# Patient Record
Sex: FEMALE | Race: WHITE | NOT HISPANIC OR LATINO | Employment: OTHER | URBAN - NONMETROPOLITAN AREA
[De-identification: names, ages, dates, MRNs, and addresses within clinical notes are randomized per-mention and may not be internally consistent; named-entity substitution may affect disease eponyms.]

---

## 2019-05-06 ENCOUNTER — HOSPITAL ENCOUNTER (INPATIENT)
Facility: HOSPITAL | Age: 74
LOS: 6 days | Discharge: HOME | End: 2019-05-12
Attending: EMERGENCY MEDICINE | Admitting: INTERNAL MEDICINE

## 2019-05-06 ENCOUNTER — APPOINTMENT (OUTPATIENT)
Dept: GENERAL RADIOLOGY | Facility: HOSPITAL | Age: 74
End: 2019-05-06

## 2019-05-06 DIAGNOSIS — J43.9 PULMONARY EMPHYSEMA, UNSPECIFIED EMPHYSEMA TYPE (HCC): Primary | ICD-10-CM

## 2019-05-06 DIAGNOSIS — Z78.9 POOR TOLERANCE FOR ACTIVITY: ICD-10-CM

## 2019-05-06 DIAGNOSIS — J44.1 COPD WITH ACUTE EXACERBATION (HCC): ICD-10-CM

## 2019-05-06 DIAGNOSIS — Z74.09 IMPAIRED MOBILITY AND ENDURANCE: ICD-10-CM

## 2019-05-06 PROBLEM — E43 SEVERE MALNUTRITION (HCC): Status: ACTIVE | Noted: 2019-05-06

## 2019-05-06 PROBLEM — R91.8 INFILTRATE OF LUNG PRESENT ON CHEST X-RAY: Status: ACTIVE | Noted: 2019-05-06

## 2019-05-06 PROBLEM — J96.22 ACUTE ON CHRONIC RESPIRATORY FAILURE WITH HYPERCAPNIA (HCC): Status: ACTIVE | Noted: 2019-05-06

## 2019-05-06 LAB
ALBUMIN SERPL-MCNC: 3.9 G/DL (ref 3.5–5)
ALBUMIN/GLOB SERPL: 1.5 G/DL (ref 1.1–2.5)
ALP SERPL-CCNC: 68 U/L (ref 24–120)
ALT SERPL W P-5'-P-CCNC: 18 U/L (ref 0–54)
ANION GAP SERPL CALCULATED.3IONS-SCNC: ABNORMAL MMOL/L (ref 4–13)
ARTERIAL PATENCY WRIST A: POSITIVE
AST SERPL-CCNC: 22 U/L (ref 7–45)
ATMOSPHERIC PRESS: 752 MMHG
BACTERIA UR QL AUTO: ABNORMAL /HPF
BASE EXCESS BLDA CALC-SCNC: 17.3 MMOL/L (ref 0–2)
BASOPHILS # BLD AUTO: 0.03 10*3/MM3 (ref 0–0.2)
BASOPHILS NFR BLD AUTO: 0.2 % (ref 0–2)
BDY SITE: ABNORMAL
BILIRUB SERPL-MCNC: 0.9 MG/DL (ref 0.1–1)
BILIRUB UR QL STRIP: NEGATIVE
BODY TEMPERATURE: 37 C
BUN BLD-MCNC: 16 MG/DL (ref 5–21)
BUN/CREAT SERPL: 76.2 (ref 7–25)
CALCIUM SPEC-SCNC: 9.7 MG/DL (ref 8.4–10.4)
CHLORIDE SERPL-SCNC: 89 MMOL/L (ref 98–110)
CLARITY UR: CLEAR
CO2 SERPL-SCNC: >40 MMOL/L (ref 24–31)
COLOR UR: YELLOW
CREAT BLD-MCNC: 0.21 MG/DL (ref 0.5–1.4)
D-LACTATE SERPL-SCNC: 1.2 MMOL/L (ref 0.5–2)
DEPRECATED RDW RBC AUTO: 45.7 FL (ref 40–54)
EOSINOPHIL # BLD AUTO: 0.03 10*3/MM3 (ref 0–0.7)
EOSINOPHIL NFR BLD AUTO: 0.2 % (ref 0–4)
ERYTHROCYTE [DISTWIDTH] IN BLOOD BY AUTOMATED COUNT: 13.1 % (ref 12–15)
FLUAV AG NPH QL: NEGATIVE
FLUBV AG NPH QL IA: NEGATIVE
GAS FLOW AIRWAY: 4 LPM
GFR SERPL CREATININE-BSD FRML MDRD: >150 ML/MIN/1.73
GLOBULIN UR ELPH-MCNC: 2.6 GM/DL
GLUCOSE BLD-MCNC: 108 MG/DL (ref 70–100)
GLUCOSE UR STRIP-MCNC: NEGATIVE MG/DL
HCO3 BLDA-SCNC: 46.1 MMOL/L (ref 20–26)
HCT VFR BLD AUTO: 39 % (ref 37–47)
HGB BLD-MCNC: 11.5 G/DL (ref 12–16)
HGB UR QL STRIP.AUTO: NEGATIVE
HOLD SPECIMEN: NORMAL
HYALINE CASTS UR QL AUTO: ABNORMAL /LPF
IMM GRANULOCYTES # BLD AUTO: 0.07 10*3/MM3 (ref 0–0.05)
IMM GRANULOCYTES NFR BLD AUTO: 0.4 % (ref 0–5)
INR PPP: 0.79 (ref 0.91–1.09)
KETONES UR QL STRIP: NEGATIVE
LEUKOCYTE ESTERASE UR QL STRIP.AUTO: ABNORMAL
LYMPHOCYTES # BLD AUTO: 0.94 10*3/MM3 (ref 0.72–4.86)
LYMPHOCYTES NFR BLD AUTO: 5.7 % (ref 15–45)
Lab: ABNORMAL
Lab: ABNORMAL
MCH RBC QN AUTO: 28 PG (ref 28–32)
MCHC RBC AUTO-ENTMCNC: 29.5 G/DL (ref 33–36)
MCV RBC AUTO: 95.1 FL (ref 82–98)
MODALITY: ABNORMAL
MONOCYTES # BLD AUTO: 1.25 10*3/MM3 (ref 0.19–1.3)
MONOCYTES NFR BLD AUTO: 7.6 % (ref 4–12)
NEUTROPHILS # BLD AUTO: 14.2 10*3/MM3 (ref 1.87–8.4)
NEUTROPHILS NFR BLD AUTO: 85.9 % (ref 39–78)
NITRITE UR QL STRIP: NEGATIVE
NOTIFIED BY: ABNORMAL
NOTIFIED WHO: ABNORMAL
NRBC BLD AUTO-RTO: 0 /100 WBC (ref 0–0.2)
NT-PROBNP SERPL-MCNC: 130 PG/ML (ref 0–900)
PCO2 BLDA: 80.1 MM HG (ref 35–45)
PH BLDA: 7.37 PH UNITS (ref 7.35–7.45)
PH UR STRIP.AUTO: 6.5 [PH] (ref 5–8)
PLATELET # BLD AUTO: 142 10*3/MM3 (ref 130–400)
PMV BLD AUTO: 12.8 FL (ref 6–12)
PO2 BLDA: 119 MM HG (ref 83–108)
POTASSIUM BLD-SCNC: 4.1 MMOL/L (ref 3.5–5.3)
PROCALCITONIN SERPL-MCNC: <0.25 NG/ML
PROT SERPL-MCNC: 6.5 G/DL (ref 6.3–8.7)
PROT UR QL STRIP: NEGATIVE
PROTHROMBIN TIME: 11.2 SECONDS (ref 11.9–14.6)
RBC # BLD AUTO: 4.1 10*6/MM3 (ref 4.2–5.4)
RBC # UR: ABNORMAL /HPF
REF LAB TEST METHOD: ABNORMAL
SAO2 % BLDCOA: 99 % (ref 94–99)
SODIUM BLD-SCNC: 137 MMOL/L (ref 135–145)
SP GR UR STRIP: 1.01 (ref 1–1.03)
SQUAMOUS #/AREA URNS HPF: ABNORMAL /HPF
TROPONIN I SERPL-MCNC: <0.012 NG/ML (ref 0–0.03)
UROBILINOGEN UR QL STRIP: ABNORMAL
VENTILATOR MODE: ABNORMAL
WBC NRBC COR # BLD: 16.52 10*3/MM3 (ref 4.8–10.8)
WBC UR QL AUTO: ABNORMAL /HPF
WHOLE BLOOD HOLD SPECIMEN: NORMAL
WHOLE BLOOD HOLD SPECIMEN: NORMAL

## 2019-05-06 PROCEDURE — 99284 EMERGENCY DEPT VISIT MOD MDM: CPT

## 2019-05-06 PROCEDURE — 82803 BLOOD GASES ANY COMBINATION: CPT

## 2019-05-06 PROCEDURE — 83605 ASSAY OF LACTIC ACID: CPT | Performed by: EMERGENCY MEDICINE

## 2019-05-06 PROCEDURE — 93010 ELECTROCARDIOGRAM REPORT: CPT | Performed by: INTERNAL MEDICINE

## 2019-05-06 PROCEDURE — 25010000002 METHYLPREDNISOLONE PER 125 MG: Performed by: EMERGENCY MEDICINE

## 2019-05-06 PROCEDURE — 93005 ELECTROCARDIOGRAM TRACING: CPT | Performed by: EMERGENCY MEDICINE

## 2019-05-06 PROCEDURE — 94640 AIRWAY INHALATION TREATMENT: CPT

## 2019-05-06 PROCEDURE — 84145 PROCALCITONIN (PCT): CPT | Performed by: EMERGENCY MEDICINE

## 2019-05-06 PROCEDURE — 87804 INFLUENZA ASSAY W/OPTIC: CPT | Performed by: EMERGENCY MEDICINE

## 2019-05-06 PROCEDURE — 36600 WITHDRAWAL OF ARTERIAL BLOOD: CPT

## 2019-05-06 PROCEDURE — 71046 X-RAY EXAM CHEST 2 VIEWS: CPT

## 2019-05-06 PROCEDURE — 80053 COMPREHEN METABOLIC PANEL: CPT | Performed by: EMERGENCY MEDICINE

## 2019-05-06 PROCEDURE — 84484 ASSAY OF TROPONIN QUANT: CPT | Performed by: EMERGENCY MEDICINE

## 2019-05-06 PROCEDURE — 25010000002 CEFTRIAXONE PER 250 MG: Performed by: EMERGENCY MEDICINE

## 2019-05-06 PROCEDURE — 94644 CONT INHLJ TX 1ST HOUR: CPT

## 2019-05-06 PROCEDURE — 94799 UNLISTED PULMONARY SVC/PX: CPT

## 2019-05-06 PROCEDURE — 81001 URINALYSIS AUTO W/SCOPE: CPT | Performed by: EMERGENCY MEDICINE

## 2019-05-06 PROCEDURE — 85610 PROTHROMBIN TIME: CPT | Performed by: EMERGENCY MEDICINE

## 2019-05-06 PROCEDURE — 85025 COMPLETE CBC W/AUTO DIFF WBC: CPT | Performed by: EMERGENCY MEDICINE

## 2019-05-06 PROCEDURE — 87040 BLOOD CULTURE FOR BACTERIA: CPT | Performed by: EMERGENCY MEDICINE

## 2019-05-06 PROCEDURE — 83880 ASSAY OF NATRIURETIC PEPTIDE: CPT | Performed by: EMERGENCY MEDICINE

## 2019-05-06 RX ORDER — AZITHROMYCIN 250 MG/1
500 TABLET, FILM COATED ORAL ONCE
Status: COMPLETED | OUTPATIENT
Start: 2019-05-06 | End: 2019-05-06

## 2019-05-06 RX ORDER — BUDESONIDE AND FORMOTEROL FUMARATE DIHYDRATE 160; 4.5 UG/1; UG/1
2 AEROSOL RESPIRATORY (INHALATION)
COMMUNITY
End: 2019-06-05 | Stop reason: SDUPTHER

## 2019-05-06 RX ORDER — METHYLPREDNISOLONE SODIUM SUCCINATE 125 MG/2ML
125 INJECTION, POWDER, LYOPHILIZED, FOR SOLUTION INTRAMUSCULAR; INTRAVENOUS ONCE
Status: COMPLETED | OUTPATIENT
Start: 2019-05-06 | End: 2019-05-06

## 2019-05-06 RX ORDER — IPRATROPIUM BROMIDE AND ALBUTEROL SULFATE 2.5; .5 MG/3ML; MG/3ML
3 SOLUTION RESPIRATORY (INHALATION) ONCE
Status: COMPLETED | OUTPATIENT
Start: 2019-05-06 | End: 2019-05-06

## 2019-05-06 RX ORDER — CLOTRIMAZOLE 10 MG/1
10 LOZENGE ORAL; TOPICAL
Status: DISCONTINUED | OUTPATIENT
Start: 2019-05-07 | End: 2019-05-12

## 2019-05-06 RX ORDER — ALBUTEROL SULFATE 2.5 MG/3ML
2.5 SOLUTION RESPIRATORY (INHALATION)
Status: COMPLETED | OUTPATIENT
Start: 2019-05-06 | End: 2019-05-06

## 2019-05-06 RX ADMIN — CEFTRIAXONE SODIUM 1 G: 1 INJECTION, POWDER, FOR SOLUTION INTRAMUSCULAR; INTRAVENOUS at 23:10

## 2019-05-06 RX ADMIN — AZITHROMYCIN 500 MG: 250 TABLET, FILM COATED ORAL at 21:19

## 2019-05-06 RX ADMIN — ALBUTEROL SULFATE 2.5 MG: 2.5 SOLUTION RESPIRATORY (INHALATION) at 21:15

## 2019-05-06 RX ADMIN — IPRATROPIUM BROMIDE AND ALBUTEROL SULFATE 3 ML: 2.5; .5 SOLUTION RESPIRATORY (INHALATION) at 21:14

## 2019-05-06 RX ADMIN — ALBUTEROL SULFATE 2.5 MG: 2.5 SOLUTION RESPIRATORY (INHALATION) at 21:16

## 2019-05-06 RX ADMIN — METHYLPREDNISOLONE SODIUM SUCCINATE 125 MG: 125 INJECTION, POWDER, FOR SOLUTION INTRAMUSCULAR; INTRAVENOUS at 21:19

## 2019-05-06 RX ADMIN — ALBUTEROL SULFATE 2.5 MG: 2.5 SOLUTION RESPIRATORY (INHALATION) at 21:17

## 2019-05-07 ENCOUNTER — APPOINTMENT (OUTPATIENT)
Dept: CARDIOLOGY | Facility: HOSPITAL | Age: 74
End: 2019-05-07

## 2019-05-07 PROBLEM — Z85.038 HISTORY OF COLON CANCER: Status: ACTIVE | Noted: 2019-05-07

## 2019-05-07 PROBLEM — D69.6 THROMBOCYTOPENIA (HCC): Status: ACTIVE | Noted: 2019-05-07

## 2019-05-07 PROBLEM — J18.9 COMMUNITY ACQUIRED PNEUMONIA OF RIGHT LOWER LOBE OF LUNG: Status: ACTIVE | Noted: 2019-05-07

## 2019-05-07 PROBLEM — T50.905A HYPERGLYCEMIA, DRUG-INDUCED: Status: ACTIVE | Noted: 2019-05-07

## 2019-05-07 PROBLEM — R73.9 HYPERGLYCEMIA, DRUG-INDUCED: Status: ACTIVE | Noted: 2019-05-07

## 2019-05-07 PROBLEM — D64.9 ANEMIA: Status: ACTIVE | Noted: 2019-05-07

## 2019-05-07 LAB
ALBUMIN SERPL-MCNC: 3.5 G/DL (ref 3.5–5)
ALBUMIN/GLOB SERPL: 1.3 G/DL (ref 1.1–2.5)
ALP SERPL-CCNC: 61 U/L (ref 24–120)
ALT SERPL W P-5'-P-CCNC: 19 U/L (ref 0–54)
ANION GAP SERPL CALCULATED.3IONS-SCNC: ABNORMAL MMOL/L (ref 4–13)
ARTICHOKE IGE QN: 51 MG/DL (ref 0–99)
AST SERPL-CCNC: 17 U/L (ref 7–45)
BH CV ECHO MEAS - AO MAX PG (FULL): 9.9 MMHG
BH CV ECHO MEAS - AO MAX PG: 13.7 MMHG
BH CV ECHO MEAS - AO MEAN PG (FULL): 4 MMHG
BH CV ECHO MEAS - AO MEAN PG: 5 MMHG
BH CV ECHO MEAS - AO ROOT AREA (BSA CORRECTED): 2.2
BH CV ECHO MEAS - AO ROOT AREA: 6.6 CM^2
BH CV ECHO MEAS - AO ROOT DIAM: 2.9 CM
BH CV ECHO MEAS - AO V2 MAX: 185 CM/SEC
BH CV ECHO MEAS - AO V2 MEAN: 97.6 CM/SEC
BH CV ECHO MEAS - AO V2 VTI: 29.2 CM
BH CV ECHO MEAS - AVA(I,A): 1.4 CM^2
BH CV ECHO MEAS - AVA(I,D): 1.4 CM^2
BH CV ECHO MEAS - AVA(V,A): 1.3 CM^2
BH CV ECHO MEAS - AVA(V,D): 1.3 CM^2
BH CV ECHO MEAS - BSA(HAYCOCK): 1.3 M^2
BH CV ECHO MEAS - BSA: 1.3 M^2
BH CV ECHO MEAS - BZI_BMI: 14.3 KILOGRAMS/M^2
BH CV ECHO MEAS - BZI_METRIC_HEIGHT: 160 CM
BH CV ECHO MEAS - BZI_METRIC_WEIGHT: 36.7 KG
BH CV ECHO MEAS - EDV(CUBED): 50.9 ML
BH CV ECHO MEAS - EDV(MOD-SP4): 83.7 ML
BH CV ECHO MEAS - EDV(TEICH): 58.3 ML
BH CV ECHO MEAS - EF(CUBED): 56.8 %
BH CV ECHO MEAS - EF(MOD-SP4): 64.6 %
BH CV ECHO MEAS - EF(TEICH): 49.3 %
BH CV ECHO MEAS - ESV(CUBED): 22 ML
BH CV ECHO MEAS - ESV(MOD-SP4): 29.6 ML
BH CV ECHO MEAS - ESV(TEICH): 29.6 ML
BH CV ECHO MEAS - FS: 24.4 %
BH CV ECHO MEAS - IVS/LVPW: 1.2
BH CV ECHO MEAS - IVSD: 0.84 CM
BH CV ECHO MEAS - LA DIMENSION: 2.6 CM
BH CV ECHO MEAS - LA/AO: 0.9
BH CV ECHO MEAS - LAT PEAK E' VEL: 9.8 CM/SEC
BH CV ECHO MEAS - LV DIASTOLIC VOL/BSA (35-75): 63.6 ML/M^2
BH CV ECHO MEAS - LV MASS(C)D: 78.7 GRAMS
BH CV ECHO MEAS - LV MASS(C)DI: 59.8 GRAMS/M^2
BH CV ECHO MEAS - LV MAX PG: 3.7 MMHG
BH CV ECHO MEAS - LV MEAN PG: 1 MMHG
BH CV ECHO MEAS - LV SYSTOLIC VOL/BSA (12-30): 22.5 ML/M^2
BH CV ECHO MEAS - LV V1 MAX: 96.8 CM/SEC
BH CV ECHO MEAS - LV V1 MEAN: 52.1 CM/SEC
BH CV ECHO MEAS - LV V1 VTI: 17.4 CM
BH CV ECHO MEAS - LVIDD: 3.7 CM
BH CV ECHO MEAS - LVIDS: 2.8 CM
BH CV ECHO MEAS - LVLD AP4: 6.7 CM
BH CV ECHO MEAS - LVLS AP4: 5.5 CM
BH CV ECHO MEAS - LVOT AREA (M): 2.5 CM^2
BH CV ECHO MEAS - LVOT AREA: 2.4 CM^2
BH CV ECHO MEAS - LVOT DIAM: 1.8 CM
BH CV ECHO MEAS - LVPWD: 0.71 CM
BH CV ECHO MEAS - MED PEAK E' VEL: 8.38 CM/SEC
BH CV ECHO MEAS - MR MAX PG: 41.5 MMHG
BH CV ECHO MEAS - MR MAX VEL: 322 CM/SEC
BH CV ECHO MEAS - MV A MAX VEL: 106 CM/SEC
BH CV ECHO MEAS - MV DEC TIME: 0.2 SEC
BH CV ECHO MEAS - MV E MAX VEL: 80.5 CM/SEC
BH CV ECHO MEAS - MV E/A: 0.76
BH CV ECHO MEAS - SI(AO): 146.4 ML/M^2
BH CV ECHO MEAS - SI(CUBED): 21.9 ML/M^2
BH CV ECHO MEAS - SI(LVOT): 31.8 ML/M^2
BH CV ECHO MEAS - SI(MOD-SP4): 41.1 ML/M^2
BH CV ECHO MEAS - SI(TEICH): 21.8 ML/M^2
BH CV ECHO MEAS - SV(AO): 192.9 ML
BH CV ECHO MEAS - SV(CUBED): 28.9 ML
BH CV ECHO MEAS - SV(LVOT): 41.9 ML
BH CV ECHO MEAS - SV(MOD-SP4): 54.1 ML
BH CV ECHO MEAS - SV(TEICH): 28.8 ML
BH CV ECHO MEAS - TR MAX VEL: 208 CM/SEC
BH CV ECHO MEASUREMENTS AVERAGE E/E' RATIO: 8.86
BILIRUB SERPL-MCNC: 0.6 MG/DL (ref 0.1–1)
BUN BLD-MCNC: 15 MG/DL (ref 5–21)
BUN/CREAT SERPL: 71.4 (ref 7–25)
CALCIUM SPEC-SCNC: 9.4 MG/DL (ref 8.4–10.4)
CHLORIDE SERPL-SCNC: 93 MMOL/L (ref 98–110)
CHOLEST SERPL-MCNC: 145 MG/DL (ref 130–200)
CO2 SERPL-SCNC: >40 MMOL/L (ref 24–31)
CREAT BLD-MCNC: 0.21 MG/DL (ref 0.5–1.4)
DEPRECATED RDW RBC AUTO: 43.7 FL (ref 40–54)
ERYTHROCYTE [DISTWIDTH] IN BLOOD BY AUTOMATED COUNT: 13.1 % (ref 12–15)
GFR SERPL CREATININE-BSD FRML MDRD: >150 ML/MIN/1.73
GLOBULIN UR ELPH-MCNC: 2.6 GM/DL
GLUCOSE BLD-MCNC: 138 MG/DL (ref 70–100)
HBA1C MFR BLD: 5 %
HCT VFR BLD AUTO: 34 % (ref 37–47)
HDLC SERPL-MCNC: 82 MG/DL
HGB BLD-MCNC: 10.1 G/DL (ref 12–16)
HYPOCHROMIA BLD QL: ABNORMAL
LDLC/HDLC SERPL: 0.69 {RATIO}
LEFT ATRIUM VOLUME INDEX: 34.6 ML/M2
LEFT ATRIUM VOLUME: 45.7 CM3
LYMPHOCYTES # BLD MANUAL: 0 10*3/MM3 (ref 0.72–4.86)
LYMPHOCYTES NFR BLD MANUAL: 0 % (ref 15–45)
MAXIMAL PREDICTED HEART RATE: 148 BPM
MCH RBC QN AUTO: 27.3 PG (ref 28–32)
MCHC RBC AUTO-ENTMCNC: 29.7 G/DL (ref 33–36)
MCV RBC AUTO: 91.9 FL (ref 82–98)
NEUTROPHILS # BLD AUTO: 11.12 10*3/MM3 (ref 1.87–8.4)
NEUTROPHILS NFR BLD MANUAL: 100 % (ref 39–78)
PLAT MORPH BLD: NORMAL
PLATELET # BLD AUTO: 112 10*3/MM3 (ref 130–400)
PMV BLD AUTO: 13.1 FL (ref 6–12)
POIKILOCYTOSIS BLD QL SMEAR: ABNORMAL
POTASSIUM BLD-SCNC: 4.1 MMOL/L (ref 3.5–5.3)
PROT SERPL-MCNC: 6.1 G/DL (ref 6.3–8.7)
RBC # BLD AUTO: 3.7 10*6/MM3 (ref 4.2–5.4)
SODIUM BLD-SCNC: 138 MMOL/L (ref 135–145)
STRESS TARGET HR: 126 BPM
TRIGL SERPL-MCNC: 33 MG/DL (ref 0–149)
TSH SERPL DL<=0.05 MIU/L-ACNC: 0.05 MIU/ML (ref 0.47–4.68)
WBC MORPH BLD: NORMAL
WBC NRBC COR # BLD: 11.12 10*3/MM3 (ref 4.8–10.8)

## 2019-05-07 PROCEDURE — 93306 TTE W/DOPPLER COMPLETE: CPT

## 2019-05-07 PROCEDURE — 80061 LIPID PANEL: CPT | Performed by: NURSE PRACTITIONER

## 2019-05-07 PROCEDURE — 83036 HEMOGLOBIN GLYCOSYLATED A1C: CPT | Performed by: NURSE PRACTITIONER

## 2019-05-07 PROCEDURE — 25010000002 AZITHROMYCIN PER 500 MG: Performed by: NURSE PRACTITIONER

## 2019-05-07 PROCEDURE — 84443 ASSAY THYROID STIM HORMONE: CPT | Performed by: NURSE PRACTITIONER

## 2019-05-07 PROCEDURE — 94760 N-INVAS EAR/PLS OXIMETRY 1: CPT

## 2019-05-07 PROCEDURE — 80053 COMPREHEN METABOLIC PANEL: CPT | Performed by: NURSE PRACTITIONER

## 2019-05-07 PROCEDURE — 94799 UNLISTED PULMONARY SVC/PX: CPT

## 2019-05-07 PROCEDURE — 25010000002 METHYLPREDNISOLONE PER 125 MG: Performed by: NURSE PRACTITIONER

## 2019-05-07 PROCEDURE — 25010000002 CEFTRIAXONE PER 250 MG: Performed by: NURSE PRACTITIONER

## 2019-05-07 PROCEDURE — 93306 TTE W/DOPPLER COMPLETE: CPT | Performed by: INTERNAL MEDICINE

## 2019-05-07 PROCEDURE — 85025 COMPLETE CBC W/AUTO DIFF WBC: CPT | Performed by: NURSE PRACTITIONER

## 2019-05-07 PROCEDURE — 85007 BL SMEAR W/DIFF WBC COUNT: CPT | Performed by: NURSE PRACTITIONER

## 2019-05-07 RX ORDER — METHYLPREDNISOLONE SODIUM SUCCINATE 125 MG/2ML
60 INJECTION, POWDER, LYOPHILIZED, FOR SOLUTION INTRAMUSCULAR; INTRAVENOUS EVERY 8 HOURS
Status: DISCONTINUED | OUTPATIENT
Start: 2019-05-07 | End: 2019-05-08

## 2019-05-07 RX ORDER — SODIUM CHLORIDE 9 MG/ML
50 INJECTION, SOLUTION INTRAVENOUS CONTINUOUS
Status: DISCONTINUED | OUTPATIENT
Start: 2019-05-07 | End: 2019-05-09

## 2019-05-07 RX ORDER — SODIUM CHLORIDE 0.9 % (FLUSH) 0.9 %
3-10 SYRINGE (ML) INJECTION AS NEEDED
Status: DISCONTINUED | OUTPATIENT
Start: 2019-05-07 | End: 2019-05-12 | Stop reason: HOSPADM

## 2019-05-07 RX ORDER — ONDANSETRON 2 MG/ML
4 INJECTION INTRAMUSCULAR; INTRAVENOUS EVERY 6 HOURS PRN
Status: DISCONTINUED | OUTPATIENT
Start: 2019-05-07 | End: 2019-05-12 | Stop reason: HOSPADM

## 2019-05-07 RX ORDER — ONDANSETRON 4 MG/1
4 TABLET, FILM COATED ORAL EVERY 6 HOURS PRN
Status: DISCONTINUED | OUTPATIENT
Start: 2019-05-07 | End: 2019-05-12 | Stop reason: HOSPADM

## 2019-05-07 RX ORDER — SODIUM CHLORIDE 0.9 % (FLUSH) 0.9 %
3 SYRINGE (ML) INJECTION EVERY 12 HOURS SCHEDULED
Status: DISCONTINUED | OUTPATIENT
Start: 2019-05-07 | End: 2019-05-12 | Stop reason: HOSPADM

## 2019-05-07 RX ORDER — BENZONATATE 100 MG/1
200 CAPSULE ORAL 3 TIMES DAILY PRN
Status: DISCONTINUED | OUTPATIENT
Start: 2019-05-07 | End: 2019-05-12 | Stop reason: HOSPADM

## 2019-05-07 RX ORDER — ACETAMINOPHEN 325 MG/1
650 TABLET ORAL EVERY 4 HOURS PRN
Status: DISCONTINUED | OUTPATIENT
Start: 2019-05-07 | End: 2019-05-12 | Stop reason: HOSPADM

## 2019-05-07 RX ORDER — BUDESONIDE AND FORMOTEROL FUMARATE DIHYDRATE 160; 4.5 UG/1; UG/1
2 AEROSOL RESPIRATORY (INHALATION)
Status: DISCONTINUED | OUTPATIENT
Start: 2019-05-07 | End: 2019-05-12 | Stop reason: HOSPADM

## 2019-05-07 RX ORDER — ROPINIROLE 0.25 MG/1
0.25 TABLET, FILM COATED ORAL NIGHTLY
Status: DISCONTINUED | OUTPATIENT
Start: 2019-05-07 | End: 2019-05-12 | Stop reason: HOSPADM

## 2019-05-07 RX ORDER — METHYLPREDNISOLONE SODIUM SUCCINATE 125 MG/2ML
60 INJECTION, POWDER, LYOPHILIZED, FOR SOLUTION INTRAMUSCULAR; INTRAVENOUS EVERY 6 HOURS SCHEDULED
Status: DISCONTINUED | OUTPATIENT
Start: 2019-05-07 | End: 2019-05-07

## 2019-05-07 RX ORDER — IPRATROPIUM BROMIDE AND ALBUTEROL SULFATE 2.5; .5 MG/3ML; MG/3ML
3 SOLUTION RESPIRATORY (INHALATION)
Status: DISCONTINUED | OUTPATIENT
Start: 2019-05-07 | End: 2019-05-12 | Stop reason: HOSPADM

## 2019-05-07 RX ORDER — GUAIFENESIN 600 MG/1
1200 TABLET, EXTENDED RELEASE ORAL EVERY 12 HOURS SCHEDULED
Status: DISCONTINUED | OUTPATIENT
Start: 2019-05-07 | End: 2019-05-09

## 2019-05-07 RX ADMIN — AZITHROMYCIN MONOHYDRATE 500 MG: 500 INJECTION, POWDER, LYOPHILIZED, FOR SOLUTION INTRAVENOUS at 23:13

## 2019-05-07 RX ADMIN — GUAIFENESIN 1200 MG: 600 TABLET, EXTENDED RELEASE ORAL at 00:32

## 2019-05-07 RX ADMIN — IPRATROPIUM BROMIDE AND ALBUTEROL SULFATE 3 ML: 2.5; .5 SOLUTION RESPIRATORY (INHALATION) at 06:16

## 2019-05-07 RX ADMIN — SODIUM CHLORIDE, PRESERVATIVE FREE 3 ML: 5 INJECTION INTRAVENOUS at 00:36

## 2019-05-07 RX ADMIN — CLOTRIMAZOLE 10 MG: 10 LOZENGE ORAL; TOPICAL at 21:41

## 2019-05-07 RX ADMIN — METHYLPREDNISOLONE SODIUM SUCCINATE 60 MG: 125 INJECTION, POWDER, FOR SOLUTION INTRAMUSCULAR; INTRAVENOUS at 06:16

## 2019-05-07 RX ADMIN — IPRATROPIUM BROMIDE AND ALBUTEROL SULFATE 3 ML: 2.5; .5 SOLUTION RESPIRATORY (INHALATION) at 00:36

## 2019-05-07 RX ADMIN — IPRATROPIUM BROMIDE AND ALBUTEROL SULFATE 3 ML: 2.5; .5 SOLUTION RESPIRATORY (INHALATION) at 23:49

## 2019-05-07 RX ADMIN — CLOTRIMAZOLE 10 MG: 10 LOZENGE ORAL; TOPICAL at 12:08

## 2019-05-07 RX ADMIN — SODIUM CHLORIDE 100 ML/HR: 9 INJECTION, SOLUTION INTRAVENOUS at 21:48

## 2019-05-07 RX ADMIN — CLOTRIMAZOLE 10 MG: 10 LOZENGE ORAL; TOPICAL at 14:41

## 2019-05-07 RX ADMIN — IPRATROPIUM BROMIDE AND ALBUTEROL SULFATE 3 ML: 2.5; .5 SOLUTION RESPIRATORY (INHALATION) at 11:38

## 2019-05-07 RX ADMIN — CLOTRIMAZOLE 10 MG: 10 LOZENGE ORAL; TOPICAL at 17:30

## 2019-05-07 RX ADMIN — BUDESONIDE AND FORMOTEROL FUMARATE DIHYDRATE 2 PUFF: 160; 4.5 AEROSOL RESPIRATORY (INHALATION) at 18:21

## 2019-05-07 RX ADMIN — CEFTRIAXONE SODIUM 1 G: 1 INJECTION, POWDER, FOR SOLUTION INTRAMUSCULAR; INTRAVENOUS at 21:42

## 2019-05-07 RX ADMIN — SODIUM CHLORIDE, PRESERVATIVE FREE 3 ML: 5 INJECTION INTRAVENOUS at 21:52

## 2019-05-07 RX ADMIN — METHYLPREDNISOLONE SODIUM SUCCINATE 60 MG: 125 INJECTION, POWDER, FOR SOLUTION INTRAMUSCULAR; INTRAVENOUS at 00:32

## 2019-05-07 RX ADMIN — ROPINIROLE HYDROCHLORIDE 0.25 MG: 0.25 TABLET, FILM COATED ORAL at 21:41

## 2019-05-07 RX ADMIN — METHYLPREDNISOLONE SODIUM SUCCINATE 60 MG: 125 INJECTION, POWDER, FOR SOLUTION INTRAMUSCULAR; INTRAVENOUS at 21:42

## 2019-05-07 RX ADMIN — CLOTRIMAZOLE 10 MG: 10 LOZENGE ORAL; TOPICAL at 08:14

## 2019-05-07 RX ADMIN — METHYLPREDNISOLONE SODIUM SUCCINATE 60 MG: 125 INJECTION, POWDER, FOR SOLUTION INTRAMUSCULAR; INTRAVENOUS at 14:41

## 2019-05-07 RX ADMIN — BUDESONIDE AND FORMOTEROL FUMARATE DIHYDRATE 2 PUFF: 160; 4.5 AEROSOL RESPIRATORY (INHALATION) at 06:16

## 2019-05-07 RX ADMIN — SODIUM CHLORIDE 100 ML/HR: 9 INJECTION, SOLUTION INTRAVENOUS at 12:08

## 2019-05-07 RX ADMIN — IPRATROPIUM BROMIDE AND ALBUTEROL SULFATE 3 ML: 2.5; .5 SOLUTION RESPIRATORY (INHALATION) at 18:21

## 2019-05-07 RX ADMIN — GUAIFENESIN 1200 MG: 600 TABLET, EXTENDED RELEASE ORAL at 21:41

## 2019-05-07 RX ADMIN — SODIUM CHLORIDE 100 ML/HR: 9 INJECTION, SOLUTION INTRAVENOUS at 00:36

## 2019-05-07 RX ADMIN — GUAIFENESIN 1200 MG: 600 TABLET, EXTENDED RELEASE ORAL at 08:14

## 2019-05-07 NOTE — PLAN OF CARE
Problem: Patient Care Overview  Goal: Plan of Care Review  Outcome: Ongoing (interventions implemented as appropriate)   05/07/19 1611   Coping/Psychosocial   Plan of Care Reviewed With patient   Plan of Care Review   Progress no change   OTHER   Outcome Summary no c/o pain. safety maintained. 4L NC o2 continuous.        Problem: Chronic Obstructive Pulmonary Disease (Adult)  Goal: Signs and Symptoms of Listed Potential Problems Will be Absent, Minimized or Managed (Chronic Obstructive Pulmonary Disease)  Outcome: Ongoing (interventions implemented as appropriate)      Problem: Fall Risk (Adult)  Goal: Identify Related Risk Factors and Signs and Symptoms  Outcome: Ongoing (interventions implemented as appropriate)    Goal: Absence of Fall  Outcome: Ongoing (interventions implemented as appropriate)

## 2019-05-07 NOTE — PROGRESS NOTES
Discharge Planning Assessment  [facility]     Patient Name: Heide Newsome  MRN: [id number]  Today's Date: 5/7/2019    Admit Date: 5/6/2019    Discharge Needs Assessment     Row Name 05/07/19 0940       Living Environment    Lives With  spouse    Name(s) of Who Lives With Patient  Myke    Current Living Arrangements  home/apartment/condo    Primary Care Provided by  self;spouse/significant other    Provides Primary Care For  no one, unable/limited ability to care for self    Family Caregiver if Needed  spouse    Quality of Family Relationships  supportive;involved;helpful    Able to Return to Prior Arrangements  yes       Resource/Environmental Concerns    Resource/Environmental Concerns  none    Transportation Concerns  car, none       Transition Planning    Patient/Family Anticipates Transition to  home with family    Patient/Family Anticipated Services at Transition  home health care    Transportation Anticipated  family or friend will provide       Discharge Needs Assessment    Readmission Within the Last 30 Days  no previous admission in last 30 days    Concerns to be Addressed  basic needs    Equipment Currently Used at Home  oxygen;shower chair    Anticipated Changes Related to Illness  none    Equipment Needed After Discharge  none    Outpatient/Agency/Support Group Needs  homecare agency    Discharge Facility/Level of Care Needs  home with home health    Offered/Gave Vendor List  yes    Patient's Choice of Community Agency(s)  [facility]     Current Discharge Risk  chronically ill        Discharge Plan     Row Name 05/07/19 0942       Plan    Plan  Request HH care    Patient/Family in Agreement with Plan  yes    Plan Comments  Spoke with pt/spouse in room to assess for home needs. Pt lives at home with spouse and plans same.  They are very interested in getting HH care started at home, informed Dr. Quezada.  Pt has needed DME to include O2 from [facility].  Pt does have RX coverage. Will follow.          Destination      No service coordination in this encounter.      Durable Medical Equipment      No service coordination in this encounter.      Dialysis/Infusion      No service coordination in this encounter.      Home Medical Care      No service coordination in this encounter.      Therapy      No service coordination in this encounter.      Community Resources      No service coordination in this encounter.          Demographic Summary    No documentation.       Functional Status    No documentation.       Psychosocial    No documentation.       Abuse/Neglect    No documentation.       Legal    No documentation.       Substance Abuse    No documentation.       Patient Forms    No documentation.           XAVIER Erickson

## 2019-05-07 NOTE — ED PROVIDER NOTES
Subjective   74-year-old female presenting to the emergency department with shortness of breath.  She feels like her COPD has been getting worse since last night.  Patient denies any recent hospitalizations antibiotics or steroid use.  Patient states that she has been using her breathing treatments however this is not helped.  Patient denies any chest pain.  Patient states that she has had a worsening cough.  Patient is normally on 2 L of oxygen at home.            Review of Systems   Respiratory: Positive for shortness of breath and wheezing.        No past medical history on file.    Allergies   Allergen Reactions   • Tetracyclines & Related Anaphylaxis   • Penicillins Itching       No past surgical history on file.    No family history on file.    Social History     Socioeconomic History   • Marital status:      Spouse name: Not on file   • Number of children: Not on file   • Years of education: Not on file   • Highest education level: Not on file           Objective   Physical Exam   Constitutional: She is oriented to person, place, and time. She appears well-developed and well-nourished.   HENT:   Head: Normocephalic and atraumatic.   Eyes: EOM are normal. Pupils are equal, round, and reactive to light.   Neck: Normal range of motion. Neck supple.   Cardiovascular: Normal rate, regular rhythm and normal heart sounds.   2+ pulses in upper and lower extremities   Pulmonary/Chest: Effort normal. She has wheezes. She has rales.   Abdominal: Soft. Bowel sounds are normal.   Musculoskeletal: Normal range of motion.   Neurological: She is alert and oriented to person, place, and time.   Skin: Skin is warm. Capillary refill takes less than 2 seconds.   Psychiatric: She has a normal mood and affect. Her behavior is normal. Thought content normal.   Nursing note and vitals reviewed.      Procedures           ED Course  ED Course as of May 06 2148   Mon May 06, 2019   2135 Low suspicion for sepsis likely COPD  exacerbation causing lab abnormalities and vital sign abnormalities.  [AP]   2145 Patient be admitted for COPD exacerbation.  Patient currently improving with steroids and antibiotics as well as nebulizer treatments.  Patient's case discussed with Dr. Matson.  The PCO2 is 80 pH is normal and likely this is a chronic retention of PCO2 secondary to the patient's COPD.  [AP]      ED Course User Index  [AP] Reyna Eastman MD                  [facility]      Final diagnoses:   Pulmonary emphysema, unspecified emphysema type (CMS/[facility])            Reyna Eastman MD  05/06/19 0004

## 2019-05-07 NOTE — PROGRESS NOTES
[facility]  INPATIENT PROGRESS NOTE    Patient Name: Heide Newsome  Date of Admission: 5/6/2019  Today's Date: 05/07/19  Length of Stay: 1  Primary Care Physician: Provider, No Known    Subjective   Chief Complaint: dyspnea     HPI   Currently sitting up in the bed on 4 L nasal cannula.  She states that she is still short of breath.  She appears uncomfortable.  Family at the bedside.  She states that she has restless legs and they are really bothering her.  Currently being treated for PNA.      Review of Systems   Constitutional: Positive for activity change and fatigue. Negative for appetite change, chills and fever.   HENT: Negative for hearing loss, nosebleeds, tinnitus and trouble swallowing.    Eyes: Negative for visual disturbance.   Respiratory: Positive for cough and shortness of breath. Negative for chest tightness and wheezing.    Cardiovascular: Negative for chest pain, palpitations and leg swelling.   Gastrointestinal: Negative for abdominal distention, abdominal pain, blood in stool, constipation, diarrhea, nausea and vomiting.   Endocrine: Negative for cold intolerance, heat intolerance, polydipsia, polyphagia and polyuria.   Genitourinary: Negative for decreased urine volume, difficulty urinating, dysuria, flank pain, frequency and hematuria.   Musculoskeletal: Positive for myalgias. Negative for arthralgias and joint swelling.   Skin: Negative for rash.   Allergic/Immunologic: Negative for immunocompromised state.   Neurological: Positive for weakness. Negative for dizziness, syncope, light-headedness and headaches.   Hematological: Negative for adenopathy. Does not bruise/bleed easily.   Psychiatric/Behavioral: Negative for confusion and sleep disturbance. The patient is not nervous/anxious.       All pertinent negatives and positives are as above. All other systems have been reviewed and are negative unless otherwise stated.     Objective     Temp:  [98 °F (36.7 °C)-99.2 °F (37.3 °C)] 98.3 °F (36.8 °C)  Heart Rate:  [] 117  Resp:  [16-25] 17  BP: (111-156)/(57-89) 156/72     Intake/Output Summary (Last 24 hours) at 5/7/2019 1245  Last data filed at 5/7/2019 1210  Gross per 24 hour   Intake 1000 ml   Output --   Net 1000 ml     Physical Exam   Constitutional: She is oriented to person, place, and time. She appears well-developed.   Sitting up in the bed.  NAD.  Thin.   HENT:   Head: Normocephalic and atraumatic.   Eyes: Conjunctivae and EOM are normal. Pupils are equal, round, and reactive to light.   Neck: Neck supple. No JVD present. No thyromegaly present.   Cardiovascular: Regular rhythm, normal heart sounds and intact distal pulses. Tachycardia present. Exam reveals no gallop and no friction rub.   No murmur heard.  Sinus tach 103-109   Pulmonary/Chest: Effort normal and breath sounds normal. No respiratory distress. She has no wheezes. She has no rales. She exhibits no tenderness.   Decreased air movement.  4 L nasal cannula    Abdominal: Soft. Bowel sounds are normal. She exhibits no distension. There is no tenderness. There is no rebound and no guarding.   Genitourinary:   Genitourinary Comments: Voiding.    Musculoskeletal: Normal range of motion. She exhibits no edema, tenderness or deformity.   Lymphadenopathy:     She has no cervical adenopathy.   Neurological: She is alert and oriented to person, place, and time.   Skin: Skin is warm and dry. No rash noted.   Psychiatric: She has a normal mood and affect. Her behavior is normal. Judgment and thought content normal.   Nursing note and vitals reviewed.    Results Review:  I have reviewed the labs, radiology results, and diagnostic studies.    Laboratory Data:   Results from last 7 days   Lab Units 05/07/19  0523 05/06/19  1925   WBC 10*3/mm3 11.12* 16.52*   HEMOGLOBIN g/dL 10.1* 11.5*   HEMATOCRIT % 34.0* 39.0   PLATELETS 10*3/mm3 112* 142     Results from last 7 days   Lab Units  05/07/19 0523 05/06/19 2051   SODIUM mmol/L 138 137   POTASSIUM mmol/L 4.1 4.1   CHLORIDE mmol/L 93* 89*   CO2 mmol/L >40.0* >40.0*   BUN mg/dL 15 16   CREATININE mg/dL 0.21* 0.21*   CALCIUM mg/dL 9.4 9.7   BILIRUBIN mg/dL 0.6 0.9   ALK PHOS U/L 61 68   ALT (SGPT) U/L 19 18   AST (SGOT) U/L 17 22   GLUCOSE mg/dL 138* 108*     Radiology Data:   Imaging Results (last 24 hours)     Procedure Component Value Units Date/Time    XR Chest 2 View [[id number]] Collected:  05/06/19 2134     Updated:  05/06/19 2138    Narrative:       EXAMINATION: XR CHEST 2 VW-     5/6/2019 9:05 PM CDT     HISTORY: cough and congestion.     2 view chest x-ray with no comparison.     Normal heart size.  Aortic arch calcification.     Markedly hyperexpanded lungs with chronic interstitial disease. Mild  infiltrate or atelectasis at the right lower lobe.     The upper lobes are clear.  There is no pneumothorax or heart failure.     Summary:  1. Chronic lung changes with mild infiltrate or atelectasis at the right  lower lobe  This report was finalized on 05/06/2019 21:35 by Dr. Ady Mcnamara MD.        I have reviewed the patient's current medications.     Assessment/Plan     Active Hospital Problems    Diagnosis   • **Acute on chronic respiratory failure with hypercapnia (CMS/HCC)   • Community acquired pneumonia of right lower lobe of lung (CMS/HCC)   • COPD with acute exacerbation (CMS/HCC)   • Thrombocytopenia (CMS/HCC)   • Anemia   • Pulmonary emphysema (CMS/HCC)   • Severe malnutrition (CMS/HCC)   • History of colon cancer   • Hyperglycemia, drug-induced     Plan:  1.  Azithromycin and Rocephin day #2  2.  Continue Symbicort, Mucinex, Duonebs,   3.  Decrease Solu-medrol to 60 mg every 8 hours   4.  Start Requip  5.  CBC and BMP in AM  6.  2D echocardiogram pending  7.  Decrease IV fluids to 75 ml/hr  8.  Nutrition consult  9.  Blood cultures in process  10.  Incentive spirometry and aerobika    Discharge Planning: I expect the patient to  be discharged to home in 2-3 days.    Brian Bhatti, JESSENIA   05/07/19   12:45 PM

## 2019-05-07 NOTE — PAYOR COMM NOTE
"[facility]  DAGOBERTO,    [phone]  OR   FAX   [phone]    Heide Benitez (74 y.o. Female)     Date of Birth Social Security Number Address Home Phone MRN    1945  [id number]  [facility] [phone] [id number]    [employer] Marital Status          Voodoo        Admission Date Admission Type Admitting Provider Attending Provider Department, Room/Bed    5/6/19 Emergency Freeman Quezada DO Robinson, Maurice S, DO [facility] 4C, 464/1    Discharge Date Discharge Disposition Discharge Destination                       Attending Provider:  Freeman Quezada DO    Allergies:  Tetracyclines & Related, Penicillins    Isolation:  None   Infection:  None   Code Status:  No CPR    Ht:  160 cm (63\")   Wt:  36.7 kg (81 lb)    Admission Cmt:  None   Principal Problem:  Acute on chronic respiratory failure with hypercapnia (CMS/[facility]) [J96.22]                 Active Insurance as of 5/6/2019     Primary Coverage     Payor Plan Insurance Group Employer/Plan Group    HUMANA MEDICARE REPLACEMENT HUMANA MEDICARE REPL [id number]     Payor Plan Address Payor Plan Phone Number Payor Plan Fax Number Effective Dates    [address] [address] [phone]  1/1/2018 - None Entered    [facility] [id number]       Subscriber Name Subscriber Birth Date Member ID       HEIDE BENITEZ 1945 [id number]                 Emergency Contacts      (Rel.) Home Phone Work Phone Mobile Phone    TON BENITEZ (Spouse) [phone] -- --          5/6/2019 Event Details User   19:06 Patient arrival  Poppy Camp RN   19:06 HPI HPI   Stated Reason for Visit: soa & np congested cough worse since yesterday History Obtained From: patient    Poppy Camp RN   19:06:48 Trigger for Triage Start  Poppy Camp RN   19:06:48 Triage Started  Poppy Camp RN   19:07 Vital Signs Vital Signs   Temp: 98.6 °F (37 °C) Temp src: Oral   Heart Rate: 111 Resp: 22 " "  Resp Rate Source: Visual BP: 138/60   BMI (Calculated): 12.4    Oxygen Therapy   SpO2: 99 % Device (Oxygen Therapy): nasal cannula (per home use)   Vitals Timer   Restart Vitals Timer: Yes    Height and Weight   Height: 160 cm (63\") Height Method: Stated   Weight: 31.8 kg (70 lb) Weight Method: Stated   Other flowsheet entries   Ideal Body Weight k.4     Poppy Camp RN     :29:21 Cardiac (Adult) Cardiac (Adult)   Cardiac WDL: WDL except; rhythm Cardiac Rhythm: tachycardic (110)    Mitra Gifford RN   19:29:32 Neuro Cognitive (Adult) Neuro Cognitive (Adult)   Cognitive/Neuro/Behavioral WDL: WDL     Mitra Gifford RN   19:29:36 Peripheral Neurovascular (Adult) Peripheral Neurovascular (Adult)   Peripheral Neuro Vascular WDL: WDL     Mitra Gifford RN   19:29:39 Respiratory Respiratory   Airway WDL: WDL Additional Documentation: Breath Sounds (Group)   Respiratory WDL   Respiratory WDL: cough Cough Frequency: frequent   Cough Type: dry    Breath Sounds   Breath Sounds: All Fields All Lung Fields Breath Sounds: diminished    Mitra Gifford RN     :14 Medication Given ipratropium-albuterol (DUO-NEB) nebulizer solution 3 mL - Dose: 3 mL ; Route: Nebulization ; Scheduled Time:   Rachel Min RRT   21:14 Data Other flowsheet entries   $ Mini Neb Initial: yes     Rachel Min RRT   21:14 Respiratory Assessment $ Oximetry Charges   $ O2 Pt/System Assessment: yes    Oxygen Therapy   SpO2: 99 % Pulse Oximetry Type: Continuous   Device (Oxygen Therapy): nasal cannula Flow (L/min): 4   Vital Signs   Heart Rate: 104 Heart Rate Source: Monitor   Resp: 23 Resp Rate Source: Monitor   Respiratory   Respiratory WDL: WDL except; breath sounds; cough Rhythm/Pattern, Respiratory: shortness of breath; shallow   Cough Frequency: frequent Cough Type: congested; loose; nonproductive   Cough And Deep Breathing: done independently per patient    Breath Sounds   Breath Sounds: All Fields All Lung " Fields Breath Sounds: Anterior:; Posterior:; diminished   Suction   Cough And Deep Breathing: done independently per patient    Aerosol Therapy (SVN)   Respiratory Treatment Status (SVN): given; continuous $ Continuous Inhalation 1st Hr: yes   Treatment Route (SVN): mask; oxygen Patient Position (SVN): HOB elevated   Interventions   Cough And Deep Breathing: done independently per patient     Rachel Min, RRT   21:15 Medication Given albuterol (PROVENTIL) nebulizer solution 0.083% 2.5 mg/3mL - Dose: 2.5 mg ; Route: Nebulization ; Scheduled Time: 2039  Rachel Min RRT     21:15 Data Other flowsheet entries   $ Mini Neb Subsequent: yes     Rachel Min RRT   21:15 Respiratory Assessment Oxygen Therapy   SpO2: 100 % (Device Time: 21:15:11) Pulse Oximetry Type: (P) Continuous   Device (Oxygen Therapy): (P) aerosol mask Flow (L/min): (P) 8   Vital Signs   Heart Rate: 108 (Device Time: 21:15:11) Heart Rate Source: (P) Monitor   Resp: (P) 22 Resp Rate Source: (P) Monitor   Respiratory   Respiratory WDL: (P) WDL except; breath sounds; cough Rhythm/Pattern, Respiratory: (P) shortness of breath   Cough Frequency: (P) frequent Cough Type: (P) congested; nonproductive; loose   Cough And Deep Breathing: (P) done independently per patient    Breath Sounds   Breath Sounds: (P) All Fields All Lung Fields Breath Sounds: (P) Anterior:; Posterior:; diminished   Suction   Cough And Deep Breathing: (P) done independently per patient    Aerosol Therapy (SVN)   Respiratory Treatment Status (SVN): continuous Treatment Route (SVN): mask; oxygen   Patient Position (SVN): HOB elevated    Interventions   Cough And Deep Breathing: (P) done independently per patient     Rachel Min, RRT   21:16 Medication Given albuterol (PROVENTIL) nebulizer solution 0.083% 2.5 mg/3mL - Dose: 2.5 mg ; Route: Nebulization ; Scheduled Time: 2054  Rachel Min, MEGAN   21:16 Vitals Reassessment Vitals Timer   Restart Vitals Timer: (P) Yes      Rachel Min, RRT   21:16 Data Other flowsheet entries   $ Mini Neb Subsequent: yes     Rachel Min, RRT   21:16 Respiratory Assessment Oxygen Therapy   Pulse Oximetry Type: (P) Continuous Device (Oxygen Therapy): (P) aerosol mask   Flow (L/min): (P) 8    Vital Signs   Heart Rate Source: (P) Monitor Resp: (P) 22   Resp Rate Source: (P) Monitor    Respiratory   Respiratory WDL: (P) WDL except; breath sounds; cough Rhythm/Pattern, Respiratory: (P) shortness of breath   Cough Frequency: (P) frequent Cough Type: (P) congested; loose; nonproductive; good   Cough And Deep Breathing: (P) done independently per patient    Breath Sounds   Breath Sounds: (P) All Fields All Lung Fields Breath Sounds: (P) Anterior:; Posterior:; diminished   Suction   Cough And Deep Breathing: (P) done independently per patient    Aerosol Therapy (SVN)   Respiratory Treatment Status (SVN): continuous Treatment Route (SVN): mask; oxygen   Patient Position (SVN): HOB elevated    Interventions   Cough And Deep Breathing: (P) done independently per patient     Rachel Min, MEGAN   21:16 Custom Formula Data Other flowsheet entries   Last MAP (calculated): 85     Lee Malone RN   21:16 Device Vitals Device Data   Heart Rate: 102 (Device Time: 21:16:11) SpO2: 99 % (Device Time: 21:16:11)   BP: 119/68 (Device Time: 21:16:11)     Lee Malone RN   21:17 Medication Given albuterol (PROVENTIL) nebulizer solution 0.083% 2.5 mg/3mL - Dose: 2.5 mg ; Route: Nebulization ; Scheduled Time: 2109  Rachel Min, RRT   21:17 Data Other flowsheet entries   $ Mini Neb Subsequent: yes     Rachel Min, RRT   21:17 Respiratory Assessment Aerosol Therapy (SVN)   Respiratory Treatment Status (SVN): continuous Treatment Route (SVN): mask; oxygen   Patient Position (SVN): HOB elevated     Rachel Min, RRT   21:19 Medication Given methylPREDNISolone sodium succinate (SOLU-Medrol) injection 125 mg - Dose: 125 mg ; Route:  Intravenous ; Line: Peripheral IV 05/06/19 1926 Right Forearm ; Scheduled Time: 2052  Lee Malone RN   21:19 Medication Given azithromycin (ZITHROMAX) tablet 500 mg - Dose: 500 mg ; Route: Oral ; Scheduled Time: 2052  Lee Malone RN     21:34 Respiratory Assessment Oxygen Therapy   SpO2: 100 % (Device Time: 21:34:01) Pulse Oximetry Type: Continuous   Device (Oxygen Therapy): aerosol mask Flow (L/min): 8   Vital Signs   Heart Rate: 100 (Device Time: 21:34:01) Heart Rate Source: Monitor   Resp: 22 Resp Rate Source: Monitor   Respiratory   Respiratory WDL: WDL except; breath sounds; cough Cough Frequency: frequent   Cough Type: congested; loose; nonproductive; good Cough And Deep Breathing: done independently per patient   Breath Sounds   Breath Sounds: All Fields All Lung Fields Breath Sounds: Anterior:; Posterior:; diminished   Suction   Cough And Deep Breathing: done independently per patient    Aerosol Therapy (SVN)   Respiratory Treatment Status (SVN): continuous Treatment Route (SVN): mask; oxygen   Patient Position (SVN): HOB elevated    Interventions   Cough And Deep Breathing: done independently per patient     Rachel Min, RRT     21:54 Respiratory Assessment Oxygen Therapy   SpO2: 100 % (Device Time: 21:53:50) Pulse Oximetry Type: Continuous   Device (Oxygen Therapy): nasal cannula Flow (L/min): 4   Vital Signs   Heart Rate: 117 (Device Time: 21:53:50) Heart Rate Source: Monitor   Resp: 25 Resp Rate Source: Monitor   Respiratory   Respiratory WDL: WDL except; breath sounds; cough Cough Frequency: frequent   Cough Type: congested; loose; nonproductive; good Cough And Deep Breathing: done independently per patient   Breath Sounds Post-Respiratory Treatment   Throughout All Fields Post-Treatment: All Fields Throughout All Fields Post-Treatment: no change   Suction   Cough And Deep Breathing: done independently per patient    Aerosol Therapy (SVN)   Post Treatment Assessment  (SVN): breath sounds unchanged; patient reports breathing improved Signs of Intolerance (SVN): none   Interventions   Cough And Deep Breathing: done independently per patient     Rachel Min, RRT     23:10 Medication New Bag cefTRIAXone (ROCEPHIN) 1 g/100 mL 0.9% NS (MBP) - Dose: 1 g ; Route: Intravenous ; Line: Peripheral IV 05/06/19 1926 Right Forearm ; Scheduled Time: 2148  Lee Malone, RN   23:16 Custom Formula Data Other flowsheet entries   Last MAP (calculated): 78     Lee Malone RN          05/07/19 0413   Coping/Psychosocial   Plan of Care Reviewed With patient   Plan of Care Review   Progress no change   OTHER   Outcome Summary pt admitted to floor with COPD exacerbation; pt food in take has decreased over the last few weeks r/t trouble breathing while eating; pt had no c/o pain since admit to floor; pt unsteady and weak; up with assist to BSC; pt has rested fair; safety discussed and maintained; will contiue to monitor           History & Physical      Landen Matson MD at 5/6/2019  9:59 PM              [facility] Medicine Services  HISTORY AND PHYSICAL    Date of Admission: 5/6/2019  Primary Care Physician: Provider, No Known    Subjective     Chief Complaint: Shortness of breathing    History of Present Illness  Heide blanca is a 74-year-old female with a past medical history of chronic respiratory failure on 2 L oxygen, COPD, restless leg syndrome and colon cancer.  Patient states she has had worsening shortness of breathing over the past 1 to 2 weeks.  Over the past 2 nights breathing has been so severe that her  finally forced her to come to the emergency department for evaluation.  She does report a cough however is ineffective and unable to produce sputum.  Patient denies chest pain.  She does have some mild pain under the right rib cage radiating towards the back.  Chest x-ray reveals infiltrate in the right lower  lobe.  ABGs revealed PO2 of 119 and PCO2 of 80.1 on 4 L, we have decreased oxygen to 3 L and will monitor.  White count is 16.5 to, no other significant findings noted.  Patient is requesting treatment for thrush she feels a secondary to Symbicort use.  Patient is admitted for further evaluation and treatment.    Review of Systems   10 point review of systems was completed, all negative except for those discussed in HPI    Past Medical History:   Past Medical History:   Diagnosis Date   • Cancer (CMS/HCC), colon    • Chronic respiratory failure (CMS/HCC), 2 L nasal cannula continuously    • COPD (chronic obstructive pulmonary disease) (CMS/HCC)    • Restless leg syndrome        Past Surgical History:   Past Surgical History:   Procedure Laterality Date   • APPENDECTOMY     • COLON RESECTION     • FRACTURE SURGERY     • HYSTERECTOMY         Family History: family history includes COPD in her father; Colon cancer in her mother; Diabetes in her father.    Social History:  reports that she has quit smoking. She has never used smokeless tobacco. She reports that she does not drink alcohol.patient is , retired , reports having stopped heavy tobacco use 2 years ago however she does sneak one every now and then.  Denies alcohol or drug use.    Code Status: Limited, if unable speak for herself her  Myke will speak for her      Allergies:  Allergies   Allergen Reactions   • Tetracyclines & Related Anaphylaxis   • Penicillins Itching       Medications:  No current facility-administered medications on file prior to encounter.      Current Outpatient Medications on File Prior to Encounter   Medication Sig Dispense Refill   • budesonide-formoterol (SYMBICORT) 160-4.5 MCG/ACT inhaler Inhale 2 puffs 2 (Two) Times a Day.     • Chlorphen-Pseudoephed-APAP (TYLENOL ALLERGY COMPLETE PO) Take 2 tablets by mouth Every 6 (Six) Hours As Needed.           Objective     /57   Pulse 112   Temp 99.2 °F (37.3  "°C)   Resp 22   Ht 160 cm (63\")   Wt 31.8 kg (70 lb)   SpO2 95%   BMI 12.40 kg/m²    Physical Exam   Constitutional: She is oriented to person, place, and time. She appears well-developed.   Frail, cachectic   HENT:   Head: Normocephalic and atraumatic.   No oral white plaque noted   Eyes: EOM are normal. Pupils are equal, round, and reactive to light.   Neck: No tracheal deviation present.   Cardiovascular: Regular rhythm and normal heart sounds.   No murmur heard.  Tachycardia   Pulmonary/Chest: She is in respiratory distress. Wheezes: scattered.   Tachypnea   Abdominal: Soft. Bowel sounds are normal. She exhibits no distension.   Musculoskeletal: Normal range of motion.   Generalized weakness   Neurological: She is alert and oriented to person, place, and time.   Skin: Skin is warm and dry. No erythema.   Psychiatric: She has a normal mood and affect. Her behavior is normal.       Pertinent Data:   Lab Results (last 72 hours)     Procedure Component Value Units Date/Time    Blood Culture - Blood, Wrist, Left [[id number]] Collected:  05/06/19 2115    Specimen:  Blood from Wrist, Left Updated:  05/06/19 2131    Procalcitonin [[id number]]  (Normal) Collected:  05/06/19 2051    Specimen:  Blood Updated:  05/06/19 2130     Procalcitonin <0.25 ng/mL     Narrative:       SIRS, sepsis, severe sepsis, and septic shock are categorized according to the criteria of the consensus conference of the American College of Chest Physicians/Society of Critical Care Medicine.    PCT < 0.5 ng/mL     Systemic infection (sepsis) is not likely.    PCT >0.5 and < 2.0 ng/mL Systemic infection (sepsis) is possible, but other conditions are known to elevate PCT as well.    PCT > 2.0 ng/mL     Systemic infection (sepsis) is likely, unless other causes are known.      PCT > 10.0 ng/mL    Important systemic inflammatory response, almost exclusively due to severe bacterial sepsis or septic shock.    PCT values of < 0.5 ng/mL do not exclude an " infection, because localized infections (without systemic signs) may be associated with such low concentrations, or a systemic infection in its initial stages (<6 hours).  Increased PCT can occur without infection.  PCT concentrations between 0.5 and 2.0 ng/mL should be interpreted taking into account the patients history.  It is recommended to retest PCT within 6-24 hours if any concentrations < 2.0 ng/mL are obtained.    BNP [[id number]]  (Normal) Collected:  05/06/19 2051    Specimen:  Blood Updated:  05/06/19 2120     proBNP 130.0 pg/mL     Troponin [[id number]]  (Normal) Collected:  05/06/19 2051    Specimen:  Blood Updated:  05/06/19 2120     Troponin I <0.012 ng/mL     Comprehensive Metabolic Panel [[id number]]  (Abnormal) Collected:  05/06/19 2051    Specimen:  Blood Updated:  05/06/19 2110     Glucose 108 mg/dL      BUN 16 mg/dL      Creatinine 0.21 mg/dL      Sodium 137 mmol/L      Potassium 4.1 mmol/L      Chloride 89 mmol/L      CO2 >40.0 mmol/L      Calcium 9.7 mg/dL      Total Protein 6.5 g/dL      Albumin 3.90 g/dL      ALT (SGPT) 18 U/L      AST (SGOT) 22 U/L      Alkaline Phosphatase 68 U/L      Total Bilirubin 0.9 mg/dL      eGFR Non African Amer >150 mL/min/1.73      Globulin 2.6 gm/dL      A/G Ratio 1.5 g/dL      BUN/Creatinine Ratio 76.2     Anion Gap -- mmol/L      Comment: Unable to calculate Anion Gap.       Narrative:       GFR Normal >60  Chronic Kidney Disease <60  Kidney Failure <15    Urinalysis With Culture If Indicated - Urine, Clean Catch [[id number]]  (Abnormal) Collected:  05/06/19 2048    Specimen:  Urine, Clean Catch Updated:  05/06/19 2109     Color, UA Yellow     Appearance, UA Clear     pH, UA 6.5     Specific Gravity, UA 1.010     Glucose, UA Negative     Ketones, UA Negative     Bilirubin, UA Negative     Blood, UA Negative     Protein, UA Negative     Leuk Esterase, UA Small (1+)     Nitrite, UA Negative     Urobilinogen, UA 0.2 E.U./dL    Urinalysis, Microscopic Only - Urine,  Clean Catch [[id number]]  (Abnormal) Collected:  05/06/19 2048    Specimen:  Urine, Clean Catch Updated:  05/06/19 2109     RBC, UA 3-5 /HPF      WBC, UA 3-5 /HPF      Bacteria, UA None Seen /HPF      Squamous Epithelial Cells, UA 0-2 /HPF      Hyaline Casts, UA 0-2 /LPF      Methodology Automated Microscopy    Protime-INR [[id number]]  (Abnormal) Collected:  05/06/19 2051    Specimen:  Blood Updated:  05/06/19 2109     Protime 11.2 Seconds      INR 0.79    Blood Gas, Arterial [[id number]]  (Abnormal) Collected:  05/06/19 2100    Specimen:  Arterial Blood Updated:  05/06/19 2102     Site Right Radial     Tristen's Test Positive     pH, Arterial 7.369 pH units      pCO2, Arterial 80.1 mm Hg      Comment: 86 Value above critical limit        pO2, Arterial 119.0 mm Hg      Comment: 83 Value above reference range        HCO3, Arterial 46.1 mmol/L      Comment: 83 Value above reference range        Base Excess, Arterial 17.3 mmol/L      Comment: 83 Value above reference range        O2 Saturation, Arterial 99.0 %      Temperature 37.0 C      Barometric Pressure for Blood Gas 752 mmHg      Modality Nasal Cannula     Flow Rate 4.0 lpm      Ventilator Mode NA     Notified [facility] BREE LOPEZ MD     Notified By [id number]     Notified Time 05/06/2019 21:07     Collected by [id number]     Comment: Meter: [id number]     :  [id number]       Influenza Antigen, Rapid - Swab, Nasopharynx [[id number]]  (Normal) Collected:  05/06/19 2035    Specimen:  Swab from Nasopharynx Updated:  05/06/19 2100     Influenza A Ag, EIA Negative     Influenza B Ag, EIA Negative    Narrative:       Recommend confirmation of negative results by viral culture or molecular assay.    Lactic Acid, Plasma [[id number]]  (Normal) Collected:  05/06/19 1925    Specimen:  Blood Updated:  05/06/19 2050     Lactate 1.2 mmol/L     CBC & Differential [[id number]] Collected:  05/06/19 1925    Specimen:  Blood Updated:  05/06/19 2043    Narrative:       The following orders  were created for panel order CBC & Differential.  Procedure                               Abnormality         Status                     ---------                               -----------         ------                     CBC Auto Differential[[id number]]        Abnormal            Final result                 Please view results for these tests on the individual orders.    CBC Auto Differential [[id number]]  (Abnormal) Collected:  05/06/19 1925    Specimen:  Blood Updated:  05/06/19 2043     WBC 16.52 10*3/mm3      RBC 4.10 10*6/mm3      Hemoglobin 11.5 g/dL      Hematocrit 39.0 %      MCV 95.1 fL      MCH 28.0 pg      MCHC 29.5 g/dL      RDW 13.1 %      RDW-SD 45.7 fl      MPV 12.8 fL      Platelets 142 10*3/mm3      Neutrophil % 85.9 %      Lymphocyte % 5.7 %      Monocyte % 7.6 %      Eosinophil % 0.2 %      Basophil % 0.2 %      Immature Grans % 0.4 %      Neutrophils, Absolute 14.20 10*3/mm3      Lymphocytes, Absolute 0.94 10*3/mm3      Monocytes, Absolute 1.25 10*3/mm3      Eosinophils, Absolute 0.03 10*3/mm3      Basophils, Absolute 0.03 10*3/mm3      Immature Grans, Absolute 0.07 10*3/mm3      nRBC 0.0 /100 WBC      Imaging Results (last 24 hours)     Procedure Component Value Units Date/Time    XR Chest 2 View [[id number]] Collected:  05/06/19 2134     Updated:  05/06/19 2138    Narrative:       EXAMINATION: XR CHEST 2 VW-     5/6/2019 9:05 PM CDT     HISTORY: cough and congestion.     2 view chest x-ray with no comparison.     Normal heart size.  Aortic arch calcification.     Markedly hyperexpanded lungs with chronic interstitial disease. Mild  infiltrate or atelectasis at the right lower lobe.     The upper lobes are clear.  There is no pneumothorax or heart failure.     Summary:  1. Chronic lung changes with mild infiltrate or atelectasis at the right  lower lobe  This report was finalized on 05/06/2019 21:35 by Dr. Ady Mcnamara MD.          I have personally reviewed and interpreted the radiology  studies and ECG obtained at time of admission.     Assessment / Plan     Assessment:   Active Hospital Problems    Diagnosis   • **Acute on chronic respiratory failure with hypercapnia (CMS/HCC)   • Pulmonary emphysema (CMS/HCC)   • COPD with acute exacerbation (CMS/HCC)   • Infiltrate of lung present on chest x-ray, right lower lobe   • Severe malnutrition (CMS/HCC)       Plan:   1.  Admit as inpatient  2.  ABGs when arrives to floor  3.  Continue supplemental O2, incentive spirometry, continuous pulse oximetry, Mucinex, duo nebs, Symbicort  4.  Labs in a.m. CBC with differential, CMP, hemoglobin A1c, lipid panel, TSH  5.  Azithromycin and Rocephin  6.  Solu-Medrol 60 mg IV every 6 hours  7.  DVT prophylaxis with SCDs only    I discussed the patient's findings and my recommendations with: Landen Matson MD  Time spent 35-minute      JESSENIA Ceron  05/06/19   11:47 PM     I personally evaluated and examined the patient in conjunction with JESSENIA Ceron and agree with the assessment, treatment plan, and disposition of the patient as recorded by her. My history, exam, and further recommendations are:     Patient seen and evaluated the time of admission with nurse practitioner.  Agree with her assessments plans and her examinations.  Work on pulmonary toilet CT read is positive for pneumonia.  Treat accordingly.    Landen Matson MD  05/07/19  5:18 AM      Electronically signed by Landen Matson MD at 5/7/2019  5:19 AM          Emergency Department Notes      Reyna Eastman MD at 5/6/2019  8:35 PM          Subjective   74-year-old female presenting to the emergency department with shortness of breath.  She feels like her COPD has been getting worse since last night.  Patient denies any recent hospitalizations antibiotics or steroid use.  Patient states that she has been using her breathing treatments however this is not helped.  Patient denies any chest pain.  Patient states that she has  had a worsening cough.  Patient is normally on 2 L of oxygen at home.            Review of Systems   Respiratory: Positive for shortness of breath and wheezing.        No past medical history on file.    Allergies   Allergen Reactions   • Tetracyclines & Related Anaphylaxis   • Penicillins Itching       No past surgical history on file.    No family history on file.    Social History     Socioeconomic History   • Marital status:      Spouse name: Not on file   • Number of children: Not on file   • Years of education: Not on file   • Highest education level: Not on file           Objective   Physical Exam   Constitutional: She is oriented to person, place, and time. She appears well-developed and well-nourished.   HENT:   Head: Normocephalic and atraumatic.   Eyes: EOM are normal. Pupils are equal, round, and reactive to light.   Neck: Normal range of motion. Neck supple.   Cardiovascular: Normal rate, regular rhythm and normal heart sounds.   2+ pulses in upper and lower extremities   Pulmonary/Chest: Effort normal. She has wheezes. She has rales.   Abdominal: Soft. Bowel sounds are normal.   Musculoskeletal: Normal range of motion.   Neurological: She is alert and oriented to person, place, and time.   Skin: Skin is warm. Capillary refill takes less than 2 seconds.   Psychiatric: She has a normal mood and affect. Her behavior is normal. Thought content normal.   Nursing note and vitals reviewed.      Procedures          ED Course  ED Course as of May 06 2148   Mon May 06, 2019   2135 Low suspicion for sepsis likely COPD exacerbation causing lab abnormalities and vital sign abnormalities.  [AP]   2145 Patient be admitted for COPD exacerbation.  Patient currently improving with steroids and antibiotics as well as nebulizer treatments.  Patient's case discussed with Dr. Mtason.  The PCO2 is 80 pH is normal and likely this is a chronic retention of PCO2 secondary to the patient's COPD.  [AP]      ED Course User  Index  [AP] Reyna Eastman MD                  [facility]      Final diagnoses:   Pulmonary emphysema, unspecified emphysema type (CMS/HCC)            Reyna Eastman MD  05/06/19 6970      Electronically signed by Reyna Eastman MD at 5/6/2019  9:48 PM       Hospital Medications (all)       Dose Frequency Start End    acetaminophen (TYLENOL) tablet 650 mg 650 mg Every 4 Hours PRN 5/7/2019     Sig - Route: Take 2 tablets by mouth Every 4 (Four) Hours As Needed for Mild Pain . - Oral    albuterol (PROVENTIL) nebulizer solution 0.083% 2.5 mg/3mL 2.5 mg Every 15 Minutes 5/6/2019 5/6/2019    Sig - Route: Take 2.5 mg by nebulization Every 15 (Fifteen) Minutes. - Nebulization    azithromycin (ZITHROMAX) tablet 500 mg 500 mg Once 5/6/2019 5/6/2019    Sig - Route: Take 2 tablets by mouth 1 (One) Time. - Oral    AZITHROMYCIN 500 MG/250 ML 0.9% NS IVPB (vial-mate) 500 mg Every 24 Hours 5/7/2019 5/14/2019    Sig - Route: Infuse 500 mg into a venous catheter Daily. - Intravenous    benzonatate (TESSALON) capsule 200 mg 200 mg 3 Times Daily PRN 5/7/2019     Sig - Route: Take 2 capsules by mouth 3 (Three) Times a Day As Needed for Cough. - Oral    budesonide-formoterol (SYMBICORT) 160-4.5 MCG/ACT inhaler 2 puff 2 puff 2 Times Daily - RT 5/7/2019     Sig - Route: Inhale 2 puffs 2 (Two) Times a Day. - Inhalation    cefTRIAXone (ROCEPHIN) 1 g/100 mL 0.9% NS (MBP) 1 g Once 5/6/2019 5/6/2019    Sig - Route: Infuse 100 mL into a venous catheter 1 (One) Time. - Intravenous    cefTRIAXone (ROCEPHIN) 1 g/100 mL 0.9% NS (MBP) 1 g Every 24 Hours 5/7/2019     Sig - Route: Infuse 100 mL into a venous catheter Daily. - Intravenous    clotrimazole (MYCELEX) bruce 10 mg 10 mg 5 Times Daily 5/7/2019     Sig - Route: Take 1 tablet by mouth 5 (Five) Times a Day. - Oral    guaiFENesin (MUCINEX) 12 hr tablet 1,200 mg 1,200 mg Every 12 Hours Scheduled 5/7/2019     Sig - Route: Take 2 tablets by mouth Every 12 (Twelve) Hours. - Oral    ipratropium-albuterol  "(DUO-NEB) nebulizer solution 3 mL 3 mL Once 5/6/2019 5/6/2019    Sig - Route: Take 3 mL by nebulization 1 (One) Time. - Nebulization    ipratropium-albuterol (DUO-NEB) nebulizer solution 3 mL 3 mL Every 6 Hours - RT 5/7/2019     Sig - Route: Take 3 mL by nebulization Every 6 (Six) Hours. - Nebulization    methylPREDNISolone sodium succinate (SOLU-Medrol) injection 125 mg 125 mg Once 5/6/2019 5/6/2019    Sig - Route: Infuse 2 mL into a venous catheter 1 (One) Time. - Intravenous    methylPREDNISolone sodium succinate (SOLU-Medrol) injection 60 mg 60 mg Every 6 Hours Scheduled 5/7/2019     Sig - Route: Infuse 0.96 mL into a venous catheter Every 6 (Six) Hours. - Intravenous    ondansetron (ZOFRAN) injection 4 mg 4 mg Every 6 Hours PRN 5/7/2019     Sig - Route: Infuse 2 mL into a venous catheter Every 6 (Six) Hours As Needed for Nausea or Vomiting. - Intravenous    Linked Group 1:  \"Or\" Linked Group Details        ondansetron (ZOFRAN) tablet 4 mg 4 mg Every 6 Hours PRN 5/7/2019     Sig - Route: Take 1 tablet by mouth Every 6 (Six) Hours As Needed for Nausea or Vomiting. - Oral    Linked Group 1:  \"Or\" Linked Group Details        sodium chloride 0.9 % flush 3 mL 3 mL Every 12 Hours Scheduled 5/7/2019     Sig - Route: Infuse 3 mL into a venous catheter Every 12 (Twelve) Hours. - Intravenous    sodium chloride 0.9 % flush 3-10 mL 3-10 mL As Needed 5/7/2019     Sig - Route: Infuse 3-10 mL into a venous catheter As Needed for Line Care. - Intravenous    sodium chloride 0.9 % infusion 100 mL/hr Continuous 5/7/2019     Sig - Route: Infuse 100 mL/hr into a venous catheter Continuous. - Intravenous            "

## 2019-05-07 NOTE — H&P
[facility] Medicine Services  HISTORY AND PHYSICAL    Date of Admission: 5/6/2019  Primary Care Physician: Provider, No Known    Subjective     Chief Complaint: Shortness of breathing    History of Present Illness  Heide blanca is a 74-year-old female with a past medical history of chronic respiratory failure on 2 L oxygen, COPD, restless leg syndrome and colon cancer.  Patient states she has had worsening shortness of breathing over the past 1 to 2 weeks.  Over the past 2 nights breathing has been so severe that her  finally forced her to come to the emergency department for evaluation.  She does report a cough however is ineffective and unable to produce sputum.  Patient denies chest pain.  She does have some mild pain under the right rib cage radiating towards the back.  Chest x-ray reveals infiltrate in the right lower lobe.  ABGs revealed PO2 of 119 and PCO2 of 80.1 on 4 L, we have decreased oxygen to 3 L and will monitor.  White count is 16.5 to, no other significant findings noted.  Patient is requesting treatment for thrush she feels a secondary to Symbicort use.  Patient is admitted for further evaluation and treatment.    Review of Systems   10 point review of systems was completed, all negative except for those discussed in HPI    Past Medical History:   Past Medical History:   Diagnosis Date   • Cancer (CMS/HCC), colon    • Chronic respiratory failure (CMS/HCC), 2 L nasal cannula continuously    • COPD (chronic obstructive pulmonary disease) (CMS/HCC)    • Restless leg syndrome        Past Surgical History:   Past Surgical History:   Procedure Laterality Date   • APPENDECTOMY     • COLON RESECTION     • FRACTURE SURGERY     • HYSTERECTOMY         Family History: family history includes COPD in her father; Colon cancer in her mother; Diabetes in her father.    Social History:  reports that she has quit smoking. She has never used smokeless tobacco. She  "reports that she does not drink alcohol.patient is , retired , reports having stopped heavy tobacco use 2 years ago however she does sneak one every now and then.  Denies alcohol or drug use.    Code Status: Limited, if unable speak for herself her  Myke will speak for her      Allergies:  Allergies   Allergen Reactions   • Tetracyclines & Related Anaphylaxis   • Penicillins Itching       Medications:  No current facility-administered medications on file prior to encounter.      Current Outpatient Medications on File Prior to Encounter   Medication Sig Dispense Refill   • budesonide-formoterol (SYMBICORT) 160-4.5 MCG/ACT inhaler Inhale 2 puffs 2 (Two) Times a Day.     • Chlorphen-Pseudoephed-APAP (TYLENOL ALLERGY COMPLETE PO) Take 2 tablets by mouth Every 6 (Six) Hours As Needed.           Objective     /57   Pulse 112   Temp 99.2 °F (37.3 °C)   Resp 22   Ht 160 cm (63\")   Wt 31.8 kg (70 lb)   SpO2 95%   BMI 12.40 kg/m²   Physical Exam   Constitutional: She is oriented to person, place, and time. She appears well-developed.   Frail, cachectic   HENT:   Head: Normocephalic and atraumatic.   No oral white plaque noted   Eyes: EOM are normal. Pupils are equal, round, and reactive to light.   Neck: No tracheal deviation present.   Cardiovascular: Regular rhythm and normal heart sounds.   No murmur heard.  Tachycardia   Pulmonary/Chest: She is in respiratory distress. Wheezes: scattered.   Tachypnea   Abdominal: Soft. Bowel sounds are normal. She exhibits no distension.   Musculoskeletal: Normal range of motion.   Generalized weakness   Neurological: She is alert and oriented to person, place, and time.   Skin: Skin is warm and dry. No erythema.   Psychiatric: She has a normal mood and affect. Her behavior is normal.       Pertinent Data:   Lab Results (last 72 hours)     Procedure Component Value Units Date/Time    Blood Culture - Blood, Wrist, Left [[id number]] Collected:  " 05/06/19 2115    Specimen:  Blood from Wrist, Left Updated:  05/06/19 2131    Procalcitonin [[id number]]  (Normal) Collected:  05/06/19 2051    Specimen:  Blood Updated:  05/06/19 2130     Procalcitonin <0.25 ng/mL     Narrative:       SIRS, sepsis, severe sepsis, and septic shock are categorized according to the criteria of the consensus conference of the American College of Chest Physicians/Society of Critical Care Medicine.    PCT < 0.5 ng/mL     Systemic infection (sepsis) is not likely.    PCT >0.5 and < 2.0 ng/mL Systemic infection (sepsis) is possible, but other conditions are known to elevate PCT as well.    PCT > 2.0 ng/mL     Systemic infection (sepsis) is likely, unless other causes are known.      PCT > 10.0 ng/mL    Important systemic inflammatory response, almost exclusively due to severe bacterial sepsis or septic shock.    PCT values of < 0.5 ng/mL do not exclude an infection, because localized infections (without systemic signs) may be associated with such low concentrations, or a systemic infection in its initial stages (<6 hours).  Increased PCT can occur without infection.  PCT concentrations between 0.5 and 2.0 ng/mL should be interpreted taking into account the patients history.  It is recommended to retest PCT within 6-24 hours if any concentrations < 2.0 ng/mL are obtained.    BNP [[id number]]  (Normal) Collected:  05/06/19 2051    Specimen:  Blood Updated:  05/06/19 2120     proBNP 130.0 pg/mL     Troponin [[id number]]  (Normal) Collected:  05/06/19 2051    Specimen:  Blood Updated:  05/06/19 2120     Troponin I <0.012 ng/mL     Comprehensive Metabolic Panel [[id number]]  (Abnormal) Collected:  05/06/19 2051    Specimen:  Blood Updated:  05/06/19 2110     Glucose 108 mg/dL      BUN 16 mg/dL      Creatinine 0.21 mg/dL      Sodium 137 mmol/L      Potassium 4.1 mmol/L      Chloride 89 mmol/L      CO2 >40.0 mmol/L      Calcium 9.7 mg/dL      Total Protein 6.5 g/dL      Albumin 3.90 g/dL      ALT  (SGPT) 18 U/L      AST (SGOT) 22 U/L      Alkaline Phosphatase 68 U/L      Total Bilirubin 0.9 mg/dL      eGFR Non African Amer >150 mL/min/1.73      Globulin 2.6 gm/dL      A/G Ratio 1.5 g/dL      BUN/Creatinine Ratio 76.2     Anion Gap -- mmol/L      Comment: Unable to calculate Anion Gap.       Narrative:       GFR Normal >60  Chronic Kidney Disease <60  Kidney Failure <15    Urinalysis With Culture If Indicated - Urine, Clean Catch [[id number]]  (Abnormal) Collected:  05/06/19 2048    Specimen:  Urine, Clean Catch Updated:  05/06/19 2109     Color, UA Yellow     Appearance, UA Clear     pH, UA 6.5     Specific Gravity, UA 1.010     Glucose, UA Negative     Ketones, UA Negative     Bilirubin, UA Negative     Blood, UA Negative     Protein, UA Negative     Leuk Esterase, UA Small (1+)     Nitrite, UA Negative     Urobilinogen, UA 0.2 E.U./dL    Urinalysis, Microscopic Only - Urine, Clean Catch [[id number]]  (Abnormal) Collected:  05/06/19 2048    Specimen:  Urine, Clean Catch Updated:  05/06/19 2109     RBC, UA 3-5 /HPF      WBC, UA 3-5 /HPF      Bacteria, UA None Seen /HPF      Squamous Epithelial Cells, UA 0-2 /HPF      Hyaline Casts, UA 0-2 /LPF      Methodology Automated Microscopy    Protime-INR [[id number]]  (Abnormal) Collected:  05/06/19 2051    Specimen:  Blood Updated:  05/06/19 2109     Protime 11.2 Seconds      INR 0.79    Blood Gas, Arterial [[id number]]  (Abnormal) Collected:  05/06/19 2100    Specimen:  Arterial Blood Updated:  05/06/19 2102     Site Right Radial     Tristen's Test Positive     pH, Arterial 7.369 pH units      pCO2, Arterial 80.1 mm Hg      Comment: 86 Value above critical limit        pO2, Arterial 119.0 mm Hg      Comment: 83 Value above reference range        HCO3, Arterial 46.1 mmol/L      Comment: 83 Value above reference range        Base Excess, Arterial 17.3 mmol/L      Comment: 83 Value above reference range        O2 Saturation, Arterial 99.0 %      Temperature 37.0 C       Barometric Pressure for Blood Gas 752 mmHg      Modality Nasal Cannula     Flow Rate 4.0 lpm      Ventilator Mode NA     Notified Who BREE LOPEZ MD     Notified By [id number]     Notified Time 05/06/2019 21:07     Collected by [id number]     Comment: Meter: [id number]     :  [id number]       Influenza Antigen, Rapid - Swab, Nasopharynx [[id number]]  (Normal) Collected:  05/06/19 2035    Specimen:  Swab from Nasopharynx Updated:  05/06/19 2100     Influenza A Ag, EIA Negative     Influenza B Ag, EIA Negative    Narrative:       Recommend confirmation of negative results by viral culture or molecular assay.    Lactic Acid, Plasma [[id number]]  (Normal) Collected:  05/06/19 1925    Specimen:  Blood Updated:  05/06/19 2050     Lactate 1.2 mmol/L     CBC & Differential [[id number]] Collected:  05/06/19 1925    Specimen:  Blood Updated:  05/06/19 2043    Narrative:       The following orders were created for panel order CBC & Differential.  Procedure                               Abnormality         Status                     ---------                               -----------         ------                     CBC Auto Differential[[id number]]        Abnormal            Final result                 Please view results for these tests on the individual orders.    CBC Auto Differential [[id number]]  (Abnormal) Collected:  05/06/19 1925    Specimen:  Blood Updated:  05/06/19 2043     WBC 16.52 10*3/mm3      RBC 4.10 10*6/mm3      Hemoglobin 11.5 g/dL      Hematocrit 39.0 %      MCV 95.1 fL      MCH 28.0 pg      MCHC 29.5 g/dL      RDW 13.1 %      RDW-SD 45.7 fl      MPV 12.8 fL      Platelets 142 10*3/mm3      Neutrophil % 85.9 %      Lymphocyte % 5.7 %      Monocyte % 7.6 %      Eosinophil % 0.2 %      Basophil % 0.2 %      Immature Grans % 0.4 %      Neutrophils, Absolute 14.20 10*3/mm3      Lymphocytes, Absolute 0.94 10*3/mm3      Monocytes, Absolute 1.25 10*3/mm3      Eosinophils, Absolute 0.03 10*3/mm3      Basophils,  Absolute 0.03 10*3/mm3      Immature Grans, Absolute 0.07 10*3/mm3      nRBC 0.0 /100 WBC      Imaging Results (last 24 hours)     Procedure Component Value Units Date/Time    XR Chest 2 View [[id number]] Collected:  05/06/19 2134     Updated:  05/06/19 2138    Narrative:       EXAMINATION: XR CHEST 2 VW-     5/6/2019 9:05 PM CDT     HISTORY: cough and congestion.     2 view chest x-ray with no comparison.     Normal heart size.  Aortic arch calcification.     Markedly hyperexpanded lungs with chronic interstitial disease. Mild  infiltrate or atelectasis at the right lower lobe.     The upper lobes are clear.  There is no pneumothorax or heart failure.     Summary:  1. Chronic lung changes with mild infiltrate or atelectasis at the right  lower lobe  This report was finalized on 05/06/2019 21:35 by Dr. Ady Mcnamara MD.          I have personally reviewed and interpreted the radiology studies and ECG obtained at time of admission.     Assessment / Plan     Assessment:   Active Hospital Problems    Diagnosis   • **Acute on chronic respiratory failure with hypercapnia (CMS/HCC)   • Pulmonary emphysema (CMS/HCC)   • COPD with acute exacerbation (CMS/HCC)   • Infiltrate of lung present on chest x-ray, right lower lobe   • Severe malnutrition (CMS/HCC)       Plan:   1.  Admit as inpatient  2.  ABGs when arrives to floor  3.  Continue supplemental O2, incentive spirometry, continuous pulse oximetry, Mucinex, duo nebs, Symbicort  4.  Labs in a.m. CBC with differential, CMP, hemoglobin A1c, lipid panel, TSH  5.  Azithromycin and Rocephin  6.  Solu-Medrol 60 mg IV every 6 hours  7.  DVT prophylaxis with SCDs only    I discussed the patient's findings and my recommendations with: Landen Matson MD  Time spent 35-minute      JESSENIA Ceron  05/06/19   11:47 PM     I personally evaluated and examined the patient in conjunction with JESSENIA Ceron and agree with the assessment, treatment plan, and disposition  of the patient as recorded by her. My history, exam, and further recommendations are:     Patient seen and evaluated the time of admission with nurse practitioner.  Agree with her assessments plans and her examinations.  Work on pulmonary toilet CT read is positive for pneumonia.  Treat accordingly.    Landen Matson MD  05/07/19  5:18 AM

## 2019-05-07 NOTE — PLAN OF CARE
Problem: Patient Care Overview  Goal: Plan of Care Review  Outcome: Ongoing (interventions implemented as appropriate)   05/07/19 0413   Coping/Psychosocial   Plan of Care Reviewed With patient   Plan of Care Review   Progress no change   OTHER   Outcome Summary pt admitted to floor with COPD exacerbation; pt food in take has decreased over the last few weeks r/t trouble breathing while eating; pt had no c/o pain since admit to floor; pt unsteady and weak; up with assist to BSC; pt has rested fair; safety discussed and maintained; will contiue to monitor     Goal: Discharge Needs Assessment  Outcome: Ongoing (interventions implemented as appropriate)    Goal: Interprofessional Rounds/Family Conf  Outcome: Ongoing (interventions implemented as appropriate)   05/07/19 0413   Interdisciplinary Rounds/Family Conf   Participants nursing;patient;family       Problem: Chronic Obstructive Pulmonary Disease (Adult)  Goal: Signs and Symptoms of Listed Potential Problems Will be Absent, Minimized or Managed (Chronic Obstructive Pulmonary Disease)  Outcome: Ongoing (interventions implemented as appropriate)   05/07/19 0413   Goal/Outcome Evaluation   Problems Assessed (Chronic Obstructive Pulmonary Disease (COPD)) all   Problems Present (COPD, Bronch/Emphy) functional deficit;respiratory compromise;situational response;undernutrition

## 2019-05-08 LAB
ANION GAP SERPL CALCULATED.3IONS-SCNC: 2 MMOL/L (ref 4–13)
BASOPHILS # BLD AUTO: 0 10*3/MM3 (ref 0–0.2)
BASOPHILS NFR BLD AUTO: 0 % (ref 0–2)
BUN BLD-MCNC: 13 MG/DL (ref 5–21)
BUN/CREAT SERPL: 50 (ref 7–25)
CALCIUM SPEC-SCNC: 9.1 MG/DL (ref 8.4–10.4)
CHLORIDE SERPL-SCNC: 97 MMOL/L (ref 98–110)
CO2 SERPL-SCNC: 40 MMOL/L (ref 24–31)
CREAT BLD-MCNC: 0.26 MG/DL (ref 0.5–1.4)
DEPRECATED RDW RBC AUTO: 45.1 FL (ref 40–54)
EOSINOPHIL # BLD AUTO: 0 10*3/MM3 (ref 0–0.7)
EOSINOPHIL NFR BLD AUTO: 0 % (ref 0–4)
ERYTHROCYTE [DISTWIDTH] IN BLOOD BY AUTOMATED COUNT: 13.4 % (ref 12–15)
GFR SERPL CREATININE-BSD FRML MDRD: >150 ML/MIN/1.73
GLUCOSE BLD-MCNC: 121 MG/DL (ref 70–100)
HCT VFR BLD AUTO: 30 % (ref 37–47)
HGB BLD-MCNC: 8.9 G/DL (ref 12–16)
IMM GRANULOCYTES # BLD AUTO: 0.1 10*3/MM3 (ref 0–0.05)
IMM GRANULOCYTES NFR BLD AUTO: 1.2 % (ref 0–5)
LYMPHOCYTES # BLD AUTO: 0.22 10*3/MM3 (ref 0.72–4.86)
LYMPHOCYTES NFR BLD AUTO: 2.6 % (ref 15–45)
MCH RBC QN AUTO: 27.3 PG (ref 28–32)
MCHC RBC AUTO-ENTMCNC: 29.7 G/DL (ref 33–36)
MCV RBC AUTO: 92 FL (ref 82–98)
MONOCYTES # BLD AUTO: 0.14 10*3/MM3 (ref 0.19–1.3)
MONOCYTES NFR BLD AUTO: 1.6 % (ref 4–12)
NEUTROPHILS # BLD AUTO: 8.15 10*3/MM3 (ref 1.87–8.4)
NEUTROPHILS NFR BLD AUTO: 94.6 % (ref 39–78)
NRBC BLD AUTO-RTO: 0 /100 WBC (ref 0–0.2)
PLATELET # BLD AUTO: 111 10*3/MM3 (ref 130–400)
PMV BLD AUTO: 12.9 FL (ref 6–12)
POTASSIUM BLD-SCNC: 4.1 MMOL/L (ref 3.5–5.3)
RBC # BLD AUTO: 3.26 10*6/MM3 (ref 4.2–5.4)
SODIUM BLD-SCNC: 139 MMOL/L (ref 135–145)
WBC NRBC COR # BLD: 8.61 10*3/MM3 (ref 4.8–10.8)

## 2019-05-08 PROCEDURE — 25010000002 METHYLPREDNISOLONE PER 125 MG: Performed by: NURSE PRACTITIONER

## 2019-05-08 PROCEDURE — 25010000002 CEFTRIAXONE PER 250 MG: Performed by: NURSE PRACTITIONER

## 2019-05-08 PROCEDURE — 94799 UNLISTED PULMONARY SVC/PX: CPT

## 2019-05-08 PROCEDURE — 97161 PT EVAL LOW COMPLEX 20 MIN: CPT

## 2019-05-08 PROCEDURE — 94760 N-INVAS EAR/PLS OXIMETRY 1: CPT

## 2019-05-08 PROCEDURE — 97166 OT EVAL MOD COMPLEX 45 MIN: CPT | Performed by: OCCUPATIONAL THERAPIST

## 2019-05-08 PROCEDURE — 85025 COMPLETE CBC W/AUTO DIFF WBC: CPT | Performed by: NURSE PRACTITIONER

## 2019-05-08 PROCEDURE — 80048 BASIC METABOLIC PNL TOTAL CA: CPT | Performed by: NURSE PRACTITIONER

## 2019-05-08 PROCEDURE — 97535 SELF CARE MNGMENT TRAINING: CPT | Performed by: OCCUPATIONAL THERAPIST

## 2019-05-08 RX ORDER — METHYLPREDNISOLONE SODIUM SUCCINATE 40 MG/ML
40 INJECTION, POWDER, LYOPHILIZED, FOR SOLUTION INTRAMUSCULAR; INTRAVENOUS EVERY 12 HOURS
Status: DISCONTINUED | OUTPATIENT
Start: 2019-05-09 | End: 2019-05-09

## 2019-05-08 RX ORDER — AZITHROMYCIN 250 MG/1
500 TABLET, FILM COATED ORAL
Status: DISCONTINUED | OUTPATIENT
Start: 2019-05-08 | End: 2019-05-10

## 2019-05-08 RX ADMIN — ACETAMINOPHEN 650 MG: 325 TABLET, FILM COATED ORAL at 01:23

## 2019-05-08 RX ADMIN — IPRATROPIUM BROMIDE AND ALBUTEROL SULFATE 3 ML: 2.5; .5 SOLUTION RESPIRATORY (INHALATION) at 13:14

## 2019-05-08 RX ADMIN — BUDESONIDE AND FORMOTEROL FUMARATE DIHYDRATE 2 PUFF: 160; 4.5 AEROSOL RESPIRATORY (INHALATION) at 06:25

## 2019-05-08 RX ADMIN — AZITHROMYCIN 500 MG: 250 TABLET, FILM COATED ORAL at 20:36

## 2019-05-08 RX ADMIN — CLOTRIMAZOLE 10 MG: 10 LOZENGE ORAL; TOPICAL at 08:01

## 2019-05-08 RX ADMIN — GUAIFENESIN 1200 MG: 600 TABLET, EXTENDED RELEASE ORAL at 08:01

## 2019-05-08 RX ADMIN — ACETAMINOPHEN 650 MG: 325 TABLET, FILM COATED ORAL at 09:36

## 2019-05-08 RX ADMIN — ROPINIROLE HYDROCHLORIDE 0.25 MG: 0.25 TABLET, FILM COATED ORAL at 20:36

## 2019-05-08 RX ADMIN — ACETAMINOPHEN 650 MG: 325 TABLET, FILM COATED ORAL at 14:08

## 2019-05-08 RX ADMIN — CLOTRIMAZOLE 10 MG: 10 LOZENGE ORAL; TOPICAL at 13:38

## 2019-05-08 RX ADMIN — SODIUM CHLORIDE 100 ML/HR: 9 INJECTION, SOLUTION INTRAVENOUS at 10:10

## 2019-05-08 RX ADMIN — ACETAMINOPHEN 650 MG: 325 TABLET, FILM COATED ORAL at 20:36

## 2019-05-08 RX ADMIN — IPRATROPIUM BROMIDE AND ALBUTEROL SULFATE 3 ML: 2.5; .5 SOLUTION RESPIRATORY (INHALATION) at 20:30

## 2019-05-08 RX ADMIN — IPRATROPIUM BROMIDE AND ALBUTEROL SULFATE 3 ML: 2.5; .5 SOLUTION RESPIRATORY (INHALATION) at 06:20

## 2019-05-08 RX ADMIN — METHYLPREDNISOLONE SODIUM SUCCINATE 60 MG: 125 INJECTION, POWDER, FOR SOLUTION INTRAMUSCULAR; INTRAVENOUS at 13:38

## 2019-05-08 RX ADMIN — GUAIFENESIN 1200 MG: 600 TABLET, EXTENDED RELEASE ORAL at 20:36

## 2019-05-08 RX ADMIN — CEFTRIAXONE SODIUM 1 G: 1 INJECTION, POWDER, FOR SOLUTION INTRAMUSCULAR; INTRAVENOUS at 20:38

## 2019-05-08 RX ADMIN — BUDESONIDE AND FORMOTEROL FUMARATE DIHYDRATE 2 PUFF: 160; 4.5 AEROSOL RESPIRATORY (INHALATION) at 20:30

## 2019-05-08 RX ADMIN — METHYLPREDNISOLONE SODIUM SUCCINATE 60 MG: 125 INJECTION, POWDER, FOR SOLUTION INTRAMUSCULAR; INTRAVENOUS at 06:32

## 2019-05-08 RX ADMIN — CLOTRIMAZOLE 10 MG: 10 LOZENGE ORAL; TOPICAL at 10:10

## 2019-05-08 RX ADMIN — CLOTRIMAZOLE 10 MG: 10 LOZENGE ORAL; TOPICAL at 20:38

## 2019-05-08 RX ADMIN — CLOTRIMAZOLE 10 MG: 10 LOZENGE ORAL; TOPICAL at 17:12

## 2019-05-08 NOTE — PLAN OF CARE
Problem: Patient Care Overview  Goal: Plan of Care Review  Outcome: Ongoing (interventions implemented as appropriate)   05/08/19 3916   Coping/Psychosocial   Plan of Care Reviewed With patient   Plan of Care Review   Progress improving   OTHER   Outcome Summary Pt c/o headache pain. Medicated with PRN tylenol x2 with good relief. IV steriods adjusted. Fluids decreased. PT/OT consulted. Safety maintained. Will continue to monitor.      Goal: Individualization and Mutuality  Outcome: Ongoing (interventions implemented as appropriate)    Goal: Discharge Needs Assessment  Outcome: Ongoing (interventions implemented as appropriate)    Goal: Interprofessional Rounds/Family Conf  Outcome: Ongoing (interventions implemented as appropriate)      Problem: Chronic Obstructive Pulmonary Disease (Adult)  Goal: Signs and Symptoms of Listed Potential Problems Will be Absent, Minimized or Managed (Chronic Obstructive Pulmonary Disease)  Outcome: Ongoing (interventions implemented as appropriate)      Problem: Fall Risk (Adult)  Goal: Absence of Fall  Outcome: Ongoing (interventions implemented as appropriate)

## 2019-05-08 NOTE — THERAPY EVALUATION
Acute Care - Physical Therapy Initial Evaluation  [facility]     Patient Name: Heide Newsome  : 1945  MRN: [id number]  Today's Date: 2019   Onset of Illness/Injury or Date of Surgery: 19  Date of Referral to PT: 19  Referring Physician: Brian Bhatti APRN      Admit Date: 2019    Visit Dx:     ICD-10-CM ICD-9-CM   1. Pulmonary emphysema, unspecified emphysema type (CMS/HCC) J43.9 492.8   2. Impaired mobility and endurance Z74.09 V49.89     Patient Active Problem List   Diagnosis   • Pulmonary emphysema (CMS/HCC)   • Acute on chronic respiratory failure with hypercapnia (CMS/HCC)   • COPD with acute exacerbation (CMS/HCC)   • Severe malnutrition (CMS/HCC)   • History of colon cancer   • Thrombocytopenia (CMS/HCC)   • Anemia   • Hyperglycemia, drug-induced   • Community acquired pneumonia of right lower lobe of lung (CMS/HCC)     Past Medical History:   Diagnosis Date   • Cancer (CMS/HCC), colon    • Chronic respiratory failure (CMS/HCC), 2 L nasal cannula continuously    • COPD (chronic obstructive pulmonary disease) (CMS/HCC)    • Restless leg syndrome      Past Surgical History:   Procedure Laterality Date   • APPENDECTOMY     • COLON RESECTION     • FRACTURE SURGERY     • HYSTERECTOMY          PT ASSESSMENT (last 12 hours)      Physical Therapy Evaluation     Row Name 19 0910          PT Evaluation Time/Intention    Subjective Information  complains of;weakness;fatigue  -COURTNEY (r) GR (t) COURTNEY (c)     Document Type  evaluation  -COURTNEY (r) GR (t) COURTNEY (c)     Mode of Treatment  physical therapy  -COURTNEY (r) GR (t) COURTNEY (c)     Patient Effort  adequate  -COURTNEY (r) GR (t) COURTNEY (c)     Symptoms Noted During/After Treatment  fatigue;shortness of breath  -COURTNEY (r) GR (t) COURTNEY (c)     Row Name 19 0910          General Information    Patient Profile Reviewed?  yes  -COURTNEY (r) GR (t) COURTNEY (c)     Onset of Illness/Injury or Date of Surgery  19  -COURTNEY (r) GR (t) COURTNEY (c)     Referring Physician  Davy  JESSENIA Hayden  -COURTNEY (r) GR (t) COURTNEY (c)     Patient Observations  alert;cooperative;agree to therapy  -COURTNEY (r) GR (t) COURTNEY (c)     Patient/Family Observations  SO at Bedside, voices concern about pt pain and tolerance  -COURTNEY (r) GR (t) COURTNEY (c)     General Observations of Patient  fowlers;oxygen; telemetry; IV   -COURTNEY (r) GR (t) COURTNEY (c)     Prior Level of Function  independent:;all household mobility;transfer;ADL's;dependent:;driving;shopping Pt reports main limitation in independence is SOA  -COURTNEY (r) SAMARA (t) COURTNEY (c)     Equipment Currently Used at Home  oxygen;shower chair;walker, rolling;grab bar  -COURTNEY (r) GR (t) COURTNEY (c)     Pertinent History of Current Functional Problem  Pt presented to ED with CO MARIANELA. Dx:Acute on chronic respiratory failure with hypercapnia. Significant MHx: COPD, On O2x2L continuously at home  -COURTNEY (r) SAMARA (t) COURTNEY (c)     Existing Precautions/Restrictions  fall;oxygen therapy device and L/min Low H&H  -COURTNEY (r) SAMARA (t) COURTNEY (c)     Equipment Issued to Patient  walker, front wheeled  -COURTNEY (r) SAMARA (t) COURTNEY (c)     Risks Reviewed  patient:;spouse/S.O.:;LOB;nausea/vomiting;dizziness;increased discomfort;change in vital signs;increased drainage;lines disloged  -COURTNEY (r) GR (t) COURTNEY (c)     Benefits Reviewed  patient:;spouse/S.O.:;improve function;increase independence;increase strength;increase balance;decrease pain;decrease risk of DVT;improve skin integrity;increase knowledge  -COURTNEY (r) SAMARA (t) COURTNEY (c)     Barriers to Rehab  medically complex  -COURTNEY (r) SAMARA (t) COURTNEY (c)     Row Name 05/08/19 0964          Relationship/Environment    Primary Source of Support/Comfort  spouse  -COURTNEY (r) SAMARA (t) COURTNEY (c)     Lives With  spouse  -COURTNEY (r) GR (t) COURTNEY (c)     Family Caregiver if Needed  spouse  -COURTNEY (r) SAMARA (t) COURTNEY (c)     Primary Roles/Responsibilities  homemaker  -COURTNEY (r) GR (t) COURTNEY (c)     Row Name 05/08/19 0930          Resource/Environmental Concerns    Current Living Arrangements  home/apartment/condo  -COURTNEY (r) SAMARA (t) COURTNEY (c)      Resource/Environmental Concerns  none  -COURTNEY (r) GR (t) COURTNEY (c)     Transportation Concerns  car, none  -COURTNEY (r) GR (t) COURTNEY (c)     Row Name 05/08/19 0910          Home Main Entrance    Number of Stairs, Main Entrance  two  -COURTNEY (r) GR (t) COURTNEY (c)     Stair Railings, Main Entrance  railings on both sides of stairs  -COURTNEY (r) GR (t) COURTNEY (c)     Row Name 05/08/19 0910          Cognitive Assessment/Interventions    Additional Documentation  Cognitive Assessment/Intervention (Group)  -COURTNEY (r) GR (t) COURTNEY (c)     Row Name 05/08/19 0910          Cognitive Assessment/Intervention- PT/OT    Affect/Mental Status (Cognitive)  WFL  -COURTNEY (r) GR (t) COURTNEY (c)     Behavioral Issues (Cognitive)  overwhelmed easily Hyperventilates with activity  -COURTNEY (r) GR (t) COURTNEY (c)     Orientation Status (Cognition)  oriented x 4  -COURTNEY (r) GR (t) COURTNEY (c)     Follows Commands (Cognition)  WFL  -COURTNEY (r) GR (t) COURTNEY (c)     Cognitive Function (Cognitive)  WFL  -COURTNEY (r) GR (t) COURTNEY (c)     Personal Safety Interventions  fall prevention program maintained;gait belt;muscle strengthening facilitated;nonskid shoes/slippers when out of bed;supervised activity  -COURTNEY (r) GR (t) COURTNEY (c)     Row Name 05/08/19 0910          Safety Issues, Functional Mobility    Safety Issues Affecting Function (Mobility)  awareness of need for assistance;insight into deficits/self awareness;safety precautions follow-through/compliance  -COURTNEY (r) GR (t) COURTNEY (c)     Impairments Affecting Function (Mobility)  balance;endurance/activity tolerance;pain;shortness of breath  -COURTNEY (r) GR (t) COURTNEY (c)     Row Name 05/08/19 0910          Bed Mobility Assessment/Treatment    Bed Mobility Assessment/Treatment  supine-sit;sit-supine  -COURTNEY (r) GR (t) COURTNEY (c)     Supine-Sit [place] (Bed Mobility)  supervision  -COURTNEY (r) GR (t) COURTNEY (c)     Sit-Supine [place] (Bed Mobility)  supervision  -COURTNEY (r) GR (t) COURTNEY (c)     Assistive Device (Bed Mobility)  bed rails;head of bed elevated  -COURTNEY (r) GR (t) COURTNEY (c)     Comment (Bed  Mobility)  Main limitation is poor control of breathing with activities and marked increases in fatigue  -COURTNEY (r) GR (t) COURTNEY (c)     Row Name 05/08/19 0910          Transfer Assessment/Treatment    Transfer Assessment/Treatment  other (see comments)  -COURTNEY (r) GR (t) COURTNEY (c)     Comment (Transfers)  Deferred per pt request d/t pain and fatigue, will continue to monitor and assess  -COURTNEY (r) GR (t) COURTNEY (c)     Row Name 05/08/19 0910          Gait/Stairs Assessment/Training    Gait/Stairs Assessment/Training  other (see comments)  -COURTNEY (r) GR (t) COURTNEY (c)     Comment (Gait/Stairs)  Deferred per pt request d/t pain and fatigue, will continue to monitor and assess  -COURTNEY (r) GR (t) COURTNEY (c)     Row Name 05/08/19 0910          General ROM    GENERAL ROM COMMENTS  BUE AROM screened in OT eval, BLE AROM WFL  -COURTNEY (r) GR (t) COURTNEY (c)     Row Name 05/08/19 0910          MMT (Manual Muscle Testing)    General MMT Comments  BUE strength screened in OT eval, BLE strength Functionally 4/5  -COURTNEY (r) GR (t) COURTNEY (c)     Row Name 05/08/19 0910          Motor Assessment/Intervention    Additional Documentation  Balance (Group)  -COURTNEY (r) GR (t) COURTNEY (c)     Row Name 05/08/19 0910          Balance    Balance  static sitting balance;dynamic sitting balance  -COURTNEY (r) GR (t) COURTNEY (c)     Row Name 05/08/19 0910          Static Sitting Balance    Level of [place] (Unsupported Sitting, Static Balance)  supervision  -COURTNEY (r) GR (t) COURTNEY (c)     Sitting Position (Unsupported Sitting, Static Balance)  sitting on edge of bed  -COURTNEY (r) GR (t) COURTNEY (c)     Time Able to Maintain Position (Unsupported Sitting, Static Balance)  2 to 3 minutes  -COURTNEY (r) GR (t) COURTNEY (c)     Comment (Unsupported Sitting, Static Balance)  Reports of nausea and SOA  -COURTNEY (r) GR (t) COURTNEY (c)     Row Name 05/08/19 0910          Dynamic Sitting Balance    Level of [place], Reaches Outside Midline (Sitting, Dynamic Balance)  supervision  -COURTNEY (r) GR (t) COURTNEY (c)     Sitting Position, Reaches Outside  "Midline (Sitting, Dynamic Balance)  sitting on edge of bed  -COURTNEY (r) GR (t) COURTNEY (c)     Comment, Reaches Outside Midline (Sitting, Dynamic Balance)  Tested with LE MMT and donning/doffing socks  -COURTNEY (r) GR (t) COURTNEY (c)     Row Name 05/08/19 0910          Sensory Assessment/Intervention    Sensory General Assessment  light touch sensation deficits identified  -COURTNEY (r) GR (t) COURTNEY (c)     Row Name 05/08/19 0910          Light Touch Sensation Assessment    Left Upper Extremity: Light Touch Sensation Assessment  intact  -COURTNEY (r) GR (t) COURTNEY (c)     Right Upper Extremity: Light Touch Sensation Assessment  intact  -COURTNEY (r) GR (t) COURTNEY (c)     Left Lower Extremity: Light Touch Sensation Assessment  intact  -COURTNEY (r) GR (t) COURTNEY (c)     Right Lower Extremity: Light Touch Sensation Assessment  mild impairment, 75% or more correct responses  -COURTNEY (r) GR (t) COURTNEY (c)     Additional Details: Light Touch Sensation Assessment  Pt reports Hx of fall caused by \"tripping over her feet\"  -COURTNEY (r) GR (t) COURTNEY (c)     Row Name 05/08/19 0910          Pain Assessment    Additional Documentation  Pain Scale: Numbers Pre/Post-Treatment (Group)  -COURTNEY (r) GR (t) COURTNEY (c)     [facility] Name 05/08/19 0910          Pain Scale: Numbers Pre/Post-Treatment    Pain Scale: Numbers, Pretreatment  5/10  -COURTNEY (r) GR (t) COURTNEY (c)     Pain Scale: Numbers, Post-Treatment  5/10  -COURTNEY (r) GR (t) COURTNEY (c)     Pain Location - Orientation  generalized  -COURTNEY (r) GR (t) COURTNEY (c)     Pain Location  head  -COURTNEY (r) GR (t) COURTNEY (c)     Pain Intervention(s)  Repositioned;Ambulation/increased activity;Medication (See MAR)  -COURTNEY (r) GR (t) COURTNEY (c)     Row Name 05/08/19 0910          Health Promotion    Additional Documentation  Coping (Group)  -COURTNEY (r) GR (t) COURTNEY (c)     [facility] Name 05/08/19 0910          Coping    Observed Emotional State  calm;cooperative  -COURTNEY (r) GR (t) COURTNEY (c)     Verbalized Emotional State  acceptance  -COURTNEY (r) GR (t) COURTNEY (c)     [facility] Name 05/08/19 0910          Plan of Care Review    Plan of Care " Reviewed With  patient;spouse  -COURTNEY (r) SAMARA (t) COURTNEY (c)     Row Name 05/08/19 0910          Physical Therapy Clinical Impression    Date of Referral to PT  05/08/19  -COURTNEY (r) SAMARA (t) COURTNEY (c)     Functional Level at Time of Evaluation (PT Clinical Impression)  Pt fearful of SOA and changes in vitals with activity. Will progress as ability to control recovery breathing improves. Will continue to monitor  -COURTNEY (r) SAMARA (t) COURTNEY (c)     Patient/Family Goals Statement (PT Clinical Impression)  To return home  and begin HH  -COURTNEY (connor) SAMARA (t) COURTNEY (c)     Criteria for Skilled Interventions Met (PT Clinical Impression)  yes  -COURTNEY (r) SAMARA (t) COURTNEY (c)     Impairments Found (describe specific impairments)  aerobic capacity/endurance;ergonomics and body mechanics;gait, locomotion, and balance;posture;ventilation and respiration/gas exchange  -COURTNEY (r) SAMARA (t) COURTNEY (c)     Functional Limitations in Following Categories (Describe Specific Limitations)  self-care;home management  -COURTNEY (r) SAMARA (t) COURTNEY (c)     Disability: Inability to Perform Actions/Activities of Required Roles (describe specific disability)  community/leisure  -COURTNEY (r) SAMARA (t) COURTNEY (c)     Rehab Potential (PT Clinical Summary)  good, to achieve stated therapy goals  -COURTNEY (r) SAMARA (t) COURTNEY (c)     Predicted Duration of Therapy (PT)  until DC  -COURTNEY (r) GR (t) COURTNEY (c)     Care Plan Review (PT)  evaluation/treatment results reviewed;care plan/treatment goals reviewed;risks/benefits reviewed;current/potential barriers reviewed;patient/other agree to care plan  -COURTNEY (r) SAMARA (t) COURTNEY (c)     Care Plan Review, Other Participant (PT Clinical Impression)  spouse  -COURTNEY (r) SAMARA (t) COURTNEY (c)     Patient/Family Concerns, Anticipated Discharge Disposition (PT)  SO requests HH referal  -COURTNEY (r) SAMARA (t) COURTNEY (c)     Row Name 05/08/19 0910          Vital Signs    Pretreatment Heart Rate (beats/min)  103  -COURTNEY (r) GR (t) COURTNEY (c)     Intratreatment Heart Rate (beats/min)  113  -COURTNEY (r) SAMARA (t) COURTNEY (c)     Posttreatment Heart Rate  (beats/min)  108  -COURTNEY (r) GR (t) COURTNEY (c)     Pre SpO2 (%)  97  -COURTNEY (r) GR (t) COURTNEY (c)     O2 Delivery Pre Treatment  supplemental O2 4L  -COURTNEY (r) GR (t) COURTNEY (c)     Intra SpO2 (%)  95  -COURTNEY (r) GR (t) COURTNEY (c)     O2 Delivery Intra Treatment  supplemental O2 4L  -COURTNEY (r) GR (t) COURTNEY (c)     Post SpO2 (%)  99  -COURTNEY (r) GR (t) COURTNEY (c)     O2 Delivery Post Treatment  supplemental O2 4L  -COURTNEY (r) GR (t) COURTNEY (c)     Pre Patient Position  Supine  -COURTNEY (r) GR (t) COURTNEY (c)     Intra Patient Position  Sitting  -COURTNEY (r) GR (t) COURTNEY (c)     Post Patient Position  Supine  -COURTNEY (r) GR (t) COURTNEY (c)     Row Name 05/08/19 0910          Physical Therapy Goals    Bed Mobility Goal Selection (PT)  bed mobility, PT goal 1  -COURTNEY (r) GR (t) COURTNEY (c)     Transfer Goal Selection (PT)  transfer, PT goal 1  -COURTNEY (r) GR (t) COURTNEY (c)     Gait Training Goal Selection (PT)  gait training, PT goal 1  -COURTNEY (r) GR (t) COURTNEY (c)     Balance Goal Selection (PT)  balance, PT goal 1  -COURTNEY (r) GR (t) COURTNEY (c)     Stairs Goal Selection (PT)  stairs, PT goal 1  -COURTNEY (r) GR (t) COURTNEY (c)     Additional Documentation  Balance Goal Selection (PT) (Row);Stairs Goal Selection (PT) (Row)  -COURTNEY (r) GR (t) COURTNEY (c)     Row Name 05/08/19 0910          Bed Mobility Goal 1 (PT)    Activity/Assistive Device (Bed Mobility Goal 1, PT)  bed mobility activities, all  -COURTNEY (r) GR (t) COURTNEY (c)     [place] Level/Cues Needed (Bed Mobility Goal 1, PT)  conditional independence  -COURTNEY (r) GR (t) COURTNEY (c)     Time Frame (Bed Mobility Goal 1, PT)  long term goal (LTG);10 days  -COURTNEY (r) GR (t) COURTNEY (c)     Barriers (Bed Mobility Goal 1, PT)  Decreased control of breathing with activites  -COURTNEY (r) GR (t) COURTNEY (c)     Progress/Outcomes (Bed Mobility Goal 1, PT)  goal ongoing  -COURTNEY (r) GR (t) COURTNEY (c)     Row Name 05/08/19 0910          Transfer Goal 1 (PT)    Activity/Assistive Device (Transfer Goal 1, PT)  transfers, all  -COURTNEY (r) GR (t) COURTNEY (c)     [place] Level/Cues Needed (Transfer Goal 1, PT)  conditional independence;verbal  cues required VC for control of breathing and safety awareness  -COURTNEY (r) GR (t) COURTNEY (c)     Time Frame (Transfer Goal 1, PT)  long term goal (LTG);10 days  -COURTNEY (r) GR (t) COURTNEY (c)     Barriers (Transfers Goal 1, PT)  Decreased control of breathing with activites, fear of changes in sx  -COURTNEY (r) GR (t) COURTNEY (c)     Progress/Outcome (Transfer Goal 1, PT)  goal ongoing  -COURTNEY (r) GR (t) COURTNEY (c)     Row Name 05/08/19 0910          Gait Training Goal 1 (PT)    Activity/Assistive Device (Gait Training Goal 1, PT)  gait (walking locomotion);decrease fall risk;improve balance and speed;increase endurance/gait distance;increase energy conservation;walker, rolling progress to no AD per pt tolerance  -COURTNEY (r) GR (t) COURTNEY (c)     [place] Level (Gait Training Goal 1, PT)  contact guard assist;verbal cues required VC for control of breathing and safety awareness  -COURTNEY (r) GR (t) COURTNEY (c)     Distance (Gait Goal 1, PT)  ~100 ft Progress distance per pt tolerance  -COURTNEY (r) GR (t) COURTNEY (c)     Time Frame (Gait Training Goal 1, PT)  long term goal (LTG);10 days  -COURTNEY (r) GR (t) COURTNEY (c)     Barriers (Gait Training Goal 1, PT)  Decreased control of breathing with activites, fear of changes in sx  -COURTNEY (r) GR (t) COURTNEY (c)     Progress/Outcome (Gait Training Goal 1, PT)  goal ongoing  -COURTNEY (r) GR (t) COURTNEY (c)     Row Name 05/08/19 0910          Balance Goal 1 (PT)    Activity/Assistive Device (Balance Goal 1, PT)  standing, static;standing, dynamic  -COURTNEY (r) GR (t) COURTNEY (c)     [place] Level/Cues Needed (Balance Goal 1, PT)  supervision required;verbal cues required VC for control of breathing and safety awareness  -COURTNEY (r) GR (t) COURTNEY (c)     Time Frame (Balance Goal 1, PT)  long term goal (LTG);10 days  -COURTNEY (r) GR (t) COURTNEY (c)     Barriers (Balance Goal 1, PT)  Decreased control of breathing with activites, fear of changes in sx  -COURTNEY (r) GR (t) COURTNEY (c)     Progress/Outcomes (Balance Goal 1, PT)  goal ongoing  -COURTNEY (r) GR (t) COURTNEY (c)     Row Name 05/08/19 0910           Stairs Goal 1 (PT)    Activity/Assistive Device (Stairs Goal 1, PT)  stairs, all skills  -COURTNEY (r) GR (t) COURTNEY (c)     [place] Level/Cues Needed (Stairs Goal 1, PT)  supervision required;verbal cues required VC to control breathing and safety awareness  -COURTNEY (r) GR (t) COURTNEY (c)     Number of Stairs (Stairs Goal 1, PT)  3  -COURTNEY (r) GR (t) COURTNEY (c)     Time Frame (Stairs Goal 1, PT)  long term goal (LTG);10 days  -COURTNEY (r) GR (t) COURTNEY (c)     Barriers (Stairs Goal 1, PT)  Decreased control of breathing with activites, fear of changes in sx  -COURTNEY (r) GR (t) COURTNEY (c)     Progress/Outcome (Stairs Goal 1, PT)  goal ongoing  -COURTNEY (r) GR (t) COURTNEY (c)     Row Name 05/08/19 0910          Plan for Physical Therapy Intervention    Balance Training (Balance)  Increase tolerance of upright postions/posture  -COURTNEY (r) GR (t) COURTNEY (c)     Bed Mobility Training (Bed Mobility)  [place] with breathing and recovery from SOA with all bed mobility  -COURTNEY (r) GR (t) COURTNEY (c)     Gait Training  Initiate with RW for pt comfort and tolerance, DC use of AD as soon as possible per pt tolerance  -COURTNEY (r) GR (t) COURTNEY (c)     Patient/Family Education  Continue education on recovery breathing and activity modification, educate on possible consequences of decreased mobility and activity.  -COURTNEY (r) GR (t) COURTNEY (c)     Postural Re-education  Upright positions and stable supports for control of breathing  -COURTNEY (r) GR (t) COURTNEY (c)     Row Name 05/08/19 0910          Positioning and Restraints    Pre-Treatment Position  in bed  -COURTNEY (r) GR (t) COURTNEY (c)     Post Treatment Position  bed  -COURTNEY (r) GR (t) COURTNEY (c)     In Bed  fowlers;call light within reach;encouraged to call for assist;with family/caregiver  -COURTNEY (r) GR (t) COURTNEY (c)     Row Name 05/08/19 0910          Living Environment    Home Accessibility  stairs to enter home;tub/shower is not walk in  -COURTNEY (r) GR (t) COURTNEY (c)       User Key  (r) = Recorded By, (t) = Taken By, (c) = Cosigned By    Initials Name Provider Type    COURTNEY  Sukhdev Rudd, PT DPT Physical Therapist    GR Leslie Estes, PT Student PT Student        Physical Therapy Education     Title: PT OT SLP Therapies (In Progress)     Topic: Physical Therapy (In Progress)     Point: Mobility training (Done)     Learning Progress Summary           Patient Acceptance, E, VU,NR by GR at 5/8/2019 10:28 AM    Comment:  Pt educated on use and HEP of inspirometer, safety with t/f and bed mobility, recovery breathing, role of PT, POC, and benefits of increase mobitlity.   Significant Other Acceptance, E, VU,NR by GR at 5/8/2019 10:28 AM    Comment:  Pt educated on use and HEP of inspirometer, safety with t/f and bed mobility, recovery breathing, role of PT, POC, and benefits of increase mobitlity.                   Point: Home exercise program (Done)     Learning Progress Summary           Patient Acceptance, E, VU,NR by GR at 5/8/2019 10:28 AM    Comment:  Pt educated on use and HEP of inspirometer, safety with t/f and bed mobility, recovery breathing, role of PT, POC, and benefits of increase mobitlity.   Significant Other Acceptance, E, VU,NR by GR at 5/8/2019 10:28 AM    Comment:  Pt educated on use and HEP of inspirometer, safety with t/f and bed mobility, recovery breathing, role of PT, POC, and benefits of increase mobitlity.                   Point: Precautions (Done)     Learning Progress Summary           Patient Acceptance, E, VU,NR by GR at 5/8/2019 10:28 AM    Comment:  Pt educated on use and HEP of inspirometer, safety with t/f and bed mobility, recovery breathing, role of PT, POC, and benefits of increase mobitlity.   Significant Other Acceptance, E, VU,NR by GR at 5/8/2019 10:28 AM    Comment:  Pt educated on use and HEP of inspirometer, safety with t/f and bed mobility, recovery breathing, role of PT, POC, and benefits of increase mobitlity.                               User Key     Initials Effective Dates Name Provider Type Discipline    GR 03/13/19 -  Franklyn  Leslie, PT Student PT Student PT              PT Recommendation and Plan  Anticipated Discharge Disposition (PT): transitional care, home with home health  Therapy Frequency (PT Clinical Impression): daily  Outcome Summary/Treatment Plan (PT)  Anticipated Equipment Needs at Discharge (PT): rollator, bedside commode  Anticipated Discharge Disposition (PT): transitional care, home with home health  Patient/Family Concerns, Anticipated Discharge Disposition (PT): SO requests HH referal  Plan of Care Reviewed With: patient, spouse  Outcome Summary: PT eval completed. Pt O&Ax4 and voices great concern about limitations with SOA and pain with activity. Pt required supervision with supine<>sit, sit<>supine and sitting balance for VC for recovery breathing and safety with transitional movement. Pt on SupO2 x4L and O2 decreased from 97 to 95% with bed mobility, but pt hyperventilated and reported nausea and marked increases in fatigue. Pt's main impairments are decreased activity tolerance and mobility impaired by decreased control of breathing and recovery from SOA. Pt is hesitant to participate in PT treatment d/t fear of changes in sx, and has been educated on risks with decreased activity and POC. She would benefit from skilled PT to address previously stated impairments and we recommend DC home with HH vs TCU pending progress with ambulation to ~ 100 ft.  Outcome Measures     Row Name 05/08/19 0910 05/08/19 0800          How much help from another person do you currently need...    Turning from your back to your side while in flat bed without using bedrails?  3  -COURTNEY  --     Moving from lying on back to sitting on the side of a flat bed without bedrails?  3  -COURTNEY  --     Moving to and from a bed to a chair (including a wheelchair)?  3  -COURTNEY  --     Standing up from a chair using your arms (e.g., wheelchair, bedside chair)?  2  -COURTNEY  --     Climbing 3-5 steps with a railing?  2  -COURTNEY  --     To walk in hospital room?  2  -COURTNEY   --     AM-PAC 6 Clicks Score  15  -COURTNEY  --        Functional Assessment    Outcome Measure Options  AM-PAC 6 Clicks Basic Mobility (PT)  -COURTNEY  AM-PAC 6 Clicks Basic Mobility (PT)  -COURTNEY       User Key  (r) = Recorded By, (t) = Taken By, (c) = Cosigned By    Initials Name Provider Type    Sukhdev Blackburn, PT DPT Physical Therapist         Time Calculation:   PT Charges     Row Name 05/08/19 1114             Time Calculation    Start Time  0910  (Pended)   -GR      Stop Time  1010  (Pended)   -GR      Time Calculation (min)  60 min  (Pended)   -GR      PT Received On  05/08/19  (Pended)   -GR      PT Goal Re-Cert Due Date  05/18/19  (Pended)   -GR        User Key  (r) = Recorded By, (t) = Taken By, (c) = Cosigned By    Initials Name Provider Type    GR Leslie Estes, PT Student PT Student        Therapy Charges for Today     Code Description Service Date Service Provider Modifiers Qty    [id number] HC PT EVAL LOW COMPLEXITY 4 5/8/2019 Leslie Estes, PT Student GP 1          PT G-Codes  Outcome Measure Options: AM-PAC 6 Clicks Basic Mobility (PT)  AM-PAC 6 Clicks Score: 15      Leslie Estes PT Student  5/8/2019

## 2019-05-08 NOTE — PROGRESS NOTES
[facility]  INPATIENT PROGRESS NOTE    Patient Name: Heide Newsome  Date of Admission: 5/6/2019  Today's Date: 05/08/19  Length of Stay: 2  Primary Care Physician: Provider, No Known    Subjective   Chief Complaint: dyspnea     HPI   Currently sitting up in the bed on 4 L nasal cannula.  Currently has breakfast in front of her, eating fairly well this morning considering she has barely ate at all in the past couple weeks.  She states that she has been so short of breath that it requires too much energy to eat so she just has not been eating.  No family currently present.  She seems to be in pretty good spirits today.  She states that her restless legs had improved last night.    Review of Systems   Constitutional: Positive for activity change and fatigue. Negative for appetite change and chills.   HENT: Negative for hearing loss, nosebleeds, tinnitus and trouble swallowing.    Eyes: Negative for visual disturbance.   Respiratory: Positive for cough. Negative for chest tightness.    Cardiovascular: Negative for palpitations.   Gastrointestinal: Negative for abdominal distention, blood in stool, constipation, diarrhea and nausea.   Endocrine: Negative for cold intolerance, heat intolerance, polydipsia, polyphagia and polyuria.   Genitourinary: Negative for decreased urine volume, difficulty urinating, dysuria, flank pain, frequency and hematuria.   Musculoskeletal: Positive for myalgias. Negative for arthralgias and joint swelling.   Allergic/Immunologic: Negative for immunocompromised state.   Neurological: Positive for weakness. Negative for dizziness, syncope and light-headedness.   Hematological: Negative for adenopathy. Does not bruise/bleed easily.   Psychiatric/Behavioral: Negative for confusion and sleep disturbance. The patient is not nervous/anxious.       All pertinent negatives and positives are as above. All other systems have been reviewed and  are negative unless otherwise stated.     Objective    Temp:  [98.1 °F (36.7 °C)-98.6 °F (37 °C)] 98.1 °F (36.7 °C)  Heart Rate:  [] 101  Resp:  [16-18] 18  BP: (110-123)/(48-65) 115/48     Intake/Output Summary (Last 24 hours) at 5/8/2019 1453  Last data filed at 5/8/2019 1405  Gross per 24 hour   Intake 1742 ml   Output 275 ml   Net 1467 ml     Physical Exam   Constitutional: She is oriented to person, place, and time. She appears well-developed.   Sitting up in the bed.  NAD.  Thin.   HENT:   Head: Normocephalic and atraumatic.   Eyes: Conjunctivae and EOM are normal. Pupils are equal, round, and reactive to light.   Neck: Neck supple. No JVD present. No thyromegaly present.   Cardiovascular: Regular rhythm, normal heart sounds and intact distal pulses. Tachycardia present. Exam reveals no gallop and no friction rub.   No murmur heard.  Sinus tach 103-109   Pulmonary/Chest: Effort normal and breath sounds normal. No respiratory distress. She has no wheezes. She has no rales. She exhibits no tenderness.   Decreased air movement.  4 L nasal cannula    Abdominal: Soft. Bowel sounds are normal. She exhibits no distension. There is no tenderness. There is no rebound and no guarding.   Genitourinary:   Genitourinary Comments: Voiding.    Musculoskeletal: Normal range of motion. She exhibits no edema, tenderness or deformity.   Lymphadenopathy:     She has no cervical adenopathy.   Neurological: She is alert and oriented to person, place, and time.   Skin: Skin is warm and dry. No rash noted.   Psychiatric: She has a normal mood and affect. Her behavior is normal. Judgment and thought content normal.   Nursing note and vitals reviewed.    Results Review:  I have reviewed the labs, radiology results, and diagnostic studies.    Laboratory Data:   Results from last 7 days   Lab Units 05/08/19  0443 05/07/19  0523 05/06/19  1925   WBC 10*3/mm3 8.61 11.12* 16.52*   HEMOGLOBIN g/dL 8.9* 10.1* 11.5*   HEMATOCRIT % 30.0*  34.0* 39.0   PLATELETS 10*3/mm3 111* 112* 142     Results from last 7 days   Lab Units 05/08/19  0443 05/07/19  0523 05/06/19  2051   SODIUM mmol/L 139 138 137   POTASSIUM mmol/L 4.1 4.1 4.1   CHLORIDE mmol/L 97* 93* 89*   CO2 mmol/L 40.0* >40.0* >40.0*   BUN mg/dL 13 15 16   CREATININE mg/dL 0.26* 0.21* 0.21*   CALCIUM mg/dL 9.1 9.4 9.7   BILIRUBIN mg/dL  --  0.6 0.9   ALK PHOS U/L  --  61 68   ALT (SGPT) U/L  --  19 18   AST (SGOT) U/L  --  17 22   GLUCOSE mg/dL 121* 138* 108*     Radiology Data:   Imaging Results (last 24 hours)     ** No results found for the last 24 hours. **        I have reviewed the patient's current medications.     Assessment/Plan     Active Hospital Problems    Diagnosis   • **Acute on chronic respiratory failure with hypercapnia (CMS/HCC)   • Community acquired pneumonia of right lower lobe of lung (CMS/HCC)   • COPD with acute exacerbation (CMS/HCC)   • Thrombocytopenia (CMS/HCC)   • Anemia   • Pulmonary emphysema (CMS/HCC)   • Severe malnutrition (CMS/HCC)   • History of colon cancer   • Hyperglycemia, drug-induced     Plan:  1.  Azithromycin and Rocephin day #3  2.  Continue Symbicort, Mucinex, Duonebs,   3.  Decrease Solu-medrol to 40 mg every 12 hours  4.  Continue Requip  5.  CBC and BMP in AM  6.  2D echocardiogram results reviewed and show mild mitral valve prolapse with mild mitral valve regurgitation  7.  Decrease IV fluids to 50 ml/hr  8.  Nutrition consult  9.  Blood cultures no growth at 24 hours  10.  Incentive spirometry and aerobika  11.  Consult PT and OT    Discharge Planning: I expect the patient to be discharged to home in 2 days.    Brian Bhatti, APRN   05/08/19   2:53 PM

## 2019-05-08 NOTE — PAYOR COMM NOTE
"5/8 CLINICAL UPDATE  UR PHONE    [phone]    Melvin Benitez (74 y.o. Female)     Date of Birth Social Security Number Address Home Phone MRN    1945  [id number]  [facility] [phone] [id number]    [employer]eterian Marital Status          Hinduism        Admission Date Admission Type Admitting Provider Attending Provider Department, Room/Bed    5/6/19 Emergency Freeman Quezada DO Robinson, Maurice S, DO [facility] 4C, 464/1    Discharge Date Discharge Disposition Discharge Destination                       Attending Provider:  Freeman Quezada DO    Allergies:  Tetracyclines & Related, Penicillins    Isolation:  None   Infection:  None   Code Status:  No CPR    Ht:  160 cm (63\")   Wt:  37.3 kg (82 lb 3 oz)    Admission Cmt:  None   Principal Problem:  Acute on chronic respiratory failure with hypercapnia (CMS/HCC) [J96.22]                 Active Insurance as of 5/6/2019     Primary Coverage     Payor Plan Insurance Group Employer/Plan Group    HUMANA MEDICARE REPLACEMENT HUMANA MEDICARE REPL [id number]     Payor Plan Address Payor Plan Phone Number Payor Plan Fax Number Effective Dates    [address] [address] [phone]  1/1/2018 - None Entered    [facility] [id number]       Subscriber Name Subscriber Birth Date Member ID       MELVIN BENITEZ 1945 [id number]                 Emergency Contacts      (Rel.) Home Phone Work Phone Mobile Phone    TON BENITEZ (Spouse) [phone] -- --            ICU Vital Signs     Row Name 05/08/19 1156 05/08/19 0802 05/08/19 0800 05/08/19 0625 05/08/19 0620       Vitals    Temp  98.1 °F (36.7 °C)  98.6 °F (37 °C)  --  --  --    Temp src  Oral  Oral  --  --  --    Pulse  106  93  --  103  94    Heart Rate Source  Monitor  Monitor  --  Monitor  Monitor    Resp  18  18  --  18  16    Resp Rate Source  Visual  Visual  --  Visual  Visual    BP  115/48  110/54  --  --  --    BP Location  Left arm  " Left arm  --  --  --    BP Method  Automatic  Automatic  --  --  --    Patient Position  Lying  Lying  --  --  Lying       Oxygen Therapy    SpO2  96 %  97 %  --  93 %  99 %    Pulse Oximetry Type  --  --  --  Continuous  Continuous    Device (Oxygen Therapy)  --  --  nasal cannula  --  nasal cannula    Flow (L/min)  --  --  4  --  4    Row Name 05/08/19 0000 05/07/19 2357 05/07/19 2349 05/07/19 2000 05/07/19 1832       Height and Weight    Weight  --  --  --  37.3 kg (82 lb 3 oz)  --    Weight Method  --  --  --  Standing scale  --       Vitals    Temp  98.3 °F (36.8 °C)  --  --  98.2 °F (36.8 °C)  --    Temp src  Oral  --  --  Oral  --    Pulse  90  98  105  98  110    Heart Rate Source  Monitor  Monitor  Monitor  Monitor  Monitor    Resp  16  16  16  16  16    Resp Rate Source  Visual  Visual  Visual  Visual  Visual    BP  122/64  --  --  123/65  --    BP Location  Left arm  --  --  Left arm  --    BP Method  Automatic  --  --  Automatic  --    Patient Position  Lying  --  --  Lying  --       Oxygen Therapy    SpO2  98 %  96 %  96 %  96 %  99 %    Pulse Oximetry Type  Continuous  --  Continuous  Continuous  --    Device (Oxygen Therapy)  nasal cannula  --  nasal cannula  nasal cannula  --    Flow (L/min)  4  --  4  4  --    Row Name 05/07/19 1821 05/07/19 1641                Vitals    Temp  --  98.3 °F (36.8 °C)       Temp src  --  Oral       Pulse  81  92       Heart Rate Source  Monitor  Monitor       Resp  16  16       Resp Rate Source  Visual  Visual       BP  --  118/59       BP Location  --  Left arm       BP Method  --  Automatic       Patient Position  --  Lying          Oxygen Therapy    SpO2  98 %  99 %       Pulse Oximetry Type  Continuous  Continuous       Device (Oxygen Therapy)  nasal cannula  --       Flow (L/min)  4  4           Intake & Output (last day)       05/07 0701 - 05/08 0700 05/08 0701 - 05/09 0700    P.O. 720 240    I.V. (mL/kg) 1902 (51)     Total Intake(mL/kg) 2622 (70.3) 240 (6.4)     Urine (mL/kg/hr) 275 (0.3)     Total Output 275     Net +2347 +240          Urine Unmeasured Occurrence  1 x    Stool Unmeasured Occurrence 1 x         Lines, Drains & Airways    Active LDAs     Name:   Placement date:   Placement time:   Site:   Days:    Peripheral IV 05/06/19 1926 Right Forearm   05/06/19 1926    Forearm   1                Hospital Medications (active)       Dose Frequency Start End    acetaminophen (TYLENOL) tablet 650 mg 650 mg Every 4 Hours PRN 5/7/2019     Sig - Route: Take 2 tablets by mouth Every 4 (Four) Hours As Needed for Mild Pain . - Oral    AZITHROMYCIN 500 MG/250 ML 0.9% NS IVPB (vial-mate) 500 mg Every 24 Hours 5/7/2019 5/14/2019    Sig - Route: Infuse 500 mg into a venous catheter Daily. - Intravenous    benzonatate (TESSALON) capsule 200 mg 200 mg 3 Times Daily PRN 5/7/2019     Sig - Route: Take 2 capsules by mouth 3 (Three) Times a Day As Needed for Cough. - Oral    budesonide-formoterol (SYMBICORT) 160-4.5 MCG/ACT inhaler 2 puff 2 puff 2 Times Daily - RT 5/7/2019     Sig - Route: Inhale 2 puffs 2 (Two) Times a Day. - Inhalation    cefTRIAXone (ROCEPHIN) 1 g/100 mL 0.9% NS (MBP) 1 g Every 24 Hours 5/7/2019     Sig - Route: Infuse 100 mL into a venous catheter Daily. - Intravenous    clotrimazole (MYCELEX) bruce 10 mg 10 mg 5 Times Daily 5/7/2019     Sig - Route: Take 1 tablet by mouth 5 (Five) Times a Day. - Oral    guaiFENesin (MUCINEX) 12 hr tablet 1,200 mg 1,200 mg Every 12 Hours Scheduled 5/7/2019     Sig - Route: Take 2 tablets by mouth Every 12 (Twelve) Hours. - Oral    ipratropium-albuterol (DUO-NEB) nebulizer solution 3 mL 3 mL Every 6 Hours - RT 5/7/2019     Sig - Route: Take 3 mL by nebulization Every 6 (Six) Hours. - Nebulization    methylPREDNISolone sodium succinate (SOLU-Medrol) injection 60 mg 60 mg Every 8 Hours 5/7/2019     Sig - Route: Infuse 0.96 mL into a venous catheter Every 8 (Eight) Hours. - Intravenous    ondansetron (ZOFRAN) injection 4 mg 4 mg Every  "6 Hours PRN 5/7/2019     Sig - Route: Infuse 2 mL into a venous catheter Every 6 (Six) Hours As Needed for Nausea or Vomiting. - Intravenous    Linked Group 1:  \"Or\" Linked Group Details        ondansetron (ZOFRAN) tablet 4 mg 4 mg Every 6 Hours PRN 5/7/2019     Sig - Route: Take 1 tablet by mouth Every 6 (Six) Hours As Needed for Nausea or Vomiting. - Oral    Linked Group 1:  \"Or\" Linked Group Details        rOPINIRole (REQUIP) tablet 0.25 mg 0.25 mg Nightly 5/7/2019     Sig - Route: Take 1 tablet by mouth Every Night. - Oral    sodium chloride 0.9 % flush 3 mL 3 mL Every 12 Hours Scheduled 5/7/2019     Sig - Route: Infuse 3 mL into a venous catheter Every 12 (Twelve) Hours. - Intravenous    sodium chloride 0.9 % flush 3-10 mL 3-10 mL As Needed 5/7/2019     Sig - Route: Infuse 3-10 mL into a venous catheter As Needed for Line Care. - Intravenous    sodium chloride 0.9 % infusion 100 mL/hr Continuous 5/7/2019     Sig - Route: Infuse 100 mL/hr into a venous catheter Continuous. - Intravenous          Blood Administration Record (From admission, onward)    None          Lab Results (last 24 hours)     Procedure Component Value Units Date/Time    Basic Metabolic Panel [[id number]]  (Abnormal) Collected:  05/08/19 0443    Specimen:  Blood Updated:  05/08/19 0544     Glucose 121 mg/dL      BUN 13 mg/dL      Creatinine 0.26 mg/dL      Sodium 139 mmol/L      Potassium 4.1 mmol/L      Chloride 97 mmol/L      CO2 40.0 mmol/L      Calcium 9.1 mg/dL      eGFR Non African Amer >150 mL/min/1.73      BUN/Creatinine Ratio 50.0     Anion Gap 2.0 mmol/L     Narrative:       GFR Normal >60  Chronic Kidney Disease <60  Kidney Failure <15    CBC & Differential [[id number]] Collected:  05/08/19 0443    Specimen:  Blood Updated:  05/08/19 0523    Narrative:       The following orders were created for panel order CBC & Differential.  Procedure                               Abnormality         Status                     ---------             "                   -----------         ------                     CBC Auto Differential[[id number]]        Abnormal            Final result                 Please view results for these tests on the individual orders.    CBC Auto Differential [[id number]]  (Abnormal) Collected:  05/08/19 0443    Specimen:  Blood Updated:  05/08/19 0523     WBC 8.61 10*3/mm3      RBC 3.26 10*6/mm3      Hemoglobin 8.9 g/dL      Hematocrit 30.0 %      MCV 92.0 fL      MCH 27.3 pg      MCHC 29.7 g/dL      RDW 13.4 %      RDW-SD 45.1 fl      MPV 12.9 fL      Platelets 111 10*3/mm3      Neutrophil % 94.6 %      Lymphocyte % 2.6 %      Monocyte % 1.6 %      Eosinophil % 0.0 %      Basophil % 0.0 %      Immature Grans % 1.2 %      Neutrophils, Absolute 8.15 10*3/mm3      Lymphocytes, Absolute 0.22 10*3/mm3      Monocytes, Absolute 0.14 10*3/mm3      Eosinophils, Absolute 0.00 10*3/mm3      Basophils, Absolute 0.00 10*3/mm3      Immature Grans, Absolute 0.10 10*3/mm3      nRBC 0.0 /100 WBC     Blood Culture - Blood, Wrist, Left [[id number]] Collected:  05/06/19 2115    Specimen:  Blood from Wrist, Left Updated:  05/07/19 2145     Blood Culture No growth at 24 hours        Imaging Results (last 24 hours)     ** No results found for the last 24 hours. **        ECG/EMG Results (last 24 hours)     Procedure Component Value Units Date/Time    Adult Transthoracic Echo Complete With Contrast if Necessary Per Protocol [[id number]] Collected:  05/07/19 1142     Updated:  05/07/19 1255     BSA 1.3 m^2      IVSd 0.84 cm      LVIDd 3.7 cm      LVIDs 2.8 cm      LVPWd 0.71 cm      IVS/LVPW 1.2     FS 24.4 %      EDV(Teich) 58.3 ml      ESV(Teich) 29.6 ml      EF(Teich) 49.3 %      EDV(cubed) 50.9 ml      ESV(cubed) 22.0 ml      EF(cubed) 56.8 %      LV mass(C)d 78.7 grams      LV mass(C)dI 59.8 grams/m^2      SV(Teich) 28.8 ml      SI(Teich) 21.8 ml/m^2      SV(cubed) 28.9 ml      SI(cubed) 21.9 ml/m^2      Ao root diam 2.9 cm      Ao root area 6.6 cm^2       LA dimension 2.6 cm      LA/Ao 0.9     LVOT diam 1.8 cm      LVOT area 2.4 cm^2      LVOT area(traced) 2.5 cm^2      LVLd ap4 6.7 cm      EDV(MOD-sp4) 83.7 ml      LVLs ap4 5.5 cm      ESV(MOD-sp4) 29.6 ml      EF(MOD-sp4) 64.6 %      SV(MOD-sp4) 54.1 ml      SI(MOD-sp4) 41.1 ml/m^2      Ao root area (BSA corrected) 2.2     LV Quevedo Vol (BSA corrected) 63.6 ml/m^2      LV Sys Vol (BSA corrected) 22.5 ml/m^2      MV E max mac 80.5 cm/sec      MV A max mac 106.0 cm/sec      MV E/A 0.76     MV dec time 0.2 sec      Ao pk mac 185.0 cm/sec      Ao max PG 13.7 mmHg      Ao max PG (full) 9.9 mmHg      Ao V2 mean 97.6 cm/sec      Ao mean PG 5.0 mmHg      Ao mean PG (full) 4.0 mmHg      Ao V2 VTI 29.2 cm      RONALD(I,A) 1.4 cm^2      RONALD(I,D) 1.4 cm^2      RONALD(V,A) 1.3 cm^2      RONALD(V,D) 1.3 cm^2      LV V1 max PG 3.7 mmHg      LV V1 mean PG 1.0 mmHg      LV V1 max 96.8 cm/sec      LV V1 mean 52.1 cm/sec      LV V1 VTI 17.4 cm      MR max mac 322.0 cm/sec      MR max PG 41.5 mmHg      SV(Ao) 192.9 ml      SI(Ao) 146.4 ml/m^2      SV(LVOT) 41.9 ml      SI(LVOT) 31.8 ml/m^2      TR max mac 208.0 cm/sec       CV ECHO TYE - BZI_BMI 14.3 kilograms/m^2       CV ECHO TYE - BSA(HAYCOCK) 1.3 m^2       CV ECHO TYE - BZI_METRIC_WEIGHT 36.7 kg       CV ECHO TYE - BZI_METRIC_HEIGHT 160.0 cm      Target HR (85%) 126 bpm      Max. Pred. HR (100%) 148 bpm      LA volume 45.7 cm3      LA Volume Index 34.6 mL/m2      Avg E/e' ratio 8.86     Lat Peak E' Mac 9.8 cm/sec      Med Peak E' Mac 8.38 cm/sec     Narrative:         · There is mild mitral valve prolapse of the anterior mitral leaflet.  · Right ventricular cavity is mildly dilated.  · Left ventricular systolic function is normal.     NORMAL LV AND RV SYSTOLIC FUNCTION  RV SYSTOLIC PRESSURE NOT ASSESSED  MILD MVP WITH TRIVIAL MR  MILD RV ENLARGEMENT      ECG 12 Lead [[id number]] Collected:  05/06/19 1918     Updated:  05/07/19 1345    Narrative:       Test Reason : soa  Blood  Pressure : **/** mmHG  Vent. Rate : 103 BPM     Atrial Rate : 103 BPM     P-R Int : 096 ms          QRS Dur : 078 ms      QT Int : 308 ms       P-R-T Axes : 092 098 075 degrees     QTc Int : 403 ms    ** Suspect arm lead reversal, interpretation assumes no reversal  Sinus tachycardia with short AK  Right atrial enlargement  Rightward axis  Pulmonary disease pattern  Abnormal ECG  No previous ECGs available    Referred By:  WHITE           Confirmed By:Dakotah Curry MD          Orders (last 24 hrs)     Start     Ordered    19 1118  PT Plan of Care Cert / Re-Cert  Once     Comments:  Physical Therapy Plan of Care  Initial Certification  Certification Period: 2019 - 2019    Patient Name: Heide Newsome  : 1945    (J43.9) Pulmonary emphysema, unspecified emphysema type (CMS/HCC)  (primary encounter diagnosis)  (Z74.09) Impaired mobility and endurance                  Assessment  PT Assessment  Functional Level at Time of Evaluation (PT Clinical Impression): Pt fearful of SOA and changes in vitals with activity. Will progress as ability to control recovery breathing improves. Will continue to monitor  Impairments Found (describe specific impairments): aerobic capacity/endurance, ergonomics and body mechanics, gait, locomotion, and balance, posture, ventilation and respiration/gas exchange  Criteria for Skilled Interventions Met (PT Clinical Impression): yes        Rehab Goal Summary     Row Name 19 0910             Physical Therapy Goals    Bed Mobility Goal Selection (PT)  bed mobility, PT goal 1  -COURTNEY (r) GR (t)   COURTNEY (c)      Transfer Goal Selection (PT)  transfer, PT goal 1  -COURTNEY (r) GR (t) COURTNEY (c)        Gait Training Goal Selection (PT)  gait training, PT goal 1  -COURTNEY (r) GR   (t) COURTNEY (c)      Balance Goal Selection (PT)  balance, PT goal 1  -COURTNEY (r) GR (t) COURTNEY (c)      Stairs Goal Selection (PT)  stairs, PT goal 1  -COURTNEY (r) GR (t) COURTNEY (c)         Bed Mobility Goal 1 (PT)    Activity/Assistive  Device (Bed Mobility Goal 1, PT)  bed mobility   activities, all  -COURTNEY (r) GR (t) COURTNEY (c)      Baker Level/Cues Needed (Bed Mobility Goal 1, PT)  conditional   independence  -COURTNEY (r) GR (t) COURTNEY (c)      Time Frame (Bed Mobility Goal 1, PT)  long term goal (LTG);10 days  -COURTNEY   (r) GR (t) COURTNEY (c)      Barriers (Bed Mobility Goal 1, PT)  Decreased control of breathing with   activites  -COURTNEY (r) GR (t) COURTNEY (c)      Progress/Outcomes (Bed Mobility Goal 1, PT)  goal ongoing  -COURTNEY (r) GR (t)   COURTNEY (c)         Transfer Goal 1 (PT)    Activity/Assistive Device (Transfer Goal 1, PT)  transfers, all  -COURTNEY (r)   GR (t) COURTNEY (c)      Baker Level/Cues Needed (Transfer Goal 1, PT)  conditional   independence;verbal cues required VC for control of breathing and safety   awareness  -COURTNEY (r) GR (t) COURTNEY (c)      Time Frame (Transfer Goal 1, PT)  long term goal (LTG);10 days  -COURTNEY (r)   GR (t) COURTNEY (c)      Barriers (Transfers Goal 1, PT)  Decreased control of breathing with   activites, fear of changes in sx  -COURTNEY (r) GR (t) COURTNEY (c)      Progress/Outcome (Transfer Goal 1, PT)  goal ongoing  -COURTNEY (r) GR (t) COURTNEY   (c)         Gait Training Goal 1 (PT)    Activity/Assistive Device (Gait Training Goal 1, PT)  gait (walking   locomotion);decrease fall risk;improve balance and speed;increase   endurance/gait distance;increase energy conservation;walker, rolling   progress to no AD per pt tolerance  -COURTNEY (r) GR (t) COURTNEY (c)      Baker Level (Gait Training Goal 1, PT)  contact guard   assist;verbal cues required VC for control of breathing and safety   awareness  -COURTNEY (r) GR (t) COURTNEY (c)      Distance (Gait Goal 1, PT)  ~100 ft Progress distance per pt tolerance    -COURTNEY (r) GR (t) COURTNEY (c)      Time Frame (Gait Training Goal 1, PT)  long term goal (LTG);10 days  -COURTNEY   (r) GR (t) COURTNEY (c)      Barriers (Gait Training Goal 1, PT)  Decreased control of breathing with   activites, fear of changes in sx  -COURTNEY (r) GR (t) COURTNEY (c)      Progress/Outcome (Gait  Training Goal 1, PT)  goal ongoing  -COURTNEY (r) GR (t)   COURTNEY (c)         Balance Goal 1 (PT)    Activity/Assistive Device (Balance Goal 1, PT)  standing,   static;standing, dynamic  -COURTNEY (r) GR (t) COURTNEY (c)      [place] Level/Cues Needed (Balance Goal 1, PT)  supervision   required;verbal cues required VC for control of breathing and safety   awareness  -COURTNEY (r) GR (t) COURTNEY (c)      Time Frame (Balance Goal 1, PT)  long term goal (LTG);10 days  -COURTNEY (r) GR   (t) COURTNEY (c)      Barriers (Balance Goal 1, PT)  Decreased control of breathing with   activites, fear of changes in sx  -COURTNEY (r) GR (t) COURTNEY (c)      Progress/Outcomes (Balance Goal 1, PT)  goal ongoing  -COURTNEY (r) GR (t) COURTNEY   (c)         Stairs Goal 1 (PT)    Activity/Assistive Device (Stairs Goal 1, PT)  stairs, all skills  -COURTNEY   (r) GR (t) COURTNEY (c)      [place] Level/Cues Needed (Stairs Goal 1, PT)  supervision   required;verbal cues required VC to control breathing and safety awareness    -COURTNEY (r) GR (t) COURTNEY (c)      Number of Stairs (Stairs Goal 1, PT)  3  -COURTNEY (r) GR (t) COURTNEY (c)      Time Frame (Stairs Goal 1, PT)  long term goal (LTG);10 days  -COURTNEY (r) GR   (t) COURTNEY (c)      Barriers (Stairs Goal 1, PT)  Decreased control of breathing with   activites, fear of changes in sx  -COURTNEY (r) GR (t) COURTNEY (c)      Progress/Outcome (Stairs Goal 1, PT)  goal ongoing  -COURTNEY (r) GR (t) COURTNEY (c)           Occupational Therapy Goals    Bed Mobility Goal Selection (OT)  -  (Pended)   -        User Key  (r) = Recorded By, (t) = Taken By, (c) = Cosigned By    Initials Name Provider Type Discipline    Sukhdev Blackburn, PT DPT Physical Therapist PT    GR Leslie Estes, PT Student PT Student PT    LH Aiden Wiggins, OT Student OT Student OT      PT OP Goals     Row Name 05/08/19 1114          Time Calculation    PT Goal Re-Cert Due Date  05/18/19  (Pended)   -GR       User Key  (r) = Recorded By, (t) = Taken By, (c) = Cosigned By    Initials Name Provider Type    Leslie Ivey, PT  Student PT Student            Plan  PT Plan  Therapy Frequency (PT Clinical Impression): daily  Planned Therapy Interventions (PT Eval): bed mobility training, transfer training, gait training, balance training, home exercise program, patient/family education, postural re-education, stair training, strengthening  Outcome Summary: PT eval completed. Pt O&Ax4 and voices great concern about limitations with SOA and pain with activity. Pt required supervision with supine<>sit, sit<>supine and sitting balance for VC for recovery breathing and safety with transitional movement. Pt on SupO2 x4L and O2 decreased from 97 to 95% with bed mobility, but pt hyperventilated and reported nausea and marked increases in fatigue. Pt's main impairments are decreased activity tolerance and mobility impaired by decreased control of breathing and recovery from SOA. Pt is hesitant to participate in PT treatment d/t fear of changes in sx, and has been educated on risks with decreased activity and POC. She would benefit from skilled PT to address previously stated impairments and we recommend DC home with HH vs TCU pending progress with ambulation to ~ 100 ft.        Sukhdev Rudd, PT DPT  5/8/2019            By cosigning this order, either electronically or physically, I certify that the therapy services are furnished while this patient is under my care, the services outline above are required by this patient, and will be reviewed every 90 days.        M.D.:__________________________________________ Date: ______________    05/08/19 1117    05/08/19 0728  PT Consult: Eval & Treat  Once      05/08/19 0727    05/08/19 0728  OT Consult: Eval & Treat weakness  Once      05/08/19 0727    05/08/19 0600  CBC & Differential  Morning Draw      05/07/19 1309    05/08/19 0600  Basic Metabolic Panel  Morning Draw      05/07/19 1309    05/08/19 0600  CBC Auto Differential  PROCEDURE ONCE      05/08/19 0001    05/07/19 2200  cefTRIAXone (ROCEPHIN) 1  g/100 mL 0.9% NS (MBP)  Every 24 Hours      05/07/19 0007    05/07/19 2100  AZITHROMYCIN 500 MG/250 ML 0.9% NS IVPB (vial-mate)  Every 24 Hours      05/07/19 0008 05/07/19 2100  rOPINIRole (REQUIP) tablet 0.25 mg  Nightly      05/07/19 1050    05/07/19 1416  methylPREDNISolone sodium succinate (SOLU-Medrol) injection 60 mg  Every 8 Hours      05/07/19 1051    05/07/19 1307  Oscillating Positive Expiratory Pressure (OPEP)  Once      05/07/19 1306    05/07/19 0700  clotrimazole (MYCELEX) bruce 10 mg  5 Times Daily      05/06/19 2350    05/07/19 0100  sodium chloride 0.9 % infusion  Continuous      05/07/19 0008    05/07/19 0100  ipratropium-albuterol (DUO-NEB) nebulizer solution 3 mL  Every 6 Hours - RT      05/07/19 0008    05/07/19 0015  guaiFENesin (MUCINEX) 12 hr tablet 1,200 mg  Every 12 Hours Scheduled      05/07/19 0008    05/07/19 0008  sodium chloride 0.9 % flush 3 mL  Every 12 Hours Scheduled      05/07/19 0008    05/07/19 0008  sodium chloride 0.9 % flush 3-10 mL  As Needed      05/07/19 0008    05/07/19 0008  acetaminophen (TYLENOL) tablet 650 mg  Every 4 Hours PRN      05/07/19 0008    05/07/19 0008  ondansetron (ZOFRAN) tablet 4 mg  Every 6 Hours PRN      05/07/19 0008    05/07/19 0008  ondansetron (ZOFRAN) injection 4 mg  Every 6 Hours PRN      05/07/19 0008    05/07/19 0008  benzonatate (TESSALON) capsule 200 mg  3 Times Daily PRN      05/07/19 0008    05/07/19 0007  budesonide-formoterol (SYMBICORT) 160-4.5 MCG/ACT inhaler 2 puff  2 Times Daily - RT      05/07/19 0007    Unscheduled  Up With Assistance  As Needed      05/07/19 0008    --  budesonide-formoterol (SYMBICORT) 160-4.5 MCG/ACT inhaler  2 Times Daily - RT      05/06/19 2246    --  Chlorphen-Pseudoephed-APAP (TYLENOL ALLERGY COMPLETE PO)  Every 6 Hours PRN      05/06/19 2246    --  SCANNED - TELEMETRY        05/06/19 0000    --  SCANNED EKG      05/06/19 0000        Sukhdev Rudd, PT DPT   Physical Therapist   Physical Therapy   Plan  of Care   Signed   Date of Service:  5/8/2019 11:11 AM   Creation Time:  5/8/2019 11:11 AM            Signed           Problem: Patient Care Overview  Goal: Plan of Care Review  Outcome: Ongoing (interventions implemented as appropriate)    05/08/19 0910   Coping/Psychosocial   Plan of Care Reviewed With patient;spouse   OTHER   Outcome Summary PT eval completed. Pt O&Ax4 and voices great concern about limitations with SOA and pain with activity. Pt required supervision with supine<>sit, sit<>supine and sitting balance for VC for recovery breathing and safety with transitional movement. Pt on SupO2 x4L and O2 decreased from 97 to 95% with bed mobility, but pt hyperventilated and reported nausea and marked increases in fatigue. Pt's main impairments are decreased activity tolerance and mobility impaired by decreased control of breathing and recovery from SOA. Pt is hesitant to participate in PT treatment d/t fear of changes in sx, and has been educated on risks with decreased activity and POC. She would benefit from skilled PT to address previously stated impairments and we recommend DC home with HH vs TCU pending progress with ambulation to ~ 100 ft.         Aliza Gusman RN   Registered Nurse      Plan of Care   Signed   Date of Service:  5/7/2019  4:12 PM   Creation Time:  5/7/2019  4:12 PM            Signed           Problem: Patient Care Overview  Goal: Plan of Care Review  Outcome: Ongoing (interventions implemented as appropriate)    05/07/19 1611   Coping/Psychosocial   Plan of Care Reviewed With patient   Plan of Care Review   Progress no change   OTHER   Outcome Summary no c/o pain. safety maintained. 4L NC o2 continuous.          Problem: Chronic Obstructive Pulmonary Disease (Adult)  Goal: Signs and Symptoms of Listed Potential Problems Will be Absent, Minimized or Managed (Chronic Obstructive Pulmonary Disease)  Outcome: Ongoing (interventions implemented as appropriate)        Problem: Fall Risk  (Adult)  Goal: Identify Related Risk Factors and Signs and Symptoms  Outcome: Ongoing (interventions implemented as appropriate)     Goal: Absence of Fall  Outcome: Ongoing (interventions implemented as appropriate)

## 2019-05-08 NOTE — PROGRESS NOTES
Malnutrition Severity Assessment    Patient Name:  Heide Newsome  YOB: 1945  MRN: [id number]  Admit Date:  5/6/2019    Patient meets criteria for : Severe malnutrition    Comments:  If in agreement with malnutrition assessment, please attest documentation. Thanks.     Malnutrition Type: Chronic Illness Malnutrition     Malnutrition Type (last 8 hours)      Malnutrition Severity Assessment     Row Name 05/08/19 1654       Malnutrition Severity Assessment    Malnutrition Type  Chronic Illness Malnutrition    Row Name 05/08/19 1654       Physical Signs of Malnutrition (Chronic)    Muscle Wasting  Severe scooping temporalis; squared shoulders with signicantly sunken pectoris, deltoid, and trapezius--clavicle and acromion process prominent; depression of interosseous muscle    Fat Loss  Severe sunken/dark orbital fat pad; cheek bones visible; minimal to no fat tissue on ribs; loose fat/skin tissue to upper arms     Secondary Physical Signs  Present (comment) dry/flaky skin; easily fatigued/generalized weakness    Row Name 05/08/19 1654       Weight Status (Chronic)    Weight Loss  -- Pt states she's been losing wt but does not specify an amount or time frame    Row Name 05/08/19 1654       Energy Intake Status (Chronic)    Energy Intake  Severe (< or equal to 50% / > or equal to 1 mo)    Row Name 05/08/19 1654       Criteria Met (Must meet criteria for severity in at least 2 of these categories: M Wasting, Fat Loss, Fluid, Secondary Signs, Wt. Status, Intake)    Patient meets criteria for   Severe malnutrition          Electronically signed by:  Kirsten Zhong RDN, LD  05/08/19 5:16 PM

## 2019-05-08 NOTE — PLAN OF CARE
Problem: Patient Care Overview  Goal: Plan of Care Review  Outcome: Ongoing (interventions implemented as appropriate)   05/08/19 1714   Coping/Psychosocial   Plan of Care Reviewed With patient;spouse   Plan of Care Review   Progress no change   OTHER   Outcome Summary Pt meets criteria for malnutrition (see progress notes for details). Boost Plus BID-provided with discharge pack including samples and coupons. Educated on importance of adequate kcal/protein and provided diet handouts to facilitate improved intake. Will continue to follow.       Problem: Chronic Obstructive Pulmonary Disease (Adult)  Goal: Signs and Symptoms of Listed Potential Problems Will be Absent, Minimized or Managed (Chronic Obstructive Pulmonary Disease)  Outcome: Ongoing (interventions implemented as appropriate)   05/08/19 1714   Goal/Outcome Evaluation   Problems Assessed (Chronic Obstructive Pulmonary Disease (COPD)) undernutrition   Problems Present (COPD, Bronch/Emphy) undernutrition

## 2019-05-08 NOTE — PLAN OF CARE
Problem: Patient Care Overview  Goal: Plan of Care Review   05/08/19 0828   Coping/Psychosocial   Plan of Care Reviewed With patient;spouse   OTHER   Outcome Summary Pt. participated in OT evaluation. Pt. reports current pain as a 5/10 due to headache. Pt. supine in bed upon therpaist arrival. Pt. able to sit EOB > 2 mins due to decreased activity tolerance and SOB. Pt. able to don sock ind. followed by returning to supine due to fatigue and SOB. Sat reamined WNL on 4 L Pt. demonstrates some self limiting behaviors. Pt would benefit from skilled OT services to increase activity tolerance and endurance. Anticipate discharge home with  w/ 24 hr assist or SNF.

## 2019-05-08 NOTE — THERAPY EVALUATION
Acute Care - Occupational Therapy Initial Evaluation  [facility]     Patient Name: Heide Newsome  : 1945  MRN: [id number]  Today's Date: 2019  Onset of Illness/Injury or Date of Surgery: 19  Date of Referral to OT: 19  Referring Physician: Brian Bhatti APRN    Admit Date: 2019       ICD-10-CM ICD-9-CM   1. Pulmonary emphysema, unspecified emphysema type (CMS/HCC) J43.9 492.8   2. Impaired mobility and endurance Z74.09 V49.89   3. Poor tolerance for activity Z78.9 V49.89     Patient Active Problem List   Diagnosis   • Pulmonary emphysema (CMS/HCC)   • Acute on chronic respiratory failure with hypercapnia (CMS/HCC)   • COPD with acute exacerbation (CMS/HCC)   • Severe malnutrition (CMS/HCC)   • History of colon cancer   • Thrombocytopenia (CMS/HCC)   • Anemia   • Hyperglycemia, drug-induced   • Community acquired pneumonia of right lower lobe of lung (CMS/HCC)     Past Medical History:   Diagnosis Date   • Cancer (CMS/HCC), colon    • Chronic respiratory failure (CMS/HCC), 2 L nasal cannula continuously    • COPD (chronic obstructive pulmonary disease) (CMS/HCC)    • Restless leg syndrome      Past Surgical History:   Procedure Laterality Date   • APPENDECTOMY     • COLON RESECTION     • FRACTURE SURGERY     • HYSTERECTOMY            OT ASSESSMENT FLOWSHEET (last 12 hours)      Occupational Therapy Evaluation     Row Name 19 0910 19 0815                OT Evaluation Time/Intention    Subjective Information  --  fatigue;weakness;complains of pain 5/10 HA  -       Document Type  --  evaluation  -CH (r) LH (t) CH (c)       Mode of Treatment  --  occupational therapy  -       Patient Effort  --  adequate  -CH (r) LH (t) CH (c)       Symptoms Noted During/After Treatment  --  shortness of breath;fatigue  -CH (r) LH (t) CH (c)          General Information    Patient Profile Reviewed?  --  yes  -       Onset of Illness/Injury or Date of Surgery  --  19  -CH (r) LH  (t) CH (c)       Referring Physician  --  Brian Bhatti APRN   - (r)  (t) CH (c)       Patient Observations  --  alert  -       Patient/Family Observations  --  spouse in room, supportive   - (r)  (t) CH (c)       General Observations of Patient  --  fowlers;oxygen; telemetry; IV  - (r)  (t) CH (c)       Prior Level of Function  --  independent:;all household mobility;transfer;ADL's;dependent:;driving;shopping  (Significant)  pt dresses and bathes infrequently due to SOB   - (r)  (t) CH (c)       Equipment Currently Used at Home  --  oxygen;shower chair;walker, rolling;grab bar  -       Pertinent History of Current Functional Problem  --  cc:SOB   -CH (r)  (t) CH (c)       Existing Precautions/Restrictions  --  fall;oxygen therapy device and L/min  - (r)  (t) CH (c)       Equipment Issued to Patient  --  walker, front wheeled  - (r)  (t) CH (c)       Risks Reviewed  --  spouse/S.O.:;patient:;LOB;dizziness;increased discomfort;change in vital signs;lines disloged;nausea/vomiting  - (r)  (t) CH (c)       Benefits Reviewed  --  patient:;spouse/S.O.:;improve function;increase independence;increase strength;increase balance;decrease pain;improve skin integrity;increase knowledge  - (r)  (t) CH (c)       Barriers to Rehab  --  medically complex  - (r)  (t) CH (c)          Relationship/Environment    Primary Source of Support/Comfort  --  spouse  -       Lives With  --  spouse  -       Family Caregiver if Needed  --  spouse  - (r)  (t) CH (c)       Primary Roles/Responsibilities  --  --  - (r)  (t) CH (c)          Resource/Environmental Concerns    Current Living Arrangements  --  home/apartment/condo  - (r)  (t) CH (c)       Resource/Environmental Concerns  --  none  - (r)  (t) CH (c)       Transportation Concerns  --  car, none  - (r)  (t) CH (c)          Home Main Entrance    Number of Stairs, Main Entrance  --  two  -       Stair Railings, Main  Entrance  --  railings on both sides of stairs  -          Cognitive Assessment/Interventions    Additional Documentation  --  Cognitive Assessment/Intervention (Group)  - (r)  (t)  (c)          Cognitive Assessment/Intervention- PT/OT    Affect/Mental Status (Cognitive)  --  WFL  - (r)  (t) CH (c)       Behavioral Issues (Cognitive)  --  overwhelmed easily  - (r)  (t) CH (c)       Orientation Status (Cognition)  --  oriented x 4  - (r)  (t) CH (c)       Follows Commands (Cognition)  --  WFL  - (r)  (t) CH (c)       Cognitive Function (Cognitive)  --  [facility]  - (r)  (t)  (c)       Personal Safety Interventions  --  gait belt;fall prevention program maintained;muscle strengthening facilitated;nonskid shoes/slippers when out of bed;supervised activity  - (r)  (t)  (c)          Safety Issues, Functional Mobility    Safety Issues Affecting Function (Mobility)  --  awareness of need for assistance;insight into deficits/self awareness;safety precautions follow-through/compliance  - (r)  (t)  (c)       Impairments Affecting Function (Mobility)  --  shortness of breath;endurance/activity tolerance;balance;pain  - (r)  (t)  (c)          Bed Mobility Assessment/Treatment    Bed Mobility Assessment/Treatment  --  supine-sit;sit-supine  - (r)  (t)  (c)       Supine-Sit [place] (Bed Mobility)  --  supervision  - (r)  (t)  (c)       Sit-Supine [place] (Bed Mobility)  --  supervision  - (r)  (t)  (c)       Assistive Device (Bed Mobility)  --  bed rails;head of bed elevated  - (r)  (t)  (c)       Comment (Bed Mobility)  --  Increased fatigue and shortness of breath   - (r)  (t)  (c)          Functional Mobility    Functional Mobility- Comment  --  Pt. refused mobility at thist time, citing SOB as her primary limiting factor   - (r)  (t)  (c)          Transfer Assessment/Treatment    Comment (Transfers)  --  Pt. refused mobility at thist time,  citing SOB as her primary limiting factor   - (r)  (t) CH (c)          ADL Assessment/Intervention    BADL Assessment/Intervention  --  lower body dressing  - (r)  (t) CH (c)          Lower Body Dressing Assessment/Training    Lower Body Dressing [place] Level  --  supervision;don;socks  - (r)  (t) CH (c)       Lower Body Dressing Position  --  edge of bed sitting  - (r)  (t) CH (c)       Comment (Lower Body Dressing)  --  pt. posseses full ROM and strength to perdfrom sock don, may need additional assistance performing full LBD 2' decreased activity tolerance   - (r)  (t) CH (c)          BADL Safety/Performance    Impairments, BADL Safety/Performance  --  endurance/activity tolerance;pain;shortness of breath;strength;trunk/postural control;balance  - (r)  (t) CH (c)          General ROM    GENERAL ROM COMMENTS  --  BUE AROM WFL   - (r)  (t) CH (c)          MMT (Manual Muscle Testing)    General MMT Comments  --  BUE strength 4/5   - (r)  (t) CH (c)          Motor Assessment/Interventions    Additional Documentation  --  Balance (Group)  - (r)  (t) CH (c)          Balance    Balance  --  static sitting balance;dynamic sitting balance  - (r)  (t) CH (c)          Static Sitting Balance    Level of [place] (Unsupported Sitting, Static Balance)  --  supervision  - (r)  (t) CH (c)       Sitting Position (Unsupported Sitting, Static Balance)  --  long sitting on bed  - (r)  (t) CH (c)       Time Able to Maintain Position (Unsupported Sitting, Static Balance)  --  2 to 3 minutes  - (r)  (t) CH (c)       Comment (Unsupported Sitting, Static Balance)  --  Reports of nausea and SOA  - (r)  (t) CH (c)          Dynamic Sitting Balance    Level of [place], Reaches Outside Midline (Sitting, Dynamic Balance)  --  supervision  - (r)  (t) CH (c)       Sitting Position, Reaches Outside Midline (Sitting, Dynamic Balance)  --  sitting on edge of bed  - (r)   (t) CH (c)          Sensory Assessment/Intervention    Sensory General Assessment  --  light touch sensation deficits identified  -CH (r) LH (t) CH (c)          Light Touch Sensation Assessment    Left Upper Extremity: Light Touch Sensation Assessment  --  intact  -CH (r) LH (t) CH (c)       Right Upper Extremity: Light Touch Sensation Assessment  --  intact  -CH (r) LH (t) CH (c)       Left Lower Extremity: Light Touch Sensation Assessment  --  intact  -CH (r) LH (t) CH (c)       Right Lower Extremity: Light Touch Sensation Assessment  --  mild impairment, 75% or more correct responses  -CH (r) LH (t) CH (c)          Positioning and Restraints    Pre-Treatment Position  --  in bed  -CH (r) LH (t) CH (c)       Post Treatment Position  --  bed  -CH (r) LH (t) CH (c)       In Bed  --  supine;fowlers;call light within reach;with family/caregiver;side rails up x2  -CH (r) LH (t) CH (c)          Pain Scale: Numbers Pre/Post-Treatment    Pain Scale: Numbers, Pretreatment  --  5/10  -CH (r) LH (t) CH (c)       Pain Scale: Numbers, Post-Treatment  --  5/10  -CH (r) LH (t) CH (c)       Pain Location - Orientation  --  generalized  -CH (r) LH (t) CH (c)       Pain Location  --  head  -CH (r) LH (t) CH (c)       Pain Intervention(s)  --  Ambulation/increased activity;Repositioned;Medication (See MAR)  -CH (r) LH (t) CH (c)          Coping    Observed Emotional State  --  calm;cooperative  -CH (r) LH (t) CH (c)       Verbalized Emotional State  --  acceptance  -CH (r) LH (t) CH (c)          Plan of Care Review    Plan of Care Reviewed With  --  patient;spouse  -CH (r) LH (t)          Clinical Impression (OT)    Date of Referral to OT  --  -CH (r) LH (t) CH (c)  05/08/19  -CH (r) LH (t) CH (c)       OT Diagnosis  --  -CH (r) LH (t) CH (c)  decreased adl   -CH (r) LH (t) CH (c)       Patient/Family Goals Statement (OT Eval)  --  -CH (r) LH (t) CH (c)  return home   -CH (r) LH (t) CH (c)       Criteria for Skilled Therapeutic  Interventions Met (OT Eval)  --  -CH (r) LH (t) CH (c)  yes  -CH (r) LH (t) CH (c)       Rehab Potential (OT Eval)  --  -CH (r) LH (t) CH (c)  good, to achieve stated therapy goals  -CH (r) LH (t) CH (c)       Therapy Frequency (OT Eval)  --  -CH (r) LH (t) CH (c)  5 times/wk  -CH (r) LH (t) CH (c)       Predicted Duration of Therapy Intervention (Therapy Eval)  --  utntil discharge from this facility; pt will require continued services   -CH (r) LH (t) CH (c)       Care Plan Review, Other Participant (OT Eval)  --  -CH (r) LH (t) CH (c)  caregiver  -CH (r) LH (t) CH (c)       Anticipated Discharge Disposition (OT)  --  -CH (r) LH (t) CH (c)  home with home health;skilled nursing facility  -CH (r) LH (t) CH (c)          Vital Signs    Pretreatment Heart Rate (beats/min)  --  103  -CH       Intratreatment Heart Rate (beats/min)  --  113  -CH (r) LH (t) CH (c)       Posttreatment Heart Rate (beats/min)  --  108  -CH (r) LH (t) CH (c)       Pre SpO2 (%)  --  97  -CH       O2 Delivery Pre Treatment  --  supplemental O2 4 L  -CH       O2 Delivery Intra Treatment  --  supplemental O2  -CH (r) LH (t) CH (c)       Post SpO2 (%)  --  99  -CH (r) LH (t) CH (c)       O2 Delivery Post Treatment  --  supplemental O2  -CH (r) LH (t) CH (c)       Pre Patient Position  --  Supine  -CH (r) LH (t) CH (c)       Intra Patient Position  --  Sitting  -CH (r) LH (t) CH (c)       Post Patient Position  --  Supine  -CH (r) LH (t) CH (c)          Planned OT Interventions    Planned Therapy Interventions (OT Eval)  --  activity tolerance training;BADL retraining;occupation/activity based interventions;patient/caregiver education/training;ROM/therapeutic exercise;strengthening exercise;transfer/mobility retraining;functional balance retraining  -CH (r) LH (t) CH (c)          OT Goals    Bed Mobility Goal Selection (OT)  --  -CH (r) LH (t) CH (c)  --       Bathing Goal Selection (OT)  --  bathing, OT goal 1  -CH (r) LH (t) CH (c)       Dressing  Goal Selection (OT)  --  dressing, OT goal 1  -CH (r) LH (t) CH (c)       Toileting Goal Selection (OT)  --  toileting, OT goal 1  -CH (r) LH (t) CH (c)       Activity Tolerance Goal Selection (OT)  --  activity tolerance, OT goal 1  -CH (r) LH (t) CH (c)       Additional Documentation  --  Activity Tolerance Goal Selection (OT) (Row)  -CH (r) LH (t) CH (c)          Bathing Goal 1 (OT)    Activity/Assistive Device (Bathing Goal 1, OT)  --  bathing skills, all  -CH (r) LH (t) CH (c)       [place] Level/Cues Needed (Bathing Goal 1, OT)  --  supervision required;set-up required  -CH (r) LH (t) CH (c)       Time Frame (Bathing Goal 1, OT)  --  long term goal (LTG);by discharge  -CH (r) LH (t) CH (c)       Barriers (Bathing Goal 1, OT)  --  .  -CH (r) LH (t) CH (c)       Progress/Outcomes (Bathing Goal 1, OT)  --  goal ongoing  -CH (r) LH (t) CH (c)          Dressing Goal 1 (OT)    Activity/Assistive Device (Dressing Goal 1, OT)  --  dressing skills, all  -CH (r) LH (t) CH (c)       [place]/Cues Needed (Dressing Goal 1, OT)  --  supervision required;set-up required  -CH (r) LH (t) CH (c)       Time Frame (Dressing Goal 1, OT)  --  long term goal (LTG);by discharge  -CH (r) LH (t) CH (c)       Barriers (Dressing Goal 1, OT)  --  .  -CH (r) LH (t) CH (c)       Progress/Outcome (Dressing Goal 1, OT)  --  goal ongoing  -CH (r) LH (t) CH (c)          Toileting Goal 1 (OT)    Activity/Device (Toileting Goal 1, OT)  --  toileting skills, all  -CH (r) LH (t) CH (c)       [place] Level/Cues Needed (Toileting Goal 1, OT)  --  independent  -CH (r) LH (t) CH (c)       Time Frame (Toileting Goal 1, OT)  --  long term goal (LTG);by discharge  -CH (r) LH (t) CH (c)       Barriers (Toileting Goal 1, OT)  --  .  -CH (r) LH (t) CH (c)       Progress/Outcome (Toileting Goal 1, OT)  --  goal ongoing  -CH (r) LH (t) CH (c)           Activity Tolerance Goal 1 (OT)    Activity Tolerance Goal 1 (OT)  --  pt. will be able to  participate in 10 m ADL activity with no more than 1 rest break while maintaining sat levels > or equal to 88%. on 4 L.  -CH (r) LH (t) CH (c)       Activity Level (Endurance Goal 1, OT)  --  10 min activity  -CH (r) LH (t) CH (c)       Time Frame (Activity Tolerance Goal 1, OT)  --  by discharge  -CH (r) LH (t) CH (c)       Barriers (Activity Tolerance Goal 1, OT)  --  .  -CH (r) LH (t) CH (c)       Progress/Outcome (Activity Tolerance Goal 1, OT)  --  goal ongoing  -CH (r) LH (t) CH (c)          Living Environment    Home Accessibility  --  stairs to enter home;tub/shower is not walk in single story, 2 stairs to get inside   -         User Key  (r) = Recorded By, (t) = Taken By, (c) = Cosigned By    Initials Name Effective Dates     aMrina Gomez, OTR/L 08/02/16 -      Aiden Wiggins, OT Student 04/15/19 -          Occupational Therapy Education     Title: PT OT SLP Therapies (In Progress)     Topic: Occupational Therapy (In Progress)     Point: ADL training (Done)     Description: Instruct learner(s) on proper safety adaptation and remediation techniques during self care or transfers.   Instruct in proper use of assistive devices.    Learning Progress Summary           Patient Acceptance, E, VU by  at 5/8/2019  4:16 PM   Significant Other Acceptance, E, VU by  at 5/8/2019  4:16 PM                   Point: Precautions (Done)     Description: Instruct learner(s) on prescribed precautions during self-care and functional transfers.    Learning Progress Summary           Patient Acceptance, E, VU by  at 5/8/2019  4:16 PM   Significant Other Acceptance, E, VU by  at 5/8/2019  4:16 PM                               User Key     Initials Effective Dates Name Provider Type Discipline     04/15/19 -  Aiden Wiggins, OT Student OT Student OT                  OT Recommendation and Plan          Outcome Measures     Row Name 05/08/19 1600 05/08/19 0910 05/08/19 0800       How much help from another  person do you currently need...    Turning from your back to your side while in flat bed without using bedrails?  --  3  -COURTNEY  --    Moving from lying on back to sitting on the side of a flat bed without bedrails?  --  3  -COURTNEY  --    Moving to and from a bed to a chair (including a wheelchair)?  --  3  -COURTNEY  --    Standing up from a chair using your arms (e.g., wheelchair, bedside chair)?  --  2  -COURTNEY  --    Climbing 3-5 steps with a railing?  --  2  -COURTNEY  --    To walk in hospital room?  --  2  -COURTNEY  --    AM-PAC 6 Clicks Score  --  15  -COURTNEY  --       How much help from another is currently needed...    Putting on and taking off regular lower body clothing?  2  -CH (r)  (t) CH (c)  --  --    Bathing (including washing, rinsing, and drying)  2  -CH (r)  (t) CH (c)  --  --    Toileting (which includes using toilet bed pan or urinal)  2  -CH (r) LH (t) CH (c)  --  --    Putting on and taking off regular upper body clothing  2  -CH (r) LH (t) CH (c)  --  --    Taking care of personal grooming (such as brushing teeth)  3  -CH (r) LH (t) CH (c)  --  --    Eating meals  3  -CH (r) LH (t) CH (c)  --  --    Score  14  -CH (r) LH (t)  --  --       Functional Assessment    Outcome Measure Options  AM-PAC 6 Clicks Daily Activity (OT)  -CH (r) LH (t) CH (c)  AM-PAC 6 Clicks Basic Mobility (PT)  -COURTNEY  AM-PAC 6 Clicks Basic Mobility (PT)  -COURTNEY      User Key  (r) = Recorded By, (t) = Taken By, (c) = Cosigned By    Initials Name Provider Type     Marina Gomez, OTR/L Occupational Therapist    Sukhdev Blackburn, PT DPT Physical Therapist     Aiden Wiggins, OT Student OT Student          Time Calculation:   Time Calculation- OT     Row Name 05/08/19 0815             Time Calculation- OT    OT Start Time  0815  -CH      OT Stop Time  0950  -      OT Time Calculation (min)  95 min  -      Total Timed Code Minutes- OT  35 minute(s)  -      OT Received On  05/08/19  -      OT Goal Re-Cert Due Date  05/18/19  -         User Key  (r) = Recorded By, (t) = Taken By, (c) = Cosigned By    Initials Name Provider Type     Marina Gomez, OTR/L Occupational Therapist        Therapy Charges for Today     Code Description Service Date Service Provider Modifiers Qty    [id number]  OT SELF CARE/MGMT/TRAIN EA 15 MIN 5/8/2019 Marina Gomez OTR/L GO 2    [id number]  OT EVAL MOD COMPLEXITY 4 5/8/2019 Marina Gomez OTR/L GO 1               Marina Gomez OTR/LES  5/8/2019

## 2019-05-08 NOTE — PLAN OF CARE
Problem: Patient Care Overview  Goal: Plan of Care Review  Outcome: Ongoing (interventions implemented as appropriate)   05/08/19 0436   Coping/Psychosocial   Plan of Care Reviewed With patient   Plan of Care Review   Progress no change   OTHER   Outcome Summary pt c/o headache during night; tylenol given with relief noted; pt rested fair; VSS; safety maintained; will continue to monitor     Goal: Interprofessional Rounds/Family Conf  Outcome: Ongoing (interventions implemented as appropriate)   05/08/19 0436   Interdisciplinary Rounds/Family Conf   Participants nursing;patient;family       Problem: Chronic Obstructive Pulmonary Disease (Adult)  Goal: Signs and Symptoms of Listed Potential Problems Will be Absent, Minimized or Managed (Chronic Obstructive Pulmonary Disease)  Outcome: Ongoing (interventions implemented as appropriate)   05/08/19 0436   Goal/Outcome Evaluation   Problems Assessed (Chronic Obstructive Pulmonary Disease (COPD)) all   Problems Present (COPD, Bronch/Emphy) functional deficit;respiratory compromise;situational response       Problem: Fall Risk (Adult)  Goal: Identify Related Risk Factors and Signs and Symptoms  Outcome: Outcome(s) achieved Date Met: 05/08/19    Goal: Absence of Fall  Outcome: Ongoing (interventions implemented as appropriate)   05/08/19 0436   Fall Risk (Adult)   Absence of Fall making progress toward outcome

## 2019-05-08 NOTE — PLAN OF CARE
Problem: Patient Care Overview  Goal: Plan of Care Review  Outcome: Ongoing (interventions implemented as appropriate)   05/08/19 0910   Coping/Psychosocial   Plan of Care Reviewed With patient;spouse   OTHER   Outcome Summary PT eval completed. Pt O&Ax4 and voices great concern about limitations with SOA and pain with activity. Pt required supervision with supine<>sit, sit<>supine and sitting balance for VC for recovery breathing and safety with transitional movement. Pt on SupO2 x4L and O2 decreased from 97 to 95% with bed mobility, but pt hyperventilated and reported nausea and marked increases in fatigue. Pt's main impairments are decreased activity tolerance and mobility impaired by decreased control of breathing and recovery from SOA. Pt is hesitant to participate in PT treatment d/t fear of changes in sx, and has been educated on risks with decreased activity and POC. She would benefit from skilled PT to address previously stated impairments and we recommend DC home with HH vs TCU pending progress with ambulation to ~ 100 ft.

## 2019-05-09 ENCOUNTER — APPOINTMENT (OUTPATIENT)
Dept: GENERAL RADIOLOGY | Facility: HOSPITAL | Age: 74
End: 2019-05-09

## 2019-05-09 LAB
ANION GAP SERPL CALCULATED.3IONS-SCNC: ABNORMAL MMOL/L (ref 4–13)
BASOPHILS # BLD AUTO: 0 10*3/MM3 (ref 0–0.2)
BASOPHILS NFR BLD AUTO: 0 % (ref 0–2)
BUN BLD-MCNC: 21 MG/DL (ref 5–21)
BUN/CREAT SERPL: 65.6 (ref 7–25)
CALCIUM SPEC-SCNC: 9.4 MG/DL (ref 8.4–10.4)
CHLORIDE SERPL-SCNC: 100 MMOL/L (ref 98–110)
CO2 SERPL-SCNC: >40 MMOL/L (ref 24–31)
CREAT BLD-MCNC: 0.32 MG/DL (ref 0.5–1.4)
DEPRECATED RDW RBC AUTO: 47.8 FL (ref 40–54)
EOSINOPHIL # BLD AUTO: 0 10*3/MM3 (ref 0–0.7)
EOSINOPHIL NFR BLD AUTO: 0 % (ref 0–4)
ERYTHROCYTE [DISTWIDTH] IN BLOOD BY AUTOMATED COUNT: 13.8 % (ref 12–15)
GFR SERPL CREATININE-BSD FRML MDRD: >150 ML/MIN/1.73
GLUCOSE BLD-MCNC: 109 MG/DL (ref 70–100)
HCT VFR BLD AUTO: 32.5 % (ref 37–47)
HGB BLD-MCNC: 9.6 G/DL (ref 12–16)
L PNEUMO1 AG UR QL IA: NEGATIVE
LYMPHOCYTES # BLD AUTO: 0.29 10*3/MM3 (ref 0.72–4.86)
LYMPHOCYTES NFR BLD AUTO: 3.4 % (ref 15–45)
MCH RBC QN AUTO: 27.8 PG (ref 28–32)
MCHC RBC AUTO-ENTMCNC: 29.5 G/DL (ref 33–36)
MCV RBC AUTO: 94.2 FL (ref 82–98)
MONOCYTES # BLD AUTO: 0.17 10*3/MM3 (ref 0.19–1.3)
MONOCYTES NFR BLD AUTO: 2 % (ref 4–12)
NEUTROPHILS # BLD AUTO: 7.92 10*3/MM3 (ref 1.87–8.4)
NEUTROPHILS NFR BLD AUTO: 93.3 % (ref 39–78)
PLATELET # BLD AUTO: 121 10*3/MM3 (ref 130–400)
PMV BLD AUTO: 13.1 FL (ref 6–12)
POTASSIUM BLD-SCNC: 4.1 MMOL/L (ref 3.5–5.3)
RBC # BLD AUTO: 3.45 10*6/MM3 (ref 4.2–5.4)
S PNEUM AG SPEC QL LA: NEGATIVE
SODIUM BLD-SCNC: 140 MMOL/L (ref 135–145)
WBC NRBC COR # BLD: 8.49 10*3/MM3 (ref 4.8–10.8)

## 2019-05-09 PROCEDURE — 87899 AGENT NOS ASSAY W/OPTIC: CPT | Performed by: NURSE PRACTITIONER

## 2019-05-09 PROCEDURE — 94799 UNLISTED PULMONARY SVC/PX: CPT

## 2019-05-09 PROCEDURE — 25010000002 METHYLPREDNISOLONE PER 40 MG: Performed by: NURSE PRACTITIONER

## 2019-05-09 PROCEDURE — 80048 BASIC METABOLIC PNL TOTAL CA: CPT | Performed by: NURSE PRACTITIONER

## 2019-05-09 PROCEDURE — 97530 THERAPEUTIC ACTIVITIES: CPT | Performed by: OCCUPATIONAL THERAPIST

## 2019-05-09 PROCEDURE — 71045 X-RAY EXAM CHEST 1 VIEW: CPT

## 2019-05-09 PROCEDURE — 97535 SELF CARE MNGMENT TRAINING: CPT | Performed by: OCCUPATIONAL THERAPIST

## 2019-05-09 PROCEDURE — 85025 COMPLETE CBC W/AUTO DIFF WBC: CPT | Performed by: NURSE PRACTITIONER

## 2019-05-09 PROCEDURE — 25010000002 CEFTRIAXONE PER 250 MG: Performed by: NURSE PRACTITIONER

## 2019-05-09 RX ORDER — ECHINACEA PURPUREA EXTRACT 125 MG
2 TABLET ORAL AS NEEDED
Status: DISCONTINUED | OUTPATIENT
Start: 2019-05-09 | End: 2019-05-12 | Stop reason: HOSPADM

## 2019-05-09 RX ORDER — METHYLPREDNISOLONE SODIUM SUCCINATE 40 MG/ML
40 INJECTION, POWDER, LYOPHILIZED, FOR SOLUTION INTRAMUSCULAR; INTRAVENOUS EVERY 8 HOURS
Status: DISCONTINUED | OUTPATIENT
Start: 2019-05-09 | End: 2019-05-10

## 2019-05-09 RX ORDER — FLUTICASONE PROPIONATE 50 MCG
2 SPRAY, SUSPENSION (ML) NASAL DAILY
Status: DISCONTINUED | OUTPATIENT
Start: 2019-05-09 | End: 2019-05-12 | Stop reason: HOSPADM

## 2019-05-09 RX ADMIN — ACETAMINOPHEN 650 MG: 325 TABLET, FILM COATED ORAL at 06:47

## 2019-05-09 RX ADMIN — IPRATROPIUM BROMIDE AND ALBUTEROL SULFATE 3 ML: 2.5; .5 SOLUTION RESPIRATORY (INHALATION) at 00:05

## 2019-05-09 RX ADMIN — BUDESONIDE AND FORMOTEROL FUMARATE DIHYDRATE 2 PUFF: 160; 4.5 AEROSOL RESPIRATORY (INHALATION) at 06:23

## 2019-05-09 RX ADMIN — BUDESONIDE AND FORMOTEROL FUMARATE DIHYDRATE 2 PUFF: 160; 4.5 AEROSOL RESPIRATORY (INHALATION) at 19:45

## 2019-05-09 RX ADMIN — CLOTRIMAZOLE 10 MG: 10 LOZENGE ORAL; TOPICAL at 10:37

## 2019-05-09 RX ADMIN — ROPINIROLE HYDROCHLORIDE 0.25 MG: 0.25 TABLET, FILM COATED ORAL at 21:43

## 2019-05-09 RX ADMIN — METHYLPREDNISOLONE SODIUM SUCCINATE 40 MG: 40 INJECTION, POWDER, FOR SOLUTION INTRAMUSCULAR; INTRAVENOUS at 11:02

## 2019-05-09 RX ADMIN — GUAIFENESIN 1200 MG: 600 TABLET, EXTENDED RELEASE ORAL at 08:08

## 2019-05-09 RX ADMIN — IPRATROPIUM BROMIDE AND ALBUTEROL SULFATE 3 ML: 2.5; .5 SOLUTION RESPIRATORY (INHALATION) at 11:40

## 2019-05-09 RX ADMIN — SODIUM CHLORIDE, PRESERVATIVE FREE 3 ML: 5 INJECTION INTRAVENOUS at 21:44

## 2019-05-09 RX ADMIN — IPRATROPIUM BROMIDE AND ALBUTEROL SULFATE 3 ML: 2.5; .5 SOLUTION RESPIRATORY (INHALATION) at 19:45

## 2019-05-09 RX ADMIN — METHYLPREDNISOLONE SODIUM SUCCINATE 40 MG: 40 INJECTION, POWDER, FOR SOLUTION INTRAMUSCULAR; INTRAVENOUS at 18:16

## 2019-05-09 RX ADMIN — SODIUM CHLORIDE 50 ML/HR: 9 INJECTION, SOLUTION INTRAVENOUS at 01:36

## 2019-05-09 RX ADMIN — CLOTRIMAZOLE 10 MG: 10 LOZENGE ORAL; TOPICAL at 21:43

## 2019-05-09 RX ADMIN — METHYLPREDNISOLONE SODIUM SUCCINATE 40 MG: 40 INJECTION, POWDER, FOR SOLUTION INTRAMUSCULAR; INTRAVENOUS at 01:36

## 2019-05-09 RX ADMIN — CLOTRIMAZOLE 10 MG: 10 LOZENGE ORAL; TOPICAL at 17:12

## 2019-05-09 RX ADMIN — Medication 2 SPRAY: at 11:02

## 2019-05-09 RX ADMIN — AZITHROMYCIN 500 MG: 250 TABLET, FILM COATED ORAL at 21:43

## 2019-05-09 RX ADMIN — CLOTRIMAZOLE 10 MG: 10 LOZENGE ORAL; TOPICAL at 06:45

## 2019-05-09 RX ADMIN — FLUTICASONE PROPIONATE 2 SPRAY: 50 SPRAY, METERED NASAL at 11:02

## 2019-05-09 RX ADMIN — CEFTRIAXONE SODIUM 1 G: 1 INJECTION, POWDER, FOR SOLUTION INTRAMUSCULAR; INTRAVENOUS at 21:43

## 2019-05-09 RX ADMIN — IPRATROPIUM BROMIDE AND ALBUTEROL SULFATE 3 ML: 2.5; .5 SOLUTION RESPIRATORY (INHALATION) at 06:23

## 2019-05-09 NOTE — PLAN OF CARE
Problem: Patient Care Overview  Goal: Plan of Care Review  Outcome: Ongoing (interventions implemented as appropriate)   05/09/19 1611   Coping/Psychosocial   Plan of Care Reviewed With patient   Plan of Care Review   Progress improving   OTHER   Outcome Summary No complaints of pain or discomfort so far this shift. IV fluids dc'd. IV steriods adjusted. Chest xray this shift. Pt resting. Family at bedside. Safety maintained. Will continue to monitor.      Goal: Individualization and Mutuality  Outcome: Ongoing (interventions implemented as appropriate)    Goal: Discharge Needs Assessment  Outcome: Ongoing (interventions implemented as appropriate)    Goal: Interprofessional Rounds/Family Conf  Outcome: Ongoing (interventions implemented as appropriate)      Problem: Chronic Obstructive Pulmonary Disease (Adult)  Goal: Signs and Symptoms of Listed Potential Problems Will be Absent, Minimized or Managed (Chronic Obstructive Pulmonary Disease)  Outcome: Ongoing (interventions implemented as appropriate)      Problem: Fall Risk (Adult)  Goal: Absence of Fall  Outcome: Ongoing (interventions implemented as appropriate)

## 2019-05-09 NOTE — PROGRESS NOTES
DISCUSSED WITH PT ABOUT BREATHING TECHNIQUES, PACING HERSELF, KEEPING ALL TUBING AND SUPPLIES CHANGED OUT TO REDUCE INFECTION.  INFORMATION LEFT WITH PT ABOUT COPD ALSO.

## 2019-05-09 NOTE — PLAN OF CARE
Problem: Patient Care Overview  Goal: Plan of Care Review  Outcome: Ongoing (interventions implemented as appropriate)   05/09/19 0447   Coping/Psychosocial   Plan of Care Reviewed With patient;spouse   Plan of Care Review   Progress improving   OTHER   Outcome Summary pt c/o headache; tylenol given with relief reported; pt has rested well; VSS; safety maintained; will continue to monitor     Goal: Interprofessional Rounds/Family Conf  Outcome: Ongoing (interventions implemented as appropriate)   05/09/19 0447   Interdisciplinary Rounds/Family Conf   Participants nursing;patient;family       Problem: Chronic Obstructive Pulmonary Disease (Adult)  Goal: Signs and Symptoms of Listed Potential Problems Will be Absent, Minimized or Managed (Chronic Obstructive Pulmonary Disease)  Outcome: Ongoing (interventions implemented as appropriate)   05/09/19 0447   Goal/Outcome Evaluation   Problems Assessed (Chronic Obstructive Pulmonary Disease (COPD)) all   Problems Present (COPD, Bronch/Emphy) situational response;undernutrition       Problem: Fall Risk (Adult)  Goal: Absence of Fall  Outcome: Ongoing (interventions implemented as appropriate)   05/09/19 0447   Fall Risk (Adult)   Absence of Fall making progress toward outcome

## 2019-05-09 NOTE — PROGRESS NOTES
"    [facility] Medicine Services  INPATIENT PROGRESS NOTE    Patient Name: Heide Newsome  Date of Admission: 5/6/2019  Today's Date: 05/09/19  Length of Stay: 3  Primary Care Physician: Provider, No Known    Subjective   Chief Complaint: follow up hypercapnic respiratory failure  HPI   Pt lying in bed with  at bedside. Complains of continued SOA at rest.  Says that she has difficulty breathing through her nose and is therefore not getting full benefit of supplemental O2 per nasal cannula.  Reports continued nonproductive cough.  Says \"Mucinex binds me\" and makes congestion worse.  Had difficulty walking with PT yesterday, but gets very SILVERIO at baseline, only ambulating 25 feet or so before needing to sit down.  Understands the need for increased caloric intake.  Drank all of Boost this morning and most of breakfast tray.    Review of Systems   All pertinent negatives and positives are as above. All other systems have been reviewed and are negative unless otherwise stated.     Objective    Temp:  [97.9 °F (36.6 °C)-98.2 °F (36.8 °C)] 98.2 °F (36.8 °C)  Heart Rate:  [] 83  Resp:  [16-20] 16  BP: (105-118)/(48-61) 105/61  Physical Exam   Constitutional: She is oriented to person, place, and time.   Cachectic    HENT:   Head: Normocephalic and atraumatic.   Eyes: Conjunctivae and EOM are normal. Pupils are equal, round, and reactive to light.   Neck: Neck supple. No JVD present. No thyromegaly present.   Cardiovascular: Normal rate, regular rhythm, normal heart sounds and intact distal pulses. Exam reveals no gallop and no friction rub.   No murmur heard.  Sinus to sinus tachycardia    Pulmonary/Chest: No respiratory distress. She has no wheezes. She has no rales. She exhibits no tenderness.   Dyspneic. All lung fields diminished.   Abdominal: Soft. Bowel sounds are normal. She exhibits no distension. There is no tenderness. There is no rebound and no guarding. "   Musculoskeletal: Normal range of motion. She exhibits no edema, tenderness or deformity.   Lymphadenopathy:     She has no cervical adenopathy.   Neurological: She is alert and oriented to person, place, and time. She displays normal reflexes. No cranial nerve deficit. She exhibits normal muscle tone.   Skin: Skin is warm and dry. No rash noted.   Psychiatric: She has a normal mood and affect. Her behavior is normal. Judgment and thought content normal.       Results Review:  I have reviewed the labs, radiology results, and diagnostic studies.    Laboratory Data:   Results from last 7 days   Lab Units 05/09/19 0523 05/08/19 0443 05/07/19 0523   WBC 10*3/mm3 8.49 8.61 11.12*   HEMOGLOBIN g/dL 9.6* 8.9* 10.1*   HEMATOCRIT % 32.5* 30.0* 34.0*   PLATELETS 10*3/mm3 121* 111* 112*        Results from last 7 days   Lab Units 05/09/19 0523 05/08/19 0443 05/07/19 0523 05/06/19 2051   SODIUM mmol/L 140 139 138 137   POTASSIUM mmol/L 4.1 4.1 4.1 4.1   CHLORIDE mmol/L 100 97* 93* 89*   CO2 mmol/L >40.0* 40.0* >40.0* >40.0*   BUN mg/dL 21 13 15 16   CREATININE mg/dL 0.32* 0.26* 0.21* 0.21*   CALCIUM mg/dL 9.4 9.1 9.4 9.7   BILIRUBIN mg/dL  --   --  0.6 0.9   ALK PHOS U/L  --   --  61 68   ALT (SGPT) U/L  --   --  19 18   AST (SGOT) U/L  --   --  17 22   GLUCOSE mg/dL 109* 121* 138* 108*     Culture Data:   Blood Culture   Date Value Ref Range Status   05/06/2019 No growth at 2 days  Preliminary     Radiology Data:   Imaging Results (last 24 hours)     ** No results found for the last 24 hours. **          I have reviewed the patient's current medications.     Assessment/Plan     Active Hospital Problems    Diagnosis   • **Acute on chronic respiratory failure with hypercapnia (CMS/HCC)   • History of colon cancer   • Thrombocytopenia (CMS/HCC)   • Anemia   • Hyperglycemia, drug-induced   • Community acquired pneumonia of right lower lobe of lung (CMS/HCC)   • Pulmonary emphysema (CMS/HCC)   • COPD with acute exacerbation  (CMS/[facility])   • Severe malnutrition (CMS/[facility])     Plan:  1. Discontinue Mucinex.  2. Start saline nasal spray and Flonase.  3. Increase Solu-Medrol frequency to Q8h.  4. Continue duo-nebs.   5. Continue PT/OT.  6. Dietician consulted.  7. Day 3 azithromycin and rocephin.  8. Repeat CXR.  9. Add Magic Cup.    Discharge Planning: I expect the patient to be discharged to home in ? days.    Inna Haile, APRN   05/09/19   10:16 AM

## 2019-05-10 PROBLEM — J96.11 CHRONIC RESPIRATORY FAILURE WITH HYPOXIA (HCC): Chronic | Status: ACTIVE | Noted: 2019-05-10

## 2019-05-10 PROCEDURE — 25010000002 METHYLPREDNISOLONE PER 40 MG: Performed by: NURSE PRACTITIONER

## 2019-05-10 PROCEDURE — 25010000002 ONDANSETRON PER 1 MG: Performed by: NURSE PRACTITIONER

## 2019-05-10 PROCEDURE — 94799 UNLISTED PULMONARY SVC/PX: CPT

## 2019-05-10 PROCEDURE — 97535 SELF CARE MNGMENT TRAINING: CPT | Performed by: OCCUPATIONAL THERAPIST

## 2019-05-10 PROCEDURE — 97116 GAIT TRAINING THERAPY: CPT

## 2019-05-10 PROCEDURE — 97530 THERAPEUTIC ACTIVITIES: CPT | Performed by: OCCUPATIONAL THERAPIST

## 2019-05-10 PROCEDURE — 87205 SMEAR GRAM STAIN: CPT | Performed by: NURSE PRACTITIONER

## 2019-05-10 PROCEDURE — 25010000002 CEFTRIAXONE PER 250 MG: Performed by: NURSE PRACTITIONER

## 2019-05-10 PROCEDURE — 25010000002 METHYLPREDNISOLONE PER 40 MG: Performed by: INTERNAL MEDICINE

## 2019-05-10 PROCEDURE — 87070 CULTURE OTHR SPECIMN AEROBIC: CPT | Performed by: NURSE PRACTITIONER

## 2019-05-10 RX ORDER — GUAIFENESIN 600 MG/1
1200 TABLET, EXTENDED RELEASE ORAL EVERY 12 HOURS SCHEDULED
Status: DISCONTINUED | OUTPATIENT
Start: 2019-05-10 | End: 2019-05-10

## 2019-05-10 RX ORDER — METHYLPREDNISOLONE SODIUM SUCCINATE 40 MG/ML
40 INJECTION, POWDER, LYOPHILIZED, FOR SOLUTION INTRAMUSCULAR; INTRAVENOUS EVERY 12 HOURS
Status: DISCONTINUED | OUTPATIENT
Start: 2019-05-10 | End: 2019-05-11

## 2019-05-10 RX ADMIN — CLOTRIMAZOLE 10 MG: 10 LOZENGE ORAL; TOPICAL at 21:57

## 2019-05-10 RX ADMIN — METHYLPREDNISOLONE SODIUM SUCCINATE 40 MG: 40 INJECTION, POWDER, FOR SOLUTION INTRAMUSCULAR; INTRAVENOUS at 11:50

## 2019-05-10 RX ADMIN — CEFTRIAXONE SODIUM 1 G: 1 INJECTION, POWDER, FOR SOLUTION INTRAMUSCULAR; INTRAVENOUS at 21:46

## 2019-05-10 RX ADMIN — BUDESONIDE AND FORMOTEROL FUMARATE DIHYDRATE 2 PUFF: 160; 4.5 AEROSOL RESPIRATORY (INHALATION) at 06:28

## 2019-05-10 RX ADMIN — METHYLPREDNISOLONE SODIUM SUCCINATE 40 MG: 40 INJECTION, POWDER, FOR SOLUTION INTRAMUSCULAR; INTRAVENOUS at 23:54

## 2019-05-10 RX ADMIN — IPRATROPIUM BROMIDE AND ALBUTEROL SULFATE 3 ML: 2.5; .5 SOLUTION RESPIRATORY (INHALATION) at 00:25

## 2019-05-10 RX ADMIN — SODIUM CHLORIDE, PRESERVATIVE FREE 3 ML: 5 INJECTION INTRAVENOUS at 21:47

## 2019-05-10 RX ADMIN — BUDESONIDE AND FORMOTEROL FUMARATE DIHYDRATE 2 PUFF: 160; 4.5 AEROSOL RESPIRATORY (INHALATION) at 18:42

## 2019-05-10 RX ADMIN — IPRATROPIUM BROMIDE AND ALBUTEROL SULFATE 3 ML: 2.5; .5 SOLUTION RESPIRATORY (INHALATION) at 18:42

## 2019-05-10 RX ADMIN — METHYLPREDNISOLONE SODIUM SUCCINATE 40 MG: 40 INJECTION, POWDER, FOR SOLUTION INTRAMUSCULAR; INTRAVENOUS at 03:42

## 2019-05-10 RX ADMIN — IPRATROPIUM BROMIDE AND ALBUTEROL SULFATE 3 ML: 2.5; .5 SOLUTION RESPIRATORY (INHALATION) at 06:28

## 2019-05-10 RX ADMIN — ROPINIROLE HYDROCHLORIDE 0.25 MG: 0.25 TABLET, FILM COATED ORAL at 21:57

## 2019-05-10 RX ADMIN — FLUTICASONE PROPIONATE 2 SPRAY: 50 SPRAY, METERED NASAL at 08:06

## 2019-05-10 RX ADMIN — CLOTRIMAZOLE 10 MG: 10 LOZENGE ORAL; TOPICAL at 17:03

## 2019-05-10 RX ADMIN — ONDANSETRON HYDROCHLORIDE 4 MG: 2 INJECTION, SOLUTION INTRAMUSCULAR; INTRAVENOUS at 21:46

## 2019-05-10 RX ADMIN — CLOTRIMAZOLE 10 MG: 10 LOZENGE ORAL; TOPICAL at 06:07

## 2019-05-10 RX ADMIN — SODIUM CHLORIDE, PRESERVATIVE FREE 3 ML: 5 INJECTION INTRAVENOUS at 08:06

## 2019-05-10 RX ADMIN — CLOTRIMAZOLE 10 MG: 10 LOZENGE ORAL; TOPICAL at 11:50

## 2019-05-10 NOTE — PLAN OF CARE
Problem: Patient Care Overview  Goal: Plan of Care Review  Outcome: Ongoing (interventions implemented as appropriate)   05/10/19 1312   Coping/Psychosocial   Plan of Care Reviewed With patient;spouse   Plan of Care Review   Progress improving   OTHER   Outcome Summary OT tx completed. Pt is limited by anxiety and activity tolerance. Pt was conditional independent to CGA for bed mobility, functional mobility and t/f. Pt was set up assist for grooming in chair. Pt's O2 sats did not drop below 95%. Pt and spouse frequently asked questions concerning O2 status. Pt's spouse tends to enable pt lack of desire to participate. Cont OT POC.

## 2019-05-10 NOTE — PLAN OF CARE
Problem: Patient Care Overview  Goal: Plan of Care Review  Outcome: Ongoing (interventions implemented as appropriate)   05/10/19 1533   Coping/Psychosocial   Plan of Care Reviewed With patient   Plan of Care Review   Progress improving   OTHER   Outcome Summary No complaints of pain or discomfort. VSS. 02 stable on 4L. Worked with OT this shift. Set up in chair. IV steriods/abx continue. Pt reports feeling a little better this shift. Family at bedside. Safety maintained. Will continue to monitor.      Goal: Individualization and Mutuality  Outcome: Ongoing (interventions implemented as appropriate)    Goal: Discharge Needs Assessment  Outcome: Ongoing (interventions implemented as appropriate)    Goal: Interprofessional Rounds/Family Conf  Outcome: Ongoing (interventions implemented as appropriate)      Problem: Chronic Obstructive Pulmonary Disease (Adult)  Goal: Signs and Symptoms of Listed Potential Problems Will be Absent, Minimized or Managed (Chronic Obstructive Pulmonary Disease)  Outcome: Ongoing (interventions implemented as appropriate)      Problem: Fall Risk (Adult)  Goal: Absence of Fall  Outcome: Ongoing (interventions implemented as appropriate)

## 2019-05-10 NOTE — PLAN OF CARE
Problem: Patient Care Overview  Goal: Plan of Care Review  Outcome: Ongoing (interventions implemented as appropriate)   05/10/19 0512   Coping/Psychosocial   Plan of Care Reviewed With patient;spouse   Plan of Care Review   Progress no change   OTHER   Outcome Summary no c/o pain this shift; VSS; pt has rested well; spouse stays bedside; safety maintained; will continue to monitor     Goal: Interprofessional Rounds/Family Conf  Outcome: Ongoing (interventions implemented as appropriate)   05/10/19 0512   Interdisciplinary Rounds/Family Conf   Participants nursing;patient       Problem: Chronic Obstructive Pulmonary Disease (Adult)  Goal: Signs and Symptoms of Listed Potential Problems Will be Absent, Minimized or Managed (Chronic Obstructive Pulmonary Disease)  Outcome: Ongoing (interventions implemented as appropriate)   05/10/19 0512   Goal/Outcome Evaluation   Problems Assessed (Chronic Obstructive Pulmonary Disease (COPD)) all   Problems Present (COPD, Bronch/Emphy) situational response       Problem: Fall Risk (Adult)  Goal: Absence of Fall  Outcome: Ongoing (interventions implemented as appropriate)   05/10/19 0512   Fall Risk (Adult)   Absence of Fall making progress toward outcome

## 2019-05-10 NOTE — PROGRESS NOTES
Continued Stay Note   [place]     Patient Name: Heide Newsome  MRN: [id number]  Today's Date: 5/10/2019    Admit Date: 5/6/2019    Discharge Plan     Row Name 05/10/19 0912       Plan    Plan  Request HH Care    Patient/Family in Agreement with Plan  yes    Plan Comments  Pt will return home with spouse upon d/c as long as making good progress with P.T. They are interested in getting HH care follow-up at home, Dr. Quezada informed.  Pt is on O2 at home already continuously at 4LPM. Will follow.         Discharge Codes    No documentation.             XAVIER Erickson

## 2019-05-10 NOTE — THERAPY TREATMENT NOTE
Acute Care - Occupational Therapy Treatment Note  [facility]     Patient Name: Heide Nwesome  : 1945  MRN: [id number]  Today's Date: 5/10/2019  Onset of Illness/Injury or Date of Surgery: 19  Date of Referral to OT: 19  Referring Physician: Brian Bhatti APRN    Admit Date: 2019       ICD-10-CM ICD-9-CM   1. Pulmonary emphysema, unspecified emphysema type (CMS/HCC) J43.9 492.8   2. Impaired mobility and endurance Z74.09 V49.89   3. Poor tolerance for activity Z78.9 V49.89     Patient Active Problem List   Diagnosis   • Pulmonary emphysema (CMS/HCC)   • Acute on chronic respiratory failure with hypercapnia (CMS/HCC)   • COPD with acute exacerbation (CMS/HCC)   • Severe malnutrition (CMS/HCC)   • History of colon cancer   • Thrombocytopenia (CMS/HCC)   • Anemia   • Hyperglycemia, drug-induced   • Community acquired pneumonia of right lower lobe of lung (CMS/HCC)     Past Medical History:   Diagnosis Date   • Cancer (CMS/HCC), colon    • Chronic respiratory failure (CMS/HCC), 2 L nasal cannula continuously    • COPD (chronic obstructive pulmonary disease) (CMS/HCC)    • Restless leg syndrome      Past Surgical History:   Procedure Laterality Date   • APPENDECTOMY     • COLON RESECTION     • FRACTURE SURGERY     • HYSTERECTOMY         Therapy Treatment    Rehabilitation Treatment Summary     Row Name 05/10/19 1404 05/10/19 1312 05/10/19 0949       Treatment Time/Intention    Discipline  physical therapy assistant  -  occupational therapist  -MM  physical therapy assistant  -    Document Type  therapy note (daily note)  -[facility]  therapy note (daily note)  -MM  --    Subjective Information  complains of;weakness;dyspnea  -2  complains of;weakness;fatigue  -MM  --    Mode of Treatment  --  occupational therapy  -MM  --    Patient/Family Observations  --  spouse present  -MM  --    Care Plan Review  --  care plan/treatment goals reviewed  -MM  --    Comment  --  --  pt on [facility],requested to  check back in pm  -2    Reason Treatment Not Performed  --  --  patient/family declined treatment  -2    Existing Precautions/Restrictions  fall;oxygen therapy device and L/min  (Significant)  anxiety  -2  fall;oxygen therapy device and L/min  -MM  --    Treatment Considerations/Comments  anxiety  -[facility]  --  --    Recorded by [AH] Jessica Pickard, PTA 05/10/19 1404  [AH2] Jessica Pickard, PTA 05/10/19 1426 [MM] Delmar Garcia, OTR/L 05/10/19 1404 [AH] Jessica Pickard, PTA 05/10/19 0949  [AH2] Jessica Pickard, PTA 05/10/19 0951    Row Name 05/10/19 1404 05/10/19 1312          Vital Signs    Pre SpO2 (%)  99  -AH  97  -MM     O2 Delivery Pre Treatment  nasal cannula 4L  -AH  supplemental O2  -MM     Intra SpO2 (%)  --  95  -MM     O2 Delivery Intra Treatment  --  supplemental O2  -MM     Post SpO2 (%)  95  -AH  97  -MM     O2 Delivery Post Treatment  nasal cannula  -AH  supplemental O2  -MM     Pre Patient Position  --  -- Fowlers  -MM     Intra Patient Position  --  Sitting  -MM     Post Patient Position  --  Sitting  -MM     Recorded by [AH] Jessica Pickard, PTA 05/10/19 1426 [MM] Delmar Garica, OTR/L 05/10/19 1409     Row Name 05/10/19 1404 05/10/19 1312          Bed Mobility Assessment/Treatment    Bed Mobility Assessment/Treatment  --  supine-sit;sit-supine  -MM     Supine-Sit [place] (Bed Mobility)  -- chair  -  conditional independence  -MM     Sit-Supine [place] (Bed Mobility)  conditional independence  -  conditional independence  -MM     Assistive Device (Bed Mobility)  head of bed elevated  -  bed rails;head of bed elevated  -MM     Recorded by [AH] Jessica Pickard, PTA 05/10/19 1426 [MM] Delmar Garcia, OTR/L 05/10/19 1409     Row Name 05/10/19 1312             Functional Mobility    Functional Mobility- Ind. Level  contact guard assist  -MM      Functional Mobility- Comment  Bed to chair, approximately 3 steps  -MM      Recorded by [MM] Delmar Garcia, OTR/L 05/10/19 1401       Row Name 05/10/19 1312             Transfer Assessment/Treatment    Transfer Assessment/Treatment  sit-stand transfer;stand-sit transfer  -MM      Recorded by [MM] Delmar Garcia, OTR/L 05/10/19 1409      Row Name 05/10/19 1404 05/10/19 1312          Sit-Stand Transfer    Sit-Stand [place] (Transfers)  contact guard;stand by assist  -  contact guard  -MM     Recorded by [AH] Jessica Pickard, PTA 05/10/19 1426 [MM] Delmar Garcia, OTR/L 05/10/19 1409     Row Name 05/10/19 1404 05/10/19 1312          Stand-Sit Transfer    Stand-Sit [place] (Transfers)  stand by assist  -  supervision  -MM     Recorded by [AH] Jessica Pickard, PTA 05/10/19 1426 [MM] Delmar Garcia, OTR/L 05/10/19 1409     Row Name 05/10/19 1404             Gait/Stairs Assessment/Training    [place] Level (Gait)  contact guard;1 person to manage equipment;verbal cues  -      Assistive Device (Gait)  walker, front-wheeled  -      Distance in Feet (Gait)  30  -AH      Deviations/Abnormal Patterns (Gait)  michelle decreased  -AH      Recorded by [AH] Jessica Pickard, [facility] 05/10/19 1426      Row Name 05/10/19 1312             ADL Assessment/Intervention    BADL Assessment/Intervention  grooming  -MM      Recorded by [MM] Delmar Garcia, OTR/L 05/10/19 1409      Row Name 05/10/19 1312             Grooming Assessment/Training    [place] Level (Grooming)  conditional independence;set up;wash face, hands;oral care regimen  -MM      Grooming Position  supported sitting  -MM      Recorded by [MM] Delmar Garcia, OTR/L 05/10/19 1409      Row Name 05/10/19 1312             BADL Safety/Performance    Impairments, BADL Safety/Performance  endurance/activity tolerance;shortness of breath  -MM      Recorded by [MM] Delmar Garcia, OTR/L 05/10/19 1409      Row Name 05/10/19 1404 05/10/19 1312          Positioning and Restraints    Pre-Treatment Position  sitting in chair/recliner  -  in bed  -MM     Post Treatment  Position  bed  -  chair  -MM     In Bed  fowlers;call light within reach;encouraged to call for assist;with family/caregiver  -  --     In Chair  --  reclined;call light within reach;encouraged to call for assist;with family/caregiver;legs elevated  -MM     Recorded by [AH] Jessica Pickard, PTA 05/10/19 1426 [MM] Delmar Garcia, OTR/L 05/10/19 1409     Row Name 05/10/19 1312             Pain Assessment    Additional Documentation  Pain Scale: Numbers Pre/Post-Treatment (Group)  -MM      Recorded by [MM] Delmar Garcia, OTR/L 05/10/19 1409      Row Name 05/10/19 1404 05/10/19 1312          Pain Scale: Numbers Pre/Post-Treatment    Pain Scale: Numbers, Pretreatment  0/10 - no pain  -  0/10 - no pain  -MM     Pain Scale: Numbers, Post-Treatment  --  0/10 - no pain  -MM     Recorded by [] Jessica Pickard, PTA 05/10/19 1426 [MM] Delmar Garcia, OTR/L 05/10/19 1409     Row Name 05/10/19 1312             Plan of Care Review    Plan of Care Reviewed With  patient;spouse  -MM      Recorded by [MM] Delmar Garcia, OTR/L 05/10/19 1409      Row Name 05/10/19 1312             Outcome Summary/Treatment Plan (OT)    Daily Summary of Progress (OT)  progress towards functional goals is fair  -MM      Barriers to Overall Progress (OT)  anxiety, O2  -MM      Plan for Continued Treatment (OT)  cont OT POC  -MM      Anticipated Discharge Disposition (OT)  home with home health;skilled nursing facility pending pt progress  -MM      Recorded by [MM] Delmar Garcia, OTR/L 05/10/19 1409        User Key  (r) = Recorded By, (t) = Taken By, (c) = Cosigned By    Initials Name Effective Dates Discipline     Jessica Pickard, PTA 08/02/16 -  PT    MM Delmar Garcia, OTR/L 04/03/18 -  OT             Occupational Therapy Education     Title: PT OT SLP Therapies (In Progress)     Topic: Occupational Therapy (In Progress)     Point: ADL training (Done)     Description: Instruct learner(s) on proper safety adaptation and  remediation techniques during self care or transfers.   Instruct in proper use of assistive devices.    Learning Progress Summary           Patient Acceptance, E, VU by  at 5/10/2019  2:16 PM    Comment:  Benefits of activity    Acceptance, E, VU by  at 5/8/2019  4:16 PM   Significant Other Acceptance, E, VU by  at 5/8/2019  4:16 PM                   Point: Precautions (Done)     Description: Instruct learner(s) on prescribed precautions during self-care and functional transfers.    Learning Progress Summary           Patient Acceptance, E, VU by  at 5/8/2019  4:16 PM   Significant Other Acceptance, E, VU by  at 5/8/2019  4:16 PM                               User Key     Initials Effective Dates Name Provider Type Discipline     04/03/18 -  Delmar Garcia, OTR/L Occupational Therapist OT     04/15/19 -  Aiden Wiggins OT Student OT Student OT                OT Recommendation and Plan  Outcome Summary/Treatment Plan (OT)  Daily Summary of Progress (OT): progress towards functional goals is fair  Barriers to Overall Progress (OT): anxiety, O2  Plan for Continued Treatment (OT): cont OT POC  Anticipated Discharge Disposition (OT): home with home health, skilled nursing facility(pending pt progress)  Daily Summary of Progress (OT): progress towards functional goals is fair  Plan of Care Review  Plan of Care Reviewed With: patient, spouse  Plan of Care Reviewed With: patient, spouse  Outcome Summary: OT tx completed. Pt is limited by anxiety and activity tolerance. Pt was conditional independent to CGA for bed mobility, functional mobility and t/f. Pt was set up assist for grooming in chair. Pt's O2 sats did not drop below 95%. Pt and spouse frequently asked questions concerning O2 status. Pt's spouse tends to enable pt lack of desire to participate. Cont OT POC.  Outcome Measures     Row Name 05/10/19 1400 05/08/19 1600 05/08/19 0910       How much help from another person do you currently need...     Turning from your back to your side while in flat bed without using bedrails?  --  --  3  -COURTNEY    Moving from lying on back to sitting on the side of a flat bed without bedrails?  --  --  3  -COURTNEY    Moving to and from a bed to a chair (including a wheelchair)?  --  --  3  -COURTNEY    Standing up from a chair using your arms (e.g., wheelchair, bedside chair)?  --  --  2  -COURTNEY    Climbing 3-5 steps with a railing?  --  --  2  -COURTNEY    To walk in hospital room?  --  --  2  -COURTNEY    AM-PAC 6 Clicks Score  --  --  15  -COURTNEY       How much help from another is currently needed...    Putting on and taking off regular lower body clothing?  2  -MM  2  -CH (r) LH (t) CH (c)  --    Bathing (including washing, rinsing, and drying)  2  -MM  2  -CH (r) LH (t) CH (c)  --    Toileting (which includes using toilet bed pan or urinal)  2  -MM  2  -CH (r) LH (t) CH (c)  --    Putting on and taking off regular upper body clothing  2  -MM  2  -CH (r) LH (t) CH (c)  --    Taking care of personal grooming (such as brushing teeth)  3  -MM  3  -CH (r) LH (t) CH (c)  --    Eating meals  3  -MM  3  -CH (r) LH (t) CH (c)  --    Score  14  -MM  14  -CH (r) LH (t)  --       Functional Assessment    Outcome Measure Options  AM-PAC 6 Clicks Daily Activity (OT)  -MM  AM-PAC 6 Clicks Daily Activity (OT)  -CH (r) LH (t) CH (c)  AM-PAC 6 Clicks Basic Mobility (PT)  -COURTNEY    Row Name 05/08/19 0800             Functional Assessment    Outcome Measure Options  AM-PAC 6 Clicks Basic Mobility (PT)  -COURTNEY        User Key  (r) = Recorded By, (t) = Taken By, (c) = Cosigned By    Initials Name Provider Type    CH Marina Gomez, OTR/L Occupational Therapist    Sukhdev Blackburn, PT DPT Physical Therapist    Delmar Hernandez, OTR/L Occupational Therapist    Aiden Osborn, OT Student OT Student           Time Calculation:   Time Calculation- OT     Row Name 05/10/19 1312             Time Calculation- OT    OT Start Time  1312  -MM      OT Stop Time  1355  -MM       OT Time Calculation (min)  43 min  -MM      Total Timed Code Minutes- OT  43 minute(s)  -MM      OT Received On  05/10/19  -MM         Timed Charges    04725 - OT Therapeutic Activity Minutes  20  -MM      02575 - OT Self Care/Mgmt Minutes  23  -MM        User Key  (r) = Recorded By, (t) = Taken By, (c) = Cosigned By    Initials Name Provider Type    MM Delmar Garcia, OTR/L Occupational Therapist        Therapy Charges for Today     Code Description Service Date Service Provider Modifiers Qty    74685478138 HC OT THERAPEUTIC ACT EA 15 MIN 5/9/2019 Delmar Garcia OTR/L GO 1    14233236737 HC OT SELF CARE/MGMT/TRAIN EA 15 MIN 5/9/2019 Delmar Garcia, OTR/L GO 2    93096942818 HC OT THERAPEUTIC ACT EA 15 MIN 5/10/2019 Delmar Garcia, OTR/L GO 1    43391297053 HC OT SELF CARE/MGMT/TRAIN EA 15 MIN 5/10/2019 Delmar Garcia, OTR/L GO 2               eDlmar Garcia, OTR/L  5/10/2019

## 2019-05-10 NOTE — PLAN OF CARE
Problem: Patient Care Overview  Goal: Plan of Care Review  Outcome: Ongoing (interventions implemented as appropriate)   05/10/19 4085   Coping/Psychosocial   Plan of Care Reviewed With patient   Plan of Care Review   Progress improving   OTHER   Outcome Summary pt in chair, 02 sat 99% on 4L NC, pt trans sit-stand sba, pt amb 30 feet with rwx cga-sba, trans back to bed sba. 02 sat 95% after walk

## 2019-05-10 NOTE — PROGRESS NOTES
"    [facility]  INPATIENT PROGRESS NOTE    Patient Name: Heide Newsome  Date of Admission: 5/6/2019  Today's Date: 05/10/19  Length of Stay: 4  Primary Care Physician: Provider, No Known    Subjective   Chief Complaint: follow up PNA  HPI   Pt is lying in bed. States she feels some better today. She did not participate with therapy yesterday because she was \"too tired.\" Explained to her that activity is key to discharge. Her baseline activity is walking from the bed to the chair, the bed to the recliner, and the bed to the kitchen table.  at bedside. She has been able to cough up some sputum and was sent to lab. No chest pain.     Review of Systems   All pertinent negatives and positives are as above. All other systems have been reviewed and are negative unless otherwise stated.     Objective    Temp:  [97.8 °F (36.6 °C)-98.5 °F (36.9 °C)] 97.8 °F (36.6 °C)  Heart Rate:  [] 109  Resp:  [16-20] 18  BP: (118-135)/(59-67) 122/67  Physical Exam   Constitutional: She is oriented to person, place, and time. She appears well-developed.   Thin frail cachexia.    HENT:   Head: Normocephalic and atraumatic.   Eyes: Conjunctivae and EOM are normal. Pupils are equal, round, and reactive to light.   Neck: Neck supple. No JVD present. No thyromegaly present.   Cardiovascular: Normal rate, regular rhythm, normal heart sounds and intact distal pulses. Exam reveals no gallop and no friction rub.   No murmur heard.  Pulmonary/Chest: Effort normal. No respiratory distress. She has no wheezes. She has no rales. She exhibits no tenderness.   Minimal air movement.    Abdominal: Soft. Bowel sounds are normal. She exhibits no distension. There is no tenderness. There is no rebound and no guarding.   Musculoskeletal: Normal range of motion. She exhibits no edema, tenderness or deformity.   Muscle wasting bilateral lower extremities.    Lymphadenopathy:     She has no " cervical adenopathy.   Neurological: She is alert and oriented to person, place, and time. She displays normal reflexes. No cranial nerve deficit. She exhibits normal muscle tone.   Skin: Skin is warm and dry. No rash noted.   Psychiatric: She has a normal mood and affect. Her behavior is normal. Judgment and thought content normal.     Results Review:  I have reviewed the labs, radiology results, and diagnostic studies.    Laboratory Data:   Results from last 7 days   Lab Units 05/09/19 0523 05/08/19 0443 05/07/19  0523   WBC 10*3/mm3 8.49 8.61 11.12*   HEMOGLOBIN g/dL 9.6* 8.9* 10.1*   HEMATOCRIT % 32.5* 30.0* 34.0*   PLATELETS 10*3/mm3 121* 111* 112*      Results from last 7 days   Lab Units 05/09/19 0523 05/08/19 0443 05/07/19 0523 05/06/19  2051   SODIUM mmol/L 140 139 138 137   POTASSIUM mmol/L 4.1 4.1 4.1 4.1   CHLORIDE mmol/L 100 97* 93* 89*   CO2 mmol/L >40.0* 40.0* >40.0* >40.0*   BUN mg/dL 21 13 15 16   CREATININE mg/dL 0.32* 0.26* 0.21* 0.21*   CALCIUM mg/dL 9.4 9.1 9.4 9.7   BILIRUBIN mg/dL  --   --  0.6 0.9   ALK PHOS U/L  --   --  61 68   ALT (SGPT) U/L  --   --  19 18   AST (SGOT) U/L  --   --  17 22   GLUCOSE mg/dL 109* 121* 138* 108*       Culture Data:   Blood Culture   Date Value Ref Range Status   05/06/2019 No growth at 3 days  Preliminary       Radiology Data:   Imaging Results (last 24 hours)     ** No results found for the last 24 hours. **        I have reviewed the patient's current medications.     Assessment/Plan     Active Hospital Problems    Diagnosis   • **Acute on chronic respiratory failure with hypercapnia (CMS/HCC)   • Chronic respiratory failure with hypoxia (CMS/HCC)   • Community acquired pneumonia of right lower lobe of lung (CMS/HCC)   • Pulmonary emphysema (CMS/HCC)   • COPD with acute exacerbation (CMS/HCC)   • History of colon cancer   • Thrombocytopenia (CMS/HCC)   • Anemia   • Hyperglycemia, drug-induced   • Severe malnutrition (CMS/HCC)     Plan:  1. She has  completed Azithromycin therapy.   2. Rocephin day 5  3. Continue steroids at current dose for now. Will likely wean tomorrow.   4. Labs in am.   5. She will need outpatient pulmonary evaluation.     Discharge Planning: I expect the patient to be discharged to home in 1-2 days.    JESSENIA Luz   05/10/19   12:44 PM     I personally evaluated and examined the patient in conjunction with JESSENIA Devi and agree with the assessment, treatment plan, and disposition of the patient as recorded by her. My history, exam, and further recommendations are:     Discussed with her nurse, Lizette.    Seen with her  present.  She is currently up in bed and eating pizza.  She states that she is slowly feeling better on a daily basis.    Poppy has seen her for a few days and feels that she has been improving clinically. Just completed azithromycin therapy and will continue on ceftriaxone today.    Sputum submitted today and shows mixed gram-positive abrahan.  Blood cultures from presentation remain negative.  Swab for influenza was negative.  Streptococcal and Legionella urinary antigens were negative.    Continue Symbicort and DuoNeb.  IV steroids are being weaned.  Add Mucinex.    She has not done well from a physical standpoint and has found therapy difficult.  However, she is fairly sedentary at home as a baseline.  She did manage to stay up in the chair for a while today and also ambulated into the hallway with physical therapy.    She previously saw Dr. Mcgill for primary care but has not seen him in over 18 months.  She would be interested in establishing care with 1 of our clinic doctors here on campus.    Fantasma Combs,   05/10/19  4:55 PM

## 2019-05-10 NOTE — THERAPY TREATMENT NOTE
Acute Care - Physical Therapy Treatment Note  [facility]     Patient Name: Heide Newsome  : 1945  MRN: [id number]  Today's Date: 5/10/2019  Onset of Illness/Injury or Date of Surgery: 19  Date of Referral to PT: 19  Referring Physician: Brian Bhatti APRN    Admit Date: 2019    Visit Dx:    ICD-10-CM ICD-9-CM   1. Pulmonary emphysema, unspecified emphysema type (CMS/HCC) J43.9 492.8   2. Impaired mobility and endurance Z74.09 V49.89   3. Poor tolerance for activity Z78.9 V49.89     Patient Active Problem List   Diagnosis   • Pulmonary emphysema (CMS/HCC)   • Acute on chronic respiratory failure with hypercapnia (CMS/HCC)   • COPD with acute exacerbation (CMS/HCC)   • Severe malnutrition (CMS/HCC)   • History of colon cancer   • Thrombocytopenia (CMS/HCC)   • Anemia   • Hyperglycemia, drug-induced   • Community acquired pneumonia of right lower lobe of lung (CMS/HCC)       Therapy Treatment    Rehabilitation Treatment Summary     Row Name 05/10/19 1404 05/10/19 1312 05/10/19 0949       Treatment Time/Intention    Discipline  physical therapy assistant  -  occupational therapist  -MM  physical therapy assistant  -    Document Type  therapy note (daily note)  -[facility]  therapy note (daily note)  -  --    Subjective Information  complains of;weakness;dyspnea  -[facility]  complains of;weakness;fatigue  -  --    Mode of Treatment  --  occupational therapy  -MM  --    Patient/Family Observations  --  spouse present  -  --    Care Plan Review  --  care plan/treatment goals reviewed  -  --    Comment  --  --  pt on [facility],requested to check back in pm  -[facility]    Reason Treatment Not Performed  --  --  patient/family declined treatment  -[facility]    Existing Precautions/Restrictions  fall;oxygen therapy device and L/min  (Significant)  anxiety  -[facility]  fall;oxygen therapy device and L/min  -  --    Treatment Considerations/Comments  anxiety  -[facility]  --  --    Recorded by [] Jessica Pickard, [facility]  05/10/19 1404  [AH2] Jessica Pickard AMY, PTA 05/10/19 1426 [MM] Delmar Garcia, OTR/L 05/10/19 1404 [AH] Toery Pickarda AMY, PTA 05/10/19 0949  [AH2] Jessica Pickard, PTA 05/10/19 0951    Row Name 05/10/19 1404 05/10/19 1312          Vital Signs    Pre SpO2 (%)  99  -AH  97  -MM     O2 Delivery Pre Treatment  nasal cannula 4L  -AH  supplemental O2  -MM     Intra SpO2 (%)  --  95  -MM     O2 Delivery Intra Treatment  --  supplemental O2  -MM     Post SpO2 (%)  95  -AH  97  -MM     O2 Delivery Post Treatment  nasal cannula  -AH  supplemental O2  -MM     Pre Patient Position  --  -- Fowlers  -MM     Intra Patient Position  --  Sitting  -MM     Post Patient Position  --  Sitting  -MM     Recorded by [AH] Melly Jessica AMY, PTA 05/10/19 1426 [MM] Delmar Garcia, OTR/L 05/10/19 1409     Row Name 05/10/19 1404 05/10/19 1312          Bed Mobility Assessment/Treatment    Bed Mobility Assessment/Treatment  --  supine-sit;sit-supine  -MM     Supine-Sit [place] (Bed Mobility)  -- chair  -  conditional independence  -MM     Sit-Supine [place] (Bed Mobility)  conditional independence  -  conditional independence  -MM     Assistive Device (Bed Mobility)  head of bed elevated  -  bed rails;head of bed elevated  -MM     Recorded by [AH] Jessica Pickard, PTA 05/10/19 1426 [MM] Delmar Garcia, OTR/L 05/10/19 1409     Row Name 05/10/19 1312             Functional Mobility    Functional Mobility- Ind. Level  contact guard assist  -MM      Functional Mobility- Comment  Bed to chair, approximately 3 steps  -MM      Recorded by [MM] Delmar Garcia, OTR/L 05/10/19 1409      Row Name 05/10/19 1312             Transfer Assessment/Treatment    Transfer Assessment/Treatment  sit-stand transfer;stand-sit transfer  -MM      Recorded by [MM] Delmar Garcia, OTR/L 05/10/19 1409      Row Name 05/10/19 1404 05/10/19 1312          Sit-Stand Transfer    Sit-Stand [place] (Transfers)  contact guard;stand by assist  -AH   contact guard  -MM     Recorded by [] Jessica Pickard, PTA 05/10/19 1426 [MM] Delmar Garcia, OTR/L 05/10/19 1409     Row Name 05/10/19 1404 05/10/19 1312          Stand-Sit Transfer    Stand-Sit [place] (Transfers)  stand by assist  -  supervision  -MM     Recorded by [AH] Jessica Pickard, [facility] 05/10/19 1426 [MM] Delmar Garcia, OTR/L 05/10/19 1409     Row Name 05/10/19 1404             Gait/Stairs Assessment/Training    [place] Level (Gait)  contact guard;1 person to manage equipment;verbal cues  -      Assistive Device (Gait)  walker, front-wheeled  -      Distance in Feet (Gait)  30  -      Deviations/Abnormal Patterns (Gait)  michelle decreased  -AH      Recorded by [AH] Jessica Pickard, PTA 05/10/19 1426      Row Name 05/10/19 1312             ADL Assessment/Intervention    BADL Assessment/Intervention  grooming  -MM      Recorded by [MM] Delmar Garcia, OTR/L 05/10/19 1409      Row Name 05/10/19 1312             Grooming Assessment/Training    [place] Level (Grooming)  conditional independence;set up;wash face, hands;oral care regimen  -MM      Grooming Position  supported sitting  -MM      Recorded by [MM] Delmar Garcia, OTR/L 05/10/19 1409      Row Name 05/10/19 1312             BADL Safety/Performance    Impairments, BADL Safety/Performance  endurance/activity tolerance;shortness of breath  -MM      Recorded by [MM] Delmar Garcia OTR/L 05/10/19 1409      Row Name 05/10/19 1404 05/10/19 1312          Positioning and Restraints    Pre-Treatment Position  sitting in chair/recliner  -  in bed  -MM     Post Treatment Position  bed  -AH  chair  -MM     In Bed  fowlers;call light within reach;encouraged to call for assist;with family/caregiver  -  --     In Chair  --  reclined;call light within reach;encouraged to call for assist;with family/caregiver;legs elevated  -MM     Recorded by [] Jessica Pickard, PTA 05/10/19 1426 [MM] Delmar Garcia, OTR/L 05/10/19 1409      Row Name 05/10/19 1312             Pain Assessment    Additional Documentation  Pain Scale: Numbers Pre/Post-Treatment (Group)  -MM      Recorded by [MM] Delmar Garcia, OTR/L 05/10/19 1409      Row Name 05/10/19 1404 05/10/19 1312          Pain Scale: Numbers Pre/Post-Treatment    Pain Scale: Numbers, Pretreatment  0/10 - no pain  -AH  0/10 - no pain  -MM     Pain Scale: Numbers, Post-Treatment  --  0/10 - no pain  -MM     Recorded by [AH] Jessica Pickard, PTA 05/10/19 1426 [MM] Delmar Garcia, OTR/L 05/10/19 1409     Row Name 05/10/19 1312             Plan of Care Review    Plan of Care Reviewed With  patient;spouse  -MM      Recorded by [MM] Delmar Garcia, OTR/L 05/10/19 1409      Row Name 05/10/19 1312             Outcome Summary/Treatment Plan (OT)    Daily Summary of Progress (OT)  progress towards functional goals is fair  -MM      Barriers to Overall Progress (OT)  anxiety, O2  -MM      Plan for Continued Treatment (OT)  cont OT POC  -MM      Anticipated Discharge Disposition (OT)  home with home health;skilled nursing facility pending pt progress  -MM      Recorded by [MM] Delmar Garcia, OTR/L 05/10/19 1409        User Key  (r) = Recorded By, (t) = Taken By, (c) = Cosigned By    Initials Name Effective Dates Discipline     Jessica Pickard, PTA 08/02/16 -  PT    MM Delmar Garcia, OTR/L 04/03/18 -  OT                   Physical Therapy Education     Title: PT OT SLP Therapies (In Progress)     Topic: Physical Therapy (In Progress)     Point: Mobility training (Done)     Learning Progress Summary           Patient Acceptance, E, VU,NR by GR at 5/8/2019 10:28 AM    Comment:  Pt educated on use and HEP of inspirometer, safety with t/f and bed mobility, recovery breathing, role of PT, POC, and benefits of increase mobitlity.   Significant Other Acceptance, E, VU,NR by GR at 5/8/2019 10:28 AM    Comment:  Pt educated on use and HEP of inspirometer, safety with t/f and bed mobility, recovery  breathing, role of PT, POC, and benefits of increase mobitlity.                   Point: Home exercise program (Done)     Learning Progress Summary           Patient Acceptance, E, VU,NR by GR at 5/8/2019 10:28 AM    Comment:  Pt educated on use and HEP of inspirometer, safety with t/f and bed mobility, recovery breathing, role of PT, POC, and benefits of increase mobitlity.   Significant Other Acceptance, E, VU,NR by GR at 5/8/2019 10:28 AM    Comment:  Pt educated on use and HEP of inspirometer, safety with t/f and bed mobility, recovery breathing, role of PT, POC, and benefits of increase mobitlity.                   Point: Precautions (Done)     Learning Progress Summary           Patient Acceptance, E,TB, VU by  at 5/10/2019  2:26 PM    Comment:  benefits of activity    Acceptance, E, VU,NR by GR at 5/8/2019 10:28 AM    Comment:  Pt educated on use and HEP of inspirometer, safety with t/f and bed mobility, recovery breathing, role of PT, POC, and benefits of increase mobitlity.   Significant Other Acceptance, E, VU,NR by GR at 5/8/2019 10:28 AM    Comment:  Pt educated on use and HEP of inspirometer, safety with t/f and bed mobility, recovery breathing, role of PT, POC, and benefits of increase mobitlity.                               User Key     Initials Effective Dates Name Provider Type Discipline     08/02/16 -  Jessica Pickard, PTA Physical Therapy Assistant PT     03/13/19 -  Leslie Estes, ELVIN Student PT Student PT                PT Recommendation and Plan     Plan of Care Reviewed With: patient  Progress: improving  Outcome Summary: pt in chair, 02 sat 99% on 4L NC, pt trans sit-stand sba, pt amb 30 feet with rwx cga-sba, trans back to bed sba. 02 sat 95% after walk  Outcome Measures     Row Name 05/10/19 1400 05/08/19 1600 05/08/19 0910       How much help from another person do you currently need...    Turning from your back to your side while in flat bed without using bedrails?  --  --  3   -COURTNEY    Moving from lying on back to sitting on the side of a flat bed without bedrails?  --  --  3  -COURTNEY    Moving to and from a bed to a chair (including a wheelchair)?  --  --  3  -COURTNEY    Standing up from a chair using your arms (e.g., wheelchair, bedside chair)?  --  --  2  -COURTNEY    Climbing 3-5 steps with a railing?  --  --  2  -COURTNEY    To walk in hospital room?  --  --  2  -COURTNEY    AM-PAC 6 Clicks Score  --  --  15  -COURTNEY       How much help from another is currently needed...    Putting on and taking off regular lower body clothing?  2  -MM  2  -CH (r) LH (t) CH (c)  --    Bathing (including washing, rinsing, and drying)  2  -MM  2  -CH (r) LH (t) CH (c)  --    Toileting (which includes using toilet bed pan or urinal)  2  -MM  2  -CH (r) LH (t) CH (c)  --    Putting on and taking off regular upper body clothing  2  -MM  2  -CH (r) LH (t) CH (c)  --    Taking care of personal grooming (such as brushing teeth)  3  -MM  3  -CH (r) LH (t) CH (c)  --    Eating meals  3  -MM  3  -CH (r) LH (t) CH (c)  --    Score  14  -MM  14  -CH (r) LH (t)  --       Functional Assessment    Outcome Measure Options  AM-PAC 6 Clicks Daily Activity (OT)  -MM  AM-PAC 6 Clicks Daily Activity (OT)  -CH (r) LH (t) CH (c)  AM-PAC 6 Clicks Basic Mobility (PT)  -COURTNEY    Row Name 05/08/19 0800             Functional Assessment    Outcome Measure Options  AM-PAC 6 Clicks Basic Mobility (PT)  -COURTNEY        User Key  (r) = Recorded By, (t) = Taken By, (c) = Cosigned By    Initials Name Provider Type    CH Marina Gomez, OTR/L Occupational Therapist    Sukhdev Blackburn, PT DPT Physical Therapist    MM Delmar Garcia, OTR/L Occupational Therapist     Aiden Wiggins, OT Student OT Student         Time Calculation:   PT Charges     Row Name 05/10/19 5984             Time Calculation    Start Time  1404  -      Stop Time  1420  -      Time Calculation (min)  16 min  -AH      PT Received On  05/10/19  -         Time Calculation- PT    Total  Timed Code Minutes- PT  16 minute(s)  -         Timed Charges    57226 - Gait Training Minutes   16  -        User Key  (r) = Recorded By, (t) = Taken By, (c) = Cosigned By    Initials Name Provider Type     Jessica Pickard PTA Physical Therapy Assistant        Therapy Charges for Today     Code Description Service Date Service Provider Modifiers Qty    [id number] HC GAIT TRAINING EA 15 MIN 5/10/2019 Jessica Pickard PTA GP 1          PT G-Codes  Outcome Measure Options: AM-PAC 6 Clicks Daily Activity (OT)  AM-PAC 6 Clicks Score: 15  Score: 14    Jessica Pickard PTA  5/10/2019

## 2019-05-11 LAB
ANION GAP SERPL CALCULATED.3IONS-SCNC: ABNORMAL MMOL/L (ref 4–13)
BACTERIA SPEC AEROBE CULT: NORMAL
BASOPHILS # BLD AUTO: 0 10*3/MM3 (ref 0–0.2)
BASOPHILS NFR BLD AUTO: 0 % (ref 0–2)
BUN BLD-MCNC: 21 MG/DL (ref 5–21)
BUN/CREAT SERPL: 63.6 (ref 7–25)
CALCIUM SPEC-SCNC: 9.1 MG/DL (ref 8.4–10.4)
CHLORIDE SERPL-SCNC: 89 MMOL/L (ref 98–110)
CO2 SERPL-SCNC: >40 MMOL/L (ref 24–31)
CREAT BLD-MCNC: 0.33 MG/DL (ref 0.5–1.4)
DEPRECATED RDW RBC AUTO: 47.4 FL (ref 40–54)
EOSINOPHIL # BLD AUTO: 0 10*3/MM3 (ref 0–0.7)
EOSINOPHIL NFR BLD AUTO: 0 % (ref 0–4)
ERYTHROCYTE [DISTWIDTH] IN BLOOD BY AUTOMATED COUNT: 13.3 % (ref 12–15)
GFR SERPL CREATININE-BSD FRML MDRD: >150 ML/MIN/1.73
GLUCOSE BLD-MCNC: 103 MG/DL (ref 70–100)
HCT VFR BLD AUTO: 35.2 % (ref 37–47)
HGB BLD-MCNC: 10.1 G/DL (ref 12–16)
IMM GRANULOCYTES # BLD AUTO: 0.05 10*3/MM3 (ref 0–0.05)
IMM GRANULOCYTES NFR BLD AUTO: 0.9 % (ref 0–5)
LYMPHOCYTES # BLD AUTO: 0.3 10*3/MM3 (ref 0.72–4.86)
LYMPHOCYTES NFR BLD AUTO: 5.2 % (ref 15–45)
MCH RBC QN AUTO: 28 PG (ref 28–32)
MCHC RBC AUTO-ENTMCNC: 28.7 G/DL (ref 33–36)
MCV RBC AUTO: 97.5 FL (ref 82–98)
MONOCYTES # BLD AUTO: 0.2 10*3/MM3 (ref 0.19–1.3)
MONOCYTES NFR BLD AUTO: 3.5 % (ref 4–12)
NEUTROPHILS # BLD AUTO: 5.24 10*3/MM3 (ref 1.87–8.4)
NEUTROPHILS NFR BLD AUTO: 90.4 % (ref 39–78)
NRBC BLD AUTO-RTO: 0 /100 WBC (ref 0–0.2)
PLATELET # BLD AUTO: 144 10*3/MM3 (ref 130–400)
PMV BLD AUTO: 12.7 FL (ref 6–12)
POTASSIUM BLD-SCNC: 4.6 MMOL/L (ref 3.5–5.3)
RBC # BLD AUTO: 3.61 10*6/MM3 (ref 4.2–5.4)
SODIUM BLD-SCNC: 139 MMOL/L (ref 135–145)
WBC NRBC COR # BLD: 5.79 10*3/MM3 (ref 4.8–10.8)

## 2019-05-11 PROCEDURE — 97110 THERAPEUTIC EXERCISES: CPT

## 2019-05-11 PROCEDURE — 85025 COMPLETE CBC W/AUTO DIFF WBC: CPT | Performed by: NURSE PRACTITIONER

## 2019-05-11 PROCEDURE — 94799 UNLISTED PULMONARY SVC/PX: CPT

## 2019-05-11 PROCEDURE — 94760 N-INVAS EAR/PLS OXIMETRY 1: CPT

## 2019-05-11 PROCEDURE — 63710000001 PREDNISONE PER 1 MG: Performed by: NURSE PRACTITIONER

## 2019-05-11 PROCEDURE — 80048 BASIC METABOLIC PNL TOTAL CA: CPT | Performed by: NURSE PRACTITIONER

## 2019-05-11 PROCEDURE — 97116 GAIT TRAINING THERAPY: CPT

## 2019-05-11 PROCEDURE — 25010000002 CEFTRIAXONE PER 250 MG: Performed by: NURSE PRACTITIONER

## 2019-05-11 RX ORDER — PREDNISONE 20 MG/1
20 TABLET ORAL 2 TIMES DAILY WITH MEALS
Status: DISCONTINUED | OUTPATIENT
Start: 2019-05-11 | End: 2019-05-12 | Stop reason: HOSPADM

## 2019-05-11 RX ADMIN — IPRATROPIUM BROMIDE AND ALBUTEROL SULFATE 3 ML: 2.5; .5 SOLUTION RESPIRATORY (INHALATION) at 23:58

## 2019-05-11 RX ADMIN — CLOTRIMAZOLE 10 MG: 10 LOZENGE ORAL; TOPICAL at 06:01

## 2019-05-11 RX ADMIN — FLUTICASONE PROPIONATE 2 SPRAY: 50 SPRAY, METERED NASAL at 08:51

## 2019-05-11 RX ADMIN — PREDNISONE 20 MG: 20 TABLET ORAL at 20:57

## 2019-05-11 RX ADMIN — ROPINIROLE HYDROCHLORIDE 0.25 MG: 0.25 TABLET, FILM COATED ORAL at 20:57

## 2019-05-11 RX ADMIN — IPRATROPIUM BROMIDE AND ALBUTEROL SULFATE 3 ML: 2.5; .5 SOLUTION RESPIRATORY (INHALATION) at 01:07

## 2019-05-11 RX ADMIN — BUDESONIDE AND FORMOTEROL FUMARATE DIHYDRATE 2 PUFF: 160; 4.5 AEROSOL RESPIRATORY (INHALATION) at 20:10

## 2019-05-11 RX ADMIN — SODIUM CHLORIDE, PRESERVATIVE FREE 3 ML: 5 INJECTION INTRAVENOUS at 20:59

## 2019-05-11 RX ADMIN — IPRATROPIUM BROMIDE AND ALBUTEROL SULFATE 3 ML: 2.5; .5 SOLUTION RESPIRATORY (INHALATION) at 13:45

## 2019-05-11 RX ADMIN — IPRATROPIUM BROMIDE AND ALBUTEROL SULFATE 3 ML: 2.5; .5 SOLUTION RESPIRATORY (INHALATION) at 20:09

## 2019-05-11 RX ADMIN — PREDNISONE 20 MG: 20 TABLET ORAL at 10:14

## 2019-05-11 RX ADMIN — BUDESONIDE AND FORMOTEROL FUMARATE DIHYDRATE 2 PUFF: 160; 4.5 AEROSOL RESPIRATORY (INHALATION) at 06:12

## 2019-05-11 RX ADMIN — CEFTRIAXONE SODIUM 1 G: 1 INJECTION, POWDER, FOR SOLUTION INTRAMUSCULAR; INTRAVENOUS at 20:57

## 2019-05-11 RX ADMIN — SODIUM CHLORIDE, PRESERVATIVE FREE 3 ML: 5 INJECTION INTRAVENOUS at 08:52

## 2019-05-11 RX ADMIN — IPRATROPIUM BROMIDE AND ALBUTEROL SULFATE 3 ML: 2.5; .5 SOLUTION RESPIRATORY (INHALATION) at 06:12

## 2019-05-11 NOTE — PLAN OF CARE
Problem: Patient Care Overview  Goal: Plan of Care Review  Outcome: Ongoing (interventions implemented as appropriate)   05/11/19 1841   Coping/Psychosocial   Plan of Care Reviewed With patient   Plan of Care Review   Progress improving   OTHER   Outcome Summary VSS, Oxygen 4L/NC 98% pulse ox, No s/s of distress or discomfort noted. Needs lots of encouragement to ambulate. Walked 40 feet with therapy. Will continue to monitor. Call light within reach.       Problem: Chronic Obstructive Pulmonary Disease (Adult)  Goal: Signs and Symptoms of Listed Potential Problems Will be Absent, Minimized or Managed (Chronic Obstructive Pulmonary Disease)  Outcome: Ongoing (interventions implemented as appropriate)   05/11/19 1841   Goal/Outcome Evaluation   Problems Assessed (Chronic Obstructive Pulmonary Disease (COPD)) all   Problems Present (COPD, Bronch/Emphy) respiratory compromise;situational response       Problem: Fall Risk (Adult)  Goal: Absence of Fall  Outcome: Ongoing (interventions implemented as appropriate)   05/11/19 1841   Fall Risk (Adult)   Absence of Fall making progress toward outcome

## 2019-05-11 NOTE — THERAPY TREATMENT NOTE
Acute Care - Physical Therapy Treatment Note  [facility]     Patient Name: Heide Newsome  : 1945  MRN: [id number]  Today's Date: 2019  Onset of Illness/Injury or Date of Surgery: 19  Date of Referral to PT: 19  Referring Physician: Brian Bhatti APRN    Admit Date: 2019    Visit Dx:    ICD-10-CM ICD-9-CM   1. Pulmonary emphysema, unspecified emphysema type (CMS/HCC) J43.9 492.8   2. Impaired mobility and endurance Z74.09 V49.89   3. Poor tolerance for activity Z78.9 V49.89     Patient Active Problem List   Diagnosis   • Pulmonary emphysema (CMS/HCC)   • Acute on chronic respiratory failure with hypercapnia (CMS/HCC)   • COPD with acute exacerbation (CMS/HCC)   • Severe malnutrition (CMS/HCC)   • History of colon cancer   • Thrombocytopenia (CMS/HCC)   • Anemia   • Hyperglycemia, drug-induced   • Community acquired pneumonia of right lower lobe of lung (CMS/HCC)   • Chronic respiratory failure with hypoxia (CMS/HCC)       Therapy Treatment    Rehabilitation Treatment Summary     Row Name 19 1542             Treatment Time/Intention    Discipline  physical therapy assistant  -LG      Document Type  therapy note (daily note)  -LG      Subjective Information  complains of;weakness;fatigue;dyspnea  -LG2      Mode of Treatment  individual therapy;physical therapy  -LG2      Patient/Family Observations  spouse present  -LG2      Patient Effort  adequate  -LG3      Existing Precautions/Restrictions  fall;oxygen therapy device and L/min  -LG3      Treatment Considerations/Comments  anxiety  -LG3      Recorded by [LG] Oh Archuleta, PTA 19 1544  [LG2] Oh Archuleta, PTA 19 1607  [LG3] Oh Archuleta, PTA 19 1614      Row Name 19 1542             Vital Signs    Pre SpO2 (%)  97  -LG      O2 Delivery Pre Treatment  nasal cannula 4L  -LG      Intra SpO2 (%)  84  -LG      O2 Delivery Intra Treatment  nasal cannula 4L  -LG      Post SpO2 (%)  95  -LG      O2  Delivery Post Treatment  nasal cannula 4L  -LG      Pre Patient Position  Supine  -LG      Intra Patient Position  Standing  -LG      Post Patient Position  Sitting  -LG      Recorded by [LG] Oh Archuleta, PTA 05/11/19 1614      Row Name 05/11/19 1542             Bed Mobility Assessment/Treatment    Supine-Sit [place] (Bed Mobility)  conditional independence  -LG      Sit-Supine [place] (Bed Mobility)  conditional independence  -LG      Recorded by [LG] Oh Archuleta, PTA 05/11/19 1614      Row Name 05/11/19 1542             Sit-Stand Transfer    Sit-Stand [place] (Transfers)  supervision;stand by assist  -LG      Assistive Device (Sit-Stand Transfers)  walker, front-wheeled  -LG      Recorded by [LG] Oh Archuleta, PTA 05/11/19 1614      Row Name 05/11/19 1542             Stand-Sit Transfer    Stand-Sit [place] (Transfers)  contact guard  -LG      Assistive Device (Stand-Sit Transfers)  walker, front-wheeled  -LG      Recorded by [LG] Oh Archuleta, PTA 05/11/19 1614      Row Name 05/11/19 1542             Gait/Stairs Assessment/Training    [place] Level (Gait)  contact guard  -LG      Assistive Device (Gait)  walker, front-wheeled  -LG      Distance in Feet (Gait)  40' standing rest then 40' back to roomo  -LG      Pattern (Gait)  step-through  -LG      Deviations/Abnormal Patterns (Gait)  base of support, narrow  -LG      Recorded by [LG] Oh Archuleta, PTA 05/11/19 1614      Row Name 05/11/19 1542             Motor Skills Assessment/Interventions    Additional Documentation  Therapeutic Exercise (Group)  -LG      Recorded by [LG] Oh Archuleta, PTA 05/11/19 1614      Row Name 05/11/19 1542             Therapeutic Exercise    Comment (Therapeutic Exercise)  B LE AROM 2 sets of 10  -LG      Recorded by [LG] Oh Archuleta, PTA 05/11/19 1614        User Key  (r) = Recorded By, (t) = Taken By, (c) = Cosigned By    Initials Name Effective Dates Discipline    LG Oh Archuleta, PTA 08/02/16 -   PT                   Physical Therapy Education     Title: PT OT SLP Therapies (In Progress)     Topic: Physical Therapy (In Progress)     Point: Mobility training (Done)     Learning Progress Summary           Patient Acceptance, E, VU,NR by GR at 5/8/2019 10:28 AM    Comment:  Pt educated on use and HEP of inspirometer, safety with t/f and bed mobility, recovery breathing, role of PT, POC, and benefits of increase mobitlity.   Significant Other Acceptance, E, VU,NR by  at 5/8/2019 10:28 AM    Comment:  Pt educated on use and HEP of inspirometer, safety with t/f and bed mobility, recovery breathing, role of PT, POC, and benefits of increase mobitlity.                   Point: Home exercise program (Done)     Learning Progress Summary           Patient Acceptance, E, VU,NR by GR at 5/8/2019 10:28 AM    Comment:  Pt educated on use and HEP of inspirometer, safety with t/f and bed mobility, recovery breathing, role of PT, POC, and benefits of increase mobitlity.   Significant Other Acceptance, E, VU,NR by  at 5/8/2019 10:28 AM    Comment:  Pt educated on use and HEP of inspirometer, safety with t/f and bed mobility, recovery breathing, role of PT, POC, and benefits of increase mobitlity.                   Point: Precautions (Done)     Learning Progress Summary           Patient Acceptance, E,TB, VU by  at 5/10/2019  2:26 PM    Comment:  benefits of activity    Acceptance, E, VU,NR by  at 5/8/2019 10:28 AM    Comment:  Pt educated on use and HEP of inspirometer, safety with t/f and bed mobility, recovery breathing, role of PT, POC, and benefits of increase mobitlity.   Significant Other Acceptance, E, VU,NR by  at 5/8/2019 10:28 AM    Comment:  Pt educated on use and HEP of inspirometer, safety with t/f and bed mobility, recovery breathing, role of PT, POC, and benefits of increase mobitlity.                               User Key     Initials Effective Dates Name Provider Type Discipline     08/02/16 -   Jessica Pickard PTA Physical Therapy Assistant PT    GR 03/13/19 -  Leslie Estes, PT Student PT Student PT                PT Recommendation and Plan     Plan of Care Reviewed With: patient, spouse  Progress: improving  Outcome Summary: Pt reluctant to get out of bed and walk, strongly educated that pt needs to be up out of bed more to promote strength and better breathing. Pt independent with bed mobility, supervision for transfers, cga for Gait with RW approx 40' standing rest then 40' back to room. NC O2 4L dropped from 97% at rest to 84% during gait but she recovered to 95% after 45 seconds.   Outcome Measures     Row Name 05/10/19 1400             How much help from another is currently needed...    Putting on and taking off regular lower body clothing?  2  -MM      Bathing (including washing, rinsing, and drying)  2  -MM      Toileting (which includes using toilet bed pan or urinal)  2  -MM      Putting on and taking off regular upper body clothing  2  -MM      Taking care of personal grooming (such as brushing teeth)  3  -MM      Eating meals  3  -MM      Score  14  -MM         Functional Assessment    Outcome Measure Options  AM-PAC 6 Clicks Daily Activity (OT)  -MM        User Key  (r) = Recorded By, (t) = Taken By, (c) = Cosigned By    Initials Name Provider Type    MM Delmar Garcia, OTR/L Occupational Therapist         Time Calculation:   PT Charges     Row Name 05/11/19 1542             Time Calculation    Start Time  1542  -LG      Stop Time  1614  -LG      Time Calculation (min)  32 min  -LG      PT Received On  05/11/19  -      PT Goal Re-Cert Due Date  05/18/19  -         Time Calculation- PT    Total Timed Code Minutes- PT  3232 minute(s)  -LG        User Key  (r) = Recorded By, (t) = Taken By, (c) = Cosigned By    Initials Name Provider Type    LG Oh Archuleta PTA Physical Therapy Assistant        Therapy Charges for Today     Code Description Service Date Service Provider Modifiers  Qty    01450281475 HC GAIT TRAINING EA 15 MIN 5/11/2019 Oh Archuleta, PTA GP 1    63175717271 HC PT THER PROC EA 15 MIN 5/11/2019 Oh Archuleta, PTA GP 1          PT G-Codes  Outcome Measure Options: AM-PAC 6 Clicks Daily Activity (OT)  AM-PAC 6 Clicks Score: 15  Score: 14    Oh Archuleta PTA  5/11/2019

## 2019-05-11 NOTE — PLAN OF CARE
Problem: Patient Care Overview  Goal: Plan of Care Review   05/11/19 0407   Coping/Psychosocial   Plan of Care Reviewed With patient   Plan of Care Review   Progress improving   OTHER   Outcome Summary Pt alert and oriented. O2 stable on 4L. Pt HR tachycardic. No c/o pain. PRN zofran given per c/o nausea with relief. Up to bedside commode independently. Family at bedside. Safety maintained. Will continue to monitor.       Problem: Chronic Obstructive Pulmonary Disease (Adult)  Goal: Signs and Symptoms of Listed Potential Problems Will be Absent, Minimized or Managed (Chronic Obstructive Pulmonary Disease)  Outcome: Ongoing (interventions implemented as appropriate)      Problem: Fall Risk (Adult)  Goal: Absence of Fall  Outcome: Ongoing (interventions implemented as appropriate)

## 2019-05-11 NOTE — PROGRESS NOTES
[facility] Medicine Services  INPATIENT PROGRESS NOTE    Patient Name: Heide Newsome  Date of Admission: 5/6/2019  Today's Date: 05/11/19  Length of Stay: 5  Primary Care Physician: Provider, No Known    Subjective   Chief Complaint: follow up PNA  HPI   Pt is lying in bed. States she is getting deeper breaths today. She was up in the chair yesterday and walked to the door. She is interested in having home health at discharge.  at bedside.     Review of Systems   All pertinent negatives and positives are as above. All other systems have been reviewed and are negative unless otherwise stated.     Objective    Temp:  [97.8 °F (36.6 °C)-98.5 °F (36.9 °C)] 98.2 °F (36.8 °C)  Heart Rate:  [] 91  Resp:  [16-24] 18  BP: ()/(49-71) 96/49  Physical Exam   Constitutional: She is oriented to person, place, and time. She appears well-developed.   Thin frail cachetic.   HENT:   Head: Normocephalic and atraumatic.   Eyes: Conjunctivae and EOM are normal. Pupils are equal, round, and reactive to light.   Neck: Neck supple. No JVD present. No thyromegaly present.   Cardiovascular: Normal rate, regular rhythm, normal heart sounds and intact distal pulses. Exam reveals no gallop and no friction rub.   No murmur heard.  Sinus to sinus tachycardia    Pulmonary/Chest: Effort normal. No respiratory distress. She has no wheezes. She has no rales. She exhibits no tenderness.   Improved aeration this am. No wheezing noted.    Abdominal: Soft. Bowel sounds are normal. She exhibits no distension. There is no tenderness. There is no rebound and no guarding.   Musculoskeletal: Normal range of motion. She exhibits no edema, tenderness or deformity.   Lymphadenopathy:     She has no cervical adenopathy.   Neurological: She is alert and oriented to person, place, and time. She displays normal reflexes. No cranial nerve deficit. She exhibits normal muscle tone.   Skin: Skin is warm  and dry. No rash noted. There is pallor.   Psychiatric: She has a normal mood and affect. Her behavior is normal. Judgment and thought content normal.     Results Review:  I have reviewed the labs, radiology results, and diagnostic studies.    Laboratory Data:   Results from last 7 days   Lab Units 05/11/19 0556 05/09/19 0523 05/08/19 0443   WBC 10*3/mm3 5.79 8.49 8.61   HEMOGLOBIN g/dL 10.1* 9.6* 8.9*   HEMATOCRIT % 35.2* 32.5* 30.0*   PLATELETS 10*3/mm3 144 121* 111*        Results from last 7 days   Lab Units 05/11/19  0556 05/09/19  0523 05/08/19 0443 05/07/19 0523 05/06/19 2051   SODIUM mmol/L 139 140 139 138 137   POTASSIUM mmol/L 4.6 4.1 4.1 4.1 4.1   CHLORIDE mmol/L 89* 100 97* 93* 89*   CO2 mmol/L >40.0* >40.0* 40.0* >40.0* >40.0*   BUN mg/dL 21 21 13 15 16   CREATININE mg/dL 0.33* 0.32* 0.26* 0.21* 0.21*   CALCIUM mg/dL 9.1 9.4 9.1 9.4 9.7   BILIRUBIN mg/dL  --   --   --  0.6 0.9   ALK PHOS U/L  --   --   --  61 68   ALT (SGPT) U/L  --   --   --  19 18   AST (SGOT) U/L  --   --   --  17 22   GLUCOSE mg/dL 103* 109* 121* 138* 108*     I have reviewed the patient's current medications.     Assessment/Plan     Active Hospital Problems    Diagnosis   • **Acute on chronic respiratory failure with hypercapnia (CMS/HCC)   • Chronic respiratory failure with hypoxia (CMS/HCC)   • History of colon cancer   • Thrombocytopenia (CMS/HCC)   • Anemia   • Hyperglycemia, drug-induced   • Community acquired pneumonia of right lower lobe of lung (CMS/HCC)   • Pulmonary emphysema (CMS/HCC)   • COPD with acute exacerbation (CMS/HCC)   • Severe malnutrition (CMS/HCC)     Plan:  1. Continue supplements.   2. Has completed Azithromycin therapy. Rocephin day 6  3. Increase activity. Discussed with nursing.   4. Will transition steroids to oral.     Discharge Planning: I expect the patient to be discharged to home in 1 day.    JESSENIA Luz   05/11/19   7:36 AM     I personally evaluated and examined the  "patient in conjunction with JESSENIA Devi and agree with the assessment, treatment plan, and disposition of the patient as recorded by her. My history, exam, and further recommendations are:     Discussed with her nurse, Ariana.     Seen and discussed with her  and son.  She is up in bed.  Not dyspneic at rest.  4 L of nasal cannula in use.  This is her baseline.    I had wanted to start Mucinex yesterday.  However, the patient refused.  She states that Mucinex \"dries her out too much and makes her have a headache.\"    She is experiencing slow, daily improvement.    She wants to try to go home tomorrow if she is feeling up to it.    Fantasma Combs, DO  05/11/19  2:03 PM    "

## 2019-05-11 NOTE — PLAN OF CARE
Problem: Patient Care Overview  Goal: Plan of Care Review  Outcome: Ongoing (interventions implemented as appropriate)   05/11/19 7594   Coping/Psychosocial   Plan of Care Reviewed With patient;spouse   Plan of Care Review   Progress improving   OTHER   Outcome Summary Pt reluctant to get out of bed and walk, strongly educated that pt needs to be up out of bed more to promote strength and better breathing. Pt independent with bed mobility, supervision for transfers, cga for Gait with RW approx 40' standing rest then 40' back to room. NC O2 4L dropped from 97% at rest to 84% during gait but she recovered to 95% after 45 seconds.

## 2019-05-12 VITALS
HEIGHT: 63 IN | BODY MASS INDEX: 16.78 KG/M2 | WEIGHT: 94.7 LBS | DIASTOLIC BLOOD PRESSURE: 58 MMHG | OXYGEN SATURATION: 97 % | SYSTOLIC BLOOD PRESSURE: 131 MMHG | HEART RATE: 91 BPM | RESPIRATION RATE: 18 BRPM | TEMPERATURE: 98.1 F

## 2019-05-12 LAB
ANION GAP SERPL CALCULATED.3IONS-SCNC: ABNORMAL MMOL/L (ref 4–13)
BACTERIA SPEC RESP CULT: NORMAL
BUN BLD-MCNC: 18 MG/DL (ref 5–21)
BUN/CREAT SERPL: 56.3 (ref 7–25)
CALCIUM SPEC-SCNC: 8.8 MG/DL (ref 8.4–10.4)
CHLORIDE SERPL-SCNC: 85 MMOL/L (ref 98–110)
CO2 SERPL-SCNC: >40 MMOL/L (ref 24–31)
CREAT BLD-MCNC: 0.32 MG/DL (ref 0.5–1.4)
DEPRECATED RDW RBC AUTO: 47.1 FL (ref 40–54)
ERYTHROCYTE [DISTWIDTH] IN BLOOD BY AUTOMATED COUNT: 13.3 % (ref 12–15)
GFR SERPL CREATININE-BSD FRML MDRD: >150 ML/MIN/1.73
GLUCOSE BLD-MCNC: 79 MG/DL (ref 70–100)
GRAM STN SPEC: NORMAL
HCT VFR BLD AUTO: 36.6 % (ref 37–47)
HGB BLD-MCNC: 10.6 G/DL (ref 12–16)
MCH RBC QN AUTO: 27.7 PG (ref 28–32)
MCHC RBC AUTO-ENTMCNC: 29 G/DL (ref 33–36)
MCV RBC AUTO: 95.8 FL (ref 82–98)
PLATELET # BLD AUTO: 146 10*3/MM3 (ref 130–400)
PMV BLD AUTO: 12.2 FL (ref 6–12)
POTASSIUM BLD-SCNC: 4.5 MMOL/L (ref 3.5–5.3)
RBC # BLD AUTO: 3.82 10*6/MM3 (ref 4.2–5.4)
SODIUM BLD-SCNC: 136 MMOL/L (ref 135–145)
WBC NRBC COR # BLD: 8.22 10*3/MM3 (ref 4.8–10.8)

## 2019-05-12 PROCEDURE — 94799 UNLISTED PULMONARY SVC/PX: CPT

## 2019-05-12 PROCEDURE — 85027 COMPLETE CBC AUTOMATED: CPT | Performed by: NURSE PRACTITIONER

## 2019-05-12 PROCEDURE — 63710000001 PREDNISONE PER 1 MG: Performed by: NURSE PRACTITIONER

## 2019-05-12 PROCEDURE — 80048 BASIC METABOLIC PNL TOTAL CA: CPT | Performed by: NURSE PRACTITIONER

## 2019-05-12 RX ORDER — FLUTICASONE PROPIONATE 50 MCG
2 SPRAY, SUSPENSION (ML) NASAL DAILY
Status: ON HOLD
Start: 2019-05-13 | End: 2020-07-10

## 2019-05-12 RX ORDER — CEFDINIR 300 MG/1
300 CAPSULE ORAL EVERY 12 HOURS SCHEDULED
Qty: 3 CAPSULE | Refills: 0 | Status: SHIPPED | OUTPATIENT
Start: 2019-05-12 | End: 2019-05-14

## 2019-05-12 RX ORDER — ROPINIROLE 0.25 MG/1
0.25 TABLET, FILM COATED ORAL NIGHTLY
Qty: 30 TABLET | Refills: 0 | Status: SHIPPED | OUTPATIENT
Start: 2019-05-12 | End: 2019-06-05 | Stop reason: SDUPTHER

## 2019-05-12 RX ORDER — CEFDINIR 300 MG/1
300 CAPSULE ORAL EVERY 12 HOURS SCHEDULED
Status: DISCONTINUED | OUTPATIENT
Start: 2019-05-12 | End: 2019-05-12 | Stop reason: HOSPADM

## 2019-05-12 RX ORDER — ECHINACEA PURPUREA EXTRACT 125 MG
2 TABLET ORAL AS NEEDED
Refills: 12 | Status: ON HOLD
Start: 2019-05-12 | End: 2021-01-01

## 2019-05-12 RX ORDER — IPRATROPIUM BROMIDE AND ALBUTEROL SULFATE 2.5; .5 MG/3ML; MG/3ML
3 SOLUTION RESPIRATORY (INHALATION) EVERY 4 HOURS PRN
Qty: 180 VIAL | Refills: 1 | Status: SHIPPED | OUTPATIENT
Start: 2019-05-12 | End: 2019-06-05 | Stop reason: SDUPTHER

## 2019-05-12 RX ORDER — PREDNISONE 20 MG/1
TABLET ORAL
Qty: 12 TABLET | Refills: 0 | Status: SHIPPED | OUTPATIENT
Start: 2019-05-12 | End: 2019-06-05

## 2019-05-12 RX ADMIN — BUDESONIDE AND FORMOTEROL FUMARATE DIHYDRATE 2 PUFF: 160; 4.5 AEROSOL RESPIRATORY (INHALATION) at 06:53

## 2019-05-12 RX ADMIN — PREDNISONE 20 MG: 20 TABLET ORAL at 08:11

## 2019-05-12 RX ADMIN — SODIUM CHLORIDE, PRESERVATIVE FREE 3 ML: 5 INJECTION INTRAVENOUS at 08:12

## 2019-05-12 RX ADMIN — IPRATROPIUM BROMIDE AND ALBUTEROL SULFATE 3 ML: 2.5; .5 SOLUTION RESPIRATORY (INHALATION) at 06:53

## 2019-05-12 RX ADMIN — FLUTICASONE PROPIONATE 2 SPRAY: 50 SPRAY, METERED NASAL at 08:11

## 2019-05-12 NOTE — PROGRESS NOTES
Continued Stay Note   [facility]     Patient Name: Heide Newsome  MRN: [id number]  Today's Date: 5/12/2019    Admit Date: 5/6/2019    Discharge Plan     Row Name 05/12/19 1616       Plan    Plan  Home    Patient/Family in Agreement with Plan  yes    Final Discharge Disposition Code  01 - home or self-care    Final Note  Pt is being d/c'ed home today. She is unable to have home health because she does not have a pcp. She will get established with a pcp and then she can have home health at that time.        Discharge Codes    No documentation.       Expected Discharge Date and Time     Expected Discharge Date Expected Discharge Time    May 12, 2019             XAVIER Gagnon

## 2019-05-12 NOTE — PLAN OF CARE
Problem: Patient Care Overview  Goal: Plan of Care Review  Outcome: Ongoing (interventions implemented as appropriate)   05/12/19 9235   Coping/Psychosocial   Plan of Care Reviewed With patient   Plan of Care Review   Progress improving   OTHER   Outcome Summary VSS. No complaints of pain. Patient desats when getting up to BSC. O2 on home dose at 4L NC. HR gets up to 130's when up out of bed. Cont to monitor.        Problem: Chronic Obstructive Pulmonary Disease (Adult)  Goal: Signs and Symptoms of Listed Potential Problems Will be Absent, Minimized or Managed (Chronic Obstructive Pulmonary Disease)  Outcome: Ongoing (interventions implemented as appropriate)      Problem: Fall Risk (Adult)  Goal: Absence of Fall  Outcome: Ongoing (interventions implemented as appropriate)

## 2019-05-12 NOTE — DISCHARGE SUMMARY
[facility]  DISCHARGE SUMMARY       Date of Admission: 5/6/2019  Date of Discharge:  5/12/2019  Primary Care Physician: Provider, No Known    Presenting Problem/History of Present Illness:  Shortness of breath    Final Discharge Diagnoses:  • **Acute on chronic respiratory failure with hypercapnia (CMS/HCC)   • Chronic respiratory failure with hypoxia (CMS/HCC)   • History of colon cancer   • Thrombocytopenia (CMS/HCC)   • Anemia   • Hyperglycemia, drug-induced   • Community acquired pneumonia of right lower lobe of lung (CMS/HCC)   • Pulmonary emphysema (CMS/HCC)   • COPD with acute exacerbation (CMS/HCC)   • Severe malnutrition (CMS/HCC)     Consults: none    Procedures Performed: none    Pertinent Test Results:   Imaging Results (last 7 days)     Procedure Component Value Units Date/Time    XR Chest 1 View [[id number]] Collected:  05/09/19 1556     Updated:  05/09/19 1600    Narrative:       Frontal upright radiograph of the chest 5/9/2019 3:52 PM CDT     COMPARISON: 05/06/2019.     FINDINGS:   Hyperinflation chronic changes noted in the pulmonary parenchyma  consistent with COPD. Linear infiltrate is present in the right lung.  This is a slight change from May 6. This may be atelectasis or possibly  an early inflammatory process.. Cardiac silhouettes upper limits of  normal..      The osseous structures and surrounding soft tissues demonstrate no acute  abnormality.       Impression:       1. New linear infiltrate in the right lung. This may be atelectasis or  possibly an early inflammatory process. This is superimposed on COPD..        This report was finalized on 05/09/2019 15:57 by Dr. Zeb Gardner MD.    XR Chest 2 View [[id number]] Collected:  05/06/19 2134     Updated:  05/06/19 2138    Narrative:       EXAMINATION: XR CHEST 2 VW-     5/6/2019 9:05 PM CDT     HISTORY: cough and congestion.     2 view chest x-ray with no comparison.     Normal heart  size.  Aortic arch calcification.     Markedly hyperexpanded lungs with chronic interstitial disease. Mild  infiltrate or atelectasis at the right lower lobe.     The upper lobes are clear.  There is no pneumothorax or heart failure.     Summary:  1. Chronic lung changes with mild infiltrate or atelectasis at the right  lower lobe  This report was finalized on 05/06/2019 21:35 by Dr. Ady Mcnamara MD.        Lab Results (last 7 days)     Procedure Component Value Units Date/Time    Respiratory Culture - Sputum, Cough [[id number]] Collected:  05/10/19 0851    Specimen:  Sputum from Cough Updated:  05/12/19 0745     Respiratory Culture Light growth (2+) Normal Respiratory Amarilys     Gram Stain Less than 25 WBCs per low power field      No Epithelial cells per low power field      Rare (1+) Mixed gram positive amarilys    Basic Metabolic Panel [[id number]]  (Abnormal) Collected:  05/12/19 0549    Specimen:  Blood Updated:  05/12/19 0621     Glucose 79 mg/dL      BUN 18 mg/dL      Creatinine 0.32 mg/dL      Sodium 136 mmol/L      Potassium 4.5 mmol/L      Chloride 85 mmol/L      CO2 >40.0 mmol/L      Calcium 8.8 mg/dL      eGFR Non African Amer >150 mL/min/1.73      BUN/Creatinine Ratio 56.3     Anion Gap -- mmol/L      Comment: Unable to calculate Anion Gap.       Narrative:       GFR Normal >60  Chronic Kidney Disease <60  Kidney Failure <15    CBC (No Diff) [[id number]]  (Abnormal) Collected:  05/12/19 0549    Specimen:  Blood Updated:  05/12/19 0610     WBC 8.22 10*3/mm3      RBC 3.82 10*6/mm3      Hemoglobin 10.6 g/dL      Hematocrit 36.6 %      MCV 95.8 fL      MCH 27.7 pg      MCHC 29.0 g/dL      RDW 13.3 %      RDW-SD 47.1 fl      MPV 12.2 fL      Platelets 146 10*3/mm3     Blood Culture - Blood, Wrist, Left [[id number]] Collected:  05/06/19 2115    Specimen:  Blood from Wrist, Left Updated:  05/11/19 2145     Blood Culture No growth at 5 days    Basic Metabolic Panel [[id number]]  (Abnormal) Collected:  05/11/19 0556     Specimen:  Blood Updated:  05/11/19 0642     Glucose 103 mg/dL      BUN 21 mg/dL      Creatinine 0.33 mg/dL      Sodium 139 mmol/L      Potassium 4.6 mmol/L      Chloride 89 mmol/L      CO2 >40.0 mmol/L      Calcium 9.1 mg/dL      eGFR Non African Amer >150 mL/min/1.73      BUN/Creatinine Ratio 63.6     Anion Gap -- mmol/L      Comment: Unable to calculate Anion Gap.       Narrative:       GFR Normal >60  Chronic Kidney Disease <60  Kidney Failure <15    CBC & Differential [[id number]] Collected:  05/11/19 0556    Specimen:  Blood Updated:  05/11/19 0631    Narrative:       The following orders were created for panel order CBC & Differential.  Procedure                               Abnormality         Status                     ---------                               -----------         ------                     CBC Auto Differential[[id number]]        Abnormal            Final result                 Please view results for these tests on the individual orders.    CBC Auto Differential [[id number]]  (Abnormal) Collected:  05/11/19 0556    Specimen:  Blood Updated:  05/11/19 0631     WBC 5.79 10*3/mm3      RBC 3.61 10*6/mm3      Hemoglobin 10.1 g/dL      Hematocrit 35.2 %      MCV 97.5 fL      MCH 28.0 pg      MCHC 28.7 g/dL      RDW 13.3 %      RDW-SD 47.4 fl      MPV 12.7 fL      Platelets 144 10*3/mm3      Neutrophil % 90.4 %      Lymphocyte % 5.2 %      Monocyte % 3.5 %      Eosinophil % 0.0 %      Basophil % 0.0 %      Immature Grans % 0.9 %      Neutrophils, Absolute 5.24 10*3/mm3      Lymphocytes, Absolute 0.30 10*3/mm3      Monocytes, Absolute 0.20 10*3/mm3      Eosinophils, Absolute 0.00 10*3/mm3      Basophils, Absolute 0.00 10*3/mm3      Immature Grans, Absolute 0.05 10*3/mm3      nRBC 0.0 /100 WBC     CBC & Differential [[id number]] Collected:  05/09/19 0523    Specimen:  Blood Updated:  05/09/19 0649    Narrative:       The following orders were created for panel order CBC & Differential.  Procedure                                Abnormality         Status                     ---------                               -----------         ------                     CBC Auto Differential[[id number]]        Abnormal            Final result                 Please view results for these tests on the individual orders.    CBC Auto Differential [[id number]]  (Abnormal) Collected:  05/09/19 0523    Specimen:  Blood Updated:  05/09/19 0649     WBC 8.49 10*3/mm3      RBC 3.45 10*6/mm3      Hemoglobin 9.6 g/dL      Hematocrit 32.5 %      MCV 94.2 fL      MCH 27.8 pg      MCHC 29.5 g/dL      RDW 13.8 %      RDW-SD 47.8 fl      MPV 13.1 fL      Platelets 121 10*3/mm3      Neutrophil % 93.3 %      Lymphocyte % 3.4 %      Monocyte % 2.0 %      Eosinophil % 0.0 %      Basophil % 0.0 %      Neutrophils, Absolute 7.92 10*3/mm3      Lymphocytes, Absolute 0.29 10*3/mm3      Monocytes, Absolute 0.17 10*3/mm3      Eosinophils, Absolute 0.00 10*3/mm3      Basophils, Absolute 0.00 10*3/mm3     Basic Metabolic Panel [[id number]]  (Abnormal) Collected:  05/09/19 0523    Specimen:  Blood Updated:  05/09/19 0639     Glucose 109 mg/dL      BUN 21 mg/dL      Creatinine 0.32 mg/dL      Sodium 140 mmol/L      Potassium 4.1 mmol/L      Chloride 100 mmol/L      CO2 >40.0 mmol/L      Calcium 9.4 mg/dL      eGFR Non African Amer >150 mL/min/1.73      BUN/Creatinine Ratio 65.6     Anion Gap -- mmol/L      Comment: Unable to calculate Anion Gap.       Narrative:       GFR Normal >60  Chronic Kidney Disease <60  Kidney Failure <15    S. Pneumo Ag Urine or CSF - Urine, Urine, Clean Catch [[id number]]  (Normal) Collected:  05/09/19 0217    Specimen:  Urine, Clean Catch Updated:  05/09/19 0423     Strep Pneumo Ag Negative    Legionella Antigen, Urine - Urine, Urine, Clean Catch [[id number]]  (Normal) Collected:  05/09/19 0217    Specimen:  Urine, Clean Catch Updated:  05/09/19 0423     LEGIONELLA ANTIGEN, URINE Negative    Basic Metabolic Panel [[id number]]  (Abnormal)  Collected:  05/08/19 0443    Specimen:  Blood Updated:  05/08/19 0544     Glucose 121 mg/dL      BUN 13 mg/dL      Creatinine 0.26 mg/dL      Sodium 139 mmol/L      Potassium 4.1 mmol/L      Chloride 97 mmol/L      CO2 40.0 mmol/L      Calcium 9.1 mg/dL      eGFR Non African Amer >150 mL/min/1.73      BUN/Creatinine Ratio 50.0     Anion Gap 2.0 mmol/L     Narrative:       GFR Normal >60  Chronic Kidney Disease <60  Kidney Failure <15    CBC & Differential [[id number]] Collected:  05/08/19 0443    Specimen:  Blood Updated:  05/08/19 0523    Narrative:       The following orders were created for panel order CBC & Differential.  Procedure                               Abnormality         Status                     ---------                               -----------         ------                     CBC Auto Differential[[id number]]        Abnormal            Final result                 Please view results for these tests on the individual orders.    CBC Auto Differential [[id number]]  (Abnormal) Collected:  05/08/19 0443    Specimen:  Blood Updated:  05/08/19 0523     WBC 8.61 10*3/mm3      RBC 3.26 10*6/mm3      Hemoglobin 8.9 g/dL      Hematocrit 30.0 %      MCV 92.0 fL      MCH 27.3 pg      MCHC 29.7 g/dL      RDW 13.4 %      RDW-SD 45.1 fl      MPV 12.9 fL      Platelets 111 10*3/mm3      Neutrophil % 94.6 %      Lymphocyte % 2.6 %      Monocyte % 1.6 %      Eosinophil % 0.0 %      Basophil % 0.0 %      Immature Grans % 1.2 %      Neutrophils, Absolute 8.15 10*3/mm3      Lymphocytes, Absolute 0.22 10*3/mm3      Monocytes, Absolute 0.14 10*3/mm3      Eosinophils, Absolute 0.00 10*3/mm3      Basophils, Absolute 0.00 10*3/mm3      Immature Grans, Absolute 0.10 10*3/mm3      nRBC 0.0 /100 WBC     Hemoglobin A1c [[id number]] Collected:  05/07/19 0523    Specimen:  Blood Updated:  05/07/19 0733     Hemoglobin A1C 5.0 %     Narrative:       Less than 6.0           Non-Diabetic Range  6.0-7.0                 ADA Therapeutic  Target  Greater than 7.0        Action Suggested    Manual Differential [[id number]]  (Abnormal) Collected:  05/07/19 0523    Specimen:  Blood Updated:  05/07/19 0658     Neutrophil % 100.0 %      Lymphocyte % 0.0 %      Neutrophils Absolute 11.12 10*3/mm3      Lymphocytes Absolute 0.00 10*3/mm3      Hypochromia Slight/1+     Poikilocytes Slight/1+     WBC Morphology Normal     Platelet Morphology Normal    CBC Auto Differential [[id number]]  (Abnormal) Collected:  05/07/19 0523    Specimen:  Blood Updated:  05/07/19 0658     WBC 11.12 10*3/mm3      RBC 3.70 10*6/mm3      Hemoglobin 10.1 g/dL      Hematocrit 34.0 %      MCV 91.9 fL      MCH 27.3 pg      MCHC 29.7 g/dL      RDW 13.1 %      RDW-SD 43.7 fl      MPV 13.1 fL      Platelets 112 10*3/mm3     TSH [[id number]]  (Abnormal) Collected:  05/07/19 0523    Specimen:  Blood Updated:  05/07/19 0628     TSH 0.053 mIU/mL     Lipid Panel [[id number]] Collected:  05/07/19 0523    Specimen:  Blood Updated:  05/07/19 0610     Total Cholesterol 145 mg/dL      Triglycerides 33 mg/dL      HDL Cholesterol 82 mg/dL      LDL Cholesterol  51 mg/dL      LDL/HDL Ratio 0.69    Comprehensive Metabolic Panel [[id number]]  (Abnormal) Collected:  05/07/19 0523    Specimen:  Blood Updated:  05/07/19 0600     Glucose 138 mg/dL      BUN 15 mg/dL      Creatinine 0.21 mg/dL      Sodium 138 mmol/L      Potassium 4.1 mmol/L      Chloride 93 mmol/L      CO2 >40.0 mmol/L      Calcium 9.4 mg/dL      Total Protein 6.1 g/dL      Albumin 3.50 g/dL      ALT (SGPT) 19 U/L      AST (SGOT) 17 U/L      Alkaline Phosphatase 61 U/L      Total Bilirubin 0.6 mg/dL      eGFR Non African Amer >150 mL/min/1.73      Globulin 2.6 gm/dL      A/G Ratio 1.3 g/dL      BUN/Creatinine Ratio 71.4     Anion Gap -- mmol/L      Comment: Unable to calculate Anion Gap.       Narrative:       GFR Normal >60  Chronic Kidney Disease <60  Kidney Failure <15    Procalcitonin [[id number]]  (Normal) Collected:  05/06/19 2051     Specimen:  Blood Updated:  05/06/19 2130     Procalcitonin <0.25 ng/mL     Narrative:       SIRS, sepsis, severe sepsis, and septic shock are categorized according to the criteria of the consensus conference of the American College of Chest Physicians/Society of Critical Care Medicine.    PCT < 0.5 ng/mL     Systemic infection (sepsis) is not likely.    PCT >0.5 and < 2.0 ng/mL Systemic infection (sepsis) is possible, but other conditions are known to elevate PCT as well.    PCT > 2.0 ng/mL     Systemic infection (sepsis) is likely, unless other causes are known.      PCT > 10.0 ng/mL    Important systemic inflammatory response, almost exclusively due to severe bacterial sepsis or septic shock.    PCT values of < 0.5 ng/mL do not exclude an infection, because localized infections (without systemic signs) may be associated with such low concentrations, or a systemic infection in its initial stages (<6 hours).  Increased PCT can occur without infection.  PCT concentrations between 0.5 and 2.0 ng/mL should be interpreted taking into account the patients history.  It is recommended to retest PCT within 6-24 hours if any concentrations < 2.0 ng/mL are obtained.    BNP [[id number]]  (Normal) Collected:  05/06/19 2051    Specimen:  Blood Updated:  05/06/19 2120     proBNP 130.0 pg/mL     Troponin [[id number]]  (Normal) Collected:  05/06/19 2051    Specimen:  Blood Updated:  05/06/19 2120     Troponin I <0.012 ng/mL     Comprehensive Metabolic Panel [[id number]]  (Abnormal) Collected:  05/06/19 2051    Specimen:  Blood Updated:  05/06/19 2110     Glucose 108 mg/dL      BUN 16 mg/dL      Creatinine 0.21 mg/dL      Sodium 137 mmol/L      Potassium 4.1 mmol/L      Chloride 89 mmol/L      CO2 >40.0 mmol/L      Calcium 9.7 mg/dL      Total Protein 6.5 g/dL      Albumin 3.90 g/dL      ALT (SGPT) 18 U/L      AST (SGOT) 22 U/L      Alkaline Phosphatase 68 U/L      Total Bilirubin 0.9 mg/dL      eGFR Non African Amer >150 mL/min/1.73       Globulin 2.6 gm/dL      A/G Ratio 1.5 g/dL      BUN/Creatinine Ratio 76.2     Anion Gap -- mmol/L      Comment: Unable to calculate Anion Gap.       Narrative:       GFR Normal >60  Chronic Kidney Disease <60  Kidney Failure <15    Urinalysis With Culture If Indicated - Urine, Clean Catch [[id number]]  (Abnormal) Collected:  05/06/19 2048    Specimen:  Urine, Clean Catch Updated:  05/06/19 2109     Color, UA Yellow     Appearance, UA Clear     pH, UA 6.5     Specific Gravity, UA 1.010     Glucose, UA Negative     Ketones, UA Negative     Bilirubin, UA Negative     Blood, UA Negative     Protein, UA Negative     Leuk Esterase, UA Small (1+)     Nitrite, UA Negative     Urobilinogen, UA 0.2 E.U./dL    Urinalysis, Microscopic Only - Urine, Clean Catch [[id number]]  (Abnormal) Collected:  05/06/19 2048    Specimen:  Urine, Clean Catch Updated:  05/06/19 2109     RBC, UA 3-5 /HPF      WBC, UA 3-5 /HPF      Bacteria, UA None Seen /HPF      Squamous Epithelial Cells, UA 0-2 /HPF      Hyaline Casts, UA 0-2 /LPF      Methodology Automated Microscopy    Protime-INR [[id number]]  (Abnormal) Collected:  05/06/19 2051    Specimen:  Blood Updated:  05/06/19 2109     Protime 11.2 Seconds      INR 0.79    Blood Gas, Arterial [[id number]]  (Abnormal) Collected:  05/06/19 2100    Specimen:  Arterial Blood Updated:  05/06/19 2102     Site Right Radial     Tristen's Test Positive     pH, Arterial 7.369 pH units      pCO2, Arterial 80.1 mm Hg      Comment: 86 Value above critical limit        pO2, Arterial 119.0 mm Hg      Comment: 83 Value above reference range        HCO3, Arterial 46.1 mmol/L      Comment: 83 Value above reference range        Base Excess, Arterial 17.3 mmol/L      Comment: 83 Value above reference range        O2 Saturation, Arterial 99.0 %      Temperature 37.0 C      Barometric Pressure for Blood Gas 752 mmHg      Modality Nasal Cannula     Flow Rate 4.0 lpm      Ventilator Mode NA     Notified Who BREE LOPEZ MD      Notified By [id number]     Notified Time 05/06/2019 21:07     Collected by [id number]     Comment: Meter: [id number]     :  [id number]       Influenza Antigen, Rapid - Swab, Nasopharynx [[id number]]  (Normal) Collected:  05/06/19 2035    Specimen:  Swab from Nasopharynx Updated:  05/06/19 2100     Influenza A Ag, EIA Negative     Influenza B Ag, EIA Negative    Narrative:       Recommend confirmation of negative results by viral culture or molecular assay.    Lactic Acid, Plasma [[id number]]  (Normal) Collected:  05/06/19 1925    Specimen:  Blood Updated:  05/06/19 2050     Lactate 1.2 mmol/L     CBC & Differential [[id number]] Collected:  05/06/19 1925    Specimen:  Blood Updated:  05/06/19 2043    Narrative:       The following orders were created for panel order CBC & Differential.  Procedure                               Abnormality         Status                     ---------                               -----------         ------                     CBC Auto Differential[[id number]]        Abnormal            Final result                 Please view results for these tests on the individual orders.    CBC Auto Differential [[id number]]  (Abnormal) Collected:  05/06/19 1925    Specimen:  Blood Updated:  05/06/19 2043     WBC 16.52 10*3/mm3      RBC 4.10 10*6/mm3      Hemoglobin 11.5 g/dL      Hematocrit 39.0 %      MCV 95.1 fL      MCH 28.0 pg      MCHC 29.5 g/dL      RDW 13.1 %      RDW-SD 45.7 fl      MPV 12.8 fL      Platelets 142 10*3/mm3      Neutrophil % 85.9 %      Lymphocyte % 5.7 %      Monocyte % 7.6 %      Eosinophil % 0.2 %      Basophil % 0.2 %      Immature Grans % 0.4 %      Neutrophils, Absolute 14.20 10*3/mm3      Lymphocytes, Absolute 0.94 10*3/mm3      Monocytes, Absolute 1.25 10*3/mm3      Eosinophils, Absolute 0.03 10*3/mm3      Basophils, Absolute 0.03 10*3/mm3      Immature Grans, Absolute 0.07 10*3/mm3      nRBC 0.0 /100 WBC     [place] Draw [[id number]] Collected:  05/06/19 1925     Specimen:  Blood Updated:  05/06/19 2030    Narrative:       The following orders were created for panel order [place] Draw.  Procedure                               Abnormality         Status                     ---------                               -----------         ------                     Light Blue Top[[id number]]                                   Final result               Green Top (Gel)[[id number]]                                  Final result               Lavender Top[[id number]]                                     Final result               Red Top[[id number]]                                          Final result               [place] Blood Culture Arie...[[id number]]                      Final result                 Please view results for these tests on the individual orders.    Lavender Top [[id number]] Collected:  05/06/19 1925    Specimen:  Blood Updated:  05/06/19 2030     Extra Tube hold for add-on     Comment: Auto resulted       [place] Blood Culture Bottle Set [[id number]] Collected:  05/06/19 1925    Specimen:  Blood from Arm, Right Updated:  05/06/19 2030     Extra Tube Hold for add-ons.     Comment: Auto resulted.       Light Blue Top [[id number]] Collected:  05/06/19 1925    Specimen:  Blood Updated:  05/06/19 2030     Extra Tube hold for add-on     Comment: Auto resulted       Green Top (Gel) [[id number]] Collected:  05/06/19 1925    Specimen:  Blood Updated:  05/06/19 2030     Extra Tube Hold for add-ons.     Comment: Auto resulted.       Red Top [[id number]] Collected:  05/06/19 1925    Specimen:  Blood Updated:  05/06/19 2030     Extra Tube Hold for add-ons.     Comment: Auto resulted.           Hospital Course:  The patient is a 74 y.o. female who does not follow with a primary care provider at present. She has a past medical history significant for chronic respiratory failure with home oxygen at 4L, COPD, restless leg syndrome, and colon cancer. She presented to the emergency department on  "5/6 with complaints of increasing shortness of breath. She had been having worsening problems over the past 2 weeks. In the emergency department, chest x-ray was consistent with an infection in her lower lobe.  She was noted to have hypercarbia with PCO2 of 80.1 and a PCO2 of 119.  She was admitted to hospitalist service for further evaluation management.    She was placed on Rocephin and azithromycin and has completed azithromycin therapy and will have 1 more day of Omnicef therapy.  She required elevated doses of corticosteroids and has been finally weaned down to 20 mg twice daily. She has continued on Symbicort. She was noted to have oral candidiasis and has been treated with mycelex. She has been slow to improve given the severity of her COPD. She will need referral to pulmonology once she establishes with her new PCP. She will be discharged home today with steroid taper and 3 doses of omnicef. I have given her Requip prescription for her restless legs. She has been eating well and tolerating supplements. Sputum culture did not grow any pathogens.     Physical Exam on Discharge:  /58 (BP Location: Left arm, Patient Position: Lying)   Pulse 91   Temp 98.1 °F (36.7 °C) (Oral)   Resp 18   Ht 160 cm (63\")   Wt 43 kg (94 lb 11.2 oz)   SpO2 97%   BMI 16.78 kg/m²   Physical Exam   Constitutional: She is oriented to person, place, and time. She appears well-developed.   Thin frail cachetic   HENT:   Head: Normocephalic and atraumatic.   Eyes: Conjunctivae and EOM are normal. Pupils are equal, round, and reactive to light.   Neck: Neck supple. No JVD present. No thyromegaly present.   Cardiovascular: Normal rate, regular rhythm, normal heart sounds and intact distal pulses. Exam reveals no gallop and no friction rub.   No murmur heard.  Sinus to sinus tachycardia    Pulmonary/Chest: Effort normal. No respiratory distress. She has no wheezes. She has no rales. She exhibits no tenderness.   Minimal air " movement. No wheezes noted.    Abdominal: Soft. Bowel sounds are normal. She exhibits no distension. There is no tenderness. There is no rebound and no guarding.   Musculoskeletal: Normal range of motion. She exhibits no edema, tenderness or deformity.   Lymphadenopathy:     She has no cervical adenopathy.   Neurological: She is alert and oriented to person, place, and time. She displays normal reflexes. No cranial nerve deficit. She exhibits normal muscle tone.   Skin: Skin is warm and dry. No rash noted.   Psychiatric: She has a normal mood and affect. Her behavior is normal. Judgment and thought content normal.     Condition on Discharge: stable    Discharge Disposition:  Home or Self Care    Discharge Medications:     Discharge Medications      New Medications      Instructions Start Date   cefdinir 300 MG capsule  Commonly known as:  OMNICEF   300 mg, Oral, Every 12 Hours Scheduled      fluticasone 50 MCG/ACT nasal spray  Commonly known as:  FLONASE   2 sprays, Each Nare, Daily   Start Date:  5/13/2019     ipratropium-albuterol 0.5-2.5 mg/3 ml nebulizer  Commonly known as:  DUO-NEB   3 mL, Nebulization, Every 4 Hours PRN      predniSONE 20 MG tablet  Commonly known as:  DELTASONE   1 tablet twice daily for 2 days then 1 tablet daily for 3 days then 1/2 tablet daily for 3 days then stop.      rOPINIRole 0.25 MG tablet  Commonly known as:  REQUIP   0.25 mg, Oral, Nightly, Take 1 hour before bedtime.      sodium chloride 0.65 % nasal spray  Commonly known as:  OCEAN   2 sprays, Nasal, As Needed         Continue These Medications      Instructions Start Date   budesonide-formoterol 160-4.5 MCG/ACT inhaler  Commonly known as:  SYMBICORT   2 puffs, Inhalation, 2 Times Daily - RT         Stop These Medications    TYLENOL ALLERGY COMPLETE PO          Discharge Diet:   Diet Instructions     Regular/Ensure or Boost               Activity at Discharge:   Activity Instructions     Activity as tolerated                  Follow-up Appointments:   1. Dr. Cooney-1 week. Will need home health at that time.     Test Results Pending at Discharge: none    JESSENIA Luz  05/12/19  10:59 AM    Time: 35 minutes.     I personally evaluated and examined the patient in conjunction with JESSENIA Devi and agree with the assessment, treatment plan, and disposition of the patient as recorded by her. My history, exam, and further recommendations are:     Discussed with her nurse, Ariana.    Seen and discussed with her  and her son.    She feels that she has approached her baseline and would like to go home today.    She has not carried primary care doctor as an outpatient in recent memory.  We are going to set her up with Dr. Cooney.  After he evaluates her, she can get orders for home health.    I have also placed orders for a home nebulizer and DuoNeb solution.    Fantasma Combs,   05/12/19  12:00 PM

## 2019-05-13 ENCOUNTER — TELEPHONE (OUTPATIENT)
Dept: INTERNAL MEDICINE | Facility: CLINIC | Age: 74
End: 2019-05-13

## 2019-05-13 ENCOUNTER — READMISSION MANAGEMENT (OUTPATIENT)
Dept: CALL CENTER | Facility: HOSPITAL | Age: 74
End: 2019-05-13

## 2019-05-13 NOTE — OUTREACH NOTE
Prep Survey      Responses   Facility patient discharged from?  [facility]   Is patient eligible?  Yes   Discharge diagnosis  A/C resp. failure with hypercapnia,, chronic resp failure with hypoxia, hx. of colon cancer, thrombocytopenia, anemia, Hyperglycemia, CAP of RLL, pulmonary emphysema, COPD AE, severe malnutrition   Does the patient have one of the following disease processes/diagnoses(primary or secondary)?  COPD/Pneumonia   Does the patient have Home health ordered?  No   What is the Home health agency?   Pt request HH but has no PCP, will establish care with PCP and get order for HH   Is there a DME ordered?  Yes   What DME was ordered?  Home nebulizer   Comments regarding appointments  Pt to Schedule follow up appointments.   Prep survey completed?  Yes          Neeru Longo RN

## 2019-05-13 NOTE — TELEPHONE ENCOUNTER
The  of Heide Eason called. She has a new patient appointment with you on 5/20/19. Hospital follow-up.  is upset that she needs HH . He doesn't understand she has to be a patient first. He stated she cant get around and  I don't know if I will be able to get her up for the appointment. Her arm is pooling blood from where she has blood drawn at the hospital.He was very upset  And let me know that he is the only one to take care of her. Mr eason was jumping from one concern to another.He would like to talk to the doctor . He has question [phone]

## 2019-05-13 NOTE — THERAPY DISCHARGE NOTE
Acute Care - Occupational Therapy Discharge Summary  [facility]     Patient Name: Heide Newsome  : 1945  MRN: [id number]    Today's Date: 2019  Onset of Illness/Injury or Date of Surgery: 19    Date of Referral to OT: 19  Referring Physician: Brian Bhatti APRN      Admit Date: 2019        OT Recommendation and Plan    Visit Dx:    ICD-10-CM ICD-9-CM   1. Pulmonary emphysema, unspecified emphysema type (CMS/[facility]) J43.9 492.8   2. Impaired mobility and endurance Z74.09 V49.89   3. Poor tolerance for activity Z78.9 V49.89   4. COPD with acute exacerbation (CMS/[facility]) J44.1 491.21               Rehab Goal Summary     Row Name 19 0700             Bathing Goal 1 (OT)    Activity/Assistive Device (Bathing Goal 1, OT)  bathing skills, all  -TS      [place] Level/Cues Needed (Bathing Goal 1, OT)  supervision required;set-up required  -TS      Time Frame (Bathing Goal 1, OT)  long term goal (LTG);by discharge  -TS      Progress/Outcomes (Bathing Goal 1, OT)  goal not met  -TS         Dressing Goal 1 (OT)    Activity/Assistive Device (Dressing Goal 1, OT)  dressing skills, all  -TS      [place]/Cues Needed (Dressing Goal 1, OT)  supervision required;set-up required  -TS      Time Frame (Dressing Goal 1, OT)  long term goal (LTG);by discharge  -TS      Progress/Outcome (Dressing Goal 1, OT)  goal not met  -TS         Toileting Goal 1 (OT)    Activity/Device (Toileting Goal 1, OT)  toileting skills, all  -TS      [place] Level/Cues Needed (Toileting Goal 1, OT)  independent  -TS      Time Frame (Toileting Goal 1, OT)  long term goal (LTG);by discharge  -TS      Progress/Outcome (Toileting Goal 1, OT)  goal not met  -TS          Activity Tolerance Goal 1 (OT)    Activity Tolerance Goal 1 (OT)  pt will be able to participate in 10 min ADL activity with no more than 1 rest break while maintaining sat levels > or equal to 88% on 4L  -TS      Activity Level (Endurance Goal 1,  OT)  10 min activity  -TS      Time Frame (Activity Tolerance Goal 1, OT)  by discharge  -TS      Progress/Outcome (Activity Tolerance Goal 1, OT)  goal not met  -TS        User Key  (r) = Recorded By, (t) = Taken By, (c) = Cosigned By    Initials Name Provider Type Discipline    Farzana Escobar COTA/L Occupational Therapy Assistant OT          Outcome Measures     Row Name 05/10/19 1400             How much help from another is currently needed...    Putting on and taking off regular lower body clothing?  2  -MM      Bathing (including washing, rinsing, and drying)  2  -MM      Toileting (which includes using toilet bed pan or urinal)  2  -MM      Putting on and taking off regular upper body clothing  2  -MM      Taking care of personal grooming (such as brushing teeth)  3  -MM      Eating meals  3  -MM      Score  14  -MM         Functional Assessment    Outcome Measure Options  AM-PAC 6 Clicks Daily Activity (OT)  -MM        User Key  (r) = Recorded By, (t) = Taken By, (c) = Cosigned By    Initials Name Provider Type    MM Delmar Garcia, OTR/L Occupational Therapist          Therapy Suggested Charges     Code   Minutes Charges    88067 (CPT®) Hc Ot Neuromusc Re Education Ea 15 Min      72838 (CPT®) Hc Ot Ther Proc Ea 15 Min      29839 (CPT®) Hc Ot Therapeutic Act Ea 15 Min 20 1    97190 (CPT®) Hc Ot Manual Therapy Ea 15 Min      39720 (CPT®) Hc Ot Iontophoresis Ea 15 Min      95898 (CPT®) Hc Ot Elec Stim Ea-Per 15 Min      61646 (CPT®) Hc Ot Ultrasound Ea 15 Min      42435 (CPT®) Hc Ot Self Care/Mgmt/Train Ea 15 Min 23 2    Total  43 3              OT Discharge Summary  Reason for Discharge: Discharge from facility  Outcomes Achieved: Refer to plan of care for updates on goals achieved  Discharge Destination: Home with assist      IRMA Valencia/LES  5/13/2019

## 2019-05-13 NOTE — TELEPHONE ENCOUNTER
She will have to be seen or the  should reach out to hospitalist who discharged her if he would like home health referral prior to appt.

## 2019-05-13 NOTE — PAYOR COMM NOTE
"Dc home 5-12-19  [id number]    Melvin Benitez (74 y.o. Female)     Date of Birth Social Security Number Address Home Phone MRN    1945  [facility] [address]  [facility] [address] [phone] [id number]    Oriental orthodox Marital Status          Jain        Admission Date Admission Type Admitting Provider Attending Provider Department, Room/Bed    5/6/19 Emergency Fantasma Combs DO  [facility] 4C, 464/1    Discharge Date Discharge Disposition Discharge Destination        5/12/2019 Home or Self Care              Attending Provider:  (none)   Allergies:  Tetracyclines & Related, Penicillins    Isolation:  None   Infection:  None   Code Status:  Prior    Ht:  160 cm (63\")   Wt:  43 kg (94 lb 11.2 oz)    Admission Cmt:  None   Principal Problem:  Acute on chronic respiratory failure with hypercapnia (CMS/HCC) [J96.22]                 Active Insurance as of 5/6/2019     Primary Coverage     Payor Plan Insurance Group Employer/Plan Group    HUMANA MEDICARE REPLACEMENT HUMANA MEDICARE REPL [id number]     Payor Plan Address Payor Plan Phone Number Payor Plan Fax Number Effective Dates    [address] [address] [phone]  1/1/2018 - None Entered    [facility] [id number]       Subscriber Name Subscriber Birth Date Member ID       MELVIN BENITEZ 1945 [id number]                 Emergency Contacts      (Rel.) Home Phone Work Phone Mobile Phone    TON EBNITEZ (Spouse) [phone] -- --               Discharge Summary      Fantasma Combs DO at 5/12/2019 10:59 AM                [facility]  DISCHARGE SUMMARY       Date of Admission: 5/6/2019  Date of Discharge:  5/12/2019  Primary Care Physician: Provider, No Known    Presenting Problem/History of Present Illness:  Shortness of breath    Final Discharge Diagnoses:  • **Acute on chronic respiratory failure with hypercapnia (CMS/HCC)   • Chronic respiratory failure with hypoxia " (CMS/HCC)   • History of colon cancer   • Thrombocytopenia (CMS/HCC)   • Anemia   • Hyperglycemia, drug-induced   • Community acquired pneumonia of right lower lobe of lung (CMS/HCC)   • Pulmonary emphysema (CMS/HCC)   • COPD with acute exacerbation (CMS/HCC)   • Severe malnutrition (CMS/HCC)     Consults: none    Procedures Performed: none    Pertinent Test Results:   Imaging Results (last 7 days)     Procedure Component Value Units Date/Time    XR Chest 1 View [[id number]] Collected:  05/09/19 1556     Updated:  05/09/19 1600    Narrative:       Frontal upright radiograph of the chest 5/9/2019 3:52 PM CDT     COMPARISON: 05/06/2019.     FINDINGS:   Hyperinflation chronic changes noted in the pulmonary parenchyma  consistent with COPD. Linear infiltrate is present in the right lung.  This is a slight change from May 6. This may be atelectasis or possibly  an early inflammatory process.. Cardiac silhouettes upper limits of  normal..      The osseous structures and surrounding soft tissues demonstrate no acute  abnormality.       Impression:       1. New linear infiltrate in the right lung. This may be atelectasis or  possibly an early inflammatory process. This is superimposed on COPD..        This report was finalized on 05/09/2019 15:57 by Dr. Zeb Gardner MD.    XR Chest 2 View [[id number]] Collected:  05/06/19 2134     Updated:  05/06/19 2138    Narrative:       EXAMINATION: XR CHEST 2 VW-     5/6/2019 9:05 PM CDT     HISTORY: cough and congestion.     2 view chest x-ray with no comparison.     Normal heart size.  Aortic arch calcification.     Markedly hyperexpanded lungs with chronic interstitial disease. Mild  infiltrate or atelectasis at the right lower lobe.     The upper lobes are clear.  There is no pneumothorax or heart failure.     Summary:  1. Chronic lung changes with mild infiltrate or atelectasis at the right  lower lobe  This report was finalized on 05/06/2019 21:35 by Dr. Ady Mcnamara MD.         Lab Results (last 7 days)     Procedure Component Value Units Date/Time    Respiratory Culture - Sputum, Cough [[id number]] Collected:  05/10/19 0851    Specimen:  Sputum from Cough Updated:  05/12/19 0745     Respiratory Culture Light growth (2+) Normal Respiratory Abrahan     Gram Stain Less than 25 WBCs per low power field      No Epithelial cells per low power field      Rare (1+) Mixed gram positive abrahan    Basic Metabolic Panel [[id number]]  (Abnormal) Collected:  05/12/19 0549    Specimen:  Blood Updated:  05/12/19 0621     Glucose 79 mg/dL      BUN 18 mg/dL      Creatinine 0.32 mg/dL      Sodium 136 mmol/L      Potassium 4.5 mmol/L      Chloride 85 mmol/L      CO2 >40.0 mmol/L      Calcium 8.8 mg/dL      eGFR Non African Amer >150 mL/min/1.73      BUN/Creatinine Ratio 56.3     Anion Gap -- mmol/L      Comment: Unable to calculate Anion Gap.       Narrative:       GFR Normal >60  Chronic Kidney Disease <60  Kidney Failure <15    CBC (No Diff) [[id number]]  (Abnormal) Collected:  05/12/19 0549    Specimen:  Blood Updated:  05/12/19 0610     WBC 8.22 10*3/mm3      RBC 3.82 10*6/mm3      Hemoglobin 10.6 g/dL      Hematocrit 36.6 %      MCV 95.8 fL      MCH 27.7 pg      MCHC 29.0 g/dL      RDW 13.3 %      RDW-SD 47.1 fl      MPV 12.2 fL      Platelets 146 10*3/mm3     Blood Culture - Blood, Wrist, Left [[id number]] Collected:  05/06/19 2115    Specimen:  Blood from Wrist, Left Updated:  05/11/19 2145     Blood Culture No growth at 5 days    Basic Metabolic Panel [[id number]]  (Abnormal) Collected:  05/11/19 0556    Specimen:  Blood Updated:  05/11/19 0642     Glucose 103 mg/dL      BUN 21 mg/dL      Creatinine 0.33 mg/dL      Sodium 139 mmol/L      Potassium 4.6 mmol/L      Chloride 89 mmol/L      CO2 >40.0 mmol/L      Calcium 9.1 mg/dL      eGFR Non African Amer >150 mL/min/1.73      BUN/Creatinine Ratio 63.6     Anion Gap -- mmol/L      Comment: Unable to calculate Anion Gap.       Narrative:       GFR Normal  >60  Chronic Kidney Disease <60  Kidney Failure <15    CBC & Differential [[id number]] Collected:  05/11/19 0556    Specimen:  Blood Updated:  05/11/19 0631    Narrative:       The following orders were created for panel order CBC & Differential.  Procedure                               Abnormality         Status                     ---------                               -----------         ------                     CBC Auto Differential[[id number]]        Abnormal            Final result                 Please view results for these tests on the individual orders.    CBC Auto Differential [[id number]]  (Abnormal) Collected:  05/11/19 0556    Specimen:  Blood Updated:  05/11/19 0631     WBC 5.79 10*3/mm3      RBC 3.61 10*6/mm3      Hemoglobin 10.1 g/dL      Hematocrit 35.2 %      MCV 97.5 fL      MCH 28.0 pg      MCHC 28.7 g/dL      RDW 13.3 %      RDW-SD 47.4 fl      MPV 12.7 fL      Platelets 144 10*3/mm3      Neutrophil % 90.4 %      Lymphocyte % 5.2 %      Monocyte % 3.5 %      Eosinophil % 0.0 %      Basophil % 0.0 %      Immature Grans % 0.9 %      Neutrophils, Absolute 5.24 10*3/mm3      Lymphocytes, Absolute 0.30 10*3/mm3      Monocytes, Absolute 0.20 10*3/mm3      Eosinophils, Absolute 0.00 10*3/mm3      Basophils, Absolute 0.00 10*3/mm3      Immature Grans, Absolute 0.05 10*3/mm3      nRBC 0.0 /100 WBC     CBC & Differential [[id number]] Collected:  05/09/19 0523    Specimen:  Blood Updated:  05/09/19 0649    Narrative:       The following orders were created for panel order CBC & Differential.  Procedure                               Abnormality         Status                     ---------                               -----------         ------                     CBC Auto Differential[[id number]]        Abnormal            Final result                 Please view results for these tests on the individual orders.    CBC Auto Differential [[id number]]  (Abnormal) Collected:  05/09/19 0523    Specimen:  Blood  Updated:  05/09/19 0649     WBC 8.49 10*3/mm3      RBC 3.45 10*6/mm3      Hemoglobin 9.6 g/dL      Hematocrit 32.5 %      MCV 94.2 fL      MCH 27.8 pg      MCHC 29.5 g/dL      RDW 13.8 %      RDW-SD 47.8 fl      MPV 13.1 fL      Platelets 121 10*3/mm3      Neutrophil % 93.3 %      Lymphocyte % 3.4 %      Monocyte % 2.0 %      Eosinophil % 0.0 %      Basophil % 0.0 %      Neutrophils, Absolute 7.92 10*3/mm3      Lymphocytes, Absolute 0.29 10*3/mm3      Monocytes, Absolute 0.17 10*3/mm3      Eosinophils, Absolute 0.00 10*3/mm3      Basophils, Absolute 0.00 10*3/mm3     Basic Metabolic Panel [[id number]]  (Abnormal) Collected:  05/09/19 0523    Specimen:  Blood Updated:  05/09/19 0639     Glucose 109 mg/dL      BUN 21 mg/dL      Creatinine 0.32 mg/dL      Sodium 140 mmol/L      Potassium 4.1 mmol/L      Chloride 100 mmol/L      CO2 >40.0 mmol/L      Calcium 9.4 mg/dL      eGFR Non African Amer >150 mL/min/1.73      BUN/Creatinine Ratio 65.6     Anion Gap -- mmol/L      Comment: Unable to calculate Anion Gap.       Narrative:       GFR Normal >60  Chronic Kidney Disease <60  Kidney Failure <15    S. Pneumo Ag Urine or CSF - Urine, Urine, Clean Catch [[id number]]  (Normal) Collected:  05/09/19 0217    Specimen:  Urine, Clean Catch Updated:  05/09/19 0423     Strep Pneumo Ag Negative    Legionella Antigen, Urine - Urine, Urine, Clean Catch [[id number]]  (Normal) Collected:  05/09/19 0217    Specimen:  Urine, Clean Catch Updated:  05/09/19 0423     LEGIONELLA ANTIGEN, URINE Negative    Basic Metabolic Panel [[id number]]  (Abnormal) Collected:  05/08/19 0443    Specimen:  Blood Updated:  05/08/19 0544     Glucose 121 mg/dL      BUN 13 mg/dL      Creatinine 0.26 mg/dL      Sodium 139 mmol/L      Potassium 4.1 mmol/L      Chloride 97 mmol/L      CO2 40.0 mmol/L      Calcium 9.1 mg/dL      eGFR Non African Amer >150 mL/min/1.73      BUN/Creatinine Ratio 50.0     Anion Gap 2.0 mmol/L     Narrative:       GFR Normal >60  Chronic  Kidney Disease <60  Kidney Failure <15    CBC & Differential [[id number]] Collected:  05/08/19 0443    Specimen:  Blood Updated:  05/08/19 0523    Narrative:       The following orders were created for panel order CBC & Differential.  Procedure                               Abnormality         Status                     ---------                               -----------         ------                     CBC Auto Differential[[id number]]        Abnormal            Final result                 Please view results for these tests on the individual orders.    CBC Auto Differential [[id number]]  (Abnormal) Collected:  05/08/19 0443    Specimen:  Blood Updated:  05/08/19 0523     WBC 8.61 10*3/mm3      RBC 3.26 10*6/mm3      Hemoglobin 8.9 g/dL      Hematocrit 30.0 %      MCV 92.0 fL      MCH 27.3 pg      MCHC 29.7 g/dL      RDW 13.4 %      RDW-SD 45.1 fl      MPV 12.9 fL      Platelets 111 10*3/mm3      Neutrophil % 94.6 %      Lymphocyte % 2.6 %      Monocyte % 1.6 %      Eosinophil % 0.0 %      Basophil % 0.0 %      Immature Grans % 1.2 %      Neutrophils, Absolute 8.15 10*3/mm3      Lymphocytes, Absolute 0.22 10*3/mm3      Monocytes, Absolute 0.14 10*3/mm3      Eosinophils, Absolute 0.00 10*3/mm3      Basophils, Absolute 0.00 10*3/mm3      Immature Grans, Absolute 0.10 10*3/mm3      nRBC 0.0 /100 WBC     Hemoglobin A1c [[id number]] Collected:  05/07/19 0523    Specimen:  Blood Updated:  05/07/19 0733     Hemoglobin A1C 5.0 %     Narrative:       Less than 6.0           Non-Diabetic Range  6.0-7.0                 ADA Therapeutic Target  Greater than 7.0        Action Suggested    Manual Differential [[id number]]  (Abnormal) Collected:  05/07/19 0523    Specimen:  Blood Updated:  05/07/19 0658     Neutrophil % 100.0 %      Lymphocyte % 0.0 %      Neutrophils Absolute 11.12 10*3/mm3      Lymphocytes Absolute 0.00 10*3/mm3      Hypochromia Slight/1+     Poikilocytes Slight/1+     WBC Morphology Normal     Platelet Morphology  Normal    CBC Auto Differential [[id number]]  (Abnormal) Collected:  05/07/19 0523    Specimen:  Blood Updated:  05/07/19 0658     WBC 11.12 10*3/mm3      RBC 3.70 10*6/mm3      Hemoglobin 10.1 g/dL      Hematocrit 34.0 %      MCV 91.9 fL      MCH 27.3 pg      MCHC 29.7 g/dL      RDW 13.1 %      RDW-SD 43.7 fl      MPV 13.1 fL      Platelets 112 10*3/mm3     TSH [[id number]]  (Abnormal) Collected:  05/07/19 0523    Specimen:  Blood Updated:  05/07/19 0628     TSH 0.053 mIU/mL     Lipid Panel [[id number]] Collected:  05/07/19 0523    Specimen:  Blood Updated:  05/07/19 0610     Total Cholesterol 145 mg/dL      Triglycerides 33 mg/dL      HDL Cholesterol 82 mg/dL      LDL Cholesterol  51 mg/dL      LDL/HDL Ratio 0.69    Comprehensive Metabolic Panel [[id number]]  (Abnormal) Collected:  05/07/19 0523    Specimen:  Blood Updated:  05/07/19 0600     Glucose 138 mg/dL      BUN 15 mg/dL      Creatinine 0.21 mg/dL      Sodium 138 mmol/L      Potassium 4.1 mmol/L      Chloride 93 mmol/L      CO2 >40.0 mmol/L      Calcium 9.4 mg/dL      Total Protein 6.1 g/dL      Albumin 3.50 g/dL      ALT (SGPT) 19 U/L      AST (SGOT) 17 U/L      Alkaline Phosphatase 61 U/L      Total Bilirubin 0.6 mg/dL      eGFR Non African Amer >150 mL/min/1.73      Globulin 2.6 gm/dL      A/G Ratio 1.3 g/dL      BUN/Creatinine Ratio 71.4     Anion Gap -- mmol/L      Comment: Unable to calculate Anion Gap.       Narrative:       GFR Normal >60  Chronic Kidney Disease <60  Kidney Failure <15    Procalcitonin [[id number]]  (Normal) Collected:  05/06/19 2051    Specimen:  Blood Updated:  05/06/19 2130     Procalcitonin <0.25 ng/mL     Narrative:       SIRS, sepsis, severe sepsis, and septic shock are categorized according to the criteria of the consensus conference of the American College of Chest Physicians/Society of Critical Care Medicine.    PCT < 0.5 ng/mL     Systemic infection (sepsis) is not likely.    PCT >0.5 and < 2.0 ng/mL Systemic infection (sepsis)  is possible, but other conditions are known to elevate PCT as well.    PCT > 2.0 ng/mL     Systemic infection (sepsis) is likely, unless other causes are known.      PCT > 10.0 ng/mL    Important systemic inflammatory response, almost exclusively due to severe bacterial sepsis or septic shock.    PCT values of < 0.5 ng/mL do not exclude an infection, because localized infections (without systemic signs) may be associated with such low concentrations, or a systemic infection in its initial stages (<6 hours).  Increased PCT can occur without infection.  PCT concentrations between 0.5 and 2.0 ng/mL should be interpreted taking into account the patients history.  It is recommended to retest PCT within 6-24 hours if any concentrations < 2.0 ng/mL are obtained.    BNP [[id number]]  (Normal) Collected:  05/06/19 2051    Specimen:  Blood Updated:  05/06/19 2120     proBNP 130.0 pg/mL     Troponin [[id number]]  (Normal) Collected:  05/06/19 2051    Specimen:  Blood Updated:  05/06/19 2120     Troponin I <0.012 ng/mL     Comprehensive Metabolic Panel [[id number]]  (Abnormal) Collected:  05/06/19 2051    Specimen:  Blood Updated:  05/06/19 2110     Glucose 108 mg/dL      BUN 16 mg/dL      Creatinine 0.21 mg/dL      Sodium 137 mmol/L      Potassium 4.1 mmol/L      Chloride 89 mmol/L      CO2 >40.0 mmol/L      Calcium 9.7 mg/dL      Total Protein 6.5 g/dL      Albumin 3.90 g/dL      ALT (SGPT) 18 U/L      AST (SGOT) 22 U/L      Alkaline Phosphatase 68 U/L      Total Bilirubin 0.9 mg/dL      eGFR Non African Amer >150 mL/min/1.73      Globulin 2.6 gm/dL      A/G Ratio 1.5 g/dL      BUN/Creatinine Ratio 76.2     Anion Gap -- mmol/L      Comment: Unable to calculate Anion Gap.       Narrative:       GFR Normal >60  Chronic Kidney Disease <60  Kidney Failure <15    Urinalysis With Culture If Indicated - Urine, Clean Catch [[id number]]  (Abnormal) Collected:  05/06/19 2048    Specimen:  Urine, Clean Catch Updated:  05/06/19 2109      Color, UA Yellow     Appearance, UA Clear     pH, UA 6.5     Specific Gravity, UA 1.010     Glucose, UA Negative     Ketones, UA Negative     Bilirubin, UA Negative     Blood, UA Negative     Protein, UA Negative     Leuk Esterase, UA Small (1+)     Nitrite, UA Negative     Urobilinogen, UA 0.2 E.U./dL    Urinalysis, Microscopic Only - Urine, Clean Catch [[id number]]  (Abnormal) Collected:  05/06/19 2048    Specimen:  Urine, Clean Catch Updated:  05/06/19 2109     RBC, UA 3-5 /HPF      WBC, UA 3-5 /HPF      Bacteria, UA None Seen /HPF      Squamous Epithelial Cells, UA 0-2 /HPF      Hyaline Casts, UA 0-2 /LPF      Methodology Automated Microscopy    Protime-INR [[id number]]  (Abnormal) Collected:  05/06/19 2051    Specimen:  Blood Updated:  05/06/19 2109     Protime 11.2 Seconds      INR 0.79    Blood Gas, Arterial [[id number]]  (Abnormal) Collected:  05/06/19 2100    Specimen:  Arterial Blood Updated:  05/06/19 2102     Site Right Radial     Tristen's Test Positive     pH, Arterial 7.369 pH units      pCO2, Arterial 80.1 mm Hg      Comment: 86 Value above critical limit        pO2, Arterial 119.0 mm Hg      Comment: 83 Value above reference range        HCO3, Arterial 46.1 mmol/L      Comment: 83 Value above reference range        Base Excess, Arterial 17.3 mmol/L      Comment: 83 Value above reference range        O2 Saturation, Arterial 99.0 %      Temperature 37.0 C      Barometric Pressure for Blood Gas 752 mmHg      Modality Nasal Cannula     Flow Rate 4.0 lpm      Ventilator Mode NA     Notified [facility] BREE LOPEZ MD     Notified By [id number]     Notified Time 05/06/2019 21:07     Collected by [id number]     Comment: Meter: [id number]     :  [id number]       Influenza Antigen, Rapid - Swab, Nasopharynx [[id number]]  (Normal) Collected:  05/06/19 2035    Specimen:  Swab from Nasopharynx Updated:  05/06/19 2100     Influenza A Ag, EIA Negative     Influenza B Ag, EIA Negative    Narrative:       Recommend  confirmation of negative results by viral culture or molecular assay.    Lactic Acid, Plasma [[id number]]  (Normal) Collected:  05/06/19 1925    Specimen:  Blood Updated:  05/06/19 2050     Lactate 1.2 mmol/L     CBC & Differential [[id number]] Collected:  05/06/19 1925    Specimen:  Blood Updated:  05/06/19 2043    Narrative:       The following orders were created for panel order CBC & Differential.  Procedure                               Abnormality         Status                     ---------                               -----------         ------                     CBC Auto Differential[[id number]]        Abnormal            Final result                 Please view results for these tests on the individual orders.    CBC Auto Differential [[id number]]  (Abnormal) Collected:  05/06/19 1925    Specimen:  Blood Updated:  05/06/19 2043     WBC 16.52 10*3/mm3      RBC 4.10 10*6/mm3      Hemoglobin 11.5 g/dL      Hematocrit 39.0 %      MCV 95.1 fL      MCH 28.0 pg      MCHC 29.5 g/dL      RDW 13.1 %      RDW-SD 45.7 fl      MPV 12.8 fL      Platelets 142 10*3/mm3      Neutrophil % 85.9 %      Lymphocyte % 5.7 %      Monocyte % 7.6 %      Eosinophil % 0.2 %      Basophil % 0.2 %      Immature Grans % 0.4 %      Neutrophils, Absolute 14.20 10*3/mm3      Lymphocytes, Absolute 0.94 10*3/mm3      Monocytes, Absolute 1.25 10*3/mm3      Eosinophils, Absolute 0.03 10*3/mm3      Basophils, Absolute 0.03 10*3/mm3      Immature Grans, Absolute 0.07 10*3/mm3      nRBC 0.0 /100 WBC     [place] Draw [[id number]] Collected:  05/06/19 1925    Specimen:  Blood Updated:  05/06/19 2030    Narrative:       The following orders were created for panel order [place] Draw.  Procedure                               Abnormality         Status                     ---------                               -----------         ------                     Light Blue Top[[id number]]                                   Final result               Green Top  (Gel)[[id number]]                                  Final result               Lavender Top[[id number]]                                     Final result               Red Top[[id number]]                                          Final result               Start Blood Culture Arie...[[id number]]                      Final result                 Please view results for these tests on the individual orders.    Lavender Top [[id number]] Collected:  05/06/19 1925    Specimen:  Blood Updated:  05/06/19 2030     Extra Tube hold for add-on     Comment: Auto resulted       Start Blood Culture Bottle Set [[id number]] Collected:  05/06/19 1925    Specimen:  Blood from Arm, Right Updated:  05/06/19 2030     Extra Tube Hold for add-ons.     Comment: Auto resulted.       Light Blue Top [[id number]] Collected:  05/06/19 1925    Specimen:  Blood Updated:  05/06/19 2030     Extra Tube hold for add-on     Comment: Auto resulted       Green Top (Gel) [[id number]] Collected:  05/06/19 1925    Specimen:  Blood Updated:  05/06/19 2030     Extra Tube Hold for add-ons.     Comment: Auto resulted.       Red Top [[id number]] Collected:  05/06/19 1925    Specimen:  Blood Updated:  05/06/19 2030     Extra Tube Hold for add-ons.     Comment: Auto resulted.           Hospital Course:  The patient is a 74 y.o. female who does not follow with a primary care provider at present. She has a past medical history significant for chronic respiratory failure with home oxygen at 4L, COPD, restless leg syndrome, and colon cancer. She presented to the emergency department on 5/6 with complaints of increasing shortness of breath. She had been having worsening problems over the past 2 weeks. In the emergency department, chest x-ray was consistent with an infection in her lower lobe.  She was noted to have hypercarbia with PCO2 of 80.1 and a PCO2 of 119.  She was admitted to hospitalist service for further evaluation management.    She was placed on Rocephin and  "azithromycin and has completed azithromycin therapy and will have 1 more day of Omnicef therapy.  She required elevated doses of corticosteroids and has been finally weaned down to 20 mg twice daily. She has continued on Symbicort. She was noted to have oral candidiasis and has been treated with mycelex. She has been slow to improve given the severity of her COPD. She will need referral to pulmonology once she establishes with her new PCP. She will be discharged home today with steroid taper and 3 doses of omnicef. I have given her Requip prescription for her restless legs. She has been eating well and tolerating supplements. Sputum culture did not grow any pathogens.     Physical Exam on Discharge:  /58 (BP Location: Left arm, Patient Position: Lying)   Pulse 91   Temp 98.1 °F (36.7 °C) (Oral)   Resp 18   Ht 160 cm (63\")   Wt 43 kg (94 lb 11.2 oz)   SpO2 97%   BMI 16.78 kg/m²    Physical Exam   Constitutional: She is oriented to person, place, and time. She appears well-developed.   Thin frail cachetic   HENT:   Head: Normocephalic and atraumatic.   Eyes: Conjunctivae and EOM are normal. Pupils are equal, round, and reactive to light.   Neck: Neck supple. No JVD present. No thyromegaly present.   Cardiovascular: Normal rate, regular rhythm, normal heart sounds and intact distal pulses. Exam reveals no gallop and no friction rub.   No murmur heard.  Sinus to sinus tachycardia    Pulmonary/Chest: Effort normal. No respiratory distress. She has no wheezes. She has no rales. She exhibits no tenderness.   Minimal air movement. No wheezes noted.    Abdominal: Soft. Bowel sounds are normal. She exhibits no distension. There is no tenderness. There is no rebound and no guarding.   Musculoskeletal: Normal range of motion. She exhibits no edema, tenderness or deformity.   Lymphadenopathy:     She has no cervical adenopathy.   Neurological: She is alert and oriented to person, place, and time. She displays " normal reflexes. No cranial nerve deficit. She exhibits normal muscle tone.   Skin: Skin is warm and dry. No rash noted.   Psychiatric: She has a normal mood and affect. Her behavior is normal. Judgment and thought content normal.     Condition on Discharge: stable    Discharge Disposition:  Home or Self Care    Discharge Medications:     Discharge Medications      New Medications      Instructions Start Date   cefdinir 300 MG capsule  Commonly known as:  OMNICEF   300 mg, Oral, Every 12 Hours Scheduled      fluticasone 50 MCG/ACT nasal spray  Commonly known as:  FLONASE   2 sprays, Each Nare, Daily   Start Date:  5/13/2019     ipratropium-albuterol 0.5-2.5 mg/3 ml nebulizer  Commonly known as:  DUO-NEB   3 mL, Nebulization, Every 4 Hours PRN      predniSONE 20 MG tablet  Commonly known as:  DELTASONE   1 tablet twice daily for 2 days then 1 tablet daily for 3 days then 1/2 tablet daily for 3 days then stop.      rOPINIRole 0.25 MG tablet  Commonly known as:  REQUIP   0.25 mg, Oral, Nightly, Take 1 hour before bedtime.      sodium chloride 0.65 % nasal spray  Commonly known as:  OCEAN   2 sprays, Nasal, As Needed         Continue These Medications      Instructions Start Date   budesonide-formoterol 160-4.5 MCG/ACT inhaler  Commonly known as:  SYMBICORT   2 puffs, Inhalation, 2 Times Daily - RT         Stop These Medications    TYLENOL ALLERGY COMPLETE PO          Discharge Diet:   Diet Instructions     Regular/Ensure or Boost               Activity at Discharge:   Activity Instructions     Activity as tolerated                 Follow-up Appointments:   1. Dr. Cooney-1 week. Will need home health at that time.     Test Results Pending at Discharge: none    JESSENIA Luz  05/12/19  10:59 AM    Time: 35 minutes.     I personally evaluated and examined the patient in conjunction with JESSENIA Devi and agree with the assessment, treatment plan, and disposition of the patient as recorded by her. My  history, exam, and further recommendations are:     Discussed with her nurse, Ariana.    Seen and discussed with her  and her son.    She feels that she has approached her baseline and would like to go home today.    She has not carried primary care doctor as an outpatient in recent memory.  We are going to set her up with Dr. Cooney.  After he evaluates her, she can get orders for home health.    I have also placed orders for a home nebulizer and DuoNeb solution.    Fantasma Combs DO  05/12/19  12:00 PM        Electronically signed by Fantasma Combs DO at 5/12/2019 12:01 PM

## 2019-05-13 NOTE — THERAPY DISCHARGE NOTE
Acute Care - Physical Therapy Discharge Summary  [facility]       Patient Name: Heide Newsome  : 1945  MRN: [id number]    Today's Date: 2019  Onset of Illness/Injury or Date of Surgery: 19    Date of Referral to PT: 19  Referring Physician: Brian Bhatti APRN      Admit Date: 2019      PT Recommendation and Plan    Visit Dx:    ICD-10-CM ICD-9-CM   1. Pulmonary emphysema, unspecified emphysema type (CMS/[facility]) J43.9 492.8   2. Impaired mobility and endurance Z74.09 V49.89   3. Poor tolerance for activity Z78.9 V49.89   4. COPD with acute exacerbation (CMS/[facility]) J44.1 491.21               Rehab Goal Summary     Row Name 19 1529 19 0700          Physical Therapy Goals    Bed Mobility Goal Selection (PT)  bed mobility, PT goal 1  -NW  --     Transfer Goal Selection (PT)  transfer, PT goal 1  -NW  --     Gait Training Goal Selection (PT)  gait training, PT goal 1  -NW  --     Balance Goal Selection (PT)  balance, PT goal 1  -NW  --     Stairs Goal Selection (PT)  stairs, PT goal 1  -NW  --        Bed Mobility Goal 1 (PT)    Activity/Assistive Device (Bed Mobility Goal 1, PT)  bed mobility activities, all  -NW  --     [place] Level/Cues Needed (Bed Mobility Goal 1, PT)  conditional independence  -NW  --     Time Frame (Bed Mobility Goal 1, PT)  long term goal (LTG);10 days  -NW  --     Barriers (Bed Mobility Goal 1, PT)  Decreased control of breathing with activites  -NW  --     Progress/Outcomes (Bed Mobility Goal 1, PT)  goal met  -NW  --        Transfer Goal 1 (PT)    Activity/Assistive Device (Transfer Goal 1, PT)  transfers, all  -NW  --     [place] Level/Cues Needed (Transfer Goal 1, PT)  conditional independence;verbal cues required VC for control of breathing and safety awareness  -NW  --     Time Frame (Transfer Goal 1, PT)  long term goal (LTG);10 days  -NW  --     Barriers (Transfers Goal 1, PT)  Decreased control of breathing with activites, fear of  changes in sx  -NW  --     Progress/Outcome (Transfer Goal 1, PT)  goal not met  -NW  --        Gait Training Goal 1 (PT)    Activity/Assistive Device (Gait Training Goal 1, PT)  gait (walking locomotion);decrease fall risk;improve balance and speed;increase endurance/gait distance;increase energy conservation;walker, rolling progress to no AD per pt tolerance  -NW  --     [place] Level (Gait Training Goal 1, PT)  contact guard assist;verbal cues required VC for control of breathing and safety awareness  -NW  --     Distance (Gait Goal 1, PT)  ~100 ft Progress distance per pt tolerance  -NW  --     Time Frame (Gait Training Goal 1, PT)  long term goal (LTG);10 days  -NW  --     Barriers (Gait Training Goal 1, PT)  Decreased control of breathing with activites, fear of changes in sx  -NW  --     Progress/Outcome (Gait Training Goal 1, PT)  goal not met  -NW  --        Balance Goal 1 (PT)    Activity/Assistive Device (Balance Goal 1, PT)  standing, static;standing, dynamic  -NW  --     [place] Level/Cues Needed (Balance Goal 1, PT)  supervision required;verbal cues required VC for control of breathing and safety awareness  -NW  --     Time Frame (Balance Goal 1, PT)  long term goal (LTG);10 days  -NW  --     Barriers (Balance Goal 1, PT)  Decreased control of breathing with activites, fear of changes in sx  -NW  --     Progress/Outcomes (Balance Goal 1, PT)  goal not met  -NW  --        Stairs Goal 1 (PT)    Activity/Assistive Device (Stairs Goal 1, PT)  stairs, all skills  -NW  --     [place] Level/Cues Needed (Stairs Goal 1, PT)  supervision required;verbal cues required VC to control breathing and safety awareness  -NW  --     Number of Stairs (Stairs Goal 1, PT)  3  -NW  --     Time Frame (Stairs Goal 1, PT)  long term goal (LTG);10 days  -NW  --     Barriers (Stairs Goal 1, PT)  Decreased control of breathing with activites, fear of changes in sx  -NW  --     Progress/Outcome (Stairs Goal 1, PT)   goal not met  -NW  --        Bathing Goal 1 (OT)    Activity/Assistive Device (Bathing Goal 1, OT)  --  bathing skills, all  -TS     [place] Level/Cues Needed (Bathing Goal 1, OT)  --  supervision required;set-up required  -TS     Time Frame (Bathing Goal 1, OT)  --  long term goal (LTG);by discharge  -TS     Progress/Outcomes (Bathing Goal 1, OT)  --  goal not met  -TS        Dressing Goal 1 (OT)    Activity/Assistive Device (Dressing Goal 1, OT)  --  dressing skills, all  -TS     [place]/Cues Needed (Dressing Goal 1, OT)  --  supervision required;set-up required  -TS     Time Frame (Dressing Goal 1, OT)  --  long term goal (LTG);by discharge  -TS     Progress/Outcome (Dressing Goal 1, OT)  --  goal not met  -TS        Toileting Goal 1 (OT)    Activity/Device (Toileting Goal 1, OT)  --  toileting skills, all  -TS     [place] Level/Cues Needed (Toileting Goal 1, OT)  --  independent  -TS     Time Frame (Toileting Goal 1, OT)  --  long term goal (LTG);by discharge  -TS     Progress/Outcome (Toileting Goal 1, OT)  --  goal not met  -TS         Activity Tolerance Goal 1 (OT)    Activity Tolerance Goal 1 (OT)  --  pt will be able to participate in 10 min ADL activity with no more than 1 rest break while maintaining sat levels > or equal to 88% on 4L  -TS     Activity Level (Endurance Goal 1, OT)  --  10 min activity  -TS     Time Frame (Activity Tolerance Goal 1, OT)  --  by discharge  -TS     Progress/Outcome (Activity Tolerance Goal 1, OT)  --  goal not met  -TS       User Key  (r) = Recorded By, (t) = Taken By, (c) = Cosigned By    Initials Name Provider Type Discipline    TS Farzana Hahn, SOLIS/L Occupational Therapy Assistant OT    NW Sarah Landers, PTA Physical Therapy Assistant PT              PT Discharge Summary  Anticipated Discharge Disposition (PT): home  Reason for Discharge: Discharge from facility  Outcomes Achieved: Refer to plan of care for updates on goals achieved  Discharge  Destination: Home      Sarah REYESMoraima Landers, PTA   5/13/2019

## 2019-05-14 ENCOUNTER — READMISSION MANAGEMENT (OUTPATIENT)
Dept: CALL CENTER | Facility: HOSPITAL | Age: 74
End: 2019-05-14

## 2019-05-14 NOTE — OUTREACH NOTE
COPD/PN Week 1 Survey      Responses   Facility patient discharged from?  [place]   Does the patient have one of the following disease processes/diagnoses(primary or secondary)?  COPD/Pneumonia   Is there a successful TCM telephone encounter documented?  No   Was the primary reason for admission:  COPD exacerbation   Week 1 attempt successful?  No   Unsuccessful attempts  Attempt 1          Ese Garsia RN

## 2019-05-15 ENCOUNTER — READMISSION MANAGEMENT (OUTPATIENT)
Dept: CALL CENTER | Facility: HOSPITAL | Age: 74
End: 2019-05-15

## 2019-05-15 NOTE — OUTREACH NOTE
COPD/PN Week 2 Survey      Responses   Facility patient discharged from?  [place]   Does the patient have one of the following disease processes/diagnoses(primary or secondary)?  COPD/Pneumonia   Was the primary reason for admission:  COPD exacerbation          Sidney Gallo RN

## 2019-05-15 NOTE — OUTREACH NOTE
COPD/PN Week 1 Survey      Responses   Facility patient discharged from?  [place]   Does the patient have one of the following disease processes/diagnoses(primary or secondary)?  COPD/Pneumonia   Is there a successful TCM telephone encounter documented?  No   Was the primary reason for admission:  COPD exacerbation   Week 1 attempt successful?  No   Unsuccessful attempts  Attempt 2          Sidney Gallo RN

## 2019-05-20 ENCOUNTER — TELEPHONE (OUTPATIENT)
Dept: INTERNAL MEDICINE | Facility: CLINIC | Age: 74
End: 2019-05-20

## 2019-05-20 ENCOUNTER — READMISSION MANAGEMENT (OUTPATIENT)
Dept: CALL CENTER | Facility: HOSPITAL | Age: 74
End: 2019-05-20

## 2019-05-20 NOTE — OUTREACH NOTE
COPD/PN Week 2 Survey      Responses   Facility patient discharged from?  [place]   Does the patient have one of the following disease processes/diagnoses(primary or secondary)?  COPD/Pneumonia   Was the primary reason for admission:  COPD exacerbation   Week 2 attempt successful?  No   Unsuccessful attempts  Attempt 1          Georgina Arriaga, RN

## 2019-05-20 NOTE — TELEPHONE ENCOUNTER
Patient is coming in tomorrow as a new patient.  called to request a refill for Symbicort 160-4.5 MCG/ACT inhaler be sent to [facility], [place], [place]. He said that she is completely out and is needing it before tomorrow if at all possible.

## 2019-05-20 NOTE — TELEPHONE ENCOUNTER
"Patient has not been seen in our office yet, I spoke with her  and explained that we cannot fill anything until she is seen. Patient's  stated \"yes I know its the law, but if she does not have it, I don't know what shape she will be in when we get there\".  I advised the patient that she may need to use her nebulizer, or to go the ED if needed.   MM  "

## 2019-05-21 ENCOUNTER — READMISSION MANAGEMENT (OUTPATIENT)
Dept: CALL CENTER | Facility: HOSPITAL | Age: 74
End: 2019-05-21

## 2019-05-21 NOTE — OUTREACH NOTE
COPD/PN Week 3 Survey      Responses   Facility patient discharged from?  [place]   Does the patient have one of the following disease processes/diagnoses(primary or secondary)?  COPD/Pneumonia          Carrol Stuart, RN

## 2019-05-22 NOTE — OUTREACH NOTE
COPD/PN Week 2 Survey      Responses   Facility patient discharged from?  [place]   Does the patient have one of the following disease processes/diagnoses(primary or secondary)?  COPD/Pneumonia   Week 2 attempt successful?  No   Unsuccessful attempts  Attempt 2          Carrol Stuart RN

## 2019-05-29 ENCOUNTER — READMISSION MANAGEMENT (OUTPATIENT)
Dept: CALL CENTER | Facility: HOSPITAL | Age: 74
End: 2019-05-29

## 2019-05-29 NOTE — OUTREACH NOTE
COPD/PN Week 3 Survey      Responses   Facility patient discharged from?  [place]   Does the patient have one of the following disease processes/diagnoses(primary or secondary)?  COPD/Pneumonia   Was the primary reason for admission:  COPD exacerbation   Week 3 attempt successful?  Yes   Call start time  1608   Call end time  1610   Discharge diagnosis  A/C resp. failure with hypercapnia,, chronic resp failure with hypoxia, hx. of colon cancer, thrombocytopenia, anemia, Hyperglycemia, CAP of RLL, pulmonary emphysema, COPD AE, severe malnutrition   Is patient permission given to speak with other caregiver?  Yes   List who call center can speak with  Spouse- Myke   Person spoke with today (if not patient) and relationship  Spouse- Myke   Meds reviewed with patient/caregiver?  Yes   Is the patient taking all medications as directed (includes completed medication regime)?  Yes   Has the patient kept scheduled appointments due by today?  No   What is preventing the patient from keeping their appointments?  Distance   Nursing Interventions  Advised patient to keep appointment, Educated on importance of keeping appointment   Comments  Spouse said he had to cancel first appt but he is calling today to reschedule with her PCP   What is the patient's perception of their health status since discharge?  Improving   Is the patient/caregiver able to teach back the hierarchy of who to call/visit for symptoms/problems? PCP, Specialist, Home health nurse, Urgent Care, ED, 911  Yes   Is the patient able to teach back COPD zones?  Yes   Nursing interventions  Education provided on various zones   Patient reports what zone on this call?  Green Zone   Green Zone  Reports doing well, Breathing without shortness of breath, Sleeping well, Appetite is good, Usual amount of phlegm/mucus without difficulty coughing up, Usual activity and exercise level   Green Zone interventions:  Take daily medications, Continue regular exercise/diet plan,  Avoid indoor/outdoor triggers, Use oxygen as prescribed   Week 3 call completed?  Yes   Wrap up additional comments  spouse says that pt is doing really well          Alla Sanchez RN

## 2019-06-05 ENCOUNTER — OFFICE VISIT (OUTPATIENT)
Dept: INTERNAL MEDICINE | Facility: CLINIC | Age: 74
End: 2019-06-05

## 2019-06-05 VITALS
OXYGEN SATURATION: 98 % | HEART RATE: 116 BPM | TEMPERATURE: 98.3 F | DIASTOLIC BLOOD PRESSURE: 64 MMHG | WEIGHT: 87.06 LBS | RESPIRATION RATE: 16 BRPM | BODY MASS INDEX: 15.43 KG/M2 | SYSTOLIC BLOOD PRESSURE: 132 MMHG | HEIGHT: 63 IN

## 2019-06-05 DIAGNOSIS — J44.9 COPD, VERY SEVERE (HCC): Primary | ICD-10-CM

## 2019-06-05 DIAGNOSIS — G25.81 RLS (RESTLESS LEGS SYNDROME): ICD-10-CM

## 2019-06-05 DIAGNOSIS — D64.9 ANEMIA, UNSPECIFIED TYPE: ICD-10-CM

## 2019-06-05 PROCEDURE — 99204 OFFICE O/P NEW MOD 45 MIN: CPT | Performed by: FAMILY MEDICINE

## 2019-06-05 RX ORDER — IPRATROPIUM BROMIDE AND ALBUTEROL SULFATE 2.5; .5 MG/3ML; MG/3ML
3 SOLUTION RESPIRATORY (INHALATION) EVERY 4 HOURS PRN
Qty: 180 VIAL | Refills: 3 | Status: SHIPPED | OUTPATIENT
Start: 2019-06-05 | End: 2020-06-05

## 2019-06-05 RX ORDER — ROPINIROLE 0.5 MG/1
0.25 TABLET, FILM COATED ORAL NIGHTLY
Qty: 90 TABLET | Refills: 0 | Status: SHIPPED | OUTPATIENT
Start: 2019-06-05 | End: 2019-06-16 | Stop reason: SDUPTHER

## 2019-06-05 RX ORDER — BUDESONIDE AND FORMOTEROL FUMARATE DIHYDRATE 160; 4.5 UG/1; UG/1
2 AEROSOL RESPIRATORY (INHALATION)
Qty: 1 INHALER | Refills: 12 | Status: SHIPPED | OUTPATIENT
Start: 2019-06-05 | End: 2020-07-06

## 2019-06-05 NOTE — PROGRESS NOTES
CC:   Chief Complaint   Patient presents with   • Establish Care     Patient reports requip dosage is not effective through the night   • Restless Legs Syndrome       History:  Heide Newsome is a 74 y.o. female who presents today for evaluation of the above problems.      Here to establish care after hospital stay from 5/6/2019-5/12/2019 for community-acquired pneumonia of right lower lobe and acute on chronic respiratory failure with hypoxia and hypercapnia.  She is on 4 L of home oxygen and has additional findings of COPD, restless leg syndrome, and colon cancer.  She was discharged on Omnicef and steroid taper and was recommended to continue on Symbicort.  Her restless legs were treated with a Requip trial that stopped working after a few days.     Her shortness of breath has improved with scheduled duo nebs and Symbicort, but she still feels weaker than prior to hospital admission.  Denies worsening following cessation of steroids.  Has not had therapy since hospital discharge.  She is not interested in discussing hospice at this time.    She has a depressed mood at times but does not want any further counseling or medical support.    Hemoglobin at time of discharge was 10.6 with MCV of 95.8. Denies residual colon cancer after resection.     ROS:  Review of Systems   Constitutional: Positive for fatigue. Unexpected weight change: increased since discharge.   Respiratory: Positive for cough and shortness of breath.    Cardiovascular: Negative for chest pain.   Gastrointestinal: Negative for blood in stool.   Neurological: Numbness: chronic distal extremities.   Psychiatric/Behavioral: Behavioral problem: depression. Sleep disturbance: rls.       Allergies   Allergen Reactions   • Tetracyclines & Related Anaphylaxis   • Penicillins Itching     Past Medical History:   Diagnosis Date   • Cancer (CMS/HCC), colon    • Chronic respiratory failure (CMS/HCC), 4 L nasal cannula continuously    • COPD (chronic  "obstructive pulmonary disease) (CMS/HCC)    • Restless leg syndrome      Past Surgical History:   Procedure Laterality Date   • APPENDECTOMY     • CARPAL TUNNEL RELEASE Left    • COLON RESECTION     • FOOT SURGERY Bilateral     hammer toe   • FRACTURE SURGERY     • HYSTERECTOMY     • WRIST FRACTURE SURGERY Right      Family History   Problem Relation Age of Onset   • Colon cancer Mother    • Cancer Mother    • COPD Father    • Diabetes Father    • Heart disease Father    • Cancer Sister    • Diabetes Sister    • Cancer Brother    • Cancer Paternal Grandmother    • Cancer Paternal Grandfather       reports that she quit smoking about 2 years ago. She has never used smokeless tobacco. She reports that she does not drink alcohol or use drugs.      Current Outpatient Medications:   •  budesonide-formoterol (SYMBICORT) 160-4.5 MCG/ACT inhaler, Inhale 2 puffs 2 (Two) Times a Day., Disp: , Rfl:   •  ipratropium-albuterol (DUO-NEB) 0.5-2.5 mg/3 ml nebulizer, Take 3 mL by nebulization Every 4 (Four) Hours As Needed for Wheezing., Disp: 180 vial, Rfl: 1  •  fluticasone (FLONASE) 50 MCG/ACT nasal spray, 2 sprays by Each Nare route Daily., Disp: , Rfl:   •  rOPINIRole (REQUIP) 0.5 MG tablet, Take 0.5 tablets by mouth Every Night. Take 1 hour before bedtime., Disp: 90 tablet, Rfl: 0  •  sodium chloride (OCEAN) 0.65 % nasal spray, 2 sprays into the nostril(s) as directed by provider As Needed for Congestion., Disp: , Rfl: 12    OBJECTIVE:  /64 (BP Location: Left arm, Patient Position: Sitting, Cuff Size: Pediatric)   Pulse 116   Temp 98.3 °F (36.8 °C) (Temporal)   Resp 16   Ht 160 cm (63\")   Wt 39.5 kg (87 lb 1 oz)   SpO2 98%   BMI 15.42 kg/m²    Physical Exam   Constitutional: She is oriented to person, place, and time.   Cachectic, chronic ill appearance   HENT:   Head: Normocephalic and atraumatic.   Nose: Nose normal.   Eyes: Conjunctivae are normal. Right eye exhibits no discharge. Left eye exhibits no " discharge. No scleral icterus.   Neck: No tracheal deviation present.   Cardiovascular:   Distant heart sounds without murmur   Pulmonary/Chest: No respiratory distress. She has no wheezes. She has no rales.   Increased work of breathing at rest, speaks in short sentences   Abdominal: Soft. There is no tenderness.   Musculoskeletal: She exhibits no edema.   Neurological: She is alert and oriented to person, place, and time.   Skin: Skin is warm and dry. No pallor.   Psychiatric: She has a normal mood and affect. Her behavior is normal. Judgment and thought content normal.   Nursing note and vitals reviewed.      Assessment/Plan    Problem List Items Addressed This Visit        Respiratory    COPD, very severe (CMS/HCC) - Primary    Relevant Medications    budesonide-formoterol (SYMBICORT) 160-4.5 MCG/ACT inhaler    ipratropium-albuterol (DUO-NEB) 0.5-2.5 mg/3 ml nebulizer    Other Relevant Orders    Ambulatory Referral to Home Health       Hematopoietic and Hemostatic    Anemia    Relevant Orders    Iron and TIBC    Vitamin B12    Folate      Other Visit Diagnoses     RLS (restless legs syndrome)        Relevant Medications    rOPINIRole (REQUIP) 0.5 MG tablet      -Continue scheduled duo nebs and Symbicort, O2  -Referral to home health  -Patient will let us know if she would like to discuss hospice support  -Increase Requip to 0.5 mg nightly  -Labs above for further anemia investigation  -Follow-up 2 to 3 months    An After Visit Summary was printed and given to the patient at discharge.  Return in about 3 months (around 9/5/2019).         Pato Cooney D.O.  Family Medicine  Osteopathic Neuromusculoskeletal Medicine  6/5/2019

## 2019-06-07 ENCOUNTER — READMISSION MANAGEMENT (OUTPATIENT)
Dept: CALL CENTER | Facility: HOSPITAL | Age: 74
End: 2019-06-07

## 2019-06-07 NOTE — OUTREACH NOTE
COPD/PN Week 4 Survey      Responses   Facility patient discharged from?  [place]   Does the patient have one of the following disease processes/diagnoses(primary or secondary)?  COPD/Pneumonia   Was the primary reason for admission:  COPD exacerbation   Week 4 attempt successful?  No          Ese Garsia RN

## 2019-06-10 DIAGNOSIS — B37.0 THRUSH: Primary | ICD-10-CM

## 2019-06-10 DIAGNOSIS — Z66 DNR (DO NOT RESUSCITATE): ICD-10-CM

## 2019-06-13 ENCOUNTER — TELEPHONE (OUTPATIENT)
Dept: INTERNAL MEDICINE | Facility: CLINIC | Age: 74
End: 2019-06-13

## 2019-06-13 DIAGNOSIS — G25.81 RLS (RESTLESS LEGS SYNDROME): ICD-10-CM

## 2019-06-13 NOTE — TELEPHONE ENCOUNTER
Ca monahan with [facility]..WANTS TO KNOW IF WE WILL INCREASE HER REQUIP FROM 0.5 TO AT LEAST A 1 MILLIGRAM AT NIGHT..PLEASE CALL [phone]

## 2019-06-16 RX ORDER — ROPINIROLE 1 MG/1
1 TABLET, FILM COATED ORAL NIGHTLY
Qty: 30 TABLET | Refills: 2 | Status: SHIPPED | OUTPATIENT
Start: 2019-06-16 | End: 2019-06-17 | Stop reason: SDUPTHER

## 2019-06-17 DIAGNOSIS — G25.81 RLS (RESTLESS LEGS SYNDROME): ICD-10-CM

## 2019-06-17 RX ORDER — ROPINIROLE 1 MG/1
1 TABLET, FILM COATED ORAL NIGHTLY
Qty: 30 TABLET | Refills: 2 | Status: SHIPPED | OUTPATIENT
Start: 2019-06-17 | End: 2019-08-07 | Stop reason: SDUPTHER

## 2019-06-17 NOTE — TELEPHONE ENCOUNTER
Zohreh called from [facility] stating that the patient misunderstood the bottle and has been taking her Requip incorrectly.  She will need a new RX [facility] in to  [facility].

## 2019-06-20 ENCOUNTER — TELEPHONE (OUTPATIENT)
Dept: INTERNAL MEDICINE | Facility: CLINIC | Age: 74
End: 2019-06-20

## 2019-08-07 ENCOUNTER — OFFICE VISIT (OUTPATIENT)
Dept: INTERNAL MEDICINE | Facility: CLINIC | Age: 74
End: 2019-08-07

## 2019-08-07 VITALS
HEART RATE: 110 BPM | SYSTOLIC BLOOD PRESSURE: 124 MMHG | HEIGHT: 63 IN | TEMPERATURE: 98.5 F | WEIGHT: 91 LBS | RESPIRATION RATE: 20 BRPM | OXYGEN SATURATION: 92 % | DIASTOLIC BLOOD PRESSURE: 76 MMHG | BODY MASS INDEX: 16.12 KG/M2

## 2019-08-07 DIAGNOSIS — J44.9 COPD, VERY SEVERE (HCC): Primary | ICD-10-CM

## 2019-08-07 DIAGNOSIS — G25.81 RLS (RESTLESS LEGS SYNDROME): ICD-10-CM

## 2019-08-07 PROCEDURE — 99214 OFFICE O/P EST MOD 30 MIN: CPT | Performed by: FAMILY MEDICINE

## 2019-08-07 RX ORDER — ROPINIROLE 1 MG/1
1 TABLET, FILM COATED ORAL 2 TIMES DAILY
Qty: 60 TABLET | Refills: 2 | Status: SHIPPED | OUTPATIENT
Start: 2019-08-07 | End: 2019-11-05 | Stop reason: SDUPTHER

## 2019-08-07 RX ORDER — ALBUTEROL SULFATE 90 UG/1
2 AEROSOL, METERED RESPIRATORY (INHALATION) EVERY 4 HOURS PRN
Qty: 1 INHALER | Refills: 12 | Status: SHIPPED | OUTPATIENT
Start: 2019-08-07 | End: 2021-01-01

## 2019-08-07 NOTE — PROGRESS NOTES
CC:   Chief Complaint   Patient presents with   • Follow-up     Patient reports increased SOB with the heat and humidity   • COPD       History:  Heide Newsome is a 74 y.o. female who presents today for follow-up for evaluation of the above:    Here for follow-up of COPD on chronic oxygen therapy as well as duo nebs, Symbicort which she has been compliant with.  She does not have an albuterol inhaler for as needed use, and she has tried no further medications.  Her shortness of breath seems to be a little better than last visit, but still feels very fatigued at baseline.  Today she feels like she has more energy than usual.    Her restless legs have worsened, requiring ropinirole dosing during the day as needed    ROS:  Review of Systems   Constitutional: Positive for fatigue.   Respiratory: Positive for cough, shortness of breath and wheezing.    Musculoskeletal: Positive for myalgias.   Psychiatric/Behavioral: Positive for sleep disturbance.       Ms. Newsome  reports that she quit smoking about 2 years ago. She has never used smokeless tobacco. She reports that she does not drink alcohol or use drugs.      Current Outpatient Medications:   •  budesonide-formoterol (SYMBICORT) 160-4.5 MCG/ACT inhaler, Inhale 2 puffs 2 (Two) Times a Day., Disp: 1 inhaler, Rfl: 12  •  fluticasone (FLONASE) 50 MCG/ACT nasal spray, 2 sprays by Each Nare route Daily., Disp: , Rfl:   •  ipratropium-albuterol (DUO-NEB) 0.5-2.5 mg/3 ml nebulizer, Take 3 mL by nebulization Every 4 (Four) Hours As Needed for Wheezing., Disp: 180 vial, Rfl: 3  •  nystatin (MYCOSTATIN) 944868 UNIT/ML suspension, Swish and swallow 5 mL 4 (Four) Times a Day., Disp: 473 mL, Rfl: 12  •  rOPINIRole (REQUIP) 1 MG tablet, Take 1 tablet by mouth 2 (Two) Times a Day. Take 1 hour before bedtime., Disp: 60 tablet, Rfl: 2  •  sodium chloride (OCEAN) 0.65 % nasal spray, 2 sprays into the nostril(s) as directed by provider As Needed for Congestion., Disp: , Rfl:  "12  •  albuterol sulfate HFA (VENTOLIN HFA) 108 (90 Base) MCG/ACT inhaler, Inhale 2 puffs Every 4 (Four) Hours As Needed for Wheezing or Shortness of Air., Disp: 1 inhaler, Rfl: 12  •  tiotropium bromide monohydrate (SPIRIVA RESPIMAT) 2.5 MCG/ACT aerosol solution inhaler, Inhale 2 puffs Daily., Disp: 2 inhaler, Rfl: 12      OBJECTIVE:  /76 (BP Location: Left arm, Patient Position: Sitting, Cuff Size: Pediatric)   Pulse 110   Temp 98.5 °F (36.9 °C) (Temporal)   Resp 20   Ht 160 cm (63\")   Wt 41.3 kg (91 lb)   SpO2 92%   BMI 16.12 kg/m²    Physical Exam   Constitutional: She is oriented to person, place, and time. No distress.   Chronic ill appearance, more energy than previous   HENT:   Head: Normocephalic and atraumatic.   Nose: Nose normal.   Eyes: Conjunctivae are normal. Right eye exhibits no discharge. Left eye exhibits no discharge. No scleral icterus.   Neck: No tracheal deviation present.   Cardiovascular: Exam reveals no gallop and no friction rub.   No murmur heard.  Regular tachycardia, distant heart sounds   Pulmonary/Chest: Effort normal.   Mildly increased work of breathing with decreased breath sounds throughout, no wheezing   Musculoskeletal: She exhibits no edema.   Neurological: She is alert and oriented to person, place, and time.   Skin: Skin is warm and dry. She is not diaphoretic. No pallor.   Psychiatric: She has a normal mood and affect. Her behavior is normal. Judgment and thought content normal.   Nursing note and vitals reviewed.      Assessment/Plan     Diagnosis Plan   1. COPD, very severe (CMS/HCC)  tiotropium bromide monohydrate (SPIRIVA RESPIMAT) 2.5 MCG/ACT aerosol solution inhaler    albuterol sulfate HFA (VENTOLIN HFA) 108 (90 Base) MCG/ACT inhaler   2. RLS (restless legs syndrome)  rOPINIRole (REQUIP) 1 MG tablet   -Add Spiriva Respimat to current regimen as well as PRN albuterol  -Increase propranolol to 1 mg twice daily      An After Visit Summary was printed and " given to the patient at discharge.  Return in about 3 months (around 11/7/2019). Sooner if problems arise.         Pato Cooney D.O.  Family Medicine  Osteopathic Neuromusculoskeletal Medicine

## 2019-08-20 ENCOUNTER — TELEPHONE (OUTPATIENT)
Dept: INTERNAL MEDICINE | Facility: CLINIC | Age: 74
End: 2019-08-20

## 2019-08-20 DIAGNOSIS — H10.30 ACUTE CONJUNCTIVITIS, UNSPECIFIED ACUTE CONJUNCTIVITIS TYPE, UNSPECIFIED LATERALITY: Primary | ICD-10-CM

## 2019-09-12 ENCOUNTER — TELEPHONE (OUTPATIENT)
Dept: INTERNAL MEDICINE | Facility: CLINIC | Age: 74
End: 2019-09-12

## 2019-09-12 NOTE — TELEPHONE ENCOUNTER
PLEASE CALL IN MEDICATION FOR [facility].. SHE SAID WE DID BEFORE AND SHE KEPT USING IT AND MISSING HER EYE AND NOW SHE IS OUT.. PLEASE USE [facility] IN [place]

## 2019-09-13 ENCOUNTER — TELEPHONE (OUTPATIENT)
Dept: INTERNAL MEDICINE | Facility: CLINIC | Age: 74
End: 2019-09-13

## 2019-09-13 DIAGNOSIS — H10.30 ACUTE CONJUNCTIVITIS, UNSPECIFIED ACUTE CONJUNCTIVITIS TYPE, UNSPECIFIED LATERALITY: ICD-10-CM

## 2019-09-13 NOTE — TELEPHONE ENCOUNTER
PLEASE CALL IN MEDICATION FOR [facility] TO THE [facility] IN [place]. PT STATES HER EYE IS REALLY BOTHERING HER

## 2019-11-05 DIAGNOSIS — G25.81 RLS (RESTLESS LEGS SYNDROME): ICD-10-CM

## 2019-11-05 RX ORDER — ROPINIROLE 1 MG/1
TABLET, FILM COATED ORAL
Qty: 180 TABLET | Refills: 3 | Status: SHIPPED | OUTPATIENT
Start: 2019-11-05 | End: 2020-07-06

## 2020-06-03 DIAGNOSIS — J44.9 COPD, VERY SEVERE (HCC): ICD-10-CM

## 2020-06-05 RX ORDER — IPRATROPIUM BROMIDE AND ALBUTEROL SULFATE 2.5; .5 MG/3ML; MG/3ML
SOLUTION RESPIRATORY (INHALATION)
Qty: 540 ML | Refills: 11 | Status: ON HOLD | OUTPATIENT
Start: 2020-06-05 | End: 2020-07-10

## 2020-07-02 DIAGNOSIS — J44.9 COPD, VERY SEVERE (HCC): ICD-10-CM

## 2020-07-02 RX ORDER — BUDESONIDE AND FORMOTEROL FUMARATE DIHYDRATE 160; 4.5 UG/1; UG/1
AEROSOL RESPIRATORY (INHALATION)
Qty: 11 G | Refills: 0 | OUTPATIENT
Start: 2020-07-02

## 2020-07-03 DIAGNOSIS — J44.9 COPD, VERY SEVERE (HCC): ICD-10-CM

## 2020-07-03 DIAGNOSIS — G25.81 RLS (RESTLESS LEGS SYNDROME): ICD-10-CM

## 2020-07-06 RX ORDER — ROPINIROLE 1 MG/1
1 TABLET, FILM COATED ORAL 2 TIMES DAILY
Qty: 180 TABLET | Refills: 0 | Status: SHIPPED | OUTPATIENT
Start: 2020-07-06 | End: 2020-07-13 | Stop reason: HOSPADM

## 2020-07-06 RX ORDER — BUDESONIDE AND FORMOTEROL FUMARATE DIHYDRATE 160; 4.5 UG/1; UG/1
2 AEROSOL RESPIRATORY (INHALATION) 2 TIMES DAILY
Qty: 11 G | Refills: 11 | Status: SHIPPED | OUTPATIENT
Start: 2020-07-06 | End: 2020-07-28

## 2020-07-07 ENCOUNTER — HOSPITAL ENCOUNTER (INPATIENT)
Facility: HOSPITAL | Age: 75
LOS: 6 days | Discharge: HOME HEALTH | End: 2020-07-13
Attending: EMERGENCY MEDICINE | Admitting: INTERNAL MEDICINE

## 2020-07-07 ENCOUNTER — APPOINTMENT (OUTPATIENT)
Dept: CT IMAGING | Facility: HOSPITAL | Age: 75
End: 2020-07-07

## 2020-07-07 ENCOUNTER — APPOINTMENT (OUTPATIENT)
Dept: GENERAL RADIOLOGY | Facility: HOSPITAL | Age: 75
End: 2020-07-07

## 2020-07-07 DIAGNOSIS — Z78.9 DECREASED ACTIVITIES OF DAILY LIVING (ADL): ICD-10-CM

## 2020-07-07 DIAGNOSIS — J90 PLEURAL EFFUSION: ICD-10-CM

## 2020-07-07 DIAGNOSIS — R09.02 HYPOXIA: ICD-10-CM

## 2020-07-07 DIAGNOSIS — E27.8 ADRENAL NODULE (HCC): ICD-10-CM

## 2020-07-07 DIAGNOSIS — J44.1 COPD WITH ACUTE EXACERBATION (HCC): Primary | ICD-10-CM

## 2020-07-07 DIAGNOSIS — J44.9 COPD, VERY SEVERE (HCC): ICD-10-CM

## 2020-07-07 DIAGNOSIS — Z74.09 IMPAIRED FUNCTIONAL MOBILITY AND ENDURANCE: ICD-10-CM

## 2020-07-07 DIAGNOSIS — J96.21 ACUTE ON CHRONIC RESPIRATORY FAILURE WITH HYPOXIA (HCC): ICD-10-CM

## 2020-07-07 DIAGNOSIS — J18.9 PNEUMONIA OF RIGHT LOWER LOBE DUE TO INFECTIOUS ORGANISM: ICD-10-CM

## 2020-07-07 LAB
ALBUMIN SERPL-MCNC: 3.5 G/DL (ref 3.5–5.2)
ALBUMIN/GLOB SERPL: 0.9 G/DL
ALP SERPL-CCNC: 79 U/L (ref 39–117)
ALT SERPL W P-5'-P-CCNC: 10 U/L (ref 1–33)
ANION GAP SERPL CALCULATED.3IONS-SCNC: 10 MMOL/L (ref 5–15)
ARTERIAL PATENCY WRIST A: ABNORMAL
AST SERPL-CCNC: 16 U/L (ref 1–32)
ATMOSPHERIC PRESS: 749 MMHG
B PARAPERT DNA SPEC QL NAA+PROBE: NOT DETECTED
B PERT DNA SPEC QL NAA+PROBE: NOT DETECTED
BASE EXCESS BLDA CALC-SCNC: 11.8 MMOL/L (ref 0–2)
BASOPHILS # BLD AUTO: 0.02 10*3/MM3 (ref 0–0.2)
BASOPHILS NFR BLD AUTO: 0.3 % (ref 0–1.5)
BDY SITE: ABNORMAL
BILIRUB SERPL-MCNC: 0.5 MG/DL (ref 0–1.2)
BODY TEMPERATURE: 37 C
BUN SERPL-MCNC: 14 MG/DL (ref 8–23)
BUN/CREAT SERPL: 45.2 (ref 7–25)
C PNEUM DNA NPH QL NAA+NON-PROBE: NOT DETECTED
CALCIUM SPEC-SCNC: 10 MG/DL (ref 8.6–10.5)
CHLORIDE SERPL-SCNC: 92 MMOL/L (ref 98–107)
CO2 SERPL-SCNC: 36 MMOL/L (ref 22–29)
CREAT SERPL-MCNC: 0.31 MG/DL (ref 0.57–1)
D DIMER PPP FEU-MCNC: 2.23 MG/L (FEU) (ref 0–0.5)
D-LACTATE SERPL-SCNC: 0.7 MMOL/L (ref 0.5–2)
DEPRECATED RDW RBC AUTO: 39.8 FL (ref 37–54)
EOSINOPHIL # BLD AUTO: 0.08 10*3/MM3 (ref 0–0.4)
EOSINOPHIL NFR BLD AUTO: 1.3 % (ref 0.3–6.2)
ERYTHROCYTE [DISTWIDTH] IN BLOOD BY AUTOMATED COUNT: 12.9 % (ref 12.3–15.4)
FLUAV H1 2009 PAND RNA NPH QL NAA+PROBE: NOT DETECTED
FLUAV H1 HA GENE NPH QL NAA+PROBE: NOT DETECTED
FLUAV H3 RNA NPH QL NAA+PROBE: NOT DETECTED
FLUAV SUBTYP SPEC NAA+PROBE: NOT DETECTED
FLUBV RNA ISLT QL NAA+PROBE: NOT DETECTED
GFR SERPL CREATININE-BSD FRML MDRD: >150 ML/MIN/1.73
GLOBULIN UR ELPH-MCNC: 3.9 GM/DL
GLUCOSE SERPL-MCNC: 135 MG/DL (ref 65–99)
HADV DNA SPEC NAA+PROBE: NOT DETECTED
HCO3 BLDA-SCNC: 38.9 MMOL/L (ref 20–26)
HCOV 229E RNA SPEC QL NAA+PROBE: NOT DETECTED
HCOV HKU1 RNA SPEC QL NAA+PROBE: NOT DETECTED
HCOV NL63 RNA SPEC QL NAA+PROBE: NOT DETECTED
HCOV OC43 RNA SPEC QL NAA+PROBE: NOT DETECTED
HCT VFR BLD AUTO: 38.8 % (ref 34–46.6)
HGB BLD-MCNC: 11.7 G/DL (ref 12–15.9)
HMPV RNA NPH QL NAA+NON-PROBE: NOT DETECTED
HPIV1 RNA SPEC QL NAA+PROBE: NOT DETECTED
HPIV2 RNA SPEC QL NAA+PROBE: NOT DETECTED
HPIV3 RNA NPH QL NAA+PROBE: NOT DETECTED
HPIV4 P GENE NPH QL NAA+PROBE: NOT DETECTED
IMM GRANULOCYTES # BLD AUTO: 0.02 10*3/MM3 (ref 0–0.05)
IMM GRANULOCYTES NFR BLD AUTO: 0.3 % (ref 0–0.5)
INHALED O2 CONCENTRATION: 100 %
L PNEUMO1 AG UR QL IA: NEGATIVE
LYMPHOCYTES # BLD AUTO: 0.61 10*3/MM3 (ref 0.7–3.1)
LYMPHOCYTES NFR BLD AUTO: 9.6 % (ref 19.6–45.3)
Lab: ABNORMAL
M PNEUMO IGG SER IA-ACNC: NOT DETECTED
MCH RBC QN AUTO: 25.7 PG (ref 26.6–33)
MCHC RBC AUTO-ENTMCNC: 30.2 G/DL (ref 31.5–35.7)
MCV RBC AUTO: 85.1 FL (ref 79–97)
MODALITY: ABNORMAL
MONOCYTES # BLD AUTO: 0.79 10*3/MM3 (ref 0.1–0.9)
MONOCYTES NFR BLD AUTO: 12.4 % (ref 5–12)
NEUTROPHILS NFR BLD AUTO: 4.84 10*3/MM3 (ref 1.7–7)
NEUTROPHILS NFR BLD AUTO: 76.1 % (ref 42.7–76)
NRBC BLD AUTO-RTO: 0 /100 WBC (ref 0–0.2)
NT-PROBNP SERPL-MCNC: 204 PG/ML (ref 0–900)
PCO2 BLDA: 63.1 MM HG (ref 35–45)
PCO2 TEMP ADJ BLD: 63.1 MM HG (ref 35–45)
PH BLDA: 7.4 PH UNITS (ref 7.35–7.45)
PH, TEMP CORRECTED: 7.4 PH UNITS (ref 7.35–7.45)
PLATELET # BLD AUTO: 292 10*3/MM3 (ref 140–450)
PMV BLD AUTO: 10.8 FL (ref 6–12)
PO2 BLDA: 108 MM HG (ref 83–108)
PO2 TEMP ADJ BLD: 108 MM HG (ref 83–108)
POTASSIUM SERPL-SCNC: 3.9 MMOL/L (ref 3.5–5.2)
PROT SERPL-MCNC: 7.4 G/DL (ref 6–8.5)
RBC # BLD AUTO: 4.56 10*6/MM3 (ref 3.77–5.28)
RHINOVIRUS RNA SPEC NAA+PROBE: NOT DETECTED
RSV RNA NPH QL NAA+NON-PROBE: NOT DETECTED
S PNEUM AG SPEC QL LA: NEGATIVE
SAO2 % BLDCOA: 98.5 % (ref 94–99)
SARS-COV-2 RDRP RESP QL NAA+PROBE: NOT DETECTED
SODIUM SERPL-SCNC: 138 MMOL/L (ref 136–145)
VENTILATOR MODE: ABNORMAL
WBC # BLD AUTO: 6.36 10*3/MM3 (ref 3.4–10.8)

## 2020-07-07 PROCEDURE — 83880 ASSAY OF NATRIURETIC PEPTIDE: CPT | Performed by: EMERGENCY MEDICINE

## 2020-07-07 PROCEDURE — 93010 ELECTROCARDIOGRAM REPORT: CPT | Performed by: INTERNAL MEDICINE

## 2020-07-07 PROCEDURE — 80053 COMPREHEN METABOLIC PANEL: CPT | Performed by: EMERGENCY MEDICINE

## 2020-07-07 PROCEDURE — 94799 UNLISTED PULMONARY SVC/PX: CPT

## 2020-07-07 PROCEDURE — 25010000002 ENOXAPARIN PER 10 MG: Performed by: INTERNAL MEDICINE

## 2020-07-07 PROCEDURE — 25010000002 METHYLPREDNISOLONE PER 125 MG: Performed by: EMERGENCY MEDICINE

## 2020-07-07 PROCEDURE — 87635 SARS-COV-2 COVID-19 AMP PRB: CPT | Performed by: EMERGENCY MEDICINE

## 2020-07-07 PROCEDURE — 83605 ASSAY OF LACTIC ACID: CPT | Performed by: EMERGENCY MEDICINE

## 2020-07-07 PROCEDURE — 25010000002 CEFTRIAXONE PER 250 MG: Performed by: INTERNAL MEDICINE

## 2020-07-07 PROCEDURE — 85025 COMPLETE CBC W/AUTO DIFF WBC: CPT | Performed by: EMERGENCY MEDICINE

## 2020-07-07 PROCEDURE — 71275 CT ANGIOGRAPHY CHEST: CPT

## 2020-07-07 PROCEDURE — 0 IOPAMIDOL PER 1 ML: Performed by: EMERGENCY MEDICINE

## 2020-07-07 PROCEDURE — 25010000002 LEVOFLOXACIN PER 250 MG: Performed by: EMERGENCY MEDICINE

## 2020-07-07 PROCEDURE — 85379 FIBRIN DEGRADATION QUANT: CPT | Performed by: EMERGENCY MEDICINE

## 2020-07-07 PROCEDURE — 36600 WITHDRAWAL OF ARTERIAL BLOOD: CPT

## 2020-07-07 PROCEDURE — 87899 AGENT NOS ASSAY W/OPTIC: CPT | Performed by: INTERNAL MEDICINE

## 2020-07-07 PROCEDURE — 25010000002 AZITHROMYCIN PER 500 MG: Performed by: INTERNAL MEDICINE

## 2020-07-07 PROCEDURE — 25010000002 METHYLPREDNISOLONE PER 125 MG: Performed by: INTERNAL MEDICINE

## 2020-07-07 PROCEDURE — 71045 X-RAY EXAM CHEST 1 VIEW: CPT

## 2020-07-07 PROCEDURE — 82803 BLOOD GASES ANY COMBINATION: CPT

## 2020-07-07 PROCEDURE — 0100U HC BIOFIRE FILMARRAY RESP PANEL 2: CPT | Performed by: INTERNAL MEDICINE

## 2020-07-07 PROCEDURE — 93005 ELECTROCARDIOGRAM TRACING: CPT | Performed by: EMERGENCY MEDICINE

## 2020-07-07 PROCEDURE — 94640 AIRWAY INHALATION TREATMENT: CPT

## 2020-07-07 PROCEDURE — 87040 BLOOD CULTURE FOR BACTERIA: CPT | Performed by: EMERGENCY MEDICINE

## 2020-07-07 PROCEDURE — 99285 EMERGENCY DEPT VISIT HI MDM: CPT

## 2020-07-07 RX ORDER — ACETAMINOPHEN 325 MG/1
650 TABLET ORAL EVERY 4 HOURS PRN
Status: DISCONTINUED | OUTPATIENT
Start: 2020-07-07 | End: 2020-07-13 | Stop reason: HOSPADM

## 2020-07-07 RX ORDER — METHYLPREDNISOLONE SODIUM SUCCINATE 125 MG/2ML
80 INJECTION, POWDER, LYOPHILIZED, FOR SOLUTION INTRAMUSCULAR; INTRAVENOUS EVERY 8 HOURS SCHEDULED
Status: DISCONTINUED | OUTPATIENT
Start: 2020-07-07 | End: 2020-07-08

## 2020-07-07 RX ORDER — ROPINIROLE 1 MG/1
1 TABLET, FILM COATED ORAL ONCE
Status: COMPLETED | OUTPATIENT
Start: 2020-07-07 | End: 2020-07-07

## 2020-07-07 RX ORDER — FLUTICASONE PROPIONATE 50 MCG
2 SPRAY, SUSPENSION (ML) NASAL DAILY
Status: DISCONTINUED | OUTPATIENT
Start: 2020-07-08 | End: 2020-07-10

## 2020-07-07 RX ORDER — ECHINACEA PURPUREA EXTRACT 125 MG
2 TABLET ORAL AS NEEDED
Status: DISCONTINUED | OUTPATIENT
Start: 2020-07-07 | End: 2020-07-13 | Stop reason: HOSPADM

## 2020-07-07 RX ORDER — LEVOFLOXACIN 5 MG/ML
750 INJECTION, SOLUTION INTRAVENOUS ONCE
Status: COMPLETED | OUTPATIENT
Start: 2020-07-07 | End: 2020-07-07

## 2020-07-07 RX ORDER — ONDANSETRON 2 MG/ML
4 INJECTION INTRAMUSCULAR; INTRAVENOUS EVERY 6 HOURS PRN
Status: DISCONTINUED | OUTPATIENT
Start: 2020-07-07 | End: 2020-07-13 | Stop reason: HOSPADM

## 2020-07-07 RX ORDER — SODIUM CHLORIDE 0.9 % (FLUSH) 0.9 %
10 SYRINGE (ML) INJECTION AS NEEDED
Status: DISCONTINUED | OUTPATIENT
Start: 2020-07-07 | End: 2020-07-13 | Stop reason: HOSPADM

## 2020-07-07 RX ORDER — ALBUTEROL SULFATE 2.5 MG/3ML
2.5 SOLUTION RESPIRATORY (INHALATION)
Status: COMPLETED | OUTPATIENT
Start: 2020-07-07 | End: 2020-07-07

## 2020-07-07 RX ORDER — GUAIFENESIN 600 MG/1
1200 TABLET, EXTENDED RELEASE ORAL EVERY 12 HOURS SCHEDULED
Status: DISCONTINUED | OUTPATIENT
Start: 2020-07-07 | End: 2020-07-13 | Stop reason: HOSPADM

## 2020-07-07 RX ORDER — ROPINIROLE 1 MG/1
2 TABLET, FILM COATED ORAL NIGHTLY
Status: DISCONTINUED | OUTPATIENT
Start: 2020-07-07 | End: 2020-07-08

## 2020-07-07 RX ORDER — SODIUM CHLORIDE 0.9 % (FLUSH) 0.9 %
10 SYRINGE (ML) INJECTION EVERY 12 HOURS SCHEDULED
Status: DISCONTINUED | OUTPATIENT
Start: 2020-07-07 | End: 2020-07-13 | Stop reason: HOSPADM

## 2020-07-07 RX ORDER — SODIUM CHLORIDE, SODIUM LACTATE, POTASSIUM CHLORIDE, CALCIUM CHLORIDE 600; 310; 30; 20 MG/100ML; MG/100ML; MG/100ML; MG/100ML
75 INJECTION, SOLUTION INTRAVENOUS CONTINUOUS
Status: DISPENSED | OUTPATIENT
Start: 2020-07-07 | End: 2020-07-08

## 2020-07-07 RX ORDER — IPRATROPIUM BROMIDE AND ALBUTEROL SULFATE 2.5; .5 MG/3ML; MG/3ML
3 SOLUTION RESPIRATORY (INHALATION)
Status: DISCONTINUED | OUTPATIENT
Start: 2020-07-07 | End: 2020-07-13 | Stop reason: HOSPADM

## 2020-07-07 RX ORDER — BUDESONIDE AND FORMOTEROL FUMARATE DIHYDRATE 160; 4.5 UG/1; UG/1
2 AEROSOL RESPIRATORY (INHALATION)
Status: DISCONTINUED | OUTPATIENT
Start: 2020-07-07 | End: 2020-07-13 | Stop reason: HOSPADM

## 2020-07-07 RX ORDER — METHYLPREDNISOLONE SODIUM SUCCINATE 125 MG/2ML
125 INJECTION, POWDER, LYOPHILIZED, FOR SOLUTION INTRAMUSCULAR; INTRAVENOUS ONCE
Status: COMPLETED | OUTPATIENT
Start: 2020-07-07 | End: 2020-07-07

## 2020-07-07 RX ADMIN — ALBUTEROL SULFATE 2.5 MG: 2.5 SOLUTION RESPIRATORY (INHALATION) at 15:14

## 2020-07-07 RX ADMIN — METHYLPREDNISOLONE SODIUM SUCCINATE 80 MG: 125 INJECTION, POWDER, FOR SOLUTION INTRAMUSCULAR; INTRAVENOUS at 21:16

## 2020-07-07 RX ADMIN — GUAIFENESIN 1200 MG: 600 TABLET, EXTENDED RELEASE ORAL at 20:10

## 2020-07-07 RX ADMIN — SODIUM CHLORIDE, POTASSIUM CHLORIDE, SODIUM LACTATE AND CALCIUM CHLORIDE 75 ML/HR: 600; 310; 30; 20 INJECTION, SOLUTION INTRAVENOUS at 18:34

## 2020-07-07 RX ADMIN — ROPINIROLE HYDROCHLORIDE 1 MG: 1 TABLET, FILM COATED ORAL at 16:02

## 2020-07-07 RX ADMIN — CEFTRIAXONE SODIUM 1 G: 1 INJECTION, POWDER, FOR SOLUTION INTRAMUSCULAR; INTRAVENOUS at 20:10

## 2020-07-07 RX ADMIN — SODIUM CHLORIDE, PRESERVATIVE FREE 10 ML: 5 INJECTION INTRAVENOUS at 20:10

## 2020-07-07 RX ADMIN — IOPAMIDOL 53 ML: 755 INJECTION, SOLUTION INTRAVENOUS at 16:31

## 2020-07-07 RX ADMIN — ROPINIROLE HYDROCHLORIDE 2 MG: 1 TABLET, FILM COATED ORAL at 20:10

## 2020-07-07 RX ADMIN — LEVOFLOXACIN 750 MG: 5 INJECTION, SOLUTION INTRAVENOUS at 17:42

## 2020-07-07 RX ADMIN — IPRATROPIUM BROMIDE AND ALBUTEROL SULFATE 3 ML: 2.5; .5 SOLUTION RESPIRATORY (INHALATION) at 19:33

## 2020-07-07 RX ADMIN — ENOXAPARIN SODIUM 40 MG: 40 INJECTION SUBCUTANEOUS at 20:10

## 2020-07-07 RX ADMIN — AZITHROMYCIN DIHYDRATE 500 MG: 500 INJECTION, POWDER, LYOPHILIZED, FOR SOLUTION INTRAVENOUS at 20:10

## 2020-07-07 RX ADMIN — METHYLPREDNISOLONE SODIUM SUCCINATE 125 MG: 125 INJECTION, POWDER, FOR SOLUTION INTRAMUSCULAR; INTRAVENOUS at 14:27

## 2020-07-07 NOTE — H&P
"    [facility] Medicine Services  HISTORY AND PHYSICAL    Date of Admission: 7/7/2020  Primary Care Physician: Pato Cooney DO    Subjective     Chief Complaint: Shortness of breath    History of Present Illness  Patient is a 75-year-old  female with past medical history significant for chronic respiratory failure (on 4-1/2 L by nasal cannula continuously), chronic obstructive pulmonary disease, and history of restless leg syndrome the presented to our hospital with a chief complaint of shortness of breath.  Patient reports that she has been having symptoms for the past 2 weeks that have progressively gotten worse.  She denies any fevers or chills.  She denies any sick contacts.  In fact, she states that she has been staying home almost exclusively given the ongoing coronavirus pandemic.  She does report a cough that is started to become more productive of thick, viscous, and tan-colored sputum.  She states that she has not smoked \"in about a year.\"  Recently, even with activity she has noticed that her O2 saturations will drop, and in fact states that her O2 sats hits 71% with activity earlier today which prompted her to come to our facility.  She has not been on any antibiotics recently.  She states that she has not had her Symbicort refilled \"in a while.\"  She states that she did try Trelegy but could not appreciate much benefit and no longer takes this medication.  She does use nebulizer treatments on an outpatient basis 3 times daily, but those have not been improving her symptoms.  She denies any chest pain or chest pressure.  She reports that she has not followed by pulmonologist.  She does not think she has ever had pulmonary function test.    Review of Systems     Otherwise complete ROS reviewed and negative except as mentioned in the HPI.    Past Medical History:   Past Medical History:   Diagnosis Date   • Cancer (CMS/HCC), colon    • Chronic respiratory " failure (CMS/HCC), 4 L nasal cannula continuously    • COPD (chronic obstructive pulmonary disease) (CMS/HCC)    • Restless leg syndrome      Past Surgical History:  Past Surgical History:   Procedure Laterality Date   • APPENDECTOMY     • CARPAL TUNNEL RELEASE Left    • COLON RESECTION     • FOOT SURGERY Bilateral     hammer toe   • FRACTURE SURGERY     • HYSTERECTOMY     • WRIST FRACTURE SURGERY Right      Social History:  reports that she quit smoking about 3 years ago. She has never used smokeless tobacco. She reports that she does not drink alcohol or use drugs.    Family History: family history includes COPD in her father; Cancer in her brother, mother, paternal grandfather, paternal grandmother, and sister; Colon cancer in her mother; Diabetes in her father and sister; Heart disease in her father.       Allergies:  Allergies   Allergen Reactions   • Tetracyclines & Related Anaphylaxis   • Penicillins Itching     Medications:  Prior to Admission medications    Medication Sig Start Date End Date Taking? Authorizing Provider   albuterol sulfate HFA (VENTOLIN HFA) 108 (90 Base) MCG/ACT inhaler Inhale 2 puffs Every 4 (Four) Hours As Needed for Wheezing or Shortness of Air. 8/7/19   Pato Cooney, DO   fluticasone (FLONASE) 50 MCG/ACT nasal spray 2 sprays by Each Nare route Daily. 5/13/19   Inna Haile APRN   ipratropium-albuterol (DUO-NEB) 0.5-2.5 mg/3 ml nebulizer USE 1 AMPULE IN NEBULIZER EVERY 4 HOURS AS NEEDED FOR WHEEZING 6/5/20   Pato Cooney, DO   nystatin (MYCOSTATIN) 887802 UNIT/ML suspension Swish and swallow 5 mL 4 (Four) Times a Day. 6/10/19   Pato Cooney, DO   rOPINIRole (REQUIP) 1 MG tablet Take 1 tablet by mouth 2 (two) times a day. 7/6/20   Pato Cooney, DO   sodium chloride (OCEAN) 0.65 % nasal spray 2 sprays into the nostril(s) as directed by provider As Needed for Congestion. 5/12/19   Inna Haile APRN   SYMBICORT 160-4.5 MCG/ACT inhaler Inhale 2  "puffs 2 (Two) Times a Day. 7/6/20   Pato Cooney, DO   tiotropium bromide monohydrate (SPIRIVA RESPIMAT) 2.5 MCG/ACT aerosol solution inhaler Inhale 2 puffs Daily. 8/7/19   Pato Cooney, DO   tobramycin in sterile water (preservative free) injection-balanced salts ophthalmic solution Apply 1-2 drops to eye(s) as directed by provider Every 4 (Four) Hours. 9/13/19   Pato Cooney, DO     Objective     Vital Signs: /78   Pulse 98   Temp 98.2 °F (36.8 °C)   Resp 18   Ht 162.6 cm (64\")   Wt 50.8 kg (112 lb 1.6 oz)   SpO2 95%   BMI 19.24 kg/m²   Physical Exam   Constitutional: She is oriented to person, place, and time. No distress.   Nontoxic appearing   HENT:   Head: Normocephalic.   Mouth/Throat: No oropharyngeal exudate.   Eyes: No scleral icterus.   Neck: No tracheal deviation present.   Cardiovascular:   Rate approx 100; regular   Pulmonary/Chest: Effort normal. No respiratory distress. She has wheezes (and some faint rhonchi on the right).   On supp 02 via nasal cannula   Abdominal: Soft.   Musculoskeletal: She exhibits no edema.   Neurological: She is alert and oriented to person, place, and time.   Skin: Skin is warm. She is not diaphoretic.   Psychiatric: She has a normal mood and affect. Her behavior is normal.   Vitals reviewed.      Results Reviewed:  Lab Results (last 24 hours)     Procedure Component Value Units Date/Time    Lactic Acid, Plasma [[id number]]  (Normal) Collected:  07/07/20 1739    Specimen:  Blood Updated:  07/07/20 1758     Lactate 0.7 mmol/L     Blood Culture - Blood, Wrist, Right [[id number]] Collected:  07/07/20 1425    Specimen:  Blood from Wrist, Right Updated:  07/07/20 1542    COVID PRE-OP / PRE-PROCEDURE SCREENING ORDER (NO ISOLATION) - Swab, Nasopharynx [[id number]] Collected:  07/07/20 1429    Specimen:  Swab from Nasopharynx Updated:  07/07/20 1548    Narrative:       The following orders were created for panel order COVID PRE-OP / PRE-PROCEDURE SCREENING " ORDER (NO ISOLATION) - Swab, Nasopharynx.  Procedure                               Abnormality         Status                     ---------                               -----------         ------                     COVID-19, ABBOTT IN-HOUS...[[id number]]  Normal              Final result                 Please view results for these tests on the individual orders.    COVID-19, ABBOTT IN-HOUSE,NP Swab (NO TRANSPORT MEDIA) 2 HR TAT - Swab, Nasopharynx [[id number]]  (Normal) Collected:  07/07/20 1429    Specimen:  Swab from Nasopharynx Updated:  07/07/20 1543     COVID19 Not Detected    Narrative:       Fact sheet for providers: https://www.fda.gov/media/291789/download     Fact sheet for patients: https://www.fda.gov/media/461646/download    Blood Gas, Arterial [[id number]]  (Abnormal) Collected:  07/07/20 1500    Specimen:  Arterial Blood Updated:  07/07/20 1509     Site Right Brachial     Tristen's Test N/A     pH, Arterial 7.398 pH units      pCO2, Arterial 63.1 mm Hg      Comment: 83 Value above reference range        pO2, Arterial 108.0 mm Hg      Comment: 83 Value above reference range        HCO3, Arterial 38.9 mmol/L      Comment: 83 Value above reference range        Base Excess, Arterial 11.8 mmol/L      Comment: 83 Value above reference range        O2 Saturation, Arterial 98.5 %      Temperature 37.0 C      Barometric Pressure for Blood Gas 749 mmHg      Modality NRB     FIO2 100 %      Ventilator Mode NA     Collected by [id number]     Comment: Meter: [id number]     :  [id number]        pCO2, Temperature Corrected 63.1 mm Hg      pH, Temp Corrected 7.398 pH Units      pO2, Temperature Corrected 108 mm Hg     Comprehensive Metabolic Panel [[id number]]  (Abnormal) Collected:  07/07/20 1421    Specimen:  Blood Updated:  07/07/20 1457     Glucose 135 mg/dL      BUN 14 mg/dL      Creatinine 0.31 mg/dL      Sodium 138 mmol/L      Potassium 3.9 mmol/L      Chloride 92 mmol/L      CO2 36.0 mmol/L      Calcium  10.0 mg/dL      Total Protein 7.4 g/dL      Albumin 3.50 g/dL      ALT (SGPT) 10 U/L      AST (SGOT) 16 U/L      Alkaline Phosphatase 79 U/L      Total Bilirubin 0.5 mg/dL      eGFR Non African Amer >150 mL/min/1.73      Globulin 3.9 gm/dL      A/G Ratio 0.9 g/dL      BUN/Creatinine Ratio 45.2     Anion Gap 10.0 mmol/L     Narrative:       GFR Normal >60  Chronic Kidney Disease <60  Kidney Failure <15      BNP [[id number]]  (Normal) Collected:  07/07/20 1421    Specimen:  Blood Updated:  07/07/20 1453     proBNP 204.0 pg/mL     Narrative:       Among patients with dyspnea, NT-proBNP is highly sensitive for the detection of acute congestive heart failure. In addition NT-proBNP of <300 pg/ml effectively rules out acute congestive heart failure with 99% negative predictive value.    Results may be falsely decreased if patient taking Biotin.      D-dimer, Quantitative [[id number]]  (Abnormal) Collected:  07/07/20 1421    Specimen:  Blood Updated:  07/07/20 1448     D-Dimer, Quantitative 2.23 mg/L (FEU)     Narrative:       Reference Range is 0-0.50 mg/L FEU. However, results <0.50 mg/L FEU tends to rule out DVT or PE. Results >0.50 mg/L FEU are not useful in predicting absence or presence of DVT or PE.      Blood Culture - Blood, Arm, Right [[id number]] Collected:  07/07/20 1421    Specimen:  Blood from Arm, Right Updated:  07/07/20 1440    CBC & Differential [[id number]] Collected:  07/07/20 1421    Specimen:  Blood Updated:  07/07/20 1438    Narrative:       The following orders were created for panel order CBC & Differential.  Procedure                               Abnormality         Status                     ---------                               -----------         ------                     CBC Auto Differential[[id number]]        Abnormal            Final result                 Please view results for these tests on the individual orders.    CBC Auto Differential [[id number]]  (Abnormal) Collected:  07/07/20 1421       Specimen:  Blood Updated:  07/07/20 1438     WBC 6.36 10*3/mm3      RBC 4.56 10*6/mm3      Hemoglobin 11.7 g/dL      Hematocrit 38.8 %      MCV 85.1 fL      MCH 25.7 pg      MCHC 30.2 g/dL      RDW 12.9 %      RDW-SD 39.8 fl      MPV 10.8 fL      Platelets 292 10*3/mm3      Neutrophil % 76.1 %      Lymphocyte % 9.6 %      Monocyte % 12.4 %      Eosinophil % 1.3 %      Basophil % 0.3 %      Immature Grans % 0.3 %      Neutrophils, Absolute 4.84 10*3/mm3      Lymphocytes, Absolute 0.61 10*3/mm3      Monocytes, Absolute 0.79 10*3/mm3      Eosinophils, Absolute 0.08 10*3/mm3      Basophils, Absolute 0.02 10*3/mm3      Immature Grans, Absolute 0.02 10*3/mm3      nRBC 0.0 /100 WBC         Imaging Results (Last 24 Hours)     Procedure Component Value Units Date/Time    CT Angiogram Chest [[id number]] Collected:  07/07/20 1700     Updated:  07/07/20 1712    Narrative:       CT angiography with 3D MIP images of the chest with IV contrast     Indication: Chest pain, acute, PE suspected, intermed prob, positive  D-dimer     Comparison: None     DOSE LENGTH PRODUCT: 214 mGy cm. Automated exposure control was also  utilized to decrease patient radiation dose.     Findings:     No pulmonary embolus identified. The main pulmonary trunk is upper  limits of normal in caliber. Thoracic aorta is nonaneurysmal and  contains heavy atherosclerotic calcification. Moderate coronary artery  calcification. No pericardial effusion.     The central airways are clear. Small bilateral pleural effusions with  overlying atelectasis. There is also some patchy consolidation in the  RIGHT lower lobe. There is a background of severe centrilobular  emphysema. There is some mild tree-in-bud nodularity in the peripheral  RIGHT middle lobe. No isolated solid pulmonary nodule.     No enlarged axillary or supraclavicular adenopathy. Borderline prominent  precarinal lymph node and RIGHT hilar lymph node, likely reactive.     1.3 cm RIGHT adrenal gland  nodule, not completely characterize on the CT  the chest likely an adenoma. No other abnormality in the partially  imaged upper abdomen.       Impression:       Impression:     1.  No evidence of pulmonary embolus.  2.  Patchy RIGHT lower lobe consolidation and RIGHT middle lobe  tree-in-bud nodularity. Findings likely represent a multifocal  infectious process/pneumonia.  3.  Small bilateral pleural effusions with overlying atelectasis.  4.  1.3 cm RIGHT adrenal gland nodule, likely adenoma.  This report was finalized on 07/07/2020 17:09 by Dr. Tristan Benitez MD.    XR Chest 1 View [[id number]] Collected:  07/07/20 1614     Updated:  07/07/20 1617    Narrative:       Frontal upright radiograph of the chest 7/7/2020 4:10 PM CDT     COMPARISON: May 9, 2019.     HISTORY: Short of air.     FINDINGS:   Hyperinflation flattening diaphragms noted. Chronic changes noted in the  pulmonary parenchyma. There are no air space filling infiltrates.. The  cardiac silhouette is normal. Vascular calcifications present in the  aortic arch..      The osseous structures and surrounding soft tissues demonstrate no acute  abnormality.       Impression:       1. COPD no acute cardiopulmonary process.        This report was finalized on 07/07/2020 16:14 by Dr. Zeb Gardner MD.        I have personally reviewed and interpreted the radiology studies and ECG obtained at time of admission.     Assessment / Plan     Assessment:   Active Hospital Problems    Diagnosis   • COPD with acute exacerbation (CMS/HCC)   • Adrenal nodule (CMS/HCC)   • History of colon cancer   • Pneumonia   • Acute on chronic respiratory failure with hypoxia (CMS/HCC)   • COPD, very severe (CMS/HCC)   • Pulmonary emphysema (CMS/HCC)     Plan:   1.  IV Azithromycin and Rocephin  2.  IV Solumedrol  3.  Supp 02; goal 02 sats 88-94%  4.  Scheduled nebs q4hrs  5.  Mucinex  6.  Respiratory culture and PCR panel  7.  Strep pneumo and Legionella Ag  8.  Lovenox PPx  9.   Flutter valve and incentive spirometry  10.  May require PT and OT assessment  11.  Nutrition consultation  12.  Workup ongoing      Wiliam Byrd MD   07/07/20   18:01

## 2020-07-07 NOTE — ED PROVIDER NOTES
Subjective   Patient is a 75-year-old female who presents to the ER with shortness of breath.  Patient states she has had chronic shortness of breath due to COPD but has had worsening shortness of breath over the last 2 weeks.  Patient is on 4-1/2 L of oxygen at all times.  She denies any fever, chest pain, abdominal pain, nausea vomiting diarrhea, urinary changes, neurologic changes, cough, leg pain or swelling.  Patient states that exertion makes her symptoms worse.  Patient sats were 73% when EMS arrived.          Review of Systems   Constitutional: Negative.    HENT: Negative.    Eyes: Negative.    Respiratory: Positive for shortness of breath.    Cardiovascular: Negative.    Gastrointestinal: Negative.    Endocrine: Negative.    Genitourinary: Negative.    Musculoskeletal: Negative.    Skin: Negative.    Allergic/Immunologic: Negative.    Neurological: Negative.    Hematological: Negative.    Psychiatric/Behavioral: Negative.    All other systems reviewed and are negative.      Past Medical History:   Diagnosis Date   • Cancer (CMS/HCC), colon    • Chronic respiratory failure (CMS/HCC), 4 L nasal cannula continuously    • COPD (chronic obstructive pulmonary disease) (CMS/HCC)    • Restless leg syndrome        Allergies   Allergen Reactions   • Tetracyclines & Related Anaphylaxis   • Penicillins Itching       Past Surgical History:   Procedure Laterality Date   • APPENDECTOMY     • CARPAL TUNNEL RELEASE Left    • COLON RESECTION     • FOOT SURGERY Bilateral     hammer toe   • FRACTURE SURGERY     • HYSTERECTOMY     • WRIST FRACTURE SURGERY Right        Family History   Problem Relation Age of Onset   • Colon cancer Mother    • Cancer Mother    • COPD Father    • Diabetes Father    • Heart disease Father    • Cancer Sister    • Diabetes Sister    • Cancer Brother    • Cancer Paternal Grandmother    • Cancer Paternal Grandfather        Social History     Socioeconomic History   • Marital status:      Spouse  name: Not on file   • Number of children: Not on file   • Years of education: Not on file   • Highest education level: Not on file   Tobacco Use   • Smoking status: Former Smoker     Last attempt to quit: 2017     Years since quitting: 3.5   • Smokeless tobacco: Never Used   Substance and Sexual Activity   • Alcohol use: No     Frequency: Never   • Drug use: No   • Sexual activity: Never           Objective   Physical Exam   Constitutional: She is oriented to person, place, and time. She appears well-developed and well-nourished.   HENT:   Head: Normocephalic and atraumatic.   Eyes: Pupils are equal, round, and reactive to light. Conjunctivae are normal.   Neck: Normal range of motion.   Cardiovascular: Regular rhythm and normal heart sounds. Tachycardia present.   Pulmonary/Chest: Effort normal. No accessory muscle usage. No tachypnea. No respiratory distress. She has wheezes.   Abdominal: Soft. There is no tenderness.   Musculoskeletal: Normal range of motion. She exhibits no edema or deformity.   Neurological: She is alert and oriented to person, place, and time. She has normal strength.   Skin: Skin is warm.   Psychiatric: She has a normal mood and affect. Her behavior is normal.   Nursing note and vitals reviewed.      Procedures           ED Course      EKG: Sinus tachycardia with a rate of 102, no acute ischemia or infarction    Patient was given a continuous nebulizer treatment and Solu-Medrol.  Patient remained the same.  Patient was tried on her home dose of 4-1/2 L of oxygen and became hypoxic.    Lab Results (last 24 hours)     Procedure Component Value Units Date/Time    CBC & Differential [[id number]] Collected:  07/07/20 1421    Specimen:  Blood Updated:  07/07/20 1438    Narrative:       The following orders were created for panel order CBC & Differential.  Procedure                               Abnormality         Status                     ---------                               -----------          ------                     CBC Auto Differential[[id number]]        Abnormal            Final result                 Please view results for these tests on the individual orders.    Comprehensive Metabolic Panel [[id number]]  (Abnormal) Collected:  07/07/20 1421    Specimen:  Blood Updated:  07/07/20 1457     Glucose 135 mg/dL      BUN 14 mg/dL      Creatinine 0.31 mg/dL      Sodium 138 mmol/L      Potassium 3.9 mmol/L      Chloride 92 mmol/L      CO2 36.0 mmol/L      Calcium 10.0 mg/dL      Total Protein 7.4 g/dL      Albumin 3.50 g/dL      ALT (SGPT) 10 U/L      AST (SGOT) 16 U/L      Alkaline Phosphatase 79 U/L      Total Bilirubin 0.5 mg/dL      eGFR Non African Amer >150 mL/min/1.73      Globulin 3.9 gm/dL      A/G Ratio 0.9 g/dL      BUN/Creatinine Ratio 45.2     Anion Gap 10.0 mmol/L     Narrative:       GFR Normal >60  Chronic Kidney Disease <60  Kidney Failure <15      D-dimer, Quantitative [[id number]]  (Abnormal) Collected:  07/07/20 1421    Specimen:  Blood Updated:  07/07/20 1448     D-Dimer, Quantitative 2.23 mg/L (FEU)     Narrative:       Reference Range is 0-0.50 mg/L FEU. However, results <0.50 mg/L FEU tends to rule out DVT or PE. Results >0.50 mg/L FEU are not useful in predicting absence or presence of DVT or PE.      BNP [[id number]]  (Normal) Collected:  07/07/20 1421    Specimen:  Blood Updated:  07/07/20 1453     proBNP 204.0 pg/mL     Narrative:       Among patients with dyspnea, NT-proBNP is highly sensitive for the detection of acute congestive heart failure. In addition NT-proBNP of <300 pg/ml effectively rules out acute congestive heart failure with 99% negative predictive value.    Results may be falsely decreased if patient taking Biotin.      Blood Culture - Blood, Arm, Right [[id number]] Collected:  07/07/20 1421    Specimen:  Blood from Arm, Right Updated:  07/07/20 1440    CBC Auto Differential [[id number]]  (Abnormal) Collected:  07/07/20 1421    Specimen:  Blood Updated:  07/07/20  1438     WBC 6.36 10*3/mm3      RBC 4.56 10*6/mm3      Hemoglobin 11.7 g/dL      Hematocrit 38.8 %      MCV 85.1 fL      MCH 25.7 pg      MCHC 30.2 g/dL      RDW 12.9 %      RDW-SD 39.8 fl      MPV 10.8 fL      Platelets 292 10*3/mm3      Neutrophil % 76.1 %      Lymphocyte % 9.6 %      Monocyte % 12.4 %      Eosinophil % 1.3 %      Basophil % 0.3 %      Immature Grans % 0.3 %      Neutrophils, Absolute 4.84 10*3/mm3      Lymphocytes, Absolute 0.61 10*3/mm3      Monocytes, Absolute 0.79 10*3/mm3      Eosinophils, Absolute 0.08 10*3/mm3      Basophils, Absolute 0.02 10*3/mm3      Immature Grans, Absolute 0.02 10*3/mm3      nRBC 0.0 /100 WBC     Blood Culture - Blood, Wrist, Right [[id number]] Collected:  07/07/20 1425    Specimen:  Blood from Wrist, Right Updated:  07/07/20 1547    COVID PRE-OP / PRE-PROCEDURE SCREENING ORDER (NO ISOLATION) - Swab, Nasopharynx [[id number]] Collected:  07/07/20 1429    Specimen:  Swab from Nasopharynx Updated:  07/07/20 1543    Narrative:       The following orders were created for panel order COVID PRE-OP / PRE-PROCEDURE SCREENING ORDER (NO ISOLATION) - Swab, Nasopharynx.  Procedure                               Abnormality         Status                     ---------                               -----------         ------                     COVID-19, ABBOTT IN-HOUS...[[id number]]  Normal              Final result                 Please view results for these tests on the individual orders.    COVID-19, ABBOTT IN-HOUSE,NP Swab (NO TRANSPORT MEDIA) 2 HR TAT - Swab, Nasopharynx [[id number]]  (Normal) Collected:  07/07/20 1429    Specimen:  Swab from Nasopharynx Updated:  07/07/20 1543     COVID19 Not Detected    Narrative:       Fact sheet for providers: https://www.fda.gov/media/787500/download     Fact sheet for patients: https://www.fda.gov/media/475276/download    Blood Gas, Arterial [[id number]]  (Abnormal) Collected:  07/07/20 1500    Specimen:  Arterial Blood Updated:  07/07/20  1509     Site Right Brachial     Tristen's Test N/A     pH, Arterial 7.398 pH units      pCO2, Arterial 63.1 mm Hg      Comment: 83 Value above reference range        pO2, Arterial 108.0 mm Hg      Comment: 83 Value above reference range        HCO3, Arterial 38.9 mmol/L      Comment: 83 Value above reference range        Base Excess, Arterial 11.8 mmol/L      Comment: 83 Value above reference range        O2 Saturation, Arterial 98.5 %      Temperature 37.0 C      Barometric Pressure for Blood Gas 749 mmHg      Modality NRB     FIO2 100 %      Ventilator Mode NA     Collected by [id number]     Comment: Meter: [id number]     :  [id number]        pCO2, Temperature Corrected 63.1 mm Hg      pH, Temp Corrected 7.398 pH Units      pO2, Temperature Corrected 108 mm Hg         CT Angiogram Chest   Final Result   Impression:       1.  No evidence of pulmonary embolus.   2.  Patchy RIGHT lower lobe consolidation and RIGHT middle lobe   tree-in-bud nodularity. Findings likely represent a multifocal   infectious process/pneumonia.   3.  Small bilateral pleural effusions with overlying atelectasis.   4.  1.3 cm RIGHT adrenal gland nodule, likely adenoma.   This report was finalized on 07/07/2020 17:09 by Dr. Tristan Benitez MD.      XR Chest 1 View   Final Result   1. COPD no acute cardiopulmonary process.           This report was finalized on 07/07/2020 16:14 by Dr. Zeb Gardner MD.        Labs showed hypercarbia and elevated d-dimer.  Chest x-ray showed COPD.  CT scan of the chest showed no evidence of pulmonary embolus.  Did show right lower lobe and right middle lobe pneumonia.  Patient also had bilateral pleural effusions and a 1.3 cm right adrenal nodule.  Patient was given Levaquin.  Patient continued to remain hypoxic.  Patient was admitted to the hospitalist service for further treatment.                                     MDM    Final diagnoses:   COPD with acute exacerbation (CMS/HCC)   Hypoxia   Pneumonia  of right lower lobe due to infectious organism   Adrenal nodule (CMS/HCC)   Pleural effusion            Pina Hill MD  07/07/20 7042

## 2020-07-08 PROCEDURE — 25010000002 ENOXAPARIN PER 10 MG: Performed by: INTERNAL MEDICINE

## 2020-07-08 PROCEDURE — 25010000002 METHYLPREDNISOLONE PER 125 MG: Performed by: INTERNAL MEDICINE

## 2020-07-08 PROCEDURE — 94799 UNLISTED PULMONARY SVC/PX: CPT

## 2020-07-08 PROCEDURE — 25010000002 METHYLPREDNISOLONE PER 40 MG: Performed by: INTERNAL MEDICINE

## 2020-07-08 PROCEDURE — 94640 AIRWAY INHALATION TREATMENT: CPT

## 2020-07-08 PROCEDURE — 99406 BEHAV CHNG SMOKING 3-10 MIN: CPT

## 2020-07-08 PROCEDURE — 94664 DEMO&/EVAL PT USE INHALER: CPT

## 2020-07-08 PROCEDURE — 25010000002 CEFTRIAXONE PER 250 MG: Performed by: INTERNAL MEDICINE

## 2020-07-08 PROCEDURE — 87205 SMEAR GRAM STAIN: CPT | Performed by: INTERNAL MEDICINE

## 2020-07-08 PROCEDURE — 25010000002 AZITHROMYCIN PER 500 MG: Performed by: INTERNAL MEDICINE

## 2020-07-08 RX ORDER — METHYLPREDNISOLONE SODIUM SUCCINATE 40 MG/ML
40 INJECTION, POWDER, LYOPHILIZED, FOR SOLUTION INTRAMUSCULAR; INTRAVENOUS EVERY 8 HOURS SCHEDULED
Status: DISCONTINUED | OUTPATIENT
Start: 2020-07-08 | End: 2020-07-09

## 2020-07-08 RX ORDER — ROPINIROLE 1 MG/1
1 TABLET, FILM COATED ORAL EVERY 12 HOURS SCHEDULED
Status: DISCONTINUED | OUTPATIENT
Start: 2020-07-08 | End: 2020-07-12

## 2020-07-08 RX ADMIN — IPRATROPIUM BROMIDE AND ALBUTEROL SULFATE 3 ML: 2.5; .5 SOLUTION RESPIRATORY (INHALATION) at 23:05

## 2020-07-08 RX ADMIN — ROPINIROLE HYDROCHLORIDE 1 MG: 1 TABLET, FILM COATED ORAL at 20:08

## 2020-07-08 RX ADMIN — IPRATROPIUM BROMIDE AND ALBUTEROL SULFATE 3 ML: 2.5; .5 SOLUTION RESPIRATORY (INHALATION) at 14:19

## 2020-07-08 RX ADMIN — BUDESONIDE AND FORMOTEROL FUMARATE DIHYDRATE 2 PUFF: 160; 4.5 AEROSOL RESPIRATORY (INHALATION) at 18:56

## 2020-07-08 RX ADMIN — ENOXAPARIN SODIUM 40 MG: 40 INJECTION SUBCUTANEOUS at 20:08

## 2020-07-08 RX ADMIN — FLUTICASONE PROPIONATE 2 SPRAY: 50 SPRAY, METERED NASAL at 09:44

## 2020-07-08 RX ADMIN — IPRATROPIUM BROMIDE AND ALBUTEROL SULFATE 3 ML: 2.5; .5 SOLUTION RESPIRATORY (INHALATION) at 06:14

## 2020-07-08 RX ADMIN — ROPINIROLE HYDROCHLORIDE 1 MG: 1 TABLET, FILM COATED ORAL at 14:08

## 2020-07-08 RX ADMIN — GUAIFENESIN 1200 MG: 600 TABLET, EXTENDED RELEASE ORAL at 09:44

## 2020-07-08 RX ADMIN — SALINE NASAL SPRAY 2 SPRAY: 1.5 SOLUTION NASAL at 09:44

## 2020-07-08 RX ADMIN — METHYLPREDNISOLONE SODIUM SUCCINATE 40 MG: 40 INJECTION, POWDER, FOR SOLUTION INTRAMUSCULAR; INTRAVENOUS at 16:42

## 2020-07-08 RX ADMIN — CEFTRIAXONE SODIUM 1 G: 1 INJECTION, POWDER, FOR SOLUTION INTRAMUSCULAR; INTRAVENOUS at 20:08

## 2020-07-08 RX ADMIN — GUAIFENESIN 1200 MG: 600 TABLET, EXTENDED RELEASE ORAL at 20:08

## 2020-07-08 RX ADMIN — METHYLPREDNISOLONE SODIUM SUCCINATE 80 MG: 125 INJECTION, POWDER, FOR SOLUTION INTRAMUSCULAR; INTRAVENOUS at 04:57

## 2020-07-08 RX ADMIN — METHYLPREDNISOLONE SODIUM SUCCINATE 40 MG: 40 INJECTION, POWDER, FOR SOLUTION INTRAMUSCULAR; INTRAVENOUS at 21:07

## 2020-07-08 RX ADMIN — IPRATROPIUM BROMIDE AND ALBUTEROL SULFATE 3 ML: 2.5; .5 SOLUTION RESPIRATORY (INHALATION) at 18:48

## 2020-07-08 RX ADMIN — SODIUM CHLORIDE, PRESERVATIVE FREE 10 ML: 5 INJECTION INTRAVENOUS at 20:09

## 2020-07-08 RX ADMIN — IPRATROPIUM BROMIDE AND ALBUTEROL SULFATE 3 ML: 2.5; .5 SOLUTION RESPIRATORY (INHALATION) at 09:57

## 2020-07-08 RX ADMIN — AZITHROMYCIN DIHYDRATE 500 MG: 500 INJECTION, POWDER, LYOPHILIZED, FOR SOLUTION INTRAVENOUS at 20:08

## 2020-07-08 NOTE — PLAN OF CARE
Patient had a episode of severe tremors.  She said she almost shook herself off the bed.  I, however, did not witness this event but the aid had.  I told her that the high doses of steroids could cause you side effects like that.  Plus she already has restless leg syndrome and essential tremors.

## 2020-07-08 NOTE — PROGRESS NOTES
[facility] Medicine Services  INPATIENT PROGRESS NOTE    Patient Name: Heide Newsome  Date of Admission: 7/7/2020  Today's Date: 07/08/20  Length of Stay: 1  Primary Care Physician: Pato Cooney DO    Subjective   Chief Complaint: Shortness of breath  HPI   Patient reports that she is doing about the same.  No better but also no worse.  She has not been able to cough up any additional sputum for respiratory culture.  She denies any pain at this time.  She reports that prior to coming to the hospital, even minimal activity was resulting in significant desaturation, down as low as 71% at home.  She states that her legs have also been very weak, and she has felt deconditioned.  She denies any chest pain or chest pressure.    Review of Systems     All pertinent negatives and positives are as above. All other systems have been reviewed and are negative unless otherwise stated.     Objective    Temp:  [97.6 °F (36.4 °C)-98.7 °F (37.1 °C)] 98.6 °F (37 °C)  Heart Rate:  [] 86  Resp:  [16-22] 18  BP: (106-129)/(56-87) 115/56  Physical Exam   Constitutional: No distress.   Nontoxic appearing   HENT:   Head: Normocephalic.   Mouth/Throat: No oropharyngeal exudate.   Eyes: No scleral icterus.   Neck: No tracheal deviation present.   Cardiovascular: Normal rate.   Pulmonary/Chest: Effort normal. No respiratory distress. She has wheezes. She has no rales.   No work of breathing; a little bit better air movement today as compared to exam yesterday   Musculoskeletal: She exhibits no deformity.   Neurological: She is alert.   Skin: Skin is warm. She is not diaphoretic.   Psychiatric: She has a normal mood and affect.   Vitals reviewed.      Results Review:  I have reviewed the labs, radiology results, and diagnostic studies.    Laboratory Data:   Results from last 7 days   Lab Units 07/07/20  1421   WBC 10*3/mm3 6.36   HEMOGLOBIN g/dL 11.7*   HEMATOCRIT % 38.8   PLATELETS  10*3/mm3 292        Results from last 7 days   Lab Units 07/07/20  1421   SODIUM mmol/L 138   POTASSIUM mmol/L 3.9   CHLORIDE mmol/L 92*   CO2 mmol/L 36.0*   BUN mg/dL 14   CREATININE mg/dL 0.31*   CALCIUM mg/dL 10.0   BILIRUBIN mg/dL 0.5   ALK PHOS U/L 79   ALT (SGPT) U/L 10   AST (SGOT) U/L 16   GLUCOSE mg/dL 135*       Culture Data:   No results found for: BLOODCX, URINECX, WOUNDCX, MRSACX, RESPCX, STOOLCX    Radiology Data:   Imaging Results (Last 24 Hours)     Procedure Component Value Units Date/Time    CT Angiogram Chest [[id number]] Collected:  07/07/20 1700     Updated:  07/07/20 1712    Narrative:       CT angiography with 3D MIP images of the chest with IV contrast     Indication: Chest pain, acute, PE suspected, intermed prob, positive  D-dimer     Comparison: None     DOSE LENGTH PRODUCT: 214 mGy cm. Automated exposure control was also  utilized to decrease patient radiation dose.     Findings:     No pulmonary embolus identified. The main pulmonary trunk is upper  limits of normal in caliber. Thoracic aorta is nonaneurysmal and  contains heavy atherosclerotic calcification. Moderate coronary artery  calcification. No pericardial effusion.     The central airways are clear. Small bilateral pleural effusions with  overlying atelectasis. There is also some patchy consolidation in the  RIGHT lower lobe. There is a background of severe centrilobular  emphysema. There is some mild tree-in-bud nodularity in the peripheral  RIGHT middle lobe. No isolated solid pulmonary nodule.     No enlarged axillary or supraclavicular adenopathy. Borderline prominent  precarinal lymph node and RIGHT hilar lymph node, likely reactive.     1.3 cm RIGHT adrenal gland nodule, not completely characterize on the CT  the chest likely an adenoma. No other abnormality in the partially  imaged upper abdomen.       Impression:       Impression:     1.  No evidence of pulmonary embolus.  2.  Patchy RIGHT lower lobe consolidation and  RIGHT middle lobe  tree-in-bud nodularity. Findings likely represent a multifocal  infectious process/pneumonia.  3.  Small bilateral pleural effusions with overlying atelectasis.  4.  1.3 cm RIGHT adrenal gland nodule, likely adenoma.  This report was finalized on 07/07/2020 17:09 by Dr. Tristan Benitez MD.    XR Chest 1 View [[id number]] Collected:  07/07/20 1614     Updated:  07/07/20 1617    Narrative:       Frontal upright radiograph of the chest 7/7/2020 4:10 PM CDT     COMPARISON: May 9, 2019.     HISTORY: Short of air.     FINDINGS:   Hyperinflation flattening diaphragms noted. Chronic changes noted in the  pulmonary parenchyma. There are no air space filling infiltrates.. The  cardiac silhouette is normal. Vascular calcifications present in the  aortic arch..      The osseous structures and surrounding soft tissues demonstrate no acute  abnormality.       Impression:       1. COPD no acute cardiopulmonary process.        This report was finalized on 07/07/2020 16:14 by Dr. Zeb Gardner MD.          I have reviewed the patient's current medications.     Assessment/Plan     Active Hospital Problems    Diagnosis   • COPD with acute exacerbation (CMS/HCC)   • Adrenal nodule (CMS/HCC)   • History of colon cancer   • Pneumonia   • Acute on chronic respiratory failure with hypoxia (CMS/HCC)   • COPD, very severe (CMS/HCC)   • Pulmonary emphysema (CMS/HCC)     Plan:   1.  IV Azithromycin and Rocephin - day 2  2.  IV Solumedrol - 40mg IV q8hrs  3.  Supp 02; goal 02 sats 88-94%; currently on 6-7 liters  4.  Scheduled nebs q4hrs  5.  Mucinex  6.  Respiratory culture (no sample obtained yet); PCR panel negative  7.  Strep pneumo and Legionella Ag negative  8.  Lovenox PPx  9.  Flutter valve and incentive spirometry  10.  PT and OT assessment  11.  Nutrition consultation  12.  Workup continues      Wiliam Byrd MD   07/08/20   10:47

## 2020-07-08 NOTE — PAYOR COMM NOTE
"7/8/20  [facility] [phone]  [phone]     PATIENT ADMITTED ON 7/7/20  ER ADMISSION TO IN PATIENT .      AUTH  [id number]    Heide Benitez (75 y.o. Female)     Date of Birth Social Security Number Address Home Phone MRN    1945  [id number]  [facility] [address] [phone] [id number]    [employer] Marital Status          Orthodoxy        Admission Date Admission Type Admitting Provider Attending Provider Department, Room/Bed    7/7/20 Emergency Wiliam Byrd MD Thompson, Benjamin H, MD [facility] 4B, 406/1    Discharge Date Discharge Disposition Discharge Destination                       Attending Provider:  Wiliam Byrd MD    Allergies:  Tetracyclines & Related, Penicillins    Isolation:  None   Infection:  None   Code Status:  No CPR    Ht:  162.6 cm (64\")   Wt:  50.8 kg (112 lb 1.6 oz)    Admission Cmt:  None   Principal Problem:  None                Active Insurance as of 7/7/2020     Primary Coverage     Payor Plan Insurance Group Employer/Plan Group    HUMANA MEDICARE REPLACEMENT HUMANA MEDICARE REPLACEMENT [id number]     Payor Plan Address Payor Plan Phone Number Payor Plan Fax Number Effective Dates    [address] [address] [phone]  1/1/2018 - None Entered    [facility] [id number]       Subscriber Name Subscriber Birth Date Member ID       HEIDE BENITEZ 1945 [id number]                 Emergency Contacts      (Rel.) Home Phone Work Phone Mobile Phone    TON BENITEZ (Spouse) [phone] -- --        Wiliam Byrd MD   Physician   Medicine   H&P   Addendum   Date of Service:  07/07/20 1751   Creation Time:  07/07/20 1751            Expand All Collapse All      Show:Clear all  [x]Manual[x]Template[]Copied    Added by:  [x]Wiliam Byrd MD    []Swetha for details       [facility] Medicine Services  HISTORY AND PHYSICAL     Date of Admission: " "7/7/2020  Primary Care Physician: Pato Cooney DO     Subjective      Chief Complaint: Shortness of breath     History of Present Illness  Patient is a 75-year-old  female with past medical history significant for chronic respiratory failure (on 4-1/2 L by nasal cannula continuously), chronic obstructive pulmonary disease, and history of restless leg syndrome the presented to our hospital with a chief complaint of shortness of breath.  Patient reports that she has been having symptoms for the past 2 weeks that have progressively gotten worse.  She denies any fevers or chills.  She denies any sick contacts.  In fact, she states that she has been staying home almost exclusively given the ongoing coronavirus pandemic.  She does report a cough that is started to become more productive of thick, viscous, and tan-colored sputum.  She states that she has not smoked \"in about a year.\"  Recently, even with activity she has noticed that her O2 saturations will drop, and in fact states that her O2 sats hits 71% with activity earlier today which prompted her to come to our facility.  She has not been on any antibiotics recently.  She states that she has not had her Symbicort refilled \"in a while.\"  She states that she did try Trelegy but could not appreciate much benefit and no longer takes this medication.  She does use nebulizer treatments on an outpatient basis 3 times daily, but those have not been improving her symptoms.  She denies any chest pain or chest pressure.  She reports that she has not followed by pulmonologist.  She does not think she has ever had pulmonary function test.     Review of Systems      Otherwise complete ROS reviewed and negative except as mentioned in the HPI.           Medical History:   Medical History        Past Medical History:   Diagnosis Date   • Cancer (CMS/HCC), colon     • Chronic respiratory failure (CMS/HCC), 4 L nasal cannula continuously     • COPD (chronic obstructive " "pulmonary disease) (CMS/HCC)     • Restless leg syndrome           Vital Signs: /78   Pulse 98   Temp 98.2 °F (36.8 °C)   Resp 18   Ht 162.6 cm (64\")   Wt 50.8 kg (112 lb 1.6 oz)   SpO2 95%   BMI 19.24 kg/m²   Physical Exam   Constitutional: She is oriented to person, place, and time. No distress.   Nontoxic appearing   HENT:   Head: Normocephalic.   Mouth/Throat: No oropharyngeal exudate.   Eyes: No scleral icterus.   Neck: No tracheal deviation present.   Cardiovascular:   Rate approx 100; regular   Pulmonary/Chest: Effort normal. No respiratory distress. She has wheezes (and some faint rhonchi on the right).   On supp 02 via nasal cannula   Abdominal: Soft.   Musculoskeletal: She exhibits no edema.   Neurological: She is alert and oriented to person, place, and time.   Skin: Skin is warm. She is not diaphoretic.   Psychiatric: She has a normal mood and affect. Her behavior is normal.   Vitals reviewed.     Blood Culture - Blood, Wrist, Right [[id number]] Collected:  07/07/20 1425      Specimen:  Blood from Wrist, Right Updated:  07/07/20 1547     COVID PRE-OP / PRE-PROCEDURE SCREENING ORDER (NO ISOLATION) - Swab, Nasopharynx [[id number]] Collected:  07/07/20 1429     Specimen:  Swab from Nasopharynx Updated:  07/07/20 1543     Narrative:        Fact sheet for patients: https://www.fda.gov/media/979194/download      Blood Gas, Arterial [[id number]]  (Abnormal) Collected:  07/07/20 1500     Specimen:  Arterial Blood Updated:  07/07/20 1509       Site Right Brachial       Tristen's Test N/A       pH, Arterial 7.398 pH units         pCO2, Arterial 63.1 mm Hg         Comment: 83 Value above reference range          pO2, Arterial 108.0 mm Hg         Comment: 83 Value above reference range          HCO3, Arterial 38.9 mmol/L         Comment: 83 Value above reference range          Base Excess, Arterial 11.8 mmol/L         Comment: 83 Value above reference range          O2 Saturation, Arterial 98.5 %         " Temperature 37.0 C         Barometric Pressure for Blood Gas 749 mmHg         Modality NRB       FIO2 100 %         Ventilator Mode NA       Collected by [id number]       Comment: Meter: [id number]     :  [id number]          pCO2, Temperature Corrected 63.1 mm Hg         pH, Temp Corrected 7.398 pH Units         pO2, Temperature Corrected 108 mm Hg      108 mm Hg          Comprehensive Metabolic Panel [[id number]]  (Abnormal) Collected:  07/07/20 1421     Specimen:  Blood Updated:  07/07/20 1457       Glucose 135 mg/dL         BUN 14 mg/dL         Creatinine 0.31 mg/dL         Sodium 138 mmol/L         Potassium 3.9 mmol/L         Chloride 92 mmol/L         CO2 36.0 mmol/L         Calcium 10.0 mg/dL         Total Protein 7.4 g/dL         Albumin 3.50 g/dL         ALT (SGPT) 10 U/L         AST (SGOT) 16 U/L         Alkaline Phosphatase 79 U/L         Total Bilirubin 0.5 mg/dL         eGFR Non African Amer >150 mL/min/1.73         Globulin 3.9 gm/dL         A/G Ratio 0.9 g/dL         BUN/Creatinine Ratio 45.2       Anion Gap 10.0 mmol/L       Narrative:             Wiliam Byrd MD   Physician   Medicine   H&P   Addendum   Date of Service: 07/07/20 1751   Creation Time: 07/07/20 1751             Expand All Collapse All      Show:Clear all   ManualTemplateCopied  Added by:   Wiliam Byrd MD  [facility] for details       Differential [[id number]]  (Abnormal) Collected:  07/07/20 1421      Specimen:  Blood Updated:  07/07/20 1438       WBC 6.36 10*3/mm3         RBC 4.56 10*6/mm3         Hemoglobin 11.7 g/dL         Hematocrit 38.8 %         MCV 85.1 fL         MCH 25.7 pg         MCHC 30.2 g/dL         RDW 12.9 %         RDW-SD 39.8 fl         MPV 10.8 fL         Platelets 292 10*3/mm3         Neutrophil % 76.1 %         Lymphocyte % 9.6 %         Monocyte % 12.4 %         Eosinophil % 1.3 %         Basophil % 0.3 %         Immature Grans % 0.3 %         Neutrophils, Absolute 4.84 10*3/mm3          Lymphocytes, Absolute 0.61 10*3/mm3         Monocytes, Absolute 0.79 10*3/mm3         Eosinophils, Absolute 0.08 10*3/mm3         Basophils, Absolute 0.02 10*3/mm3         Immature Grans, Absolute 0.02 10*3/mm3         nRBC 0.0 /100 WBC                 CT angiography with 3D MIP images of the chest with IV contrast     Indication: Chest pain, acute, PE suspected, intermed prob, positive  D-dimer     Comparison: None     DOSE LENGTH PRODUCT: 214 mGy cm. Automated exposure control was also  utilized to decrease patient radiation dose.     Findings:     No pulmonary embolus identified. The main pulmonary trunk is upper  limits of normal in caliber. Thoracic aorta is nonaneurysmal and  contains heavy atherosclerotic calcification. Moderate coronary artery  calcification. No pericardial effusion.     The central airways are clear. Small bilateral pleural effusions with  overlying atelectasis. There is also some patchy consolidation in the  RIGHT lower lobe. There is a background of severe centrilobular  emphysema. There is some mild tree-in-bud nodularity in the peripheral  RIGHT middle lobe. No isolated solid pulmonary nodule.     No enlarged axillary or supraclavicular adenopathy. Borderline prominent  precarinal lymph node and RIGHT hilar lymph node, likely reactive.     1.3 cm RIGHT adrenal gland nodule, not completely characterize on the CT  the chest likely an adenoma. No other abnormality in the partially  imaged upper abdomen.        Impression:        Impression:     1.  No evidence of pulmonary embolus.  2.  Patchy RIGHT lower lobe consolidation and RIGHT middle lobe  tree-in-bud nodularity. Findings likely represent a multifocal  infectious process/pneumonia.  3.  Small bilateral pleural effusions with overlying atelectasis.  4.  1.3 cm RIGHT adrenal gland nodule, likely adenoma.  This report was finalized on 07/07/2020 17:09 by Dr. Tristan Benitez MD.     XR Chest 1 View [[id number]] Collected:  07/07/20 4504        Updated:  07/07/20 1617     Narrative:        Frontal upright radiograph of the chest 7/7/2020 4:10 PM      Chest 1 View [[id number]] Collected:  07/07/20 1614        Updated:  07/07/20 1617     Narrative:        Frontal upright radiograph of the chest 7/7/2020 4:10 PM CDT     COMPARISON: May 9, 2019.     HISTORY: Short of air.     FINDINGS:   Hyperinflation flattening diaphragms noted. Chronic changes noted in the  pulmonary parenchyma. There are no air space filling infiltrates.. The  cardiac silhouette is normal. Vascular calcifications present in the  aortic arch..      The osseous structures and surrounding soft tissues demonstrate no acute  abnormality.        Impression:        1. COPD no acute cardiopulmonary process.        This report was finalized on 07/07/2020 16:14 by Dr. Zeb Gardner MD.          I have personally reviewed and interpreted the radiology studies and ECG obtained at time of admission  Assessment:       Active Hospital Problems     Diagnosis   • COPD with acute exacerbation (CMS/HCC)   • Adrenal nodule (CMS/HCC)   • History of colon cancer   • Pneumonia   • Acute on chronic respiratory failure with hypoxia (CMS/HCC)   • COPD, very severe (CMS/HCC)   • Pulmonary emphysema (CMS/HCC)      Plan:   1.  IV Azithromycin and Rocephin  2.  IV Solumedrol  3.  Supp 02; goal 02 sats 88-94%  4.  Scheduled nebs q4hrs  5.  Mucinex  6.  Respiratory culture and PCR panel  7.  Strep pneumo and Legionella Ag  8.  Lovenox PPx  9.  Flutter valve and incentive spirometry  10.  May require PT and OT assessment  11.  Nutrition consultation  12.  Workup ongoing        Wiliam Byrd MD   07/07/20   18:01      Temp  Pulse  Resp  BP  Device (Oxygen Therapy)  Flow (L/min)  SpO2    07/08/20 0620  --  92  16  --  humidified;high-flow nasal cannula  6  97   07/08/20 0614  --  92  16  --  humidified;high-flow nasal cannula  8   100   Flow (L/min): decreased to 6 lpm nc at 07/08/20 0614   07/08/20 0257  97.6  (36.4)  96  18  129/69  high-flow nasal cannula  8  94   07/07/20 2328  --  101  20  117/87  high-flow nasal cannula  8  95   07/07/20 2012  --  --  --  --  high-flow nasal cannula  8  --   07/07/20 1941  --  102  --  --  --  --  --   07/07/20 1933  --  110  22  --  high-flow nasal cannula;humidified  8  95   07/07/20 1831  97.9 (36.6)  94  16  106/61  nasal cannula  8  97   07/07/20 1755  98.2 (36.8)  98  18  114/78  --  8  95   07/07/20 16:03:29  --  104  22  122/73  --  8  92   07/07/20 1535  --  110  --  --  high-flow nasal cannula;humidified  8  88Abnormal    07/07/20 15:29:01  --  120  20  121/75  --  5  91   07/07/20 1525  --  --  --  --  nasal cannula  5  --   07/07/20 1522  --  --  --  --  nasal cannula  4.5  --   07/07/20 1514  --  97  18  --  nonrebreather mask  8  100   07/07/20 14:55:55  --  102  18  109/74  nonrebreather mask  8  98   07/07/20 1400  98.7 (37.                                                                                                                                                     [facility] Medicine Services   HISTORY AND PHYSICAL   Date of Admission: 7/7/2020   Primary Care Physician: Pato Cooney DO    Subjective   Chief Complaint: Shortness of breath   History of Present Illness   Patient is a 75-year-old  female with past medical history significant for chronic respiratory failure (on 4-1/2 L by nasal cannula continuously), chronic obstructive pulmonary disease, and history of restless leg syndrome the presented to our hospital with a chief complaint of shortness of breath. Patient reports that she has been having symptoms for the past 2 weeks that have progressively gotten worse. She denies any fevers or chills. She denies any sick contacts. In fact, she states that she has been staying home almost exclusively given the ongoing coronavirus pandemic. She does report a cough that is started to become more productive of thick,  "viscous, and tan-colored sputum. She states that she has not smoked \"in about a year.\" Recently, even with activity she has noticed that her O2 saturations will drop, and in fact states that her O2 sats hits 71% with activity earlier today which prompted her to come to our facility. She has not been on any antibiotics recently. She states that she has not had her Symbicort refilled \"in a while.\" She states that she did try Trelegy but could not appreciate much benefit and no longer takes this medication. She does use nebulizer treatments on an outpatient basis 3 times daily, but those have not been improving her symptoms. She denies any chest pain or chest pressure. She reports that she has not followed by pulmonologist. She does not think she has ever had pulmonary function test.   Review of Systems   Otherwise complete ROS reviewed and negative except as mentioned in the HPI.   Past Medical History:    Medical History        Past Medical History:   Diagnosis Date   • Cancer (CMS/[facility]), colon    • Chronic respiratory failure (CMS/[facility]), 4 L nasal cannula continuously    • COPD (chronic obstructive pulmonary disease) (CMS/[facility])    • Restless leg syndrome                        Current Facility-Administered Medications   Medication Dose Route Frequency Provider Last Rate Last Dose   • acetaminophen (TYLENOL) tablet 650 mg  650 mg Oral Q4H PRN Wiliam Byrd MD       • AZITHROMYCIN 500 MG/250 ML 0.9% NS IVPB (vial-mate)  500 mg Intravenous Q24H Wiliam Byrd MD   500 mg at 07/07/20 2010   • budesonide-formoterol (SYMBICORT) 160-4.5 MCG/ACT inhaler 2 puff  2 puff Inhalation BID - RT Wiliam Byrd MD       • cefTRIAXone (ROCEPHIN) 1 g/100 mL 0.9% NS (MBP)  1 g Intravenous Q24H Wiliam Byrd MD   1 g at 07/07/20 2010   • enoxaparin (LOVENOX) syringe 40 mg  40 mg Subcutaneous Q24H Wiliam Byrd MD   40 mg at 07/07/20 2010   • fluticasone (FLONASE) 50 MCG/ACT nasal spray 2 spray  2 spray " Each Nare Daily Wiliam Byrd MD       • guaiFENesin (MUCINEX) 12 hr tablet 1,200 mg  1,200 mg Oral Q12H Wiliam Byrd MD   1,200 mg at 07/07/20 2010   • ipratropium-albuterol (DUO-NEB) nebulizer solution 3 mL  3 mL Nebulization Q4H - RT Wiliam Byrd MD   3 mL at 07/08/20 0614   • lactated ringers infusion  75 mL/hr Intravenous Continuous Wiliam Byrd MD 75 mL/hr at 07/07/20 1834 75 mL/hr at 07/07/20 1834   • methylPREDNISolone sodium succinate (SOLU-Medrol) injection 80 mg  80 mg Intravenous Q8H Wiliam Byrd MD   80 mg at 07/08/20 0457   • ondansetron (ZOFRAN) injection 4 mg  4 mg Intravenous Q6H PRN Wiliam Byrd MD       • rOPINIRole (REQUIP) tablet 2 mg  2 mg Oral Nightly Wiliam Byrd MD   2 mg at 07/07/20 2010   • sodium chloride 0.9 % flush 10 mL  10 mL Intravenous PRN Wiliam Byrd MD       • sodium chloride 0.9 % flush 10 mL  10 mL Intravenous Q12H Wiliam Byrd MD   10 mL at 07/07/20 2010   • sodium chloride 0.9 % flush 10 mL  10 mL Intravenous PRN Wiliam Byrd MD       • sodium chloride nasal spray 2 spray  2 spray Nasal PRN Wiliam Byrd MD

## 2020-07-08 NOTE — PROGRESS NOTES
Discharge Planning Assessment  [facility]     Patient Name: Heide Newsome  MRN: [id number]  Today's Date: 7/8/2020    Admit Date: 7/7/2020    Discharge Needs Assessment     Row Name 07/08/20 1106       Living Environment    Lives With  spouse    Name(s) of Who Lives With Patient  Spouse- Myke and son stays with them to assist    Current Living Arrangements  home/apartment/condo    Primary Care Provided by  self;spouse/significant other;child(nomi)    Provides Primary Care For  no one, unable/limited ability to care for self    Family Caregiver if Needed  spouse;child(nomi), adult    Quality of Family Relationships  supportive;involved;helpful    Able to Return to Prior Arrangements  yes       Resource/Environmental Concerns    Resource/Environmental Concerns  none    Transportation Concerns  car, none       Transition Planning    Patient/Family Anticipates Transition to  home with family;home with help/services    Patient/Family Anticipated Services at Transition  home health care    Transportation Anticipated  family or friend will provide       Discharge Needs Assessment    Readmission Within the Last 30 Days  no previous admission in last 30 days    Concerns to be Addressed  basic needs    Equipment Currently Used at Home  nebulizer;oxygen;bath bench;commode;shower chair;rollator    Anticipated Changes Related to Illness  none    Equipment Needed After Discharge  none    Outpatient/Agency/Support Group Needs  homecare agency    Discharge Facility/Level of Care Needs  home with home health    Current Discharge Risk  chronically ill        Discharge Plan     Row Name 07/08/20 1101       Plan    Plan  Home Health    Patient/Family in Agreement with Plan  yes    Plan Comments  Spoke with pt to assess for home needs. Pt has RX coverage/PCP.  Pt saying she lives with spouse and son(which assist both pt and spouse) and plans same.  Pt is interested in HH care to follow at home. Pt has needed DME to include O2 4.5L  via [employer].  Will follow.         Destination      Coordination has not been started for this encounter.      Durable Medical Equipment      Coordination has not been started for this encounter.      Dialysis/Infusion      Coordination has not been started for this encounter.      Home Medical Care      Coordination has not been started for this encounter.      Therapy      Coordination has not been started for this encounter.      Community Resources      Coordination has not been started for this encounter.          Demographic Summary    No documentation.       Functional Status    No documentation.       Psychosocial    No documentation.       Abuse/Neglect    No documentation.       Legal    No documentation.       Substance Abuse    No documentation.       Patient Forms    No documentation.           XAVIER Erickson

## 2020-07-08 NOTE — SIGNIFICANT NOTE
RT went over COPD education, use of MDI spacer, incentive spirometer and home nebulizer.  Spoke to Dr. Byrd about this pt.  Pt is not compliant with home devices as far as COPD care.      Heather Irizarry, RRT

## 2020-07-08 NOTE — PLAN OF CARE
Problem: Patient Care Overview  Goal: Plan of Care Review  Outcome: Ongoing (interventions implemented as appropriate)  Flowsheets (Taken 7/8/2020 9973)  Progress: no change  Plan of Care Reviewed With: patient  Outcome Summary: Patient has no c/o pain. On 8L high flow, O2 sat 97%. Patient SOB with exertion. S/ST  on tele. Continues to receive IVF. VSS. Safety maintained. Will continue to monitor.

## 2020-07-09 LAB
BACTERIA SPEC RESP CULT: NORMAL
GRAM STN SPEC: NORMAL

## 2020-07-09 PROCEDURE — 97162 PT EVAL MOD COMPLEX 30 MIN: CPT

## 2020-07-09 PROCEDURE — 97165 OT EVAL LOW COMPLEX 30 MIN: CPT | Performed by: OCCUPATIONAL THERAPIST

## 2020-07-09 PROCEDURE — 25010000002 METHYLPREDNISOLONE PER 40 MG: Performed by: INTERNAL MEDICINE

## 2020-07-09 PROCEDURE — 94799 UNLISTED PULMONARY SVC/PX: CPT

## 2020-07-09 PROCEDURE — 25010000002 CEFTRIAXONE PER 250 MG: Performed by: INTERNAL MEDICINE

## 2020-07-09 PROCEDURE — 97110 THERAPEUTIC EXERCISES: CPT

## 2020-07-09 PROCEDURE — 25010000002 ENOXAPARIN PER 10 MG: Performed by: INTERNAL MEDICINE

## 2020-07-09 RX ORDER — AZITHROMYCIN 250 MG/1
500 TABLET, FILM COATED ORAL EVERY 24 HOURS
Status: COMPLETED | OUTPATIENT
Start: 2020-07-09 | End: 2020-07-11

## 2020-07-09 RX ORDER — METHYLPREDNISOLONE SODIUM SUCCINATE 40 MG/ML
40 INJECTION, POWDER, LYOPHILIZED, FOR SOLUTION INTRAMUSCULAR; INTRAVENOUS EVERY 12 HOURS
Status: DISCONTINUED | OUTPATIENT
Start: 2020-07-09 | End: 2020-07-11

## 2020-07-09 RX ADMIN — GUAIFENESIN 1200 MG: 600 TABLET, EXTENDED RELEASE ORAL at 09:43

## 2020-07-09 RX ADMIN — IPRATROPIUM BROMIDE AND ALBUTEROL SULFATE 3 ML: 2.5; .5 SOLUTION RESPIRATORY (INHALATION) at 06:14

## 2020-07-09 RX ADMIN — METHYLPREDNISOLONE SODIUM SUCCINATE 40 MG: 40 INJECTION, POWDER, FOR SOLUTION INTRAMUSCULAR; INTRAVENOUS at 06:41

## 2020-07-09 RX ADMIN — ENOXAPARIN SODIUM 40 MG: 40 INJECTION SUBCUTANEOUS at 20:16

## 2020-07-09 RX ADMIN — NYSTATIN 500000 UNITS: 100000 SUSPENSION ORAL at 12:59

## 2020-07-09 RX ADMIN — ROPINIROLE HYDROCHLORIDE 1 MG: 1 TABLET, FILM COATED ORAL at 09:43

## 2020-07-09 RX ADMIN — METHYLPREDNISOLONE SODIUM SUCCINATE 40 MG: 40 INJECTION, POWDER, FOR SOLUTION INTRAMUSCULAR; INTRAVENOUS at 18:26

## 2020-07-09 RX ADMIN — IPRATROPIUM BROMIDE AND ALBUTEROL SULFATE 3 ML: 2.5; .5 SOLUTION RESPIRATORY (INHALATION) at 22:48

## 2020-07-09 RX ADMIN — BUDESONIDE AND FORMOTEROL FUMARATE DIHYDRATE 2 PUFF: 160; 4.5 AEROSOL RESPIRATORY (INHALATION) at 06:20

## 2020-07-09 RX ADMIN — BUDESONIDE AND FORMOTEROL FUMARATE DIHYDRATE 2 PUFF: 160; 4.5 AEROSOL RESPIRATORY (INHALATION) at 18:27

## 2020-07-09 RX ADMIN — IPRATROPIUM BROMIDE AND ALBUTEROL SULFATE 3 ML: 2.5; .5 SOLUTION RESPIRATORY (INHALATION) at 03:01

## 2020-07-09 RX ADMIN — CEFTRIAXONE SODIUM 1 G: 1 INJECTION, POWDER, FOR SOLUTION INTRAMUSCULAR; INTRAVENOUS at 20:16

## 2020-07-09 RX ADMIN — GUAIFENESIN 1200 MG: 600 TABLET, EXTENDED RELEASE ORAL at 20:17

## 2020-07-09 RX ADMIN — NYSTATIN 500000 UNITS: 100000 SUSPENSION ORAL at 20:17

## 2020-07-09 RX ADMIN — SODIUM CHLORIDE, PRESERVATIVE FREE 10 ML: 5 INJECTION INTRAVENOUS at 09:43

## 2020-07-09 RX ADMIN — ROPINIROLE HYDROCHLORIDE 1 MG: 1 TABLET, FILM COATED ORAL at 20:17

## 2020-07-09 RX ADMIN — IPRATROPIUM BROMIDE AND ALBUTEROL SULFATE 3 ML: 2.5; .5 SOLUTION RESPIRATORY (INHALATION) at 10:40

## 2020-07-09 RX ADMIN — NYSTATIN 500000 UNITS: 100000 SUSPENSION ORAL at 18:26

## 2020-07-09 RX ADMIN — AZITHROMYCIN 500 MG: 250 TABLET, FILM COATED ORAL at 20:17

## 2020-07-09 RX ADMIN — SODIUM CHLORIDE, PRESERVATIVE FREE 10 ML: 5 INJECTION INTRAVENOUS at 20:17

## 2020-07-09 RX ADMIN — IPRATROPIUM BROMIDE AND ALBUTEROL SULFATE 3 ML: 2.5; .5 SOLUTION RESPIRATORY (INHALATION) at 18:19

## 2020-07-09 NOTE — PROGRESS NOTES
Malnutrition Severity Assessment    Patient Name:  Heide Newsome  YOB: 1945  MRN: [id number]  Admit Date:  7/7/2020    Patient meets criteria for : Severe Malnutrition(Secondary Sign: generalized weakness)    Comments:  If in agreement with assessment please attest.    Malnutrition Severity Assessment  Malnutrition Type: Chronic Disease - Related Malnutrition     Malnutrition Type (last 8 hours)      Malnutrition Severity Assessment     Row Name 07/09/20 1147       Malnutrition Severity Assessment    Malnutrition Type  Chronic Disease - Related Malnutrition    Row Name 07/09/20 1147       Insufficient Energy Intake     Insufficient Energy Intake Findings  Severe    Insufficient Energy Intake   <75% of est. energy requirement for > or equal to 1 month    Row Name 07/09/20 1147       Muscle Loss    Loss of Muscle Mass Findings  Moderate    [place]  Moderate - slight depression    Dorsal Hand Region  Moderate - slight depression    Patellar Region  Moderate - patella more prominent, less muscle definition around patella    Anterior Thigh Region  Moderate - mild depression on inner thigh    Posterior Calf Region  Moderate - some roundness, slight firmness    Row Name 07/09/20 1147       Fat Loss    Subcutaneous Fat Loss Findings  Severe    Orbital Region   Severe - pronounced hollowness/depression, dark circles, loose saggy skin    Upper Arm Region  Moderate - some fat tissue, not ample    Row Name 07/09/20 1147       Criteria Met (Must meet criteria for severity in at least 2 of these categories: M Wasting, Fat Loss, Fluid, Secondary Signs, Wt. Status, Intake)    Patient meets criteria for   Severe Malnutrition Secondary Sign: generalized weakness        Electronically signed by:  Cindi Canales MS,RDN,LD  07/09/20 11:56

## 2020-07-09 NOTE — THERAPY EVALUATION
Patient Name: Heide Newsome  : 1945    MRN: [id number]                              Today's Date: 2020       Admit Date: 2020    Visit Dx:     ICD-10-CM ICD-9-CM   1. COPD with acute exacerbation (CMS/HCC) J44.1 491.21   2. Hypoxia R09.02 799.02   3. Pneumonia of right lower lobe due to infectious organism J18.9 486   4. Adrenal nodule (CMS/HCC) E27.8 255.8   5. Pleural effusion J90 511.9   6. Decreased activities of daily living (ADL) Z78.9 V49.89   7. Impaired functional mobility and endurance Z74.09 V49.89     Patient Active Problem List   Diagnosis   • Pulmonary emphysema (CMS/HCC)   • Acute on chronic respiratory failure with hypoxia (CMS/HCC)   • COPD, very severe (CMS/HCC)   • Severe malnutrition (CMS/HCC)   • History of colon cancer   • Thrombocytopenia (CMS/HCC)   • Anemia   • Hyperglycemia, drug-induced   • Pneumonia   • Chronic respiratory failure with hypoxia (CMS/HCC)   • COPD with acute exacerbation (CMS/HCC)   • Adrenal nodule (CMS/HCC)     Past Medical History:   Diagnosis Date   • Cancer (CMS/HCC), colon    • Chronic respiratory failure (CMS/HCC), 4 L nasal cannula continuously    • COPD (chronic obstructive pulmonary disease) (CMS/HCC)    • Restless leg syndrome      Past Surgical History:   Procedure Laterality Date   • APPENDECTOMY     • CARPAL TUNNEL RELEASE Left    • COLON RESECTION     • FOOT SURGERY Bilateral     hammer toe   • FRACTURE SURGERY Left     Arm   • HYSTERECTOMY     • WRIST FRACTURE SURGERY Right      General Information     Row Name 20 0838          PT Evaluation Time/Intention    Document Type  evaluation 75 y.o reports to ED with SOB, wears 4.5L of O2 at all times; PMH chronic respiratory failure, COPD, restless les syndrome   -SB (r) EB (t) SB (c)     Mode of Treatment  physical therapy  -SB (r) EB (t) SB (c)     Row Name 20 0842          General Information    Patient Profile Reviewed?  yes  -SB (r) EB (t) SB (c)     Prior Level of Function   independent:;gait;grooming;dressing;feeding;bathing;dependent:;driving;cleaning;cooking;shopping  -SB (r) EB (t) SB (c)     Existing Precautions/Restrictions  fall;oxygen therapy device and L/min 5.5L   -SB (r) EB (t) SB (c)     Barriers to Rehab  medically complex  -SB (r) EB (t) SB (c)     Row Name 07/09/20 0838          Relationship/Environment    Lives With  spouse;child(nomi), adult  -SB (r) EB (t) SB (c)     Name(s) of Who Lives With Patient  Myke   -SB (r) EB (t) SB (c)     Row Name 07/09/20 0838          Resource/Environmental Concerns    Current Living Arrangements  home/apartment/condo shower chair, tub, grab bars, removable shower, BSC, rollator, RW  -SB (r) EB (t) SB (c)     Row Name 07/09/20 0838          Home Main Entrance    Number of Stairs, Main Entrance  two  -SB (r) EB (t) SB (c)     Stair Railings, Main Entrance  railings on both sides of stairs  -SB (r) EB (t) SB (c)     Row Name 07/09/20 0838          Stairs Within Home, Primary    Number of Stairs, Within Home, Primary  none  -SB (r) EB (t) SB (c)     Row Name 07/09/20 0838          Cognitive Assessment/Intervention- PT/OT    Orientation Status (Cognition)  oriented x 4  -SB (r) EB (t) SB (c)     Row Name 07/09/20 0838          Safety Issues, Functional Mobility    Safety Issues Affecting Function (Mobility)  awareness of need for assistance;insight into deficits/self awareness  -SB (r) EB (t) SB (c)     Impairments Affecting Function (Mobility)  balance;shortness of breath;endurance/activity tolerance;strength  -SB (r) EB (t) SB (c)       User Key  (r) = Recorded By, (t) = Taken By, (c) = Cosigned By    Initials Name Provider Type    Nataliya Recio, PT DPT Physical Therapist    Patrica Ayala, PT Student PT Student        Mobility     Row Name 07/09/20 0838          Bed Mobility Assessment/Treatment    Bed Mobility Assessment/Treatment  supine-sit;scooting/bridging;sit-supine  -SB (r) EB (t) SB (c)     Scooting/Bridging [place] (Bed  Mobility)  supervision  -SB (r) EB (t) SB (c)     Supine-Sit [place] (Bed Mobility)  supervision  -SB (r) EB (t) SB (c)     Sit-Supine [place] (Bed Mobility)  supervision  -SB (r) EB (t) SB (c)     Assistive Device (Bed Mobility)  head of bed elevated  -SB (r) EB (t) SB (c)     Row Name 07/09/20 0838          Transfer Assessment/Treatment    Comment (Transfers)  With increasing SOB, Pt became more unsafe and unsteady throughout transfer  -SB (r) EB (t) SB (c)     Row Name 07/09/20 0838          Bed-Chair Transfer    Bed-Chair [place] (Transfers)  contact guard;verbal cues  -SB (r) EB (t) SB (c)     [facility] Name 07/09/20 08          Sit-Stand Transfer    Sit-Stand [place] (Transfers)  contact guard;verbal cues  -SB (r) EB (t) SB (c)       User Key  (r) = Recorded By, (t) = Taken By, (c) = Cosigned By    Initials Name Provider Type    SB Nataliya Tucker, PT DPT Physical Therapist    Patrica Ayala, PT Student PT Student        Obj/Interventions     Row Name 07/09/20 0838          General ROM    GENERAL ROM COMMENTS  BLE WFL   -SB (r) EB (t) SB (c)     [facility] Name 07/09/20 0838          MMT (Manual Muscle Testing)    General MMT Comments  B hip 4-/5, B knee flexion/extension 4+/5, B DF/PF 5/5   -SB (r) EB (t) SB (c)     Row Name 07/09/20 0838          Static Sitting Balance    Level of [place] (Unsupported Sitting, Static Balance)  independent  -SB (r) EB (t) SB (c)     Sitting Position (Unsupported Sitting, Static Balance)  sitting on edge of bed  -SB (r) EB (t) SB (c)     Time Able to Maintain Position (Unsupported Sitting, Static Balance)  45 to 60 seconds  -SB (r) EB (t) SB (c)     Comment (Unsupported Sitting, Static Balance)  needed rest break between each MMT, cued for deep breathes; rested in supine before starting UE assessment   -SB (r) EB (t) SB (c)     Row Name 07/09/20 0838          Static Standing Balance    Level of [place] (Supported Standing, Static Balance)  contact guard  assist  -SB (r) EB (t) SB (c)     Time Able to Maintain Position (Supported Standing, Static Balance)  less than 15 seconds  -SB (r) EB (t) SB (c)     Row Name 07/09/20 0838          Sensory Assessment/Intervention    Sensory General Assessment  no sensation deficits identified  -SB (r) EB (t) SB (c)       User Key  (r) = Recorded By, (t) = Taken By, (c) = Cosigned By    Initials Name Provider Type    Nataliya Recio, PT DPT Physical Therapist    Patrica Ayala, PT Student PT Student        Goals/Plan     Row Name 07/09/20 0838          Bed Mobility Goal 1 (PT)    Activity/Assistive Device (Bed Mobility Goal 1, PT)  bed mobility activities, all  -SB (r) EB (t) SB (c)     [place] Level/Cues Needed (Bed Mobility Goal 1, PT)  independent  -SB (r) EB (t) SB (c)     Time Frame (Bed Mobility Goal 1, PT)  long term goal (LTG)  -SB (r) EB (t) SB (c)     Progress/Outcomes (Bed Mobility Goal 1, PT)  goal ongoing  -SB (r) EB (t) SB (c)     Row Name 07/09/20 0838          Transfer Goal 1 (PT)    Activity/Assistive Device (Transfer Goal 1, PT)  bed-to-chair/chair-to-bed;commode, bedside  -SB (r) EB (t) SB (c)     [place] Level/Cues Needed (Transfer Goal 1, PT)  supervision required  -SB (r) EB (t) SB (c)     Time Frame (Transfer Goal 1, PT)  long term goal (LTG)  -SB (r) EB (t) SB (c)     Progress/Outcome (Transfer Goal 1, PT)  goal ongoing  -SB (r) EB (t) SB (c)     Row Name 07/09/20 0838          Gait Training Goal 1 (PT)    Activity/Assistive Device (Gait Training Goal 1, PT)  gait (walking locomotion);improve balance and speed;increase endurance/gait distance;increase energy conservation  -SB (r) EB (t) SB (c)     [place] Level (Gait Training Goal 1, PT)  minimum assist (75% or more patient effort)  -SB (r) EB (t) SB (c)     Distance (Gait Goal 1, PT)  20 ft  -SB (r) EB (t) SB (c)     Time Frame (Gait Training Goal 1, PT)  long term goal (LTG)  -SB (r) EB (t) SB (c)     Progress/Outcome (Gait Training Goal  1, PT)  goal ongoing  -SB (r) EB (t) SB (c)       User Key  (r) = Recorded By, (t) = Taken By, (c) = Cosigned By    Initials Name Provider Type    Nataliya Recio, PT DPT Physical Therapist    Patrica Ayala, PT Student PT Student        Clinical Impression     Row Name 07/09/20 0838          Pain Assessment    Additional Documentation  Pain Scale: Numbers Pre/Post-Treatment (Group)  -SB (r) EB (t) SB (c)     Row Name 07/09/20 0838          Pain Scale: Numbers Pre/Post-Treatment    Pain Scale: Numbers, Pretreatment  3/10  -SB (r) EB (t) SB (c)     Pain Location - Side  Bilateral  -SB (r) EB (t) SB (c)     Pain Location - Orientation  lower  -SB (r) EB (t) SB (c)     Pain Location  extremity  -SB (r) EB (t) SB (c)     Pre/Post Treatment Pain Comment  pain in legs due to resetless leg syndrome, headache, light headed when turning in bed   -SB (r) EB (t) SB (c)     Pain Intervention(s)  Medication (See MAR);Repositioned;Ambulation/increased activity  -SB (r) EB (t) SB (c)     Row Name 07/09/20 0896          Plan of Care Review    Plan of Care Reviewed With  patient  -SB (r) EB (t) SB (c)     Progress  no change  -SB (r) EB (t) SB (c)     Outcome Summary  PT eval completed. Pt is A&Ox4. Descibes her pain a 3/10, mostly in B legs due to restless leg syndrome, no complaints of SOB in supine. Pt presents with decreased strength, endurance and balance. Pt sat EOB with supervision with HOB elevated. Pt needed 3-4 rest breaks during MMT and ROM assessment due to SOB. Pt returned to supine for recovery. Supervision given for all bed mobility. CGA for sit to stand and transfered to BSC using small , unsteady steps. As Pts demands of breathing increased, verbal cueing and assits levels increased due to Pt becoming unsafe. O2 dropped to 84% while on BSC. Pt would benefit from skilled PT in order to improve activity tolerance, balance and decrease risk of falls. Recommened d/c home with assist and HH vs. short term rehab.    -SB (r) EB (t) SB (c)     Row Name 07/09/20 0838          Physical Therapy Clinical Impression    Patient/Family Goals Statement (PT Clinical Impression)  return home, ambulate to normal bathroom independently   -SB (r) EB (t) SB (c)     Criteria for Skilled Interventions Met (PT Clinical Impression)  yes;treatment indicated  -SB (r) EB (t) SB (c)     Rehab Potential (PT Clinical Summary)  good, to achieve stated therapy goals  -SB (r) EB (t) SB (c)     Predicted Duration of Therapy (PT)  until d/c  -SB (r) EB (t) SB (c)     Row Name 07/09/20 0838          Vital Signs    Pretreatment Heart Rate (beats/min)  109  -SB (r) EB (t) SB (c)     Intratreatment Heart Rate (beats/min)  140  -SB (r) EB (t) SB (c)     Posttreatment Heart Rate (beats/min)  117  -SB (r) EB (t) SB (c)     Pre SpO2 (%)  98  -SB (r) EB (t) SB (c)     O2 Delivery Pre Treatment  supplemental O2 5.5L  -SB (r) EB (t) SB (c)     Intra SpO2 (%)  (S) 84  -SB (r) EB (t) SB (c)     O2 Delivery Intra Treatment  supplemental O2  -SB (r) EB (t) SB (c)     Post SpO2 (%)  96  -SB (r) EB (t) SB (c)     O2 Delivery Post Treatment  supplemental O2  -SB (r) EB (t) SB (c)     Pre Patient Position  Supine  -SB (r) EB (t) SB (c)     Intra Patient Position  Supine  -SB (r) EB (t) SB (c)     Post Patient Position  Supine  -SB (r) EB (t) SB (c)     Recovery Time  1-3 minutes in supine   -SB (r) EB (t) SB (c)     Row Name 07/09/20 0838          Positioning and Restraints    Pre-Treatment Position  in bed  -SB (r) EB (t) SB (c)     Post Treatment Position  bed  -SB (r) EB (t) SB (c)     In Bed  supine;fowlers;call light within reach;encouraged to call for assist;side rails up x2  -SB (r) EB (t) SB (c)       User Key  (r) = Recorded By, (t) = Taken By, (c) = Cosigned By    Initials Name Provider Type    Nataliya Recio, PT DPT Physical Therapist    Patrica Ayala, PT Student PT Student        Outcome Measures     Row Name 07/09/20 0838          How much help from another  person do you currently need...    Turning from your back to your side while in flat bed without using bedrails?  4  -SB (r) EB (t) SB (c)     Moving from lying on back to sitting on the side of a flat bed without bedrails?  4  -SB (r) EB (t) SB (c)     Moving to and from a bed to a chair (including a wheelchair)?  3  -SB (r) EB (t) SB (c)     Standing up from a chair using your arms (e.g., wheelchair, bedside chair)?  4  -SB (r) EB (t) SB (c)     Climbing 3-5 steps with a railing?  2  -SB (r) EB (t) SB (c)     To walk in hospital room?  3  -SB (r) EB (t) SB (c)     AM-PAC 6 Clicks Score (PT)  20  -SB (r) EB (t)     Row Name 07/09/20 0838          Functional Assessment    Outcome Measure Options  AM-PAC 6 Clicks Basic Mobility (PT)  -SB (r) EB (t) SB (c)       User Key  (r) = Recorded By, (t) = Taken By, (c) = Cosigned By    Initials Name Provider Type    Nataliya Recio, PT DPT Physical Therapist    Patrica Ayala, ELVIN Student PT Student        Physical Therapy Education                 Title: PT OT SLP Therapies (Not Started)     Topic: Physical Therapy (In Progress)     Point: Mobility training (Done)     Description:   Instruct learner(s) on safety and technique for assisting patient out of bed, chair or wheelchair.  Instruct in the proper use of assistive devices, such as walker, crutches, cane or brace.              Patient Friendly Description:   It's important to get you on your feet again, but we need to do so in a way that is safe for you. Falling has serious consequences, and your personal safety is the most important thing of all.        When it's time to get out of bed, one of us or a family member will sit next to you on the bed to give you support.     If your doctor or nurse tells you to use a walker, crutches, a cane, or a brace, be sure you use it every time you get out of bed, even if you think you don't need it.    Learning Progress Summary           Patient Acceptance, E, VU by DEANGELO at 7/9/2020  [id number]    Comment:  Pt educated on importance of getting EOB for meals;POC; d/c planning                   Point: Home exercise program (Not Started)     Description:   Instruct learner(s) on appropriate technique for monitoring, assisting and/or progressing patient with therapeutic exercises and activities.              Learner Progress:   Not documented in this visit.          Point: Body mechanics (Not Started)     Description:   Instruct learner(s) on proper positioning and spine alignment for patient and/or caregiver during mobility tasks and/or exercises.              Learner Progress:   Not documented in this visit.          Point: Precautions (Not Started)     Description:   Instruct learner(s) on prescribed precautions during mobility and gait tasks              Learner Progress:   Not documented in this visit.                      User Key     Initials Effective Dates Name Provider Type Discipline     06/23/20 -  Patrica Cunningham, PT Student PT Student PT              PT Recommendation and Plan  Planned Therapy Interventions (PT Eval): balance training, bed mobility training, transfer training, gait training, home exercise program, patient/family education, strengthening  Outcome Summary/Treatment Plan (PT)  Anticipated Discharge Disposition (PT): home with home health, home with assist, skilled nursing facility  Plan of Care Reviewed With: patient  Progress: no change  Outcome Summary: PT eval completed. Pt is A&Ox4. Descibes her pain a 3/10, mostly in B legs due to restless leg syndrome, no complaints of SOB in supine. Pt presents with decreased strength, endurance and balance. Pt sat EOB with supervision with HOB elevated. Pt needed 3-4 rest breaks during MMT and ROM assessment due to SOB. Pt returned to supine for recovery. Supervision given for all bed mobility. CGA for sit to stand and transfered to BSC using small , unsteady steps. As Pts demands of breathing increased, verbal cueing and assits levels  increased due to Pt becoming unsafe. O2 dropped to 84% while on BSC. Pt would benefit from skilled PT in order to improve activity tolerance, balance and decrease risk of falls. Recommened d/c home with assist and HH vs. short term rehab.     Time Calculation:   PT Charges     Row Name 07/09/20 1341             Time Calculation    Start Time  0838 11 minute chart review, for a total of 53 minutes  -SB (r) EB (t) SB (c)      Stop Time  0920  -SB (r) EB (t) SB (c)      Time Calculation (min)  42 min  -SB (r) EB (t)      PT Received On  07/09/20  -SB (r) EB (t) SB (c)      PT Goal Re-Cert Due Date  07/19/20  -SB (r) EB (t) SB (c)        User Key  (r) = Recorded By, (t) = Taken By, (c) = Cosigned By    Initials Name Provider Type    Nataliya Recio, PT DPT Physical Therapist    Patrica Ayala, PT Student PT Student        Therapy Charges for Today     Code Description Service Date Service Provider Modifiers Qty    40660065047 HC PT EVAL MOD COMPLEXITY 4 7/9/2020 Patrica Cunningham, PT Student GP 1          PT G-Codes  Outcome Measure Options: AM-PAC 6 Clicks Daily Activity (OT)  AM-PAC 6 Clicks Score (PT): 20  AM-PAC 6 Clicks Score (OT): 20    Patrica Cunningham PT Student  7/9/2020

## 2020-07-09 NOTE — THERAPY TREATMENT NOTE
Acute Care - Physical Therapy Treatment Note  [facility]     Patient Name: Heide Newsome  : 1945  MRN: [id number]  Today's Date: 2020  Onset of Illness/Injury or Date of Surgery: 20     Referring Physician: Dr. Byrd    Admit Date: 2020    Visit Dx:    ICD-10-CM ICD-9-CM   1. COPD with acute exacerbation (CMS/HCC) J44.1 491.21   2. Hypoxia R09.02 799.02   3. Pneumonia of right lower lobe due to infectious organism J18.9 486   4. Adrenal nodule (CMS/HCC) E27.8 255.8   5. Pleural effusion J90 511.9   6. Decreased activities of daily living (ADL) Z78.9 V49.89   7. Impaired functional mobility and endurance Z74.09 V49.89     Patient Active Problem List   Diagnosis   • Pulmonary emphysema (CMS/HCC)   • Acute on chronic respiratory failure with hypoxia (CMS/HCC)   • COPD, very severe (CMS/HCC)   • Severe malnutrition (CMS/HCC)   • History of colon cancer   • Thrombocytopenia (CMS/HCC)   • Anemia   • Hyperglycemia, drug-induced   • Pneumonia   • Chronic respiratory failure with hypoxia (CMS/HCC)   • COPD with acute exacerbation (CMS/HCC)   • Adrenal nodule (CMS/HCC)       Therapy Treatment    Rehabilitation Treatment Summary     Row Name 20 1533             Treatment Time/Intention    Discipline  physical therapy assistant  -      Document Type  therapy note (daily note)  -      Subjective Information  complains of;weakness;dyspnea  -LC2      Mode of Treatment  physical therapy  -      Existing Precautions/Restrictions  fall;oxygen therapy device and L/min  -2      Recorded by [LC] Alice Lundberg, PTA 20 1534  [LC2] Alice Lundberg, PTA 20 1549      Row Name 20 1533             Bed Mobility Assessment/Treatment    Scooting/Bridging [place] (Bed Mobility)  supervision  -      Supine-Sit [place] (Bed Mobility)  supervision  -      Sit-Supine [place] (Bed Mobility)  supervision  -      Assistive Device (Bed Mobility)  head of bed elevated  -       Recorded by [LC] Alice Lundberg, PTA 07/09/20 1549      Row Name 07/09/20 1533             Motor Skills Assessment/Interventions    Additional Documentation  Therapeutic Exercise (Group);Therapeutic Exercise Interventions (Group)  -LC      Recorded by [] Alice Lundberg, PTA 07/09/20 1549      Row Name 07/09/20 1533             Therapeutic Exercise    Lower Extremity (Therapeutic Exercise)  LAQ (long arc quad), bilateral;marching while seated  -LC      Lower Extremity Range of Motion (Therapeutic Exercise)  hip abduction/adduction, bilateral;ankle dorsiflexion/plantar flexion, bilateral  -      Exercise Type (Therapeutic Exercise)  AROM (active range of motion)  -LC      Position (Therapeutic Exercise)  seated  -LC      Sets/Reps (Therapeutic Exercise)  15  -LC      Comment (Therapeutic Exercise)  pt requires frequent rest breaks in order to complete  -LC      Recorded by [] Alice Lundberg, PTA 07/09/20 1549      Row Name 07/09/20 1533             Positioning and Restraints    Pre-Treatment Position  in bed  -LC      Post Treatment Position  bed  -LC      In Bed  fowlers;call light within reach;encouraged to call for assist  -LC      Recorded by [] Alice Lundberg, [facility] 07/09/20 1549      Row Name 07/09/20 1533             Pain Scale: Numbers Pre/Post-Treatment    Pain Scale: Numbers, Pretreatment  0/10 - no pain  -      Pain Intervention(s)  Medication (See MAR)  -LC      Recorded by [] Alice Lundberg, [facility] 07/09/20 1549        User Key  (r) = Recorded By, (t) = Taken By, (c) = Cosigned By    Initials Name Effective Dates Discipline     Alice Lundberg, [facility] 10/18/19 -  PT               Rehab Goal Summary     Row Name 07/09/20 0839 07/09/20 0838          Bed Mobility Goal 1 (PT)    Activity/Assistive Device (Bed Mobility Goal 1, PT)  --  bed mobility activities, all  -SB (r) EB (t) SB (c)     [place] Level/Cues Needed (Bed Mobility Goal 1, PT)  --  independent  -SB (r) EB (t) SB (c)     Time Frame (Bed  Mobility Goal 1, PT)  --  long term goal (LTG)  -SB (r) EB (t) SB (c)     Progress/Outcomes (Bed Mobility Goal 1, PT)  --  goal ongoing  -SB (r) EB (t) SB (c)        Transfer Goal 1 (PT)    Activity/Assistive Device (Transfer Goal 1, PT)  --  bed-to-chair/chair-to-bed;commode, bedside  -SB (r) EB (t) SB (c)     [place] Level/Cues Needed (Transfer Goal 1, PT)  --  supervision required  -SB (r) EB (t) SB (c)     Time Frame (Transfer Goal 1, PT)  --  long term goal (LTG)  -SB (r) EB (t) SB (c)     Progress/Outcome (Transfer Goal 1, PT)  --  goal ongoing  -SB (r) EB (t) SB (c)        Gait Training Goal 1 (PT)    Activity/Assistive Device (Gait Training Goal 1, PT)  --  gait (walking locomotion);improve balance and speed;increase endurance/gait distance;increase energy conservation  -SB (r) EB (t) SB (c)     [place] Level (Gait Training Goal 1, PT)  --  minimum assist (75% or more patient effort)  -SB (r) EB (t) SB (c)     Distance (Gait Goal 1, PT)  --  20 ft  -SB (r) EB (t) SB (c)     Time Frame (Gait Training Goal 1, PT)  --  long term goal (LTG)  -SB (r) EB (t) SB (c)     Progress/Outcome (Gait Training Goal 1, PT)  --  goal ongoing  -SB (r) EB (t) SB (c)        Occupational Therapy Goals    Bathing Goal Selection (OT)  bathing, OT goal 1  -JJ  --     Dressing Goal Selection (OT)  dressing, OT goal 1  -JJ  --     Activity Tolerance Goal Selection (OT)  activity tolerance, OT goal 1  -JJ  --        Bathing Goal 1 (OT)    Activity/Assistive Device (Bathing Goal 1, OT)  bathing skills, all  -JJ  --     [place] Level/Cues Needed (Bathing Goal 1, OT)  set-up required;supervision required AE PRN  -JJ  --     Time Frame (Bathing Goal 1, OT)  long term goal (LTG);by discharge  -JJ  --     Progress/Outcomes (Bathing Goal 1, OT)  goal ongoing  -JJ  --        Dressing Goal 1 (OT)    Activity/Assistive Device (Dressing Goal 1, OT)  dressing skills, all  -JJ  --     [place]/Cues Needed (Dressing Goal 1, OT)   supervision required;set-up required  -JJ  --     Time Frame (Dressing Goal 1, OT)  long term goal (LTG);by discharge  -JJ  --     Progress/Outcome (Dressing Goal 1, OT)  goal ongoing  -JJ  --         Activity Tolerance Goal 1 (OT)    Activity Tolerance Goal 1 (OT)  Pt will participate in functional task in sitting for 8 minutes to address decreased activity tolerance   -JJ  --     Activity Level (Endurance Goal 1, OT)  O2 sat >/ equal to 88% 8 minutes  -JJ  --     Time Frame (Activity Tolerance Goal 1, OT)  long term goal (LTG);by discharge  -JJ  --     Progress/Outcome (Activity Tolerance Goal 1, OT)  goal ongoing  -JJ  --       User Key  (r) = Recorded By, (t) = Taken By, (c) = Cosigned By    Initials Name Provider Type Discipline    Nataliya Recio, PT DPT Physical Therapist PT    Inna Gomez, OTR/L Occupational Therapist OT    Patrica Ayala, PT Student PT Student PT          Physical Therapy Education                 Title: PT OT SLP Therapies (Not Started)     Topic: Physical Therapy (In Progress)     Point: Mobility training (Done)     Description:   Instruct learner(s) on safety and technique for assisting patient out of bed, chair or wheelchair.  Instruct in the proper use of assistive devices, such as walker, crutches, cane or brace.              Patient Friendly Description:   It's important to get you on your feet again, but we need to do so in a way that is safe for you. Falling has serious consequences, and your personal safety is the most important thing of all.        When it's time to get out of bed, one of us or a family member will sit next to you on the bed to give you support.     If your doctor or nurse tells you to use a walker, crutches, a cane, or a brace, be sure you use it every time you get out of bed, even if you think you don't need it.    Learning Progress Summary           Patient Acceptance, SUJATA VU by DEANGELO at 7/9/2020 6265    Comment:  Pt educated on importance of getting  EOB for meals;POC; d/c planning                   Point: Home exercise program (Not Started)     Description:   Instruct learner(s) on appropriate technique for monitoring, assisting and/or progressing patient with therapeutic exercises and activities.              Learner Progress:   Not documented in this visit.          Point: Body mechanics (Not Started)     Description:   Instruct learner(s) on proper positioning and spine alignment for patient and/or caregiver during mobility tasks and/or exercises.              Learner Progress:   Not documented in this visit.          Point: Precautions (Not Started)     Description:   Instruct learner(s) on prescribed precautions during mobility and gait tasks              Learner Progress:   Not documented in this visit.                      User Key     Initials Effective Dates Name Provider Type Discipline    EB 06/23/20 -  Patrica Cunningham, PT Student PT Student PT                PT Recommendation and Plan        Outcome Measures     Row Name 07/09/20 0900             How much help from another is currently needed...    Putting on and taking off regular lower body clothing?  2  -JJ      Bathing (including washing, rinsing, and drying)  3  -JJ      Toileting (which includes using toilet bed pan or urinal)  3  -JJ      Putting on and taking off regular upper body clothing  4  -JJ      Taking care of personal grooming (such as brushing teeth)  4  -JJ      Eating meals  4  -JJ      AM-PAC 6 Clicks Score (OT)  20  -JJ         Functional Assessment    Outcome Measure Options  AM-PAC 6 Clicks Daily Activity (OT)  -JJ        User Key  (r) = Recorded By, (t) = Taken By, (c) = Cosigned By    Initials Name Provider Type    JJ Inna Dooley OTR/L Occupational Therapist         Time Calculation:   PT Charges     Row Name 07/09/20 1547 07/09/20 1341          Time Calculation    Start Time  1533  -  0838 11 minute chart review, for a total of 53 minutes  -SB (r) EB (t) SB (c)      Stop Time  1546  -  0920  -SB (r) EB (t) SB (c)     Time Calculation (min)  13 min  -  42 min  -SB (r) EB (t)     PT Received On  07/09/20  -  07/09/20  -SB (r) EB (t) SB (c)     PT Goal Re-Cert Due Date  --  07/19/20  -SB (r) EB (t) SB (c)        Time Calculation- PT    Total Timed Code Minutes- PT  13 minute(s)  -  --       User Key  (r) = Recorded By, (t) = Taken By, (c) = Cosigned By    Initials Name Provider Type     Alice Lundberg, PTA Physical Therapy Assistant    Nataliya Recio, PT DPT Physical Therapist    Patrica Ayala, PT Student PT Student        Therapy Charges for Today     Code Description Service Date Service Provider Modifiers Qty    [id number] HC PT THER PROC EA 15 MIN 7/9/2020 Alice Lundberg PTA GP 1          PT G-Codes  Outcome Measure Options: AM-PAC 6 Clicks Daily Activity (OT)  AM-PAC 6 Clicks Score (PT): 20  AM-PAC 6 Clicks Score (OT): 20    Alice Lundberg PTA  7/9/2020

## 2020-07-09 NOTE — PLAN OF CARE
Problem: Patient Care Overview  Goal: Plan of Care Review  Outcome: Ongoing (interventions implemented as appropriate)  Flowsheets (Taken 7/9/2020 5947)  Progress: no change  Plan of Care Reviewed With: patient  Outcome Summary: A&O x4. Tachycardic. 7L High flow NC, WNL O2 sat. SOB on exertion. IV abx contd. Safety maintained. Will cont to monitor.

## 2020-07-09 NOTE — PLAN OF CARE
"She refused to get in to the chair when encouraged.  She stated,\" no I am too tired and I need to rest.\"  I informed her it would be in her best interest to get up and move to the chair.  She continues to make slow progress with weaning oxygen.  "

## 2020-07-09 NOTE — PLAN OF CARE
Problem: Patient Care Overview  Goal: Plan of Care Review  Flowsheets  Taken 7/9/2020 1228  Plan of Care Reviewed With: patient  Taken 7/9/2020 3206  Outcome Summary: OT eval completed. Pt is A&Ox4. Demo's decreased strength, balance, activity tolerance and increased SOB. Supervision for supine <> sit at EOB. Max A for LB dressing d/t SOB. CGA for sit <> stand t/f from EOB and to take small steps to bsc. Pt set-up with supervision for clothing mgt and perineal hygiene during toileting. Pt required multiple recovery period throughout eval d/t increased SOB with all activity. Pt would benefit from skilled OT services to address these deficits. Recommend d/c home with assist and HH vs. short term rehab.

## 2020-07-09 NOTE — THERAPY EVALUATION
Acute Care - Occupational Therapy Initial Evaluation  [facility]     Patient Name: Heide Newsome  : 1945  MRN: [id number]  Today's Date: 2020  Onset of Illness/Injury or Date of Surgery: 20  Date of Referral to OT: 20  Referring Physician: Dr. Byrd    Admit Date: 2020       ICD-10-CM ICD-9-CM   1. COPD with acute exacerbation (CMS/HCC) J44.1 491.21   2. Hypoxia R09.02 799.02   3. Pneumonia of right lower lobe due to infectious organism J18.9 486   4. Adrenal nodule (CMS/HCC) E27.8 255.8   5. Pleural effusion J90 511.9   6. Decreased activities of daily living (ADL) Z78.9 V49.89     Patient Active Problem List   Diagnosis   • Pulmonary emphysema (CMS/HCC)   • Acute on chronic respiratory failure with hypoxia (CMS/HCC)   • COPD, very severe (CMS/HCC)   • Severe malnutrition (CMS/HCC)   • History of colon cancer   • Thrombocytopenia (CMS/HCC)   • Anemia   • Hyperglycemia, drug-induced   • Pneumonia   • Chronic respiratory failure with hypoxia (CMS/HCC)   • COPD with acute exacerbation (CMS/HCC)   • Adrenal nodule (CMS/HCC)     Past Medical History:   Diagnosis Date   • Cancer (CMS/HCC), colon    • Chronic respiratory failure (CMS/HCC), 4 L nasal cannula continuously    • COPD (chronic obstructive pulmonary disease) (CMS/HCC)    • Restless leg syndrome      Past Surgical History:   Procedure Laterality Date   • APPENDECTOMY     • CARPAL TUNNEL RELEASE Left    • COLON RESECTION     • FOOT SURGERY Bilateral     hammer toe   • FRACTURE SURGERY Left     Arm   • HYSTERECTOMY     • WRIST FRACTURE SURGERY Right           OT ASSESSMENT FLOWSHEET (last 12 hours)      Occupational Therapy Evaluation     Row Name 20 0839                   OT Evaluation Time/Intention    Subjective Information  complains of;pain;weakness;fatigue;dyspnea  -JJ        Document Type  evaluation see MAR  -JJ        Mode of Treatment  occupational therapy  -JJ        Patient Effort  good  -JJ           General  Information    Patient Profile Reviewed?  yes  -JJ        Onset of Illness/Injury or Date of Surgery  07/07/20  -JJ        Referring Physician  Dr. Byrd  -JJ        Patient Observations  alert;cooperative;agree to therapy  -JJ        Patient/Family Observations  no family present in room   -JJ        General Observations of Patient  fowlers in bed, 5L O2/NC, alert   -JJ        Prior Level of Function  independent:;transfer;bed mobility;gait;min assist:;mod assist:;dressing;bathing  -JJ        Equipment Currently Used at Home  oxygen;shower chair;walker, rolling;grab bar  -JJ        Pertinent History of Current Functional Problem  Pt presented to ED with reports of worsening SOB over past 2 weeks. Pt on 4L O2/NC at home continuous. Dx: COPD with acute exacerbation, hypoxia, pneumonia of RLL, adrenal nodule, pleural effusion.   -JJ        Existing Precautions/Restrictions  fall;oxygen therapy device and L/min  -JJ        Risks Reviewed  patient:;LOB;nausea/vomiting;dizziness;increased discomfort;change in vital signs;increased drainage;lines disloged  -JJ        Benefits Reviewed  patient:;improve function;increase independence;increase strength;increase balance;decrease risk of DVT;decrease pain;improve skin integrity;increase knowledge  -JJ           Relationship/Environment    Lives With  child(nomi), adult;spouse  -JJ           Resource/Environmental Concerns    Current Living Arrangements  home/apartment/condo  -JJ           Home Main Entrance    Number of Stairs, Main Entrance  two  -JJ        Stair Railings, Main Entrance  railings on both sides of stairs  -JJ           Stairs Within Home, Primary    Number of Stairs, Within Home, Primary  none  -JJ           Cognitive Assessment/Interventions    Additional Documentation  Cognitive Assessment/Intervention (Group)  -JJ           Cognitive Assessment/Intervention- PT/OT    Affect/Mental Status (Cognitive)  WNL  -JJ        Orientation Status (Cognition)   oriented x 4  -J        Follows Commands (Cognition)  WNL  -JJ        Cognitive Function (Cognitive)  WNL  -        Personal Safety Interventions  fall prevention program maintained;gait belt;muscle strengthening facilitated;nonskid shoes/slippers when out of bed;supervised activity  -           Safety Issues, Functional Mobility    Impairments Affecting Function (Mobility)  endurance/activity tolerance;pain;strength;shortness of breath  -           Bed Mobility Assessment/Treatment    Bed Mobility Assessment/Treatment  supine-sit;sit-supine  -        Supine-Sit [place] (Bed Mobility)  supervision  -        Assistive Device (Bed Mobility)  head of bed elevated  -           Functional Mobility    Functional Mobility- Comment  small steps from bed to [facility] [place]   -           Transfer Assessment/Treatment    Transfer Assessment/Treatment  sit-stand transfer;stand-sit transfer  -           Sit-Stand Transfer    Sit-Stand [place] (Transfers)  contact guard;supervision  -           Stand-Sit Transfer    Stand-Sit [place] (Transfers)  contact guard;supervision  -           ADL Assessment/Intervention    BADL Assessment/Intervention  lower body dressing;toileting  -           Lower Body Dressing Assessment/Training    Lower Body Dressing [place] Level  don;socks;maximum assist (25% patient effort)  -        Lower Body Dressing Position  edge of bed sitting  -        Comment (Lower Body Dressing)  d/t SOB  -           Toileting Assessment/Training    [place] Level (Toileting)  perform perineal hygiene;adjust/manage clothing;contact guard assist;set up  -        Assistive Devices (Toileting)  commode, bedside without drop arms  -        Toileting Position  unsupported sitting  -           BAD Safety/Performance    Impairments, BADL Safety/Performance  endurance/activity tolerance;strength;pain;shortness of breath  -        Skilled BADL Treatment/Intervention  BADL  process/adaptation training  -JJ           General ROM    GENERAL ROM COMMENTS  BUE AROM WFL   -JJ           MMT (Manual Muscle Testing)    General MMT Comments  BUE strength 5/5  -JJ           Motor Assessment/Interventions    Additional Documentation  Balance (Group)  -JJ           Balance    Balance  static sitting balance;static standing balance  -JJ           Static Sitting Balance    Level of [place] (Unsupported Sitting, Static Balance)  independent  -JJ        Sitting Position (Unsupported Sitting, Static Balance)  sitting on edge of bed  -JJ           Static Standing Balance    Level of [place] (Supported Standing, Static Balance)  contact guard assist  -JJ           Sensory Assessment/Intervention    Sensory General Assessment  no sensation deficits identified in BUEs  -JJ           Positioning and Restraints    Pre-Treatment Position  in bed  -JJ        Post Treatment Position  bed  -JJ        In Bed  fowlers;notified nsg;call light within reach;encouraged to call for assist;side rails up x2  -JJ           Pain Assessment    Additional Documentation  Pain Scale: Numbers Pre/Post-Treatment (Group)  -JJ           Pain Scale: Numbers Pre/Post-Treatment    Pain Scale: Numbers, Pretreatment  3/10  -JJ        Pain Location - Side  Bilateral  -JJ        Pain Location - Orientation  lower  -JJ        Pain Location  extremity  -JJ        Pain Intervention(s)  Repositioned;Ambulation/increased activity  -JJ           Plan of Care Review    Plan of Care Reviewed With  patient  -JJ        Outcome Summary  OT eval completed. Pt is A&Ox4. Demo's decreased strength, balance, activity tolerance and increased SOB. Supervision for supine <> sit at EOB. Max A for LB dressing d/t SOB. CGA for sit <> stand t/f from EOB and to take small steps to bsc. Pt set-up with supervision for clothing mgt and perineal hygiene during toileting. Pt required multiple recovery period throughout eval d/t increased SOB with all  activity. Pt would benefit from skilled OT services to address these deficits. Recommend d/c home with assist and HH vs. short term rehab.   -JJ           Clinical Impression (OT)    Date of Referral to OT  07/08/20  -JJ        OT Diagnosis  decreased adls  -JJ        Prognosis (OT Eval)  good  -JJ        Patient/Family Goals Statement (OT Eval)  return home  -JJ        Criteria for Skilled Therapeutic Interventions Met (OT Eval)  yes;treatment indicated  -JJ        Rehab Potential (OT Eval)  good, to achieve stated therapy goals  -JJ        Therapy Frequency (OT Eval)  5 times/wk  -JJ        Predicted Duration of Therapy Intervention (Therapy Eval)  10 days  -JJ        Care Plan Review (OT)  evaluation/treatment results reviewed;risks/benefits reviewed;care plan/treatment goals reviewed;current/potential barriers reviewed;patient/other agree to care plan  -JJ        Anticipated Discharge Disposition (OT)  home with assist;home with home health;transitional care;skilled nursing facility  -JJ           Vital Signs    Pretreatment Heart Rate (beats/min)  109  -JJ        Intratreatment Heart Rate (beats/min)  140  -JJ        Pre SpO2 (%)  98  -JJ        O2 Delivery Pre Treatment  supplemental O2  -JJ        Intra SpO2 (%)  84  -JJ        O2 Delivery Intra Treatment  supplemental O2  -JJ        Pre Patient Position  Supine  -JJ        Intra Patient Position  Standing  -JJ        Post Patient Position  Supine  -JJ           Planned OT Interventions    Planned Therapy Interventions (OT Eval)  activity tolerance training;BADL retraining;functional balance retraining;occupation/activity based interventions;patient/caregiver education/training;strengthening exercise;transfer/mobility retraining  -JJ           OT Goals    Bathing Goal Selection (OT)  bathing, OT goal 1  -JJ        Dressing Goal Selection (OT)  dressing, OT goal 1  -JJ        Activity Tolerance Goal Selection (OT)  activity tolerance, OT goal 1  -JJ         Additional Documentation  Activity Tolerance Goal Selection (OT) (Row)  -           Bathing Goal 1 (OT)    Activity/Assistive Device (Bathing Goal 1, OT)  bathing skills, all  -JJ        [place] Level/Cues Needed (Bathing Goal 1, OT)  set-up required;supervision required AE PRN  -JJ        Time Frame (Bathing Goal 1, OT)  long term goal (LTG);by discharge  -JJ        Progress/Outcomes (Bathing Goal 1, OT)  goal ongoing  -J           Dressing Goal 1 (OT)    Activity/Assistive Device (Dressing Goal 1, OT)  dressing skills, all  -JJ        [place]/Cues Needed (Dressing Goal 1, OT)  supervision required;set-up required  -JJ        Time Frame (Dressing Goal 1, OT)  long term goal (LTG);by discharge  -JJ        Progress/Outcome (Dressing Goal 1, OT)  goal ongoing  -J            Activity Tolerance Goal 1 (OT)    Activity Tolerance Goal 1 (OT)  Pt will participate in functional task in sitting for 8 minutes to address decreased activity tolerance   -J        Activity Level (Endurance Goal 1, OT)  O2 sat >/ equal to 88% 8 minutes  -JJ        Time Frame (Activity Tolerance Goal 1, OT)  long term goal (LTG);by discharge  -JJ        Progress/Outcome (Activity Tolerance Goal 1, OT)  goal ongoing  -           Living Environment    Home Accessibility  stairs to enter home;tub/shower is not walk in  -          User Key  (r) = Recorded By, (t) = Taken By, (c) = Cosigned By    Initials Name Effective Dates    Inna Gomez OTR/L 07/05/20 -          Occupational Therapy Education                 Title: PT OT SLP Therapies (Not Started)     Topic: Occupational Therapy (In Progress)     Point: ADL training (Done)     Description:   Instruct learner(s) on proper safety adaptation and remediation techniques during self care or transfers.   Instruct in proper use of assistive devices.              Learning Progress Summary           Patient Acceptance, E, VU by SYLVIA at 7/9/2020 0868    Comment:  OT POC, benefits  and roles of OT.                   Point: Home exercise program (Not Started)     Description:   Instruct learner(s) on appropriate technique for monitoring, assisting and/or progressing therapeutic exercises/activities.              Learner Progress:   Not documented in this visit.          Point: Precautions (Not Started)     Description:   Instruct learner(s) on prescribed precautions during self-care and functional transfers.              Learner Progress:   Not documented in this visit.          Point: Body mechanics (Not Started)     Description:   Instruct learner(s) on proper positioning and spine alignment during self-care, functional mobility activities and/or exercises.              Learner Progress:   Not documented in this visit.                      User Key     Initials Effective Dates Name Provider Type Discipline    SYLVIA 07/05/20 -  Inna Dooley, OTR/L Occupational Therapist OT                  OT Recommendation and Plan  Outcome Summary/Treatment Plan (OT)  Anticipated Discharge Disposition (OT): home with assist, home with home health, transitional care, skilled nursing facility  Planned Therapy Interventions (OT Eval): activity tolerance training, BADL retraining, functional balance retraining, occupation/activity based interventions, patient/caregiver education/training, strengthening exercise, transfer/mobility retraining  Therapy Frequency (OT Eval): 5 times/wk  Plan of Care Review  Plan of Care Reviewed With: patient  Plan of Care Reviewed With: patient  Outcome Summary: OT eval completed. Pt is A&Ox4. Demo's decreased strength, balance, activity tolerance and increased SOB. Supervision for supine <> sit at EOB. Max A for LB dressing d/t SOB. CGA for sit <> stand t/f from EOB and to take small steps to bsc. Pt set-up with supervision for clothing mgt and perineal hygiene during toileting. Pt required multiple recovery period throughout eval d/t increased SOB with all activity. Pt would  benefit from skilled OT services to address these deficits. Recommend d/c home with assist and HH vs. short term rehab.     Outcome Measures     Row Name 07/09/20 0900             How much help from another is currently needed...    Putting on and taking off regular lower body clothing?  2  -JJ      Bathing (including washing, rinsing, and drying)  3  -JJ      Toileting (which includes using toilet bed pan or urinal)  3  -JJ      Putting on and taking off regular upper body clothing  4  -JJ      Taking care of personal grooming (such as brushing teeth)  4  -JJ      Eating meals  4  -J      AM-PAC 6 Clicks Score (OT)  20  -         Functional Assessment    Outcome Measure Options  AM-PAC 6 Clicks Daily Activity (OT)  -        User Key  (r) = Recorded By, (t) = Taken By, (c) = Cosigned By    Initials Name Provider Type    Inna Gomez OTR/L Occupational Therapist          Time Calculation:   Time Calculation- OT     Row Name 07/09/20 0929             Time Calculation- OT    OT Start Time  0839 add 8 minutes for chart review   -      OT Stop Time  0913  -      OT Time Calculation (min)  34 min  -      OT Received On  07/09/20  -      OT Goal Re-Cert Due Date  07/19/20  -        User Key  (r) = Recorded By, (t) = Taken By, (c) = Cosigned By    Initials Name Provider Type    Inna Gomez OTR/L Occupational Therapist        Therapy Charges for Today     Code Description Service Date Service Provider Modifiers Qty    82480709013 HC OT EVAL LOW COMPLEXITY 3 7/9/2020 Inna Dooley OTR/L GO 1               DOMINIQUE Hoang/LES  7/9/2020

## 2020-07-09 NOTE — PROGRESS NOTES
[facility] Medicine Services  INPATIENT PROGRESS NOTE    Patient Name: Heide Newsome  Date of Admission: 7/7/2020  Today's Date: 07/09/20  Length of Stay: 2  Primary Care Physician: Pato Cooney DO    Subjective   Chief Complaint: Shortness of breath  HPI   Patient states that she is feeling better today-much better than yesterday.  She is worked with therapy this morning, states that she got up to a bedside commode, and did much better with activity than she thought she would.  She feels like her breathing is starting to improve.  She denies any significant purulent sputum production, and states that the respiratory culture that was previously sent was an adequate specimen, and patient states had a lot of saliva.  Denies any current pain.    Review of Systems   Respiratory: Positive for shortness of breath.         All pertinent negatives and positives are as above. All other systems have been reviewed and are negative unless otherwise stated.     Objective    Temp:  [97.6 °F (36.4 °C)-98.1 °F (36.7 °C)] 97.6 °F (36.4 °C)  Heart Rate:  [] 89  Resp:  [16-20] 18  BP: (110-116)/(51-66) 114/51  Physical Exam   Constitutional: No distress.   Nontoxic appearing   HENT:   Head: Normocephalic.   Mouth/Throat: No oropharyngeal exudate.   Eyes: No scleral icterus.   Neck: No tracheal deviation present.   Cardiovascular: Normal rate.   Pulmonary/Chest: Effort normal. No respiratory distress. She has wheezes (improving). She has no rales.   Moved from 7L to 6L NC this AM   Musculoskeletal: She exhibits no deformity.   Neurological: She is alert.   Skin: Skin is warm. She is not diaphoretic.   Psychiatric: She has a normal mood and affect.   Vitals reviewed.      Results Review:  I have reviewed the labs, radiology results, and diagnostic studies.    Laboratory Data:   Results from last 7 days   Lab Units 07/07/20  1421   WBC 10*3/mm3 6.36   HEMOGLOBIN g/dL 11.7*    HEMATOCRIT % 38.8   PLATELETS 10*3/mm3 292        Results from last 7 days   Lab Units 07/07/20  1421   SODIUM mmol/L 138   POTASSIUM mmol/L 3.9   CHLORIDE mmol/L 92*   CO2 mmol/L 36.0*   BUN mg/dL 14   CREATININE mg/dL 0.31*   CALCIUM mg/dL 10.0   BILIRUBIN mg/dL 0.5   ALK PHOS U/L 79   ALT (SGPT) U/L 10   AST (SGOT) U/L 16   GLUCOSE mg/dL 135*       Culture Data:   No results found for: BLOODCX, URINECX, WOUNDCX, MRSACX, RESPCX, STOOLCX    Radiology Data:   Imaging Results (Last 24 Hours)     ** No results found for the last 24 hours. **          I have reviewed the patient's current medications.     Assessment/Plan     Active Hospital Problems    Diagnosis   • COPD with acute exacerbation (CMS/HCC)   • Adrenal nodule (CMS/HCC)   • History of colon cancer   • Pneumonia   • Acute on chronic respiratory failure with hypoxia (CMS/HCC)   • COPD, very severe (CMS/HCC)   • Pulmonary emphysema (CMS/HCC)     Plan:   1.  IV to PO Azithromycin - day 3  2.  IV Rocephin - day 3  3.  Taper IV Solumedrol - 40mg IV q12hrs  4.  Supp 02; goal 02 sats 88-94%; turned down to 6LNC during exam today (normally on 4.5LNC on outpatient basis)  5.  Scheduled nebs q4hrs  6.  Mucinex  7.  Respiratory culture (inadequate sample obtained); PCR panel negative  8.  Lovenox PPx  9.  Flutter valve and incentive spirometry  10.  PT and OT; increase activity  11.  PO Nystatin  12.  PO Symbicort  13.  Dispo: home 2-3 days anticipated; will see how patient does with therapy services    Wiliam Byrd MD   07/09/20   10:40

## 2020-07-09 NOTE — PLAN OF CARE
Problem: Patient Care Overview  Goal: Plan of Care Review  Outcome: Ongoing (interventions implemented as appropriate)  Flowsheets  Taken 7/9/2020 0838  Progress: no change  Taken 7/9/2020 1344  Plan of Care Reviewed With: patient (Pended)  Outcome Summary: PT eval completed. Pt is A&Ox4. Descibes her pain a 3/10, mostly in B legs due to restless leg syndrome, no complaints of SOB in supine. Pt presents with decreased strength, endurance and balance. Pt sat EOB with supervision with HOB elevated. Pt needed 3-4 rest breaks during MMT and ROM assessment due to SOB. Pt returned to supine for recovery. Supervision given for all bed mobility. CGA for sit to stand and transfered to BSC using small , unsteady steps. As Pts demands of breathing increased, verbal cueing and assits levels increased due to Pt becoming unsafe. O2 dropped to 84% while on BSC. Pt would benefit from skilled PT in order to improve activity tolerance, balance and decrease risk of falls. Recommened d/c home with assist and HH vs. short term rehab. (Pended)

## 2020-07-10 PROCEDURE — 25010000002 METHYLPREDNISOLONE PER 40 MG: Performed by: INTERNAL MEDICINE

## 2020-07-10 PROCEDURE — 25010000002 ENOXAPARIN PER 10 MG: Performed by: INTERNAL MEDICINE

## 2020-07-10 PROCEDURE — 94799 UNLISTED PULMONARY SVC/PX: CPT

## 2020-07-10 PROCEDURE — 25010000002 ACETAZOLAMIDE PER 500 MG: Performed by: INTERNAL MEDICINE

## 2020-07-10 PROCEDURE — 97530 THERAPEUTIC ACTIVITIES: CPT

## 2020-07-10 PROCEDURE — 25010000002 CEFTRIAXONE PER 250 MG: Performed by: INTERNAL MEDICINE

## 2020-07-10 PROCEDURE — 97110 THERAPEUTIC EXERCISES: CPT

## 2020-07-10 RX ORDER — ACETAZOLAMIDE 500 MG/5ML
250 INJECTION, POWDER, LYOPHILIZED, FOR SOLUTION INTRAVENOUS ONCE
Status: COMPLETED | OUTPATIENT
Start: 2020-07-10 | End: 2020-07-10

## 2020-07-10 RX ORDER — IPRATROPIUM BROMIDE AND ALBUTEROL SULFATE 2.5; .5 MG/3ML; MG/3ML
3 SOLUTION RESPIRATORY (INHALATION) EVERY 4 HOURS PRN
COMMUNITY
End: 2021-01-01 | Stop reason: HOSPADM

## 2020-07-10 RX ADMIN — ENOXAPARIN SODIUM 40 MG: 40 INJECTION SUBCUTANEOUS at 20:00

## 2020-07-10 RX ADMIN — SODIUM CHLORIDE, PRESERVATIVE FREE 10 ML: 5 INJECTION INTRAVENOUS at 07:47

## 2020-07-10 RX ADMIN — CEFTRIAXONE SODIUM 1 G: 1 INJECTION, POWDER, FOR SOLUTION INTRAMUSCULAR; INTRAVENOUS at 19:54

## 2020-07-10 RX ADMIN — SODIUM CHLORIDE, PRESERVATIVE FREE 10 ML: 5 INJECTION INTRAVENOUS at 20:01

## 2020-07-10 RX ADMIN — METHYLPREDNISOLONE SODIUM SUCCINATE 40 MG: 40 INJECTION, POWDER, FOR SOLUTION INTRAMUSCULAR; INTRAVENOUS at 16:56

## 2020-07-10 RX ADMIN — IPRATROPIUM BROMIDE AND ALBUTEROL SULFATE 3 ML: 2.5; .5 SOLUTION RESPIRATORY (INHALATION) at 06:30

## 2020-07-10 RX ADMIN — SODIUM CHLORIDE, PRESERVATIVE FREE 10 ML: 5 INJECTION INTRAVENOUS at 06:19

## 2020-07-10 RX ADMIN — BUDESONIDE AND FORMOTEROL FUMARATE DIHYDRATE 2 PUFF: 160; 4.5 AEROSOL RESPIRATORY (INHALATION) at 19:09

## 2020-07-10 RX ADMIN — ROPINIROLE HYDROCHLORIDE 1 MG: 1 TABLET, FILM COATED ORAL at 07:48

## 2020-07-10 RX ADMIN — METHYLPREDNISOLONE SODIUM SUCCINATE 40 MG: 40 INJECTION, POWDER, FOR SOLUTION INTRAMUSCULAR; INTRAVENOUS at 06:19

## 2020-07-10 RX ADMIN — NYSTATIN 500000 UNITS: 100000 SUSPENSION ORAL at 07:47

## 2020-07-10 RX ADMIN — IPRATROPIUM BROMIDE AND ALBUTEROL SULFATE 3 ML: 2.5; .5 SOLUTION RESPIRATORY (INHALATION) at 14:25

## 2020-07-10 RX ADMIN — BUDESONIDE AND FORMOTEROL FUMARATE DIHYDRATE 2 PUFF: 160; 4.5 AEROSOL RESPIRATORY (INHALATION) at 06:30

## 2020-07-10 RX ADMIN — IPRATROPIUM BROMIDE AND ALBUTEROL SULFATE 3 ML: 2.5; .5 SOLUTION RESPIRATORY (INHALATION) at 19:09

## 2020-07-10 RX ADMIN — IPRATROPIUM BROMIDE AND ALBUTEROL SULFATE 3 ML: 2.5; .5 SOLUTION RESPIRATORY (INHALATION) at 02:37

## 2020-07-10 RX ADMIN — GUAIFENESIN 1200 MG: 600 TABLET, EXTENDED RELEASE ORAL at 07:48

## 2020-07-10 RX ADMIN — GUAIFENESIN 1200 MG: 600 TABLET, EXTENDED RELEASE ORAL at 20:00

## 2020-07-10 RX ADMIN — NYSTATIN 500000 UNITS: 100000 SUSPENSION ORAL at 11:34

## 2020-07-10 RX ADMIN — ACETAZOLAMIDE 250 MG: 500 INJECTION, POWDER, LYOPHILIZED, FOR SOLUTION INTRAVENOUS at 11:35

## 2020-07-10 RX ADMIN — ROPINIROLE HYDROCHLORIDE 1 MG: 1 TABLET, FILM COATED ORAL at 20:00

## 2020-07-10 RX ADMIN — FLUTICASONE PROPIONATE 2 SPRAY: 50 SPRAY, METERED NASAL at 07:47

## 2020-07-10 RX ADMIN — IPRATROPIUM BROMIDE AND ALBUTEROL SULFATE 3 ML: 2.5; .5 SOLUTION RESPIRATORY (INHALATION) at 10:12

## 2020-07-10 RX ADMIN — IPRATROPIUM BROMIDE AND ALBUTEROL SULFATE 3 ML: 2.5; .5 SOLUTION RESPIRATORY (INHALATION) at 23:35

## 2020-07-10 RX ADMIN — AZITHROMYCIN 500 MG: 250 TABLET, FILM COATED ORAL at 20:00

## 2020-07-10 NOTE — THERAPY TREATMENT NOTE
Acute Care - Physical Therapy Treatment Note  [facility]     Patient Name: Heide Newsome  : 1945  MRN: [id number]  Today's Date: 7/10/2020  Onset of Illness/Injury or Date of Surgery: 20     Referring Physician: Dr. Byrd    Admit Date: 2020    Visit Dx:    ICD-10-CM ICD-9-CM   1. COPD with acute exacerbation (CMS/HCC) J44.1 491.21   2. Hypoxia R09.02 799.02   3. Pneumonia of right lower lobe due to infectious organism J18.9 486   4. Adrenal nodule (CMS/HCC) E27.8 255.8   5. Pleural effusion J90 511.9   6. Decreased activities of daily living (ADL) Z78.9 V49.89   7. Impaired functional mobility and endurance Z74.09 V49.89     Patient Active Problem List   Diagnosis   • Pulmonary emphysema (CMS/HCC)   • Acute on chronic respiratory failure with hypoxia (CMS/HCC)   • COPD, very severe (CMS/HCC)   • Severe malnutrition (CMS/HCC)   • History of colon cancer   • Thrombocytopenia (CMS/HCC)   • Anemia   • Hyperglycemia, drug-induced   • Pneumonia   • Chronic respiratory failure with hypoxia (CMS/HCC)   • COPD with acute exacerbation (CMS/HCC)   • Adrenal nodule (CMS/HCC)       Therapy Treatment    Rehabilitation Treatment Summary     Row Name 07/10/20 0938             Treatment Time/Intention    Discipline  physical therapy assistant  (Pended)   -AP      Document Type  therapy note (daily note)  (Pended)   -AP      Subjective Information  complains of;pain  (Pended)   -AP2      Mode of Treatment  physical therapy  (Pended)   -AP      Patient Effort  good  (Pended)   -AP2      Existing Precautions/Restrictions  fall;oxygen therapy device and L/min  (Pended)   -AP2      Recorded by [AP] Yeyo Robins PTA Student 07/10/20 0939  [AP2] Yeyo Robins PTA Student 07/10/20 1022      Row Name 07/10/20 0938             Vital Signs    Pre SpO2 (%)  92  (Pended)   -AP      O2 Delivery Pre Treatment  hi-flow  (Pended)   -AP      Intra SpO2 (%)  86  (Pended)   -AP      O2 Delivery Intra Treatment  hi-flow   (Pended)   -AP      Post SpO2 (%)  89  (Pended)   -AP      O2 Delivery Post Treatment  hi-flow  (Pended)   -AP      Rest Breaks   2  (Pended)   -AP      Recorded by [AP] Yeyo Robins PTA Student 07/10/20 1035      Row Name 07/10/20 0938             Bed Mobility Assessment/Treatment    Bed Mobility Assessment/Treatment  supine-sit;sidelying-sit  (Pended)   -AP      Supine-Sit Mellette (Bed Mobility)  contact guard  (Pended)   -AP2      Sidelying-Sit Mel[place] (Bed Mobility)  contact guard  (Pended)   -AP2      Assistive Device (Bed Mobility)  head of bed elevated  (Pended)   -AP2      Recorded by [AP] Yeyo Robins PTA Student 07/10/20 1022  [AP2] Yeyo Robins PTA Student 07/10/20 1023      Row Name 07/10/20 0938             Transfer Assessment/Treatment    Transfer Assessment/Treatment  sit-stand transfer  (Pended)   -AP      Comment (Transfers)  pt stood quickly and transferred to chair   (Pended)   -AP2      Recorded by [AP] Yeyo Robins PTA Student 07/10/20 1023  [AP2] Yeyo Robins PTA Student 07/10/20 1027      Row Name 07/10/20 0938             Sit-Stand Transfer    Sit-Stand Mel[place] (Transfers)  contact guard  (Pended)   -AP      Recorded by [AP] Yeyo Robins PTA Student 07/10/20 1027      Row Name 07/10/20 0938             Stand-Sit Transfer    Stand-Sit Mel[place] (Transfers)  contact guard  (Pended)   -AP      Recorded by [AP] Yeyo Robins PTA Student 07/10/20 1027      Row Name 07/10/20 0938             Gait/Stairs Assessment/Training    Gait/Stairs Assessment/Training  gait/ambulation independence  (Pended)   -AP      Mellette Level (Gait)  contact guard  (Pended)   -AP      Distance in Feet (Gait)  3-4 steps to chair  (Pended)   -AP      Comment (Gait/Stairs)  SOB inhibited ability to walk greater distance  (Pended)   -AP      Recorded by [AP] Yeyo Robins PTA Student 07/10/20 1035      Row Name 07/10/20 0938             Therapeutic Exercise    60820 - PT  Therapeutic Exercise Minutes  15  (Pended)   -AP      35740 - PT Therapeutic Activity Minutes  11  (Pended)   -AP      Recorded by [AP] Yeyo Robins PTA Student 07/10/20 1029      Row Name 07/10/20 0938             Therapeutic Exercise    Comment (Therapeutic Exercise)  pt performed AROM marches, LAQ, ankle pumps, and hip abd/add X 10 in sitting  (Pended)   -AP      Recorded by [AP] Yeyo Robins PTA Student 07/10/20 1029      Row Name 07/10/20 0938             Positioning and Restraints    Pre-Treatment Position  in bed  (Pended)   -AP      Post Treatment Position  chair  (Pended)   -AP      In Bed  fowlers;call light within reach  (Pended)   -AP2      In Chair  sitting;reclined;call light within reach;encouraged to call for assist;heels elevated  (Pended)   -AP2      Recorded by [AP] Yeyo Robins PTA Student 07/10/20 1035  [AP2] Yeyo Robins PTA Student 07/10/20 1036      Row Name 07/10/20 0938             Pain Assessment    Additional Documentation  Pain Scale: FACES Pre/Post-Treatment (Group)  (Pended)   -AP      Recorded by [AP] Yeyo Robins PTA Student 07/10/20 1029      Row Name 07/10/20 0938             Pain Scale: FACES Pre/Post-Treatment    Pain: FACES Scale, Pretreatment  2-->hurts little bit  (Pended)   -AP      Pain: FACES Scale, Post-Treatment  2-->hurts little bit  (Pended)   -AP      Recorded by [AP] Yeyo Robins PTA Student 07/10/20 1035        User Key  (r) = Recorded By, (t) = Taken By, (c) = Cosigned By    Initials Name Effective Dates Discipline    AP Yeyo Robins PTA Student 06/23/20 -  PT                   Physical Therapy Education                 Title: PT OT SLP Therapies (Not Started)     Topic: Physical Therapy (In Progress)     Point: Mobility training (Done)     Description:   Instruct learner(s) on safety and technique for assisting patient out of bed, chair or wheelchair.  Instruct in the proper use of assistive devices, such as walker, crutches, cane or  brace.              Patient Friendly Description:   It's important to get you on your feet again, but we need to do so in a way that is safe for you. Falling has serious consequences, and your personal safety is the most important thing of all.        When it's time to get out of bed, one of us or a family member will sit next to you on the bed to give you support.     If your doctor or nurse tells you to use a walker, crutches, a cane, or a brace, be sure you use it every time you get out of bed, even if you think you don't need it.    Learning Progress Summary           Patient Acceptance, E, VU by AP at 7/10/2020 1038    Comment:  pt educated on t/f adn bed mobilty.    Acceptance, E, VU by EB at 7/9/2020 1340    Comment:  Pt educated on importance of getting EOB for meals;POC; d/c planning                   Point: Home exercise program (Not Started)     Description:   Instruct learner(s) on appropriate technique for monitoring, assisting and/or progressing patient with therapeutic exercises and activities.              Learner Progress:   Not documented in this visit.          Point: Body mechanics (Not Started)     Description:   Instruct learner(s) on proper positioning and spine alignment for patient and/or caregiver during mobility tasks and/or exercises.              Learner Progress:   Not documented in this visit.          Point: Precautions (Not Started)     Description:   Instruct learner(s) on prescribed precautions during mobility and gait tasks              Learner Progress:   Not documented in this visit.                      User Key     Initials Effective Dates Name Provider Type Discipline    EB 06/23/20 -  Patrica Cunningham, PT Student PT Student PT    KRISTAN 06/23/20 -  Yeyo Robins, PTA Student PTA Student PT                PT Recommendation and Plan     Plan of Care Reviewed With: (P) patient  Progress: (P) improving  Outcome Summary: (P) Pt c/o pain, did not specify number on scale when asked. Pt  CGA with bed mobilities and t/fs. Pt stood from bed and t/f to chair CGA. Pt attempted to do exercises in standing, from chair, however could not due to SOB and O2 drop. Pt performed AROM BLE X 10.  Outcome Measures     Row Name 07/10/20 0938 07/09/20 0900          How much help from another person do you currently need...    Turning from your back to your side while in flat bed without using bedrails?  4  (Pended)   -AP  --     Moving from lying on back to sitting on the side of a flat bed without bedrails?  3  (Pended)   -AP  --     Moving to and from a bed to a chair (including a wheelchair)?  3  (Pended)   -AP  --     Standing up from a chair using your arms (e.g., wheelchair, bedside chair)?  3  (Pended)   -AP  --     Climbing 3-5 steps with a railing?  2  (Pended)   -AP  --     To walk in hospital room?  3  (Pended)   -AP  --     AM-PAC 6 Clicks Score (PT)  18  (Pended)   -AP  --        How much help from another is currently needed...    Putting on and taking off regular lower body clothing?  --  2  -JJ     Bathing (including washing, rinsing, and drying)  --  3  -JJ     Toileting (which includes using toilet bed pan or urinal)  --  3  -JJ     Putting on and taking off regular upper body clothing  --  4  -JJ     Taking care of personal grooming (such as brushing teeth)  --  4  -JJ     Eating meals  --  4  -JJ     AM-PAC 6 Clicks Score (OT)  --  20  -JJ        Functional Assessment    Outcome Measure Options  AM-PAC 6 Clicks Basic Mobility (PT)  (Pended)   -AP  AM-PAC 6 Clicks Daily Activity (OT)  -       User Key  (r) = Recorded By, (t) = Taken By, (c) = Cosigned By    Initials Name Provider Type    Inna Gomez, OTR/L Occupational Therapist    Yeyo Mendiloa PTA Student PTA Student         Time Calculation:   PT Charges     Row Name 07/10/20 0938             Time Calculation    Start Time  0938  (Pended)   -AP      Stop Time  1004  (Pended)   -AP      Time Calculation (min)  26 min  (Pended)    -AP      PT Received On  07/10/20  (Pended)   -AP      PT Goal Re-Cert Due Date  07/19/20  (Pended)   -AP         Time Calculation- PT    Total Timed Code Minutes- PT  26 minute(s)  (Pended)   -AP         Timed Charges    38778 - PT Therapeutic Exercise Minutes  15  (Pended)   -AP      94792 - PT Therapeutic Activity Minutes  11  (Pended)   -AP        User Key  (r) = Recorded By, (t) = Taken By, (c) = Cosigned By    Initials Name Provider Type    AP Yeyo Robins PTA Student PTA Student        Therapy Charges for Today     Code Description Service Date Service Provider Modifiers Qty    10838671156 HC PT THER PROC EA 15 MIN 7/10/2020 Yeyo Robins PTA Student GP 1    38487756680 HC PT THERAPEUTIC ACT EA 15 MIN 7/10/2020 Yeyo Robins PTA Student GP 1          PT G-Codes  Outcome Measure Options: (P) AM-PAC 6 Clicks Basic Mobility (PT)  AM-PAC 6 Clicks Score (PT): (P) 18  AM-PAC 6 Clicks Score (OT): 20    Yeyo Robins PTA Student  7/10/2020

## 2020-07-10 NOTE — PLAN OF CARE
Problem: Patient Care Overview  Goal: Plan of Care Review  Outcome: Ongoing (interventions implemented as appropriate)  Flowsheets (Taken 7/10/2020 1145)  Progress: improving  Plan of Care Reviewed With: patient  Note:   Pt. Performed ue exs to increase her transfer and amb ability with rwx, also increasing her act. Teodora! C/o's pain in L. Wrist during bar exs sec. Old wrist pinning!

## 2020-07-10 NOTE — PLAN OF CARE
Problem: Patient Care Overview  Goal: Plan of Care Review  Outcome: Ongoing (interventions implemented as appropriate)  Flowsheets (Taken 7/10/2020 6374)  Progress: improving  Plan of Care Reviewed With: patient  Outcome Summary: Pt c/o pain, did not specify number on scale when asked. Pt CGA with bed mobilities and t/fs. Pt stood from bed and t/f to chair CGA. Pt attempted to do exercises in standing, from chair, however could not due to SOB and O2 drop. Pt performed AROM BLE X 10.

## 2020-07-10 NOTE — THERAPY TREATMENT NOTE
Acute Care - Occupational Therapy Treatment Note  [facility]     Patient Name: Heide Newsome  : 1945  MRN: [id number]  Today's Date: 7/10/2020  Onset of Illness/Injury or Date of Surgery: 20  Date of Referral to OT: 20  Referring Physician: Dr. Byrd    Admit Date: 2020       ICD-10-CM ICD-9-CM   1. COPD with acute exacerbation (CMS/HCC) J44.1 491.21   2. Hypoxia R09.02 799.02   3. Pneumonia of right lower lobe due to infectious organism J18.9 486   4. Adrenal nodule (CMS/HCC) E27.8 255.8   5. Pleural effusion J90 511.9   6. Decreased activities of daily living (ADL) Z78.9 V49.89   7. Impaired functional mobility and endurance Z74.09 V49.89     Patient Active Problem List   Diagnosis   • Pulmonary emphysema (CMS/HCC)   • Acute on chronic respiratory failure with hypoxia (CMS/HCC)   • COPD, very severe (CMS/HCC)   • Severe malnutrition (CMS/HCC)   • History of colon cancer   • Thrombocytopenia (CMS/HCC)   • Anemia   • Hyperglycemia, drug-induced   • Pneumonia   • Chronic respiratory failure with hypoxia (CMS/HCC)   • COPD with acute exacerbation (CMS/HCC)   • Adrenal nodule (CMS/HCC)     Past Medical History:   Diagnosis Date   • Cancer (CMS/HCC), colon    • Chronic respiratory failure (CMS/HCC), 4 L nasal cannula continuously    • COPD (chronic obstructive pulmonary disease) (CMS/HCC)    • Restless leg syndrome      Past Surgical History:   Procedure Laterality Date   • APPENDECTOMY     • CARPAL TUNNEL RELEASE Left    • COLON RESECTION     • FOOT SURGERY Bilateral     hammer toe   • FRACTURE SURGERY Left     Arm   • HYSTERECTOMY     • WRIST FRACTURE SURGERY Right        Therapy Treatment    Rehabilitation Treatment Summary     Row Name 07/10/20 0938 07/10/20 0812          Treatment Time/Intention    Discipline  physical therapy assistant  (Pended)   -AP  occupational therapy assistant  -ELIO     Document Type  therapy note (daily note)  (Pended)   -AP  therapy note (daily note)  -ELIO      Subjective Information  complains of;pain  (Pended)   -AP2  complains of;weakness;fatigue;pain  -CJ     Mode of Treatment  physical therapy  (Pended)   -AP  occupational therapy  -CJ     Patient Effort  good  (Pended)   -AP2  good  -CJ     Existing Precautions/Restrictions  fall;oxygen therapy device and L/min  (Pended)   -AP2  fall;oxygen therapy device and L/min  -CJ     Recorded by [AP] Yeyo Robins PTA Student 07/10/20 0939  [AP2] Yeyo Robins PTA Student 07/10/20 1022 [CJ] Haroldo Cavanaugh COTA/L 07/10/20 1144     Row Name 07/10/20 0938             Vital Signs    Pre SpO2 (%)  92  (Pended)   -AP      O2 Delivery Pre Treatment  hi-flow  (Pended)   -AP      Intra SpO2 (%)  86  (Pended)   -AP      O2 Delivery Intra Treatment  hi-flow  (Pended)   -AP      Post SpO2 (%)  89  (Pended)   -AP      O2 Delivery Post Treatment  hi-flow  (Pended)   -AP      Rest Breaks   2  (Pended)   -AP      Recorded by [AP] Yeyo Robins PTA Student 07/10/20 1035      Row Name 07/10/20 0938 07/10/20 0812          Bed Mobility Assessment/Treatment    Bed Mobility Assessment/Treatment  supine-sit;sidelying-sit  (Pended)   -AP  --     Supine-Sit [place] (Bed Mobility)  contact guard  (Pended)   -AP2  --     Sidelying-Sit [place] (Bed Mobility)  contact guard  (Pended)   -AP2  --     Assistive Device (Bed Mobility)  head of bed elevated  (Pended)   -AP2  --     Comment (Bed Mobility)  --  fowlers in bed!  -CJ     Recorded by [AP] Yeyo Robins PTA Student 07/10/20 1022  [AP2] Yeyo Robins PTA Student 07/10/20 1023 [CJ] Haroldo Cavanaugh COTA/L 07/10/20 1144     Row Name 07/10/20 0938             Transfer Assessment/Treatment    Transfer Assessment/Treatment  sit-stand transfer  (Pended)   -AP      Comment (Transfers)  pt stood quickly and transferred to chair   (Pended)   -AP2      Recorded by [AP] Yeyo Robins PTA Student 07/10/20 1023  [AP2] Yeyo Robins PTA Student 07/10/20 1027      Row Name  07/10/20 0938             Sit-Stand Transfer    Sit-Stand [place] (Transfers)  contact guard  (Pended)   -AP      Recorded by [AP] Yeyo Robins PTA Student 07/10/20 1027      Row Name 07/10/20 0938             Stand-Sit Transfer    Stand-Sit [place] (Transfers)  contact guard  (Pended)   -AP      Recorded by [AP] Yeyo Robins PTA Student 07/10/20 1027      Row Name 07/10/20 0938             Gait/Stairs Assessment/Training    Gait/Stairs Assessment/Training  gait/ambulation independence  (Pended)   -AP      [place] Level (Gait)  contact guard  (Pended)   -AP      Distance in Feet (Gait)  3-4 steps to chair  (Pended)   -AP      Comment (Gait/Stairs)  SOB inhibited ability to walk greater distance  (Pended)   -AP      Recorded by [AP] Yeyo Robins PTA Student 07/10/20 1035      Row Name 07/10/20 0812             Motor Skills Assessment/Interventions    Additional Documentation  Therapeutic Exercise (Group)  -CJ      Recorded by [CJ] Haroldo Cavanaugh COTA/L 07/10/20 1144      Row Name 07/10/20 0938             Therapeutic Exercise    74350 - PT Therapeutic Exercise Minutes  15  (Pended)   -AP      49911 - PT Therapeutic Activity Minutes  11  (Pended)   -AP      Recorded by [AP] Yeyo Robins PTA Student 07/10/20 1029      Row Name 07/10/20 0938 07/10/20 0812          Therapeutic Exercise    Upper Extremity (Therapeutic Exercise)  --  bicep curl, bilateral;wand exercises  -     Upper Extremity Range of Motion (Therapeutic Exercise)  --  shoulder flexion/extension, bilateral;elbow flexion/extension, bilateral;wrist flexion/extension, bilateral  -     Weight/Resistance (Therapeutic Exercise)  --  2 pounds;3 pounds  -     Exercise Type (Therapeutic Exercise)  --  AROM (active range of motion)  -     Position (Therapeutic Exercise)  --  seated  -     Sets/Reps (Therapeutic Exercise)  --  2 sets x 20 reps!  -     Equipment (Therapeutic Exercise)  --  dowel rosemary/wand;free weight,  barbell  -CJ     Expected Outcome (Therapeutic Exercise)  --  facilitate normal movement patterns;improve functional stability;improve functional tolerance, self-care activity;improve motor control;improve neuromuscular control;improve performance, BADLs;improve performance, gait skills;improve performance, transfer skills;improve postural control;increase active range of motion  -CJ     Comment (Therapeutic Exercise)  pt performed AROM marches, LAQ, ankle pumps, and hip abd/add X 10 in sitting  (Pended)   -AP  --     Recorded by [AP] Yeyo Robins PTA Student 07/10/20 1029 [CJ] Haroldo Cavanaugh SOLIS/L 07/10/20 1144     Row Name 07/10/20 0938 07/10/20 0812          Positioning and Restraints    Pre-Treatment Position  in bed  (Pended)   -AP  in bed  -CJ     Post Treatment Position  chair  (Pended)   -AP  bed  -CJ     In Bed  fowlers;call light within reach  (Pended)   -AP2  fowlers;call light within reach;encouraged to call for assist;side rails up x2  -CJ     In Chair  sitting;reclined;call light within reach;encouraged to call for assist;heels elevated  (Pended)   -AP2  --     Recorded by [AP] Yeyo Robins PTA Student 07/10/20 1035  [AP2] Yeyo Robins PTA Student 07/10/20 1036 [CJ] Haroldo Cavanaugh SOLIS/L 07/10/20 1144     Row Name 07/10/20 0938 07/10/20 0812          Pain Assessment    Additional Documentation  Pain Scale: FACES Pre/Post-Treatment (Group)  (Pended)   -AP  Pain Scale 2: Word Pre/Post-Treatment (Group)  -CJ     Recorded by [AP] Yeyo Robins PTA Student 07/10/20 1029 [CJ] Haroldo Cavanaugh SOLIS/L 07/10/20 1144     Row Name 07/10/20 0812             Pain Scale: Numbers Pre/Post-Treatment    Pain Scale: Numbers, Pretreatment  2/10  -CJ      Pain Location - Side  Left  -CJ      Pain Location - Orientation  medial  -CJ      Pain Location  wrist  -CJ      Pain Intervention(s)  Repositioned;Rest  -CJ      Recorded by [CJ] Haroldo Cavanaugh SOLIS/L 07/10/20 1144      Row Name  07/10/20 0938             Pain Scale: FACES Pre/Post-Treatment    Pain: FACES Scale, Pretreatment  2-->hurts little bit  (Pended)   -AP      Pain: FACES Scale, Post-Treatment  2-->hurts little bit  (Pended)   -AP      Recorded by [AP] Yeyo Robins PTA Student 07/10/20 1035      Row Name 07/10/20 0812             Outcome Summary/Treatment Plan (OT)    Daily Summary of Progress (OT)  progress toward functional goals is good  -      Barriers to Overall Progress (OT)  Fall/O2/pain!  -      Plan for Continued Treatment (OT)  continue with ot poc1  -CJ      Recorded by [CJ] Haroldo Cavanaugh SOLIS/LES 07/10/20 1144        User Key  (r) = Recorded By, (t) = Taken By, (c) = Cosigned By    Initials Name Effective Dates Discipline     Haroldo Cavanaugh SOLIS/L 08/02/16 -  OT    AP Yeyo Robins PTA Student 06/23/20 -  PT             Occupational Therapy Education                 Title: PT OT SLP Therapies (Not Started)     Topic: Occupational Therapy (Done)     Point: ADL training (Done)     Description:   Instruct learner(s) on proper safety adaptation and remediation techniques during self care or transfers.   Instruct in proper use of assistive devices.              Learning Progress Summary           Patient Acceptance, E, VU by SYLVIA at 7/9/2020 1228    Comment:  OT POC, benefits and roles of OT.                   Point: Home exercise program (Done)     Description:   Instruct learner(s) on appropriate technique for monitoring, assisting and/or progressing therapeutic exercises/activities.              Learning Progress Summary           Patient Acceptance, E, VU,DU,NR by  at 7/10/2020 1144    Comment:  Pt. performed ue exs to increase her act. benny!                   Point: Precautions (Done)     Description:   Instruct learner(s) on prescribed precautions during self-care and functional transfers.              Learning Progress Summary           Patient Acceptance, E, VU,DU,NR by  at 7/10/2020 114     Comment:  Pt. performed ue exs to increase her act. benny!                   Point: Body mechanics (Done)     Description:   Instruct learner(s) on proper positioning and spine alignment during self-care, functional mobility activities and/or exercises.              Learning Progress Summary           Patient Acceptance, E, VU,DU,NR by  at 7/10/2020 2184    Comment:  Pt. performed ue exs to increase her act. benny!                               User Key     Initials Effective Dates Name Provider Type Discipline     08/02/16 -  Haroldo Cavanaugh COTA/L Occupational Therapy Assistant OT    SYLVIA 07/05/20 -  Inna Dooley OTR/L Occupational Therapist OT                OT Recommendation and Plan  Outcome Summary/Treatment Plan (OT)  Daily Summary of Progress (OT): progress toward functional goals is good  Barriers to Overall Progress (OT): Fall/O2/pain!  Plan for Continued Treatment (OT): continue with ot poc1  Daily Summary of Progress (OT): progress toward functional goals is good  Plan of Care Review  Plan of Care Reviewed With: patient  Plan of Care Reviewed With: patient  Outcome Measures     Row Name 07/10/20 0938 07/10/20 0812 07/09/20 0900       How much help from another person do you currently need...    Turning from your back to your side while in flat bed without using bedrails?  4  (Pended)   -AP  --  --    Moving from lying on back to sitting on the side of a flat bed without bedrails?  3  (Pended)   -AP  --  --    Moving to and from a bed to a chair (including a wheelchair)?  3  (Pended)   -AP  --  --    Standing up from a chair using your arms (e.g., wheelchair, bedside chair)?  3  (Pended)   -AP  --  --    Climbing 3-5 steps with a railing?  2  (Pended)   -AP  --  --    To walk in hospital room?  3  (Pended)   -AP  --  --    AM-PAC 6 Clicks Score (PT)  18  (Pended)   -CJ (r) AP (t)  --  --       How much help from another is currently needed...    Putting on and taking off regular lower body  clothing?  --  2  -  2  -JJ    Bathing (including washing, rinsing, and drying)  --  3  -  3  -JJ    Toileting (which includes using toilet bed pan or urinal)  --  3  -  3  -JJ    Putting on and taking off regular upper body clothing  --  4  -  4  -JJ    Taking care of personal grooming (such as brushing teeth)  --  4  -  4  -JJ    Eating meals  --  4  -  4  -JJ    AM-PAC 6 Clicks Score (OT)  --  20  -  20  -J       Functional Assessment    Outcome Measure Options  AM-PAC 6 Clicks Basic Mobility (PT)  (Pended)   -AP  AM-PAC 6 Clicks Daily Activity (OT)  -  AM-PAC 6 Clicks Daily Activity (OT)  -      User Key  (r) = Recorded By, (t) = Taken By, (c) = Cosigned By    Initials Name Provider Type     Haroldo Cavanaugh COTA/L Occupational Therapy Assistant    Inna Gomez, OTR/L Occupational Therapist    Yeyo Mendiola, PTA Student PTA Student           Time Calculation:   Time Calculation- OT     Row Name 07/10/20 0812             Time Calculation- OT    OT Start Time  0812  -      OT Stop Time  0840  -      OT Time Calculation (min)  28 min  -      Total Timed Code Minutes- OT  28 minute(s)  -      TCU Minutes- OT  28 min  -      OT Received On  07/10/20  -      OT Goal Re-Cert Due Date  07/19/20  -        User Key  (r) = Recorded By, (t) = Taken By, (c) = Cosigned By    Initials Name Provider Type     Haroldo Cavanaugh COTA/L Occupational Therapy Assistant        Therapy Charges for Today     Code Description Service Date Service Provider Modifiers Qty    [id number] HC OT THER PROC EA 15 MIN 7/10/2020 Haroldo Cavanaugh COTA/L GO 2               MONSTER Pablo  7/10/2020

## 2020-07-10 NOTE — PAYOR COMM NOTE
"7/9 CLINICAL UPDATE  UR  [phone]      Heide Benitez (75 y.o. Female)     Date of Birth Social Security Number Address Home Phone MRN    [id number]  [id number]  [facility] [phone] [id number]    Druze Marital Status          Presybeterian        Admission Date Admission Type Admitting Provider Attending Provider Department, Room/Bed    7/7/20 Emergency Wiliam Byrd MD Thompson, Benjamin H, MD [facility] 4B, 406/1    Discharge Date Discharge Disposition Discharge Destination                       Attending Provider:  Wiliam Byrd MD    Allergies:  Tetracyclines & Related, Penicillins    Isolation:  None   Infection:  None   Code Status:  No CPR    Ht:  162.6 cm (64\")   Wt:  53 kg (116 lb 14.4 oz)    Admission Cmt:  None   Principal Problem:  None                Active Insurance as of 7/7/2020     Primary Coverage     Payor Plan Insurance Group Employer/Plan Group    HUMANA MEDICARE REPLACEMENT HUMANA MEDICARE REPLACEMENT [id number]     Payor Plan Address Payor Plan Phone Number Payor Plan Fax Number Effective Dates    [address] [address] [phone]  1/1/2018 - None Entered    [facility] [id number]       Subscriber Name Subscriber Birth Date Member ID       HEIDE BENITEZ 1945 [id number]                 Emergency Contacts      (Rel.) Home Phone Work Phone Mobile Phone    TON BENITEZ (Spouse) [phone] -- --               Physician Progress Notes (last 48 hours) (Notes from 07/08/20 0637 through 07/10/20 0637)      Wiliam Byrd MD at 07/09/20 1040              [facility] Medicine Services  INPATIENT PROGRESS NOTE    Patient Name: Heide Benitez  Date of Admission: 7/7/2020  Today's Date: 07/09/20  Length of Stay: 2  Primary Care Physician: Pato Cooney DO    Subjective   Chief Complaint: Shortness of breath  HPI   Patient states that she is feeling better today-much better " than yesterday.  She is worked with therapy this morning, states that she got up to a bedside commode, and did much better with activity than she thought she would.  She feels like her breathing is starting to improve.  She denies any significant purulent sputum production, and states that the respiratory culture that was previously sent was an adequate specimen, and patient states had a lot of saliva.  Denies any current pain.    Review of Systems   Respiratory: Positive for shortness of breath.         All pertinent negatives and positives are as above. All other systems have been reviewed and are negative unless otherwise stated.     Objective    Temp:  [97.6 °F (36.4 °C)-98.1 °F (36.7 °C)] 97.6 °F (36.4 °C)  Heart Rate:  [] 89  Resp:  [16-20] 18  BP: (110-116)/(51-66) 114/51  Physical Exam   Constitutional: No distress.   Nontoxic appearing   HENT:   Head: Normocephalic.   Mouth/Throat: No oropharyngeal exudate.   Eyes: No scleral icterus.   Neck: No tracheal deviation present.   Cardiovascular: Normal rate.   Pulmonary/Chest: Effort normal. No respiratory distress. She has wheezes (improving). She has no rales.   Moved from 7L to 6L NC this AM   Musculoskeletal: She exhibits no deformity.   Neurological: She is alert.   Skin: Skin is warm. She is not diaphoretic.   Psychiatric: She has a normal mood and affect.   Vitals reviewed.      Results Review:  I have reviewed the labs, radiology results, and diagnostic studies.    Laboratory Data:   Results from last 7 days   Lab Units 07/07/20  1421   WBC 10*3/mm3 6.36   HEMOGLOBIN g/dL 11.7*   HEMATOCRIT % 38.8   PLATELETS 10*3/mm3 292        Results from last 7 days   Lab Units 07/07/20  1421   SODIUM mmol/L 138   POTASSIUM mmol/L 3.9   CHLORIDE mmol/L 92*   CO2 mmol/L 36.0*   BUN mg/dL 14   CREATININE mg/dL 0.31*   CALCIUM mg/dL 10.0   BILIRUBIN mg/dL 0.5   ALK PHOS U/L 79   ALT (SGPT) U/L 10   AST (SGOT) U/L 16   GLUCOSE mg/dL 135*       Culture Data:   No  results found for: BLOODCX, URINECX, WOUNDCX, MRSACX, RESPCX, STOOLCX    Radiology Data:   Imaging Results (Last 24 Hours)     ** No results found for the last 24 hours. **          I have reviewed the patient's current medications.     Assessment/Plan     Active Hospital Problems    Diagnosis   • COPD with acute exacerbation (CMS/HCC)   • Adrenal nodule (CMS/HCC)   • History of colon cancer   • Pneumonia   • Acute on chronic respiratory failure with hypoxia (CMS/HCC)   • COPD, very severe (CMS/HCC)   • Pulmonary emphysema (CMS/HCC)     Plan:   1.  IV  to PO Azithromycin - day 3  2.  IV Rocephin - day 3  3.   Taper IV Solumedrol - 40mg IV q12hrs  4.  Supp 02; goal 02 sats 88-94%; turned down to 6LNC during exam today (normally on 4.5LNC on outpatient basis)  5.  Scheduled nebs q4hrs  6.  Mucinex  7.  Respiratory culture (inadequate sample obtained); PCR panel negative  8.  Lovenox PPx  9.  Flutter valve and incentive spirometry  10.  PT and OT; increase activity  11.   PO Nystatin  12.  PO Symbicort  13.  Dispo: home 2-3 days anticipated; will see how patient does with therapy services    Wiliam Byrd MD   07/09/20   10:40    Electronically signed by Wiliam Byrd MD at 07/09/20 1046     Wiliam Byrd MD at 07/08/20 1047              [facility] Medicine Services  INPATIENT PROGRESS NOTE    Patient Name: Heide Newsome  Date of Admission: 7/7/2020  Today's Date: 07/08/20  Length of Stay: 1  Primary Care Physician: Pato Cooney DO    Subjective   Chief Complaint: Shortness of breath  HPI   Patient reports that she is doing about the same.  No better but also no worse.  She has not been able to cough up any additional sputum for respiratory culture.  She denies any pain at this time.  She reports that prior to coming to the hospital, even minimal activity was resulting in significant desaturation, down as low as 71% at home.  She states that her legs  have also been very weak, and she has felt deconditioned.  She denies any chest pain or chest pressure.    Review of Systems     All pertinent negatives and positives are as above. All other systems have been reviewed and are negative unless otherwise stated.     Objective    Temp:  [97.6 °F (36.4 °C)-98.7 °F (37.1 °C)] 98.6 °F (37 °C)  Heart Rate:  [] 86  Resp:  [16-22] 18  BP: (106-129)/(56-87) 115/56  Physical Exam   Constitutional: No distress.   Nontoxic appearing   HENT:   Head: Normocephalic.   Mouth/Throat: No oropharyngeal exudate.   Eyes: No scleral icterus.   Neck: No tracheal deviation present.   Cardiovascular: Normal rate.   Pulmonary/Chest: Effort normal. No respiratory distress. She has wheezes. She has no rales.   No work of breathing; a little bit better air movement today as compared to exam yesterday   Musculoskeletal: She exhibits no deformity.   Neurological: She is alert.   Skin: Skin is warm. She is not diaphoretic.   Psychiatric: She has a normal mood and affect.   Vitals reviewed.      Results Review:  I have reviewed the labs, radiology results, and diagnostic studies.    Laboratory Data:   Results from last 7 days   Lab Units 07/07/20  1421   WBC 10*3/mm3 6.36   HEMOGLOBIN g/dL 11.7*   HEMATOCRIT % 38.8   PLATELETS 10*3/mm3 292        Results from last 7 days   Lab Units 07/07/20  1421   SODIUM mmol/L 138   POTASSIUM mmol/L 3.9   CHLORIDE mmol/L 92*   CO2 mmol/L 36.0*   BUN mg/dL 14   CREATININE mg/dL 0.31*   CALCIUM mg/dL 10.0   BILIRUBIN mg/dL 0.5   ALK PHOS U/L 79   ALT (SGPT) U/L 10   AST (SGOT) U/L 16   GLUCOSE mg/dL 135*       Culture Data:   No results found for: BLOODCX, URINECX, WOUNDCX, MRSACX, RESPCX, STOOLCX    Radiology Data:   Imaging Results (Last 24 Hours)     Procedure Component Value Units Date/Time    CT Angiogram Chest [[id number]] Collected:  07/07/20 1700     Updated:  07/07/20 1712    Narrative:       CT angiography with 3D MIP images of the chest with IV  contrast     Indication: Chest pain, acute, PE suspected, intermed prob, positive  D-dimer     Comparison: None     DOSE LENGTH PRODUCT: 214 mGy cm. Automated exposure control was also  utilized to decrease patient radiation dose.     Findings:     No pulmonary embolus identified. The main pulmonary trunk is upper  limits of normal in caliber. Thoracic aorta is nonaneurysmal and  contains heavy atherosclerotic calcification. Moderate coronary artery  calcification. No pericardial effusion.     The central airways are clear. Small bilateral pleural effusions with  overlying atelectasis. There is also some patchy consolidation in the  RIGHT lower lobe. There is a background of severe centrilobular  emphysema. There is some mild tree-in-bud nodularity in the peripheral  RIGHT middle lobe. No isolated solid pulmonary nodule.     No enlarged axillary or supraclavicular adenopathy. Borderline prominent  precarinal lymph node and RIGHT hilar lymph node, likely reactive.     1.3 cm RIGHT adrenal gland nodule, not completely characterize on the CT  the chest likely an adenoma. No other abnormality in the partially  imaged upper abdomen.       Impression:       Impression:     1.  No evidence of pulmonary embolus.  2.  Patchy RIGHT lower lobe consolidation and RIGHT middle lobe  tree-in-bud nodularity. Findings likely represent a multifocal  infectious process/pneumonia.  3.  Small bilateral pleural effusions with overlying atelectasis.  4.  1.3 cm RIGHT adrenal gland nodule, likely adenoma.  This report was finalized on 07/07/2020 17:09 by Dr. Tristan Benitez MD.    XR Chest 1 View [[id number]] Collected:  07/07/20 1614     Updated:  07/07/20 1617    Narrative:       Frontal upright radiograph of the chest 7/7/2020 4:10 PM CDT     COMPARISON: May 9, 2019.     HISTORY: Short of air.     FINDINGS:   Hyperinflation flattening diaphragms noted. Chronic changes noted in the  pulmonary parenchyma. There are no air space filling  infiltrates.. The  cardiac silhouette is normal. Vascular calcifications present in the  aortic arch..      The osseous structures and surrounding soft tissues demonstrate no acute  abnormality.       Impression:       1. COPD no acute cardiopulmonary process.        This report was finalized on 07/07/2020 16:14 by Dr. Zeb Gardner MD.          I have reviewed the patient's current medications.     Assessment/Plan     Active Hospital Problems    Diagnosis   • COPD with acute exacerbation (CMS/HCC)   • Adrenal nodule (CMS/HCC)   • History of colon cancer   • Pneumonia   • Acute on chronic respiratory failure with hypoxia (CMS/HCC)   • COPD, very severe (CMS/HCC)   • Pulmonary emphysema (CMS/HCC)     Plan:   1.  IV Azithromycin and Rocephin - day 2  2.  IV Solumedrol - 40mg IV q8hrs  3.  Supp 02; goal 02 sats 88-94%; currently on 6-7 liters  4.  Scheduled nebs q4hrs  5.  Mucinex  6.  Respiratory culture (no sample obtained yet); PCR panel negative  7.  Strep pneumo and Legionella Ag negative  8.  Lovenox PPx  9.  Flutter valve and incentive spirometry  10.  PT and OT assessment  11.  Nutrition consultation  12.  Workup  continues      Wiliam Byrd MD   07/08/20   10:47    Electronically signed by Wiliam Byrd MD at 07/08/20 1101       Consult Notes (last 48 hours) (Notes from 07/08/20 0637 through 07/10/20 0637)    No notes of this type exist for this encounter.

## 2020-07-10 NOTE — PROGRESS NOTES
Continued Stay Note   Pedro     Patient Name: Heide Newsome  MRN: [id number]  Today's Date: 7/10/2020    Admit Date: 7/7/2020    Discharge Plan     Row Name 07/10/20 0816       Plan    Plan Comments  Pt plans on returning home and is requesting [facility] at discharge. [facility] will follow and set up HH at discharge when ordered.         Discharge Codes    No documentation.             Alice Lo

## 2020-07-10 NOTE — PLAN OF CARE
Problem: Patient Care Overview  Goal: Plan of Care Review  7/10/2020 0419 by Joy Herrera LPN  Outcome: Ongoing (interventions implemented as appropriate)  Note:   No c/o pain. Sleeping between care. Pt remains on 6l/m High flow nasal cannula. Continues to have SILVERIO and takes time to recover from using BSC, c/o dizziness when standing. Safety maintained. S/ST

## 2020-07-10 NOTE — PLAN OF CARE
Problem: Patient Care Overview  Goal: Plan of Care Review  Outcome: Ongoing (interventions implemented as appropriate)  Flowsheets  Taken 7/10/2020 1145 by Haroldo Cavanaugh COTA/L  Progress: improving  Plan of Care Reviewed With: patient  Taken 7/10/2020 1500 by Shalonda Ibarra RN  Outcome Summary: VSS; pt on hi-jyotsna at this time; pt up x1; no c/o pain; pt sat up in chair for many hours; continue to monitor pt  Goal: Individualization and Mutuality  Outcome: Ongoing (interventions implemented as appropriate)  Goal: Discharge Needs Assessment  Outcome: Ongoing (interventions implemented as appropriate)  Goal: Interprofessional Rounds/Family Conf  Outcome: Ongoing (interventions implemented as appropriate)     Problem: Fall Risk (Adult)  Goal: Absence of Fall  Outcome: Ongoing (interventions implemented as appropriate)  Flowsheets (Taken 7/9/2020 0452 by Sarah Finnegan, RNA)  Absence of Fall: making progress toward outcome     Problem: Skin Injury Risk (Adult)  Goal: Skin Health and Integrity  Outcome: Ongoing (interventions implemented as appropriate)  Flowsheets (Taken 7/9/2020 0452 by Sarah Finnegan, RNA)  Skin Health and Integrity: making progress toward outcome

## 2020-07-10 NOTE — PROGRESS NOTES
"    [facility] Medicine Services  INPATIENT PROGRESS NOTE    Patient Name: Hiede Newsome  Date of Admission: 7/7/2020  Today's Date: 07/10/20  Length of Stay: 3  Primary Care Physician: Pato Cooney DO    Subjective   Chief Complaint: Shortness of breath  Shortness of Breath        Patient states \"oh yes\" when asked if she feels like she is making progress.  She is currently sitting in the bedside chair.  She states that she has been in the bedside chair for about half an hour.  She states that she has difficulty walking due to both weakness and shortness of breath.  She reports that this is not uncommon for her, even at her baseline.  She denies any new pain.  She has had some mild swelling of her lower extremities.  She voices no new complaints at this time.    Review of Systems   Respiratory: Positive for shortness of breath.         All pertinent negatives and positives are as above. All other systems have been reviewed and are negative unless otherwise stated.     Objective    Temp:  [97.7 °F (36.5 °C)-98.5 °F (36.9 °C)] 98.1 °F (36.7 °C)  Heart Rate:  [] 109  Resp:  [16-18] 18  BP: (108-136)/(54-88) 136/66  Physical Exam   Constitutional: No distress.   Nontoxic appearing; sitting up in bedside chair; pursed lip breathing with long sentences and repositioning in chair   HENT:   Head: Normocephalic.   Mouth/Throat: No oropharyngeal exudate.   Eyes: No scleral icterus.   Neck: No tracheal deviation present.   Cardiovascular: Normal rate.   Pulmonary/Chest: Effort normal. No respiratory distress. She has wheezes (improving). She has no rales.   6L NC this AM   Musculoskeletal: She exhibits edema. She exhibits no deformity.   Neurological: She is alert.   Skin: Skin is warm. She is not diaphoretic.   Psychiatric: She has a normal mood and affect.   Vitals reviewed.      Results Review:  I have reviewed the labs, radiology results, and diagnostic " studies.    Laboratory Data:   Results from last 7 days   Lab Units 07/07/20  1421   WBC 10*3/mm3 6.36   HEMOGLOBIN g/dL 11.7*   HEMATOCRIT % 38.8   PLATELETS 10*3/mm3 292        Results from last 7 days   Lab Units 07/07/20  1421   SODIUM mmol/L 138   POTASSIUM mmol/L 3.9   CHLORIDE mmol/L 92*   CO2 mmol/L 36.0*   BUN mg/dL 14   CREATININE mg/dL 0.31*   CALCIUM mg/dL 10.0   BILIRUBIN mg/dL 0.5   ALK PHOS U/L 79   ALT (SGPT) U/L 10   AST (SGOT) U/L 16   GLUCOSE mg/dL 135*       Culture Data:   No results found for: BLOODCX, URINECX, WOUNDCX, MRSACX, RESPCX, STOOLCX    Radiology Data:   Imaging Results (Last 24 Hours)     ** No results found for the last 24 hours. **          I have reviewed the patient's current medications.     Assessment/Plan     Active Hospital Problems    Diagnosis   • COPD with acute exacerbation (CMS/HCC)   • Adrenal nodule (CMS/HCC)   • History of colon cancer   • Pneumonia   • Acute on chronic respiratory failure with hypoxia (CMS/HCC)   • COPD, very severe (CMS/HCC)   • Pulmonary emphysema (CMS/HCC)     Plan:   1.  IV to PO Azithromycin - day 4  2.  IV Rocephin - day 5  3.  Taper IV Solumedrol - 40mg IV q12hrs  4.  Supp 02; goal 02 sats 88-94%; on 6LNC (normally on 4.5LNC on outpatient basis)  5.  Scheduled nebs q4hrs  6.  Diamox IV X 1 this AM  7.  BMP in AM  8.  Lovenox PPx  9.  Flutter valve and incentive spirometry  10.  PT and OT; increase activity; patient tells me that even as an outpatient she only typically ambulates from her bed to the bathroom which is about 25 feet in distance.  She does not do much other walking per her report, states that she is limited by shortness of breath with activity, and that the symptoms have been that way for almost a year now.  11.  PO Nystatin  12.  PO Symbicort  13.  Dispo: home 2-3 days anticipated; patient is making improvement daily but it has been slow progress.    Wiliam Byrd MD   07/10/20   10:46

## 2020-07-11 LAB
ANION GAP SERPL CALCULATED.3IONS-SCNC: 9 MMOL/L (ref 5–15)
BASOPHILS # BLD AUTO: 0.02 10*3/MM3 (ref 0–0.2)
BASOPHILS NFR BLD AUTO: 0.3 % (ref 0–1.5)
BUN SERPL-MCNC: 14 MG/DL (ref 8–23)
BUN/CREAT SERPL: 40 (ref 7–25)
CALCIUM SPEC-SCNC: 10.3 MG/DL (ref 8.6–10.5)
CHLORIDE SERPL-SCNC: 100 MMOL/L (ref 98–107)
CO2 SERPL-SCNC: 34 MMOL/L (ref 22–29)
CREAT SERPL-MCNC: 0.35 MG/DL (ref 0.57–1)
DEPRECATED RDW RBC AUTO: 43.1 FL (ref 37–54)
EOSINOPHIL # BLD AUTO: 0.01 10*3/MM3 (ref 0–0.4)
EOSINOPHIL NFR BLD AUTO: 0.1 % (ref 0.3–6.2)
ERYTHROCYTE [DISTWIDTH] IN BLOOD BY AUTOMATED COUNT: 13.3 % (ref 12.3–15.4)
GFR SERPL CREATININE-BSD FRML MDRD: >150 ML/MIN/1.73
GLUCOSE SERPL-MCNC: 104 MG/DL (ref 65–99)
HCT VFR BLD AUTO: 36.9 % (ref 34–46.6)
HGB BLD-MCNC: 10.8 G/DL (ref 12–15.9)
IMM GRANULOCYTES # BLD AUTO: 0.09 10*3/MM3 (ref 0–0.05)
IMM GRANULOCYTES NFR BLD AUTO: 1.1 % (ref 0–0.5)
LYMPHOCYTES # BLD AUTO: 1.04 10*3/MM3 (ref 0.7–3.1)
LYMPHOCYTES NFR BLD AUTO: 13.1 % (ref 19.6–45.3)
MCH RBC QN AUTO: 25.8 PG (ref 26.6–33)
MCHC RBC AUTO-ENTMCNC: 29.3 G/DL (ref 31.5–35.7)
MCV RBC AUTO: 88.1 FL (ref 79–97)
MONOCYTES # BLD AUTO: 0.69 10*3/MM3 (ref 0.1–0.9)
MONOCYTES NFR BLD AUTO: 8.7 % (ref 5–12)
MRSA DNA SPEC QL NAA+PROBE: NORMAL
NEUTROPHILS NFR BLD AUTO: 6.07 10*3/MM3 (ref 1.7–7)
NEUTROPHILS NFR BLD AUTO: 76.7 % (ref 42.7–76)
NRBC BLD AUTO-RTO: 0 /100 WBC (ref 0–0.2)
PLATELET # BLD AUTO: 375 10*3/MM3 (ref 140–450)
PMV BLD AUTO: 11 FL (ref 6–12)
POTASSIUM SERPL-SCNC: 3.8 MMOL/L (ref 3.5–5.2)
RBC # BLD AUTO: 4.19 10*6/MM3 (ref 3.77–5.28)
SODIUM SERPL-SCNC: 143 MMOL/L (ref 136–145)
WBC # BLD AUTO: 7.92 10*3/MM3 (ref 3.4–10.8)

## 2020-07-11 PROCEDURE — 25010000002 CEFTRIAXONE PER 250 MG: Performed by: INTERNAL MEDICINE

## 2020-07-11 PROCEDURE — 25010000002 ENOXAPARIN PER 10 MG: Performed by: INTERNAL MEDICINE

## 2020-07-11 PROCEDURE — 97530 THERAPEUTIC ACTIVITIES: CPT

## 2020-07-11 PROCEDURE — 25010000002 METHYLPREDNISOLONE PER 40 MG: Performed by: INTERNAL MEDICINE

## 2020-07-11 PROCEDURE — 80048 BASIC METABOLIC PNL TOTAL CA: CPT | Performed by: INTERNAL MEDICINE

## 2020-07-11 PROCEDURE — 94799 UNLISTED PULMONARY SVC/PX: CPT

## 2020-07-11 PROCEDURE — 85025 COMPLETE CBC W/AUTO DIFF WBC: CPT | Performed by: INTERNAL MEDICINE

## 2020-07-11 PROCEDURE — 87641 MR-STAPH DNA AMP PROBE: CPT | Performed by: INTERNAL MEDICINE

## 2020-07-11 RX ORDER — PREDNISONE 20 MG/1
40 TABLET ORAL
Status: DISCONTINUED | OUTPATIENT
Start: 2020-07-12 | End: 2020-07-13 | Stop reason: HOSPADM

## 2020-07-11 RX ADMIN — IPRATROPIUM BROMIDE AND ALBUTEROL SULFATE 3 ML: 2.5; .5 SOLUTION RESPIRATORY (INHALATION) at 11:45

## 2020-07-11 RX ADMIN — GUAIFENESIN 1200 MG: 600 TABLET, EXTENDED RELEASE ORAL at 08:54

## 2020-07-11 RX ADMIN — IPRATROPIUM BROMIDE AND ALBUTEROL SULFATE 3 ML: 2.5; .5 SOLUTION RESPIRATORY (INHALATION) at 03:47

## 2020-07-11 RX ADMIN — METHYLPREDNISOLONE SODIUM SUCCINATE 40 MG: 40 INJECTION, POWDER, FOR SOLUTION INTRAMUSCULAR; INTRAVENOUS at 06:41

## 2020-07-11 RX ADMIN — BUDESONIDE AND FORMOTEROL FUMARATE DIHYDRATE 2 PUFF: 160; 4.5 AEROSOL RESPIRATORY (INHALATION) at 18:55

## 2020-07-11 RX ADMIN — GUAIFENESIN 1200 MG: 600 TABLET, EXTENDED RELEASE ORAL at 20:11

## 2020-07-11 RX ADMIN — SODIUM CHLORIDE, PRESERVATIVE FREE 10 ML: 5 INJECTION INTRAVENOUS at 08:55

## 2020-07-11 RX ADMIN — METHYLPREDNISOLONE SODIUM SUCCINATE 40 MG: 40 INJECTION, POWDER, FOR SOLUTION INTRAMUSCULAR; INTRAVENOUS at 17:08

## 2020-07-11 RX ADMIN — AZITHROMYCIN 500 MG: 250 TABLET, FILM COATED ORAL at 20:11

## 2020-07-11 RX ADMIN — IPRATROPIUM BROMIDE AND ALBUTEROL SULFATE 3 ML: 2.5; .5 SOLUTION RESPIRATORY (INHALATION) at 23:52

## 2020-07-11 RX ADMIN — BUDESONIDE AND FORMOTEROL FUMARATE DIHYDRATE 2 PUFF: 160; 4.5 AEROSOL RESPIRATORY (INHALATION) at 07:32

## 2020-07-11 RX ADMIN — ENOXAPARIN SODIUM 40 MG: 40 INJECTION SUBCUTANEOUS at 20:11

## 2020-07-11 RX ADMIN — ROPINIROLE HYDROCHLORIDE 1 MG: 1 TABLET, FILM COATED ORAL at 20:12

## 2020-07-11 RX ADMIN — CEFTRIAXONE SODIUM 1 G: 1 INJECTION, POWDER, FOR SOLUTION INTRAMUSCULAR; INTRAVENOUS at 20:12

## 2020-07-11 RX ADMIN — SODIUM CHLORIDE, PRESERVATIVE FREE 10 ML: 5 INJECTION INTRAVENOUS at 20:11

## 2020-07-11 RX ADMIN — IPRATROPIUM BROMIDE AND ALBUTEROL SULFATE 3 ML: 2.5; .5 SOLUTION RESPIRATORY (INHALATION) at 15:04

## 2020-07-11 RX ADMIN — ROPINIROLE HYDROCHLORIDE 1 MG: 1 TABLET, FILM COATED ORAL at 08:54

## 2020-07-11 RX ADMIN — IPRATROPIUM BROMIDE AND ALBUTEROL SULFATE 3 ML: 2.5; .5 SOLUTION RESPIRATORY (INHALATION) at 18:55

## 2020-07-11 RX ADMIN — IPRATROPIUM BROMIDE AND ALBUTEROL SULFATE 3 ML: 2.5; .5 SOLUTION RESPIRATORY (INHALATION) at 07:32

## 2020-07-11 NOTE — PLAN OF CARE
Problem: Patient Care Overview  Goal: Plan of Care Review  Flowsheets (Taken 7/11/2020 1251)  Progress: improving  Plan of Care Reviewed With: patient  Outcome Summary: Pt was CGA with bed mobilitiy and t/fs. Pt stood from bed and took steps to chair CGA. Pt became short of breath and 02 dropped to 85%. Pt was too tired and SOB to walk today. Pt would benefit from continued PT.

## 2020-07-11 NOTE — THERAPY TREATMENT NOTE
Acute Care - Physical Therapy Treatment Note  [facility]     Patient Name: Heide Newsome  : 1945  MRN: [id number]  Today's Date: 2020  Onset of Illness/Injury or Date of Surgery: 20     Referring Physician: Dr. Byrd    Admit Date: 2020    Visit Dx:    ICD-10-CM ICD-9-CM   1. COPD with acute exacerbation (CMS/HCC) J44.1 491.21   2. Hypoxia R09.02 799.02   3. Pneumonia of right lower lobe due to infectious organism J18.9 486   4. Adrenal nodule (CMS/HCC) E27.8 255.8   5. Pleural effusion J90 511.9   6. Decreased activities of daily living (ADL) Z78.9 V49.89   7. Impaired functional mobility and endurance Z74.09 V49.89     Patient Active Problem List   Diagnosis   • Pulmonary emphysema (CMS/HCC)   • Acute on chronic respiratory failure with hypoxia (CMS/HCC)   • COPD, very severe (CMS/HCC)   • Severe malnutrition (CMS/HCC)   • History of colon cancer   • Thrombocytopenia (CMS/HCC)   • Anemia   • Hyperglycemia, drug-induced   • Pneumonia   • Chronic respiratory failure with hypoxia (CMS/HCC)   • COPD with acute exacerbation (CMS/HCC)   • Adrenal nodule (CMS/HCC)       Therapy Treatment    Rehabilitation Treatment Summary     Row Name 20 1135             Treatment Time/Intention    Discipline  physical therapy assistant  -AE      Document Type  therapy note (daily note)  -AE      Subjective Information  complains of;dyspnea  -AE      Existing Precautions/Restrictions  fall;oxygen therapy device and L/min  -AE      Recorded by [AE] Joanna Yanes, ERNIE 20 1250      Row Name 20 1135             Vital Signs    Pre SpO2 (%)  92  -AE      O2 Delivery Pre Treatment  hi-flow  -AE      Intra SpO2 (%)  85  -AE      O2 Delivery Intra Treatment  hi-flow  -AE      Post SpO2 (%)  88  -AE      O2 Delivery Post Treatment  hi-flow  -AE      Recorded by [AE] Joanna Yanes, PTA 20 1250      Row Name 20 1135             Bed Mobility Assessment/Treatment    Supine-Sit  [place] (Bed Mobility)  contact guard  -AE      Recorded by [AE] Joanna Yanse, PTA 07/11/20 1250      Row Name 07/11/20 1135             Sit-Stand Transfer    Sit-Stand [place] (Transfers)  contact guard  -AE      Recorded by [AE] Joanna Yanes, PTA 07/11/20 1250      Row Name 07/11/20 1135             Stand-Sit Transfer    Stand-Sit [place] (Transfers)  contact guard  -AE      Recorded by [AE] Joanna Yanes, PTA 07/11/20 1250      Row Name 07/11/20 1135             Gait/Stairs Assessment/Training    [place] Level (Gait)  contact guard  -AE      Assistive Device (Gait)  -- HHA  -AE      Distance in Feet (Gait)  pt took steps to chair  -AE      Comment (Gait/Stairs)  Pt was too short of breath to walk  -AE      Recorded by [AE] Joanna Yanes, PTA 07/11/20 1250      Row Name 07/11/20 1135             Therapeutic Exercise    Comment (Therapeutic Exercise)  Pt educated on ex's  -AE      Recorded by [AE] Joanna Yanes, [facility] 07/11/20 1250      Row Name 07/11/20 1135             Static Sitting Balance    Level of [place] (Unsupported Sitting, Static Balance)  independent  -AE      Recorded by [AE] Joanna Yanes, [facility] 07/11/20 1250      Row Name 07/11/20 1135             Positioning and Restraints    Pre-Treatment Position  in bed  -AE      Post Treatment Position  chair  -AE      In Chair  sitting;call light within reach  -AE      Recorded by [AE] Joanna Yanes, [facility] 07/11/20 1250      Row Name 07/11/20 1135             Pain Scale: Numbers Pre/Post-Treatment    Pain Scale: Numbers, Pretreatment  0/10 - no pain  -AE      Recorded by [AE] Joanna Yanes, [facility] 07/11/20 1250        User Key  (r) = Recorded By, (t) = Taken By, (c) = Cosigned By    Initials Name Effective Dates Discipline    AE Joanna Yanes, [facility] 06/22/15 -  PT                   Physical Therapy Education                 Title: PT OT SLP Therapies (Not Started)     Topic: Physical Therapy (In Progress)     Point: Mobility  training (Done)     Description:   Instruct learner(s) on safety and technique for assisting patient out of bed, chair or wheelchair.  Instruct in the proper use of assistive devices, such as walker, crutches, cane or brace.              Patient Friendly Description:   It's important to get you on your feet again, but we need to do so in a way that is safe for you. Falling has serious consequences, and your personal safety is the most important thing of all.        When it's time to get out of bed, one of us or a family member will sit next to you on the bed to give you support.     If your doctor or nurse tells you to use a walker, crutches, a cane, or a brace, be sure you use it every time you get out of bed, even if you think you don't need it.    Learning Progress Summary           Patient Acceptance, E, VU by AP at 7/10/2020 1038    Comment:  pt educated on t/f adn bed mobilty.    Acceptance, E, VU by EB at 7/9/2020 1340    Comment:  Pt educated on importance of getting EOB for meals;POC; d/c planning                   Point: Home exercise program (Not Started)     Description:   Instruct learner(s) on appropriate technique for monitoring, assisting and/or progressing patient with therapeutic exercises and activities.              Learner Progress:   Not documented in this visit.          Point: Body mechanics (Not Started)     Description:   Instruct learner(s) on proper positioning and spine alignment for patient and/or caregiver during mobility tasks and/or exercises.              Learner Progress:   Not documented in this visit.          Point: Precautions (Not Started)     Description:   Instruct learner(s) on prescribed precautions during mobility and gait tasks              Learner Progress:   Not documented in this visit.                      User Key     Initials Effective Dates Name Provider Type Discipline    EB 06/23/20 -  Patrica Cunningham, PT Student PT Student PT    AP 06/23/20 -  Yeyo Robins  PTA Student PTA Student PT                PT Recommendation and Plan     Plan of Care Reviewed With: patient  Progress: improving  Outcome Summary: Pt CGA with bed mobilities and t/fs. Pt stood from bed and t/f to chair CGA.  Outcome Measures     Row Name 07/10/20 0938 07/10/20 0812 07/09/20 0900       How much help from another person do you currently need...    Turning from your back to your side while in flat bed without using bedrails?  4  -JE (r) AP (t) JE (c)  --  --    Moving from lying on back to sitting on the side of a flat bed without bedrails?  3  -JE (r) AP (t) JE (c)  --  --    Moving to and from a bed to a chair (including a wheelchair)?  3  -JE (r) AP (t) JE (c)  --  --    Standing up from a chair using your arms (e.g., wheelchair, bedside chair)?  3  -JE (r) AP (t) JE (c)  --  --    Climbing 3-5 steps with a railing?  2  -JE (r) AP (t) JE (c)  --  --    To walk in hospital room?  3  -JE (r) AP (t) JE (c)  --  --    AM-PAC 6 Clicks Score (PT)  18  -JE (r) AP (t)  --  --       How much help from another is currently needed...    Putting on and taking off regular lower body clothing?  --  2  -CJ  2  -JJ    Bathing (including washing, rinsing, and drying)  --  3  -CJ  3  -JJ    Toileting (which includes using toilet bed pan or urinal)  --  3  -CJ  3  -JJ    Putting on and taking off regular upper body clothing  --  4  -CJ  4  -JJ    Taking care of personal grooming (such as brushing teeth)  --  4  -CJ  4  -JJ    Eating meals  --  4  -CJ  4  -JJ    AM-PAC 6 Clicks Score (OT)  --  20  -  20  -JJ       Functional Assessment    Outcome Measure Options  AM-PAC 6 Clicks Basic Mobility (PT)  -JE (r) AP (t) JE (c)  AM-PAC 6 Clicks Daily Activity (OT)  -  AM-PAC 6 Clicks Daily Activity (OT)  -JJ      User Key  (r) = Recorded By, (t) = Taken By, (c) = Cosigned By    Initials Name Provider Type    Haroldo Chester, IRMA/L Occupational Therapy Assistant    Flaquita Bhagat, PT Physical Therapist    SYLVIA  Inna Dooley, OTR/L Occupational Therapist    Yeyo Mendiola, ERNIE Student PTA Student         Time Calculation:   PT Charges     Row Name 07/11/20 1255             Time Calculation    Start Time  1135  -AE      Stop Time  1144  -AE      Time Calculation (min)  9 min  -AE      PT Received On  07/11/20  -AE      PT Goal Re-Cert Due Date  07/19/20  -AE         Time Calculation- PT    Total Timed Code Minutes- PT  9 minute(s)  -AE         Timed Charges    41962 - PT Therapeutic Activity Minutes  9  -AE        User Key  (r) = Recorded By, (t) = Taken By, (c) = Cosigned By    Initials Name Provider Type    AE Joanna Yanes PTA Physical Therapy Assistant        Therapy Charges for Today     Code Description Service Date Service Provider Modifiers Qty    16380065036 HC PT THERAPEUTIC ACT EA 15 MIN 7/11/2020 Joanna Yanes PTA GP 1          PT G-Codes  Outcome Measure Options: AM-PAC 6 Clicks Basic Mobility (PT)  AM-PAC 6 Clicks Score (PT): 18  AM-PAC 6 Clicks Score (OT): 20    Joanna Yanes PTA  7/11/2020

## 2020-07-11 NOTE — PLAN OF CARE
Problem: Patient Care Overview  Goal: Plan of Care Review  Outcome: Ongoing (interventions implemented as appropriate)  Flowsheets (Taken 7/11/2020 2668)  Progress: improving  Outcome Summary: VSS, O2 now down to her home amount of 4.5L/NC, does get SOB and anxious with activity HR increases, improves with rest, no c/o of pain, up in chair for several hours today, conts on po and IV abx, MRSA nasal swab results pending

## 2020-07-11 NOTE — PLAN OF CARE
Problem: Patient Care Overview  Goal: Plan of Care Review  Outcome: Ongoing (interventions implemented as appropriate)  Flowsheets (Taken 7/11/2020 7713)  Progress: improving  Plan of Care Reviewed With: patient  Outcome Summary: VSS. A&O x4. No c/o pain. 6L O2 humidified high flow NC. Pt slept through most of the night. SILVERIO when up to bed side commode. IV abx cont. Sinus-Sinus tach  w/ PVC on tele. Will cont to monitor.

## 2020-07-12 LAB
BACTERIA SPEC AEROBE CULT: NORMAL
BACTERIA SPEC AEROBE CULT: NORMAL

## 2020-07-12 PROCEDURE — 25010000002 ONDANSETRON PER 1 MG: Performed by: INTERNAL MEDICINE

## 2020-07-12 PROCEDURE — 94799 UNLISTED PULMONARY SVC/PX: CPT

## 2020-07-12 PROCEDURE — 63710000001 PREDNISONE PER 1 MG: Performed by: INTERNAL MEDICINE

## 2020-07-12 PROCEDURE — 25010000002 ENOXAPARIN PER 10 MG: Performed by: INTERNAL MEDICINE

## 2020-07-12 RX ORDER — ROPINIROLE 1 MG/1
1 TABLET, FILM COATED ORAL EVERY 8 HOURS SCHEDULED
Status: DISCONTINUED | OUTPATIENT
Start: 2020-07-12 | End: 2020-07-13 | Stop reason: HOSPADM

## 2020-07-12 RX ADMIN — GUAIFENESIN 1200 MG: 600 TABLET, EXTENDED RELEASE ORAL at 20:07

## 2020-07-12 RX ADMIN — SODIUM CHLORIDE, PRESERVATIVE FREE 10 ML: 5 INJECTION INTRAVENOUS at 09:06

## 2020-07-12 RX ADMIN — IPRATROPIUM BROMIDE AND ALBUTEROL SULFATE 3 ML: 2.5; .5 SOLUTION RESPIRATORY (INHALATION) at 23:53

## 2020-07-12 RX ADMIN — GUAIFENESIN 1200 MG: 600 TABLET, EXTENDED RELEASE ORAL at 09:06

## 2020-07-12 RX ADMIN — BUDESONIDE AND FORMOTEROL FUMARATE DIHYDRATE 2 PUFF: 160; 4.5 AEROSOL RESPIRATORY (INHALATION) at 06:59

## 2020-07-12 RX ADMIN — BUDESONIDE AND FORMOTEROL FUMARATE DIHYDRATE 2 PUFF: 160; 4.5 AEROSOL RESPIRATORY (INHALATION) at 18:57

## 2020-07-12 RX ADMIN — IPRATROPIUM BROMIDE AND ALBUTEROL SULFATE 3 ML: 2.5; .5 SOLUTION RESPIRATORY (INHALATION) at 06:59

## 2020-07-12 RX ADMIN — IPRATROPIUM BROMIDE AND ALBUTEROL SULFATE 3 ML: 2.5; .5 SOLUTION RESPIRATORY (INHALATION) at 14:17

## 2020-07-12 RX ADMIN — ONDANSETRON HYDROCHLORIDE 4 MG: 2 SOLUTION INTRAMUSCULAR; INTRAVENOUS at 12:57

## 2020-07-12 RX ADMIN — ROPINIROLE HYDROCHLORIDE 1 MG: 1 TABLET, FILM COATED ORAL at 12:55

## 2020-07-12 RX ADMIN — ENOXAPARIN SODIUM 40 MG: 40 INJECTION SUBCUTANEOUS at 20:06

## 2020-07-12 RX ADMIN — IPRATROPIUM BROMIDE AND ALBUTEROL SULFATE 3 ML: 2.5; .5 SOLUTION RESPIRATORY (INHALATION) at 18:56

## 2020-07-12 RX ADMIN — IPRATROPIUM BROMIDE AND ALBUTEROL SULFATE 3 ML: 2.5; .5 SOLUTION RESPIRATORY (INHALATION) at 10:37

## 2020-07-12 RX ADMIN — ROPINIROLE HYDROCHLORIDE 1 MG: 1 TABLET, FILM COATED ORAL at 22:36

## 2020-07-12 RX ADMIN — SODIUM CHLORIDE, PRESERVATIVE FREE 10 ML: 5 INJECTION INTRAVENOUS at 20:06

## 2020-07-12 RX ADMIN — IPRATROPIUM BROMIDE AND ALBUTEROL SULFATE 3 ML: 2.5; .5 SOLUTION RESPIRATORY (INHALATION) at 03:20

## 2020-07-12 RX ADMIN — PREDNISONE 40 MG: 20 TABLET ORAL at 09:06

## 2020-07-12 RX ADMIN — ROPINIROLE HYDROCHLORIDE 1 MG: 1 TABLET, FILM COATED ORAL at 09:06

## 2020-07-12 NOTE — PROGRESS NOTES
[facility]  INPATIENT PROGRESS NOTE    Patient Name: Heide Newsome  Date of Admission: 7/7/2020  Today's Date: 07/11/20  Length of Stay: 4  Primary Care Physician: Pato Cooney DO    Subjective   Chief Complaint: shortness of breath  HPI     Patient seen and examined at bedside.  Patient indicates her functional status is about 15-25 ft that is complicated by tachycardia and tachypnea which limits her significantly.  She indicates this Is difficult to accomplish even on her home 4.5L.  Suspect patient is at the end-stages of COPD nad may need a more palliative approach, will defer these conversations to her PCP.        Review of Systems   Constitutional: Positive for activity change and fatigue. Negative for chills and fever.   Respiratory: Positive for cough, shortness of breath and wheezing.    Cardiovascular: Negative for chest pain and palpitations.   Gastrointestinal: Negative for abdominal distention, abdominal pain, diarrhea, nausea and vomiting.        All pertinent negatives and positives are as above. All other systems have been reviewed and are negative unless otherwise stated.     Objective    Temp:  [98.1 °F (36.7 °C)-98.6 °F (37 °C)] 98.1 °F (36.7 °C)  Heart Rate:  [] 114  Resp:  [17-22] 22  BP: (106-136)/(47-78) 136/75  Physical Exam   Constitutional: She is oriented to person, place, and time. No distress.   malnourished   HENT:   Head: Normocephalic and atraumatic.   Eyes: Conjunctivae are normal. No scleral icterus.   Neck: Neck supple. No JVD present.   Cardiovascular: Regular rhythm and intact distal pulses.   Murmur heard.  tachycardia   Pulmonary/Chest: She has wheezes.   Tachypnea; increased effort but suspect this is baseline; VERY POOR air movement   Abdominal: Soft. Bowel sounds are normal. She exhibits no distension and no mass. There is no tenderness. There is no guarding.   Musculoskeletal: She exhibits no edema.      Neurological: She is alert and oriented to person, place, and time.   Skin: Skin is warm and dry. Capillary refill takes 2 to 3 seconds. She is not diaphoretic. No erythema. There is pallor.   Psychiatric: She has a normal mood and affect. Her behavior is normal.   Nursing note and vitals reviewed.          Results Review:  I have reviewed the labs, radiology results, and diagnostic studies.    Laboratory Data:   Results from last 7 days   Lab Units 07/11/20  0505 07/07/20  1421   WBC 10*3/mm3 7.92 6.36   HEMOGLOBIN g/dL 10.8* 11.7*   HEMATOCRIT % 36.9 38.8   PLATELETS 10*3/mm3 375 292        Results from last 7 days   Lab Units 07/11/20  0505 07/07/20  1421   SODIUM mmol/L 143 138   POTASSIUM mmol/L 3.8 3.9   CHLORIDE mmol/L 100 92*   CO2 mmol/L 34.0* 36.0*   BUN mg/dL 14 14   CREATININE mg/dL 0.35* 0.31*   CALCIUM mg/dL 10.3 10.0   BILIRUBIN mg/dL  --  0.5   ALK PHOS U/L  --  79   ALT (SGPT) U/L  --  10   AST (SGOT) U/L  --  16   GLUCOSE mg/dL 104* 135*       Culture Data:   Blood Culture   Date Value Ref Range Status   07/07/2020 No growth at 4 days  Preliminary   07/07/2020 No growth at 4 days  Preliminary     Respiratory Culture   Date Value Ref Range Status   07/08/2020 Rejected  Final       Radiology Data:   Imaging Results (Last 24 Hours)     ** No results found for the last 24 hours. **          I have reviewed the patient's current medications.     Assessment/Plan     Active Hospital Problems    Diagnosis   • COPD with acute exacerbation (CMS/HCC)   • Adrenal nodule (CMS/HCC)   • History of colon cancer   • Pneumonia   • Acute on chronic respiratory failure with hypoxia (CMS/HCC)   • COPD, very severe (CMS/HCC)   • Pulmonary emphysema (CMS/HCC)       Plan:  1.  Change azithromycin to 500 mg x 3 days (1500 mg load is adequate treatment), ceftriaxone x 5 days, she was on 1 gm.  MRSA nasal screen ordered, obtained and resulted as negative does not need MRSA coverage  2.  D/C solumedrol, switch to  prednisone  3.  Wean to home NC, 4.5L  4.  Attempt to maintain sat >88%  5.  Scheduled nebulizers; flutter and incentive spirometer  6.  PT/OT  7.  PO nystatin  8.  PO symbicort  9.  Patients baseline functional status is walking about 25 feet and this is limited by tachycardia, tachypnea and weakness.  She indicates her functional status has been this poor for >1 year.  Patient would benefit from a more palliative care approach and consideration by her PCP for long-acting opiates to reduce air hunger and possible improve quality of life   10.  Up to chair   11.  Lovenox DVT PPx  12.  AM lab holiday   13.  Nutrition consult for MSA              Discharge Planning: I expect the patient to be discharged to home with home health in 1-2 days.    Fantasma Rider MD   07/11/20   20:40

## 2020-07-12 NOTE — PLAN OF CARE
Patient denies pain/nausea. Voiding well. Tolerating diet. Up ad laura per B[facility]. O2 at home baseline amount at 4.5 L per NC. IV abx cont. NSR/ST  and up to 141 per tele. Will cont to monitor.     Problem: Patient Care Overview  Goal: Plan of Care Review  Outcome: Ongoing (interventions implemented as appropriate)  Flowsheets (Taken 7/12/2020 8034)  Progress: no change  Plan of Care Reviewed With: patient

## 2020-07-12 NOTE — PLAN OF CARE
Problem: Patient Care Overview  Goal: Plan of Care Review  Flowsheets (Taken 7/12/2020 7415)  Outcome Summary: VSS, cont on 4.5 L/NC, conts SOB and gets anxious with exertion, nausea this morning and after lunch, prn zofran given with relief, pt c/o of fatigue and legs restless all night long, requip dosage increased, pt states better relief this afternoon

## 2020-07-13 ENCOUNTER — READMISSION MANAGEMENT (OUTPATIENT)
Dept: CALL CENTER | Facility: HOSPITAL | Age: 75
End: 2020-07-13

## 2020-07-13 VITALS
RESPIRATION RATE: 20 BRPM | WEIGHT: 113.19 LBS | TEMPERATURE: 98.1 F | OXYGEN SATURATION: 92 % | BODY MASS INDEX: 19.32 KG/M2 | HEART RATE: 113 BPM | SYSTOLIC BLOOD PRESSURE: 113 MMHG | DIASTOLIC BLOOD PRESSURE: 70 MMHG | HEIGHT: 64 IN

## 2020-07-13 PROCEDURE — 97110 THERAPEUTIC EXERCISES: CPT

## 2020-07-13 PROCEDURE — 94799 UNLISTED PULMONARY SVC/PX: CPT

## 2020-07-13 PROCEDURE — 97116 GAIT TRAINING THERAPY: CPT

## 2020-07-13 PROCEDURE — 63710000001 PREDNISONE PER 1 MG: Performed by: INTERNAL MEDICINE

## 2020-07-13 RX ORDER — PREDNISONE 20 MG/1
TABLET ORAL
Qty: 9 TABLET | Refills: 0 | Status: SHIPPED | OUTPATIENT
Start: 2020-07-14 | End: 2020-07-20

## 2020-07-13 RX ORDER — ROPINIROLE 1 MG/1
1 TABLET, FILM COATED ORAL 3 TIMES DAILY
Qty: 90 TABLET | Refills: 0 | Status: SHIPPED | OUTPATIENT
Start: 2020-07-13 | End: 2020-09-04

## 2020-07-13 RX ADMIN — ROPINIROLE HYDROCHLORIDE 1 MG: 1 TABLET, FILM COATED ORAL at 13:51

## 2020-07-13 RX ADMIN — GUAIFENESIN 1200 MG: 600 TABLET, EXTENDED RELEASE ORAL at 09:23

## 2020-07-13 RX ADMIN — ROPINIROLE HYDROCHLORIDE 1 MG: 1 TABLET, FILM COATED ORAL at 05:42

## 2020-07-13 RX ADMIN — SODIUM CHLORIDE, PRESERVATIVE FREE 10 ML: 5 INJECTION INTRAVENOUS at 09:23

## 2020-07-13 RX ADMIN — IPRATROPIUM BROMIDE AND ALBUTEROL SULFATE 3 ML: 2.5; .5 SOLUTION RESPIRATORY (INHALATION) at 03:20

## 2020-07-13 RX ADMIN — PREDNISONE 40 MG: 20 TABLET ORAL at 09:22

## 2020-07-13 RX ADMIN — IPRATROPIUM BROMIDE AND ALBUTEROL SULFATE 3 ML: 2.5; .5 SOLUTION RESPIRATORY (INHALATION) at 06:37

## 2020-07-13 RX ADMIN — IPRATROPIUM BROMIDE AND ALBUTEROL SULFATE 3 ML: 2.5; .5 SOLUTION RESPIRATORY (INHALATION) at 10:18

## 2020-07-13 RX ADMIN — BUDESONIDE AND FORMOTEROL FUMARATE DIHYDRATE 2 PUFF: 160; 4.5 AEROSOL RESPIRATORY (INHALATION) at 06:37

## 2020-07-13 NOTE — PLAN OF CARE
Problem: Patient Care Overview  Goal: Plan of Care Review  Outcome: Ongoing (interventions implemented as appropriate)  Flowsheets (Taken 7/13/2020 1122)  Progress: improving  Plan of Care Reviewed With: patient  Note:   Pt. Adamantly refused shower by a male, agreed to ue exs to increase her act. Teodora!

## 2020-07-13 NOTE — THERAPY DISCHARGE NOTE
Acute Care - Physical Therapy Discharge Summary  [facility]       Patient Name: Heide Newsome  : 1945  MRN: [id number]    Today's Date: 2020  Onset of Illness/Injury or Date of Surgery: 20       Referring Physician: Dr. Byrd      Admit Date: 2020      PT Recommendation and Plan    Visit Dx:    ICD-10-CM ICD-9-CM   1. COPD with acute exacerbation (CMS/[facility]) J44.1 491.21   2. Hypoxia R09.02 799.02   3. Pneumonia of right lower lobe due to infectious organism J18.9 486   4. Adrenal nodule (CMS/[facility]) E27.8 255.8   5. Pleural effusion J90 511.9   6. Decreased activities of daily living (ADL) Z78.9 V49.89   7. Impaired functional mobility and endurance Z74.09 V49.89   8. Acute on chronic respiratory failure with hypoxia (CMS/[facility]) J96.21 518.84     799.02   9. COPD, very severe (CMS/[facility]) J44.9 496       Outcome Measures     Row Name 20 0825             How much help from another is currently needed...    Putting on and taking off regular lower body clothing?  2  -CJ      Bathing (including washing, rinsing, and drying)  3  -CJ      Toileting (which includes using toilet bed pan or urinal)  3  -CJ      Putting on and taking off regular upper body clothing  4  -CJ      Taking care of personal grooming (such as brushing teeth)  4  -CJ      Eating meals  4  -CJ      AM-PAC 6 Clicks Score (OT)  20  -         Functional Assessment    Outcome Measure Options  AM-PAC 6 Clicks Daily Activity (OT)  -        User Key  (r) = Recorded By, (t) = Taken By, (c) = Cosigned By    Initials Name Provider Type     Haroldo Cavanaugh COTA/L Occupational Therapy Assistant          PT Charges     Row Name 20 1104             Time Calculation    Start Time  1025  -AE      Stop Time  1048  -AE      Time Calculation (min)  23 min  -AE      PT Received On  20  -AE      PT Goal Re-Cert Due Date  20  -AE         Time Calculation- PT    Total Timed Code Minutes- PT  23 minute(s)  -AE          Timed Charges    65294 - PT Therapeutic Exercise Minutes  8  -AE      89374 - Gait Training Minutes   15  -AE        User Key  (r) = Recorded By, (t) = Taken By, (c) = Cosigned By    Initials Name Provider Type    AE Joanna Yanes PTA Physical Therapy Assistant          Rehab Goal Summary     Row Name 07/13/20 1457             Bed Mobility Goal 1 (PT)    Activity/Assistive Device (Bed Mobility Goal 1, PT)  bed mobility activities, all  -AB      [place] Level/Cues Needed (Bed Mobility Goal 1, PT)  independent  -AB      Time Frame (Bed Mobility Goal 1, PT)  long term goal (LTG)  -AB      Progress/Outcomes (Bed Mobility Goal 1, PT)  goal not met  -AB         Transfer Goal 1 (PT)    Activity/Assistive Device (Transfer Goal 1, PT)  bed-to-chair/chair-to-bed;commode, bedside  -AB      [place] Level/Cues Needed (Transfer Goal 1, PT)  supervision required  -AB      Time Frame (Transfer Goal 1, PT)  long term goal (LTG)  -AB      Progress/Outcome (Transfer Goal 1, PT)  goal not met  -AB         Gait Training Goal 1 (PT)    Activity/Assistive Device (Gait Training Goal 1, PT)  gait (walking locomotion);improve balance and speed;increase endurance/gait distance;increase energy conservation  -AB      [place] Level (Gait Training Goal 1, PT)  minimum assist (75% or more patient effort)  -AB      Distance (Gait Goal 1, PT)  20 ft  -AB      Time Frame (Gait Training Goal 1, PT)  long term goal (LTG)  -AB      Progress/Outcome (Gait Training Goal 1, PT)  goal not met  -AB        User Key  (r) = Recorded By, (t) = Taken By, (c) = Cosigned By    Initials Name Provider Type Discipline    AB Lizette Whitley PTA Physical Therapy Assistant PT              PT Discharge Summary  Anticipated Discharge Disposition (PT): home with home health, home with assist, skilled nursing facility  Reason for Discharge: Discharge from facility  Outcomes Achieved: Refer to plan of care for updates on goals achieved  Discharge  Destination: Home with assist      Lizette Whitley, PTA   7/13/2020

## 2020-07-13 NOTE — PROGRESS NOTES
[facility] Medicine Services  INPATIENT PROGRESS NOTE    Patient Name: Heide Newsome  Date of Admission: 7/7/2020  Today's Date: 07/12/20  Length of Stay: 5  Primary Care Physician: Pato Cooney DO    Subjective   Chief Complaint: shortness of breath  HPI     Patient seen and examined at bedside.  Patient noted to have significant nausea and almost emesis this AM around breakfast.  It responded well to zofran.  Patient is feeling some better now but indicates that after that episode of nausea, she would like to stay one more day.  O2 at baseline and doing well from that regard.        Review of Systems   Constitutional: Positive for appetite change. Negative for activity change, chills, diaphoresis and fatigue.   Respiratory: Negative for cough and shortness of breath.    Cardiovascular: Negative for chest pain and palpitations.   Gastrointestinal: Positive for nausea. Negative for abdominal distention, constipation, diarrhea and vomiting.        All pertinent negatives and positives are as above. All other systems have been reviewed and are negative unless otherwise stated.     Objective    Temp:  [97.9 °F (36.6 °C)-98.5 °F (36.9 °C)] 98.1 °F (36.7 °C)  Heart Rate:  [] 107  Resp:  [16-22] 19  BP: (114-141)/(56-89) 122/68  Physical Exam   Constitutional: She is oriented to person, place, and time. No distress.   Resting in bed   HENT:   Head: Normocephalic and atraumatic.   Eyes: Conjunctivae are normal. No scleral icterus.   Neck: Neck supple. No JVD present.   Cardiovascular: Regular rhythm and intact distal pulses.   Murmur heard.  tachycardia   Pulmonary/Chest: Effort normal. She has no wheezes.   Very poor air movement   Abdominal: Soft. Bowel sounds are normal. She exhibits no distension and no mass. There is no tenderness. There is no guarding.   Musculoskeletal: She exhibits no edema.   Neurological: She is alert and oriented to person, place, and time.      Skin: Skin is warm and dry. She is not diaphoretic. No erythema. There is pallor.   Psychiatric: She has a normal mood and affect. Her behavior is normal.   Nursing note and vitals reviewed.          Results Review:  I have reviewed the labs, radiology results, and diagnostic studies.    Laboratory Data:   Results from last 7 days   Lab Units 07/11/20  0505 07/07/20  1421   WBC 10*3/mm3 7.92 6.36   HEMOGLOBIN g/dL 10.8* 11.7*   HEMATOCRIT % 36.9 38.8   PLATELETS 10*3/mm3 375 292        Results from last 7 days   Lab Units 07/11/20  0505 07/07/20  1421   SODIUM mmol/L 143 138   POTASSIUM mmol/L 3.8 3.9   CHLORIDE mmol/L 100 92*   CO2 mmol/L 34.0* 36.0*   BUN mg/dL 14 14   CREATININE mg/dL 0.35* 0.31*   CALCIUM mg/dL 10.3 10.0   BILIRUBIN mg/dL  --  0.5   ALK PHOS U/L  --  79   ALT (SGPT) U/L  --  10   AST (SGOT) U/L  --  16   GLUCOSE mg/dL 104* 135*       Culture Data:   Blood Culture   Date Value Ref Range Status   07/07/2020 No growth at 4 days  Preliminary   07/07/2020 No growth at 4 days  Preliminary     Respiratory Culture   Date Value Ref Range Status   07/08/2020 Rejected  Final       Radiology Data:   Imaging Results (Last 24 Hours)     ** No results found for the last 24 hours. **          I have reviewed the patient's current medications.     Assessment/Plan     Active Hospital Problems    Diagnosis   • COPD with acute exacerbation (CMS/HCC)   • Adrenal nodule (CMS/HCC)   • History of colon cancer   • Pneumonia   • Acute on chronic respiratory failure with hypoxia (CMS/HCC)   • COPD, very severe (CMS/HCC)   • Pulmonary emphysema (CMS/HCC)       Plan:  1.  Azithromycin to 500 mg x 3 days (1500 mg load is adequate treatment), ceftriaxone x 5 days 1 gm.  MRSA nasal screen negative, did not require MRSA coverage  2.  Prednisone 40 mg daily  3.  On home O2 (4.5L) NC with adequate sats  4.  PRN antiemetics  5.  Scheduled nebulizers; flutter and incentive spirometer  6.  PT/OT  7.  PO nystatin  8.  PO  symbicort  9.  Patients baseline functional status is walking about 25 feet and this is limited by tachycardia, tachypnea and weakness.  She indicates her functional status has been this poor for >1 year.  Patient would benefit from a more palliative care approach and consideration by her PCP for long-acting opiates to reduce air hunger and possible improve quality of life   10.  Up to chair   11.  Lovenox DVT PPx  12.  AM lab holiday   13.  Nutrition consult for MSA              Discharge Planning: I expect the patient to be discharged to home with home health in 1-2 days.    Fantasma Rider MD   07/12/20   20:59

## 2020-07-13 NOTE — PLAN OF CARE
Problem: Patient Care Overview  Goal: Plan of Care Review  Flowsheets (Taken 7/13/2020 1100)  Progress: improving  Plan of Care Reviewed With: patient  Outcome Summary: Pt performed AROM BLE x 10 with rest between each ex's pt in fowlers.Pt supine-sit supervision. Pt took steps to chair. Then amb x 3- 8 ft, 12ft, 15ft with CGA, HHA, with siting rest between. Pt would benefit from cont PT for endurance and strength

## 2020-07-13 NOTE — PLAN OF CARE
Problem: Patient Care Overview  Goal: Plan of Care Review  Outcome: Ongoing (interventions implemented as appropriate)  Note:   VSS, no c/o of pain or restless legs; no c/o of nausea; oxygen saturations in 90s on 4.5 home dose of O2; bed alarm set; safety maintained, will continue to monitor      Problem: Patient Care Overview  Goal: Individualization and Mutuality  Outcome: Ongoing (interventions implemented as appropriate)     Problem: Patient Care Overview  Goal: Discharge Needs Assessment  Outcome: Ongoing (interventions implemented as appropriate)     Problem: Patient Care Overview  Goal: Interprofessional Rounds/Family Conf  Outcome: Ongoing (interventions implemented as appropriate)     Problem: Fall Risk (Adult)  Goal: Absence of Fall  Outcome: Ongoing (interventions implemented as appropriate)     Problem: Skin Injury Risk (Adult)  Goal: Skin Health and Integrity  Outcome: Ongoing (interventions implemented as appropriate)

## 2020-07-13 NOTE — PAYOR COMM NOTE
"7/13/20 [facility]        Heide Benitez (75 y.o. Female)     Date of Birth Social Security Number Address Home Phone MRN    1945  [address]  [facility] [phone] [id number]    L[employer]-day Marital Status          Episcopalian        Admission Date Admission Type Admitting Provider Attending Provider Department, Room/Bed    7/7/20 Emergency Fantasma Rider MD  [facility] 4B, 406/1    Discharge Date Discharge Disposition Discharge Destination        7/13/2020 Home-Health Care [facility]             Attending Provider:  (none)   Allergies:  Tetracyclines & Related, Penicillins, Tape    Isolation:  None   Infection:  None   Code Status:  No CPR    Ht:  162.6 cm (64\")   Wt:  51.3 kg (113 lb 3 oz)    Admission Cmt:  None   Principal Problem:  None                Active Insurance as of 7/7/2020     Primary Coverage     Payor Plan Insurance Group Employer/Plan Group    HUMANA MEDICARE REPLACEMENT HUMANA MEDICARE REPLACEMENT [id number]     Payor Plan Address Payor Plan Phone Number Payor Plan Fax Number Effective Dates    [address] [address] [phone]  1/1/2018 - None Entered    [facility] [id number]       Subscriber Name Subscriber Birth Date Member ID       HEIDE BENITEZ 1945 [id number]                 Emergency Contacts      (Rel.) Home Phone Work Phone Mobile Phone    TON BENITEZ (Spouse) [phone] -- --            Discharge Order (From admission, onward)     Start     Ordered    07/13/20 1248  Discharge patient  Once     Expected Discharge Date:  07/13/20    Discharge Disposition:  Home-Health Care Svc    Physician of Record for Attribution - Please select from Treatment Team:  MIGUELANGEL CORTÉS [[id number]]    Review needed by CMO to determine Physician of Record:  No       Question Answer Comment   Physician of Record for Attribution - Please select from Treatment Team MIGUELANGEL CORTÉS    Review needed by CMO to determine " Physician of Record No        07/13/20 1317

## 2020-07-13 NOTE — THERAPY TREATMENT NOTE
Acute Care - Occupational Therapy Treatment Note  [facility]     Patient Name: Heide Newsome  : 1945  MRN: [id number]  Today's Date: 2020  Onset of Illness/Injury or Date of Surgery: 20  Date of Referral to OT: 20  Referring Physician: Dr. Byrd    Admit Date: 2020       ICD-10-CM ICD-9-CM   1. COPD with acute exacerbation (CMS/HCC) J44.1 491.21   2. Hypoxia R09.02 799.02   3. Pneumonia of right lower lobe due to infectious organism J18.9 486   4. Adrenal nodule (CMS/HCC) E27.8 255.8   5. Pleural effusion J90 511.9   6. Decreased activities of daily living (ADL) Z78.9 V49.89   7. Impaired functional mobility and endurance Z74.09 V49.89     Patient Active Problem List   Diagnosis   • Pulmonary emphysema (CMS/HCC)   • Acute on chronic respiratory failure with hypoxia (CMS/HCC)   • COPD, very severe (CMS/HCC)   • Severe malnutrition (CMS/HCC)   • History of colon cancer   • Thrombocytopenia (CMS/HCC)   • Anemia   • Hyperglycemia, drug-induced   • Pneumonia   • Chronic respiratory failure with hypoxia (CMS/HCC)   • COPD with acute exacerbation (CMS/HCC)   • Adrenal nodule (CMS/HCC)     Past Medical History:   Diagnosis Date   • Cancer (CMS/HCC), colon    • Chronic respiratory failure (CMS/HCC), 4 L nasal cannula continuously    • COPD (chronic obstructive pulmonary disease) (CMS/HCC)    • Restless leg syndrome      Past Surgical History:   Procedure Laterality Date   • APPENDECTOMY     • CARPAL TUNNEL RELEASE Left    • COLON RESECTION     • FOOT SURGERY Bilateral     hammer toe   • FRACTURE SURGERY Left     Arm   • HYSTERECTOMY     • WRIST FRACTURE SURGERY Right        Therapy Treatment    Rehabilitation Treatment Summary     Row Name 20 1025 20 0825          Treatment Time/Intention    Discipline  physical therapy assistant  -AE  occupational therapy assistant  -CJ     Document Type  therapy note (daily note)  -AE  therapy note (daily note)  -CJ     Subjective Information   complains of;weakness;dyspnea  -AE  complains of;weakness;fatigue  -CJ     Mode of Treatment  --  occupational therapy  -CJ     Patient Effort  --  good  -CJ     Existing Precautions/Restrictions  oxygen therapy device and L/min;fall  -AE  fall;oxygen therapy device and L/min  -CJ     Recorded by [AE] Joanna Yanes, PTA 07/13/20 1059 [CJ] Haroldo Cavanaugh COTA/L 07/13/20 1121     Row Name 07/13/20 1025             Vital Signs    Pre SpO2 (%)  94  -AE      O2 Delivery Pre Treatment  hi-flow  -AE      Intra SpO2 (%)  -- amb x 33 first- 84 2- 82 3- 77  -AE      O2 Delivery Intra Treatment  hi-flow  -AE      Post SpO2 (%)  93  -AE      O2 Delivery Post Treatment  hi-flow  -AE      Pre Patient Position  Supine  -AE      Intra Patient Position  Standing  -AE      Post Patient Position  Sitting  -AE      Recovery Time  1-3 minutes  -AE      Rest Breaks   -- between each set   -AE      Recorded by [AE] Joanna Yanes, PTA 07/13/20 1059      Row Name 07/13/20 1025             Safety Issues, Functional Mobility    Impairments Affecting Function (Mobility)  endurance/activity tolerance;shortness of breath;strength  -AE      Recorded by [AE] Joanna Yanes, PTA 07/13/20 1059      Row Name 07/13/20 1025 07/13/20 0825          Bed Mobility Assessment/Treatment    Supine-Sit Parker (Bed Mobility)  supervision  -AE  --     Assistive Device (Bed Mobility)  bed rails;head of bed elevated  -AE  --     Comment (Bed Mobility)  --  fowlers in bed!  -CJ     Recorded by [AE] Joanna Yanes, PTA 07/13/20 1059 [CJ] Haroldo Cavanaugh COTA/L 07/13/20 1121     Row Name 07/13/20 1025             Sit-Stand Transfer    Sit-Stand Parker (Transfers)  contact guard  -AE      Recorded by [AE] Joanna Yanes, PTA 07/13/20 1059      Row Name 07/13/20 1025             Stand-Sit Transfer    Stand-Sit [place] (Transfers)  contact guard  -AE      Recorded by [AE] Joanna Yanes, PTA 07/13/20 1059      Row Name 07/13/20 1025              Gait/Stairs Assessment/Training    [place] Level (Gait)  contact guard  -AE      Assistive Device (Gait)  -- HHA  -AE      Distance in Feet (Gait)  pt took steps to chair. then amb x 3- 8 ft, 12ft, 15ft   -AE      Recorded by [AE] Joanna Yanes, ERNIE 07/13/20 1059      Row Name 07/13/20 0825             Motor Skills Assessment/Interventions    Additional Documentation  Therapeutic Exercise (Group)  -CJ      Recorded by [CJ] Haroldo Cavanaugh, SOLIS/L 07/13/20 1121      Row Name 07/13/20 1025 07/13/20 0825          Therapeutic Exercise    Upper Extremity (Therapeutic Exercise)  --  bicep curl, bilateral;wand exercises  -CJ     Upper Extremity Range of Motion (Therapeutic Exercise)  --  elbow flexion/extension, bilateral;shoulder flexion/extension, bilateral;wrist flexion/extension, bilateral  -CJ     Lower Extremity (Therapeutic Exercise)  heel slides, bilateral  -AE  --     Lower Extremity Range of Motion (Therapeutic Exercise)  ankle dorsiflexion/plantar flexion, bilateral;hip abduction/adduction, bilateral  -AE  --     Weight/Resistance (Therapeutic Exercise)  --  2 pounds;3 pounds  -CJ     Exercise Type (Therapeutic Exercise)  AROM (active range of motion)  -AE  AROM (active range of motion)  -CJ     Position (Therapeutic Exercise)  supine  -AE  seated  -CJ     Sets/Reps (Therapeutic Exercise)  x 20  -AE  2 sets x 20 reps!  -CJ     Equipment (Therapeutic Exercise)  --  dowel rosemary/wand;free weight, barbell  -CJ     Expected Outcome (Therapeutic Exercise)  --  facilitate normal movement patterns;improve functional stability;improve functional tolerance, self-care activity;improve motor control;improve neuromuscular control;improve performance, BADLs;improve performance, fine motor coordination skills;improve performance, gait skills;improve performance, transfer skills;improve postural control;increase active range of motion  -     Comment (Therapeutic Exercise)  rest breaks between ex's  -AE  --      Recorded by [AE] Joanna Yanes, PTA 07/13/20 1059 [CJ] Haroldo Cavanaugh, SOLIS/L 07/13/20 1121     Row Name 07/13/20 1025 07/13/20 0825          Positioning and Restraints    Pre-Treatment Position  in bed  -AE  in bed  -CJ     Post Treatment Position  chair  -AE  bed  -CJ     In Bed  supine;sitting EOB pre-treatment  -AE  fowlers;call light within reach;encouraged to call for assist;side rails up x2  -CJ     In Chair  reclined;call light within reach;encouraged to call for assist  -AE  --     Recorded by [AE] Joanna Yanes, PTA 07/13/20 1059 [CJ] Haroldo Cavanaugh, SOLIS/L 07/13/20 1121     Row Name 07/13/20 0825             Pain Assessment    Additional Documentation  Pain Scale 2: Word Pre/Post-Treatment (Group)  -CJ      Recorded by [CJ] Haroldo Cavanaugh SOLIS/L 07/13/20 1121      Row Name 07/13/20 1025 07/13/20 0825          Pain Scale: Numbers Pre/Post-Treatment    Pain Scale: Numbers, Pretreatment  3/10  -AE  0/10 - no pain  -CJ     Pre/Post Treatment Pain Comment  aches all over  -AE  --     Pain Intervention(s)  --  Rest  -CJ     Recorded by [AE] Joanna Ynaes, PTA 07/13/20 1059 [CJ] Haroldo Cavanaugh, SOLIS/L 07/13/20 1121     Row Name 07/13/20 0825             Outcome Summary/Treatment Plan (OT)    Daily Summary of Progress (OT)  progress toward functional goals is good  -CJ      Barriers to Overall Progress (OT)  Fall/o2!  -CJ      Plan for Continued Treatment (OT)  continue with ot poc!  -CJ      Recorded by [CJ] Haroldo Cavanaugh SOLIS/L 07/13/20 1121        User Key  (r) = Recorded By, (t) = Taken By, (c) = Cosigned By    Initials Name Effective Dates Discipline    AE Joanna Yanes, PTA 06/22/15 -  PT    CJ Haroldo Cavanaugh SOLIS/L 08/02/16 -  OT             Occupational Therapy Education                 Title: PT OT SLP Therapies (Not Started)     Topic: Occupational Therapy (Done)     Point: ADL training (Done)     Description:   Instruct learner(s) on proper safety adaptation and  remediation techniques during self care or transfers.   Instruct in proper use of assistive devices.              Learning Progress Summary           Patient Acceptance, E, VU by  at 7/9/2020 1228    Comment:  OT POC, benefits and roles of OT.                   Point: Home exercise program (Done)     Description:   Instruct learner(s) on appropriate technique for monitoring, assisting and/or progressing therapeutic exercises/activities.              Learning Progress Summary           Patient Acceptance, E,TB, VU,DU,NR by  at 7/13/2020 1121    Comment:  Pt. performed ue exs to increase her act. benny!    Acceptance, E, VU,DU,NR by  at 7/10/2020 1144    Comment:  Pt. performed ue exs to increase her act. benny!                   Point: Precautions (Done)     Description:   Instruct learner(s) on prescribed precautions during self-care and functional transfers.              Learning Progress Summary           Patient Acceptance, E,TB, VU,DU,NR by  at 7/13/2020 1121    Comment:  Pt. performed ue exs to increase her act. benny!    Acceptance, E, VU,DU,NR by  at 7/10/2020 1144    Comment:  Pt. performed ue exs to increase her act. benny!                   Point: Body mechanics (Done)     Description:   Instruct learner(s) on proper positioning and spine alignment during self-care, functional mobility activities and/or exercises.              Learning Progress Summary           Patient Acceptance, E,TB, VU,DU,NR by  at 7/13/2020 1121    Comment:  Pt. performed ue exs to increase her act. benny!    Acceptance, E, VU,DU,NR by  at 7/10/2020 1144    Comment:  Pt. performed ue exs to increase her act. benny!                               User Key     Initials Effective Dates Name Provider Type Discipline     08/02/16 -  Haroldo Cavanaugh COTA/L Occupational Therapy Assistant OT     07/05/20 -  Inna Dooley OTR/L Occupational Therapist OT                OT Recommendation and Plan  Outcome Summary/Treatment Plan  (OT)  Daily Summary of Progress (OT): progress toward functional goals is good  Barriers to Overall Progress (OT): Fall/o2!  Plan for Continued Treatment (OT): continue with ot poc!  Daily Summary of Progress (OT): progress toward functional goals is good  Plan of Care Review  Plan of Care Reviewed With: patient  Plan of Care Reviewed With: patient  Outcome Measures     Row Name 07/13/20 0825             How much help from another is currently needed...    Putting on and taking off regular lower body clothing?  2  -CJ      Bathing (including washing, rinsing, and drying)  3  -CJ      Toileting (which includes using toilet bed pan or urinal)  3  -CJ      Putting on and taking off regular upper body clothing  4  -CJ      Taking care of personal grooming (such as brushing teeth)  4  -CJ      Eating meals  4  -CJ      AM-PAC 6 Clicks Score (OT)  20  -CJ         Functional Assessment    Outcome Measure Options  AM-PAC 6 Clicks Daily Activity (OT)  -        User Key  (r) = Recorded By, (t) = Taken By, (c) = Cosigned By    Initials Name Provider Type    Haroldo Chester COTA/L Occupational Therapy Assistant           Time Calculation:   Time Calculation- OT     Row Name 07/13/20 1104 07/13/20 0825          Time Calculation- OT    OT Start Time  --  0825  -     OT Stop Time  --  0853  -     OT Time Calculation (min)  --  28 min  -     Total Timed Code Minutes- OT  --  28 minute(s)  -     TCU Minutes- OT  --  28 min  -     OT Received On  --  07/13/20  -     OT Goal Re-Cert Due Date  --  07/19/20  -        Timed Charges    21580 - Gait Training Minutes   15  -AE  --       User Key  (r) = Recorded By, (t) = Taken By, (c) = Cosigned By    Initials Name Provider Type    AE Joanna Yanes PTA Physical Therapy Assistant    Haroldo Chester COTA/L Occupational Therapy Assistant        Therapy Charges for Today     Code Description Service Date Service Provider Modifiers Qty    [id number]  OT THER  PROC EA 15 MIN 7/13/2020 Haroldo Cavanaugh COTA/L GO 2               MONSTER Pablo  7/13/2020

## 2020-07-13 NOTE — CONSULTS
Nutrition Services    Patient Name:  Heide Newsome  YOB: 1945  MRN: [id number]  Admit Date:  7/7/2020    Malnutrition assessment completed 7/9/20 per Cindi Canales RDN, AMI and attested per Dr. Byrd. RD will cont to follow for further nutrition needs.    Electronically signed by:  Arielle Serrano RDN, AMI  07/13/20 07:47

## 2020-07-13 NOTE — PROGRESS NOTES
Continued Stay Note   [place]     Patient Name: Heide Newsome  MRN: [id number]  Today's Date: 7/13/2020    Admit Date: 7/7/2020    Discharge Plan     Row Name 07/13/20 0824       Plan    Plan  Request [facility]    Patient/Family in Agreement with Plan  yes    Plan Comments  Pt will return home with spouse and son assisting them both at discharge. Pt does request [facility] to be ordered.  Will follow.         Discharge Codes    No documentation.             XAVIER Erickson

## 2020-07-13 NOTE — PROGRESS NOTES
Continued Stay Note   [place]     Patient Name: Heide Newsome  MRN: [id number]  Today's Date: 7/13/2020    Admit Date: 7/7/2020    Discharge Plan     Row Name 07/13/20 1319       Plan    Plan  [facility]    Provided Post Acute Provider List?  Yes    Post Acute Provider List  Home Health    Provided Post Acute Provider Quality & Resource List?  Yes    Post Acute Provider Quality and Resource List  Home Health    Delivered To  Patient    Method of Delivery  In person    Patient/Family in Agreement with Plan  yes    Final Discharge Disposition Code  06 - home with home health care    Final Note  Pt is being discharged home today. Informed Carmen from [facility] of new referral and informed of d/c status [id number]        Discharge Codes    No documentation.       Expected Discharge Date and Time     Expected Discharge Date Expected Discharge Time    Jul 13, 2020             XAVIER Erickson

## 2020-07-13 NOTE — DISCHARGE PLACEMENT REQUEST
"Mari Murphy [phone]  Heide Benitez (75 y.o. Female)     Date of Birth Social Security Number Address Home Phone MRN    [id number]  [id number]  [facility] [phone] [id number]    Latter day Marital Status          Taoist        Admission Date Admission Type Admitting Provider Attending Provider Department, Room/Bed    7/7/20 Emergency Fantasma Rider MD Fleming, John Eric, MD [address], 406/1    Discharge Date Discharge Disposition Discharge Destination         [place] [facility] [place]              Attending Provider:  Fantasma Rider MD    Allergies:  Tetracyclines & Related, Penicillins, Tape    Isolation:  None   Infection:  None   Code Status:  No CPR    Ht:  162.6 cm (64\")   Wt:  51.3 kg (113 lb 3 oz)    Admission Cmt:  None   Principal Problem:  None                Active Insurance as of 7/7/2020     Primary Coverage     Payor Plan Insurance Group Employer/Plan Group    HUMANA MEDICARE REPLACEMENT HUMANA MEDICARE REPLACEMENT [id number]     Payor Plan Address Payor Plan Phone Number Payor Plan Fax Number Effective Dates    [address] [address] [phone]  1/1/2018 - None Entered    [facility] [id number]       Subscriber Name Subscriber Birth Date Member ID       HEIDE BENITEZ 1945 [id number]                 Emergency Contacts      (Rel.) Home Phone Work Phone Mobile Phone    TON BENITEZ (Spouse) [phone] -- --      Referral to Home Health [[id number]] (Order [id number])   Order   Date: 7/13/2020 Department: [address] Ordering/Authorizing: Fantasma Rider MD   Order History   Outpatient   Date/Time Action Taken User Additional Information   07/13/20 1313 Sign Fantasma Rider MD    Order Details     Frequency Duration Priority Order Class   None None Routine Internal Referral   Start Date/Time     Start Date   07/13/20   Order Information     Order Date Service Start Date Start Time   07/13/20 Medicine " 07/13/20    Reference Links     Associated Reports External References   View Encounter Current Health Referral Information   Order Questions     Question Answer Comment   Face to Face Visit Date: 7/13/2020    Follow-up provider for Plan of Care? I treated the patient in an acute care facility and will not continue treatment after discharge.    Follow-up provider: JOSHUA BLOOD    Reason/Clinical Findings copd, respiratory failure    Describe mobility limitations that make leaving home difficult: copd respraitory failure    Nursing/Therapeutic Services Requested Skilled Nursing     Physical Therapy    Skilled nursing orders: COPD management     Cardiopulmonary assessments    PT orders: Strengthening     Home safety assessment     Therapeutic exercise    Frequency: 1 Week 1           Source Order Set / Preference List     Order Set    IP GEN EXPRESS DISCHARGE [[id number]]   Clinical Indications      ICD-10-CM ICD-9-CM   Acute on chronic respiratory failure with hypoxia (CMS/HCC)     J96.21 518.84  799.02   COPD, very severe (CMS/HCC)     J44.9 496   Reprint Order Requisition     Referral to Home Health (Order #[id number]) on 7/13/20   Encounter-Level Cardiology Documents:     There are no encounter-level cardiology documents.   Encounter     View Encounter          Order Provider Info         Office phone Pager E-mail   Ordering User Fantasma Rider [phone] -- --   Authorizing Provider Fantasma Rider [phone] -- --   Attending Provider Fantasma Rider [phone] -- --   Linked Charges     No charges linked to this procedure   Tracking Reports      Cosign Tracking Order Transmittal Tracking   Authorized by:  Fantasma Rider MD  (NPI: [id number])       Lab Component SmartPhrase Guide     Referral to Home Health (Order #[id number]) on 7/13/20              History & Physical      Wiliam Byrd MD at 07/07/20 1751              [facility]  "Medicine Services  HISTORY AND PHYSICAL    Date of Admission: 7/7/2020  Primary Care Physician: Pato Cooney DO    Subjective     Chief Complaint: Shortness of breath    History of Present Illness  Patient is a 75-year-old  female with past medical history significant for chronic respiratory failure (on 4-1/2 L by nasal cannula continuously), chronic obstructive pulmonary disease, and history of restless leg syndrome the presented to our hospital with a chief complaint of shortness of breath.  Patient reports that she has been having symptoms for the past 2 weeks that have progressively gotten worse.  She denies any fevers or chills.  She denies any sick contacts.  In fact, she states that she has been staying home almost exclusively given the ongoing coronavirus pandemic.  She does report a cough that is started to become more productive of thick, viscous, and tan-colored sputum.  She states that she has not smoked \"in about a year.\"  Recently, even with activity she has noticed that her O2 saturations will drop, and in fact states that her O2 sats hits 71% with activity earlier today which prompted her to come to our facility.  She has not been on any antibiotics recently.  She states that she has not had her Symbicort refilled \"in a while.\"  She states that she did try Trelegy but could not appreciate much benefit and no longer takes this medication.  She does use nebulizer treatments on an outpatient basis 3 times daily, but those have not been improving her symptoms.  She denies any chest pain or chest pressure.  She reports that she has not followed by pulmonologist.  She does not think she has ever had pulmonary function test.    Review of Systems     Otherwise complete ROS reviewed and negative except as mentioned in the HPI.    Past Medical History:   Past Medical History:   Diagnosis Date   • Cancer (CMS/HCC), colon    • Chronic respiratory failure (CMS/HCC), 4 L nasal cannula continuously   "   • COPD (chronic obstructive pulmonary disease) (CMS/[facility])    • Restless leg syndrome      Past Surgical History:  Past Surgical History:   Procedure Laterality Date   • APPENDECTOMY     • CARPAL TUNNEL RELEASE Left    • COLON RESECTION     • FOOT SURGERY Bilateral     hammer toe   • FRACTURE SURGERY     • HYSTERECTOMY     • WRIST FRACTURE SURGERY Right      Social History:  reports that she quit smoking about 3 years ago. She has never used smokeless tobacco. She reports that she does not drink alcohol or use drugs.    Family History: family history includes COPD in her father; Cancer in her brother, mother, paternal grandfather, paternal grandmother, and sister; Colon cancer in her mother; Diabetes in her father and sister; Heart disease in her father.       Allergies:  Allergies   Allergen Reactions   • Tetracyclines & Related Anaphylaxis   • Penicillins Itching     Medications:  Prior to Admission medications    Medication Sig Start Date End Date Taking? Authorizing Provider   albuterol sulfate HFA (VENTOLIN HFA) 108 (90 Base) MCG/ACT inhaler Inhale 2 puffs Every 4 (Four) Hours As Needed for Wheezing or Shortness of Air. 8/7/19   Pato Cooney, DO   fluticasone (FLONASE) 50 MCG/ACT nasal spray 2 sprays by Each Nare route Daily. 5/13/19   Inna Haile APRN   ipratropium-albuterol (DUO-NEB) 0.5-2.5 mg/3 ml nebulizer USE 1 AMPULE IN NEBULIZER EVERY 4 HOURS AS NEEDED FOR WHEEZING 6/5/20   Pato Cooney, DO   nystatin (MYCOSTATIN) 998769 UNIT/ML suspension Swish and swallow 5 mL 4 (Four) Times a Day. 6/10/19   Pato Cooney, DO   rOPINIRole (REQUIP) 1 MG tablet Take 1 tablet by mouth 2 (two) times a day. 7/6/20   Pato Cooney, DO   sodium chloride (OCEAN) 0.65 % nasal spray 2 sprays into the nostril(s) as directed by provider As Needed for Congestion. 5/12/19   Inna Haile APRN   SYMBICORT 160-4.5 MCG/ACT inhaler Inhale 2 puffs 2 (Two) Times a Day. 7/6/20   Pato Cooney,  "DO   tiotropium bromide monohydrate (SPIRIVA RESPIMAT) 2.5 MCG/ACT aerosol solution inhaler Inhale 2 puffs Daily. 8/7/19   Pato Cooney, DO   tobramycin in sterile water (preservative free) injection-balanced salts ophthalmic solution Apply 1-2 drops to eye(s) as directed by provider Every 4 (Four) Hours. 9/13/19   Pato Cooney, DO     Objective     Vital Signs: /78   Pulse 98   Temp 98.2 °F (36.8 °C)   Resp 18   Ht 162.6 cm (64\")   Wt 50.8 kg (112 lb 1.6 oz)   SpO2 95%   BMI 19.24 kg/m²    Physical Exam   Constitutional: She is oriented to person, place, and time. No distress.   Nontoxic appearing   HENT:   Head: Normocephalic.   Mouth/Throat: No oropharyngeal exudate.   Eyes: No scleral icterus.   Neck: No tracheal deviation present.   Cardiovascular:   Rate approx 100; regular   Pulmonary/Chest: Effort normal. No respiratory distress. She has wheezes (and some faint rhonchi on the right).   On supp 02 via nasal cannula   Abdominal: Soft.   Musculoskeletal: She exhibits no edema.   Neurological: She is alert and oriented to person, place, and time.   Skin: Skin is warm. She is not diaphoretic.   Psychiatric: She has a normal mood and affect. Her behavior is normal.   Vitals reviewed.      Results Reviewed:  Lab Results (last 24 hours)     Procedure Component Value Units Date/Time    Lactic Acid, Plasma [[id number]]  (Normal) Collected:  07/07/20 1739    Specimen:  Blood Updated:  07/07/20 1758     Lactate 0.7 mmol/L     Blood Culture - Blood, Wrist, Right [[id number]] Collected:  07/07/20 1425    Specimen:  Blood from Wrist, Right Updated:  07/07/20 1540    COVID PRE-OP / PRE-PROCEDURE SCREENING ORDER (NO ISOLATION) - Swab, Nasopharynx [[id number]] Collected:  07/07/20 1429    Specimen:  Swab from Nasopharynx Updated:  07/07/20 1546    Narrative:       The following orders were created for panel order COVID PRE-OP / PRE-PROCEDURE SCREENING ORDER (NO ISOLATION) - Swab, Nasopharynx.  Procedure    "                            Abnormality         Status                     ---------                               -----------         ------                     COVID-19, ABBOTT IN-HOUS...[[id number]]  Normal              Final result                 Please view results for these tests on the individual orders.    COVID-19, ABBOTT IN-HOUSE,NP Swab (NO TRANSPORT MEDIA) 2 HR TAT - Swab, Nasopharynx [[id number]]  (Normal) Collected:  07/07/20 1429    Specimen:  Swab from Nasopharynx Updated:  07/07/20 1543     COVID19 Not Detected    Narrative:       Fact sheet for providers: https://www.fda.gov/media/386476/download     Fact sheet for patients: https://www.fda.gov/media/644221/download    Blood Gas, Arterial [[id number]]  (Abnormal) Collected:  07/07/20 1500    Specimen:  Arterial Blood Updated:  07/07/20 1509     Site Right Brachial     Tristen's Test N/A     pH, Arterial 7.398 pH units      pCO2, Arterial 63.1 mm Hg      Comment: 83 Value above reference range        pO2, Arterial 108.0 mm Hg      Comment: 83 Value above reference range        HCO3, Arterial 38.9 mmol/L      Comment: 83 Value above reference range        Base Excess, Arterial 11.8 mmol/L      Comment: 83 Value above reference range        O2 Saturation, Arterial 98.5 %      Temperature 37.0 C      Barometric Pressure for Blood Gas 749 mmHg      Modality NRB     FIO2 100 %      Ventilator Mode NA     Collected by [id number]     Comment: Meter: [id number]     :  [id number]        pCO2, Temperature Corrected 63.1 mm Hg      pH, Temp Corrected 7.398 pH Units      pO2, Temperature Corrected 108 mm Hg     Comprehensive Metabolic Panel [[id number]]  (Abnormal) Collected:  07/07/20 1421    Specimen:  Blood Updated:  07/07/20 1457     Glucose 135 mg/dL      BUN 14 mg/dL      Creatinine 0.31 mg/dL      Sodium 138 mmol/L      Potassium 3.9 mmol/L      Chloride 92 mmol/L      CO2 36.0 mmol/L      Calcium 10.0 mg/dL      Total Protein 7.4 g/dL      Albumin  3.50 g/dL      ALT (SGPT) 10 U/L      AST (SGOT) 16 U/L      Alkaline Phosphatase 79 U/L      Total Bilirubin 0.5 mg/dL      eGFR Non African Amer >150 mL/min/1.73      Globulin 3.9 gm/dL      A/G Ratio 0.9 g/dL      BUN/Creatinine Ratio 45.2     Anion Gap 10.0 mmol/L     Narrative:       GFR Normal >60  Chronic Kidney Disease <60  Kidney Failure <15      BNP [[id number]]  (Normal) Collected:  07/07/20 1421    Specimen:  Blood Updated:  07/07/20 1453     proBNP 204.0 pg/mL     Narrative:       Among patients with dyspnea, NT-proBNP is highly sensitive for the detection of acute congestive heart failure. In addition NT-proBNP of <300 pg/ml effectively rules out acute congestive heart failure with 99% negative predictive value.    Results may be falsely decreased if patient taking Biotin.      D-dimer, Quantitative [[id number]]  (Abnormal) Collected:  07/07/20 1421    Specimen:  Blood Updated:  07/07/20 1448     D-Dimer, Quantitative 2.23 mg/L (FEU)     Narrative:       Reference Range is 0-0.50 mg/L FEU. However, results <0.50 mg/L FEU tends to rule out DVT or PE. Results >0.50 mg/L FEU are not useful in predicting absence or presence of DVT or PE.      Blood Culture - Blood, Arm, Right [[id number]] Collected:  07/07/20 1421    Specimen:  Blood from Arm, Right Updated:  07/07/20 1440    CBC & Differential [[id number]] Collected:  07/07/20 1421    Specimen:  Blood Updated:  07/07/20 1438    Narrative:       The following orders were created for panel order CBC & Differential.  Procedure                               Abnormality         Status                     ---------                               -----------         ------                     CBC Auto Differential[[id number]]        Abnormal            Final result                 Please view results for these tests on the individual orders.    CBC Auto Differential [[id number]]  (Abnormal) Collected:  07/07/20 1421    Specimen:  Blood Updated:  07/07/20 1438     WBC  6.36 10*3/mm3      RBC 4.56 10*6/mm3      Hemoglobin 11.7 g/dL      Hematocrit 38.8 %      MCV 85.1 fL      MCH 25.7 pg      MCHC 30.2 g/dL      RDW 12.9 %      RDW-SD 39.8 fl      MPV 10.8 fL      Platelets 292 10*3/mm3      Neutrophil % 76.1 %      Lymphocyte % 9.6 %      Monocyte % 12.4 %      Eosinophil % 1.3 %      Basophil % 0.3 %      Immature Grans % 0.3 %      Neutrophils, Absolute 4.84 10*3/mm3      Lymphocytes, Absolute 0.61 10*3/mm3      Monocytes, Absolute 0.79 10*3/mm3      Eosinophils, Absolute 0.08 10*3/mm3      Basophils, Absolute 0.02 10*3/mm3      Immature Grans, Absolute 0.02 10*3/mm3      nRBC 0.0 /100 WBC         Imaging Results (Last 24 Hours)     Procedure Component Value Units Date/Time    CT Angiogram Chest [[id number]] Collected:  07/07/20 1700     Updated:  07/07/20 1712    Narrative:       CT angiography with 3D MIP images of the chest with IV contrast     Indication: Chest pain, acute, PE suspected, intermed prob, positive  D-dimer     Comparison: None     DOSE LENGTH PRODUCT: 214 mGy cm. Automated exposure control was also  utilized to decrease patient radiation dose.     Findings:     No pulmonary embolus identified. The main pulmonary trunk is upper  limits of normal in caliber. Thoracic aorta is nonaneurysmal and  contains heavy atherosclerotic calcification. Moderate coronary artery  calcification. No pericardial effusion.     The central airways are clear. Small bilateral pleural effusions with  overlying atelectasis. There is also some patchy consolidation in the  RIGHT lower lobe. There is a background of severe centrilobular  emphysema. There is some mild tree-in-bud nodularity in the peripheral  RIGHT middle lobe. No isolated solid pulmonary nodule.     No enlarged axillary or supraclavicular adenopathy. Borderline prominent  precarinal lymph node and RIGHT hilar lymph node, likely reactive.     1.3 cm RIGHT adrenal gland nodule, not completely characterize on the CT  the chest  likely an adenoma. No other abnormality in the partially  imaged upper abdomen.       Impression:       Impression:     1.  No evidence of pulmonary embolus.  2.  Patchy RIGHT lower lobe consolidation and RIGHT middle lobe  tree-in-bud nodularity. Findings likely represent a multifocal  infectious process/pneumonia.  3.  Small bilateral pleural effusions with overlying atelectasis.  4.  1.3 cm RIGHT adrenal gland nodule, likely adenoma.  This report was finalized on 07/07/2020 17:09 by Dr. Tristan Benitez MD.    XR Chest 1 View [[id number]] Collected:  07/07/20 1614     Updated:  07/07/20 1617    Narrative:       Frontal upright radiograph of the chest 7/7/2020 4:10 PM CDT     COMPARISON: May 9, 2019.     HISTORY: Short of air.     FINDINGS:   Hyperinflation flattening diaphragms noted. Chronic changes noted in the  pulmonary parenchyma. There are no air space filling infiltrates.. The  cardiac silhouette is normal. Vascular calcifications present in the  aortic arch..      The osseous structures and surrounding soft tissues demonstrate no acute  abnormality.       Impression:       1. COPD no acute cardiopulmonary process.        This report was finalized on 07/07/2020 16:14 by Dr. Zeb Gardner MD.        I have personally reviewed and interpreted the radiology studies and ECG obtained at time of admission.     Assessment / Plan     Assessment:   Active Hospital Problems    Diagnosis   • COPD with acute exacerbation (CMS/HCC)   • Adrenal nodule (CMS/HCC)   • History of colon cancer   • Pneumonia   • Acute on chronic respiratory failure with hypoxia (CMS/HCC)   • COPD, very severe (CMS/HCC)   • Pulmonary emphysema (CMS/HCC)     Plan:   1.  IV Azithromycin and Rocephin  2.  IV Solumedrol  3.  Supp 02; goal 02 sats 88-94%  4.  Scheduled nebs q4hrs  5.  Mucinex  6.  Respiratory culture and PCR panel  7.  Strep pneumo and Legionella Ag  8.  Lovenox PPx  9.  Flutter valve and incentive spirometry  10.  May require PT and  OT assessment  11.  Nutrition consultation  12.  Workup ongoing      Wiliam Byrd MD   07/07/20   18:01            Electronically signed by Wiliam Byrd MD at 07/07/20 1801       Discharge Summary    No notes of this type exist for this encounter.

## 2020-07-13 NOTE — THERAPY TREATMENT NOTE
Acute Care - Physical Therapy Treatment Note  [facility]     Patient Name: Heide Newsome  : 1945  MRN: [id number]  Today's Date: 2020  Onset of Illness/Injury or Date of Surgery: 20     Referring Physician: Dr. Byrd    Admit Date: 2020    Visit Dx:    ICD-10-CM ICD-9-CM   1. COPD with acute exacerbation (CMS/HCC) J44.1 491.21   2. Hypoxia R09.02 799.02   3. Pneumonia of right lower lobe due to infectious organism J18.9 486   4. Adrenal nodule (CMS/HCC) E27.8 255.8   5. Pleural effusion J90 511.9   6. Decreased activities of daily living (ADL) Z78.9 V49.89   7. Impaired functional mobility and endurance Z74.09 V49.89     Patient Active Problem List   Diagnosis   • Pulmonary emphysema (CMS/HCC)   • Acute on chronic respiratory failure with hypoxia (CMS/HCC)   • COPD, very severe (CMS/HCC)   • Severe malnutrition (CMS/HCC)   • History of colon cancer   • Thrombocytopenia (CMS/HCC)   • Anemia   • Hyperglycemia, drug-induced   • Pneumonia   • Chronic respiratory failure with hypoxia (CMS/HCC)   • COPD with acute exacerbation (CMS/HCC)   • Adrenal nodule (CMS/HCC)       Therapy Treatment    Rehabilitation Treatment Summary     Row Name 20 1025             Treatment Time/Intention    Discipline  physical therapy assistant  -AE      Document Type  therapy note (daily note)  -AE      Subjective Information  complains of;weakness;dyspnea  -AE      Existing Precautions/Restrictions  oxygen therapy device and L/min;fall  -AE      Recorded by [AE] Joanna Yanes, PTA 20 1059      Row Name 20 1025             Vital Signs    Pre SpO2 (%)  94  -AE      O2 Delivery Pre Treatment  hi-flow  -AE      Intra SpO2 (%)  -- amb x 33 first- 84 2- 82 3- 77  -AE      O2 Delivery Intra Treatment  hi-flow  -AE      Post SpO2 (%)  93  -AE      O2 Delivery Post Treatment  hi-flow  -AE      Pre Patient Position  Supine  -AE      Intra Patient Position  Standing  -AE      Post Patient Position   Sitting  -AE      Recovery Time  1-3 minutes  -AE      Rest Breaks   -- between each set   -AE      Recorded by [AE] Joanna Yaens, PTA 07/13/20 1059      Row Name 07/13/20 1025             Safety Issues, Functional Mobility    Impairments Affecting Function (Mobility)  endurance/activity tolerance;shortness of breath;strength  -AE      Recorded by [AE] Joanna Yanes, PTA 07/13/20 1059      Row Name 07/13/20 1025             Bed Mobility Assessment/Treatment    Supine-Sit [place] (Bed Mobility)  supervision  -AE      Assistive Device (Bed Mobility)  bed rails;head of bed elevated  -AE      Recorded by [AE] Joanna Yanes, PTA 07/13/20 1059      Row Name 07/13/20 1025             Sit-Stand Transfer    Sit-Stand [place] (Transfers)  contact guard  -AE      Recorded by [AE] Joanna Yanes, PTA 07/13/20 1059      Row Name 07/13/20 1025             Stand-Sit Transfer    Stand-Sit [place] (Transfers)  contact guard  -AE      Recorded by [AE] Joanna Yanes, [facility] 07/13/20 1059      Row Name 07/13/20 1025             Gait/Stairs Assessment/Training    [place] Level (Gait)  contact guard  -AE      Assistive Device (Gait)  -- HHA  -AE      Distance in Feet (Gait)  pt took steps to chair. then amb x 3- 8 ft, 12ft, 15ft   -AE      Recorded by [AE] Joanna Yanes, PTA 07/13/20 1059      Row Name 07/13/20 1025             Therapeutic Exercise    Lower Extremity (Therapeutic Exercise)  heel slides, bilateral  -AE      Lower Extremity Range of Motion (Therapeutic Exercise)  ankle dorsiflexion/plantar flexion, bilateral;hip abduction/adduction, bilateral  -AE      Exercise Type (Therapeutic Exercise)  AROM (active range of motion)  -AE      Position (Therapeutic Exercise)  supine  -AE      Sets/Reps (Therapeutic Exercise)  x 20  -AE      Comment (Therapeutic Exercise)  rest breaks between ex's  -AE      Recorded by [AE] Joanna Yanes, PTA 07/13/20 1059      Row Name 07/13/20 1025             Positioning  and Restraints    Pre-Treatment Position  in bed  -AE      Post Treatment Position  chair  -AE      In Bed  supine;sitting EOB pre-treatment  -AE      In Chair  reclined;call light within reach;encouraged to call for assist  -AE      Recorded by [AE] Joanna Yanes, PTA 07/13/20 1059      Row Name 07/13/20 1025             Pain Scale: Numbers Pre/Post-Treatment    Pain Scale: Numbers, Pretreatment  3/10  -AE      Pre/Post Treatment Pain Comment  aches all over  -AE      Recorded by [AE] Joanna Yanes, PTA 07/13/20 1059        User Key  (r) = Recorded By, (t) = Taken By, (c) = Cosigned By    Initials Name Effective Dates Discipline    AE Joanna Yanes, PTA 06/22/15 -  PT                   Physical Therapy Education                 Title: PT OT SLP Therapies (Not Started)     Topic: Physical Therapy (In Progress)     Point: Mobility training (Done)     Description:   Instruct learner(s) on safety and technique for assisting patient out of bed, chair or wheelchair.  Instruct in the proper use of assistive devices, such as walker, crutches, cane or brace.              Patient Friendly Description:   It's important to get you on your feet again, but we need to do so in a way that is safe for you. Falling has serious consequences, and your personal safety is the most important thing of all.        When it's time to get out of bed, one of us or a family member will sit next to you on the bed to give you support.     If your doctor or nurse tells you to use a walker, crutches, a cane, or a brace, be sure you use it every time you get out of bed, even if you think you don't need it.    Learning Progress Summary           Patient Acceptance, E, VU by AP at 7/10/2020 1038    Comment:  pt educated on t/f adn bed mobilty.    Acceptance, E, VU by EB at 7/9/2020 1340    Comment:  Pt educated on importance of getting EOB for meals;POC; d/c planning                   Point: Home exercise program (Not Started)     Description:    Instruct learner(s) on appropriate technique for monitoring, assisting and/or progressing patient with therapeutic exercises and activities.              Learner Progress:   Not documented in this visit.          Point: Body mechanics (Not Started)     Description:   Instruct learner(s) on proper positioning and spine alignment for patient and/or caregiver during mobility tasks and/or exercises.              Learner Progress:   Not documented in this visit.          Point: Precautions (Not Started)     Description:   Instruct learner(s) on prescribed precautions during mobility and gait tasks              Learner Progress:   Not documented in this visit.                      User Key     Initials Effective Dates Name Provider Type Discipline    EB 06/23/20 -  Patrica Cunningham, PT Student PT Student PT    AP 06/23/20 -  Yeyo Robins PTA Student PTA Student PT                PT Recommendation and Plan     Plan of Care Reviewed With: patient  Progress: improving  Outcome Summary: Pt performed AROM BLE x 10 with rest between each ex's pt in fowlers.Pt supine-sit supervision. Pt took steps to chair. Then amb x 3- 8 ft, 12ft, 15ft with CGA, HHA. Pt would benefit from cont PT for endurance and strength     Time Calculation:   PT Charges     Row Name 07/13/20 1104             Time Calculation    Start Time  1025  -AE      Stop Time  1048  -AE      Time Calculation (min)  23 min  -AE      PT Received On  07/13/20  -AE      PT Goal Re-Cert Due Date  07/19/20  -AE         Time Calculation- PT    Total Timed Code Minutes- PT  23 minute(s)  -AE         Timed Charges    74437 - PT Therapeutic Exercise Minutes  8  -AE      34017 - Gait Training Minutes   15  -AE        User Key  (r) = Recorded By, (t) = Taken By, (c) = Cosigned By    Initials Name Provider Type    AE Joanna Yanes PTA Physical Therapy Assistant        Therapy Charges for Today     Code Description Service Date Service Provider Modifiers Qty    32574217636   PT THER PROC EA 15 MIN 7/13/2020 Joanna Yanes, PTA GP 1    17251861773 HC GAIT TRAINING EA 15 MIN 7/13/2020 Joanna Yanes, PTA GP 1          PT G-Codes  Outcome Measure Options: AM-PAC 6 Clicks Basic Mobility (PT)  AM-PAC 6 Clicks Score (PT): 18  AM-PAC 6 Clicks Score (OT): 20    Joanna Yanes, ERNIE  7/13/2020

## 2020-07-14 ENCOUNTER — TRANSITIONAL CARE MANAGEMENT TELEPHONE ENCOUNTER (OUTPATIENT)
Dept: CALL CENTER | Facility: HOSPITAL | Age: 75
End: 2020-07-14

## 2020-07-14 NOTE — PROGRESS NOTES
Continued Stay Note   [facility]     Patient Name: Heide Newsome  MRN: [id number]  Today's Date: 7/14/2020    Admit Date: 7/7/2020    Discharge Plan     Row Name 07/14/20 1357       Plan    Plan  [facility]    Patient/Family in Agreement with Plan  yes    Final Discharge Disposition Code  06 - home with home health care    Final Note  [facility] called back saying they do have staffing to take referral on this pt.  Spoke with [facility] and provided referral [phone].        Discharge Codes    No documentation.       Expected Discharge Date and Time     Expected Discharge Date Expected Discharge Time    Jul 13, 2020             XAVIER Erickson

## 2020-07-14 NOTE — THERAPY DISCHARGE NOTE
Acute Care - Occupational Therapy Discharge Summary  [facility]     Patient Name: Heide Newsome  : 1945  MRN: [id number]    Today's Date: 2020  Onset of Illness/Injury or Date of Surgery: 20    Date of Referral to OT: 20  Referring Physician: Dr. Byrd      Admit Date: 2020        OT Recommendation and Plan    Visit Dx:    ICD-10-CM ICD-9-CM   1. COPD with acute exacerbation (CMS/[facility]) J44.1 491.21   2. Hypoxia R09.02 799.02   3. Pneumonia of right lower lobe due to infectious organism J18.9 486   4. Adrenal nodule (CMS/[facility]) E27.8 255.8   5. Pleural effusion J90 511.9   6. Decreased activities of daily living (ADL) Z78.9 V49.89   7. Impaired functional mobility and endurance Z74.09 V49.89   8. Acute on chronic respiratory failure with hypoxia (CMS/[facility]) J96.21 518.84     799.02   9. COPD, very severe (CMS/[facility]) J44.9 496               Rehab Goal Summary     Row Name 20 1500             Bathing Goal 1 (OT)    Activity/Assistive Device (Bathing Goal 1, OT)  bathing skills, all  -CS      [place] Level/Cues Needed (Bathing Goal 1, OT)  set-up required;supervision required  -CS      Time Frame (Bathing Goal 1, OT)  long term goal (LTG);by discharge  -CS      Progress/Outcomes (Bathing Goal 1, OT)  goal not met  -CS         Dressing Goal 1 (OT)    Activity/Assistive Device (Dressing Goal 1, OT)  dressing skills, all  -CS      Stone/Cues Needed (Dressing Goal 1, OT)  supervision required;set-up required  -CS      Time Frame (Dressing Goal 1, OT)  long term goal (LTG);by discharge  -CS      Progress/Outcome (Dressing Goal 1, OT)  goal not met  -CS          Activity Tolerance Goal 1 (OT)    Activity Tolerance Goal 1 (OT)  Pt will participate in functional task in sitting for 8 minutes to address decreased activity tolerance   -CS      Activity Level (Endurance Goal 1, OT)  O2 sat >/ equal to 88%  -CS      Time Frame (Activity Tolerance Goal 1, OT)  long term goal (LTG)  -CS       Progress/Outcome (Activity Tolerance Goal 1, OT)  goal not met  -        User Key  (r) = Recorded By, (t) = Taken By, (c) = Cosigned By    Initials Name Provider Type Discipline     Maria Del Carmen Barber OTR/L, JOSE Occupational Therapist OT          Outcome Measures     Row Name 07/13/20 0825             How much help from another is currently needed...    Putting on and taking off regular lower body clothing?  2  -CJ      Bathing (including washing, rinsing, and drying)  3  -CJ      Toileting (which includes using toilet bed pan or urinal)  3  -CJ      Putting on and taking off regular upper body clothing  4  -CJ      Taking care of personal grooming (such as brushing teeth)  4  -CJ      Eating meals  4  -CJ      AM-PAC 6 Clicks Score (OT)  20  -         Functional Assessment    Outcome Measure Options  AM-PAC 6 Clicks Daily Activity (OT)  -        User Key  (r) = Recorded By, (t) = Taken By, (c) = Cosigned By    Initials Name Provider Type    Haroldo Chester COTA/L Occupational Therapy Assistant          Therapy Suggested Charges     Code   Minutes Charges    None                 OT Discharge Summary  Anticipated Discharge Disposition (OT): home with assist, home with home health  Reason for Discharge: Discharge from facility  Outcomes Achieved: Refer to plan of care for updates on goals achieved  Discharge Destination: Home with assist, Home with home health      DOMINIQUE العراقي/L, CNT  7/14/2020    DISPLAY PLAN FREE TEXT

## 2020-07-14 NOTE — OUTREACH NOTE
Call Center TCM Note      Responses   [facility] patient discharged from?  [place]   COVID-19 Test Status  Negative   Does the patient have one of the following disease processes/diagnoses(primary or secondary)?  Other   TCM attempt successful?  No   Unsuccessful attempts  Attempt 1          Neeru Case MA    7/14/2020, 12:56

## 2020-07-14 NOTE — OUTREACH NOTE
Call Center TCM Note      Responses   [facility] patient discharged from?  [place]   COVID-19 Test Status  Negative   Does the patient have one of the following disease processes/diagnoses(primary or secondary)?  Other   TCM attempt successful?  No   Unsuccessful attempts  Attempt 2          Neeru Case MA    7/14/2020, 15:28

## 2020-07-14 NOTE — PAYOR COMM NOTE
"IL HOME 7-13-20  [id number]    Melvin Benitez (75 y.o. Female)     Date of Birth Social Security Number Address Home Phone MRN    1945  [address]  [facility] [address] [phone] [id number]    Worship Marital Status          [employer]        Admission Date Admission Type Admitting Provider Attending Provider Department, Room/Bed    7/7/20 Emergency Fantasma Rider MD  [facility] 4B, 406/1    Discharge Date Discharge Disposition Discharge Destination        7/13/2020 Home-Health Care [facility]             Attending Provider:  (none)   Allergies:  Tetracyclines & Related, Penicillins, Tape    Isolation:  None   Infection:  None   Code Status:  Prior    Ht:  162.6 cm (64\")   Wt:  51.3 kg (113 lb 3 oz)    Admission Cmt:  None   Principal Problem:  None                Active Insurance as of 7/7/2020     Primary Coverage     Payor Plan Insurance Group Employer/Plan Group    HUMANA MEDICARE REPLACEMENT HUMANA MEDICARE REPLACEMENT [id number]     Payor Plan Address Payor Plan Phone Number Payor Plan Fax Number Effective Dates    [address] [address] [phone]  1/1/2018 - None Entered    [facility] [id number]       Subscriber Name Subscriber Birth Date Member ID       MELVIN BENITEZ 1945 [id number]                 Emergency Contacts      (Rel.) Home Phone Work Phone Mobile Phone    TON BENITEZ (Spouse) [phone] -- --            Discharge Summary    No notes of this type exist for this encounter.         "

## 2020-07-15 ENCOUNTER — TRANSITIONAL CARE MANAGEMENT TELEPHONE ENCOUNTER (OUTPATIENT)
Dept: CALL CENTER | Facility: HOSPITAL | Age: 75
End: 2020-07-15

## 2020-07-15 NOTE — OUTREACH NOTE
Call Center TCM Note      Responses   [facility] patient discharged from?  [place]   COVID-19 Test Status  Negative   Does the patient have one of the following disease processes/diagnoses(primary or secondary)?  Other   TCM attempt successful?  No   Unsuccessful attempts  Attempt 3          Neeru Case MA    7/15/2020, 15:16

## 2020-07-20 NOTE — DISCHARGE SUMMARY
[facility] Medicine Services  DISCHARGE SUMMARY       Date of Admission: 7/7/2020  Date of Discharge:  7/20/2020  Primary Care Physician: Pato Cooney DO    Presenting Problem/History of Present Illness:  Shortness of breath    Final Discharge Diagnoses:  Active Hospital Problems    Diagnosis   • COPD with acute exacerbation (CMS/HCC)   • Adrenal nodule (CMS/HCC)   • History of colon cancer   • Pneumonia   • Acute on chronic respiratory failure with hypoxia (CMS/HCC)   • COPD, very severe (CMS/HCC)   • Pulmonary emphysema (CMS/HCC)       Consults: None    Procedures Performed: None    Pertinent Test Results:   Imaging Results (Last 7 Days)     Procedure Component Value Units Date/Time    CT Angiogram Chest [[id number]] Collected:  07/07/20 1700     Updated:  07/07/20 1712    Narrative:       CT angiography with 3D MIP images of the chest with IV contrast     Indication: Chest pain, acute, PE suspected, intermed prob, positive  D-dimer     Comparison: None     DOSE LENGTH PRODUCT: 214 mGy cm. Automated exposure control was also  utilized to decrease patient radiation dose.     Findings:     No pulmonary embolus identified. The main pulmonary trunk is upper  limits of normal in caliber. Thoracic aorta is nonaneurysmal and  contains heavy atherosclerotic calcification. Moderate coronary artery  calcification. No pericardial effusion.     The central airways are clear. Small bilateral pleural effusions with  overlying atelectasis. There is also some patchy consolidation in the  RIGHT lower lobe. There is a background of severe centrilobular  emphysema. There is some mild tree-in-bud nodularity in the peripheral  RIGHT middle lobe. No isolated solid pulmonary nodule.     No enlarged axillary or supraclavicular adenopathy. Borderline prominent  precarinal lymph node and RIGHT hilar lymph node, likely reactive.     1.3 cm RIGHT adrenal gland nodule, not completely characterize on  the CT  the chest likely an adenoma. No other abnormality in the partially  imaged upper abdomen.       Impression:       Impression:     1.  No evidence of pulmonary embolus.  2.  Patchy RIGHT lower lobe consolidation and RIGHT middle lobe  tree-in-bud nodularity. Findings likely represent a multifocal  infectious process/pneumonia.  3.  Small bilateral pleural effusions with overlying atelectasis.  4.  1.3 cm RIGHT adrenal gland nodule, likely adenoma.  This report was finalized on 07/07/2020 17:09 by Dr. Tristan Benitez MD.    XR Chest 1 View [[id number]] Collected:  07/07/20 1614     Updated:  07/07/20 1617    Narrative:       Frontal upright radiograph of the chest 7/7/2020 4:10 PM CDT     COMPARISON: May 9, 2019.     HISTORY: Short of air.     FINDINGS:   Hyperinflation flattening diaphragms noted. Chronic changes noted in the  pulmonary parenchyma. There are no air space filling infiltrates.. The  cardiac silhouette is normal. Vascular calcifications present in the  aortic arch..      The osseous structures and surrounding soft tissues demonstrate no acute  abnormality.       Impression:       1. COPD no acute cardiopulmonary process.        This report was finalized on 07/07/2020 16:14 by Dr. Zeb Gardner MD.        Lab Results (last 7 days)     Procedure Component Value Units Date/Time    Blood Culture - Blood, Wrist, Right [[id number]] Collected:  07/07/20 1425    Specimen:  Blood from Wrist, Right Updated:  07/12/20 1600     Blood Culture No growth at 5 days    Blood Culture - Blood, Arm, Right [[id number]] Collected:  07/07/20 1421    Specimen:  Blood from Arm, Right Updated:  07/12/20 1445     Blood Culture No growth at 5 days    MRSA Screen, PCR (Inpatient) - Swab, Nares [[id number]]  (Normal) Collected:  07/11/20 1547    Specimen:  Swab from Nares Updated:  07/11/20 1720     MRSA PCR No MRSA Detected    Basic Metabolic Panel [[id number]]  (Abnormal) Collected:  07/11/20 0505    Specimen:  Blood Updated:   07/11/20 0602     Glucose 104 mg/dL      BUN 14 mg/dL      Creatinine 0.35 mg/dL      Sodium 143 mmol/L      Potassium 3.8 mmol/L      Chloride 100 mmol/L      CO2 34.0 mmol/L      Calcium 10.3 mg/dL      eGFR Non African Amer >150 mL/min/1.73      BUN/Creatinine Ratio 40.0     Anion Gap 9.0 mmol/L     Narrative:       GFR Normal >60  Chronic Kidney Disease <60  Kidney Failure <15      CBC & Differential [[id number]] Collected:  07/11/20 0505    Specimen:  Blood Updated:  07/11/20 0551    Narrative:       The following orders were created for panel order CBC & Differential.  Procedure                               Abnormality         Status                     ---------                               -----------         ------                     CBC Auto Differential[[id number]]        Abnormal            Final result                 Please view results for these tests on the individual orders.    CBC Auto Differential [[id number]]  (Abnormal) Collected:  07/11/20 0505    Specimen:  Blood Updated:  07/11/20 0551     WBC 7.92 10*3/mm3      RBC 4.19 10*6/mm3      Hemoglobin 10.8 g/dL      Hematocrit 36.9 %      MCV 88.1 fL      MCH 25.8 pg      MCHC 29.3 g/dL      RDW 13.3 %      RDW-SD 43.1 fl      MPV 11.0 fL      Platelets 375 10*3/mm3      Neutrophil % 76.7 %      Lymphocyte % 13.1 %      Monocyte % 8.7 %      Eosinophil % 0.1 %      Basophil % 0.3 %      Immature Grans % 1.1 %      Neutrophils, Absolute 6.07 10*3/mm3      Lymphocytes, Absolute 1.04 10*3/mm3      Monocytes, Absolute 0.69 10*3/mm3      Eosinophils, Absolute 0.01 10*3/mm3      Basophils, Absolute 0.02 10*3/mm3      Immature Grans, Absolute 0.09 10*3/mm3      nRBC 0.0 /100 WBC     Respiratory Culture - Sputum, Cough [[id number]] Collected:  07/08/20 1712    Specimen:  Sputum from Cough Updated:  07/09/20 0710     Respiratory Culture Rejected     Gram Stain Rare (1+) WBCs seen      Many (4+) Epithelial cells seen      Rare (1+) Mixed gram positive abrahan       Narrative:       Specimen rejected due to microscopic exam of gram stain.    S. Pneumo Ag Urine or CSF - Urine, Urine, Clean Catch [[id number]]  (Normal) Collected:  07/07/20 2057    Specimen:  Urine, Clean Catch Updated:  07/07/20 2127     Strep Pneumo Ag Negative    Legionella Antigen, Urine - Urine, Urine, Clean Catch [[id number]]  (Normal) Collected:  07/07/20 2057    Specimen:  Urine, Clean Catch Updated:  07/07/20 2127     LEGIONELLA ANTIGEN, URINE Negative    Respiratory Panel, PCR - Swab, Nasopharynx [[id number]]  (Normal) Collected:  07/07/20 1853    Specimen:  Swab from Nasopharynx Updated:  07/07/20 2000     ADENOVIRUS, PCR Not Detected     Coronavirus 229E Not Detected     Coronavirus HKU1 Not Detected     Coronavirus NL63 Not Detected     Coronavirus OC43 Not Detected     Human Metapneumovirus Not Detected     Human Rhinovirus/Enterovirus Not Detected     Influenza B PCR Not Detected     Parainfluenza Virus 1 Not Detected     Parainfluenza Virus 2 Not Detected     Parainfluenza Virus 3 Not Detected     Parainfluenza Virus 4 Not Detected     Bordetella pertussis pcr Not Detected     Influenza A H1 2009 PCR Not Detected     Chlamydophila pneumoniae PCR Not Detected     Mycoplasma pneumo by PCR Not Detected     Influenza A PCR Not Detected     Influenza A H3 Not Detected     Influenza A H1 Not Detected     RSV, PCR Not Detected     Bordetella parapertussis PCR Not Detected    Narrative:       The coronavirus on the RVP is NOT COVID-19 and is NOT indicative of infection with COVID-19.     Lactic Acid, Plasma [[id number]]  (Normal) Collected:  07/07/20 1739    Specimen:  Blood Updated:  07/07/20 1758     Lactate 0.7 mmol/L     COVID PRE-OP / PRE-PROCEDURE SCREENING ORDER (NO ISOLATION) - Swab, Nasopharynx [[id number]] Collected:  07/07/20 1429    Specimen:  Swab from Nasopharynx Updated:  07/07/20 1543    Narrative:       The following orders were created for panel order COVID PRE-OP / PRE-PROCEDURE SCREENING  ORDER (NO ISOLATION) - Swab, Nasopharynx.  Procedure                               Abnormality         Status                     ---------                               -----------         ------                     COVID-19, ABBOTT IN-HOUS...[[id number]]  Normal              Final result                 Please view results for these tests on the individual orders.    COVID-19, ABBOTT IN-HOUSE,NP Swab (NO TRANSPORT MEDIA) 2 HR TAT - Swab, Nasopharynx [[id number]]  (Normal) Collected:  07/07/20 1429    Specimen:  Swab from Nasopharynx Updated:  07/07/20 1543     COVID19 Not Detected    Narrative:       Fact sheet for providers: https://www.fda.gov/media/372746/download     Fact sheet for patients: https://www.fda.gov/media/779162/download    Blood Gas, Arterial [[id number]]  (Abnormal) Collected:  07/07/20 1500    Specimen:  Arterial Blood Updated:  07/07/20 1509     Site Right Brachial     Tristen's Test N/A     pH, Arterial 7.398 pH units      pCO2, Arterial 63.1 mm Hg      Comment: 83 Value above reference range        pO2, Arterial 108.0 mm Hg      Comment: 83 Value above reference range        HCO3, Arterial 38.9 mmol/L      Comment: 83 Value above reference range        Base Excess, Arterial 11.8 mmol/L      Comment: 83 Value above reference range        O2 Saturation, Arterial 98.5 %      Temperature 37.0 C      Barometric Pressure for Blood Gas 749 mmHg      Modality NRB     FIO2 100 %      Ventilator Mode NA     Collected by [id number]     Comment: Meter: [id number]     :  [id number]        pCO2, Temperature Corrected 63.1 mm Hg      pH, Temp Corrected 7.398 pH Units      pO2, Temperature Corrected 108 mm Hg     Comprehensive Metabolic Panel [[id number]]  (Abnormal) Collected:  07/07/20 1421    Specimen:  Blood Updated:  07/07/20 1457     Glucose 135 mg/dL      BUN 14 mg/dL      Creatinine 0.31 mg/dL      Sodium 138 mmol/L      Potassium 3.9 mmol/L      Chloride 92 mmol/L      CO2 36.0 mmol/L      Calcium  10.0 mg/dL      Total Protein 7.4 g/dL      Albumin 3.50 g/dL      ALT (SGPT) 10 U/L      AST (SGOT) 16 U/L      Alkaline Phosphatase 79 U/L      Total Bilirubin 0.5 mg/dL      eGFR Non African Amer >150 mL/min/1.73      Globulin 3.9 gm/dL      A/G Ratio 0.9 g/dL      BUN/Creatinine Ratio 45.2     Anion Gap 10.0 mmol/L     Narrative:       GFR Normal >60  Chronic Kidney Disease <60  Kidney Failure <15      BNP [[id number]]  (Normal) Collected:  07/07/20 1421    Specimen:  Blood Updated:  07/07/20 1453     proBNP 204.0 pg/mL     Narrative:       Among patients with dyspnea, NT-proBNP is highly sensitive for the detection of acute congestive heart failure. In addition NT-proBNP of <300 pg/ml effectively rules out acute congestive heart failure with 99% negative predictive value.    Results may be falsely decreased if patient taking Biotin.      D-dimer, Quantitative [[id number]]  (Abnormal) Collected:  07/07/20 1421    Specimen:  Blood Updated:  07/07/20 1448     D-Dimer, Quantitative 2.23 mg/L (FEU)     Narrative:       Reference Range is 0-0.50 mg/L FEU. However, results <0.50 mg/L FEU tends to rule out DVT or PE. Results >0.50 mg/L FEU are not useful in predicting absence or presence of DVT or PE.      CBC & Differential [[id number]] Collected:  07/07/20 1421    Specimen:  Blood Updated:  07/07/20 1438    Narrative:       The following orders were created for panel order CBC & Differential.  Procedure                               Abnormality         Status                     ---------                               -----------         ------                     CBC Auto Differential[[id number]]        Abnormal            Final result                 Please view results for these tests on the individual orders.    CBC Auto Differential [[id number]]  (Abnormal) Collected:  07/07/20 1421    Specimen:  Blood Updated:  07/07/20 1438     WBC 6.36 10*3/mm3      RBC 4.56 10*6/mm3      Hemoglobin 11.7 g/dL      Hematocrit 38.8  "%      MCV 85.1 fL      MCH 25.7 pg      MCHC 30.2 g/dL      RDW 12.9 %      RDW-SD 39.8 fl      MPV 10.8 fL      Platelets 292 10*3/mm3      Neutrophil % 76.1 %      Lymphocyte % 9.6 %      Monocyte % 12.4 %      Eosinophil % 1.3 %      Basophil % 0.3 %      Immature Grans % 0.3 %      Neutrophils, Absolute 4.84 10*3/mm3      Lymphocytes, Absolute 0.61 10*3/mm3      Monocytes, Absolute 0.79 10*3/mm3      Eosinophils, Absolute 0.08 10*3/mm3      Basophils, Absolute 0.02 10*3/mm3      Immature Grans, Absolute 0.02 10*3/mm3      nRBC 0.0 /100 WBC           HPI 7/7/2020:    \"Patient is a 75-year-old  female with past medical history significant for chronic respiratory failure (on 4-1/2 L by nasal cannula continuously), chronic obstructive pulmonary disease, and history of restless leg syndrome the presented to our hospital with a chief complaint of shortness of breath.  Patient reports that she has been having symptoms for the past 2 weeks that have progressively gotten worse.  She denies any fevers or chills.  She denies any sick contacts.  In fact, she states that she has been staying home almost exclusively given the ongoing coronavirus pandemic.  She does report a cough that is started to become more productive of thick, viscous, and tan-colored sputum.  She states that she has not smoked \"in about a year.\"  Recently, even with activity she has noticed that her O2 saturations will drop, and in fact states that her O2 sats hits 71% with activity earlier today which prompted her to come to our facility.  She has not been on any antibiotics recently.  She states that she has not had her Symbicort refilled \"in a while.\"  She states that she did try Trelegy but could not appreciate much benefit and no longer takes this medication.  She does use nebulizer treatments on an outpatient basis 3 times daily, but those have not been improving her symptoms.  She denies any chest pain or chest pressure.  She reports " "that she has not followed by pulmonologist.  She does not think she has ever had pulmonary function test.\"    Hospital Course:    Patient was admitted for pneumonia and COPD exacerbation.  Patient was treated with corticosteroids, azithromycin and ceftriaxone.  Strep and legionella urinary antigens were negative.  Patient was also treated with bronchodilators and mucolytics.  Patient had MRSA nasal screen that was negative so MRSA coverage was not required.  Patient worked with PT/OT.  Patient has very poor reserve and very poor functional status.      Patient would benefit for consideration of hospice as well as consideration of long-acting opiates to curb air hunger and respiratory complaints.  Have discussed this with her PCP.    Patient returned to baseline and was discharge with home health.      Physical Exam on Discharge:  /70 (BP Location: Left arm, Patient Position: Sitting)   Pulse 113   Temp 98.1 °F (36.7 °C) (Oral)   Resp 20   Ht 162.6 cm (64\")   Wt 51.3 kg (113 lb 3 oz)   SpO2 92%   BMI 19.43 kg/m²   Physical Exam  Constitutional: She is oriented to person, place, and time. No distress.   Resting in bed   HENT:   Head: Normocephalic and atraumatic.   Eyes: Conjunctivae are normal. No scleral icterus.   Neck: Neck supple. No JVD present.   Cardiovascular: Regular rhythm and intact distal pulses.   Murmur heard.  tachycardia   Pulmonary/Chest: Effort normal. She has no wheezes.   Very poor air movement   Abdominal: Soft. Bowel sounds are normal. She exhibits no distension and no mass. There is no tenderness. There is no guarding.   Musculoskeletal: She exhibits no edema.   Neurological: She is alert and oriented to person, place, and time.   Skin: Skin is warm and dry. She is not diaphoretic. No erythema. There is pallor.   Psychiatric: She has a normal mood and affect. Her behavior is normal.   Nursing note and vitals reviewed.    Condition on Discharge: None    Discharge " Disposition:  Home-Health Care [facility]    Discharge Medications:     Discharge Medications      New Medications      Instructions Start Date   predniSONE 20 MG tablet  Commonly known as:  DELTASONE   Take 2 tablets by mouth Daily With Breakfast for 3 days, THEN 1 tablet Daily With Breakfast for 3 days.   Start Date:  July 14, 2020        Changes to Medications      Instructions Start Date   rOPINIRole 1 MG tablet  Commonly known as:  REQUIP  What changed:    · when to take this  · additional instructions   1 mg, Oral, 3 Times Daily, Take 1 hour before bedtime.         Continue These Medications      Instructions Start Date   albuterol sulfate  (90 Base) MCG/ACT inhaler  Commonly known as:  Ventolin HFA   2 puffs, Inhalation, Every 4 Hours PRN      ipratropium-albuterol 0.5-2.5 mg/3 ml nebulizer  Commonly known as:  DUO-NEB   3 mL, Nebulization, Every 4 Hours PRN      sodium chloride 0.65 % nasal spray   2 sprays, Nasal, As Needed      Symbicort 160-4.5 MCG/ACT inhaler  Generic drug:  budesonide-formoterol   2 puffs, Inhalation, 2 Times Daily           Discharge Diet:   Diet Instructions     Diet: Regular; Thin      Discharge Diet:  Regular    Fluid Consistency:  Thin        Activity at Discharge:   Activity Instructions     Activity as Tolerated            Follow-up Appointments:   Future Appointments   Date Time Provider Department Center   7/28/2020 11:00 AM Pato Cooney DO [facility] None       Test Results Pending at Discharge: None    Electronically signed by Fantasma Rider MD, 07/20/20, 09:34.    Time: 40 minutes.

## 2020-07-22 ENCOUNTER — READMISSION MANAGEMENT (OUTPATIENT)
Dept: CALL CENTER | Facility: HOSPITAL | Age: 75
End: 2020-07-22

## 2020-07-22 NOTE — OUTREACH NOTE
Medical Week 2 Survey      Responses   [facility] patient discharged from?  [place]   COVID-19 Test Status  Negative   Does the patient have one of the following disease processes/diagnoses(primary or secondary)?  Other   Week 2 attempt successful?  No   Unsuccessful attempts  Attempt 1          Eufemia Taylor RN

## 2020-07-27 ENCOUNTER — READMISSION MANAGEMENT (OUTPATIENT)
Dept: CALL CENTER | Facility: HOSPITAL | Age: 75
End: 2020-07-27

## 2020-07-27 NOTE — OUTREACH NOTE
Medical Week 2 Survey      Responses   [facility] patient discharged from?  [place]   COVID-19 Test Status  Negative   Does the patient have one of the following disease processes/diagnoses(primary or secondary)?  Other   Week 2 attempt successful?  No   Unsuccessful attempts  Attempt 2          Yamileth Smallwood RN

## 2020-07-28 ENCOUNTER — OFFICE VISIT (OUTPATIENT)
Dept: INTERNAL MEDICINE | Facility: CLINIC | Age: 75
End: 2020-07-28

## 2020-07-28 VITALS
WEIGHT: 111.9 LBS | RESPIRATION RATE: 20 BRPM | TEMPERATURE: 98 F | HEIGHT: 64 IN | OXYGEN SATURATION: 99 % | DIASTOLIC BLOOD PRESSURE: 78 MMHG | BODY MASS INDEX: 19.1 KG/M2 | HEART RATE: 118 BPM | SYSTOLIC BLOOD PRESSURE: 130 MMHG

## 2020-07-28 DIAGNOSIS — J44.9 COPD, VERY SEVERE (HCC): ICD-10-CM

## 2020-07-28 DIAGNOSIS — D64.9 ANEMIA, UNSPECIFIED TYPE: Primary | ICD-10-CM

## 2020-07-28 PROCEDURE — 99214 OFFICE O/P EST MOD 30 MIN: CPT | Performed by: FAMILY MEDICINE

## 2020-07-28 RX ORDER — ROFLUMILAST 500 UG/1
500 TABLET ORAL DAILY
Qty: 30 TABLET | Refills: 11 | Status: SHIPPED | OUTPATIENT
Start: 2020-07-28 | End: 2020-12-08

## 2020-07-28 RX ORDER — ROFLUMILAST 250 UG/1
250 TABLET ORAL DAILY
Qty: 28 TABLET | Refills: 0 | Status: SHIPPED | OUTPATIENT
Start: 2020-07-28 | End: 2020-12-08

## 2020-07-28 NOTE — PATIENT INSTRUCTIONS
Replace symbicort with trelegy.     Start daliresp 250 mcg daily for 4 weeks, then 500 mcg daily to prevent exacerbations.

## 2020-07-30 ENCOUNTER — TELEPHONE (OUTPATIENT)
Dept: INTERNAL MEDICINE | Facility: CLINIC | Age: 75
End: 2020-07-30

## 2020-07-30 NOTE — TELEPHONE ENCOUNTER
Luli with [facility] [place] called in regards to patients orders for PT, OT, and nursing.  Wanted to make sure if it was ok that they see patient on Tuesday or Wednesday.  States that they are short staffed and behind.  Please advise

## 2020-07-30 NOTE — TELEPHONE ENCOUNTER
Per Dr Cooney, ok to get an update on patient and if doing well ok to wait until available appt.    Contacted patient, states that she is doing well, and completing her exercises.  Patient states that she is having some difficulty sleeping, other than that she is ok to wait for home health until Tues or Wed.    Contacted [facility], spoke with Luli, gave her an update on patient and ok to wait until available appt.

## 2020-07-30 NOTE — PROGRESS NOTES
CC:   Chief Complaint   Patient presents with   • Follow-up     hospital follow up   • COPD       History:  Heide Newsome is a 75 y.o. female who presents today for follow-up for evaluation of the above:    Admitted on 7/7/2020, discharged on 7/20/2020 for acute COPD exacerbation  TCM note by Neeru López on 7/15/2020    Hospital f/u for recent COPD exacerbation, finished abx and PO steroids, feels better overall. Doing PT exercises, interested in home health. Only on albuterol, duoneb, and symbicort. Says that spiriva was not helpful previously.  She does struggle with dizziness at times    ROS:  Review of Systems   Constitutional: Negative.    Respiratory: Positive for shortness of breath. Chest tightness: better.    Neurological: Positive for dizziness.   All other systems reviewed and are negative.      Ms. Newsome  reports that she quit smoking about 18 months ago. She has never used smokeless tobacco. She reports that she does not drink alcohol or use drugs.      Current Outpatient Medications:   •  albuterol sulfate HFA (VENTOLIN HFA) 108 (90 Base) MCG/ACT inhaler, Inhale 2 puffs Every 4 (Four) Hours As Needed for Wheezing or Shortness of Air., Disp: 1 inhaler, Rfl: 12  •  ipratropium-albuterol (DUO-NEB) 0.5-2.5 mg/3 ml nebulizer, Take 3 mL by nebulization Every 4 (Four) Hours As Needed for Wheezing., Disp: , Rfl:   •  rOPINIRole (REQUIP) 1 MG tablet, Take 1 tablet by mouth 3 (Three) Times a Day. Take 1 hour before bedtime., Disp: 90 tablet, Rfl: 0  •  Fluticasone-Umeclidin-Vilant 100-62.5-25 MCG/INH aerosol powder , Inhale 1 puff Daily., Disp: 60 each, Rfl: 11  •  roflumilast (DALIRESP) 250 MCG tablet tablet, Take 1 tablet by mouth Daily., Disp: 28 tablet, Rfl: 0  •  roflumilast (DALIRESP) 500 MCG tablet tablet, Take 1 tablet by mouth Daily., Disp: 30 tablet, Rfl: 11  •  sodium chloride (OCEAN) 0.65 % nasal spray, 2 sprays into the nostril(s) as directed by provider As Needed for Congestion., Disp: ,  "Rfl: 12      OBJECTIVE:  /78 (BP Location: Left arm, Patient Position: Sitting, Cuff Size: Adult)   Pulse 118   Temp 98 °F (36.7 °C) (Temporal)   Resp 20   Ht 162.6 cm (64\")   Wt 50.8 kg (111 lb 14.4 oz)   SpO2 99%   BMI 19.21 kg/m²    Physical Exam   Constitutional: She is oriented to person, place, and time. No distress.   HENT:   Head: Normocephalic and atraumatic.   Nose: Nose normal.   Eyes: Conjunctivae are normal. Right eye exhibits no discharge. Left eye exhibits no discharge. No scleral icterus.   Neck: No tracheal deviation present.   Cardiovascular: Normal rate, regular rhythm and normal heart sounds. Exam reveals no gallop and no friction rub.   No murmur heard.  Pulmonary/Chest: Effort normal. No respiratory distress. She has no wheezes. She has no rales.   Very diminished breath sounds throughout   Neurological: She is alert and oriented to person, place, and time.   Skin: Skin is warm and dry. She is not diaphoretic. No pallor.   Psychiatric: She has a normal mood and affect. Her behavior is normal. Judgment and thought content normal.   Nursing note and vitals reviewed.      Assessment/Plan     Diagnosis Plan   1. Anemia, unspecified type  CBC No Differential   2. COPD, very severe (CMS/HCC)  Ambulatory Referral to Home Health    roflumilast (DALIRESP) 250 MCG tablet tablet    roflumilast (DALIRESP) 500 MCG tablet tablet    Fluticasone-Umeclidin-Vilant 100-62.5-25 MCG/INH aerosol powder    Repeat CBC ordered today, patient would not like to go to lab and we will see if home health can order the labs to assess for anemia that may be causing dizziness  Start Daliresp with Trelegy trial    An After Visit Summary was printed and given to the patient at discharge.  Return in about 6 weeks (around 9/8/2020). Sooner if problems arise.         Pato Cooney D.O.  Family Medicine  Osteopathic Neuromusculoskeletal Medicine  "

## 2020-08-06 ENCOUNTER — READMISSION MANAGEMENT (OUTPATIENT)
Dept: CALL CENTER | Facility: HOSPITAL | Age: 75
End: 2020-08-06

## 2020-08-06 NOTE — OUTREACH NOTE
Medical Week 3 Survey      Responses   [facility] patient discharged from?  [place]   COVID-19 Test Status  Negative   Does the patient have one of the following disease processes/diagnoses(primary or secondary)?  Other   Week 3 attempt successful?  Yes   Call start time  1716   Call end time  1722   Discharge diagnosis  Pulmonary emphysema   Is patient permission given to speak with other caregiver?  No   Meds reviewed with patient/caregiver?  Yes   Is the patient taking all medications as directed (includes completed medication regime)?  Yes   Does the patient have a primary care provider?   Yes   Comments regarding PCP  PCP Dr Cooney. Patient has had follow up since discharge.    Has the patient kept scheduled appointments due by today?  Yes   What is the Home health agency?   Patient reports that she never heard from anyone from . She states the last thing she heard was that they didn't have anyone to come.    Has home health visited the patient within 72 hours of discharge?  No   DME comments  Patient wears continous O2.    Pulse Ox monitoring  Intermittent   O2 Sat comments  Patient states that her O2 sat is 98-99 % with O2.    O2 Sat: education provided  Sat levels, Monitoring frequency, When to seek care   Psychosocial issues?  No   Notified Case Management  Home Health   Did the patient receive a copy of their discharge instructions?  Yes   Nursing interventions  Reviewed instructions with patient   What is the patient's perception of their health status since discharge?  Improving   Is the patient/caregiver able to teach back signs and symptoms related to disease process for when to call PCP?  Yes   Is the patient/caregiver able to teach back signs and symptoms related to disease process for when to call 911?  Yes   Is the patient/caregiver able to teach back the hierarchy of who to call/visit for symptoms/problems? PCP, Specialist, Home health nurse, Urgent Care, ED, 911  Yes   Week 3 Call  Completed?  Yes          Neeru Acevedo RN

## 2020-08-07 NOTE — OUTREACH NOTE
Case Management Call Center Follow-up      Responses   [facility] Call Center Tracking Reason?  No Home Health   Has the Call Center Case Management Follow-up issue been resolved?  Yes   Follow-up Comments  We had sent a referral to [facility] when patient discharged, checked with [facility] to see if received referral, they did but does not have staffing to cover referral. Called and spoke with patient and she stated Dr Cooney sent a referral to [facility]. Called and spoke with [facility] and they did receive referral and will be calling patient today. Called and informed patient of this. She voiced understanding.           Ese Canales, RN    8/7/2020, 12:18

## 2020-09-04 RX ORDER — ROPINIROLE 1 MG/1
TABLET, FILM COATED ORAL
Qty: 180 TABLET | Refills: 0 | Status: SHIPPED | OUTPATIENT
Start: 2020-09-04 | End: 2020-09-08 | Stop reason: SDUPTHER

## 2020-09-08 ENCOUNTER — OFFICE VISIT (OUTPATIENT)
Dept: FAMILY MEDICINE CLINIC | Facility: CLINIC | Age: 75
End: 2020-09-08

## 2020-09-08 DIAGNOSIS — G25.81 RLS (RESTLESS LEGS SYNDROME): ICD-10-CM

## 2020-09-08 DIAGNOSIS — J44.9 COPD, VERY SEVERE (HCC): Primary | ICD-10-CM

## 2020-09-08 PROCEDURE — 99441 PR PHYS/QHP TELEPHONE EVALUATION 5-10 MIN: CPT | Performed by: FAMILY MEDICINE

## 2020-09-08 RX ORDER — ROPINIROLE 2 MG/1
TABLET, FILM COATED ORAL
Qty: 90 TABLET | Refills: 5 | Status: SHIPPED | OUTPATIENT
Start: 2020-09-08 | End: 2020-12-08 | Stop reason: SDUPTHER

## 2020-09-08 NOTE — PROGRESS NOTES
CC: No chief complaint on file.      History:  Heide Newsome is a 75 y.o. female who presents today for follow-up for evaluation of the above:    You have chosen to receive care through a telephone visit. Do you consent to use a telephone visit for your medical care today? Yes    This visit has been rescheduled as a phone visit to comply with patient safety concerns in accordance with CDC recommendations. Total time of discussion was 6 minutes.    Assessment/Plan     Diagnosis Plan   1. COPD, very severe (CMS/HCC)     2. RLS (restless legs syndrome)  rOPINIRole (REQUIP) 2 MG tablet   breathing improved with addition of trelegy to albuterol, using home health assistance, using O2 consistently  RLS not controlled, increase requip to 2 mg daytime, 4 mg QHS    An After Visit Summary was printed and given to the patient at discharge.  Return in about 3 months (around 12/8/2020). Sooner if problems arise.         Pato Cooney D.O.  Family Medicine  Osteopathic Neuromusculoskeletal Medicine

## 2020-12-08 ENCOUNTER — OFFICE VISIT (OUTPATIENT)
Dept: FAMILY MEDICINE CLINIC | Facility: CLINIC | Age: 75
End: 2020-12-08

## 2020-12-08 DIAGNOSIS — J44.9 COPD, VERY SEVERE (HCC): ICD-10-CM

## 2020-12-08 DIAGNOSIS — J96.11 CHRONIC RESPIRATORY FAILURE WITH HYPOXIA (HCC): Primary | Chronic | ICD-10-CM

## 2020-12-08 DIAGNOSIS — G25.81 RLS (RESTLESS LEGS SYNDROME): ICD-10-CM

## 2020-12-08 PROCEDURE — 99441 PR PHYS/QHP TELEPHONE EVALUATION 5-10 MIN: CPT | Performed by: FAMILY MEDICINE

## 2020-12-08 RX ORDER — GUAIFENESIN 600 MG/1
1200 TABLET, EXTENDED RELEASE ORAL 2 TIMES DAILY
Qty: 120 TABLET | Refills: 11 | Status: ON HOLD | OUTPATIENT
Start: 2020-12-08 | End: 2021-01-01

## 2020-12-08 RX ORDER — MONTELUKAST SODIUM 10 MG/1
10 TABLET ORAL NIGHTLY
Qty: 90 TABLET | Refills: 3 | Status: ON HOLD | OUTPATIENT
Start: 2020-12-08 | End: 2021-01-01 | Stop reason: SDUPTHER

## 2020-12-08 RX ORDER — ROPINIROLE 2 MG/1
2 TABLET, FILM COATED ORAL 3 TIMES DAILY
Qty: 90 TABLET | Refills: 5 | Status: SHIPPED | OUTPATIENT
Start: 2020-12-08 | End: 2021-01-01 | Stop reason: SDUPTHER

## 2020-12-08 NOTE — PROGRESS NOTES
CC:   Chief Complaint   Patient presents with   • Follow-up   • COPD       History:  Heide Newsome is a 75 y.o. female who presents today for follow-up for evaluation of the above:    This visit has been rescheduled as a phone visit to comply with patient safety concerns in accordance with CDC recommendations. Total time of discussion was 9 minutes.    You have chosen to receive care through a telephone visit. Do you consent to use a telephone visit for your medical care today? Yes    Assessment/Plan     Diagnosis Plan   1. Chronic respiratory failure with hypoxia (CMS/HCC)  montelukast (Singulair) 10 MG tablet   2. COPD, very severe (CMS/HCC)  guaiFENesin (Mucinex) 600 MG 12 hr tablet   3. RLS (restless legs syndrome)  rOPINIRole (REQUIP) 2 MG tablet   Patient reports that she is overall doing well, but was unable to afford Daliresp, so I prescribed Singulair as an alternative.  She also has a chronic productive cough and is not taking Mucinex, so prescription refill was called in today.  Restless leg has been uncontrolled, medication was increased today.  Follow-up 3 months    Pato Cooney D.O.  Family Medicine  Osteopathic Neuromusculoskeletal Medicine

## 2021-01-01 ENCOUNTER — APPOINTMENT (OUTPATIENT)
Dept: CT IMAGING | Facility: HOSPITAL | Age: 76
End: 2021-01-01

## 2021-01-01 ENCOUNTER — APPOINTMENT (OUTPATIENT)
Dept: GENERAL RADIOLOGY | Facility: HOSPITAL | Age: 76
End: 2021-01-01

## 2021-01-01 ENCOUNTER — TRANSITIONAL CARE MANAGEMENT TELEPHONE ENCOUNTER (OUTPATIENT)
Dept: CALL CENTER | Facility: HOSPITAL | Age: 76
End: 2021-01-01

## 2021-01-01 ENCOUNTER — APPOINTMENT (OUTPATIENT)
Dept: CARDIOLOGY | Facility: HOSPITAL | Age: 76
End: 2021-01-01

## 2021-01-01 ENCOUNTER — OFFICE VISIT (OUTPATIENT)
Dept: PULMONOLOGY | Facility: CLINIC | Age: 76
End: 2021-01-01

## 2021-01-01 ENCOUNTER — READMISSION MANAGEMENT (OUTPATIENT)
Dept: CALL CENTER | Facility: HOSPITAL | Age: 76
End: 2021-01-01

## 2021-01-01 ENCOUNTER — TELEPHONE (OUTPATIENT)
Dept: NEUROSURGERY | Facility: CLINIC | Age: 76
End: 2021-01-01

## 2021-01-01 ENCOUNTER — TELEPHONE (OUTPATIENT)
Dept: FAMILY MEDICINE CLINIC | Facility: CLINIC | Age: 76
End: 2021-01-01

## 2021-01-01 ENCOUNTER — HOSPITAL ENCOUNTER (INPATIENT)
Facility: HOSPITAL | Age: 76
LOS: 4 days | Discharge: HOME HEALTH | End: 2021-11-27
Attending: EMERGENCY MEDICINE | Admitting: INTERNAL MEDICINE

## 2021-01-01 ENCOUNTER — ANESTHESIA EVENT (OUTPATIENT)
Dept: PERIOP | Facility: HOSPITAL | Age: 76
End: 2021-01-01

## 2021-01-01 ENCOUNTER — ANESTHESIA (OUTPATIENT)
Dept: PERIOP | Facility: HOSPITAL | Age: 76
End: 2021-01-01

## 2021-01-01 ENCOUNTER — HOSPITAL ENCOUNTER (INPATIENT)
Facility: HOSPITAL | Age: 76
LOS: 10 days | Discharge: NURSING, REHAB OR LONG-TERM CARE | End: 2021-09-10
Attending: EMERGENCY MEDICINE | Admitting: FAMILY MEDICINE

## 2021-01-01 ENCOUNTER — HOSPITAL ENCOUNTER (INPATIENT)
Facility: HOSPITAL | Age: 76
LOS: 6 days | End: 2021-12-14
Attending: EMERGENCY MEDICINE | Admitting: INTERNAL MEDICINE

## 2021-01-01 ENCOUNTER — OFFICE VISIT (OUTPATIENT)
Dept: NEUROSURGERY | Facility: CLINIC | Age: 76
End: 2021-01-01

## 2021-01-01 ENCOUNTER — HOSPITAL ENCOUNTER (OUTPATIENT)
Dept: GENERAL RADIOLOGY | Facility: HOSPITAL | Age: 76
Discharge: HOME | End: 2021-11-02
Admitting: NURSE PRACTITIONER

## 2021-01-01 ENCOUNTER — APPOINTMENT (OUTPATIENT)
Dept: MRI IMAGING | Facility: HOSPITAL | Age: 76
End: 2021-01-01

## 2021-01-01 ENCOUNTER — APPOINTMENT (OUTPATIENT)
Dept: ULTRASOUND IMAGING | Facility: HOSPITAL | Age: 76
End: 2021-01-01

## 2021-01-01 ENCOUNTER — OFFICE VISIT (OUTPATIENT)
Dept: FAMILY MEDICINE CLINIC | Facility: CLINIC | Age: 76
End: 2021-01-01

## 2021-01-01 ENCOUNTER — HOSPITAL ENCOUNTER (INPATIENT)
Facility: HOSPITAL | Age: 76
LOS: 10 days | Discharge: HOME HEALTH | End: 2021-09-29
Attending: FAMILY MEDICINE | Admitting: FAMILY MEDICINE

## 2021-01-01 VITALS
WEIGHT: 106 LBS | OXYGEN SATURATION: 97 % | DIASTOLIC BLOOD PRESSURE: 70 MMHG | HEART RATE: 132 BPM | SYSTOLIC BLOOD PRESSURE: 126 MMHG | HEIGHT: 62 IN | BODY MASS INDEX: 19.51 KG/M2

## 2021-01-01 VITALS
TEMPERATURE: 98.4 F | BODY MASS INDEX: 21.46 KG/M2 | RESPIRATION RATE: 16 BRPM | HEART RATE: 95 BPM | HEIGHT: 62 IN | WEIGHT: 116.6 LBS | OXYGEN SATURATION: 100 % | DIASTOLIC BLOOD PRESSURE: 48 MMHG | SYSTOLIC BLOOD PRESSURE: 104 MMHG

## 2021-01-01 VITALS
RESPIRATION RATE: 16 BRPM | HEART RATE: 116 BPM | HEIGHT: 62 IN | BODY MASS INDEX: 19.6 KG/M2 | OXYGEN SATURATION: 82 % | SYSTOLIC BLOOD PRESSURE: 100 MMHG | WEIGHT: 106.48 LBS | DIASTOLIC BLOOD PRESSURE: 58 MMHG | TEMPERATURE: 97.5 F

## 2021-01-01 VITALS
HEIGHT: 63 IN | HEART RATE: 94 BPM | SYSTOLIC BLOOD PRESSURE: 119 MMHG | WEIGHT: 130 LBS | OXYGEN SATURATION: 95 % | DIASTOLIC BLOOD PRESSURE: 57 MMHG | RESPIRATION RATE: 18 BRPM | BODY MASS INDEX: 23.04 KG/M2 | TEMPERATURE: 98.1 F

## 2021-01-01 VITALS
HEIGHT: 62 IN | DIASTOLIC BLOOD PRESSURE: 68 MMHG | BODY MASS INDEX: 20.56 KG/M2 | RESPIRATION RATE: 16 BRPM | OXYGEN SATURATION: 100 % | SYSTOLIC BLOOD PRESSURE: 112 MMHG | HEART RATE: 93 BPM | WEIGHT: 111.7 LBS | TEMPERATURE: 98.3 F

## 2021-01-01 VITALS
TEMPERATURE: 98 F | DIASTOLIC BLOOD PRESSURE: 73 MMHG | SYSTOLIC BLOOD PRESSURE: 101 MMHG | HEART RATE: 114 BPM | HEIGHT: 62 IN | BODY MASS INDEX: 21.33 KG/M2 | OXYGEN SATURATION: 98 %

## 2021-01-01 VITALS — HEIGHT: 62 IN | WEIGHT: 108.6 LBS | BODY MASS INDEX: 19.98 KG/M2

## 2021-01-01 DIAGNOSIS — R09.02 HYPOXIA: Primary | ICD-10-CM

## 2021-01-01 DIAGNOSIS — Z85.038 HISTORY OF COLON CANCER: ICD-10-CM

## 2021-01-01 DIAGNOSIS — D64.9 ANEMIA, UNSPECIFIED TYPE: ICD-10-CM

## 2021-01-01 DIAGNOSIS — Z78.9 DECREASED ACTIVITIES OF DAILY LIVING (ADL): ICD-10-CM

## 2021-01-01 DIAGNOSIS — Z63.4 GRIEF AT LOSS OF CHILD: ICD-10-CM

## 2021-01-01 DIAGNOSIS — E27.8 ADRENAL NODULE (HCC): ICD-10-CM

## 2021-01-01 DIAGNOSIS — J96.02 ACUTE RESPIRATORY FAILURE WITH HYPOXIA AND HYPERCAPNIA (HCC): Primary | ICD-10-CM

## 2021-01-01 DIAGNOSIS — J43.9 PULMONARY EMPHYSEMA, UNSPECIFIED EMPHYSEMA TYPE (HCC): ICD-10-CM

## 2021-01-01 DIAGNOSIS — G25.81 RLS (RESTLESS LEGS SYNDROME): ICD-10-CM

## 2021-01-01 DIAGNOSIS — I50.9 ACUTE CONGESTIVE HEART FAILURE, UNSPECIFIED HEART FAILURE TYPE (HCC): ICD-10-CM

## 2021-01-01 DIAGNOSIS — D69.6 THROMBOCYTOPENIA (HCC): ICD-10-CM

## 2021-01-01 DIAGNOSIS — S22.080D COMPRESSION FRACTURE OF T12 VERTEBRA WITH ROUTINE HEALING, SUBSEQUENT ENCOUNTER: ICD-10-CM

## 2021-01-01 DIAGNOSIS — F32.9 MAJOR DEPRESSIVE DISORDER WITH CURRENT ACTIVE EPISODE, UNSPECIFIED DEPRESSION EPISODE SEVERITY, UNSPECIFIED WHETHER RECURRENT: ICD-10-CM

## 2021-01-01 DIAGNOSIS — J90 PLEURAL EFFUSION: ICD-10-CM

## 2021-01-01 DIAGNOSIS — J96.11 CHRONIC RESPIRATORY FAILURE WITH HYPOXIA AND HYPERCAPNIA (HCC): ICD-10-CM

## 2021-01-01 DIAGNOSIS — J96.22 ACUTE ON CHRONIC RESPIRATORY FAILURE WITH HYPOXIA AND HYPERCAPNIA (HCC): ICD-10-CM

## 2021-01-01 DIAGNOSIS — Z87.891 FORMER SMOKER: ICD-10-CM

## 2021-01-01 DIAGNOSIS — J96.12 CHRONIC RESPIRATORY FAILURE WITH HYPERCAPNIA (HCC): ICD-10-CM

## 2021-01-01 DIAGNOSIS — S72.002A CLOSED FRACTURE OF LEFT HIP, INITIAL ENCOUNTER (HCC): ICD-10-CM

## 2021-01-01 DIAGNOSIS — E44.0 MODERATE MALNUTRITION (HCC): ICD-10-CM

## 2021-01-01 DIAGNOSIS — S22.080D COMPRESSION FRACTURE OF T12 VERTEBRA WITH ROUTINE HEALING, SUBSEQUENT ENCOUNTER: Primary | ICD-10-CM

## 2021-01-01 DIAGNOSIS — S72.002S CLOSED FRACTURE OF LEFT HIP, SEQUELA: ICD-10-CM

## 2021-01-01 DIAGNOSIS — Z74.09 IMPAIRED MOBILITY: ICD-10-CM

## 2021-01-01 DIAGNOSIS — J96.01 ACUTE RESPIRATORY FAILURE WITH HYPOXIA AND HYPERCAPNIA (HCC): Primary | ICD-10-CM

## 2021-01-01 DIAGNOSIS — J44.9 COPD, VERY SEVERE (HCC): ICD-10-CM

## 2021-01-01 DIAGNOSIS — G25.81 RESTLESS LEGS SYNDROME (RLS): ICD-10-CM

## 2021-01-01 DIAGNOSIS — R05.9 COUGH: Primary | ICD-10-CM

## 2021-01-01 DIAGNOSIS — J44.1 COPD EXACERBATION (HCC): ICD-10-CM

## 2021-01-01 DIAGNOSIS — J96.11 CHRONIC RESPIRATORY FAILURE WITH HYPOXIA (HCC): Primary | ICD-10-CM

## 2021-01-01 DIAGNOSIS — J96.12 CHRONIC RESPIRATORY FAILURE WITH HYPOXIA AND HYPERCAPNIA (HCC): ICD-10-CM

## 2021-01-01 DIAGNOSIS — S72.002A CLOSED FRACTURE OF LEFT HIP, INITIAL ENCOUNTER (HCC): Primary | ICD-10-CM

## 2021-01-01 DIAGNOSIS — M43.9 COMPRESSION DEFORMITY OF VERTEBRA: ICD-10-CM

## 2021-01-01 DIAGNOSIS — R73.9 HYPERGLYCEMIA, DRUG-INDUCED: ICD-10-CM

## 2021-01-01 DIAGNOSIS — S22.080S COMPRESSION FRACTURE OF T12 VERTEBRA, SEQUELA: ICD-10-CM

## 2021-01-01 DIAGNOSIS — J96.11 CHRONIC RESPIRATORY FAILURE WITH HYPOXIA (HCC): ICD-10-CM

## 2021-01-01 DIAGNOSIS — J44.1 COPD WITH ACUTE EXACERBATION (HCC): ICD-10-CM

## 2021-01-01 DIAGNOSIS — I47.1 NARROW COMPLEX TACHYCARDIA (HCC): ICD-10-CM

## 2021-01-01 DIAGNOSIS — R93.89 ABNORMAL CT OF THE CHEST: ICD-10-CM

## 2021-01-01 DIAGNOSIS — J96.21 ACUTE ON CHRONIC RESPIRATORY FAILURE WITH HYPOXIA AND HYPERCAPNIA (HCC): ICD-10-CM

## 2021-01-01 DIAGNOSIS — T50.905A HYPERGLYCEMIA, DRUG-INDUCED: ICD-10-CM
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
## 2021-01-01 DIAGNOSIS — G47.33 OSA (OBSTRUCTIVE SLEEP APNEA): ICD-10-CM

## 2021-01-01 DIAGNOSIS — F43.21 GRIEF AT LOSS OF CHILD: ICD-10-CM

## 2021-01-01 DIAGNOSIS — J96.11 CHRONIC RESPIRATORY FAILURE WITH HYPOXIA (HCC): Chronic | ICD-10-CM
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
## 2021-01-01 DIAGNOSIS — S22.32XA CLOSED FRACTURE OF ONE RIB OF LEFT SIDE, INITIAL ENCOUNTER: ICD-10-CM

## 2021-01-01 DIAGNOSIS — J44.9 COPD, VERY SEVERE (HCC): Primary | ICD-10-CM

## 2021-01-01 DIAGNOSIS — E43 SEVERE MALNUTRITION (HCC): ICD-10-CM

## 2021-01-01 DIAGNOSIS — J18.9 PNEUMONIA DUE TO INFECTIOUS ORGANISM, UNSPECIFIED LATERALITY, UNSPECIFIED PART OF LUNG: ICD-10-CM

## 2021-01-01 DIAGNOSIS — Z99.81 DEPENDENCE ON SUPPLEMENTAL OXYGEN: ICD-10-CM

## 2021-01-01 DIAGNOSIS — I27.20 PULMONARY HYPERTENSION (HCC): ICD-10-CM

## 2021-01-01 DIAGNOSIS — M25.552 ACUTE PAIN OF LEFT HIP: ICD-10-CM

## 2021-01-01 LAB
25(OH)D3 SERPL-MCNC: 45.2 NG/ML
ALBUMIN SERPL-MCNC: 3 G/DL (ref 3.5–5.2)
ALBUMIN SERPL-MCNC: 3.1 G/DL (ref 3.5–5.2)
ALBUMIN SERPL-MCNC: 3.2 G/DL (ref 3.5–5.2)
ALBUMIN SERPL-MCNC: 3.2 G/DL (ref 3.5–5.2)
ALBUMIN SERPL-MCNC: 3.4 G/DL (ref 3.5–5.2)
ALBUMIN SERPL-MCNC: 3.5 G/DL (ref 3.5–5.2)
ALBUMIN SERPL-MCNC: 3.6 G/DL (ref 3.5–5.2)
ALBUMIN SERPL-MCNC: 3.6 G/DL (ref 3.5–5.2)
ALBUMIN SERPL-MCNC: 3.7 G/DL (ref 3.5–5.2)
ALBUMIN/GLOB SERPL: 1 G/DL
ALBUMIN/GLOB SERPL: 1.1 G/DL
ALBUMIN/GLOB SERPL: 1.2 G/DL
ALBUMIN/GLOB SERPL: 1.3 G/DL
ALBUMIN/GLOB SERPL: 1.4 G/DL
ALBUMIN/GLOB SERPL: 1.4 G/DL
ALBUMIN/GLOB SERPL: 1.5 G/DL
ALP SERPL-CCNC: 100 U/L (ref 39–117)
ALP SERPL-CCNC: 115 U/L (ref 39–117)
ALP SERPL-CCNC: 117 U/L (ref 39–117)
ALP SERPL-CCNC: 136 U/L (ref 39–117)
ALP SERPL-CCNC: 161 U/L (ref 39–117)
ALP SERPL-CCNC: 73 U/L (ref 39–117)
ALP SERPL-CCNC: 73 U/L (ref 39–117)
ALP SERPL-CCNC: 92 U/L (ref 39–117)
ALP SERPL-CCNC: 96 U/L (ref 39–117)
ALT SERPL W P-5'-P-CCNC: 11 U/L (ref 1–33)
ALT SERPL W P-5'-P-CCNC: 12 U/L (ref 1–33)
ALT SERPL W P-5'-P-CCNC: 12 U/L (ref 1–33)
ALT SERPL W P-5'-P-CCNC: 13 U/L (ref 1–33)
ALT SERPL W P-5'-P-CCNC: 13 U/L (ref 1–33)
ALT SERPL W P-5'-P-CCNC: 15 U/L (ref 1–33)
ALT SERPL W P-5'-P-CCNC: 16 U/L (ref 1–33)
ALT SERPL W P-5'-P-CCNC: 23 U/L (ref 1–33)
ALT SERPL W P-5'-P-CCNC: 30 U/L (ref 1–33)
ANION GAP SERPL CALCULATED.3IONS-SCNC: 0 MMOL/L (ref 5–15)
ANION GAP SERPL CALCULATED.3IONS-SCNC: 1 MMOL/L (ref 5–15)
ANION GAP SERPL CALCULATED.3IONS-SCNC: 10 MMOL/L (ref 5–15)
ANION GAP SERPL CALCULATED.3IONS-SCNC: 10 MMOL/L (ref 5–15)
ANION GAP SERPL CALCULATED.3IONS-SCNC: 15 MMOL/L (ref 5–15)
ANION GAP SERPL CALCULATED.3IONS-SCNC: 3 MMOL/L (ref 5–15)
ANION GAP SERPL CALCULATED.3IONS-SCNC: 4 MMOL/L (ref 5–15)
ANION GAP SERPL CALCULATED.3IONS-SCNC: 4 MMOL/L (ref 5–15)
ANION GAP SERPL CALCULATED.3IONS-SCNC: 5 MMOL/L (ref 5–15)
ANION GAP SERPL CALCULATED.3IONS-SCNC: 6 MMOL/L (ref 5–15)
ANION GAP SERPL CALCULATED.3IONS-SCNC: 9 MMOL/L (ref 5–15)
ANISOCYTOSIS BLD QL: ABNORMAL
APTT PPP: 23.8 SECONDS (ref 24.1–35)
APTT PPP: 25.8 SECONDS (ref 24.1–35)
APTT PPP: 26 SECONDS (ref 24.1–35)
ARTERIAL PATENCY WRIST A: ABNORMAL
ARTERIAL PATENCY WRIST A: POSITIVE
AST SERPL-CCNC: 11 U/L (ref 1–32)
AST SERPL-CCNC: 14 U/L (ref 1–32)
AST SERPL-CCNC: 16 U/L (ref 1–32)
AST SERPL-CCNC: 17 U/L (ref 1–32)
AST SERPL-CCNC: 18 U/L (ref 1–32)
AST SERPL-CCNC: 19 U/L (ref 1–32)
AST SERPL-CCNC: 23 U/L (ref 1–32)
ATMOSPHERIC PRESS: 746 MMHG
ATMOSPHERIC PRESS: 750 MMHG
ATMOSPHERIC PRESS: 751 MMHG
ATMOSPHERIC PRESS: 755 MMHG
ATMOSPHERIC PRESS: 756 MMHG
ATMOSPHERIC PRESS: 756 MMHG
B PARAPERT DNA SPEC QL NAA+PROBE: NOT DETECTED
B PERT DNA SPEC QL NAA+PROBE: NOT DETECTED
BACTERIA SPEC AEROBE CULT: NORMAL
BACTERIA UR QL AUTO: ABNORMAL /HPF
BACTERIA UR QL AUTO: ABNORMAL /HPF
BASE EXCESS BLDA CALC-SCNC: 1.7 MMOL/L (ref 0–2)
BASE EXCESS BLDA CALC-SCNC: 10 MMOL/L (ref 0–2)
BASE EXCESS BLDA CALC-SCNC: 13.1 MMOL/L (ref 0–2)
BASE EXCESS BLDA CALC-SCNC: 14.1 MMOL/L (ref 0–2)
BASE EXCESS BLDA CALC-SCNC: 14.7 MMOL/L (ref 0–2)
BASE EXCESS BLDA CALC-SCNC: 17.1 MMOL/L (ref 0–2)
BASE EXCESS BLDA CALC-SCNC: 21.1 MMOL/L (ref 0–2)
BASE EXCESS BLDA CALC-SCNC: 3.7 MMOL/L (ref 0–2)
BASE EXCESS BLDA CALC-SCNC: 3.9 MMOL/L (ref 0–2)
BASE EXCESS BLDA CALC-SCNC: 8.9 MMOL/L (ref 0–2)
BASOPHILS # BLD AUTO: 0 10*3/MM3 (ref 0–0.2)
BASOPHILS # BLD AUTO: 0.01 10*3/MM3 (ref 0–0.2)
BASOPHILS # BLD AUTO: 0.04 10*3/MM3 (ref 0–0.2)
BASOPHILS NFR BLD AUTO: 0 % (ref 0–1.5)
BASOPHILS NFR BLD AUTO: 0.1 % (ref 0–1.5)
BASOPHILS NFR BLD AUTO: 0.2 % (ref 0–1.5)
BASOPHILS NFR BLD AUTO: 0.2 % (ref 0–1.5)
BASOPHILS NFR BLD AUTO: 0.3 % (ref 0–1.5)
BASOPHILS NFR BLD AUTO: 0.3 % (ref 0–1.5)
BASOPHILS NFR BLD AUTO: 0.5 % (ref 0–1.5)
BDY SITE: ABNORMAL
BH CV ECHO MEAS - AO MAX PG (FULL): 4 MMHG
BH CV ECHO MEAS - AO MAX PG: 9.7 MMHG
BH CV ECHO MEAS - AO MEAN PG (FULL): 3 MMHG
BH CV ECHO MEAS - AO MEAN PG: 6 MMHG
BH CV ECHO MEAS - AO ROOT AREA (BSA CORRECTED): 2
BH CV ECHO MEAS - AO ROOT AREA: 8 CM^2
BH CV ECHO MEAS - AO ROOT DIAM: 3.2 CM
BH CV ECHO MEAS - AO V2 MAX: 156 CM/SEC
BH CV ECHO MEAS - AO V2 MEAN: 121 CM/SEC
BH CV ECHO MEAS - AO V2 VTI: 28.4 CM
BH CV ECHO MEAS - AVA(I,A): 2.1 CM^2
BH CV ECHO MEAS - AVA(I,D): 2.1 CM^2
BH CV ECHO MEAS - AVA(V,A): 2.2 CM^2
BH CV ECHO MEAS - AVA(V,D): 2.2 CM^2
BH CV ECHO MEAS - BSA(HAYCOCK): 1.6 M^2
BH CV ECHO MEAS - BSA: 1.6 M^2
BH CV ECHO MEAS - BZI_BMI: 23 KILOGRAMS/M^2
BH CV ECHO MEAS - BZI_METRIC_HEIGHT: 160 CM
BH CV ECHO MEAS - BZI_METRIC_WEIGHT: 59 KG
BH CV ECHO MEAS - EDV(CUBED): 72.5 ML
BH CV ECHO MEAS - EDV(MOD-SP4): 55.4 ML
BH CV ECHO MEAS - EDV(TEICH): 77.3 ML
BH CV ECHO MEAS - EF(CUBED): 93.5 %
BH CV ECHO MEAS - EF(MOD-SP4): 70.2 %
BH CV ECHO MEAS - EF(TEICH): 89.5 %
BH CV ECHO MEAS - ESV(CUBED): 4.7 ML
BH CV ECHO MEAS - ESV(MOD-SP4): 16.5 ML
BH CV ECHO MEAS - ESV(TEICH): 8.1 ML
BH CV ECHO MEAS - FS: 59.7 %
BH CV ECHO MEAS - IVS/LVPW: 0.94
BH CV ECHO MEAS - IVSD: 0.81 CM
BH CV ECHO MEAS - LA DIMENSION: 3.4 CM
BH CV ECHO MEAS - LA/AO: 1.1
BH CV ECHO MEAS - LAT PEAK E' VEL: 6.9 CM/SEC
BH CV ECHO MEAS - LV DIASTOLIC VOL/BSA (35-75): 34.4 ML/M^2
BH CV ECHO MEAS - LV MASS(C)D: 106.4 GRAMS
BH CV ECHO MEAS - LV MASS(C)DI: 66.1 GRAMS/M^2
BH CV ECHO MEAS - LV MAX PG: 5.8 MMHG
BH CV ECHO MEAS - LV MEAN PG: 3 MMHG
BH CV ECHO MEAS - LV SYSTOLIC VOL/BSA (12-30): 10.2 ML/M^2
BH CV ECHO MEAS - LV V1 MAX: 120 CM/SEC
BH CV ECHO MEAS - LV V1 MEAN: 85.5 CM/SEC
BH CV ECHO MEAS - LV V1 VTI: 21.4 CM
BH CV ECHO MEAS - LVIDD: 4.2 CM
BH CV ECHO MEAS - LVIDS: 1.7 CM
BH CV ECHO MEAS - LVLD AP4: 6.6 CM
BH CV ECHO MEAS - LVLS AP4: 5.6 CM
BH CV ECHO MEAS - LVOT AREA (M): 2.8 CM^2
BH CV ECHO MEAS - LVOT AREA: 2.8 CM^2
BH CV ECHO MEAS - LVOT DIAM: 1.9 CM
BH CV ECHO MEAS - LVPWD: 0.86 CM
BH CV ECHO MEAS - MED PEAK E' VEL: 9.03 CM/SEC
BH CV ECHO MEAS - MV A MAX VEL: 72.3 CM/SEC
BH CV ECHO MEAS - MV DEC TIME: 0.24 SEC
BH CV ECHO MEAS - MV E MAX VEL: 55.3 CM/SEC
BH CV ECHO MEAS - MV E/A: 0.76
BH CV ECHO MEAS - PA MAX PG: 1.2 MMHG
BH CV ECHO MEAS - PA V2 MAX: 53.8 CM/SEC
BH CV ECHO MEAS - PI END-D VEL: 196 CM/SEC
BH CV ECHO MEAS - RAP SYSTOLE: 5 MMHG
BH CV ECHO MEAS - RVDD: 3 CM
BH CV ECHO MEAS - RVSP: 65.2 MMHG
BH CV ECHO MEAS - SI(AO): 141.8 ML/M^2
BH CV ECHO MEAS - SI(CUBED): 42.1 ML/M^2
BH CV ECHO MEAS - SI(LVOT): 37.7 ML/M^2
BH CV ECHO MEAS - SI(MOD-SP4): 24.2 ML/M^2
BH CV ECHO MEAS - SI(TEICH): 42.9 ML/M^2
BH CV ECHO MEAS - SV(AO): 228.4 ML
BH CV ECHO MEAS - SV(CUBED): 67.8 ML
BH CV ECHO MEAS - SV(LVOT): 60.7 ML
BH CV ECHO MEAS - SV(MOD-SP4): 38.9 ML
BH CV ECHO MEAS - SV(TEICH): 69.1 ML
BH CV ECHO MEAS - TR MAX VEL: 388 CM/SEC
BH CV ECHO MEASUREMENTS AVERAGE E/E' RATIO: 6.94
BILIRUB SERPL-MCNC: 0.3 MG/DL (ref 0–1.2)
BILIRUB SERPL-MCNC: 0.4 MG/DL (ref 0–1.2)
BILIRUB SERPL-MCNC: 0.5 MG/DL (ref 0–1.2)
BILIRUB SERPL-MCNC: 0.6 MG/DL (ref 0–1.2)
BILIRUB SERPL-MCNC: 0.7 MG/DL (ref 0–1.2)
BILIRUB SERPL-MCNC: 0.8 MG/DL (ref 0–1.2)
BILIRUB SERPL-MCNC: 1.1 MG/DL (ref 0–1.2)
BILIRUB UR QL STRIP: ABNORMAL
BILIRUB UR QL STRIP: NEGATIVE
BODY TEMPERATURE: 37 C
BUN SERPL-MCNC: 16 MG/DL (ref 8–23)
BUN SERPL-MCNC: 17 MG/DL (ref 8–23)
BUN SERPL-MCNC: 19 MG/DL (ref 8–23)
BUN SERPL-MCNC: 19 MG/DL (ref 8–23)
BUN SERPL-MCNC: 21 MG/DL (ref 8–23)
BUN SERPL-MCNC: 23 MG/DL (ref 8–23)
BUN SERPL-MCNC: 23 MG/DL (ref 8–23)
BUN SERPL-MCNC: 24 MG/DL (ref 8–23)
BUN SERPL-MCNC: 25 MG/DL (ref 8–23)
BUN SERPL-MCNC: 26 MG/DL (ref 8–23)
BUN SERPL-MCNC: 28 MG/DL (ref 8–23)
BUN SERPL-MCNC: 31 MG/DL (ref 8–23)
BUN SERPL-MCNC: 33 MG/DL (ref 8–23)
BUN SERPL-MCNC: 36 MG/DL (ref 8–23)
BUN SERPL-MCNC: 38 MG/DL (ref 8–23)
BUN SERPL-MCNC: 39 MG/DL (ref 8–23)
BUN SERPL-MCNC: 46 MG/DL (ref 8–23)
BUN/CREAT SERPL: 28.4 (ref 7–25)
BUN/CREAT SERPL: 39.6 (ref 7–25)
BUN/CREAT SERPL: 43.2 (ref 7–25)
BUN/CREAT SERPL: 43.2 (ref 7–25)
BUN/CREAT SERPL: 44.2 (ref 7–25)
BUN/CREAT SERPL: 49.1 (ref 7–25)
BUN/CREAT SERPL: 51 (ref 7–25)
BUN/CREAT SERPL: 54.2 (ref 7–25)
BUN/CREAT SERPL: 54.3 (ref 7–25)
BUN/CREAT SERPL: 54.5 (ref 7–25)
BUN/CREAT SERPL: 56.7 (ref 7–25)
BUN/CREAT SERPL: 57.4 (ref 7–25)
BUN/CREAT SERPL: 57.8 (ref 7–25)
BUN/CREAT SERPL: 59.1 (ref 7–25)
BUN/CREAT SERPL: 60.5 (ref 7–25)
BUN/CREAT SERPL: 61.3 (ref 7–25)
BUN/CREAT SERPL: 64.7 (ref 7–25)
BUN/CREAT SERPL: 65.1 (ref 7–25)
BUN/CREAT SERPL: 69.7 (ref 7–25)
BUN/CREAT SERPL: 75 (ref 7–25)
BUN/CREAT SERPL: 78.1 (ref 7–25)
C PNEUM DNA NPH QL NAA+NON-PROBE: NOT DETECTED
CALCIUM SPEC-SCNC: 10 MG/DL (ref 8.6–10.5)
CALCIUM SPEC-SCNC: 10.2 MG/DL (ref 8.6–10.5)
CALCIUM SPEC-SCNC: 10.4 MG/DL (ref 8.6–10.5)
CALCIUM SPEC-SCNC: 8.4 MG/DL (ref 8.6–10.5)
CALCIUM SPEC-SCNC: 8.8 MG/DL (ref 8.6–10.5)
CALCIUM SPEC-SCNC: 8.9 MG/DL (ref 8.6–10.5)
CALCIUM SPEC-SCNC: 8.9 MG/DL (ref 8.6–10.5)
CALCIUM SPEC-SCNC: 9.2 MG/DL (ref 8.6–10.5)
CALCIUM SPEC-SCNC: 9.3 MG/DL (ref 8.6–10.5)
CALCIUM SPEC-SCNC: 9.3 MG/DL (ref 8.6–10.5)
CALCIUM SPEC-SCNC: 9.4 MG/DL (ref 8.6–10.5)
CALCIUM SPEC-SCNC: 9.5 MG/DL (ref 8.6–10.5)
CALCIUM SPEC-SCNC: 9.6 MG/DL (ref 8.6–10.5)
CALCIUM SPEC-SCNC: 9.8 MG/DL (ref 8.6–10.5)
CALCIUM SPEC-SCNC: 9.9 MG/DL (ref 8.6–10.5)
CHLORIDE SERPL-SCNC: 100 MMOL/L (ref 98–107)
CHLORIDE SERPL-SCNC: 100 MMOL/L (ref 98–107)
CHLORIDE SERPL-SCNC: 101 MMOL/L (ref 98–107)
CHLORIDE SERPL-SCNC: 86 MMOL/L (ref 98–107)
CHLORIDE SERPL-SCNC: 88 MMOL/L (ref 98–107)
CHLORIDE SERPL-SCNC: 89 MMOL/L (ref 98–107)
CHLORIDE SERPL-SCNC: 90 MMOL/L (ref 98–107)
CHLORIDE SERPL-SCNC: 90 MMOL/L (ref 98–107)
CHLORIDE SERPL-SCNC: 92 MMOL/L (ref 98–107)
CHLORIDE SERPL-SCNC: 93 MMOL/L (ref 98–107)
CHLORIDE SERPL-SCNC: 93 MMOL/L (ref 98–107)
CHLORIDE SERPL-SCNC: 94 MMOL/L (ref 98–107)
CHLORIDE SERPL-SCNC: 97 MMOL/L (ref 98–107)
CHLORIDE SERPL-SCNC: 98 MMOL/L (ref 98–107)
CHLORIDE SERPL-SCNC: 99 MMOL/L (ref 98–107)
CHOLEST SERPL-MCNC: 140 MG/DL (ref 0–200)
CHOLEST SERPL-MCNC: 182 MG/DL (ref 0–200)
CLARITY UR: CLEAR
CLARITY UR: CLEAR
CO2 SERPL-SCNC: 30 MMOL/L (ref 22–29)
CO2 SERPL-SCNC: 32 MMOL/L (ref 22–29)
CO2 SERPL-SCNC: 32 MMOL/L (ref 22–29)
CO2 SERPL-SCNC: 35 MMOL/L (ref 22–29)
CO2 SERPL-SCNC: 36 MMOL/L (ref 22–29)
CO2 SERPL-SCNC: 36 MMOL/L (ref 22–29)
CO2 SERPL-SCNC: 39 MMOL/L (ref 22–29)
CO2 SERPL-SCNC: 40 MMOL/L (ref 22–29)
CO2 SERPL-SCNC: 40 MMOL/L (ref 22–29)
CO2 SERPL-SCNC: 41 MMOL/L (ref 22–29)
CO2 SERPL-SCNC: 42 MMOL/L (ref 22–29)
CO2 SERPL-SCNC: 42 MMOL/L (ref 22–29)
CO2 SERPL-SCNC: 45 MMOL/L (ref 22–29)
CO2 SERPL-SCNC: 45 MMOL/L (ref 22–29)
CO2 SERPL-SCNC: 47 MMOL/L (ref 22–29)
CO2 SERPL-SCNC: 48 MMOL/L (ref 22–29)
CO2 SERPL-SCNC: 49 MMOL/L (ref 22–29)
COLOR UR: ABNORMAL
COLOR UR: ABNORMAL
CREAT SERPL-MCNC: 0.3 MG/DL (ref 0.57–1)
CREAT SERPL-MCNC: 0.31 MG/DL (ref 0.57–1)
CREAT SERPL-MCNC: 0.32 MG/DL (ref 0.57–1)
CREAT SERPL-MCNC: 0.33 MG/DL (ref 0.57–1)
CREAT SERPL-MCNC: 0.37 MG/DL (ref 0.57–1)
CREAT SERPL-MCNC: 0.43 MG/DL (ref 0.57–1)
CREAT SERPL-MCNC: 0.44 MG/DL (ref 0.57–1)
CREAT SERPL-MCNC: 0.44 MG/DL (ref 0.57–1)
CREAT SERPL-MCNC: 0.45 MG/DL (ref 0.57–1)
CREAT SERPL-MCNC: 0.46 MG/DL (ref 0.57–1)
CREAT SERPL-MCNC: 0.48 MG/DL (ref 0.57–1)
CREAT SERPL-MCNC: 0.48 MG/DL (ref 0.57–1)
CREAT SERPL-MCNC: 0.49 MG/DL (ref 0.57–1)
CREAT SERPL-MCNC: 0.51 MG/DL (ref 0.57–1)
CREAT SERPL-MCNC: 0.53 MG/DL (ref 0.57–1)
CREAT SERPL-MCNC: 0.53 MG/DL (ref 0.57–1)
CREAT SERPL-MCNC: 0.54 MG/DL (ref 0.57–1)
CREAT SERPL-MCNC: 0.66 MG/DL (ref 0.57–1)
CREAT SERPL-MCNC: 0.76 MG/DL (ref 0.57–1)
CREAT SERPL-MCNC: 0.81 MG/DL (ref 0.57–1)
CREAT SERPL-MCNC: 0.86 MG/DL (ref 0.57–1)
D DIMER PPP FEU-MCNC: 8.44 MG/L (FEU) (ref 0–0.5)
D-LACTATE SERPL-SCNC: 1.1 MMOL/L (ref 0.5–2)
D-LACTATE SERPL-SCNC: 1.4 MMOL/L (ref 0.5–2)
D-LACTATE SERPL-SCNC: 1.7 MMOL/L (ref 0.5–2)
DEPRECATED RDW RBC AUTO: 42.9 FL (ref 37–54)
DEPRECATED RDW RBC AUTO: 43.5 FL (ref 37–54)
DEPRECATED RDW RBC AUTO: 43.6 FL (ref 37–54)
DEPRECATED RDW RBC AUTO: 44.1 FL (ref 37–54)
DEPRECATED RDW RBC AUTO: 44.8 FL (ref 37–54)
DEPRECATED RDW RBC AUTO: 45 FL (ref 37–54)
DEPRECATED RDW RBC AUTO: 49.1 FL (ref 37–54)
DEPRECATED RDW RBC AUTO: 49.2 FL (ref 37–54)
DEPRECATED RDW RBC AUTO: 50.1 FL (ref 37–54)
DEPRECATED RDW RBC AUTO: 50.5 FL (ref 37–54)
DEPRECATED RDW RBC AUTO: 50.6 FL (ref 37–54)
DEPRECATED RDW RBC AUTO: 50.6 FL (ref 37–54)
DEPRECATED RDW RBC AUTO: 51.1 FL (ref 37–54)
DEPRECATED RDW RBC AUTO: 51.1 FL (ref 37–54)
DEPRECATED RDW RBC AUTO: 51.2 FL (ref 37–54)
DEPRECATED RDW RBC AUTO: 52.3 FL (ref 37–54)
EOSINOPHIL # BLD AUTO: 0 10*3/MM3 (ref 0–0.4)
EOSINOPHIL # BLD AUTO: 0.01 10*3/MM3 (ref 0–0.4)
EOSINOPHIL # BLD AUTO: 0.03 10*3/MM3 (ref 0–0.4)
EOSINOPHIL # BLD AUTO: 0.05 10*3/MM3 (ref 0–0.4)
EOSINOPHIL # BLD AUTO: 0.06 10*3/MM3 (ref 0–0.4)
EOSINOPHIL # BLD AUTO: 0.08 10*3/MM3 (ref 0–0.4)
EOSINOPHIL # BLD AUTO: 0.19 10*3/MM3 (ref 0–0.4)
EOSINOPHIL NFR BLD AUTO: 0 % (ref 0.3–6.2)
EOSINOPHIL NFR BLD AUTO: 0.1 % (ref 0.3–6.2)
EOSINOPHIL NFR BLD AUTO: 0.3 % (ref 0.3–6.2)
EOSINOPHIL NFR BLD AUTO: 0.3 % (ref 0.3–6.2)
EOSINOPHIL NFR BLD AUTO: 0.7 % (ref 0.3–6.2)
EOSINOPHIL NFR BLD AUTO: 0.7 % (ref 0.3–6.2)
EOSINOPHIL NFR BLD AUTO: 2.7 % (ref 0.3–6.2)
EPAP: 10
EPAP: 8
ERYTHROCYTE [DISTWIDTH] IN BLOOD BY AUTOMATED COUNT: 13.2 % (ref 12.3–15.4)
ERYTHROCYTE [DISTWIDTH] IN BLOOD BY AUTOMATED COUNT: 13.2 % (ref 12.3–15.4)
ERYTHROCYTE [DISTWIDTH] IN BLOOD BY AUTOMATED COUNT: 13.3 % (ref 12.3–15.4)
ERYTHROCYTE [DISTWIDTH] IN BLOOD BY AUTOMATED COUNT: 13.3 % (ref 12.3–15.4)
ERYTHROCYTE [DISTWIDTH] IN BLOOD BY AUTOMATED COUNT: 13.4 % (ref 12.3–15.4)
ERYTHROCYTE [DISTWIDTH] IN BLOOD BY AUTOMATED COUNT: 13.5 % (ref 12.3–15.4)
ERYTHROCYTE [DISTWIDTH] IN BLOOD BY AUTOMATED COUNT: 14.6 % (ref 12.3–15.4)
ERYTHROCYTE [DISTWIDTH] IN BLOOD BY AUTOMATED COUNT: 15 % (ref 12.3–15.4)
ERYTHROCYTE [DISTWIDTH] IN BLOOD BY AUTOMATED COUNT: 15.1 % (ref 12.3–15.4)
ERYTHROCYTE [DISTWIDTH] IN BLOOD BY AUTOMATED COUNT: 15.2 % (ref 12.3–15.4)
ERYTHROCYTE [DISTWIDTH] IN BLOOD BY AUTOMATED COUNT: 15.2 % (ref 12.3–15.4)
ERYTHROCYTE [DISTWIDTH] IN BLOOD BY AUTOMATED COUNT: 15.3 % (ref 12.3–15.4)
ERYTHROCYTE [DISTWIDTH] IN BLOOD BY AUTOMATED COUNT: 15.4 % (ref 12.3–15.4)
ERYTHROCYTE [DISTWIDTH] IN BLOOD BY AUTOMATED COUNT: 16.2 % (ref 12.3–15.4)
FLUAV SUBTYP SPEC NAA+PROBE: NOT DETECTED
FLUBV RNA ISLT QL NAA+PROBE: NOT DETECTED
GAS FLOW AIRWAY: 15 LPM
GAS FLOW AIRWAY: 35 LPM
GAS FLOW AIRWAY: 4 LPM
GAS FLOW AIRWAY: 6 LPM
GFR SERPL CREATININE-BSD FRML MDRD: 110 ML/MIN/1.73
GFR SERPL CREATININE-BSD FRML MDRD: 113 ML/MIN/1.73
GFR SERPL CREATININE-BSD FRML MDRD: 113 ML/MIN/1.73
GFR SERPL CREATININE-BSD FRML MDRD: 118 ML/MIN/1.73
GFR SERPL CREATININE-BSD FRML MDRD: 123 ML/MIN/1.73
GFR SERPL CREATININE-BSD FRML MDRD: 126 ML/MIN/1.73
GFR SERPL CREATININE-BSD FRML MDRD: 126 ML/MIN/1.73
GFR SERPL CREATININE-BSD FRML MDRD: 133 ML/MIN/1.73
GFR SERPL CREATININE-BSD FRML MDRD: 136 ML/MIN/1.73
GFR SERPL CREATININE-BSD FRML MDRD: 140 ML/MIN/1.73
GFR SERPL CREATININE-BSD FRML MDRD: 140 ML/MIN/1.73
GFR SERPL CREATININE-BSD FRML MDRD: 144 ML/MIN/1.73
GFR SERPL CREATININE-BSD FRML MDRD: 65 ML/MIN/1.73
GFR SERPL CREATININE-BSD FRML MDRD: 69 ML/MIN/1.73
GFR SERPL CREATININE-BSD FRML MDRD: 74 ML/MIN/1.73
GFR SERPL CREATININE-BSD FRML MDRD: 88 ML/MIN/1.73
GFR SERPL CREATININE-BSD FRML MDRD: >150 ML/MIN/1.73
GLOBULIN UR ELPH-MCNC: 2.2 GM/DL
GLOBULIN UR ELPH-MCNC: 2.5 GM/DL
GLOBULIN UR ELPH-MCNC: 2.5 GM/DL
GLOBULIN UR ELPH-MCNC: 2.6 GM/DL
GLOBULIN UR ELPH-MCNC: 2.8 GM/DL
GLOBULIN UR ELPH-MCNC: 2.9 GM/DL
GLOBULIN UR ELPH-MCNC: 3 GM/DL
GLOBULIN UR ELPH-MCNC: 3.4 GM/DL
GLOBULIN UR ELPH-MCNC: 3.7 GM/DL
GLUCOSE BLDC GLUCOMTR-MCNC: 112 MG/DL (ref 70–130)
GLUCOSE BLDC GLUCOMTR-MCNC: 126 MG/DL (ref 70–130)
GLUCOSE BLDC GLUCOMTR-MCNC: 128 MG/DL (ref 70–130)
GLUCOSE BLDC GLUCOMTR-MCNC: 137 MG/DL (ref 70–130)
GLUCOSE BLDC GLUCOMTR-MCNC: 147 MG/DL (ref 70–130)
GLUCOSE BLDC GLUCOMTR-MCNC: 159 MG/DL (ref 70–130)
GLUCOSE BLDC GLUCOMTR-MCNC: 168 MG/DL (ref 70–130)
GLUCOSE BLDC GLUCOMTR-MCNC: 187 MG/DL (ref 70–130)
GLUCOSE BLDC GLUCOMTR-MCNC: 272 MG/DL (ref 70–130)
GLUCOSE BLDC GLUCOMTR-MCNC: 289 MG/DL (ref 70–130)
GLUCOSE BLDC GLUCOMTR-MCNC: 70 MG/DL (ref 70–130)
GLUCOSE BLDC GLUCOMTR-MCNC: 80 MG/DL (ref 70–130)
GLUCOSE SERPL-MCNC: 100 MG/DL (ref 65–99)
GLUCOSE SERPL-MCNC: 100 MG/DL (ref 65–99)
GLUCOSE SERPL-MCNC: 105 MG/DL (ref 65–99)
GLUCOSE SERPL-MCNC: 116 MG/DL (ref 65–99)
GLUCOSE SERPL-MCNC: 123 MG/DL (ref 65–99)
GLUCOSE SERPL-MCNC: 133 MG/DL (ref 65–99)
GLUCOSE SERPL-MCNC: 138 MG/DL (ref 65–99)
GLUCOSE SERPL-MCNC: 146 MG/DL (ref 65–99)
GLUCOSE SERPL-MCNC: 147 MG/DL (ref 65–99)
GLUCOSE SERPL-MCNC: 152 MG/DL (ref 65–99)
GLUCOSE SERPL-MCNC: 163 MG/DL (ref 65–99)
GLUCOSE SERPL-MCNC: 163 MG/DL (ref 65–99)
GLUCOSE SERPL-MCNC: 168 MG/DL (ref 65–99)
GLUCOSE SERPL-MCNC: 176 MG/DL (ref 65–99)
GLUCOSE SERPL-MCNC: 200 MG/DL (ref 65–99)
GLUCOSE SERPL-MCNC: 204 MG/DL (ref 65–99)
GLUCOSE SERPL-MCNC: 212 MG/DL (ref 65–99)
GLUCOSE SERPL-MCNC: 260 MG/DL (ref 65–99)
GLUCOSE SERPL-MCNC: 78 MG/DL (ref 65–99)
GLUCOSE SERPL-MCNC: 86 MG/DL (ref 65–99)
GLUCOSE SERPL-MCNC: 87 MG/DL (ref 65–99)
GLUCOSE UR STRIP-MCNC: NEGATIVE MG/DL
GLUCOSE UR STRIP-MCNC: NEGATIVE MG/DL
HADV DNA SPEC NAA+PROBE: NOT DETECTED
HBA1C MFR BLD: 5.4 % (ref 4.8–5.6)
HBA1C MFR BLD: 5.5 % (ref 4.8–5.6)
HCO3 BLDA-SCNC: 29.7 MMOL/L (ref 20–26)
HCO3 BLDA-SCNC: 30.8 MMOL/L (ref 20–26)
HCO3 BLDA-SCNC: 31.2 MMOL/L (ref 20–26)
HCO3 BLDA-SCNC: 38.8 MMOL/L (ref 20–26)
HCO3 BLDA-SCNC: 40.4 MMOL/L (ref 20–26)
HCO3 BLDA-SCNC: 40.9 MMOL/L (ref 20–26)
HCO3 BLDA-SCNC: 43.3 MMOL/L (ref 20–26)
HCO3 BLDA-SCNC: 43.7 MMOL/L (ref 20–26)
HCO3 BLDA-SCNC: 45.5 MMOL/L (ref 20–26)
HCO3 BLDA-SCNC: 49.6 MMOL/L (ref 20–26)
HCOV 229E RNA SPEC QL NAA+PROBE: NOT DETECTED
HCOV HKU1 RNA SPEC QL NAA+PROBE: NOT DETECTED
HCOV NL63 RNA SPEC QL NAA+PROBE: NOT DETECTED
HCOV OC43 RNA SPEC QL NAA+PROBE: NOT DETECTED
HCT VFR BLD AUTO: 31.1 % (ref 34–46.6)
HCT VFR BLD AUTO: 32.7 % (ref 34–46.6)
HCT VFR BLD AUTO: 32.9 % (ref 34–46.6)
HCT VFR BLD AUTO: 33.5 % (ref 34–46.6)
HCT VFR BLD AUTO: 34.3 % (ref 34–46.6)
HCT VFR BLD AUTO: 36.9 % (ref 34–46.6)
HCT VFR BLD AUTO: 37.1 % (ref 34–46.6)
HCT VFR BLD AUTO: 37.2 % (ref 34–46.6)
HCT VFR BLD AUTO: 37.4 % (ref 34–46.6)
HCT VFR BLD AUTO: 37.5 % (ref 34–46.6)
HCT VFR BLD AUTO: 38 % (ref 34–46.6)
HCT VFR BLD AUTO: 40.5 % (ref 34–46.6)
HCT VFR BLD AUTO: 41.5 % (ref 34–46.6)
HCT VFR BLD AUTO: 42.1 % (ref 34–46.6)
HCT VFR BLD AUTO: 43.1 % (ref 34–46.6)
HCT VFR BLD AUTO: 45.9 % (ref 34–46.6)
HDLC SERPL-MCNC: 39 MG/DL (ref 40–60)
HDLC SERPL-MCNC: 89 MG/DL (ref 40–60)
HGB BLD-MCNC: 10.2 G/DL (ref 12–15.9)
HGB BLD-MCNC: 10.2 G/DL (ref 12–15.9)
HGB BLD-MCNC: 10.3 G/DL (ref 12–15.9)
HGB BLD-MCNC: 10.6 G/DL (ref 12–15.9)
HGB BLD-MCNC: 10.8 G/DL (ref 12–15.9)
HGB BLD-MCNC: 10.9 G/DL (ref 12–15.9)
HGB BLD-MCNC: 11.1 G/DL (ref 12–15.9)
HGB BLD-MCNC: 11.2 G/DL (ref 12–15.9)
HGB BLD-MCNC: 12.4 G/DL (ref 12–15.9)
HGB BLD-MCNC: 12.7 G/DL (ref 12–15.9)
HGB BLD-MCNC: 12.7 G/DL (ref 12–15.9)
HGB BLD-MCNC: 9.2 G/DL (ref 12–15.9)
HGB BLD-MCNC: 9.3 G/DL (ref 12–15.9)
HGB BLD-MCNC: 9.5 G/DL (ref 12–15.9)
HGB BLD-MCNC: 9.9 G/DL (ref 12–15.9)
HGB BLD-MCNC: 9.9 G/DL (ref 12–15.9)
HGB UR QL STRIP.AUTO: NEGATIVE
HGB UR QL STRIP.AUTO: NEGATIVE
HMPV RNA NPH QL NAA+NON-PROBE: NOT DETECTED
HOLD SPECIMEN: NORMAL
HPIV1 RNA SPEC QL NAA+PROBE: NOT DETECTED
HPIV2 RNA SPEC QL NAA+PROBE: NOT DETECTED
HPIV3 RNA NPH QL NAA+PROBE: NOT DETECTED
HPIV4 P GENE NPH QL NAA+PROBE: NOT DETECTED
HYALINE CASTS UR QL AUTO: ABNORMAL /LPF
HYALINE CASTS UR QL AUTO: ABNORMAL /LPF
IMM GRANULOCYTES # BLD AUTO: 0.03 10*3/MM3 (ref 0–0.05)
IMM GRANULOCYTES # BLD AUTO: 0.06 10*3/MM3 (ref 0–0.05)
IMM GRANULOCYTES # BLD AUTO: 0.07 10*3/MM3 (ref 0–0.05)
IMM GRANULOCYTES # BLD AUTO: 0.08 10*3/MM3 (ref 0–0.05)
IMM GRANULOCYTES # BLD AUTO: 0.09 10*3/MM3 (ref 0–0.05)
IMM GRANULOCYTES # BLD AUTO: 0.1 10*3/MM3 (ref 0–0.05)
IMM GRANULOCYTES # BLD AUTO: 0.12 10*3/MM3 (ref 0–0.05)
IMM GRANULOCYTES # BLD AUTO: 0.13 10*3/MM3 (ref 0–0.05)
IMM GRANULOCYTES NFR BLD AUTO: 0.4 % (ref 0–0.5)
IMM GRANULOCYTES NFR BLD AUTO: 0.6 % (ref 0–0.5)
IMM GRANULOCYTES NFR BLD AUTO: 0.7 % (ref 0–0.5)
IMM GRANULOCYTES NFR BLD AUTO: 0.9 % (ref 0–0.5)
INHALED O2 CONCENTRATION: 50 %
INHALED O2 CONCENTRATION: 60 %
INHALED O2 CONCENTRATION: 75 %
INHALED O2 CONCENTRATION: 90 %
INHALED O2 CONCENTRATION: <21 %
INR PPP: 0.94 (ref 0.91–1.09)
INR PPP: 1 (ref 0.91–1.09)
INR PPP: 1.2 (ref 0.91–1.09)
IPAP: 1
IPAP: 16
IPAP: 20
KETONES UR QL STRIP: NEGATIVE
KETONES UR QL STRIP: NEGATIVE
L PNEUMO1 AG UR QL IA: NEGATIVE
LDLC SERPL CALC-MCNC: 82 MG/DL (ref 0–100)
LDLC SERPL CALC-MCNC: 86 MG/DL (ref 0–100)
LDLC/HDLC SERPL: 0.92 {RATIO}
LDLC/HDLC SERPL: 2.18 {RATIO}
LEFT ATRIUM VOLUME INDEX: 34.2 ML/M2
LEFT ATRIUM VOLUME: 55 CM3
LEUKOCYTE ESTERASE UR QL STRIP.AUTO: NEGATIVE
LEUKOCYTE ESTERASE UR QL STRIP.AUTO: NEGATIVE
LYMPHOCYTES # BLD AUTO: 0.11 10*3/MM3 (ref 0.7–3.1)
LYMPHOCYTES # BLD AUTO: 0.19 10*3/MM3 (ref 0.7–3.1)
LYMPHOCYTES # BLD AUTO: 0.36 10*3/MM3 (ref 0.7–3.1)
LYMPHOCYTES # BLD AUTO: 0.4 10*3/MM3 (ref 0.7–3.1)
LYMPHOCYTES # BLD AUTO: 0.48 10*3/MM3 (ref 0.7–3.1)
LYMPHOCYTES # BLD AUTO: 0.52 10*3/MM3 (ref 0.7–3.1)
LYMPHOCYTES # BLD AUTO: 0.69 10*3/MM3 (ref 0.7–3.1)
LYMPHOCYTES # BLD AUTO: 0.74 10*3/MM3 (ref 0.7–3.1)
LYMPHOCYTES # BLD AUTO: 0.76 10*3/MM3 (ref 0.7–3.1)
LYMPHOCYTES # BLD AUTO: 1.32 10*3/MM3 (ref 0.7–3.1)
LYMPHOCYTES # BLD MANUAL: 0.39 10*3/MM3 (ref 0.7–3.1)
LYMPHOCYTES NFR BLD AUTO: 1.2 % (ref 19.6–45.3)
LYMPHOCYTES NFR BLD AUTO: 1.9 % (ref 19.6–45.3)
LYMPHOCYTES NFR BLD AUTO: 18.4 % (ref 19.6–45.3)
LYMPHOCYTES NFR BLD AUTO: 2.6 % (ref 19.6–45.3)
LYMPHOCYTES NFR BLD AUTO: 3.6 % (ref 19.6–45.3)
LYMPHOCYTES NFR BLD AUTO: 4.6 % (ref 19.6–45.3)
LYMPHOCYTES NFR BLD AUTO: 5 % (ref 19.6–45.3)
LYMPHOCYTES NFR BLD AUTO: 5.8 % (ref 19.6–45.3)
LYMPHOCYTES NFR BLD AUTO: 5.9 % (ref 19.6–45.3)
LYMPHOCYTES NFR BLD AUTO: 6.5 % (ref 19.6–45.3)
Lab: ABNORMAL
M PNEUMO IGG SER IA-ACNC: NOT DETECTED
MAGNESIUM SERPL-MCNC: 1.8 MG/DL (ref 1.6–2.4)
MAXIMAL PREDICTED HEART RATE: 146 BPM
MCH RBC QN AUTO: 24.7 PG (ref 26.6–33)
MCH RBC QN AUTO: 24.8 PG (ref 26.6–33)
MCH RBC QN AUTO: 24.9 PG (ref 26.6–33)
MCH RBC QN AUTO: 25.1 PG (ref 26.6–33)
MCH RBC QN AUTO: 25.5 PG (ref 26.6–33)
MCH RBC QN AUTO: 25.8 PG (ref 26.6–33)
MCH RBC QN AUTO: 26.2 PG (ref 26.6–33)
MCH RBC QN AUTO: 26.2 PG (ref 26.6–33)
MCH RBC QN AUTO: 26.6 PG (ref 26.6–33)
MCH RBC QN AUTO: 26.6 PG (ref 26.6–33)
MCH RBC QN AUTO: 26.8 PG (ref 26.6–33)
MCH RBC QN AUTO: 26.9 PG (ref 26.6–33)
MCH RBC QN AUTO: 27 PG (ref 26.6–33)
MCH RBC QN AUTO: 27.1 PG (ref 26.6–33)
MCH RBC QN AUTO: 27.3 PG (ref 26.6–33)
MCH RBC QN AUTO: 27.3 PG (ref 26.6–33)
MCHC RBC AUTO-ENTMCNC: 26.8 G/DL (ref 31.5–35.7)
MCHC RBC AUTO-ENTMCNC: 27 G/DL (ref 31.5–35.7)
MCHC RBC AUTO-ENTMCNC: 27.3 G/DL (ref 31.5–35.7)
MCHC RBC AUTO-ENTMCNC: 27.4 G/DL (ref 31.5–35.7)
MCHC RBC AUTO-ENTMCNC: 27.7 G/DL (ref 31.5–35.7)
MCHC RBC AUTO-ENTMCNC: 27.7 G/DL (ref 31.5–35.7)
MCHC RBC AUTO-ENTMCNC: 27.9 G/DL (ref 31.5–35.7)
MCHC RBC AUTO-ENTMCNC: 28.3 G/DL (ref 31.5–35.7)
MCHC RBC AUTO-ENTMCNC: 29.1 G/DL (ref 31.5–35.7)
MCHC RBC AUTO-ENTMCNC: 29.5 G/DL (ref 31.5–35.7)
MCHC RBC AUTO-ENTMCNC: 29.5 G/DL (ref 31.5–35.7)
MCHC RBC AUTO-ENTMCNC: 29.6 G/DL (ref 31.5–35.7)
MCHC RBC AUTO-ENTMCNC: 29.6 G/DL (ref 31.5–35.7)
MCHC RBC AUTO-ENTMCNC: 29.7 G/DL (ref 31.5–35.7)
MCV RBC AUTO: 88.6 FL (ref 79–97)
MCV RBC AUTO: 89.3 FL (ref 79–97)
MCV RBC AUTO: 90.3 FL (ref 79–97)
MCV RBC AUTO: 91 FL (ref 79–97)
MCV RBC AUTO: 91.5 FL (ref 79–97)
MCV RBC AUTO: 91.7 FL (ref 79–97)
MCV RBC AUTO: 91.8 FL (ref 79–97)
MCV RBC AUTO: 92 FL (ref 79–97)
MCV RBC AUTO: 92.1 FL (ref 79–97)
MCV RBC AUTO: 92.2 FL (ref 79–97)
MCV RBC AUTO: 92.6 FL (ref 79–97)
MCV RBC AUTO: 93.2 FL (ref 79–97)
MCV RBC AUTO: 93.9 FL (ref 79–97)
MCV RBC AUTO: 94 FL (ref 79–97)
MCV RBC AUTO: 94.1 FL (ref 79–97)
MCV RBC AUTO: 94.8 FL (ref 79–97)
MODALITY: ABNORMAL
MONOCYTES # BLD AUTO: 0.18 10*3/MM3 (ref 0.1–0.9)
MONOCYTES # BLD AUTO: 0.2 10*3/MM3 (ref 0.1–0.9)
MONOCYTES # BLD AUTO: 0.36 10*3/MM3 (ref 0.1–0.9)
MONOCYTES # BLD AUTO: 0.38 10*3/MM3 (ref 0.1–0.9)
MONOCYTES # BLD AUTO: 0.51 10*3/MM3 (ref 0.1–0.9)
MONOCYTES # BLD AUTO: 0.57 10*3/MM3 (ref 0.1–0.9)
MONOCYTES # BLD AUTO: 0.62 10*3/MM3 (ref 0.1–0.9)
MONOCYTES # BLD AUTO: 0.84 10*3/MM3 (ref 0.1–0.9)
MONOCYTES # BLD AUTO: 0.87 10*3/MM3 (ref 0.1–0.9)
MONOCYTES # BLD AUTO: 0.93 10*3/MM3 (ref 0.1–0.9)
MONOCYTES NFR BLD AUTO: 12.2 % (ref 5–12)
MONOCYTES NFR BLD AUTO: 2 % (ref 5–12)
MONOCYTES NFR BLD AUTO: 2.2 % (ref 5–12)
MONOCYTES NFR BLD AUTO: 4.1 % (ref 5–12)
MONOCYTES NFR BLD AUTO: 4.3 % (ref 5–12)
MONOCYTES NFR BLD AUTO: 4.6 % (ref 5–12)
MONOCYTES NFR BLD AUTO: 4.9 % (ref 5–12)
MONOCYTES NFR BLD AUTO: 5.1 % (ref 5–12)
MONOCYTES NFR BLD AUTO: 5.2 % (ref 5–12)
MONOCYTES NFR BLD AUTO: 5.8 % (ref 5–12)
NEUTROPHILS # BLD AUTO: 9.25 10*3/MM3 (ref 1.7–7)
NEUTROPHILS NFR BLD AUTO: 10.1 10*3/MM3 (ref 1.7–7)
NEUTROPHILS NFR BLD AUTO: 10.46 10*3/MM3 (ref 1.7–7)
NEUTROPHILS NFR BLD AUTO: 14.26 10*3/MM3 (ref 1.7–7)
NEUTROPHILS NFR BLD AUTO: 16.78 10*3/MM3 (ref 1.7–7)
NEUTROPHILS NFR BLD AUTO: 4.74 10*3/MM3 (ref 1.7–7)
NEUTROPHILS NFR BLD AUTO: 66.2 % (ref 42.7–76)
NEUTROPHILS NFR BLD AUTO: 7.39 10*3/MM3 (ref 1.7–7)
NEUTROPHILS NFR BLD AUTO: 7.71 10*3/MM3 (ref 1.7–7)
NEUTROPHILS NFR BLD AUTO: 8.27 10*3/MM3 (ref 1.7–7)
NEUTROPHILS NFR BLD AUTO: 8.96 10*3/MM3 (ref 1.7–7)
NEUTROPHILS NFR BLD AUTO: 86.7 % (ref 42.7–76)
NEUTROPHILS NFR BLD AUTO: 87.7 % (ref 42.7–76)
NEUTROPHILS NFR BLD AUTO: 87.8 % (ref 42.7–76)
NEUTROPHILS NFR BLD AUTO: 88.4 % (ref 42.7–76)
NEUTROPHILS NFR BLD AUTO: 9.75 10*3/MM3 (ref 1.7–7)
NEUTROPHILS NFR BLD AUTO: 90.5 % (ref 42.7–76)
NEUTROPHILS NFR BLD AUTO: 91.8 % (ref 42.7–76)
NEUTROPHILS NFR BLD AUTO: 91.9 % (ref 42.7–76)
NEUTROPHILS NFR BLD AUTO: 93.9 % (ref 42.7–76)
NEUTROPHILS NFR BLD AUTO: 95.1 % (ref 42.7–76)
NEUTROPHILS NFR BLD MANUAL: 94 % (ref 42.7–76)
NEUTS BAND NFR BLD MANUAL: 2 % (ref 0–5)
NITRITE UR QL STRIP: NEGATIVE
NITRITE UR QL STRIP: NEGATIVE
NOTIFIED BY: ABNORMAL
NOTIFIED WHO: ABNORMAL
NRBC BLD AUTO-RTO: 0 /100 WBC (ref 0–0.2)
NRBC BLD AUTO-RTO: 0.3 /100 WBC (ref 0–0.2)
NT-PROBNP SERPL-MCNC: 405.5 PG/ML (ref 0–900)
NT-PROBNP SERPL-MCNC: 8308 PG/ML (ref 0–900)
NT-PROBNP SERPL-MCNC: ABNORMAL PG/ML (ref 0–900)
NT-PROBNP SERPL-MCNC: ABNORMAL PG/ML (ref 0–900)
PCO2 BLDA: 57.3 MM HG (ref 35–45)
PCO2 BLDA: 62.1 MM HG (ref 35–45)
PCO2 BLDA: 63.8 MM HG (ref 35–45)
PCO2 BLDA: 65.7 MM HG (ref 35–45)
PCO2 BLDA: 79.3 MM HG (ref 35–45)
PCO2 BLDA: 82.7 MM HG (ref 35–45)
PCO2 BLDA: 85.8 MM HG (ref 35–45)
PCO2 BLDA: 86.9 MM HG (ref 35–45)
PCO2 BLDA: 88.7 MM HG (ref 35–45)
PCO2 BLDA: 92.8 MM HG (ref 35–45)
PCO2 TEMP ADJ BLD: 57.3 MM HG (ref 35–45)
PCO2 TEMP ADJ BLD: 62.1 MM HG (ref 35–45)
PCO2 TEMP ADJ BLD: 63.8 MM HG (ref 35–45)
PCO2 TEMP ADJ BLD: 65.7 MM HG (ref 35–45)
PCO2 TEMP ADJ BLD: 79.3 MM HG (ref 35–45)
PCO2 TEMP ADJ BLD: 82.7 MM HG (ref 35–45)
PCO2 TEMP ADJ BLD: 85.8 MM HG (ref 35–45)
PCO2 TEMP ADJ BLD: 86.9 MM HG (ref 35–45)
PCO2 TEMP ADJ BLD: 88.7 MM HG (ref 35–45)
PCO2 TEMP ADJ BLD: 92.8 MM HG (ref 35–45)
PH BLDA: 7.25 PH UNITS (ref 7.35–7.45)
PH BLDA: 7.26 PH UNITS (ref 7.35–7.45)
PH BLDA: 7.28 PH UNITS (ref 7.35–7.45)
PH BLDA: 7.3 PH UNITS (ref 7.35–7.45)
PH BLDA: 7.31 PH UNITS (ref 7.35–7.45)
PH BLDA: 7.32 PH UNITS (ref 7.35–7.45)
PH BLDA: 7.34 PH UNITS (ref 7.35–7.45)
PH BLDA: 7.37 PH UNITS (ref 7.35–7.45)
PH BLDA: 7.39 PH UNITS (ref 7.35–7.45)
PH BLDA: 7.4 PH UNITS (ref 7.35–7.45)
PH UR STRIP.AUTO: 5.5 [PH] (ref 5–8)
PH UR STRIP.AUTO: 6 [PH] (ref 5–8)
PH, TEMP CORRECTED: 7.25 PH UNITS (ref 7.35–7.45)
PH, TEMP CORRECTED: 7.26 PH UNITS (ref 7.35–7.45)
PH, TEMP CORRECTED: 7.28 PH UNITS (ref 7.35–7.45)
PH, TEMP CORRECTED: 7.3 PH UNITS (ref 7.35–7.45)
PH, TEMP CORRECTED: 7.31 PH UNITS (ref 7.35–7.45)
PH, TEMP CORRECTED: 7.32 PH UNITS (ref 7.35–7.45)
PH, TEMP CORRECTED: 7.34 PH UNITS (ref 7.35–7.45)
PH, TEMP CORRECTED: 7.37 PH UNITS (ref 7.35–7.45)
PH, TEMP CORRECTED: 7.39 PH UNITS (ref 7.35–7.45)
PH, TEMP CORRECTED: 7.4 PH UNITS (ref 7.35–7.45)
PLAT MORPH BLD: NORMAL
PLATELET # BLD AUTO: 133 10*3/MM3 (ref 140–450)
PLATELET # BLD AUTO: 141 10*3/MM3 (ref 140–450)
PLATELET # BLD AUTO: 156 10*3/MM3 (ref 140–450)
PLATELET # BLD AUTO: 196 10*3/MM3 (ref 140–450)
PLATELET # BLD AUTO: 198 10*3/MM3 (ref 140–450)
PLATELET # BLD AUTO: 222 10*3/MM3 (ref 140–450)
PLATELET # BLD AUTO: 238 10*3/MM3 (ref 140–450)
PLATELET # BLD AUTO: 241 10*3/MM3 (ref 140–450)
PLATELET # BLD AUTO: 243 10*3/MM3 (ref 140–450)
PLATELET # BLD AUTO: 265 10*3/MM3 (ref 140–450)
PLATELET # BLD AUTO: 267 10*3/MM3 (ref 140–450)
PLATELET # BLD AUTO: 270 10*3/MM3 (ref 140–450)
PLATELET # BLD AUTO: 289 10*3/MM3 (ref 140–450)
PLATELET # BLD AUTO: 305 10*3/MM3 (ref 140–450)
PLATELET # BLD AUTO: 361 10*3/MM3 (ref 140–450)
PLATELET # BLD AUTO: 375 10*3/MM3 (ref 140–450)
PMV BLD AUTO: 11.3 FL (ref 6–12)
PMV BLD AUTO: 11.4 FL (ref 6–12)
PMV BLD AUTO: 11.4 FL (ref 6–12)
PMV BLD AUTO: 11.6 FL (ref 6–12)
PMV BLD AUTO: 11.7 FL (ref 6–12)
PMV BLD AUTO: 11.8 FL (ref 6–12)
PMV BLD AUTO: 12 FL (ref 6–12)
PMV BLD AUTO: 12 FL (ref 6–12)
PMV BLD AUTO: 12.1 FL (ref 6–12)
PMV BLD AUTO: 12.2 FL (ref 6–12)
PMV BLD AUTO: 12.3 FL (ref 6–12)
PMV BLD AUTO: 12.5 FL (ref 6–12)
PMV BLD AUTO: 12.5 FL (ref 6–12)
PMV BLD AUTO: 12.8 FL (ref 6–12)
PO2 BLDA: 134 MM HG (ref 83–108)
PO2 BLDA: 45.4 MM HG (ref 83–108)
PO2 BLDA: 56.4 MM HG (ref 83–108)
PO2 BLDA: 65.2 MM HG (ref 83–108)
PO2 BLDA: 71.1 MM HG (ref 83–108)
PO2 BLDA: 72.3 MM HG (ref 83–108)
PO2 BLDA: 74.5 MM HG (ref 83–108)
PO2 BLDA: 75.8 MM HG (ref 83–108)
PO2 BLDA: 80.2 MM HG (ref 83–108)
PO2 BLDA: 85.4 MM HG (ref 83–108)
PO2 TEMP ADJ BLD: 134 MM HG (ref 83–108)
PO2 TEMP ADJ BLD: 45.4 MM HG (ref 83–108)
PO2 TEMP ADJ BLD: 56.4 MM HG (ref 83–108)
PO2 TEMP ADJ BLD: 65.2 MM HG (ref 83–108)
PO2 TEMP ADJ BLD: 71.1 MM HG (ref 83–108)
PO2 TEMP ADJ BLD: 72.3 MM HG (ref 83–108)
PO2 TEMP ADJ BLD: 74.5 MM HG (ref 83–108)
PO2 TEMP ADJ BLD: 75.8 MM HG (ref 83–108)
PO2 TEMP ADJ BLD: 80.2 MM HG (ref 83–108)
PO2 TEMP ADJ BLD: 85.4 MM HG (ref 83–108)
POIKILOCYTOSIS BLD QL SMEAR: ABNORMAL
POLYCHROMASIA BLD QL SMEAR: ABNORMAL
POTASSIUM SERPL-SCNC: 3.4 MMOL/L (ref 3.5–5.2)
POTASSIUM SERPL-SCNC: 3.5 MMOL/L (ref 3.5–5.2)
POTASSIUM SERPL-SCNC: 3.6 MMOL/L (ref 3.5–5.2)
POTASSIUM SERPL-SCNC: 3.8 MMOL/L (ref 3.5–5.2)
POTASSIUM SERPL-SCNC: 3.9 MMOL/L (ref 3.5–5.2)
POTASSIUM SERPL-SCNC: 4.1 MMOL/L (ref 3.5–5.2)
POTASSIUM SERPL-SCNC: 4.1 MMOL/L (ref 3.5–5.2)
POTASSIUM SERPL-SCNC: 4.3 MMOL/L (ref 3.5–5.2)
POTASSIUM SERPL-SCNC: 4.4 MMOL/L (ref 3.5–5.2)
POTASSIUM SERPL-SCNC: 4.5 MMOL/L (ref 3.5–5.2)
POTASSIUM SERPL-SCNC: 4.7 MMOL/L (ref 3.5–5.2)
POTASSIUM SERPL-SCNC: 4.8 MMOL/L (ref 3.5–5.2)
POTASSIUM SERPL-SCNC: 4.9 MMOL/L (ref 3.5–5.2)
POTASSIUM SERPL-SCNC: 5 MMOL/L (ref 3.5–5.2)
POTASSIUM SERPL-SCNC: 5.2 MMOL/L (ref 3.5–5.2)
POTASSIUM SERPL-SCNC: 5.7 MMOL/L (ref 3.5–5.2)
PROCALCITONIN SERPL-MCNC: 0.06 NG/ML (ref 0–0.25)
PROCALCITONIN SERPL-MCNC: 0.1 NG/ML (ref 0–0.25)
PROT SERPL-MCNC: 5.3 G/DL (ref 6–8.5)
PROT SERPL-MCNC: 5.7 G/DL (ref 6–8.5)
PROT SERPL-MCNC: 5.9 G/DL (ref 6–8.5)
PROT SERPL-MCNC: 6 G/DL (ref 6–8.5)
PROT SERPL-MCNC: 6.2 G/DL (ref 6–8.5)
PROT SERPL-MCNC: 6.2 G/DL (ref 6–8.5)
PROT SERPL-MCNC: 6.5 G/DL (ref 6–8.5)
PROT SERPL-MCNC: 6.8 G/DL (ref 6–8.5)
PROT SERPL-MCNC: 7.3 G/DL (ref 6–8.5)
PROT UR QL STRIP: ABNORMAL
PROT UR QL STRIP: ABNORMAL
PROTHROMBIN TIME: 11.8 SECONDS (ref 11.5–13.4)
PROTHROMBIN TIME: 12.8 SECONDS (ref 11.9–14.6)
PROTHROMBIN TIME: 14.7 SECONDS (ref 11.9–14.6)
QT INTERVAL: 286 MS
QT INTERVAL: 290 MS
QT INTERVAL: 328 MS
QT INTERVAL: 350 MS
QTC INTERVAL: 428 MS
QTC INTERVAL: 445 MS
QTC INTERVAL: 446 MS
QTC INTERVAL: 452 MS
RBC # BLD AUTO: 3.4 10*6/MM3 (ref 3.77–5.28)
RBC # BLD AUTO: 3.47 10*6/MM3 (ref 3.77–5.28)
RBC # BLD AUTO: 3.48 10*6/MM3 (ref 3.77–5.28)
RBC # BLD AUTO: 3.78 10*6/MM3 (ref 3.77–5.28)
RBC # BLD AUTO: 3.84 10*6/MM3 (ref 3.77–5.28)
RBC # BLD AUTO: 3.95 10*6/MM3 (ref 3.77–5.28)
RBC # BLD AUTO: 3.99 10*6/MM3 (ref 3.77–5.28)
RBC # BLD AUTO: 4.01 10*6/MM3 (ref 3.77–5.28)
RBC # BLD AUTO: 4.04 10*6/MM3 (ref 3.77–5.28)
RBC # BLD AUTO: 4.05 10*6/MM3 (ref 3.77–5.28)
RBC # BLD AUTO: 4.06 10*6/MM3 (ref 3.77–5.28)
RBC # BLD AUTO: 4.45 10*6/MM3 (ref 3.77–5.28)
RBC # BLD AUTO: 4.52 10*6/MM3 (ref 3.77–5.28)
RBC # BLD AUTO: 4.66 10*6/MM3 (ref 3.77–5.28)
RBC # BLD AUTO: 4.7 10*6/MM3 (ref 3.77–5.28)
RBC # BLD AUTO: 4.98 10*6/MM3 (ref 3.77–5.28)
RBC # UR: ABNORMAL /HPF
RBC # UR: ABNORMAL /HPF
REF LAB TEST METHOD: ABNORMAL
REF LAB TEST METHOD: ABNORMAL
RHINOVIRUS RNA SPEC NAA+PROBE: NOT DETECTED
RSV RNA NPH QL NAA+NON-PROBE: NOT DETECTED
S PNEUM AG SPEC QL LA: NEGATIVE
SAO2 % BLDCOA: 75.3 % (ref 94–99)
SAO2 % BLDCOA: 86.8 % (ref 94–99)
SAO2 % BLDCOA: 92.9 % (ref 94–99)
SAO2 % BLDCOA: 94.3 % (ref 94–99)
SAO2 % BLDCOA: 94.8 % (ref 94–99)
SAO2 % BLDCOA: 94.8 % (ref 94–99)
SAO2 % BLDCOA: 95.7 % (ref 94–99)
SAO2 % BLDCOA: 95.8 % (ref 94–99)
SAO2 % BLDCOA: 96.4 % (ref 94–99)
SAO2 % BLDCOA: 99.4 % (ref 94–99)
SARS-COV-2 RNA PNL SPEC NAA+PROBE: NOT DETECTED
SCHISTOCYTES BLD QL SMEAR: ABNORMAL
SET MECH RESP RATE: 16
SET MECH RESP RATE: 20
SET MECH RESP RATE: 24
SODIUM SERPL-SCNC: 131 MMOL/L (ref 136–145)
SODIUM SERPL-SCNC: 136 MMOL/L (ref 136–145)
SODIUM SERPL-SCNC: 136 MMOL/L (ref 136–145)
SODIUM SERPL-SCNC: 137 MMOL/L (ref 136–145)
SODIUM SERPL-SCNC: 138 MMOL/L (ref 136–145)
SODIUM SERPL-SCNC: 138 MMOL/L (ref 136–145)
SODIUM SERPL-SCNC: 139 MMOL/L (ref 136–145)
SODIUM SERPL-SCNC: 140 MMOL/L (ref 136–145)
SODIUM SERPL-SCNC: 141 MMOL/L (ref 136–145)
SODIUM SERPL-SCNC: 141 MMOL/L (ref 136–145)
SODIUM SERPL-SCNC: 142 MMOL/L (ref 136–145)
SODIUM SERPL-SCNC: 143 MMOL/L (ref 136–145)
SP GR UR STRIP: 1.02 (ref 1–1.03)
SP GR UR STRIP: 1.02 (ref 1–1.03)
SQUAMOUS #/AREA URNS HPF: ABNORMAL /HPF
SQUAMOUS #/AREA URNS HPF: ABNORMAL /HPF
STRESS TARGET HR: 124 BPM
TRIGL SERPL-MCNC: 56 MG/DL (ref 0–150)
TRIGL SERPL-MCNC: 79 MG/DL (ref 0–150)
TROPONIN T SERPL-MCNC: 0.01 NG/ML (ref 0–0.03)
TROPONIN T SERPL-MCNC: <0.01 NG/ML (ref 0–0.03)
TSH SERPL DL<=0.05 MIU/L-ACNC: 0.47 UIU/ML (ref 0.27–4.2)
TSH SERPL DL<=0.05 MIU/L-ACNC: 0.71 UIU/ML (ref 0.27–4.2)
UROBILINOGEN UR QL STRIP: ABNORMAL
UROBILINOGEN UR QL STRIP: ABNORMAL
VARIANT LYMPHS NFR BLD MANUAL: 4 % (ref 19.6–45.3)
VENTILATOR MODE: ABNORMAL
VLDLC SERPL-MCNC: 11 MG/DL (ref 5–40)
VLDLC SERPL-MCNC: 15 MG/DL (ref 5–40)
WBC # BLD AUTO: 11.65 10*3/MM3 (ref 3.4–10.8)
WBC # BLD AUTO: 18.27 10*3/MM3 (ref 3.4–10.8)
WBC # BLD AUTO: 7.16 10*3/MM3 (ref 3.4–10.8)
WBC # BLD AUTO: 8.05 10*3/MM3 (ref 3.4–10.8)
WBC # BLD AUTO: 8.34 10*3/MM3 (ref 3.4–10.8)
WBC # BLD AUTO: 8.79 10*3/MM3 (ref 3.4–10.8)
WBC # BLD AUTO: 8.81 10*3/MM3 (ref 3.4–10.8)
WBC # BLD AUTO: 8.99 10*3/MM3 (ref 3.4–10.8)
WBC # BLD AUTO: 9.51 10*3/MM3 (ref 3.4–10.8)
WBC # BLD AUTO: 9.91 10*3/MM3 (ref 3.4–10.8)
WBC MORPH BLD: NORMAL
WBC NRBC COR # BLD: 10.24 10*3/MM3 (ref 3.4–10.8)
WBC NRBC COR # BLD: 11.91 10*3/MM3 (ref 3.4–10.8)
WBC NRBC COR # BLD: 12.75 10*3/MM3 (ref 3.4–10.8)
WBC NRBC COR # BLD: 16.14 10*3/MM3 (ref 3.4–10.8)
WBC NRBC COR # BLD: 8.55 10*3/MM3 (ref 3.4–10.8)
WBC NRBC COR # BLD: 9.64 10*3/MM3 (ref 3.4–10.8)
WBC UR QL AUTO: ABNORMAL /HPF
WBC UR QL AUTO: ABNORMAL /HPF
WHOLE BLOOD HOLD SPECIMEN: NORMAL
WHOLE BLOOD HOLD SPECIMEN: NORMAL

## 2021-01-01 PROCEDURE — 97110 THERAPEUTIC EXERCISES: CPT

## 2021-01-01 PROCEDURE — 80048 BASIC METABOLIC PNL TOTAL CA: CPT | Performed by: NURSE PRACTITIONER

## 2021-01-01 PROCEDURE — 25010000002 METHYLPREDNISOLONE PER 40 MG: Performed by: NURSE PRACTITIONER

## 2021-01-01 PROCEDURE — 94799 UNLISTED PULMONARY SVC/PX: CPT

## 2021-01-01 PROCEDURE — 97530 THERAPEUTIC ACTIVITIES: CPT

## 2021-01-01 PROCEDURE — 94660 CPAP INITIATION&MGMT: CPT

## 2021-01-01 PROCEDURE — A9577 INJ MULTIHANCE: HCPCS | Performed by: FAMILY MEDICINE

## 2021-01-01 PROCEDURE — 94664 DEMO&/EVAL PT USE INHALER: CPT

## 2021-01-01 PROCEDURE — 80053 COMPREHEN METABOLIC PANEL: CPT | Performed by: EMERGENCY MEDICINE

## 2021-01-01 PROCEDURE — 97162 PT EVAL MOD COMPLEX 30 MIN: CPT | Performed by: PHYSICAL THERAPIST

## 2021-01-01 PROCEDURE — 84443 ASSAY THYROID STIM HORMONE: CPT | Performed by: FAMILY MEDICINE

## 2021-01-01 PROCEDURE — 36600 WITHDRAWAL OF ARTERIAL BLOOD: CPT

## 2021-01-01 PROCEDURE — 25010000002 MORPHINE SULFATE (PF) 2 MG/ML SOLUTION: Performed by: FAMILY MEDICINE

## 2021-01-01 PROCEDURE — 80053 COMPREHEN METABOLIC PANEL: CPT | Performed by: FAMILY MEDICINE

## 2021-01-01 PROCEDURE — 87635 SARS-COV-2 COVID-19 AMP PRB: CPT | Performed by: NURSE PRACTITIONER

## 2021-01-01 PROCEDURE — 25010000002 FUROSEMIDE PER 20 MG: Performed by: NURSE PRACTITIONER

## 2021-01-01 PROCEDURE — 99222 1ST HOSP IP/OBS MODERATE 55: CPT | Performed by: CLINICAL NURSE SPECIALIST

## 2021-01-01 PROCEDURE — 1111F DSCHRG MED/CURRENT MED MERGE: CPT | Performed by: FAMILY MEDICINE

## 2021-01-01 PROCEDURE — 71045 X-RAY EXAM CHEST 1 VIEW: CPT

## 2021-01-01 PROCEDURE — 25010000002 PROPOFOL 10 MG/ML EMULSION: Performed by: NURSE ANESTHETIST, CERTIFIED REGISTERED

## 2021-01-01 PROCEDURE — 97535 SELF CARE MNGMENT TRAINING: CPT

## 2021-01-01 PROCEDURE — 83036 HEMOGLOBIN GLYCOSYLATED A1C: CPT | Performed by: FAMILY MEDICINE

## 2021-01-01 PROCEDURE — 84145 PROCALCITONIN (PCT): CPT | Performed by: EMERGENCY MEDICINE

## 2021-01-01 PROCEDURE — 85025 COMPLETE CBC W/AUTO DIFF WBC: CPT | Performed by: NURSE PRACTITIONER

## 2021-01-01 PROCEDURE — 84443 ASSAY THYROID STIM HORMONE: CPT | Performed by: ORTHOPAEDIC SURGERY

## 2021-01-01 PROCEDURE — 25010000002 CEFEPIME PER 500 MG: Performed by: FAMILY MEDICINE

## 2021-01-01 PROCEDURE — 99497 ADVNCD CARE PLAN 30 MIN: CPT | Performed by: CLINICAL NURSE SPECIALIST

## 2021-01-01 PROCEDURE — 85027 COMPLETE CBC AUTOMATED: CPT | Performed by: NURSE PRACTITIONER

## 2021-01-01 PROCEDURE — 93010 ELECTROCARDIOGRAM REPORT: CPT | Performed by: INTERNAL MEDICINE

## 2021-01-01 PROCEDURE — 85025 COMPLETE CBC W/AUTO DIFF WBC: CPT | Performed by: ORTHOPAEDIC SURGERY

## 2021-01-01 PROCEDURE — 84484 ASSAY OF TROPONIN QUANT: CPT | Performed by: EMERGENCY MEDICINE

## 2021-01-01 PROCEDURE — 99495 TRANSJ CARE MGMT MOD F2F 14D: CPT | Performed by: FAMILY MEDICINE

## 2021-01-01 PROCEDURE — 71275 CT ANGIOGRAPHY CHEST: CPT

## 2021-01-01 PROCEDURE — 76942 ECHO GUIDE FOR BIOPSY: CPT | Performed by: ORTHOPAEDIC SURGERY

## 2021-01-01 PROCEDURE — 87040 BLOOD CULTURE FOR BACTERIA: CPT | Performed by: EMERGENCY MEDICINE

## 2021-01-01 PROCEDURE — C1713 ANCHOR/SCREW BN/BN,TIS/BN: HCPCS | Performed by: ORTHOPAEDIC SURGERY

## 2021-01-01 PROCEDURE — 82803 BLOOD GASES ANY COMBINATION: CPT

## 2021-01-01 PROCEDURE — 84145 PROCALCITONIN (PCT): CPT | Performed by: NURSE PRACTITIONER

## 2021-01-01 PROCEDURE — 25010000002 MORPHINE SULFATE (PF) 2 MG/ML SOLUTION: Performed by: ORTHOPAEDIC SURGERY

## 2021-01-01 PROCEDURE — 83605 ASSAY OF LACTIC ACID: CPT | Performed by: NURSE PRACTITIONER

## 2021-01-01 PROCEDURE — 85379 FIBRIN DEGRADATION QUANT: CPT | Performed by: EMERGENCY MEDICINE

## 2021-01-01 PROCEDURE — 85610 PROTHROMBIN TIME: CPT | Performed by: EMERGENCY MEDICINE

## 2021-01-01 PROCEDURE — C1769 GUIDE WIRE: HCPCS | Performed by: ORTHOPAEDIC SURGERY

## 2021-01-01 PROCEDURE — 87040 BLOOD CULTURE FOR BACTERIA: CPT | Performed by: NURSE PRACTITIONER

## 2021-01-01 PROCEDURE — 99231 SBSQ HOSP IP/OBS SF/LOW 25: CPT | Performed by: INTERNAL MEDICINE

## 2021-01-01 PROCEDURE — 25010000002 METHYLPREDNISOLONE PER 125 MG: Performed by: NURSE PRACTITIONER

## 2021-01-01 PROCEDURE — 87635 SARS-COV-2 COVID-19 AMP PRB: CPT | Performed by: EMERGENCY MEDICINE

## 2021-01-01 PROCEDURE — 99233 SBSQ HOSP IP/OBS HIGH 50: CPT | Performed by: CLINICAL NURSE SPECIALIST

## 2021-01-01 PROCEDURE — 63710000001 INSULIN LISPRO (HUMAN) PER 5 UNITS: Performed by: FAMILY MEDICINE

## 2021-01-01 PROCEDURE — 63710000001 PREDNISONE PER 1 MG: Performed by: NURSE PRACTITIONER

## 2021-01-01 PROCEDURE — 97166 OT EVAL MOD COMPLEX 45 MIN: CPT | Performed by: OCCUPATIONAL THERAPIST

## 2021-01-01 PROCEDURE — 25010000002 IOPAMIDOL 61 % SOLUTION: Performed by: FAMILY MEDICINE

## 2021-01-01 PROCEDURE — 25010000002 CEFTRIAXONE PER 250 MG: Performed by: INTERNAL MEDICINE

## 2021-01-01 PROCEDURE — 99222 1ST HOSP IP/OBS MODERATE 55: CPT | Performed by: INTERNAL MEDICINE

## 2021-01-01 PROCEDURE — 25010000002 FUROSEMIDE PER 20 MG: Performed by: NURSE ANESTHETIST, CERTIFIED REGISTERED

## 2021-01-01 PROCEDURE — 25010000002 METHYLPREDNISOLONE PER 125 MG: Performed by: INTERNAL MEDICINE

## 2021-01-01 PROCEDURE — 99233 SBSQ HOSP IP/OBS HIGH 50: CPT | Performed by: INTERNAL MEDICINE

## 2021-01-01 PROCEDURE — 83880 ASSAY OF NATRIURETIC PEPTIDE: CPT | Performed by: EMERGENCY MEDICINE

## 2021-01-01 PROCEDURE — 94640 AIRWAY INHALATION TREATMENT: CPT

## 2021-01-01 PROCEDURE — 83735 ASSAY OF MAGNESIUM: CPT | Performed by: EMERGENCY MEDICINE

## 2021-01-01 PROCEDURE — 93306 TTE W/DOPPLER COMPLETE: CPT | Performed by: EMERGENCY MEDICINE

## 2021-01-01 PROCEDURE — 85025 COMPLETE CBC W/AUTO DIFF WBC: CPT | Performed by: EMERGENCY MEDICINE

## 2021-01-01 PROCEDURE — 99232 SBSQ HOSP IP/OBS MODERATE 35: CPT | Performed by: INTERNAL MEDICINE

## 2021-01-01 PROCEDURE — 80053 COMPREHEN METABOLIC PANEL: CPT | Performed by: ORTHOPAEDIC SURGERY

## 2021-01-01 PROCEDURE — 71110 X-RAY EXAM RIBS BIL 3 VIEWS: CPT

## 2021-01-01 PROCEDURE — 25010000002 METHYLPREDNISOLONE PER 40 MG: Performed by: FAMILY MEDICINE

## 2021-01-01 PROCEDURE — 99285 EMERGENCY DEPT VISIT HI MDM: CPT

## 2021-01-01 PROCEDURE — 25010000002 ENOXAPARIN PER 10 MG: Performed by: INTERNAL MEDICINE

## 2021-01-01 PROCEDURE — 25010000002 ENOXAPARIN PER 10 MG: Performed by: FAMILY MEDICINE

## 2021-01-01 PROCEDURE — 93005 ELECTROCARDIOGRAM TRACING: CPT | Performed by: NURSE PRACTITIONER

## 2021-01-01 PROCEDURE — 97167 OT EVAL HIGH COMPLEX 60 MIN: CPT

## 2021-01-01 PROCEDURE — 85730 THROMBOPLASTIN TIME PARTIAL: CPT | Performed by: EMERGENCY MEDICINE

## 2021-01-01 PROCEDURE — 93005 ELECTROCARDIOGRAM TRACING: CPT | Performed by: EMERGENCY MEDICINE

## 2021-01-01 PROCEDURE — 25010000002 AZITHROMYCIN PER 500 MG: Performed by: EMERGENCY MEDICINE

## 2021-01-01 PROCEDURE — 25010000002 ROPIVACAINE PER 1 MG: Performed by: NURSE ANESTHETIST, CERTIFIED REGISTERED

## 2021-01-01 PROCEDURE — 25010000002 DEXAMETHASONE PER 1 MG: Performed by: NURSE ANESTHETIST, CERTIFIED REGISTERED

## 2021-01-01 PROCEDURE — 84484 ASSAY OF TROPONIN QUANT: CPT | Performed by: NURSE PRACTITIONER

## 2021-01-01 PROCEDURE — 90471 IMMUNIZATION ADMIN: CPT | Performed by: EMERGENCY MEDICINE

## 2021-01-01 PROCEDURE — 74178 CT ABD&PLV WO CNTR FLWD CNTR: CPT

## 2021-01-01 PROCEDURE — 80048 BASIC METABOLIC PNL TOTAL CA: CPT | Performed by: FAMILY MEDICINE

## 2021-01-01 PROCEDURE — 99222 1ST HOSP IP/OBS MODERATE 55: CPT | Performed by: NURSE PRACTITIONER

## 2021-01-01 PROCEDURE — 5A09557 ASSISTANCE WITH RESPIRATORY VENTILATION, GREATER THAN 96 CONSECUTIVE HOURS, CONTINUOUS POSITIVE AIRWAY PRESSURE: ICD-10-PCS | Performed by: FAMILY MEDICINE

## 2021-01-01 PROCEDURE — 63710000001 PREDNISONE PER 1 MG: Performed by: INTERNAL MEDICINE

## 2021-01-01 PROCEDURE — 0 GADOBENATE DIMEGLUMINE 529 MG/ML SOLUTION: Performed by: FAMILY MEDICINE

## 2021-01-01 PROCEDURE — 90715 TDAP VACCINE 7 YRS/> IM: CPT | Performed by: EMERGENCY MEDICINE

## 2021-01-01 PROCEDURE — 80061 LIPID PANEL: CPT | Performed by: FAMILY MEDICINE

## 2021-01-01 PROCEDURE — 87899 AGENT NOS ASSAY W/OPTIC: CPT | Performed by: FAMILY MEDICINE

## 2021-01-01 PROCEDURE — 99284 EMERGENCY DEPT VISIT MOD MDM: CPT

## 2021-01-01 PROCEDURE — 83605 ASSAY OF LACTIC ACID: CPT | Performed by: EMERGENCY MEDICINE

## 2021-01-01 PROCEDURE — 82962 GLUCOSE BLOOD TEST: CPT

## 2021-01-01 PROCEDURE — 85730 THROMBOPLASTIN TIME PARTIAL: CPT | Performed by: NURSE PRACTITIONER

## 2021-01-01 PROCEDURE — 82306 VITAMIN D 25 HYDROXY: CPT | Performed by: NURSE PRACTITIONER

## 2021-01-01 PROCEDURE — 5A09357 ASSISTANCE WITH RESPIRATORY VENTILATION, LESS THAN 24 CONSECUTIVE HOURS, CONTINUOUS POSITIVE AIRWAY PRESSURE: ICD-10-PCS | Performed by: INTERNAL MEDICINE

## 2021-01-01 PROCEDURE — 72157 MRI CHEST SPINE W/O & W/DYE: CPT

## 2021-01-01 PROCEDURE — 80061 LIPID PANEL: CPT | Performed by: ORTHOPAEDIC SURGERY

## 2021-01-01 PROCEDURE — 25010000002 ONDANSETRON PER 1 MG: Performed by: NURSE ANESTHETIST, CERTIFIED REGISTERED

## 2021-01-01 PROCEDURE — 81001 URINALYSIS AUTO W/SCOPE: CPT | Performed by: NURSE PRACTITIONER

## 2021-01-01 PROCEDURE — 83880 ASSAY OF NATRIURETIC PEPTIDE: CPT | Performed by: INTERNAL MEDICINE

## 2021-01-01 PROCEDURE — 25010000002 MORPHINE SULFATE (PF) 2 MG/ML SOLUTION: Performed by: CLINICAL NURSE SPECIALIST

## 2021-01-01 PROCEDURE — 25010000002 LORAZEPAM PER 2 MG: Performed by: FAMILY MEDICINE

## 2021-01-01 PROCEDURE — 25010000002 MORPHINE PER 10 MG: Performed by: EMERGENCY MEDICINE

## 2021-01-01 PROCEDURE — 25010000002 FUROSEMIDE PER 20 MG: Performed by: EMERGENCY MEDICINE

## 2021-01-01 PROCEDURE — 25010000002 FUROSEMIDE PER 20 MG: Performed by: FAMILY MEDICINE

## 2021-01-01 PROCEDURE — 97530 THERAPEUTIC ACTIVITIES: CPT | Performed by: PHYSICAL THERAPIST

## 2021-01-01 PROCEDURE — 0 IOPAMIDOL PER 1 ML: Performed by: NURSE PRACTITIONER

## 2021-01-01 PROCEDURE — 25010000002 LEVOFLOXACIN PER 250 MG: Performed by: NURSE PRACTITIONER

## 2021-01-01 PROCEDURE — 97162 PT EVAL MOD COMPLEX 30 MIN: CPT

## 2021-01-01 PROCEDURE — 63710000001 PREDNISONE PER 5 MG: Performed by: INTERNAL MEDICINE

## 2021-01-01 PROCEDURE — 72070 X-RAY EXAM THORAC SPINE 2VWS: CPT

## 2021-01-01 PROCEDURE — 25010000002 FENTANYL CITRATE (PF) 100 MCG/2ML SOLUTION: Performed by: NURSE ANESTHETIST, CERTIFIED REGISTERED

## 2021-01-01 PROCEDURE — 85610 PROTHROMBIN TIME: CPT | Performed by: NURSE PRACTITIONER

## 2021-01-01 PROCEDURE — 25010000002 DEXAMETHASONE PER 1 MG: Performed by: EMERGENCY MEDICINE

## 2021-01-01 PROCEDURE — 83880 ASSAY OF NATRIURETIC PEPTIDE: CPT | Performed by: NURSE PRACTITIONER

## 2021-01-01 PROCEDURE — 25010000002 MAGNESIUM SULFATE IN D5W 1G/100ML (PREMIX) 1-5 GM/100ML-% SOLUTION: Performed by: EMERGENCY MEDICINE

## 2021-01-01 PROCEDURE — 76000 FLUOROSCOPY <1 HR PHYS/QHP: CPT

## 2021-01-01 PROCEDURE — 25010000002 CEFEPIME PER 500 MG: Performed by: NURSE PRACTITIONER

## 2021-01-01 PROCEDURE — 93005 ELECTROCARDIOGRAM TRACING: CPT | Performed by: FAMILY MEDICINE

## 2021-01-01 PROCEDURE — 73502 X-RAY EXAM HIP UNI 2-3 VIEWS: CPT

## 2021-01-01 PROCEDURE — 85025 COMPLETE CBC W/AUTO DIFF WBC: CPT | Performed by: FAMILY MEDICINE

## 2021-01-01 PROCEDURE — 36415 COLL VENOUS BLD VENIPUNCTURE: CPT

## 2021-01-01 PROCEDURE — 93970 EXTREMITY STUDY: CPT | Performed by: SURGERY

## 2021-01-01 PROCEDURE — 25010000002 FENTANYL CITRATE (PF) 50 MCG/ML SOLUTION: Performed by: NURSE ANESTHETIST, CERTIFIED REGISTERED

## 2021-01-01 PROCEDURE — 83036 HEMOGLOBIN GLYCOSYLATED A1C: CPT | Performed by: ORTHOPAEDIC SURGERY

## 2021-01-01 PROCEDURE — 87633 RESP VIRUS 12-25 TARGETS: CPT | Performed by: FAMILY MEDICINE

## 2021-01-01 PROCEDURE — 85007 BL SMEAR W/DIFF WBC COUNT: CPT | Performed by: FAMILY MEDICINE

## 2021-01-01 PROCEDURE — 25010000002 TDAP 5-2.5-18.5 LF-MCG/0.5 SUSPENSION: Performed by: EMERGENCY MEDICINE

## 2021-01-01 PROCEDURE — 93970 EXTREMITY STUDY: CPT

## 2021-01-01 PROCEDURE — 80048 BASIC METABOLIC PNL TOTAL CA: CPT | Performed by: INTERNAL MEDICINE

## 2021-01-01 PROCEDURE — 99214 OFFICE O/P EST MOD 30 MIN: CPT | Performed by: NURSE PRACTITIONER

## 2021-01-01 PROCEDURE — 25010000002 ONDANSETRON PER 1 MG: Performed by: EMERGENCY MEDICINE

## 2021-01-01 PROCEDURE — 76705 ECHO EXAM OF ABDOMEN: CPT

## 2021-01-01 PROCEDURE — 25010000003 CEFAZOLIN PER 500 MG: Performed by: NURSE ANESTHETIST, CERTIFIED REGISTERED

## 2021-01-01 PROCEDURE — 85027 COMPLETE CBC AUTOMATED: CPT | Performed by: FAMILY MEDICINE

## 2021-01-01 PROCEDURE — 93005 ELECTROCARDIOGRAM TRACING: CPT

## 2021-01-01 PROCEDURE — 25010000002 FUROSEMIDE PER 20 MG: Performed by: INTERNAL MEDICINE

## 2021-01-01 PROCEDURE — 80053 COMPREHEN METABOLIC PANEL: CPT | Performed by: NURSE PRACTITIONER

## 2021-01-01 PROCEDURE — P9612 CATHETERIZE FOR URINE SPEC: HCPCS

## 2021-01-01 PROCEDURE — 0 IOPAMIDOL PER 1 ML: Performed by: INTERNAL MEDICINE

## 2021-01-01 PROCEDURE — 93306 TTE W/DOPPLER COMPLETE: CPT

## 2021-01-01 PROCEDURE — 25010000002 CEFAZOLIN PER 500 MG: Performed by: ORTHOPAEDIC SURGERY

## 2021-01-01 PROCEDURE — 25010000002 ENOXAPARIN PER 10 MG: Performed by: EMERGENCY MEDICINE

## 2021-01-01 PROCEDURE — 25010000002 ACETAZOLAMIDE PER 500 MG: Performed by: NURSE PRACTITIONER

## 2021-01-01 PROCEDURE — 0QH736Z INSERTION OF INTRAMEDULLARY INTERNAL FIXATION DEVICE INTO LEFT UPPER FEMUR, PERCUTANEOUS APPROACH: ICD-10-PCS | Performed by: ORTHOPAEDIC SURGERY

## 2021-01-01 PROCEDURE — 99214 OFFICE O/P EST MOD 30 MIN: CPT | Performed by: INTERNAL MEDICINE

## 2021-01-01 DEVICE — BLD FEM FIX HELI TFN ADV PERF 95MM STRL: Type: IMPLANTABLE DEVICE | Site: HIP | Status: FUNCTIONAL

## 2021-01-01 DEVICE — SCRW LK STRDRV TI 5X34MM STRL: Type: IMPLANTABLE DEVICE | Site: HIP | Status: FUNCTIONAL

## 2021-01-01 DEVICE — NAIL FEM TFN ADV PROX 130D 11X235MM LT STRL: Type: IMPLANTABLE DEVICE | Site: HIP | Status: FUNCTIONAL

## 2021-01-01 RX ORDER — CLONAZEPAM 0.5 MG/1
0.5 TABLET ORAL 2 TIMES DAILY PRN
Status: DISCONTINUED | OUTPATIENT
Start: 2021-01-01 | End: 2021-01-01 | Stop reason: HOSPADM

## 2021-01-01 RX ORDER — LIDOCAINE 50 MG/G
2 PATCH TOPICAL
Status: DISCONTINUED | OUTPATIENT
Start: 2021-01-01 | End: 2021-01-01

## 2021-01-01 RX ORDER — HYDROCODONE BITARTRATE AND ACETAMINOPHEN 7.5; 325 MG/1; MG/1
2 TABLET ORAL EVERY 4 HOURS PRN
Status: DISCONTINUED | OUTPATIENT
Start: 2021-01-01 | End: 2021-01-01 | Stop reason: HOSPADM

## 2021-01-01 RX ORDER — FAMOTIDINE 10 MG/ML
20 INJECTION, SOLUTION INTRAVENOUS 2 TIMES DAILY
Status: DISCONTINUED | OUTPATIENT
Start: 2021-01-01 | End: 2021-01-01 | Stop reason: CLARIF

## 2021-01-01 RX ORDER — ACETAMINOPHEN 500 MG
1000 TABLET ORAL ONCE
Status: DISCONTINUED | OUTPATIENT
Start: 2021-01-01 | End: 2021-01-01 | Stop reason: HOSPADM

## 2021-01-01 RX ORDER — IPRATROPIUM BROMIDE AND ALBUTEROL SULFATE 2.5; .5 MG/3ML; MG/3ML
3 SOLUTION RESPIRATORY (INHALATION)
Status: DISCONTINUED | OUTPATIENT
Start: 2021-01-01 | End: 2021-01-01

## 2021-01-01 RX ORDER — LORAZEPAM 2 MG/ML
2 CONCENTRATE ORAL
Status: DISCONTINUED | OUTPATIENT
Start: 2021-01-01 | End: 2021-01-01 | Stop reason: HOSPADM

## 2021-01-01 RX ORDER — GUAIFENESIN 600 MG/1
600 TABLET, EXTENDED RELEASE ORAL 2 TIMES DAILY
Status: DISCONTINUED | OUTPATIENT
Start: 2021-01-01 | End: 2021-01-01

## 2021-01-01 RX ORDER — MORPHINE SULFATE 2 MG/ML
2 INJECTION, SOLUTION INTRAMUSCULAR; INTRAVENOUS
Status: DISCONTINUED | OUTPATIENT
Start: 2021-01-01 | End: 2021-01-01

## 2021-01-01 RX ORDER — FUROSEMIDE 10 MG/ML
40 INJECTION INTRAMUSCULAR; INTRAVENOUS ONCE
Status: COMPLETED | OUTPATIENT
Start: 2021-01-01 | End: 2021-01-01

## 2021-01-01 RX ORDER — ROPINIROLE 1 MG/1
4 TABLET, FILM COATED ORAL NIGHTLY
Status: DISCONTINUED | OUTPATIENT
Start: 2021-01-01 | End: 2021-01-01 | Stop reason: HOSPADM

## 2021-01-01 RX ORDER — FAMOTIDINE 20 MG/1
20 TABLET, FILM COATED ORAL
Qty: 60 TABLET | Refills: 2 | Status: SHIPPED | OUTPATIENT
Start: 2021-01-01

## 2021-01-01 RX ORDER — DEXAMETHASONE SODIUM PHOSPHATE 4 MG/ML
INJECTION, SOLUTION INTRA-ARTICULAR; INTRALESIONAL; INTRAMUSCULAR; INTRAVENOUS; SOFT TISSUE AS NEEDED
Status: DISCONTINUED | OUTPATIENT
Start: 2021-01-01 | End: 2021-01-01 | Stop reason: SURG

## 2021-01-01 RX ORDER — MAGNESIUM SULFATE 1 G/100ML
4 INJECTION INTRAVENOUS ONCE
Status: COMPLETED | OUTPATIENT
Start: 2021-01-01 | End: 2021-01-01

## 2021-01-01 RX ORDER — LORAZEPAM 1 MG/1
2 TABLET ORAL
Status: DISCONTINUED | OUTPATIENT
Start: 2021-01-01 | End: 2021-01-01 | Stop reason: HOSPADM

## 2021-01-01 RX ORDER — FLUOXETINE 10 MG/1
10 CAPSULE ORAL DAILY
Status: DISCONTINUED | OUTPATIENT
Start: 2021-01-01 | End: 2021-01-01

## 2021-01-01 RX ORDER — LORAZEPAM 2 MG/ML
0.5 INJECTION INTRAMUSCULAR
Status: DISCONTINUED | OUTPATIENT
Start: 2021-01-01 | End: 2021-01-01 | Stop reason: HOSPADM

## 2021-01-01 RX ORDER — HYDROMORPHONE HYDROCHLORIDE 1 MG/ML
0.5 INJECTION, SOLUTION INTRAMUSCULAR; INTRAVENOUS; SUBCUTANEOUS
Status: DISCONTINUED | OUTPATIENT
Start: 2021-01-01 | End: 2021-01-01

## 2021-01-01 RX ORDER — FLUOXETINE 10 MG/1
10 CAPSULE ORAL DAILY
Qty: 90 CAPSULE | Refills: 0 | Status: SHIPPED | OUTPATIENT
Start: 2021-01-01

## 2021-01-01 RX ORDER — METHYLPREDNISOLONE SODIUM SUCCINATE 125 MG/2ML
60 INJECTION, POWDER, LYOPHILIZED, FOR SOLUTION INTRAMUSCULAR; INTRAVENOUS EVERY 6 HOURS SCHEDULED
Status: DISCONTINUED | OUTPATIENT
Start: 2021-01-01 | End: 2021-01-01

## 2021-01-01 RX ORDER — MORPHINE SULFATE 2 MG/ML
1 INJECTION, SOLUTION INTRAMUSCULAR; INTRAVENOUS EVERY 4 HOURS PRN
Status: DISCONTINUED | OUTPATIENT
Start: 2021-01-01 | End: 2021-01-01

## 2021-01-01 RX ORDER — ACETAMINOPHEN 325 MG/1
650 TABLET ORAL EVERY 6 HOURS PRN
Status: ON HOLD
Start: 2021-01-01 | End: 2021-01-01

## 2021-01-01 RX ORDER — OXYCODONE HYDROCHLORIDE AND ACETAMINOPHEN 5; 325 MG/1; MG/1
1 TABLET ORAL EVERY 6 HOURS PRN
Status: DISCONTINUED | OUTPATIENT
Start: 2021-01-01 | End: 2021-01-01

## 2021-01-01 RX ORDER — PREDNISONE 20 MG/1
40 TABLET ORAL DAILY
Qty: 10 TABLET | Refills: 0 | Status: SHIPPED | OUTPATIENT
Start: 2021-01-01 | End: 2021-01-01

## 2021-01-01 RX ORDER — PREDNISONE 10 MG/1
TABLET ORAL
Qty: 1 EACH | Refills: 0 | Status: SHIPPED | OUTPATIENT
Start: 2021-01-01

## 2021-01-01 RX ORDER — METHYLPREDNISOLONE SODIUM SUCCINATE 40 MG/ML
40 INJECTION, POWDER, LYOPHILIZED, FOR SOLUTION INTRAMUSCULAR; INTRAVENOUS EVERY 12 HOURS
Status: DISCONTINUED | OUTPATIENT
Start: 2021-01-01 | End: 2021-01-01

## 2021-01-01 RX ORDER — FLUTICASONE PROPIONATE 50 MCG
2 SPRAY, SUSPENSION (ML) NASAL DAILY
Qty: 16 G | Refills: 12 | Status: ON HOLD | OUTPATIENT
Start: 2021-01-01 | End: 2021-01-01

## 2021-01-01 RX ORDER — ONDANSETRON 2 MG/ML
4 INJECTION INTRAMUSCULAR; INTRAVENOUS EVERY 6 HOURS PRN
Status: DISCONTINUED | OUTPATIENT
Start: 2021-01-01 | End: 2021-01-01 | Stop reason: HOSPADM

## 2021-01-01 RX ORDER — ONDANSETRON 4 MG/1
4 TABLET, FILM COATED ORAL EVERY 6 HOURS PRN
Status: DISCONTINUED | OUTPATIENT
Start: 2021-01-01 | End: 2021-01-01 | Stop reason: HOSPADM

## 2021-01-01 RX ORDER — CARVEDILOL 3.12 MG/1
3.12 TABLET ORAL 2 TIMES DAILY WITH MEALS
Status: DISCONTINUED | OUTPATIENT
Start: 2021-01-01 | End: 2021-01-01 | Stop reason: HOSPADM

## 2021-01-01 RX ORDER — IPRATROPIUM BROMIDE AND ALBUTEROL SULFATE 2.5; .5 MG/3ML; MG/3ML
3 SOLUTION RESPIRATORY (INHALATION)
Status: CANCELLED | OUTPATIENT
Start: 2021-01-01

## 2021-01-01 RX ORDER — MONTELUKAST SODIUM 10 MG/1
10 TABLET ORAL NIGHTLY
Status: DISCONTINUED | OUTPATIENT
Start: 2021-01-01 | End: 2021-01-01 | Stop reason: HOSPADM

## 2021-01-01 RX ORDER — ROPIVACAINE HYDROCHLORIDE 2 MG/ML
INJECTION, SOLUTION EPIDURAL; INFILTRATION; PERINEURAL
Status: COMPLETED | OUTPATIENT
Start: 2021-01-01 | End: 2021-01-01

## 2021-01-01 RX ORDER — ALBUTEROL SULFATE 2.5 MG/3ML
2.5 SOLUTION RESPIRATORY (INHALATION) EVERY 4 HOURS PRN
Status: DISCONTINUED | OUTPATIENT
Start: 2021-01-01 | End: 2021-01-01 | Stop reason: HOSPADM

## 2021-01-01 RX ORDER — CETIRIZINE HYDROCHLORIDE 10 MG/1
10 TABLET ORAL DAILY
Status: DISCONTINUED | OUTPATIENT
Start: 2021-01-01 | End: 2021-01-01 | Stop reason: HOSPADM

## 2021-01-01 RX ORDER — OXYCODONE AND ACETAMINOPHEN 10; 325 MG/1; MG/1
1 TABLET ORAL ONCE AS NEEDED
Status: DISCONTINUED | OUTPATIENT
Start: 2021-01-01 | End: 2021-01-01

## 2021-01-01 RX ORDER — ALBUTEROL SULFATE 90 UG/1
2 AEROSOL, METERED RESPIRATORY (INHALATION) EVERY 4 HOURS PRN
Qty: 8.5 G | Refills: 12 | Status: SHIPPED | OUTPATIENT
Start: 2021-01-01

## 2021-01-01 RX ORDER — PROPOFOL 10 MG/ML
VIAL (ML) INTRAVENOUS AS NEEDED
Status: DISCONTINUED | OUTPATIENT
Start: 2021-01-01 | End: 2021-01-01 | Stop reason: SURG

## 2021-01-01 RX ORDER — CARVEDILOL 3.12 MG/1
3.12 TABLET ORAL 2 TIMES DAILY WITH MEALS
Status: ON HOLD
Start: 2021-01-01 | End: 2021-01-01 | Stop reason: SDUPTHER

## 2021-01-01 RX ORDER — METHYLPREDNISOLONE SODIUM SUCCINATE 125 MG/2ML
60 INJECTION, POWDER, LYOPHILIZED, FOR SOLUTION INTRAMUSCULAR; INTRAVENOUS EVERY 8 HOURS SCHEDULED
Status: DISCONTINUED | OUTPATIENT
Start: 2021-01-01 | End: 2021-01-01

## 2021-01-01 RX ORDER — FENTANYL CITRATE 50 UG/ML
25 INJECTION, SOLUTION INTRAMUSCULAR; INTRAVENOUS
Status: DISCONTINUED | OUTPATIENT
Start: 2021-01-01 | End: 2021-01-01

## 2021-01-01 RX ORDER — ECHINACEA PURPUREA EXTRACT 125 MG
1 TABLET ORAL AS NEEDED
Qty: 1 EACH | Refills: 12 | Status: SHIPPED | OUTPATIENT
Start: 2021-01-01

## 2021-01-01 RX ORDER — ACETAMINOPHEN 325 MG/1
650 TABLET ORAL EVERY 4 HOURS PRN
Status: DISCONTINUED | OUTPATIENT
Start: 2021-01-01 | End: 2021-01-01 | Stop reason: HOSPADM

## 2021-01-01 RX ORDER — ROPINIROLE 1 MG/1
2 TABLET, FILM COATED ORAL NIGHTLY
Status: DISCONTINUED | OUTPATIENT
Start: 2021-01-01 | End: 2021-01-01

## 2021-01-01 RX ORDER — LABETALOL HYDROCHLORIDE 5 MG/ML
5 INJECTION, SOLUTION INTRAVENOUS
Status: DISCONTINUED | OUTPATIENT
Start: 2021-01-01 | End: 2021-01-01

## 2021-01-01 RX ORDER — ROPINIROLE 2 MG/1
2 TABLET, FILM COATED ORAL 3 TIMES DAILY
Qty: 90 TABLET | Refills: 5 | Status: SHIPPED | OUTPATIENT
Start: 2021-01-01 | End: 2021-01-01 | Stop reason: SDUPTHER

## 2021-01-01 RX ORDER — ROPINIROLE 1 MG/1
2 TABLET, FILM COATED ORAL 3 TIMES DAILY PRN
Status: DISCONTINUED | OUTPATIENT
Start: 2021-01-01 | End: 2021-01-01 | Stop reason: HOSPADM

## 2021-01-01 RX ORDER — LIDOCAINE 50 MG/G
2 PATCH TOPICAL
Status: DISCONTINUED | OUTPATIENT
Start: 2021-01-01 | End: 2021-01-01 | Stop reason: HOSPADM

## 2021-01-01 RX ORDER — SODIUM CHLORIDE 0.9 % (FLUSH) 0.9 %
3-10 SYRINGE (ML) INJECTION AS NEEDED
Status: DISCONTINUED | OUTPATIENT
Start: 2021-01-01 | End: 2021-01-01 | Stop reason: HOSPADM

## 2021-01-01 RX ORDER — GLYCOPYRROLATE 0.2 MG/ML
0.4 INJECTION INTRAMUSCULAR; INTRAVENOUS
Status: DISCONTINUED | OUTPATIENT
Start: 2021-01-01 | End: 2021-01-01 | Stop reason: HOSPADM

## 2021-01-01 RX ORDER — GUAIFENESIN 600 MG/1
600 TABLET, EXTENDED RELEASE ORAL 2 TIMES DAILY
Status: ON HOLD | COMMUNITY
End: 2021-01-01 | Stop reason: SDUPTHER

## 2021-01-01 RX ORDER — FENTANYL CITRATE 50 UG/ML
25 INJECTION, SOLUTION INTRAMUSCULAR; INTRAVENOUS
Status: DISCONTINUED | OUTPATIENT
Start: 2021-01-01 | End: 2021-01-01 | Stop reason: HOSPADM

## 2021-01-01 RX ORDER — GUAIFENESIN 600 MG/1
600 TABLET, EXTENDED RELEASE ORAL 2 TIMES DAILY
Qty: 180 TABLET | Refills: 0 | Status: SHIPPED | OUTPATIENT
Start: 2021-01-01

## 2021-01-01 RX ORDER — MORPHINE SULFATE 2 MG/ML
2 INJECTION, SOLUTION INTRAMUSCULAR; INTRAVENOUS
Status: DISCONTINUED | OUTPATIENT
Start: 2021-01-01 | End: 2021-01-01 | Stop reason: HOSPADM

## 2021-01-01 RX ORDER — BUDESONIDE AND FORMOTEROL FUMARATE DIHYDRATE 160; 4.5 UG/1; UG/1
2 AEROSOL RESPIRATORY (INHALATION)
Qty: 1 EACH | Refills: 0 | Status: ON HOLD
Start: 2021-01-01 | End: 2021-01-01 | Stop reason: SDUPTHER

## 2021-01-01 RX ORDER — LORAZEPAM 2 MG/ML
2 INJECTION INTRAMUSCULAR
Status: DISCONTINUED | OUTPATIENT
Start: 2021-01-01 | End: 2021-01-01 | Stop reason: HOSPADM

## 2021-01-01 RX ORDER — DOCUSATE SODIUM 100 MG/1
100 CAPSULE, LIQUID FILLED ORAL 2 TIMES DAILY
Status: DISCONTINUED | OUTPATIENT
Start: 2021-01-01 | End: 2021-01-01 | Stop reason: HOSPADM

## 2021-01-01 RX ORDER — FUROSEMIDE 10 MG/ML
20 INJECTION INTRAMUSCULAR; INTRAVENOUS ONCE
Status: COMPLETED | OUTPATIENT
Start: 2021-01-01 | End: 2021-01-01

## 2021-01-01 RX ORDER — LIDOCAINE HYDROCHLORIDE 10 MG/ML
0.5 INJECTION, SOLUTION EPIDURAL; INFILTRATION; INTRACAUDAL; PERINEURAL ONCE AS NEEDED
Status: DISCONTINUED | OUTPATIENT
Start: 2021-01-01 | End: 2021-01-01 | Stop reason: HOSPADM

## 2021-01-01 RX ORDER — GUAIFENESIN 600 MG/1
600 TABLET, EXTENDED RELEASE ORAL 2 TIMES DAILY
Status: DISCONTINUED | OUTPATIENT
Start: 2021-01-01 | End: 2021-01-01 | Stop reason: HOSPADM

## 2021-01-01 RX ORDER — CEFDINIR 300 MG/1
300 CAPSULE ORAL EVERY 12 HOURS SCHEDULED
Status: DISCONTINUED | OUTPATIENT
Start: 2021-01-01 | End: 2021-01-01 | Stop reason: HOSPADM

## 2021-01-01 RX ORDER — METHYLPREDNISOLONE SODIUM SUCCINATE 40 MG/ML
40 INJECTION, POWDER, LYOPHILIZED, FOR SOLUTION INTRAMUSCULAR; INTRAVENOUS 2 TIMES DAILY
Status: DISCONTINUED | OUTPATIENT
Start: 2021-01-01 | End: 2021-01-01

## 2021-01-01 RX ORDER — ACETAMINOPHEN 325 MG/1
650 TABLET ORAL EVERY 4 HOURS PRN
Start: 2021-01-01

## 2021-01-01 RX ORDER — BUSPIRONE HYDROCHLORIDE 5 MG/1
5 TABLET ORAL 2 TIMES DAILY
COMMUNITY
End: 2021-01-01 | Stop reason: HOSPADM

## 2021-01-01 RX ORDER — GLYCOPYRROLATE 0.2 MG/ML
0.2 INJECTION INTRAMUSCULAR; INTRAVENOUS
Status: DISCONTINUED | OUTPATIENT
Start: 2021-01-01 | End: 2021-01-01 | Stop reason: HOSPADM

## 2021-01-01 RX ORDER — BISACODYL 10 MG
10 SUPPOSITORY, RECTAL RECTAL ONCE
Status: DISCONTINUED | OUTPATIENT
Start: 2021-01-01 | End: 2021-01-01

## 2021-01-01 RX ORDER — SODIUM CHLORIDE 0.9 % (FLUSH) 0.9 %
10 SYRINGE (ML) INJECTION AS NEEDED
Status: DISCONTINUED | OUTPATIENT
Start: 2021-01-01 | End: 2021-01-01 | Stop reason: HOSPADM

## 2021-01-01 RX ORDER — LORAZEPAM 2 MG/ML
0.5 CONCENTRATE ORAL
Status: DISCONTINUED | OUTPATIENT
Start: 2021-01-01 | End: 2021-01-01 | Stop reason: HOSPADM

## 2021-01-01 RX ORDER — NALOXONE HCL 0.4 MG/ML
0.04 VIAL (ML) INJECTION AS NEEDED
Status: DISCONTINUED | OUTPATIENT
Start: 2021-01-01 | End: 2021-01-01

## 2021-01-01 RX ORDER — FLUOXETINE 10 MG/1
10 CAPSULE ORAL DAILY
Status: DISCONTINUED | OUTPATIENT
Start: 2021-01-01 | End: 2021-01-01 | Stop reason: HOSPADM

## 2021-01-01 RX ORDER — HYDROCODONE BITARTRATE AND ACETAMINOPHEN 7.5; 325 MG/1; MG/1
1 TABLET ORAL 3 TIMES DAILY
Status: DISCONTINUED | OUTPATIENT
Start: 2021-01-01 | End: 2021-01-01

## 2021-01-01 RX ORDER — MAGNESIUM CARB/ALUMINUM HYDROX 105-160MG
296 TABLET,CHEWABLE ORAL ONCE
Status: COMPLETED | OUTPATIENT
Start: 2021-01-01 | End: 2021-01-01

## 2021-01-01 RX ORDER — AMOXICILLIN 250 MG
2 CAPSULE ORAL NIGHTLY
Status: DISCONTINUED | OUTPATIENT
Start: 2021-01-01 | End: 2021-01-01

## 2021-01-01 RX ORDER — METHYLPREDNISOLONE SODIUM SUCCINATE 40 MG/ML
40 INJECTION, POWDER, LYOPHILIZED, FOR SOLUTION INTRAMUSCULAR; INTRAVENOUS EVERY 8 HOURS SCHEDULED
Status: DISCONTINUED | OUTPATIENT
Start: 2021-01-01 | End: 2021-01-01

## 2021-01-01 RX ORDER — ALBUTEROL SULFATE 90 UG/1
2 AEROSOL, METERED RESPIRATORY (INHALATION) EVERY 4 HOURS PRN
Status: DISCONTINUED | OUTPATIENT
Start: 2021-01-01 | End: 2021-01-01 | Stop reason: ALTCHOICE

## 2021-01-01 RX ORDER — BUDESONIDE 0.25 MG/2ML
0.25 INHALANT ORAL
Status: DISCONTINUED | OUTPATIENT
Start: 2021-01-01 | End: 2021-01-01

## 2021-01-01 RX ORDER — LORATADINE 10 MG/1
10 TABLET ORAL DAILY
Qty: 90 TABLET | Refills: 3 | Status: SHIPPED | OUTPATIENT
Start: 2021-01-01

## 2021-01-01 RX ORDER — SODIUM CHLORIDE, SODIUM LACTATE, POTASSIUM CHLORIDE, CALCIUM CHLORIDE 600; 310; 30; 20 MG/100ML; MG/100ML; MG/100ML; MG/100ML
75 INJECTION, SOLUTION INTRAVENOUS CONTINUOUS
Status: DISCONTINUED | OUTPATIENT
Start: 2021-01-01 | End: 2021-01-01

## 2021-01-01 RX ORDER — BUDESONIDE AND FORMOTEROL FUMARATE DIHYDRATE 80; 4.5 UG/1; UG/1
2 AEROSOL RESPIRATORY (INHALATION)
Status: DISCONTINUED | OUTPATIENT
Start: 2021-01-01 | End: 2021-01-01

## 2021-01-01 RX ORDER — FLUOXETINE HYDROCHLORIDE 20 MG/1
20 CAPSULE ORAL DAILY
COMMUNITY
End: 2021-01-01 | Stop reason: HOSPADM

## 2021-01-01 RX ORDER — CETIRIZINE HYDROCHLORIDE 10 MG/1
10 TABLET ORAL DAILY
Status: DISCONTINUED | OUTPATIENT
Start: 2021-01-01 | End: 2021-01-01

## 2021-01-01 RX ORDER — ALBUTEROL SULFATE 2.5 MG/3ML
2.5 SOLUTION RESPIRATORY (INHALATION) EVERY 4 HOURS PRN
Qty: 360 ML | Refills: 5 | Status: SHIPPED | OUTPATIENT
Start: 2021-01-01

## 2021-01-01 RX ORDER — FUROSEMIDE 10 MG/ML
10 INJECTION INTRAMUSCULAR; INTRAVENOUS ONCE
Status: COMPLETED | OUTPATIENT
Start: 2021-01-01 | End: 2021-01-01

## 2021-01-01 RX ORDER — POTASSIUM CHLORIDE 750 MG/1
40 CAPSULE, EXTENDED RELEASE ORAL ONCE
Status: COMPLETED | OUTPATIENT
Start: 2021-01-01 | End: 2021-01-01

## 2021-01-01 RX ORDER — ONDANSETRON 2 MG/ML
INJECTION INTRAMUSCULAR; INTRAVENOUS AS NEEDED
Status: DISCONTINUED | OUTPATIENT
Start: 2021-01-01 | End: 2021-01-01 | Stop reason: SURG

## 2021-01-01 RX ORDER — CLONAZEPAM 0.5 MG/1
0.5 TABLET ORAL 2 TIMES DAILY PRN
Status: DISPENSED | OUTPATIENT
Start: 2021-01-01 | End: 2021-01-01

## 2021-01-01 RX ORDER — ROPINIROLE 2 MG/1
2 TABLET, FILM COATED ORAL
Qty: 120 TABLET | Refills: 0 | Status: SHIPPED | OUTPATIENT
Start: 2021-01-01 | End: 2021-12-27

## 2021-01-01 RX ORDER — ROPINIROLE 2 MG/1
2 TABLET, FILM COATED ORAL 3 TIMES DAILY
Qty: 90 TABLET | Refills: 5 | Status: CANCELLED | OUTPATIENT
Start: 2021-01-01

## 2021-01-01 RX ORDER — OXYCODONE HYDROCHLORIDE AND ACETAMINOPHEN 5; 325 MG/1; MG/1
1 TABLET ORAL EVERY 6 HOURS PRN
Status: DISPENSED | OUTPATIENT
Start: 2021-01-01 | End: 2021-01-01

## 2021-01-01 RX ORDER — SODIUM CHLORIDE 0.9 % (FLUSH) 0.9 %
10 SYRINGE (ML) INJECTION EVERY 12 HOURS SCHEDULED
Status: DISCONTINUED | OUTPATIENT
Start: 2021-01-01 | End: 2021-01-01 | Stop reason: HOSPADM

## 2021-01-01 RX ORDER — MORPHINE SULFATE 2 MG/ML
2 INJECTION, SOLUTION INTRAMUSCULAR; INTRAVENOUS EVERY 4 HOURS PRN
Status: DISCONTINUED | OUTPATIENT
Start: 2021-01-01 | End: 2021-01-01

## 2021-01-01 RX ORDER — NALOXONE HCL 0.4 MG/ML
0.4 VIAL (ML) INJECTION AS NEEDED
Status: DISCONTINUED | OUTPATIENT
Start: 2021-01-01 | End: 2021-01-01 | Stop reason: HOSPADM

## 2021-01-01 RX ORDER — LIDOCAINE 50 MG/G
2 PATCH TOPICAL NIGHTLY
Status: ON HOLD
Start: 2021-01-01 | End: 2021-01-01

## 2021-01-01 RX ORDER — ROPINIROLE 1 MG/1
2 TABLET, FILM COATED ORAL NIGHTLY
Status: DISCONTINUED | OUTPATIENT
Start: 2021-01-01 | End: 2021-01-01 | Stop reason: HOSPADM

## 2021-01-01 RX ORDER — ACETAZOLAMIDE 500 MG/5ML
250 INJECTION, POWDER, LYOPHILIZED, FOR SOLUTION INTRAVENOUS ONCE
Status: COMPLETED | OUTPATIENT
Start: 2021-01-01 | End: 2021-01-01

## 2021-01-01 RX ORDER — CEFDINIR 300 MG/1
300 CAPSULE ORAL EVERY 12 HOURS SCHEDULED
Qty: 5 CAPSULE | Refills: 0 | Status: SHIPPED | OUTPATIENT
Start: 2021-01-01 | End: 2021-01-01

## 2021-01-01 RX ORDER — BUDESONIDE AND FORMOTEROL FUMARATE DIHYDRATE 160; 4.5 UG/1; UG/1
2 AEROSOL RESPIRATORY (INHALATION)
Qty: 1 EACH | Refills: 12
Start: 2021-01-01 | End: 2021-01-01 | Stop reason: ALTCHOICE

## 2021-01-01 RX ORDER — ACETAMINOPHEN 325 MG/1
650 TABLET ORAL EVERY 6 HOURS PRN
Status: DISCONTINUED | OUTPATIENT
Start: 2021-01-01 | End: 2021-01-01 | Stop reason: HOSPADM

## 2021-01-01 RX ORDER — OXYCODONE HYDROCHLORIDE AND ACETAMINOPHEN 5; 325 MG/1; MG/1
1 TABLET ORAL EVERY 6 HOURS PRN
Qty: 10 TABLET | Refills: 0 | Status: SHIPPED | OUTPATIENT
Start: 2021-01-01

## 2021-01-01 RX ORDER — FENTANYL CITRATE 50 UG/ML
50 INJECTION, SOLUTION INTRAMUSCULAR; INTRAVENOUS
Status: DISPENSED | OUTPATIENT
Start: 2021-01-01 | End: 2021-01-01

## 2021-01-01 RX ORDER — AMOXICILLIN 250 MG
1 CAPSULE ORAL 2 TIMES DAILY
Status: DISCONTINUED | OUTPATIENT
Start: 2021-01-01 | End: 2021-01-01 | Stop reason: HOSPADM

## 2021-01-01 RX ORDER — LEVOFLOXACIN 5 MG/ML
500 INJECTION, SOLUTION INTRAVENOUS EVERY 24 HOURS
Status: DISCONTINUED | OUTPATIENT
Start: 2021-01-01 | End: 2021-01-01

## 2021-01-01 RX ORDER — DOCUSATE SODIUM 100 MG/1
100 CAPSULE, LIQUID FILLED ORAL 2 TIMES DAILY
Qty: 60 CAPSULE | Refills: 1 | Status: ON HOLD | OUTPATIENT
Start: 2021-01-01 | End: 2021-01-01 | Stop reason: SDUPTHER

## 2021-01-01 RX ORDER — MORPHINE SULFATE 20 MG/ML
10 SOLUTION ORAL
Status: DISCONTINUED | OUTPATIENT
Start: 2021-01-01 | End: 2021-01-01

## 2021-01-01 RX ORDER — SODIUM CHLORIDE, SODIUM LACTATE, POTASSIUM CHLORIDE, CALCIUM CHLORIDE 600; 310; 30; 20 MG/100ML; MG/100ML; MG/100ML; MG/100ML
100 INJECTION, SOLUTION INTRAVENOUS CONTINUOUS
Status: DISCONTINUED | OUTPATIENT
Start: 2021-01-01 | End: 2021-01-01

## 2021-01-01 RX ORDER — IPRATROPIUM BROMIDE AND ALBUTEROL SULFATE 2.5; .5 MG/3ML; MG/3ML
3 SOLUTION RESPIRATORY (INHALATION)
Qty: 360 ML | Refills: 12 | Status: SHIPPED | OUTPATIENT
Start: 2021-01-01 | End: 2021-01-01 | Stop reason: ALTCHOICE

## 2021-01-01 RX ORDER — IPRATROPIUM BROMIDE AND ALBUTEROL SULFATE 2.5; .5 MG/3ML; MG/3ML
3 SOLUTION RESPIRATORY (INHALATION)
Status: DISCONTINUED | OUTPATIENT
Start: 2021-01-01 | End: 2021-01-01 | Stop reason: HOSPADM

## 2021-01-01 RX ORDER — FENTANYL CITRATE 50 UG/ML
INJECTION, SOLUTION INTRAMUSCULAR; INTRAVENOUS AS NEEDED
Status: DISCONTINUED | OUTPATIENT
Start: 2021-01-01 | End: 2021-01-01 | Stop reason: SURG

## 2021-01-01 RX ORDER — ROPINIROLE 2 MG/1
2 TABLET, FILM COATED ORAL NIGHTLY
COMMUNITY
End: 2021-01-01 | Stop reason: HOSPADM

## 2021-01-01 RX ORDER — LORAZEPAM 2 MG/ML
1 INJECTION INTRAMUSCULAR
Status: DISCONTINUED | OUTPATIENT
Start: 2021-01-01 | End: 2021-01-01 | Stop reason: HOSPADM

## 2021-01-01 RX ORDER — ONDANSETRON 2 MG/ML
4 INJECTION INTRAMUSCULAR; INTRAVENOUS ONCE
Status: COMPLETED | OUTPATIENT
Start: 2021-01-01 | End: 2021-01-01

## 2021-01-01 RX ORDER — ECHINACEA PURPUREA EXTRACT 125 MG
1 TABLET ORAL AS NEEDED
Status: DISCONTINUED | OUTPATIENT
Start: 2021-01-01 | End: 2021-01-01 | Stop reason: HOSPADM

## 2021-01-01 RX ORDER — AZITHROMYCIN 250 MG/1
TABLET, FILM COATED ORAL
Qty: 6 TABLET | Refills: 0 | Status: SHIPPED | OUTPATIENT
Start: 2021-01-01 | End: 2021-01-01

## 2021-01-01 RX ORDER — ROPINIROLE 2 MG/1
2 TABLET, FILM COATED ORAL NIGHTLY
Qty: 90 TABLET | Refills: 0 | Status: ON HOLD | OUTPATIENT
Start: 2021-01-01 | End: 2021-01-01

## 2021-01-01 RX ORDER — LIDOCAINE HYDROCHLORIDE 20 MG/ML
INJECTION, SOLUTION EPIDURAL; INFILTRATION; INTRACAUDAL; PERINEURAL AS NEEDED
Status: DISCONTINUED | OUTPATIENT
Start: 2021-01-01 | End: 2021-01-01 | Stop reason: SURG

## 2021-01-01 RX ORDER — FLUOXETINE HYDROCHLORIDE 20 MG/1
20 CAPSULE ORAL DAILY
Qty: 30 CAPSULE | Refills: 1 | Status: SHIPPED | OUTPATIENT
Start: 2021-01-01 | End: 2021-01-01

## 2021-01-01 RX ORDER — NICOTINE POLACRILEX 4 MG
15 LOZENGE BUCCAL
Status: DISCONTINUED | OUTPATIENT
Start: 2021-01-01 | End: 2021-01-01 | Stop reason: HOSPADM

## 2021-01-01 RX ORDER — SODIUM CHLORIDE 0.9 % (FLUSH) 0.9 %
3 SYRINGE (ML) INJECTION EVERY 12 HOURS SCHEDULED
Status: DISCONTINUED | OUTPATIENT
Start: 2021-01-01 | End: 2021-01-01 | Stop reason: HOSPADM

## 2021-01-01 RX ORDER — POLYETHYLENE GLYCOL 3350 17 G/17G
17 POWDER, FOR SOLUTION ORAL DAILY
Status: DISCONTINUED | OUTPATIENT
Start: 2021-01-01 | End: 2021-01-01 | Stop reason: HOSPADM

## 2021-01-01 RX ORDER — CEFAZOLIN SODIUM 1 G/3ML
INJECTION, POWDER, FOR SOLUTION INTRAMUSCULAR; INTRAVENOUS AS NEEDED
Status: DISCONTINUED | OUTPATIENT
Start: 2021-01-01 | End: 2021-01-01 | Stop reason: SURG

## 2021-01-01 RX ORDER — LIDOCAINE 50 MG/G
1 PATCH TOPICAL NIGHTLY
Status: DISCONTINUED | OUTPATIENT
Start: 2021-01-01 | End: 2021-01-01 | Stop reason: HOSPADM

## 2021-01-01 RX ORDER — HYDROCODONE BITARTRATE AND ACETAMINOPHEN 5; 325 MG/1; MG/1
1 TABLET ORAL EVERY 6 HOURS PRN
Status: DISPENSED | OUTPATIENT
Start: 2021-01-01 | End: 2021-01-01

## 2021-01-01 RX ORDER — ATROPINE SULFATE 1 MG/ML
0.5 INJECTION, SOLUTION INTRAMUSCULAR; INTRAVENOUS; SUBCUTANEOUS ONCE AS NEEDED
Status: DISCONTINUED | OUTPATIENT
Start: 2021-01-01 | End: 2021-01-01

## 2021-01-01 RX ORDER — MAGNESIUM HYDROXIDE 1200 MG/15ML
LIQUID ORAL AS NEEDED
Status: DISCONTINUED | OUTPATIENT
Start: 2021-01-01 | End: 2021-01-01 | Stop reason: HOSPADM

## 2021-01-01 RX ORDER — ALBUTEROL SULFATE 90 UG/1
AEROSOL, METERED RESPIRATORY (INHALATION)
Qty: 18 G | Refills: 11 | Status: ON HOLD | OUTPATIENT
Start: 2021-01-01 | End: 2021-01-01

## 2021-01-01 RX ORDER — FAMOTIDINE 20 MG/1
20 TABLET, FILM COATED ORAL DAILY
Status: DISCONTINUED | OUTPATIENT
Start: 2021-01-01 | End: 2021-01-01

## 2021-01-01 RX ORDER — BUPIVACAINE HCL/0.9 % NACL/PF 0.1 %
2 PLASTIC BAG, INJECTION (ML) EPIDURAL EVERY 8 HOURS
Status: COMPLETED | OUTPATIENT
Start: 2021-01-01 | End: 2021-01-01

## 2021-01-01 RX ORDER — CARVEDILOL 3.12 MG/1
3.12 TABLET ORAL 2 TIMES DAILY WITH MEALS
Qty: 180 TABLET | Refills: 0 | Status: ON HOLD
Start: 2021-01-01 | End: 2021-01-01

## 2021-01-01 RX ORDER — MORPHINE SULFATE 20 MG/ML
10 SOLUTION ORAL
Status: DISCONTINUED | OUTPATIENT
Start: 2021-01-01 | End: 2021-01-01 | Stop reason: HOSPADM

## 2021-01-01 RX ORDER — POLYETHYLENE GLYCOL 3350 17 G/17G
17 POWDER, FOR SOLUTION ORAL DAILY
Qty: 510 G | Refills: 0 | Status: SHIPPED | OUTPATIENT
Start: 2021-01-01 | End: 2021-01-01

## 2021-01-01 RX ORDER — CETIRIZINE HYDROCHLORIDE 10 MG/1
10 TABLET ORAL DAILY
Start: 2021-01-01 | End: 2021-01-01 | Stop reason: HOSPADM

## 2021-01-01 RX ORDER — LORAZEPAM 0.5 MG/1
0.5 TABLET ORAL
Status: DISCONTINUED | OUTPATIENT
Start: 2021-01-01 | End: 2021-01-01 | Stop reason: HOSPADM

## 2021-01-01 RX ORDER — FUROSEMIDE 10 MG/ML
20 INJECTION INTRAMUSCULAR; INTRAVENOUS EVERY 12 HOURS SCHEDULED
Status: COMPLETED | OUTPATIENT
Start: 2021-01-01 | End: 2021-01-01

## 2021-01-01 RX ORDER — LORAZEPAM 2 MG/ML
1 CONCENTRATE ORAL
Status: DISCONTINUED | OUTPATIENT
Start: 2021-01-01 | End: 2021-01-01 | Stop reason: HOSPADM

## 2021-01-01 RX ORDER — IBUPROFEN 400 MG/1
600 TABLET ORAL ONCE AS NEEDED
Status: DISCONTINUED | OUTPATIENT
Start: 2021-01-01 | End: 2021-01-01

## 2021-01-01 RX ORDER — ACETAMINOPHEN 160 MG/5ML
650 SOLUTION ORAL EVERY 4 HOURS PRN
Status: DISCONTINUED | OUTPATIENT
Start: 2021-01-01 | End: 2021-01-01 | Stop reason: HOSPADM

## 2021-01-01 RX ORDER — SODIUM CHLORIDE 0.9 % (FLUSH) 0.9 %
10 SYRINGE (ML) INJECTION AS NEEDED
Status: DISCONTINUED | OUTPATIENT
Start: 2021-01-01 | End: 2021-01-01

## 2021-01-01 RX ORDER — LIDOCAINE 50 MG/G
2 PATCH TOPICAL
Qty: 15 PATCH | Refills: 0 | Status: SHIPPED | OUTPATIENT
Start: 2021-01-01

## 2021-01-01 RX ORDER — METHYLPREDNISOLONE SODIUM SUCCINATE 40 MG/ML
40 INJECTION, POWDER, LYOPHILIZED, FOR SOLUTION INTRAMUSCULAR; INTRAVENOUS EVERY 6 HOURS SCHEDULED
Status: DISCONTINUED | OUTPATIENT
Start: 2021-01-01 | End: 2021-01-01

## 2021-01-01 RX ORDER — METHYLPREDNISOLONE SODIUM SUCCINATE 40 MG/ML
40 INJECTION, POWDER, LYOPHILIZED, FOR SOLUTION INTRAMUSCULAR; INTRAVENOUS EVERY 8 HOURS
Status: COMPLETED | OUTPATIENT
Start: 2021-01-01 | End: 2021-01-01

## 2021-01-01 RX ORDER — OXYCODONE AND ACETAMINOPHEN 7.5; 325 MG/1; MG/1
2 TABLET ORAL EVERY 4 HOURS PRN
Status: DISCONTINUED | OUTPATIENT
Start: 2021-01-01 | End: 2021-01-01 | Stop reason: HOSPADM

## 2021-01-01 RX ORDER — NALOXONE HCL 0.4 MG/ML
0.4 VIAL (ML) INJECTION
Status: DISCONTINUED | OUTPATIENT
Start: 2021-01-01 | End: 2021-01-01

## 2021-01-01 RX ORDER — LIDOCAINE 50 MG/G
2 PATCH TOPICAL EVERY 24 HOURS
COMMUNITY
End: 2021-01-01 | Stop reason: HOSPADM

## 2021-01-01 RX ORDER — OXYCODONE AND ACETAMINOPHEN 10; 325 MG/1; MG/1
1 TABLET ORAL ONCE AS NEEDED
Status: DISCONTINUED | OUTPATIENT
Start: 2021-01-01 | End: 2021-01-01 | Stop reason: HOSPADM

## 2021-01-01 RX ORDER — PREDNISONE 20 MG/1
40 TABLET ORAL
Status: DISCONTINUED | OUTPATIENT
Start: 2021-01-01 | End: 2021-01-01 | Stop reason: HOSPADM

## 2021-01-01 RX ORDER — LIDOCAINE 50 MG/G
1 PATCH TOPICAL NIGHTLY
Qty: 30 PATCH | Refills: 12 | Status: SHIPPED | OUTPATIENT
Start: 2021-01-01 | End: 2021-01-01 | Stop reason: ALTCHOICE

## 2021-01-01 RX ORDER — GUAIFENESIN 600 MG/1
1200 TABLET, EXTENDED RELEASE ORAL 2 TIMES DAILY
Status: DISCONTINUED | OUTPATIENT
Start: 2021-01-01 | End: 2021-01-01 | Stop reason: HOSPADM

## 2021-01-01 RX ORDER — ONDANSETRON 4 MG/1
4 TABLET, ORALLY DISINTEGRATING ORAL EVERY 8 HOURS PRN
COMMUNITY
End: 2021-01-01 | Stop reason: HOSPADM

## 2021-01-01 RX ORDER — DEXTROSE MONOHYDRATE 25 G/50ML
25 INJECTION, SOLUTION INTRAVENOUS
Status: DISCONTINUED | OUTPATIENT
Start: 2021-01-01 | End: 2021-01-01 | Stop reason: HOSPADM

## 2021-01-01 RX ORDER — FLUTICASONE PROPIONATE 50 MCG
2 SPRAY, SUSPENSION (ML) NASAL DAILY
Status: DISCONTINUED | OUTPATIENT
Start: 2021-01-01 | End: 2021-01-01 | Stop reason: HOSPADM

## 2021-01-01 RX ORDER — ACETAMINOPHEN 325 MG/1
650 TABLET ORAL EVERY 6 HOURS PRN
COMMUNITY
End: 2021-01-01 | Stop reason: HOSPADM

## 2021-01-01 RX ORDER — ONDANSETRON 2 MG/ML
4 INJECTION INTRAMUSCULAR; INTRAVENOUS ONCE AS NEEDED
Status: DISCONTINUED | OUTPATIENT
Start: 2021-01-01 | End: 2021-01-01 | Stop reason: HOSPADM

## 2021-01-01 RX ORDER — CARVEDILOL 3.12 MG/1
3.12 TABLET ORAL 2 TIMES DAILY WITH MEALS
Status: DISCONTINUED | OUTPATIENT
Start: 2021-01-01 | End: 2021-01-01

## 2021-01-01 RX ORDER — LIDOCAINE 50 MG/G
1 PATCH TOPICAL
Status: DISCONTINUED | OUTPATIENT
Start: 2021-01-01 | End: 2021-01-01

## 2021-01-01 RX ORDER — OXYCODONE HYDROCHLORIDE AND ACETAMINOPHEN 5; 325 MG/1; MG/1
1 TABLET ORAL EVERY 6 HOURS PRN
Qty: 24 TABLET | Refills: 0 | Status: SHIPPED | OUTPATIENT
Start: 2021-01-01 | End: 2021-01-01

## 2021-01-01 RX ORDER — FUROSEMIDE 10 MG/ML
20 INJECTION INTRAMUSCULAR; INTRAVENOUS EVERY 12 HOURS
Status: DISCONTINUED | OUTPATIENT
Start: 2021-01-01 | End: 2021-01-01

## 2021-01-01 RX ORDER — IPRATROPIUM BROMIDE AND ALBUTEROL SULFATE 2.5; .5 MG/3ML; MG/3ML
3 SOLUTION RESPIRATORY (INHALATION) EVERY 4 HOURS PRN
Status: DISCONTINUED | OUTPATIENT
Start: 2021-01-01 | End: 2021-01-01

## 2021-01-01 RX ORDER — BUDESONIDE 0.25 MG/2ML
0.25 INHALANT ORAL
Start: 2021-01-01 | End: 2021-01-01 | Stop reason: HOSPADM

## 2021-01-01 RX ORDER — FLUMAZENIL 0.1 MG/ML
0.2 INJECTION INTRAVENOUS AS NEEDED
Status: DISCONTINUED | OUTPATIENT
Start: 2021-01-01 | End: 2021-01-01

## 2021-01-01 RX ORDER — BISACODYL 10 MG
10 SUPPOSITORY, RECTAL RECTAL DAILY PRN
Status: DISCONTINUED | OUTPATIENT
Start: 2021-01-01 | End: 2021-01-01 | Stop reason: HOSPADM

## 2021-01-01 RX ORDER — ECHINACEA PURPUREA EXTRACT 125 MG
2 TABLET ORAL EVERY 6 HOURS PRN
COMMUNITY
End: 2021-01-01 | Stop reason: HOSPADM

## 2021-01-01 RX ORDER — PREDNISONE 20 MG/1
20 TABLET ORAL
Status: COMPLETED | OUTPATIENT
Start: 2021-01-01 | End: 2021-01-01

## 2021-01-01 RX ORDER — ERGOCALCIFEROL 1.25 MG/1
50000 CAPSULE ORAL WEEKLY
COMMUNITY
End: 2021-01-01 | Stop reason: HOSPADM

## 2021-01-01 RX ORDER — MONTELUKAST SODIUM 10 MG/1
10 TABLET ORAL NIGHTLY
Qty: 90 TABLET | Refills: 3 | Status: SHIPPED | OUTPATIENT
Start: 2021-01-01 | End: 2022-09-24

## 2021-01-01 RX ORDER — HYDROCODONE BITARTRATE AND ACETAMINOPHEN 7.5; 325 MG/1; MG/1
2 TABLET ORAL EVERY 4 HOURS PRN
Status: DISPENSED | OUTPATIENT
Start: 2021-01-01 | End: 2021-01-01

## 2021-01-01 RX ORDER — IPRATROPIUM BROMIDE AND ALBUTEROL SULFATE 2.5; .5 MG/3ML; MG/3ML
3 SOLUTION RESPIRATORY (INHALATION) ONCE
Status: COMPLETED | OUTPATIENT
Start: 2021-01-01 | End: 2021-01-01

## 2021-01-01 RX ORDER — LIDOCAINE 50 MG/G
2 PATCH TOPICAL NIGHTLY
Status: DISCONTINUED | OUTPATIENT
Start: 2021-01-01 | End: 2021-01-01 | Stop reason: HOSPADM

## 2021-01-01 RX ORDER — FLUMAZENIL 0.1 MG/ML
0.2 INJECTION INTRAVENOUS AS NEEDED
Status: DISCONTINUED | OUTPATIENT
Start: 2021-01-01 | End: 2021-01-01 | Stop reason: HOSPADM

## 2021-01-01 RX ORDER — ECHINACEA PURPUREA EXTRACT 125 MG
2 TABLET ORAL AS NEEDED
Status: DISCONTINUED | OUTPATIENT
Start: 2021-01-01 | End: 2021-01-01 | Stop reason: HOSPADM

## 2021-01-01 RX ORDER — BUDESONIDE AND FORMOTEROL FUMARATE DIHYDRATE 160; 4.5 UG/1; UG/1
2 AEROSOL RESPIRATORY (INHALATION)
Status: DISCONTINUED | OUTPATIENT
Start: 2021-01-01 | End: 2021-01-01 | Stop reason: HOSPADM

## 2021-01-01 RX ORDER — DOCUSATE SODIUM 100 MG/1
100 CAPSULE, LIQUID FILLED ORAL 2 TIMES DAILY
Status: DISCONTINUED | OUTPATIENT
Start: 2021-01-01 | End: 2021-01-01

## 2021-01-01 RX ORDER — OXYCODONE AND ACETAMINOPHEN 7.5; 325 MG/1; MG/1
1 TABLET ORAL EVERY 4 HOURS PRN
Qty: 50 TABLET | Refills: 0 | Status: SHIPPED | OUTPATIENT
Start: 2021-01-01 | End: 2021-01-01 | Stop reason: SDUPTHER

## 2021-01-01 RX ORDER — OXYCODONE HYDROCHLORIDE AND ACETAMINOPHEN 5; 325 MG/1; MG/1
1 TABLET ORAL EVERY 6 HOURS PRN
Qty: 30 TABLET | Refills: 0 | Status: ON HOLD | OUTPATIENT
Start: 2021-01-01 | End: 2021-01-01

## 2021-01-01 RX ORDER — OXYCODONE HYDROCHLORIDE AND ACETAMINOPHEN 5; 325 MG/1; MG/1
1 TABLET ORAL EVERY 6 HOURS PRN
Status: DISCONTINUED | OUTPATIENT
Start: 2021-01-01 | End: 2021-01-01 | Stop reason: HOSPADM

## 2021-01-01 RX ORDER — MONTELUKAST SODIUM 10 MG/1
10 TABLET ORAL NIGHTLY
Status: DISCONTINUED | OUTPATIENT
Start: 2021-01-01 | End: 2021-01-01

## 2021-01-01 RX ORDER — ACETAMINOPHEN 325 MG/1
650 TABLET ORAL EVERY 4 HOURS PRN
Status: DISCONTINUED | OUTPATIENT
Start: 2021-01-01 | End: 2021-01-01

## 2021-01-01 RX ORDER — FAMOTIDINE 20 MG/1
20 TABLET, FILM COATED ORAL
Status: DISCONTINUED | OUTPATIENT
Start: 2021-01-01 | End: 2021-01-01 | Stop reason: HOSPADM

## 2021-01-01 RX ORDER — LORAZEPAM 1 MG/1
1 TABLET ORAL
Status: DISCONTINUED | OUTPATIENT
Start: 2021-01-01 | End: 2021-01-01 | Stop reason: HOSPADM

## 2021-01-01 RX ORDER — ONDANSETRON 2 MG/ML
4 INJECTION INTRAMUSCULAR; INTRAVENOUS AS NEEDED
Status: DISCONTINUED | OUTPATIENT
Start: 2021-01-01 | End: 2021-01-01

## 2021-01-01 RX ORDER — DIPHENOXYLATE HYDROCHLORIDE AND ATROPINE SULFATE 2.5; .025 MG/1; MG/1
1 TABLET ORAL
Status: DISCONTINUED | OUTPATIENT
Start: 2021-01-01 | End: 2021-01-01 | Stop reason: HOSPADM

## 2021-01-01 RX ORDER — MORPHINE SULFATE 20 MG/ML
10 SOLUTION ORAL EVERY 6 HOURS SCHEDULED
Status: DISCONTINUED | OUTPATIENT
Start: 2021-01-01 | End: 2021-01-01 | Stop reason: HOSPADM

## 2021-01-01 RX ORDER — POLYETHYLENE GLYCOL 3350 17 G/17G
17 POWDER, FOR SOLUTION ORAL DAILY
Status: ON HOLD
Start: 2021-01-01 | End: 2021-01-01 | Stop reason: SDUPTHER

## 2021-01-01 RX ORDER — LABETALOL HYDROCHLORIDE 5 MG/ML
5 INJECTION, SOLUTION INTRAVENOUS
Status: DISCONTINUED | OUTPATIENT
Start: 2021-01-01 | End: 2021-01-01 | Stop reason: HOSPADM

## 2021-01-01 RX ORDER — DOCUSATE SODIUM 100 MG/1
100 CAPSULE, LIQUID FILLED ORAL 2 TIMES DAILY
Qty: 180 CAPSULE | Refills: 0 | Status: SHIPPED | OUTPATIENT
Start: 2021-01-01

## 2021-01-01 RX ORDER — OXYCODONE AND ACETAMINOPHEN 7.5; 325 MG/1; MG/1
1 TABLET ORAL EVERY 6 HOURS PRN
Qty: 12 TABLET | Refills: 0 | Status: SHIPPED | OUTPATIENT
Start: 2021-01-01 | End: 2021-01-01 | Stop reason: HOSPADM

## 2021-01-01 RX ORDER — ROPINIROLE 1 MG/1
1 TABLET, FILM COATED ORAL DAILY
Status: DISCONTINUED | OUTPATIENT
Start: 2021-01-01 | End: 2021-01-01

## 2021-01-01 RX ORDER — LEVOFLOXACIN 500 MG/1
500 TABLET, FILM COATED ORAL EVERY 24 HOURS
Status: DISCONTINUED | OUTPATIENT
Start: 2021-01-01 | End: 2021-01-01

## 2021-01-01 RX ORDER — NALOXONE HCL 0.4 MG/ML
0.4 VIAL (ML) INJECTION
Status: DISCONTINUED | OUTPATIENT
Start: 2021-01-01 | End: 2021-01-01 | Stop reason: HOSPADM

## 2021-01-01 RX ORDER — SODIUM POLYSTYRENE SULFONATE 15 G/60ML
15 SUSPENSION ORAL; RECTAL ONCE
Status: COMPLETED | OUTPATIENT
Start: 2021-01-01 | End: 2021-01-01

## 2021-01-01 RX ORDER — HYDROXYZINE HYDROCHLORIDE 25 MG/1
50 TABLET, FILM COATED ORAL EVERY 6 HOURS PRN
Status: DISCONTINUED | OUTPATIENT
Start: 2021-01-01 | End: 2021-01-01 | Stop reason: HOSPADM

## 2021-01-01 RX ORDER — LORAZEPAM 2 MG/ML
0.25 CONCENTRATE ORAL EVERY 6 HOURS
Status: DISCONTINUED | OUTPATIENT
Start: 2021-01-01 | End: 2021-01-01 | Stop reason: HOSPADM

## 2021-01-01 RX ORDER — ALBUTEROL SULFATE 2.5 MG/3ML
2.5 SOLUTION RESPIRATORY (INHALATION) ONCE
Status: COMPLETED | OUTPATIENT
Start: 2021-01-01 | End: 2021-01-01

## 2021-01-01 RX ORDER — BUDESONIDE AND FORMOTEROL FUMARATE DIHYDRATE 160; 4.5 UG/1; UG/1
2 AEROSOL RESPIRATORY (INHALATION)
Status: DISCONTINUED | OUTPATIENT
Start: 2021-01-01 | End: 2021-01-01 | Stop reason: ALTCHOICE

## 2021-01-01 RX ORDER — PREDNISONE 20 MG/1
40 TABLET ORAL DAILY
Status: DISCONTINUED | OUTPATIENT
Start: 2021-01-01 | End: 2021-01-01

## 2021-01-01 RX ORDER — ACETAMINOPHEN 650 MG/1
650 SUPPOSITORY RECTAL EVERY 4 HOURS PRN
Status: DISCONTINUED | OUTPATIENT
Start: 2021-01-01 | End: 2021-01-01 | Stop reason: HOSPADM

## 2021-01-01 RX ORDER — ROPINIROLE 2 MG/1
2 TABLET, FILM COATED ORAL NIGHTLY
Start: 2021-01-01 | End: 2021-01-01 | Stop reason: HOSPADM

## 2021-01-01 RX ORDER — FLUTICASONE PROPIONATE 50 MCG
2 SPRAY, SUSPENSION (ML) NASAL DAILY
COMMUNITY
End: 2021-01-01 | Stop reason: HOSPADM

## 2021-01-01 RX ORDER — PREDNISONE 20 MG/1
TABLET ORAL
Qty: 30 TABLET | Refills: 0 | Status: SHIPPED | OUTPATIENT
Start: 2021-01-01 | End: 2021-01-01

## 2021-01-01 RX ORDER — HYDROMORPHONE HYDROCHLORIDE 1 MG/ML
0.5 INJECTION, SOLUTION INTRAMUSCULAR; INTRAVENOUS; SUBCUTANEOUS
Status: DISCONTINUED | OUTPATIENT
Start: 2021-01-01 | End: 2021-01-01 | Stop reason: SDUPTHER

## 2021-01-01 RX ORDER — SODIUM CHLORIDE 9 MG/ML
40 INJECTION, SOLUTION INTRAVENOUS CONTINUOUS
Status: DISCONTINUED | OUTPATIENT
Start: 2021-01-01 | End: 2021-01-01

## 2021-01-01 RX ORDER — FLUTICASONE PROPIONATE 50 MCG
1 SPRAY, SUSPENSION (ML) NASAL DAILY
Status: DISCONTINUED | OUTPATIENT
Start: 2021-01-01 | End: 2021-01-01 | Stop reason: HOSPADM

## 2021-01-01 RX ORDER — ALBUTEROL SULFATE 90 UG/1
2 AEROSOL, METERED RESPIRATORY (INHALATION) EVERY 4 HOURS PRN
Status: ON HOLD | COMMUNITY
End: 2021-01-01 | Stop reason: SDUPTHER

## 2021-01-01 RX ORDER — LORAZEPAM 2 MG/ML
0.25 INJECTION INTRAMUSCULAR 3 TIMES DAILY PRN
Status: DISPENSED | OUTPATIENT
Start: 2021-01-01 | End: 2021-01-01

## 2021-01-01 RX ORDER — ONDANSETRON 4 MG/1
4 TABLET, FILM COATED ORAL EVERY 8 HOURS PRN
Qty: 20 TABLET | Refills: 1 | Status: ON HOLD | OUTPATIENT
Start: 2021-01-01 | End: 2021-01-01

## 2021-01-01 RX ORDER — MIDAZOLAM HYDROCHLORIDE 1 MG/ML
0.5 INJECTION INTRAMUSCULAR; INTRAVENOUS
Status: DISCONTINUED | OUTPATIENT
Start: 2021-01-01 | End: 2021-01-01 | Stop reason: HOSPADM

## 2021-01-01 RX ORDER — ROPIVACAINE HYDROCHLORIDE 5 MG/ML
INJECTION, SOLUTION EPIDURAL; INFILTRATION; PERINEURAL
Status: COMPLETED | OUTPATIENT
Start: 2021-01-01 | End: 2021-01-01

## 2021-01-01 RX ORDER — BUMETANIDE 0.25 MG/ML
1 INJECTION INTRAMUSCULAR; INTRAVENOUS ONCE
Status: COMPLETED | OUTPATIENT
Start: 2021-01-01 | End: 2021-01-01

## 2021-01-01 RX ORDER — ROPINIROLE 2 MG/1
4 TABLET, FILM COATED ORAL NIGHTLY
COMMUNITY
End: 2021-01-01 | Stop reason: HOSPADM

## 2021-01-01 RX ADMIN — SODIUM CHLORIDE, PRESERVATIVE FREE 10 ML: 5 INJECTION INTRAVENOUS at 08:00

## 2021-01-01 RX ADMIN — METOPROLOL TARTRATE 12.5 MG: 25 TABLET, FILM COATED ORAL at 08:26

## 2021-01-01 RX ADMIN — ROPINIROLE HYDROCHLORIDE 2 MG: 1 TABLET, FILM COATED ORAL at 20:09

## 2021-01-01 RX ADMIN — MONTELUKAST SODIUM 10 MG: 10 TABLET, FILM COATED ORAL at 21:16

## 2021-01-01 RX ADMIN — SODIUM CHLORIDE, PRESERVATIVE FREE 10 ML: 5 INJECTION INTRAVENOUS at 20:15

## 2021-01-01 RX ADMIN — CETIRIZINE HYDROCHLORIDE 10 MG: 10 TABLET ORAL at 08:02

## 2021-01-01 RX ADMIN — ROPINIROLE HYDROCHLORIDE 2 MG: 1 TABLET, FILM COATED ORAL at 20:51

## 2021-01-01 RX ADMIN — IPRATROPIUM BROMIDE 0.5 MG: 0.5 SOLUTION RESPIRATORY (INHALATION) at 06:32

## 2021-01-01 RX ADMIN — ROPINIROLE HYDROCHLORIDE 2 MG: 1 TABLET, FILM COATED ORAL at 11:31

## 2021-01-01 RX ADMIN — BUDESONIDE AND FORMOTEROL FUMARATE DIHYDRATE 2 PUFF: 160; 4.5 AEROSOL RESPIRATORY (INHALATION) at 19:50

## 2021-01-01 RX ADMIN — MONTELUKAST SODIUM 10 MG: 10 TABLET, FILM COATED ORAL at 22:12

## 2021-01-01 RX ADMIN — ACETAMINOPHEN 650 MG: 325 TABLET, FILM COATED ORAL at 08:48

## 2021-01-01 RX ADMIN — MORPHINE SULFATE 4 MG: 4 INJECTION INTRAVENOUS at 14:25

## 2021-01-01 RX ADMIN — CLONAZEPAM 0.5 MG: 0.5 TABLET ORAL at 21:44

## 2021-01-01 RX ADMIN — SODIUM CHLORIDE 40 ML/HR: 9 INJECTION, SOLUTION INTRAVENOUS at 13:15

## 2021-01-01 RX ADMIN — METHYLPREDNISOLONE SODIUM SUCCINATE 60 MG: 125 INJECTION, POWDER, FOR SOLUTION INTRAMUSCULAR; INTRAVENOUS at 11:32

## 2021-01-01 RX ADMIN — SODIUM CHLORIDE, PRESERVATIVE FREE 10 ML: 5 INJECTION INTRAVENOUS at 20:45

## 2021-01-01 RX ADMIN — APIXABAN 2.5 MG: 2.5 TABLET, FILM COATED ORAL at 08:48

## 2021-01-01 RX ADMIN — IPRATROPIUM BROMIDE AND ALBUTEROL SULFATE 3 ML: 2.5; .5 SOLUTION RESPIRATORY (INHALATION) at 10:23

## 2021-01-01 RX ADMIN — DOCUSATE SODIUM 100 MG: 100 CAPSULE ORAL at 21:31

## 2021-01-01 RX ADMIN — FLUTICASONE PROPIONATE 2 SPRAY: 50 SPRAY, METERED NASAL at 20:50

## 2021-01-01 RX ADMIN — METHYLPREDNISOLONE SODIUM SUCCINATE 60 MG: 125 INJECTION, POWDER, FOR SOLUTION INTRAMUSCULAR; INTRAVENOUS at 00:23

## 2021-01-01 RX ADMIN — GUAIFENESIN 1200 MG: 600 TABLET, EXTENDED RELEASE ORAL at 22:12

## 2021-01-01 RX ADMIN — IPRATROPIUM BROMIDE AND ALBUTEROL SULFATE 3 ML: 2.5; .5 SOLUTION RESPIRATORY (INHALATION) at 19:34

## 2021-01-01 RX ADMIN — PREDNISONE 40 MG: 20 TABLET ORAL at 08:57

## 2021-01-01 RX ADMIN — DOCUSATE SODIUM 100 MG: 100 CAPSULE, LIQUID FILLED ORAL at 20:47

## 2021-01-01 RX ADMIN — ONDANSETRON 4 MG: 2 INJECTION INTRAMUSCULAR; INTRAVENOUS at 13:07

## 2021-01-01 RX ADMIN — CETIRIZINE HYDROCHLORIDE 10 MG: 10 TABLET ORAL at 09:13

## 2021-01-01 RX ADMIN — APIXABAN 2.5 MG: 2.5 TABLET, FILM COATED ORAL at 08:08

## 2021-01-01 RX ADMIN — APIXABAN 2.5 MG: 2.5 TABLET, FILM COATED ORAL at 22:31

## 2021-01-01 RX ADMIN — METHYLPREDNISOLONE SODIUM SUCCINATE 40 MG: 40 INJECTION, POWDER, FOR SOLUTION INTRAMUSCULAR; INTRAVENOUS at 08:09

## 2021-01-01 RX ADMIN — APIXABAN 2.5 MG: 2.5 TABLET, FILM COATED ORAL at 08:46

## 2021-01-01 RX ADMIN — IOPAMIDOL 100 ML: 755 INJECTION, SOLUTION INTRAVENOUS at 17:02

## 2021-01-01 RX ADMIN — BUDESONIDE AND FORMOTEROL FUMARATE DIHYDRATE 2 PUFF: 80; 4.5 AEROSOL RESPIRATORY (INHALATION) at 21:07

## 2021-01-01 RX ADMIN — OXYCODONE HYDROCHLORIDE AND ACETAMINOPHEN 1 TABLET: 5; 325 TABLET ORAL at 17:48

## 2021-01-01 RX ADMIN — FLUTICASONE PROPIONATE 2 SPRAY: 50 SPRAY, METERED NASAL at 20:48

## 2021-01-01 RX ADMIN — DOCUSATE SODIUM 100 MG: 100 CAPSULE ORAL at 08:52

## 2021-01-01 RX ADMIN — BUDESONIDE 0.25 MG: 0.25 INHALANT RESPIRATORY (INHALATION) at 06:45

## 2021-01-01 RX ADMIN — BUDESONIDE 0.25 MG: 0.25 INHALANT RESPIRATORY (INHALATION) at 20:02

## 2021-01-01 RX ADMIN — MORPHINE SULFATE 10 MG: 20 SOLUTION ORAL at 11:35

## 2021-01-01 RX ADMIN — DOCUSATE SODIUM 100 MG: 100 CAPSULE, LIQUID FILLED ORAL at 09:17

## 2021-01-01 RX ADMIN — CEFAZOLIN SODIUM 2 G: 10 INJECTION, POWDER, FOR SOLUTION INTRAVENOUS at 11:19

## 2021-01-01 RX ADMIN — FAMOTIDINE 20 MG: 20 TABLET, FILM COATED ORAL at 08:13

## 2021-01-01 RX ADMIN — FAMOTIDINE 20 MG: 20 TABLET, FILM COATED ORAL at 09:43

## 2021-01-01 RX ADMIN — BUDESONIDE 0.25 MG: 0.25 INHALANT RESPIRATORY (INHALATION) at 06:23

## 2021-01-01 RX ADMIN — HYDROCODONE BITARTRATE AND ACETAMINOPHEN 1 TABLET: 7.5; 325 TABLET ORAL at 18:00

## 2021-01-01 RX ADMIN — FLUOXETINE 10 MG: 10 CAPSULE ORAL at 08:24

## 2021-01-01 RX ADMIN — FLUOXETINE 10 MG: 10 CAPSULE ORAL at 09:46

## 2021-01-01 RX ADMIN — BUDESONIDE AND FORMOTEROL FUMARATE DIHYDRATE 2 PUFF: 160; 4.5 AEROSOL RESPIRATORY (INHALATION) at 07:17

## 2021-01-01 RX ADMIN — SODIUM CHLORIDE, PRESERVATIVE FREE 10 ML: 5 INJECTION INTRAVENOUS at 08:49

## 2021-01-01 RX ADMIN — LIDOCAINE 1 PATCH: 50 PATCH CUTANEOUS at 20:47

## 2021-01-01 RX ADMIN — FAMOTIDINE 20 MG: 20 TABLET, FILM COATED ORAL at 18:44

## 2021-01-01 RX ADMIN — GUAIFENESIN 600 MG: 600 TABLET, EXTENDED RELEASE ORAL at 09:07

## 2021-01-01 RX ADMIN — GUAIFENESIN 1200 MG: 600 TABLET, EXTENDED RELEASE ORAL at 20:37

## 2021-01-01 RX ADMIN — IPRATROPIUM BROMIDE 0.5 MG: 0.5 SOLUTION RESPIRATORY (INHALATION) at 06:42

## 2021-01-01 RX ADMIN — SODIUM CHLORIDE, PRESERVATIVE FREE 10 ML: 5 INJECTION INTRAVENOUS at 08:28

## 2021-01-01 RX ADMIN — IPRATROPIUM BROMIDE 0.5 MG: 0.5 SOLUTION RESPIRATORY (INHALATION) at 15:24

## 2021-01-01 RX ADMIN — IPRATROPIUM BROMIDE AND ALBUTEROL SULFATE 3 ML: 2.5; .5 SOLUTION RESPIRATORY (INHALATION) at 03:09

## 2021-01-01 RX ADMIN — LORAZEPAM 1 MG: 1 TABLET ORAL at 23:37

## 2021-01-01 RX ADMIN — ROPINIROLE HYDROCHLORIDE 2 MG: 1 TABLET, FILM COATED ORAL at 20:21

## 2021-01-01 RX ADMIN — LEVOFLOXACIN 500 MG: 500 TABLET, FILM COATED ORAL at 08:24

## 2021-01-01 RX ADMIN — ENOXAPARIN SODIUM 40 MG: 40 INJECTION SUBCUTANEOUS at 09:05

## 2021-01-01 RX ADMIN — SODIUM CHLORIDE, PRESERVATIVE FREE 10 ML: 5 INJECTION INTRAVENOUS at 08:20

## 2021-01-01 RX ADMIN — GUAIFENESIN 1200 MG: 600 TABLET, EXTENDED RELEASE ORAL at 20:49

## 2021-01-01 RX ADMIN — HYDROCODONE BITARTRATE AND ACETAMINOPHEN 1 TABLET: 7.5; 325 TABLET ORAL at 14:21

## 2021-01-01 RX ADMIN — FAMOTIDINE 20 MG: 20 TABLET, FILM COATED ORAL at 08:02

## 2021-01-01 RX ADMIN — GUAIFENESIN 1200 MG: 600 TABLET, EXTENDED RELEASE ORAL at 08:01

## 2021-01-01 RX ADMIN — FLUOXETINE 10 MG: 10 CAPSULE ORAL at 08:22

## 2021-01-01 RX ADMIN — MONTELUKAST SODIUM 10 MG: 10 TABLET, FILM COATED ORAL at 21:00

## 2021-01-01 RX ADMIN — GUAIFENESIN 600 MG: 600 TABLET, EXTENDED RELEASE ORAL at 08:48

## 2021-01-01 RX ADMIN — GUAIFENESIN 1200 MG: 600 TABLET, EXTENDED RELEASE ORAL at 08:29

## 2021-01-01 RX ADMIN — GUAIFENESIN 1200 MG: 600 TABLET, EXTENDED RELEASE ORAL at 08:04

## 2021-01-01 RX ADMIN — BUDESONIDE AND FORMOTEROL FUMARATE DIHYDRATE 2 PUFF: 160; 4.5 AEROSOL RESPIRATORY (INHALATION) at 06:50

## 2021-01-01 RX ADMIN — APIXABAN 2.5 MG: 2.5 TABLET, FILM COATED ORAL at 19:52

## 2021-01-01 RX ADMIN — BUDESONIDE 0.25 MG: 0.25 INHALANT RESPIRATORY (INHALATION) at 21:05

## 2021-01-01 RX ADMIN — HYDROCODONE BITARTRATE AND ACETAMINOPHEN 2 TABLET: 7.5; 325 TABLET ORAL at 15:33

## 2021-01-01 RX ADMIN — FLUOXETINE 10 MG: 10 CAPSULE ORAL at 20:48

## 2021-01-01 RX ADMIN — FAMOTIDINE 20 MG: 10 INJECTION INTRAVENOUS at 20:29

## 2021-01-01 RX ADMIN — DOCUSATE SODIUM 50 MG AND SENNOSIDES 8.6 MG 1 TABLET: 8.6; 5 TABLET, FILM COATED ORAL at 13:58

## 2021-01-01 RX ADMIN — OXYCODONE HYDROCHLORIDE AND ACETAMINOPHEN 1 TABLET: 5; 325 TABLET ORAL at 21:44

## 2021-01-01 RX ADMIN — APIXABAN 2.5 MG: 2.5 TABLET, FILM COATED ORAL at 08:43

## 2021-01-01 RX ADMIN — FLUOXETINE 10 MG: 10 CAPSULE ORAL at 08:01

## 2021-01-01 RX ADMIN — BUDESONIDE AND FORMOTEROL FUMARATE DIHYDRATE 2 PUFF: 80; 4.5 AEROSOL RESPIRATORY (INHALATION) at 19:17

## 2021-01-01 RX ADMIN — GUAIFENESIN 1200 MG: 600 TABLET, EXTENDED RELEASE ORAL at 08:13

## 2021-01-01 RX ADMIN — MONTELUKAST SODIUM 10 MG: 10 TABLET, FILM COATED ORAL at 20:28

## 2021-01-01 RX ADMIN — ENOXAPARIN SODIUM 40 MG: 40 INJECTION SUBCUTANEOUS at 08:16

## 2021-01-01 RX ADMIN — BUDESONIDE AND FORMOTEROL FUMARATE DIHYDRATE 2 PUFF: 160; 4.5 AEROSOL RESPIRATORY (INHALATION) at 19:34

## 2021-01-01 RX ADMIN — IPRATROPIUM BROMIDE AND ALBUTEROL SULFATE 3 ML: 2.5; .5 SOLUTION RESPIRATORY (INHALATION) at 19:50

## 2021-01-01 RX ADMIN — FAMOTIDINE 20 MG: 20 TABLET, FILM COATED ORAL at 08:30

## 2021-01-01 RX ADMIN — CEFDINIR 300 MG: 300 CAPSULE ORAL at 08:16

## 2021-01-01 RX ADMIN — FLUOXETINE 10 MG: 10 CAPSULE ORAL at 09:13

## 2021-01-01 RX ADMIN — BUDESONIDE AND FORMOTEROL FUMARATE DIHYDRATE 2 PUFF: 160; 4.5 AEROSOL RESPIRATORY (INHALATION) at 06:01

## 2021-01-01 RX ADMIN — FAMOTIDINE 20 MG: 20 TABLET, FILM COATED ORAL at 08:21

## 2021-01-01 RX ADMIN — GUAIFENESIN 1200 MG: 600 TABLET, EXTENDED RELEASE ORAL at 19:52

## 2021-01-01 RX ADMIN — SODIUM CHLORIDE, PRESERVATIVE FREE 10 ML: 5 INJECTION INTRAVENOUS at 08:46

## 2021-01-01 RX ADMIN — HYDROCODONE BITARTRATE AND ACETAMINOPHEN 1 TABLET: 7.5; 325 TABLET ORAL at 08:13

## 2021-01-01 RX ADMIN — HYDROCODONE BITARTRATE AND ACETAMINOPHEN 1 TABLET: 7.5; 325 TABLET ORAL at 08:30

## 2021-01-01 RX ADMIN — IPRATROPIUM BROMIDE AND ALBUTEROL SULFATE 3 ML: 2.5; .5 SOLUTION RESPIRATORY (INHALATION) at 09:30

## 2021-01-01 RX ADMIN — SODIUM CHLORIDE, PRESERVATIVE FREE 10 ML: 5 INJECTION INTRAVENOUS at 09:44

## 2021-01-01 RX ADMIN — FUROSEMIDE 20 MG: 10 INJECTION, SOLUTION INTRAVENOUS at 20:48

## 2021-01-01 RX ADMIN — GUAIFENESIN 600 MG: 600 TABLET, EXTENDED RELEASE ORAL at 09:00

## 2021-01-01 RX ADMIN — DOCUSATE SODIUM 100 MG: 100 CAPSULE, LIQUID FILLED ORAL at 20:44

## 2021-01-01 RX ADMIN — LEVOFLOXACIN 500 MG: 5 INJECTION, SOLUTION INTRAVENOUS at 15:25

## 2021-01-01 RX ADMIN — METHYLPREDNISOLONE SODIUM SUCCINATE 40 MG: 40 INJECTION, POWDER, FOR SOLUTION INTRAMUSCULAR; INTRAVENOUS at 20:49

## 2021-01-01 RX ADMIN — DOCUSATE SODIUM 100 MG: 100 CAPSULE, LIQUID FILLED ORAL at 21:00

## 2021-01-01 RX ADMIN — MORPHINE SULFATE 1 MG: 2 INJECTION, SOLUTION INTRAMUSCULAR; INTRAVENOUS at 17:51

## 2021-01-01 RX ADMIN — IPRATROPIUM BROMIDE 0.5 MG: 0.5 SOLUTION RESPIRATORY (INHALATION) at 10:44

## 2021-01-01 RX ADMIN — METHYLPREDNISOLONE SODIUM SUCCINATE 40 MG: 40 INJECTION, POWDER, FOR SOLUTION INTRAMUSCULAR; INTRAVENOUS at 21:47

## 2021-01-01 RX ADMIN — IPRATROPIUM BROMIDE AND ALBUTEROL SULFATE 3 ML: 2.5; .5 SOLUTION RESPIRATORY (INHALATION) at 02:53

## 2021-01-01 RX ADMIN — APIXABAN 2.5 MG: 2.5 TABLET, FILM COATED ORAL at 21:31

## 2021-01-01 RX ADMIN — FENTANYL CITRATE 50 MCG: 50 INJECTION, SOLUTION INTRAMUSCULAR; INTRAVENOUS at 19:08

## 2021-01-01 RX ADMIN — OXYCODONE HYDROCHLORIDE AND ACETAMINOPHEN 1 TABLET: 5; 325 TABLET ORAL at 21:16

## 2021-01-01 RX ADMIN — CARVEDILOL 3.12 MG: 3.12 TABLET, FILM COATED ORAL at 08:20

## 2021-01-01 RX ADMIN — IPRATROPIUM BROMIDE 0.5 MG: 0.5 SOLUTION RESPIRATORY (INHALATION) at 20:25

## 2021-01-01 RX ADMIN — DOCUSATE SODIUM 100 MG: 100 CAPSULE, LIQUID FILLED ORAL at 08:59

## 2021-01-01 RX ADMIN — CEFEPIME HYDROCHLORIDE 1 G: 1 INJECTION, POWDER, FOR SOLUTION INTRAMUSCULAR; INTRAVENOUS at 08:26

## 2021-01-01 RX ADMIN — IPRATROPIUM BROMIDE 0.5 MG: 0.5 SOLUTION RESPIRATORY (INHALATION) at 06:33

## 2021-01-01 RX ADMIN — MONTELUKAST SODIUM 10 MG: 10 TABLET, FILM COATED ORAL at 20:47

## 2021-01-01 RX ADMIN — BUDESONIDE AND FORMOTEROL FUMARATE DIHYDRATE 2 PUFF: 160; 4.5 AEROSOL RESPIRATORY (INHALATION) at 19:32

## 2021-01-01 RX ADMIN — FUROSEMIDE 20 MG: 10 INJECTION, SOLUTION INTRAVENOUS at 08:44

## 2021-01-01 RX ADMIN — FLUTICASONE PROPIONATE 1 SPRAY: 50 SPRAY, METERED NASAL at 08:01

## 2021-01-01 RX ADMIN — HYDROCODONE BITARTRATE AND ACETAMINOPHEN 1 TABLET: 5; 325 TABLET ORAL at 06:25

## 2021-01-01 RX ADMIN — SODIUM CHLORIDE, PRESERVATIVE FREE 10 ML: 5 INJECTION INTRAVENOUS at 13:27

## 2021-01-01 RX ADMIN — MONTELUKAST SODIUM 10 MG: 10 TABLET, FILM COATED ORAL at 22:05

## 2021-01-01 RX ADMIN — HYDROCODONE BITARTRATE AND ACETAMINOPHEN 1 TABLET: 7.5; 325 TABLET ORAL at 20:37

## 2021-01-01 RX ADMIN — CETIRIZINE HYDROCHLORIDE 10 MG: 10 TABLET ORAL at 09:27

## 2021-01-01 RX ADMIN — FLUOXETINE 10 MG: 10 CAPSULE ORAL at 08:13

## 2021-01-01 RX ADMIN — METHYLPREDNISOLONE SODIUM SUCCINATE 40 MG: 40 INJECTION, POWDER, FOR SOLUTION INTRAMUSCULAR; INTRAVENOUS at 20:29

## 2021-01-01 RX ADMIN — ROPINIROLE HYDROCHLORIDE 2 MG: 1 TABLET, FILM COATED ORAL at 19:51

## 2021-01-01 RX ADMIN — HYDROCODONE BITARTRATE AND ACETAMINOPHEN 1 TABLET: 7.5; 325 TABLET ORAL at 16:49

## 2021-01-01 RX ADMIN — METHYLPREDNISOLONE SODIUM SUCCINATE 40 MG: 40 INJECTION, POWDER, FOR SOLUTION INTRAMUSCULAR; INTRAVENOUS at 18:05

## 2021-01-01 RX ADMIN — HYDROCODONE BITARTRATE AND ACETAMINOPHEN 1 TABLET: 7.5; 325 TABLET ORAL at 08:11

## 2021-01-01 RX ADMIN — SODIUM CHLORIDE, PRESERVATIVE FREE 10 ML: 5 INJECTION INTRAVENOUS at 20:48

## 2021-01-01 RX ADMIN — HYDROCODONE BITARTRATE AND ACETAMINOPHEN 1 TABLET: 7.5; 325 TABLET ORAL at 17:58

## 2021-01-01 RX ADMIN — IPRATROPIUM BROMIDE 0.5 MG: 0.5 SOLUTION RESPIRATORY (INHALATION) at 06:14

## 2021-01-01 RX ADMIN — FLUTICASONE PROPIONATE 2 SPRAY: 50 SPRAY, METERED NASAL at 08:03

## 2021-01-01 RX ADMIN — CETIRIZINE HYDROCHLORIDE 10 MG: 10 TABLET ORAL at 08:21

## 2021-01-01 RX ADMIN — SODIUM CHLORIDE, PRESERVATIVE FREE 10 ML: 5 INJECTION INTRAVENOUS at 20:37

## 2021-01-01 RX ADMIN — PREDNISONE 40 MG: 20 TABLET ORAL at 08:16

## 2021-01-01 RX ADMIN — IPRATROPIUM BROMIDE AND ALBUTEROL SULFATE 3 ML: 2.5; .5 SOLUTION RESPIRATORY (INHALATION) at 23:55

## 2021-01-01 RX ADMIN — SODIUM CHLORIDE, PRESERVATIVE FREE 10 ML: 5 INJECTION INTRAVENOUS at 21:00

## 2021-01-01 RX ADMIN — BUDESONIDE AND FORMOTEROL FUMARATE DIHYDRATE 2 PUFF: 80; 4.5 AEROSOL RESPIRATORY (INHALATION) at 18:28

## 2021-01-01 RX ADMIN — BUDESONIDE AND FORMOTEROL FUMARATE DIHYDRATE 2 PUFF: 160; 4.5 AEROSOL RESPIRATORY (INHALATION) at 06:55

## 2021-01-01 RX ADMIN — APIXABAN 2.5 MG: 2.5 TABLET, FILM COATED ORAL at 08:13

## 2021-01-01 RX ADMIN — HYDROCODONE BITARTRATE AND ACETAMINOPHEN 1 TABLET: 5; 325 TABLET ORAL at 10:24

## 2021-01-01 RX ADMIN — IPRATROPIUM BROMIDE AND ALBUTEROL SULFATE 3 ML: 2.5; .5 SOLUTION RESPIRATORY (INHALATION) at 15:26

## 2021-01-01 RX ADMIN — ROPINIROLE HYDROCHLORIDE 2 MG: 1 TABLET, FILM COATED ORAL at 20:48

## 2021-01-01 RX ADMIN — MORPHINE SULFATE 4 MG: 4 INJECTION INTRAVENOUS at 13:08

## 2021-01-01 RX ADMIN — SODIUM CHLORIDE, PRESERVATIVE FREE 10 ML: 5 INJECTION INTRAVENOUS at 20:50

## 2021-01-01 RX ADMIN — IPRATROPIUM BROMIDE AND ALBUTEROL SULFATE 3 ML: 2.5; .5 SOLUTION RESPIRATORY (INHALATION) at 20:30

## 2021-01-01 RX ADMIN — IPRATROPIUM BROMIDE AND ALBUTEROL SULFATE 3 ML: 2.5; .5 SOLUTION RESPIRATORY (INHALATION) at 11:53

## 2021-01-01 RX ADMIN — DOCUSATE SODIUM 100 MG: 100 CAPSULE ORAL at 08:44

## 2021-01-01 RX ADMIN — CARVEDILOL 3.12 MG: 3.12 TABLET, FILM COATED ORAL at 20:47

## 2021-01-01 RX ADMIN — METOPROLOL TARTRATE 12.5 MG: 25 TABLET, FILM COATED ORAL at 11:59

## 2021-01-01 RX ADMIN — IPRATROPIUM BROMIDE 0.5 MG: 0.5 SOLUTION RESPIRATORY (INHALATION) at 19:10

## 2021-01-01 RX ADMIN — APIXABAN 2.5 MG: 2.5 TABLET, FILM COATED ORAL at 22:25

## 2021-01-01 RX ADMIN — APIXABAN 2.5 MG: 2.5 TABLET, FILM COATED ORAL at 08:20

## 2021-01-01 RX ADMIN — DOCUSATE SODIUM 100 MG: 100 CAPSULE ORAL at 08:21

## 2021-01-01 RX ADMIN — FAMOTIDINE 20 MG: 20 TABLET, FILM COATED ORAL at 09:00

## 2021-01-01 RX ADMIN — ROPINIROLE HYDROCHLORIDE 2 MG: 1 TABLET, FILM COATED ORAL at 20:23

## 2021-01-01 RX ADMIN — IPRATROPIUM BROMIDE AND ALBUTEROL SULFATE 3 ML: 2.5; .5 SOLUTION RESPIRATORY (INHALATION) at 00:10

## 2021-01-01 RX ADMIN — APIXABAN 2.5 MG: 2.5 TABLET, FILM COATED ORAL at 21:34

## 2021-01-01 RX ADMIN — CETIRIZINE HYDROCHLORIDE 10 MG: 10 TABLET ORAL at 09:00

## 2021-01-01 RX ADMIN — IPRATROPIUM BROMIDE AND ALBUTEROL SULFATE 3 ML: 2.5; .5 SOLUTION RESPIRATORY (INHALATION) at 11:18

## 2021-01-01 RX ADMIN — DOCUSATE SODIUM 100 MG: 100 CAPSULE ORAL at 08:45

## 2021-01-01 RX ADMIN — METHYLPREDNISOLONE SODIUM SUCCINATE 40 MG: 40 INJECTION, POWDER, FOR SOLUTION INTRAMUSCULAR; INTRAVENOUS at 10:18

## 2021-01-01 RX ADMIN — HYDROXYZINE HYDROCHLORIDE 50 MG: 25 TABLET ORAL at 16:22

## 2021-01-01 RX ADMIN — OXYCODONE HYDROCHLORIDE AND ACETAMINOPHEN 1 TABLET: 5; 325 TABLET ORAL at 01:14

## 2021-01-01 RX ADMIN — GUAIFENESIN 1200 MG: 600 TABLET, EXTENDED RELEASE ORAL at 20:28

## 2021-01-01 RX ADMIN — CARVEDILOL 3.12 MG: 3.12 TABLET, FILM COATED ORAL at 18:19

## 2021-01-01 RX ADMIN — BUDESONIDE 0.25 MG: 0.25 INHALANT RESPIRATORY (INHALATION) at 18:37

## 2021-01-01 RX ADMIN — GUAIFENESIN 600 MG: 600 TABLET, EXTENDED RELEASE ORAL at 21:16

## 2021-01-01 RX ADMIN — DOCUSATE SODIUM 100 MG: 100 CAPSULE, LIQUID FILLED ORAL at 08:47

## 2021-01-01 RX ADMIN — IPRATROPIUM BROMIDE AND ALBUTEROL SULFATE 3 ML: 2.5; .5 SOLUTION RESPIRATORY (INHALATION) at 15:20

## 2021-01-01 RX ADMIN — FAMOTIDINE 20 MG: 20 TABLET, FILM COATED ORAL at 08:52

## 2021-01-01 RX ADMIN — ROPINIROLE HYDROCHLORIDE 2 MG: 1 TABLET, FILM COATED ORAL at 21:27

## 2021-01-01 RX ADMIN — IPRATROPIUM BROMIDE 0.5 MG: 0.5 SOLUTION RESPIRATORY (INHALATION) at 14:40

## 2021-01-01 RX ADMIN — CETIRIZINE HYDROCHLORIDE 10 MG: 10 TABLET ORAL at 08:44

## 2021-01-01 RX ADMIN — GUAIFENESIN 600 MG: 600 TABLET, EXTENDED RELEASE ORAL at 09:43

## 2021-01-01 RX ADMIN — SODIUM CHLORIDE, PRESERVATIVE FREE 10 ML: 5 INJECTION INTRAVENOUS at 21:15

## 2021-01-01 RX ADMIN — IPRATROPIUM BROMIDE 0.5 MG: 0.5 SOLUTION RESPIRATORY (INHALATION) at 09:57

## 2021-01-01 RX ADMIN — FUROSEMIDE 40 MG: 10 INJECTION, SOLUTION INTRAMUSCULAR; INTRAVENOUS at 17:30

## 2021-01-01 RX ADMIN — IPRATROPIUM BROMIDE AND ALBUTEROL SULFATE 3 ML: 2.5; .5 SOLUTION RESPIRATORY (INHALATION) at 18:28

## 2021-01-01 RX ADMIN — CETIRIZINE HYDROCHLORIDE 10 MG: 10 TABLET ORAL at 09:09

## 2021-01-01 RX ADMIN — ENOXAPARIN SODIUM 40 MG: 40 INJECTION SUBCUTANEOUS at 08:24

## 2021-01-01 RX ADMIN — AZITHROMYCIN 500 MG: 500 INJECTION, POWDER, LYOPHILIZED, FOR SOLUTION INTRAVENOUS at 17:07

## 2021-01-01 RX ADMIN — IPRATROPIUM BROMIDE 0.5 MG: 0.5 SOLUTION RESPIRATORY (INHALATION) at 14:37

## 2021-01-01 RX ADMIN — IPRATROPIUM BROMIDE AND ALBUTEROL SULFATE 3 ML: 2.5; .5 SOLUTION RESPIRATORY (INHALATION) at 23:29

## 2021-01-01 RX ADMIN — GUAIFENESIN 600 MG: 600 TABLET, EXTENDED RELEASE ORAL at 08:30

## 2021-01-01 RX ADMIN — IPRATROPIUM BROMIDE AND ALBUTEROL SULFATE 3 ML: 2.5; .5 SOLUTION RESPIRATORY (INHALATION) at 06:55

## 2021-01-01 RX ADMIN — CEFEPIME HYDROCHLORIDE 1 G: 1 INJECTION, POWDER, FOR SOLUTION INTRAMUSCULAR; INTRAVENOUS at 08:43

## 2021-01-01 RX ADMIN — INSULIN LISPRO 2 UNITS: 100 INJECTION, SOLUTION INTRAVENOUS; SUBCUTANEOUS at 11:48

## 2021-01-01 RX ADMIN — FUROSEMIDE 20 MG: 10 INJECTION, SOLUTION INTRAVENOUS at 17:37

## 2021-01-01 RX ADMIN — IPRATROPIUM BROMIDE 0.5 MG: 0.5 SOLUTION RESPIRATORY (INHALATION) at 06:44

## 2021-01-01 RX ADMIN — IPRATROPIUM BROMIDE AND ALBUTEROL SULFATE 3 ML: 2.5; .5 SOLUTION RESPIRATORY (INHALATION) at 02:37

## 2021-01-01 RX ADMIN — CETIRIZINE HYDROCHLORIDE 10 MG: 10 TABLET ORAL at 08:27

## 2021-01-01 RX ADMIN — IOPAMIDOL 75 ML: 755 INJECTION, SOLUTION INTRAVENOUS at 13:57

## 2021-01-01 RX ADMIN — IPRATROPIUM BROMIDE 0.5 MG: 0.5 SOLUTION RESPIRATORY (INHALATION) at 19:00

## 2021-01-01 RX ADMIN — MONTELUKAST SODIUM 10 MG: 10 TABLET, FILM COATED ORAL at 21:14

## 2021-01-01 RX ADMIN — BUDESONIDE AND FORMOTEROL FUMARATE DIHYDRATE 2 PUFF: 160; 4.5 AEROSOL RESPIRATORY (INHALATION) at 06:56

## 2021-01-01 RX ADMIN — IPRATROPIUM BROMIDE 0.5 MG: 0.5 SOLUTION RESPIRATORY (INHALATION) at 10:20

## 2021-01-01 RX ADMIN — APIXABAN 2.5 MG: 2.5 TABLET, FILM COATED ORAL at 08:26

## 2021-01-01 RX ADMIN — SODIUM CHLORIDE, PRESERVATIVE FREE 10 ML: 5 INJECTION INTRAVENOUS at 22:38

## 2021-01-01 RX ADMIN — SODIUM CHLORIDE 1 G: 900 INJECTION INTRAVENOUS at 22:04

## 2021-01-01 RX ADMIN — CETIRIZINE HYDROCHLORIDE 10 MG: 10 TABLET ORAL at 09:05

## 2021-01-01 RX ADMIN — CETIRIZINE HYDROCHLORIDE 10 MG: 10 TABLET ORAL at 08:24

## 2021-01-01 RX ADMIN — CARVEDILOL 3.12 MG: 3.12 TABLET, FILM COATED ORAL at 17:09

## 2021-01-01 RX ADMIN — FAMOTIDINE 20 MG: 20 TABLET, FILM COATED ORAL at 17:35

## 2021-01-01 RX ADMIN — SODIUM CHLORIDE, PRESERVATIVE FREE 10 ML: 5 INJECTION INTRAVENOUS at 00:03

## 2021-01-01 RX ADMIN — CARVEDILOL 3.12 MG: 3.12 TABLET, FILM COATED ORAL at 10:31

## 2021-01-01 RX ADMIN — SODIUM CHLORIDE, POTASSIUM CHLORIDE, SODIUM LACTATE AND CALCIUM CHLORIDE 100 ML/HR: 600; 310; 30; 20 INJECTION, SOLUTION INTRAVENOUS at 17:45

## 2021-01-01 RX ADMIN — FLUOXETINE 10 MG: 10 CAPSULE ORAL at 08:57

## 2021-01-01 RX ADMIN — OXYCODONE HYDROCHLORIDE AND ACETAMINOPHEN 1 TABLET: 5; 325 TABLET ORAL at 22:24

## 2021-01-01 RX ADMIN — LIDOCAINE 1 PATCH: 50 PATCH CUTANEOUS at 20:21

## 2021-01-01 RX ADMIN — IPRATROPIUM BROMIDE 0.5 MG: 0.5 SOLUTION RESPIRATORY (INHALATION) at 10:54

## 2021-01-01 RX ADMIN — METHYLPREDNISOLONE SODIUM SUCCINATE 60 MG: 125 INJECTION, POWDER, FOR SOLUTION INTRAMUSCULAR; INTRAVENOUS at 13:26

## 2021-01-01 RX ADMIN — HYDROCODONE BITARTRATE AND ACETAMINOPHEN 1 TABLET: 7.5; 325 TABLET ORAL at 09:59

## 2021-01-01 RX ADMIN — MORPHINE SULFATE 1 MG: 2 INJECTION, SOLUTION INTRAMUSCULAR; INTRAVENOUS at 14:31

## 2021-01-01 RX ADMIN — OXYCODONE HYDROCHLORIDE AND ACETAMINOPHEN 1 TABLET: 5; 325 TABLET ORAL at 08:52

## 2021-01-01 RX ADMIN — PREDNISONE 20 MG: 20 TABLET ORAL at 09:09

## 2021-01-01 RX ADMIN — CARVEDILOL 3.12 MG: 3.12 TABLET, FILM COATED ORAL at 08:13

## 2021-01-01 RX ADMIN — IPRATROPIUM BROMIDE AND ALBUTEROL SULFATE 3 ML: 2.5; .5 SOLUTION RESPIRATORY (INHALATION) at 10:52

## 2021-01-01 RX ADMIN — ROPINIROLE HYDROCHLORIDE 2 MG: 1 TABLET, FILM COATED ORAL at 21:50

## 2021-01-01 RX ADMIN — IPRATROPIUM BROMIDE 0.5 MG: 0.5 SOLUTION RESPIRATORY (INHALATION) at 11:33

## 2021-01-01 RX ADMIN — IPRATROPIUM BROMIDE AND ALBUTEROL SULFATE 3 ML: 2.5; .5 SOLUTION RESPIRATORY (INHALATION) at 10:16

## 2021-01-01 RX ADMIN — IPRATROPIUM BROMIDE AND ALBUTEROL SULFATE 3 ML: 2.5; .5 SOLUTION RESPIRATORY (INHALATION) at 20:03

## 2021-01-01 RX ADMIN — ROPINIROLE HYDROCHLORIDE 2 MG: 1 TABLET, FILM COATED ORAL at 20:47

## 2021-01-01 RX ADMIN — CARVEDILOL 3.12 MG: 3.12 TABLET, FILM COATED ORAL at 09:09

## 2021-01-01 RX ADMIN — IPRATROPIUM BROMIDE 0.5 MG: 0.5 SOLUTION RESPIRATORY (INHALATION) at 06:40

## 2021-01-01 RX ADMIN — FLUTICASONE PROPIONATE 2 SPRAY: 50 SPRAY, METERED NASAL at 09:13

## 2021-01-01 RX ADMIN — FAMOTIDINE 20 MG: 20 TABLET, FILM COATED ORAL at 17:24

## 2021-01-01 RX ADMIN — OXYCODONE HYDROCHLORIDE AND ACETAMINOPHEN 1 TABLET: 5; 325 TABLET ORAL at 09:12

## 2021-01-01 RX ADMIN — CEFDINIR 300 MG: 300 CAPSULE ORAL at 21:17

## 2021-01-01 RX ADMIN — IPRATROPIUM BROMIDE AND ALBUTEROL SULFATE 3 ML: 2.5; .5 SOLUTION RESPIRATORY (INHALATION) at 00:08

## 2021-01-01 RX ADMIN — BUDESONIDE AND FORMOTEROL FUMARATE DIHYDRATE 2 PUFF: 160; 4.5 AEROSOL RESPIRATORY (INHALATION) at 07:02

## 2021-01-01 RX ADMIN — HYDROCODONE BITARTRATE AND ACETAMINOPHEN 1 TABLET: 5; 325 TABLET ORAL at 16:11

## 2021-01-01 RX ADMIN — GUAIFENESIN 1200 MG: 600 TABLET, EXTENDED RELEASE ORAL at 08:45

## 2021-01-01 RX ADMIN — IPRATROPIUM BROMIDE AND ALBUTEROL SULFATE 3 ML: 2.5; .5 SOLUTION RESPIRATORY (INHALATION) at 14:18

## 2021-01-01 RX ADMIN — FUROSEMIDE 20 MG: 10 INJECTION, SOLUTION INTRAVENOUS at 06:25

## 2021-01-01 RX ADMIN — PREDNISONE 40 MG: 20 TABLET ORAL at 09:00

## 2021-01-01 RX ADMIN — IPRATROPIUM BROMIDE AND ALBUTEROL SULFATE 3 ML: 2.5; .5 SOLUTION RESPIRATORY (INHALATION) at 07:40

## 2021-01-01 RX ADMIN — DEXAMETHASONE SODIUM PHOSPHATE 4 MG: 4 INJECTION, SOLUTION INTRA-ARTICULAR; INTRALESIONAL; INTRAMUSCULAR; INTRAVENOUS; SOFT TISSUE at 19:11

## 2021-01-01 RX ADMIN — DOCUSATE SODIUM 100 MG: 100 CAPSULE ORAL at 20:09

## 2021-01-01 RX ADMIN — APIXABAN 2.5 MG: 2.5 TABLET, FILM COATED ORAL at 20:51

## 2021-01-01 RX ADMIN — CETIRIZINE HYDROCHLORIDE 10 MG: 10 TABLET ORAL at 08:57

## 2021-01-01 RX ADMIN — OXYCODONE HYDROCHLORIDE AND ACETAMINOPHEN 1 TABLET: 5; 325 TABLET ORAL at 17:38

## 2021-01-01 RX ADMIN — FUROSEMIDE 20 MG: 10 INJECTION, SOLUTION INTRAVENOUS at 08:27

## 2021-01-01 RX ADMIN — SODIUM CHLORIDE, PRESERVATIVE FREE 10 ML: 5 INJECTION INTRAVENOUS at 20:47

## 2021-01-01 RX ADMIN — LEVOFLOXACIN 500 MG: 500 TABLET, FILM COATED ORAL at 09:42

## 2021-01-01 RX ADMIN — CETIRIZINE HYDROCHLORIDE 10 MG: 10 TABLET ORAL at 08:52

## 2021-01-01 RX ADMIN — IPRATROPIUM BROMIDE 0.5 MG: 0.5 SOLUTION RESPIRATORY (INHALATION) at 21:05

## 2021-01-01 RX ADMIN — HYDROCODONE BITARTRATE AND ACETAMINOPHEN 1 TABLET: 7.5; 325 TABLET ORAL at 02:55

## 2021-01-01 RX ADMIN — METHYLPREDNISOLONE SODIUM SUCCINATE 60 MG: 125 INJECTION, POWDER, FOR SOLUTION INTRAMUSCULAR; INTRAVENOUS at 20:48

## 2021-01-01 RX ADMIN — METHYLPREDNISOLONE SODIUM SUCCINATE 40 MG: 40 INJECTION, POWDER, FOR SOLUTION INTRAMUSCULAR; INTRAVENOUS at 08:44

## 2021-01-01 RX ADMIN — ROPINIROLE HYDROCHLORIDE 4 MG: 1 TABLET, FILM COATED ORAL at 22:04

## 2021-01-01 RX ADMIN — BUDESONIDE AND FORMOTEROL FUMARATE DIHYDRATE 2 PUFF: 160; 4.5 AEROSOL RESPIRATORY (INHALATION) at 07:13

## 2021-01-01 RX ADMIN — SODIUM CHLORIDE, PRESERVATIVE FREE 10 ML: 5 INJECTION INTRAVENOUS at 03:35

## 2021-01-01 RX ADMIN — METHYLPREDNISOLONE SODIUM SUCCINATE 40 MG: 40 INJECTION, POWDER, FOR SOLUTION INTRAMUSCULAR; INTRAVENOUS at 17:32

## 2021-01-01 RX ADMIN — DOCUSATE SODIUM 50 MG AND SENNOSIDES 8.6 MG 1 TABLET: 8.6; 5 TABLET, FILM COATED ORAL at 22:38

## 2021-01-01 RX ADMIN — FLUOXETINE 10 MG: 10 CAPSULE ORAL at 08:46

## 2021-01-01 RX ADMIN — BUDESONIDE AND FORMOTEROL FUMARATE DIHYDRATE 2 PUFF: 80; 4.5 AEROSOL RESPIRATORY (INHALATION) at 06:32

## 2021-01-01 RX ADMIN — GUAIFENESIN 1200 MG: 600 TABLET, EXTENDED RELEASE ORAL at 08:52

## 2021-01-01 RX ADMIN — CEFAZOLIN SODIUM 2 G: 10 INJECTION, POWDER, FOR SOLUTION INTRAVENOUS at 18:53

## 2021-01-01 RX ADMIN — GUAIFENESIN 600 MG: 600 TABLET, EXTENDED RELEASE ORAL at 20:47

## 2021-01-01 RX ADMIN — ROPINIROLE HYDROCHLORIDE 2 MG: 1 TABLET, FILM COATED ORAL at 21:31

## 2021-01-01 RX ADMIN — FAMOTIDINE 20 MG: 20 TABLET, FILM COATED ORAL at 17:36

## 2021-01-01 RX ADMIN — METHYLPREDNISOLONE SODIUM SUCCINATE 60 MG: 125 INJECTION, POWDER, FOR SOLUTION INTRAMUSCULAR; INTRAVENOUS at 21:30

## 2021-01-01 RX ADMIN — OXYCODONE HYDROCHLORIDE AND ACETAMINOPHEN 1 TABLET: 5; 325 TABLET ORAL at 17:37

## 2021-01-01 RX ADMIN — HYDROCODONE BITARTRATE AND ACETAMINOPHEN 2 TABLET: 7.5; 325 TABLET ORAL at 11:41

## 2021-01-01 RX ADMIN — BUDESONIDE 0.25 MG: 0.25 INHALANT RESPIRATORY (INHALATION) at 06:42

## 2021-01-01 RX ADMIN — IPRATROPIUM BROMIDE AND ALBUTEROL SULFATE 3 ML: 2.5; .5 SOLUTION RESPIRATORY (INHALATION) at 11:23

## 2021-01-01 RX ADMIN — CETIRIZINE HYDROCHLORIDE 10 MG: 10 TABLET ORAL at 08:07

## 2021-01-01 RX ADMIN — LIDOCAINE 2 PATCH: 50 PATCH CUTANEOUS at 22:13

## 2021-01-01 RX ADMIN — OXYCODONE HYDROCHLORIDE AND ACETAMINOPHEN 1 TABLET: 5; 325 TABLET ORAL at 20:21

## 2021-01-01 RX ADMIN — DOCUSATE SODIUM 100 MG: 100 CAPSULE, LIQUID FILLED ORAL at 21:16

## 2021-01-01 RX ADMIN — BUDESONIDE AND FORMOTEROL FUMARATE DIHYDRATE 2 PUFF: 80; 4.5 AEROSOL RESPIRATORY (INHALATION) at 06:44

## 2021-01-01 RX ADMIN — PREDNISONE 40 MG: 20 TABLET ORAL at 13:01

## 2021-01-01 RX ADMIN — IPRATROPIUM BROMIDE 0.5 MG: 0.5 SOLUTION RESPIRATORY (INHALATION) at 14:47

## 2021-01-01 RX ADMIN — APIXABAN 2.5 MG: 2.5 TABLET, FILM COATED ORAL at 08:00

## 2021-01-01 RX ADMIN — MORPHINE SULFATE 2 MG: 2 INJECTION, SOLUTION INTRAMUSCULAR; INTRAVENOUS at 21:57

## 2021-01-01 RX ADMIN — LIDOCAINE 2 PATCH: 50 PATCH CUTANEOUS at 13:32

## 2021-01-01 RX ADMIN — PREDNISONE 40 MG: 20 TABLET ORAL at 08:52

## 2021-01-01 RX ADMIN — OXYCODONE HYDROCHLORIDE AND ACETAMINOPHEN 1 TABLET: 5; 325 TABLET ORAL at 08:16

## 2021-01-01 RX ADMIN — APIXABAN 2.5 MG: 2.5 TABLET, FILM COATED ORAL at 21:02

## 2021-01-01 RX ADMIN — GUAIFENESIN 1200 MG: 600 TABLET, EXTENDED RELEASE ORAL at 21:27

## 2021-01-01 RX ADMIN — GUAIFENESIN 600 MG: 600 TABLET, EXTENDED RELEASE ORAL at 08:16

## 2021-01-01 RX ADMIN — MONTELUKAST SODIUM 10 MG: 10 TABLET, FILM COATED ORAL at 21:27

## 2021-01-01 RX ADMIN — SODIUM CHLORIDE, PRESERVATIVE FREE 10 ML: 5 INJECTION INTRAVENOUS at 08:23

## 2021-01-01 RX ADMIN — GUAIFENESIN 1200 MG: 600 TABLET, EXTENDED RELEASE ORAL at 10:22

## 2021-01-01 RX ADMIN — SODIUM CHLORIDE, PRESERVATIVE FREE 10 ML: 5 INJECTION INTRAVENOUS at 21:31

## 2021-01-01 RX ADMIN — GUAIFENESIN 1200 MG: 600 TABLET, EXTENDED RELEASE ORAL at 22:24

## 2021-01-01 RX ADMIN — DOCUSATE SODIUM 100 MG: 100 CAPSULE, LIQUID FILLED ORAL at 22:05

## 2021-01-01 RX ADMIN — POLYETHYLENE GLYCOL 3350 17 G: 17 POWDER, FOR SOLUTION ORAL at 08:48

## 2021-01-01 RX ADMIN — LIDOCAINE 2 PATCH: 50 PATCH CUTANEOUS at 10:24

## 2021-01-01 RX ADMIN — DOCUSATE SODIUM 100 MG: 100 CAPSULE, LIQUID FILLED ORAL at 08:30

## 2021-01-01 RX ADMIN — FENTANYL CITRATE 50 MCG: 50 INJECTION, SOLUTION INTRAMUSCULAR; INTRAVENOUS at 17:58

## 2021-01-01 RX ADMIN — MORPHINE SULFATE 10 MG: 100 SOLUTION ORAL at 09:06

## 2021-01-01 RX ADMIN — IPRATROPIUM BROMIDE AND ALBUTEROL SULFATE 3 ML: 2.5; .5 SOLUTION RESPIRATORY (INHALATION) at 13:41

## 2021-01-01 RX ADMIN — APIXABAN 2.5 MG: 2.5 TABLET, FILM COATED ORAL at 21:42

## 2021-01-01 RX ADMIN — BUDESONIDE AND FORMOTEROL FUMARATE DIHYDRATE 2 PUFF: 160; 4.5 AEROSOL RESPIRATORY (INHALATION) at 19:30

## 2021-01-01 RX ADMIN — METOPROLOL TARTRATE 12.5 MG: 25 TABLET, FILM COATED ORAL at 09:07

## 2021-01-01 RX ADMIN — CETIRIZINE HYDROCHLORIDE 10 MG: 10 TABLET ORAL at 08:43

## 2021-01-01 RX ADMIN — APIXABAN 2.5 MG: 2.5 TABLET, FILM COATED ORAL at 08:29

## 2021-01-01 RX ADMIN — FLUTICASONE PROPIONATE 2 SPRAY: 50 SPRAY, METERED NASAL at 08:45

## 2021-01-01 RX ADMIN — IPRATROPIUM BROMIDE 0.5 MG: 0.5 SOLUTION RESPIRATORY (INHALATION) at 06:23

## 2021-01-01 RX ADMIN — SODIUM CHLORIDE, PRESERVATIVE FREE 10 ML: 5 INJECTION INTRAVENOUS at 20:06

## 2021-01-01 RX ADMIN — HYDROCODONE BITARTRATE AND ACETAMINOPHEN 2 TABLET: 7.5; 325 TABLET ORAL at 10:27

## 2021-01-01 RX ADMIN — IPRATROPIUM BROMIDE AND ALBUTEROL SULFATE 3 ML: 2.5; .5 SOLUTION RESPIRATORY (INHALATION) at 19:32

## 2021-01-01 RX ADMIN — CETIRIZINE HYDROCHLORIDE 10 MG: 10 TABLET ORAL at 09:17

## 2021-01-01 RX ADMIN — SODIUM CHLORIDE 75 ML/HR: 9 INJECTION, SOLUTION INTRAVENOUS at 00:03

## 2021-01-01 RX ADMIN — CETIRIZINE HYDROCHLORIDE 10 MG: 10 TABLET ORAL at 08:04

## 2021-01-01 RX ADMIN — BUDESONIDE AND FORMOTEROL FUMARATE DIHYDRATE 2 PUFF: 80; 4.5 AEROSOL RESPIRATORY (INHALATION) at 06:28

## 2021-01-01 RX ADMIN — LIDOCAINE 1 PATCH: 50 PATCH CUTANEOUS at 20:37

## 2021-01-01 RX ADMIN — CETIRIZINE HYDROCHLORIDE 10 MG: 10 TABLET ORAL at 10:31

## 2021-01-01 RX ADMIN — IPRATROPIUM BROMIDE AND ALBUTEROL SULFATE 3 ML: 2.5; .5 SOLUTION RESPIRATORY (INHALATION) at 02:30

## 2021-01-01 RX ADMIN — DOCUSATE SODIUM 100 MG: 100 CAPSULE ORAL at 08:27

## 2021-01-01 RX ADMIN — IPRATROPIUM BROMIDE AND ALBUTEROL SULFATE 3 ML: 2.5; .5 SOLUTION RESPIRATORY (INHALATION) at 00:01

## 2021-01-01 RX ADMIN — ROPINIROLE HYDROCHLORIDE 2 MG: 1 TABLET, FILM COATED ORAL at 00:04

## 2021-01-01 RX ADMIN — GUAIFENESIN 600 MG: 600 TABLET, EXTENDED RELEASE ORAL at 20:37

## 2021-01-01 RX ADMIN — IPRATROPIUM BROMIDE AND ALBUTEROL SULFATE 3 ML: 2.5; .5 SOLUTION RESPIRATORY (INHALATION) at 11:00

## 2021-01-01 RX ADMIN — IPRATROPIUM BROMIDE AND ALBUTEROL SULFATE 3 ML: 2.5; .5 SOLUTION RESPIRATORY (INHALATION) at 19:37

## 2021-01-01 RX ADMIN — BUDESONIDE AND FORMOTEROL FUMARATE DIHYDRATE 2 PUFF: 160; 4.5 AEROSOL RESPIRATORY (INHALATION) at 06:00

## 2021-01-01 RX ADMIN — GUAIFENESIN 1200 MG: 600 TABLET, EXTENDED RELEASE ORAL at 08:44

## 2021-01-01 RX ADMIN — DOCUSATE SODIUM 100 MG: 100 CAPSULE, LIQUID FILLED ORAL at 20:37

## 2021-01-01 RX ADMIN — ROPINIROLE HYDROCHLORIDE 4 MG: 1 TABLET, FILM COATED ORAL at 20:44

## 2021-01-01 RX ADMIN — ROPINIROLE HYDROCHLORIDE 2 MG: 1 TABLET, FILM COATED ORAL at 20:37

## 2021-01-01 RX ADMIN — FLUTICASONE PROPIONATE 2 SPRAY: 50 SPRAY, METERED NASAL at 08:28

## 2021-01-01 RX ADMIN — IPRATROPIUM BROMIDE AND ALBUTEROL SULFATE 3 ML: 2.5; .5 SOLUTION RESPIRATORY (INHALATION) at 06:44

## 2021-01-01 RX ADMIN — IPRATROPIUM BROMIDE AND ALBUTEROL SULFATE 3 ML: 2.5; .5 SOLUTION RESPIRATORY (INHALATION) at 07:13

## 2021-01-01 RX ADMIN — BUDESONIDE AND FORMOTEROL FUMARATE DIHYDRATE 2 PUFF: 160; 4.5 AEROSOL RESPIRATORY (INHALATION) at 20:03

## 2021-01-01 RX ADMIN — FAMOTIDINE 20 MG: 20 TABLET, FILM COATED ORAL at 17:37

## 2021-01-01 RX ADMIN — HYDROCODONE BITARTRATE AND ACETAMINOPHEN 1 TABLET: 5; 325 TABLET ORAL at 09:51

## 2021-01-01 RX ADMIN — SODIUM CHLORIDE, PRESERVATIVE FREE 10 ML: 5 INJECTION INTRAVENOUS at 10:25

## 2021-01-01 RX ADMIN — FLUTICASONE PROPIONATE 2 SPRAY: 50 SPRAY, METERED NASAL at 08:17

## 2021-01-01 RX ADMIN — PREDNISONE 40 MG: 20 TABLET ORAL at 08:02

## 2021-01-01 RX ADMIN — BUDESONIDE AND FORMOTEROL FUMARATE DIHYDRATE 2 PUFF: 80; 4.5 AEROSOL RESPIRATORY (INHALATION) at 18:25

## 2021-01-01 RX ADMIN — MONTELUKAST SODIUM 10 MG: 10 TABLET, FILM COATED ORAL at 20:49

## 2021-01-01 RX ADMIN — SODIUM CHLORIDE, PRESERVATIVE FREE 10 ML: 5 INJECTION INTRAVENOUS at 08:02

## 2021-01-01 RX ADMIN — MONTELUKAST SODIUM 10 MG: 10 TABLET, FILM COATED ORAL at 20:23

## 2021-01-01 RX ADMIN — IPRATROPIUM BROMIDE AND ALBUTEROL SULFATE 3 ML: 2.5; .5 SOLUTION RESPIRATORY (INHALATION) at 02:57

## 2021-01-01 RX ADMIN — HYDROCODONE BITARTRATE AND ACETAMINOPHEN 1 TABLET: 7.5; 325 TABLET ORAL at 09:07

## 2021-01-01 RX ADMIN — FLUTICASONE PROPIONATE 1 SPRAY: 50 SPRAY, METERED NASAL at 09:34

## 2021-01-01 RX ADMIN — BUDESONIDE 0.25 MG: 0.25 INHALANT RESPIRATORY (INHALATION) at 06:14

## 2021-01-01 RX ADMIN — IPRATROPIUM BROMIDE 0.5 MG: 0.5 SOLUTION RESPIRATORY (INHALATION) at 14:35

## 2021-01-01 RX ADMIN — INSULIN LISPRO 2 UNITS: 100 INJECTION, SOLUTION INTRAVENOUS; SUBCUTANEOUS at 17:09

## 2021-01-01 RX ADMIN — IPRATROPIUM BROMIDE 0.5 MG: 0.5 SOLUTION RESPIRATORY (INHALATION) at 10:29

## 2021-01-01 RX ADMIN — BUDESONIDE 0.25 MG: 0.25 INHALANT RESPIRATORY (INHALATION) at 20:25

## 2021-01-01 RX ADMIN — SODIUM CHLORIDE, PRESERVATIVE FREE 10 ML: 5 INJECTION INTRAVENOUS at 09:09

## 2021-01-01 RX ADMIN — IPRATROPIUM BROMIDE AND ALBUTEROL SULFATE 3 ML: 2.5; .5 SOLUTION RESPIRATORY (INHALATION) at 14:07

## 2021-01-01 RX ADMIN — FLUTICASONE PROPIONATE 2 SPRAY: 50 SPRAY, METERED NASAL at 08:49

## 2021-01-01 RX ADMIN — FUROSEMIDE 40 MG: 10 INJECTION, SOLUTION INTRAMUSCULAR; INTRAVENOUS at 03:35

## 2021-01-01 RX ADMIN — SODIUM CHLORIDE, PRESERVATIVE FREE 10 ML: 5 INJECTION INTRAVENOUS at 09:00

## 2021-01-01 RX ADMIN — SODIUM CHLORIDE 1 G: 900 INJECTION INTRAVENOUS at 20:47

## 2021-01-01 RX ADMIN — DOCUSATE SODIUM 100 MG: 100 CAPSULE ORAL at 20:37

## 2021-01-01 RX ADMIN — SODIUM CHLORIDE, PRESERVATIVE FREE 10 ML: 5 INJECTION INTRAVENOUS at 09:33

## 2021-01-01 RX ADMIN — FUROSEMIDE 20 MG: 10 INJECTION, SOLUTION INTRAMUSCULAR; INTRAVENOUS at 22:04

## 2021-01-01 RX ADMIN — METHYLPREDNISOLONE SODIUM SUCCINATE 40 MG: 40 INJECTION, POWDER, FOR SOLUTION INTRAMUSCULAR; INTRAVENOUS at 09:43

## 2021-01-01 RX ADMIN — GUAIFENESIN 600 MG: 600 TABLET, EXTENDED RELEASE ORAL at 22:05

## 2021-01-01 RX ADMIN — BUDESONIDE 0.25 MG: 0.25 INHALANT RESPIRATORY (INHALATION) at 19:09

## 2021-01-01 RX ADMIN — FUROSEMIDE 10 MG: 10 INJECTION, SOLUTION INTRAVENOUS at 21:50

## 2021-01-01 RX ADMIN — IPRATROPIUM BROMIDE 0.5 MG: 0.5 SOLUTION RESPIRATORY (INHALATION) at 14:49

## 2021-01-01 RX ADMIN — FAMOTIDINE 20 MG: 20 TABLET, FILM COATED ORAL at 08:27

## 2021-01-01 RX ADMIN — FLUOXETINE 10 MG: 10 CAPSULE ORAL at 08:52

## 2021-01-01 RX ADMIN — IPRATROPIUM BROMIDE AND ALBUTEROL SULFATE 3 ML: 2.5; .5 SOLUTION RESPIRATORY (INHALATION) at 20:02

## 2021-01-01 RX ADMIN — ENOXAPARIN SODIUM 40 MG: 40 INJECTION SUBCUTANEOUS at 09:00

## 2021-01-01 RX ADMIN — SODIUM CHLORIDE, PRESERVATIVE FREE 10 ML: 5 INJECTION INTRAVENOUS at 22:05

## 2021-01-01 RX ADMIN — ROPINIROLE HYDROCHLORIDE 1 MG: 1 TABLET, FILM COATED ORAL at 17:11

## 2021-01-01 RX ADMIN — APIXABAN 2.5 MG: 2.5 TABLET, FILM COATED ORAL at 10:31

## 2021-01-01 RX ADMIN — POLYETHYLENE GLYCOL 3350 17 G: 17 POWDER, FOR SOLUTION ORAL at 08:56

## 2021-01-01 RX ADMIN — IPRATROPIUM BROMIDE 0.5 MG: 0.5 SOLUTION RESPIRATORY (INHALATION) at 07:08

## 2021-01-01 RX ADMIN — DOCUSATE SODIUM 100 MG: 100 CAPSULE ORAL at 20:49

## 2021-01-01 RX ADMIN — METHYLPREDNISOLONE SODIUM SUCCINATE 40 MG: 40 INJECTION, POWDER, FOR SOLUTION INTRAMUSCULAR; INTRAVENOUS at 03:36

## 2021-01-01 RX ADMIN — CARVEDILOL 3.12 MG: 3.12 TABLET, FILM COATED ORAL at 17:35

## 2021-01-01 RX ADMIN — LEVOFLOXACIN 500 MG: 5 INJECTION, SOLUTION INTRAVENOUS at 14:50

## 2021-01-01 RX ADMIN — GUAIFENESIN 1200 MG: 600 TABLET, EXTENDED RELEASE ORAL at 21:31

## 2021-01-01 RX ADMIN — LIDOCAINE 2 PATCH: 50 PATCH CUTANEOUS at 08:59

## 2021-01-01 RX ADMIN — IPRATROPIUM BROMIDE AND ALBUTEROL SULFATE 3 ML: 2.5; .5 SOLUTION RESPIRATORY (INHALATION) at 19:30

## 2021-01-01 RX ADMIN — METHYLPREDNISOLONE SODIUM SUCCINATE 40 MG: 40 INJECTION, POWDER, FOR SOLUTION INTRAMUSCULAR; INTRAVENOUS at 02:16

## 2021-01-01 RX ADMIN — IPRATROPIUM BROMIDE AND ALBUTEROL SULFATE 3 ML: 2.5; .5 SOLUTION RESPIRATORY (INHALATION) at 06:59

## 2021-01-01 RX ADMIN — FAMOTIDINE 20 MG: 10 INJECTION INTRAVENOUS at 08:44

## 2021-01-01 RX ADMIN — POLYETHYLENE GLYCOL 3350 17 G: 17 POWDER, FOR SOLUTION ORAL at 08:05

## 2021-01-01 RX ADMIN — FLUOXETINE 10 MG: 10 CAPSULE ORAL at 09:08

## 2021-01-01 RX ADMIN — CLONAZEPAM 0.5 MG: 0.5 TABLET ORAL at 22:39

## 2021-01-01 RX ADMIN — GUAIFENESIN 1200 MG: 600 TABLET, EXTENDED RELEASE ORAL at 08:21

## 2021-01-01 RX ADMIN — ROPINIROLE HYDROCHLORIDE 2 MG: 1 TABLET, FILM COATED ORAL at 21:00

## 2021-01-01 RX ADMIN — FAMOTIDINE 20 MG: 20 TABLET, FILM COATED ORAL at 16:56

## 2021-01-01 RX ADMIN — LORAZEPAM 0.25 MG: 2 INJECTION INTRAMUSCULAR; INTRAVENOUS at 15:12

## 2021-01-01 RX ADMIN — IPRATROPIUM BROMIDE AND ALBUTEROL SULFATE 3 ML: 2.5; .5 SOLUTION RESPIRATORY (INHALATION) at 06:54

## 2021-01-01 RX ADMIN — GUAIFENESIN 1200 MG: 600 TABLET, EXTENDED RELEASE ORAL at 09:27

## 2021-01-01 RX ADMIN — DOCUSATE SODIUM 100 MG: 100 CAPSULE, LIQUID FILLED ORAL at 08:16

## 2021-01-01 RX ADMIN — CEFEPIME HYDROCHLORIDE 1 G: 1 INJECTION, POWDER, FOR SOLUTION INTRAMUSCULAR; INTRAVENOUS at 20:47

## 2021-01-01 RX ADMIN — LIDOCAINE 1 PATCH: 50 PATCH CUTANEOUS at 20:18

## 2021-01-01 RX ADMIN — CETIRIZINE HYDROCHLORIDE 10 MG: 10 TABLET ORAL at 10:24

## 2021-01-01 RX ADMIN — SODIUM POLYSTYRENE SULFONATE 15 G: 15 SUSPENSION ORAL; RECTAL at 10:33

## 2021-01-01 RX ADMIN — FLUTICASONE PROPIONATE 1 SPRAY: 50 SPRAY, METERED NASAL at 08:50

## 2021-01-01 RX ADMIN — IPRATROPIUM BROMIDE AND ALBUTEROL SULFATE 3 ML: 2.5; .5 SOLUTION RESPIRATORY (INHALATION) at 23:47

## 2021-01-01 RX ADMIN — GUAIFENESIN 1200 MG: 600 TABLET, EXTENDED RELEASE ORAL at 08:07

## 2021-01-01 RX ADMIN — CARVEDILOL 3.12 MG: 3.12 TABLET, FILM COATED ORAL at 17:19

## 2021-01-01 RX ADMIN — TETANUS TOXOID, REDUCED DIPHTHERIA TOXOID AND ACELLULAR PERTUSSIS VACCINE, ADSORBED 0.5 ML: 5; 2.5; 8; 8; 2.5 SUSPENSION INTRAMUSCULAR at 13:11

## 2021-01-01 RX ADMIN — CETIRIZINE HYDROCHLORIDE 10 MG: 10 TABLET ORAL at 20:51

## 2021-01-01 RX ADMIN — MONTELUKAST SODIUM 10 MG: 10 TABLET, FILM COATED ORAL at 20:44

## 2021-01-01 RX ADMIN — BUDESONIDE AND FORMOTEROL FUMARATE DIHYDRATE 2 PUFF: 80; 4.5 AEROSOL RESPIRATORY (INHALATION) at 06:16

## 2021-01-01 RX ADMIN — IPRATROPIUM BROMIDE 0.5 MG: 0.5 SOLUTION RESPIRATORY (INHALATION) at 19:09

## 2021-01-01 RX ADMIN — MONTELUKAST SODIUM 10 MG: 10 TABLET, FILM COATED ORAL at 22:37

## 2021-01-01 RX ADMIN — SODIUM CHLORIDE, PRESERVATIVE FREE 10 ML: 5 INJECTION INTRAVENOUS at 18:00

## 2021-01-01 RX ADMIN — MONTELUKAST SODIUM 10 MG: 10 TABLET, FILM COATED ORAL at 20:09

## 2021-01-01 RX ADMIN — GUAIFENESIN 1200 MG: 600 TABLET, EXTENDED RELEASE ORAL at 20:50

## 2021-01-01 RX ADMIN — SODIUM CHLORIDE, PRESERVATIVE FREE 10 ML: 5 INJECTION INTRAVENOUS at 22:13

## 2021-01-01 RX ADMIN — IPRATROPIUM BROMIDE 0.5 MG: 0.5 SOLUTION RESPIRATORY (INHALATION) at 10:00

## 2021-01-01 RX ADMIN — MONTELUKAST SODIUM 10 MG: 10 TABLET, FILM COATED ORAL at 06:15

## 2021-01-01 RX ADMIN — GUAIFENESIN 1200 MG: 600 TABLET, EXTENDED RELEASE ORAL at 21:42

## 2021-01-01 RX ADMIN — SODIUM CHLORIDE, PRESERVATIVE FREE 10 ML: 5 INJECTION INTRAVENOUS at 21:17

## 2021-01-01 RX ADMIN — IPRATROPIUM BROMIDE 0.5 MG: 0.5 SOLUTION RESPIRATORY (INHALATION) at 07:34

## 2021-01-01 RX ADMIN — CEFEPIME HYDROCHLORIDE 1 G: 1 INJECTION, POWDER, FOR SOLUTION INTRAMUSCULAR; INTRAVENOUS at 20:06

## 2021-01-01 RX ADMIN — METHYLPREDNISOLONE SODIUM SUCCINATE 40 MG: 40 INJECTION, POWDER, FOR SOLUTION INTRAMUSCULAR; INTRAVENOUS at 05:04

## 2021-01-01 RX ADMIN — SODIUM CHLORIDE, POTASSIUM CHLORIDE, SODIUM LACTATE AND CALCIUM CHLORIDE 500 ML: 600; 310; 30; 20 INJECTION, SOLUTION INTRAVENOUS at 18:03

## 2021-01-01 RX ADMIN — IPRATROPIUM BROMIDE AND ALBUTEROL SULFATE 3 ML: 2.5; .5 SOLUTION RESPIRATORY (INHALATION) at 14:42

## 2021-01-01 RX ADMIN — METHYLPREDNISOLONE SODIUM SUCCINATE 60 MG: 125 INJECTION, POWDER, FOR SOLUTION INTRAMUSCULAR; INTRAVENOUS at 06:11

## 2021-01-01 RX ADMIN — CARVEDILOL 3.12 MG: 3.12 TABLET, FILM COATED ORAL at 17:03

## 2021-01-01 RX ADMIN — BUDESONIDE AND FORMOTEROL FUMARATE DIHYDRATE 2 PUFF: 160; 4.5 AEROSOL RESPIRATORY (INHALATION) at 20:30

## 2021-01-01 RX ADMIN — MONTELUKAST SODIUM 10 MG: 10 TABLET, FILM COATED ORAL at 21:42

## 2021-01-01 RX ADMIN — OXYCODONE HYDROCHLORIDE AND ACETAMINOPHEN 1 TABLET: 5; 325 TABLET ORAL at 08:56

## 2021-01-01 RX ADMIN — SODIUM CHLORIDE, PRESERVATIVE FREE 10 ML: 5 INJECTION INTRAVENOUS at 08:05

## 2021-01-01 RX ADMIN — ROPINIROLE HYDROCHLORIDE 2 MG: 1 TABLET, FILM COATED ORAL at 20:36

## 2021-01-01 RX ADMIN — GUAIFENESIN 600 MG: 600 TABLET, EXTENDED RELEASE ORAL at 09:06

## 2021-01-01 RX ADMIN — GUAIFENESIN 1200 MG: 600 TABLET, EXTENDED RELEASE ORAL at 20:09

## 2021-01-01 RX ADMIN — PREDNISONE 40 MG: 20 TABLET ORAL at 08:45

## 2021-01-01 RX ADMIN — APIXABAN 2.5 MG: 2.5 TABLET, FILM COATED ORAL at 20:49

## 2021-01-01 RX ADMIN — HYDROCODONE BITARTRATE AND ACETAMINOPHEN 1 TABLET: 7.5; 325 TABLET ORAL at 16:22

## 2021-01-01 RX ADMIN — IPRATROPIUM BROMIDE 0.5 MG: 0.5 SOLUTION RESPIRATORY (INHALATION) at 14:54

## 2021-01-01 RX ADMIN — GUAIFENESIN 1200 MG: 600 TABLET, EXTENDED RELEASE ORAL at 22:37

## 2021-01-01 RX ADMIN — BUDESONIDE AND FORMOTEROL FUMARATE DIHYDRATE 2 PUFF: 160; 4.5 AEROSOL RESPIRATORY (INHALATION) at 19:37

## 2021-01-01 RX ADMIN — IPRATROPIUM BROMIDE 0.5 MG: 0.5 SOLUTION RESPIRATORY (INHALATION) at 19:16

## 2021-01-01 RX ADMIN — IPRATROPIUM BROMIDE AND ALBUTEROL SULFATE 3 ML: 2.5; .5 SOLUTION RESPIRATORY (INHALATION) at 06:01

## 2021-01-01 RX ADMIN — IPRATROPIUM BROMIDE 0.5 MG: 0.5 SOLUTION RESPIRATORY (INHALATION) at 10:04

## 2021-01-01 RX ADMIN — SODIUM CHLORIDE, PRESERVATIVE FREE 10 ML: 5 INJECTION INTRAVENOUS at 21:47

## 2021-01-01 RX ADMIN — METHYLPREDNISOLONE SODIUM SUCCINATE 60 MG: 125 INJECTION, POWDER, FOR SOLUTION INTRAMUSCULAR; INTRAVENOUS at 06:34

## 2021-01-01 RX ADMIN — FAMOTIDINE 20 MG: 20 TABLET, FILM COATED ORAL at 17:09

## 2021-01-01 RX ADMIN — METHYLPREDNISOLONE SODIUM SUCCINATE 40 MG: 40 INJECTION, POWDER, FOR SOLUTION INTRAMUSCULAR; INTRAVENOUS at 18:01

## 2021-01-01 RX ADMIN — FLUOXETINE 10 MG: 10 CAPSULE ORAL at 08:08

## 2021-01-01 RX ADMIN — CETIRIZINE HYDROCHLORIDE 10 MG: 10 TABLET ORAL at 09:43

## 2021-01-01 RX ADMIN — CARVEDILOL 3.12 MG: 3.12 TABLET, FILM COATED ORAL at 08:00

## 2021-01-01 RX ADMIN — GUAIFENESIN 1200 MG: 600 TABLET, EXTENDED RELEASE ORAL at 08:57

## 2021-01-01 RX ADMIN — DOCUSATE SODIUM 100 MG: 100 CAPSULE ORAL at 08:13

## 2021-01-01 RX ADMIN — FLUOXETINE 10 MG: 10 CAPSULE ORAL at 08:30

## 2021-01-01 RX ADMIN — FLUTICASONE PROPIONATE 2 SPRAY: 50 SPRAY, METERED NASAL at 08:08

## 2021-01-01 RX ADMIN — CETIRIZINE HYDROCHLORIDE 10 MG: 10 TABLET ORAL at 08:30

## 2021-01-01 RX ADMIN — IPRATROPIUM BROMIDE AND ALBUTEROL SULFATE 3 ML: 2.5; .5 SOLUTION RESPIRATORY (INHALATION) at 14:46

## 2021-01-01 RX ADMIN — IPRATROPIUM BROMIDE 0.5 MG: 0.5 SOLUTION RESPIRATORY (INHALATION) at 06:00

## 2021-01-01 RX ADMIN — MONTELUKAST SODIUM 10 MG: 10 TABLET, FILM COATED ORAL at 19:51

## 2021-01-01 RX ADMIN — CARVEDILOL 3.12 MG: 3.12 TABLET, FILM COATED ORAL at 08:45

## 2021-01-01 RX ADMIN — BUDESONIDE 0.25 MG: 0.25 INHALANT RESPIRATORY (INHALATION) at 06:32

## 2021-01-01 RX ADMIN — IPRATROPIUM BROMIDE AND ALBUTEROL SULFATE 3 ML: 2.5; .5 SOLUTION RESPIRATORY (INHALATION) at 06:50

## 2021-01-01 RX ADMIN — FAMOTIDINE 20 MG: 20 TABLET, FILM COATED ORAL at 17:18

## 2021-01-01 RX ADMIN — SODIUM CHLORIDE, POTASSIUM CHLORIDE, SODIUM LACTATE AND CALCIUM CHLORIDE 75 ML/HR: 600; 310; 30; 20 INJECTION, SOLUTION INTRAVENOUS at 17:16

## 2021-01-01 RX ADMIN — ROPINIROLE HYDROCHLORIDE 2 MG: 1 TABLET, FILM COATED ORAL at 22:37

## 2021-01-01 RX ADMIN — PREDNISONE 20 MG: 20 TABLET ORAL at 08:00

## 2021-01-01 RX ADMIN — APIXABAN 2.5 MG: 2.5 TABLET, FILM COATED ORAL at 20:16

## 2021-01-01 RX ADMIN — METHYLPREDNISOLONE SODIUM SUCCINATE 40 MG: 40 INJECTION, POWDER, FOR SOLUTION INTRAMUSCULAR; INTRAVENOUS at 20:46

## 2021-01-01 RX ADMIN — FAMOTIDINE 20 MG: 20 TABLET, FILM COATED ORAL at 08:57

## 2021-01-01 RX ADMIN — IPRATROPIUM BROMIDE AND ALBUTEROL SULFATE 3 ML: 2.5; .5 SOLUTION RESPIRATORY (INHALATION) at 14:59

## 2021-01-01 RX ADMIN — APIXABAN 2.5 MG: 2.5 TABLET, FILM COATED ORAL at 22:37

## 2021-01-01 RX ADMIN — GADOBENATE DIMEGLUMINE 11 ML: 529 INJECTION, SOLUTION INTRAVENOUS at 12:05

## 2021-01-01 RX ADMIN — LEVOFLOXACIN 500 MG: 500 TABLET, FILM COATED ORAL at 08:31

## 2021-01-01 RX ADMIN — MAGNESIUM SULFATE 4 G: 1 INJECTION INTRAVENOUS at 17:07

## 2021-01-01 RX ADMIN — LIDOCAINE 2 PATCH: 50 PATCH CUTANEOUS at 21:41

## 2021-01-01 RX ADMIN — IPRATROPIUM BROMIDE AND ALBUTEROL SULFATE 3 ML: 2.5; .5 SOLUTION RESPIRATORY (INHALATION) at 23:10

## 2021-01-01 RX ADMIN — FUROSEMIDE 20 MG: 10 INJECTION INTRAMUSCULAR; INTRAVENOUS at 10:38

## 2021-01-01 RX ADMIN — POLYETHYLENE GLYCOL 3350 17 G: 17 POWDER, FOR SOLUTION ORAL at 08:52

## 2021-01-01 RX ADMIN — APIXABAN 2.5 MG: 2.5 TABLET, FILM COATED ORAL at 08:52

## 2021-01-01 RX ADMIN — BUDESONIDE AND FORMOTEROL FUMARATE DIHYDRATE 2 PUFF: 80; 4.5 AEROSOL RESPIRATORY (INHALATION) at 06:34

## 2021-01-01 RX ADMIN — FUROSEMIDE 20 MG: 10 INJECTION, SOLUTION INTRAVENOUS at 20:46

## 2021-01-01 RX ADMIN — POLYETHYLENE GLYCOL 3350 17 G: 17 POWDER, FOR SOLUTION ORAL at 10:23

## 2021-01-01 RX ADMIN — SODIUM CHLORIDE, PRESERVATIVE FREE 10 ML: 5 INJECTION INTRAVENOUS at 22:25

## 2021-01-01 RX ADMIN — FUROSEMIDE 40 MG: 10 INJECTION, SOLUTION INTRAVENOUS at 13:45

## 2021-01-01 RX ADMIN — APIXABAN 2.5 MG: 2.5 TABLET, FILM COATED ORAL at 09:27

## 2021-01-01 RX ADMIN — ROPINIROLE HYDROCHLORIDE 2 MG: 1 TABLET, FILM COATED ORAL at 22:12

## 2021-01-01 RX ADMIN — ROPINIROLE HYDROCHLORIDE 2 MG: 1 TABLET, FILM COATED ORAL at 20:43

## 2021-01-01 RX ADMIN — ROPIVACAINE HYDROCHLORIDE 20 ML: 2 INJECTION, SOLUTION EPIDURAL; INFILTRATION at 18:06

## 2021-01-01 RX ADMIN — IPRATROPIUM BROMIDE AND ALBUTEROL SULFATE 3 ML: 2.5; .5 SOLUTION RESPIRATORY (INHALATION) at 09:39

## 2021-01-01 RX ADMIN — HYDROCODONE BITARTRATE AND ACETAMINOPHEN 1 TABLET: 5; 325 TABLET ORAL at 04:01

## 2021-01-01 RX ADMIN — OXYCODONE HYDROCHLORIDE AND ACETAMINOPHEN 1 TABLET: 5; 325 TABLET ORAL at 06:25

## 2021-01-01 RX ADMIN — METOPROLOL TARTRATE 12.5 MG: 25 TABLET, FILM COATED ORAL at 09:06

## 2021-01-01 RX ADMIN — METHYLPREDNISOLONE SODIUM SUCCINATE 60 MG: 125 INJECTION, POWDER, FOR SOLUTION INTRAMUSCULAR; INTRAVENOUS at 17:36

## 2021-01-01 RX ADMIN — IPRATROPIUM BROMIDE 0.5 MG: 0.5 SOLUTION RESPIRATORY (INHALATION) at 14:48

## 2021-01-01 RX ADMIN — FLUTICASONE PROPIONATE 1 SPRAY: 50 SPRAY, METERED NASAL at 09:11

## 2021-01-01 RX ADMIN — METOPROLOL TARTRATE 12.5 MG: 25 TABLET, FILM COATED ORAL at 20:47

## 2021-01-01 RX ADMIN — FAMOTIDINE 20 MG: 20 TABLET, FILM COATED ORAL at 08:24

## 2021-01-01 RX ADMIN — LEVOFLOXACIN 500 MG: 500 TABLET, FILM COATED ORAL at 09:06

## 2021-01-01 RX ADMIN — SODIUM CHLORIDE, PRESERVATIVE FREE 10 ML: 5 INJECTION INTRAVENOUS at 09:18

## 2021-01-01 RX ADMIN — MONTELUKAST SODIUM 10 MG: 10 TABLET, FILM COATED ORAL at 20:37

## 2021-01-01 RX ADMIN — CETIRIZINE HYDROCHLORIDE 10 MG: 10 TABLET ORAL at 08:16

## 2021-01-01 RX ADMIN — IPRATROPIUM BROMIDE 0.5 MG: 0.5 SOLUTION RESPIRATORY (INHALATION) at 20:02

## 2021-01-01 RX ADMIN — MORPHINE SULFATE 1 MG: 2 INJECTION, SOLUTION INTRAMUSCULAR; INTRAVENOUS at 08:52

## 2021-01-01 RX ADMIN — IPRATROPIUM BROMIDE 0.5 MG: 0.5 SOLUTION RESPIRATORY (INHALATION) at 06:34

## 2021-01-01 RX ADMIN — IPRATROPIUM BROMIDE 0.5 MG: 0.5 SOLUTION RESPIRATORY (INHALATION) at 06:28

## 2021-01-01 RX ADMIN — METHYLPREDNISOLONE SODIUM SUCCINATE 40 MG: 40 INJECTION, POWDER, FOR SOLUTION INTRAMUSCULAR; INTRAVENOUS at 16:50

## 2021-01-01 RX ADMIN — APIXABAN 2.5 MG: 2.5 TABLET, FILM COATED ORAL at 08:05

## 2021-01-01 RX ADMIN — IPRATROPIUM BROMIDE 0.5 MG: 0.5 SOLUTION RESPIRATORY (INHALATION) at 20:08

## 2021-01-01 RX ADMIN — HYDROCODONE BITARTRATE AND ACETAMINOPHEN 1 TABLET: 7.5; 325 TABLET ORAL at 06:07

## 2021-01-01 RX ADMIN — APIXABAN 2.5 MG: 2.5 TABLET, FILM COATED ORAL at 20:24

## 2021-01-01 RX ADMIN — IPRATROPIUM BROMIDE AND ALBUTEROL SULFATE 3 ML: 2.5; .5 SOLUTION RESPIRATORY (INHALATION) at 10:56

## 2021-01-01 RX ADMIN — LIDOCAINE 2 PATCH: 50 PATCH CUTANEOUS at 22:32

## 2021-01-01 RX ADMIN — BUDESONIDE AND FORMOTEROL FUMARATE DIHYDRATE 2 PUFF: 80; 4.5 AEROSOL RESPIRATORY (INHALATION) at 19:07

## 2021-01-01 RX ADMIN — GUAIFENESIN 1200 MG: 600 TABLET, EXTENDED RELEASE ORAL at 08:43

## 2021-01-01 RX ADMIN — FLUOXETINE 10 MG: 10 CAPSULE ORAL at 08:27

## 2021-01-01 RX ADMIN — IPRATROPIUM BROMIDE 0.5 MG: 0.5 SOLUTION RESPIRATORY (INHALATION) at 19:23

## 2021-01-01 RX ADMIN — CEFEPIME HYDROCHLORIDE 1 G: 1 INJECTION, POWDER, FOR SOLUTION INTRAMUSCULAR; INTRAVENOUS at 08:19

## 2021-01-01 RX ADMIN — FLUTICASONE PROPIONATE 2 SPRAY: 50 SPRAY, METERED NASAL at 08:52

## 2021-01-01 RX ADMIN — BUDESONIDE 0.25 MG: 0.25 INHALANT RESPIRATORY (INHALATION) at 06:44

## 2021-01-01 RX ADMIN — LIDOCAINE 2 PATCH: 50 PATCH CUTANEOUS at 19:52

## 2021-01-01 RX ADMIN — ENOXAPARIN SODIUM 40 MG: 40 INJECTION SUBCUTANEOUS at 08:30

## 2021-01-01 RX ADMIN — SODIUM CHLORIDE, PRESERVATIVE FREE 10 ML: 5 INJECTION INTRAVENOUS at 08:27

## 2021-01-01 RX ADMIN — GUAIFENESIN 1200 MG: 600 TABLET, EXTENDED RELEASE ORAL at 20:16

## 2021-01-01 RX ADMIN — GUAIFENESIN 1200 MG: 600 TABLET, EXTENDED RELEASE ORAL at 10:31

## 2021-01-01 RX ADMIN — GUAIFENESIN 600 MG: 600 TABLET, EXTENDED RELEASE ORAL at 08:27

## 2021-01-01 RX ADMIN — GUAIFENESIN 600 MG: 600 TABLET, EXTENDED RELEASE ORAL at 09:18

## 2021-01-01 RX ADMIN — FAMOTIDINE 20 MG: 20 TABLET, FILM COATED ORAL at 17:06

## 2021-01-01 RX ADMIN — BUDESONIDE AND FORMOTEROL FUMARATE DIHYDRATE 2 PUFF: 160; 4.5 AEROSOL RESPIRATORY (INHALATION) at 19:38

## 2021-01-01 RX ADMIN — IPRATROPIUM BROMIDE AND ALBUTEROL SULFATE 3 ML: 2.5; .5 SOLUTION RESPIRATORY (INHALATION) at 11:37

## 2021-01-01 RX ADMIN — DOCUSATE SODIUM 100 MG: 100 CAPSULE, LIQUID FILLED ORAL at 09:45

## 2021-01-01 RX ADMIN — IPRATROPIUM BROMIDE AND ALBUTEROL SULFATE 3 ML: 2.5; .5 SOLUTION RESPIRATORY (INHALATION) at 00:03

## 2021-01-01 RX ADMIN — IPRATROPIUM BROMIDE AND ALBUTEROL SULFATE 3 ML: 2.5; .5 SOLUTION RESPIRATORY (INHALATION) at 18:54

## 2021-01-01 RX ADMIN — MONTELUKAST SODIUM 10 MG: 10 TABLET, FILM COATED ORAL at 20:36

## 2021-01-01 RX ADMIN — FLUTICASONE PROPIONATE 1 SPRAY: 50 SPRAY, METERED NASAL at 08:47

## 2021-01-01 RX ADMIN — APIXABAN 2.5 MG: 2.5 TABLET, FILM COATED ORAL at 20:31

## 2021-01-01 RX ADMIN — ENOXAPARIN SODIUM 50 MG: 60 INJECTION SUBCUTANEOUS at 06:07

## 2021-01-01 RX ADMIN — OXYCODONE HYDROCHLORIDE AND ACETAMINOPHEN 1 TABLET: 5; 325 TABLET ORAL at 16:41

## 2021-01-01 RX ADMIN — IPRATROPIUM BROMIDE AND ALBUTEROL SULFATE 3 ML: 2.5; .5 SOLUTION RESPIRATORY (INHALATION) at 00:38

## 2021-01-01 RX ADMIN — APIXABAN 2.5 MG: 2.5 TABLET, FILM COATED ORAL at 21:15

## 2021-01-01 RX ADMIN — DOCUSATE SODIUM 100 MG: 100 CAPSULE, LIQUID FILLED ORAL at 09:06

## 2021-01-01 RX ADMIN — CETIRIZINE HYDROCHLORIDE 10 MG: 10 TABLET ORAL at 20:47

## 2021-01-01 RX ADMIN — APIXABAN 2.5 MG: 2.5 TABLET, FILM COATED ORAL at 20:21

## 2021-01-01 RX ADMIN — IPRATROPIUM BROMIDE AND ALBUTEROL SULFATE 3 ML: 2.5; .5 SOLUTION RESPIRATORY (INHALATION) at 23:27

## 2021-01-01 RX ADMIN — FUROSEMIDE 20 MG: 10 INJECTION, SOLUTION INTRAVENOUS at 20:23

## 2021-01-01 RX ADMIN — LORAZEPAM 2 MG: 2 SOLUTION, CONCENTRATE ORAL at 11:34

## 2021-01-01 RX ADMIN — IPRATROPIUM BROMIDE 0.5 MG: 0.5 SOLUTION RESPIRATORY (INHALATION) at 10:38

## 2021-01-01 RX ADMIN — METHYLPREDNISOLONE SODIUM SUCCINATE 40 MG: 40 INJECTION, POWDER, FOR SOLUTION INTRAMUSCULAR; INTRAVENOUS at 20:16

## 2021-01-01 RX ADMIN — SODIUM CHLORIDE, PRESERVATIVE FREE 10 ML: 5 INJECTION INTRAVENOUS at 20:21

## 2021-01-01 RX ADMIN — FLUOXETINE 10 MG: 10 CAPSULE ORAL at 09:05

## 2021-01-01 RX ADMIN — DOCUSATE SODIUM 100 MG: 100 CAPSULE, LIQUID FILLED ORAL at 08:24

## 2021-01-01 RX ADMIN — METHYLPREDNISOLONE SODIUM SUCCINATE 60 MG: 125 INJECTION, POWDER, FOR SOLUTION INTRAMUSCULAR; INTRAVENOUS at 06:22

## 2021-01-01 RX ADMIN — IPRATROPIUM BROMIDE AND ALBUTEROL SULFATE 3 ML: 2.5; .5 SOLUTION RESPIRATORY (INHALATION) at 14:47

## 2021-01-01 RX ADMIN — HYDROCODONE BITARTRATE AND ACETAMINOPHEN 2 TABLET: 7.5; 325 TABLET ORAL at 11:03

## 2021-01-01 RX ADMIN — SODIUM CHLORIDE, PRESERVATIVE FREE 10 ML: 5 INJECTION INTRAVENOUS at 08:54

## 2021-01-01 RX ADMIN — FLUOXETINE 10 MG: 10 CAPSULE ORAL at 08:44

## 2021-01-01 RX ADMIN — HYDROCODONE BITARTRATE AND ACETAMINOPHEN 1 TABLET: 7.5; 325 TABLET ORAL at 20:47

## 2021-01-01 RX ADMIN — SODIUM CHLORIDE, PRESERVATIVE FREE 10 ML: 5 INJECTION INTRAVENOUS at 21:42

## 2021-01-01 RX ADMIN — IPRATROPIUM BROMIDE AND ALBUTEROL SULFATE 3 ML: 2.5; .5 SOLUTION RESPIRATORY (INHALATION) at 23:50

## 2021-01-01 RX ADMIN — ROPINIROLE HYDROCHLORIDE 2 MG: 1 TABLET, FILM COATED ORAL at 21:15

## 2021-01-01 RX ADMIN — SODIUM CHLORIDE, PRESERVATIVE FREE 10 ML: 5 INJECTION INTRAVENOUS at 09:13

## 2021-01-01 RX ADMIN — IPRATROPIUM BROMIDE AND ALBUTEROL SULFATE 3 ML: 2.5; .5 SOLUTION RESPIRATORY (INHALATION) at 03:24

## 2021-01-01 RX ADMIN — DOCUSATE SODIUM 100 MG: 100 CAPSULE ORAL at 08:02

## 2021-01-01 RX ADMIN — METOPROLOL TARTRATE 12.5 MG: 25 TABLET, FILM COATED ORAL at 21:16

## 2021-01-01 RX ADMIN — ROPINIROLE HYDROCHLORIDE 4 MG: 1 TABLET, FILM COATED ORAL at 21:16

## 2021-01-01 RX ADMIN — GUAIFENESIN 1200 MG: 600 TABLET, EXTENDED RELEASE ORAL at 08:00

## 2021-01-01 RX ADMIN — IPRATROPIUM BROMIDE 0.5 MG: 0.5 SOLUTION RESPIRATORY (INHALATION) at 14:43

## 2021-01-01 RX ADMIN — ACETAZOLAMIDE 250 MG: 500 INJECTION, POWDER, LYOPHILIZED, FOR SOLUTION INTRAVENOUS at 11:57

## 2021-01-01 RX ADMIN — CARVEDILOL 3.12 MG: 3.12 TABLET, FILM COATED ORAL at 09:27

## 2021-01-01 RX ADMIN — OXYCODONE HYDROCHLORIDE AND ACETAMINOPHEN 1 TABLET: 5; 325 TABLET ORAL at 12:00

## 2021-01-01 RX ADMIN — FAMOTIDINE 20 MG: 20 TABLET, FILM COATED ORAL at 20:47

## 2021-01-01 RX ADMIN — HYDROCODONE BITARTRATE AND ACETAMINOPHEN 2 TABLET: 7.5; 325 TABLET ORAL at 05:04

## 2021-01-01 RX ADMIN — SODIUM CHLORIDE, PRESERVATIVE FREE 10 ML: 5 INJECTION INTRAVENOUS at 21:16

## 2021-01-01 RX ADMIN — ROPINIROLE HYDROCHLORIDE 2 MG: 1 TABLET, FILM COATED ORAL at 22:26

## 2021-01-01 RX ADMIN — INSULIN LISPRO 4 UNITS: 100 INJECTION, SOLUTION INTRAVENOUS; SUBCUTANEOUS at 17:03

## 2021-01-01 RX ADMIN — APIXABAN 2.5 MG: 2.5 TABLET, FILM COATED ORAL at 08:44

## 2021-01-01 RX ADMIN — IPRATROPIUM BROMIDE AND ALBUTEROL SULFATE 3 ML: 2.5; .5 SOLUTION RESPIRATORY (INHALATION) at 18:24

## 2021-01-01 RX ADMIN — MORPHINE SULFATE 2 MG: 2 INJECTION, SOLUTION INTRAMUSCULAR; INTRAVENOUS at 23:37

## 2021-01-01 RX ADMIN — FAMOTIDINE 20 MG: 20 TABLET, FILM COATED ORAL at 08:48

## 2021-01-01 RX ADMIN — GUAIFENESIN 1200 MG: 600 TABLET, EXTENDED RELEASE ORAL at 08:27

## 2021-01-01 RX ADMIN — APIXABAN 2.5 MG: 2.5 TABLET, FILM COATED ORAL at 20:36

## 2021-01-01 RX ADMIN — LIDOCAINE 1 PATCH: 50 PATCH CUTANEOUS at 20:06

## 2021-01-01 RX ADMIN — MONTELUKAST SODIUM 10 MG: 10 TABLET, FILM COATED ORAL at 21:02

## 2021-01-01 RX ADMIN — GUAIFENESIN 1200 MG: 600 TABLET, EXTENDED RELEASE ORAL at 20:43

## 2021-01-01 RX ADMIN — METHYLPREDNISOLONE SODIUM SUCCINATE 40 MG: 40 INJECTION, POWDER, LYOPHILIZED, FOR SOLUTION INTRAMUSCULAR; INTRAVENOUS at 21:16

## 2021-01-01 RX ADMIN — FLUTICASONE PROPIONATE 2 SPRAY: 50 SPRAY, METERED NASAL at 08:56

## 2021-01-01 RX ADMIN — IPRATROPIUM BROMIDE 0.5 MG: 0.5 SOLUTION RESPIRATORY (INHALATION) at 00:28

## 2021-01-01 RX ADMIN — CETIRIZINE HYDROCHLORIDE 10 MG: 10 TABLET ORAL at 08:20

## 2021-01-01 RX ADMIN — DOCUSATE SODIUM 50 MG AND SENNOSIDES 8.6 MG 1 TABLET: 8.6; 5 TABLET, FILM COATED ORAL at 10:22

## 2021-01-01 RX ADMIN — APIXABAN 2.5 MG: 2.5 TABLET, FILM COATED ORAL at 20:08

## 2021-01-01 RX ADMIN — OXYCODONE HYDROCHLORIDE AND ACETAMINOPHEN 1 TABLET: 5; 325 TABLET ORAL at 08:26

## 2021-01-01 RX ADMIN — IPRATROPIUM BROMIDE 0.5 MG: 0.5 SOLUTION RESPIRATORY (INHALATION) at 18:42

## 2021-01-01 RX ADMIN — IPRATROPIUM BROMIDE 0.5 MG: 0.5 SOLUTION RESPIRATORY (INHALATION) at 11:08

## 2021-01-01 RX ADMIN — IPRATROPIUM BROMIDE AND ALBUTEROL SULFATE 3 ML: 2.5; .5 SOLUTION RESPIRATORY (INHALATION) at 03:16

## 2021-01-01 RX ADMIN — ROPINIROLE HYDROCHLORIDE 2 MG: 1 TABLET, FILM COATED ORAL at 21:02

## 2021-01-01 RX ADMIN — FAMOTIDINE 20 MG: 20 TABLET, FILM COATED ORAL at 08:16

## 2021-01-01 RX ADMIN — IPRATROPIUM BROMIDE 0.5 MG: 0.5 SOLUTION RESPIRATORY (INHALATION) at 20:54

## 2021-01-01 RX ADMIN — SODIUM CHLORIDE, PRESERVATIVE FREE 10 ML: 5 INJECTION INTRAVENOUS at 21:02

## 2021-01-01 RX ADMIN — LIDOCAINE 1 PATCH: 50 PATCH CUTANEOUS at 20:52

## 2021-01-01 RX ADMIN — Medication 296 ML: at 10:36

## 2021-01-01 RX ADMIN — LORAZEPAM 1 MG: 2 SOLUTION, CONCENTRATE ORAL at 09:06

## 2021-01-01 RX ADMIN — SODIUM CHLORIDE, PRESERVATIVE FREE 10 ML: 5 INJECTION INTRAVENOUS at 08:56

## 2021-01-01 RX ADMIN — APIXABAN 2.5 MG: 2.5 TABLET, FILM COATED ORAL at 20:09

## 2021-01-01 RX ADMIN — CLONAZEPAM 0.5 MG: 0.5 TABLET ORAL at 22:24

## 2021-01-01 RX ADMIN — IPRATROPIUM BROMIDE AND ALBUTEROL SULFATE 3 ML: 2.5; .5 SOLUTION RESPIRATORY (INHALATION) at 03:08

## 2021-01-01 RX ADMIN — METHYLPREDNISOLONE SODIUM SUCCINATE 40 MG: 40 INJECTION, POWDER, FOR SOLUTION INTRAMUSCULAR; INTRAVENOUS at 05:09

## 2021-01-01 RX ADMIN — IPRATROPIUM BROMIDE AND ALBUTEROL SULFATE 3 ML: 2.5; .5 SOLUTION RESPIRATORY (INHALATION) at 06:28

## 2021-01-01 RX ADMIN — IPRATROPIUM BROMIDE 0.5 MG: 0.5 SOLUTION RESPIRATORY (INHALATION) at 14:42

## 2021-01-01 RX ADMIN — BUDESONIDE AND FORMOTEROL FUMARATE DIHYDRATE 2 PUFF: 160; 4.5 AEROSOL RESPIRATORY (INHALATION) at 07:00

## 2021-01-01 RX ADMIN — OXYCODONE HYDROCHLORIDE AND ACETAMINOPHEN 1 TABLET: 5; 325 TABLET ORAL at 11:06

## 2021-01-01 RX ADMIN — APIXABAN 2.5 MG: 2.5 TABLET, FILM COATED ORAL at 08:22

## 2021-01-01 RX ADMIN — GUAIFENESIN 1200 MG: 600 TABLET, EXTENDED RELEASE ORAL at 00:04

## 2021-01-01 RX ADMIN — BUDESONIDE AND FORMOTEROL FUMARATE DIHYDRATE 2 PUFF: 80; 4.5 AEROSOL RESPIRATORY (INHALATION) at 06:06

## 2021-01-01 RX ADMIN — MORPHINE SULFATE 2 MG: 2 INJECTION, SOLUTION INTRAMUSCULAR; INTRAVENOUS at 17:14

## 2021-01-01 RX ADMIN — IPRATROPIUM BROMIDE 0.5 MG: 0.5 SOLUTION RESPIRATORY (INHALATION) at 10:08

## 2021-01-01 RX ADMIN — FAMOTIDINE 20 MG: 10 INJECTION INTRAVENOUS at 20:49

## 2021-01-01 RX ADMIN — FENTANYL CITRATE 50 MCG: 50 INJECTION, SOLUTION INTRAMUSCULAR; INTRAVENOUS at 18:37

## 2021-01-01 RX ADMIN — METHYLPREDNISOLONE SODIUM SUCCINATE 40 MG: 40 INJECTION, POWDER, FOR SOLUTION INTRAMUSCULAR; INTRAVENOUS at 13:45

## 2021-01-01 RX ADMIN — MONTELUKAST SODIUM 10 MG: 10 TABLET, FILM COATED ORAL at 20:51

## 2021-01-01 RX ADMIN — LIDOCAINE 2 PATCH: 50 PATCH CUTANEOUS at 08:17

## 2021-01-01 RX ADMIN — IPRATROPIUM BROMIDE 0.5 MG: 0.5 SOLUTION RESPIRATORY (INHALATION) at 06:45

## 2021-01-01 RX ADMIN — FLUTICASONE PROPIONATE 2 SPRAY: 50 SPRAY, METERED NASAL at 09:17

## 2021-01-01 RX ADMIN — ROPINIROLE HYDROCHLORIDE 2 MG: 1 TABLET, FILM COATED ORAL at 20:16

## 2021-01-01 RX ADMIN — FAMOTIDINE 20 MG: 20 TABLET, FILM COATED ORAL at 08:46

## 2021-01-01 RX ADMIN — PREDNISONE 40 MG: 20 TABLET ORAL at 08:14

## 2021-01-01 RX ADMIN — CLONAZEPAM 0.5 MG: 0.5 TABLET ORAL at 20:09

## 2021-01-01 RX ADMIN — MORPHINE SULFATE 2 MG: 2 INJECTION, SOLUTION INTRAMUSCULAR; INTRAVENOUS at 00:59

## 2021-01-01 RX ADMIN — FLUTICASONE PROPIONATE 1 SPRAY: 50 SPRAY, METERED NASAL at 08:30

## 2021-01-01 RX ADMIN — ALBUTEROL SULFATE 2.5 MG: 2.5 SOLUTION RESPIRATORY (INHALATION) at 16:59

## 2021-01-01 RX ADMIN — IPRATROPIUM BROMIDE 0.5 MG: 0.5 SOLUTION RESPIRATORY (INHALATION) at 10:09

## 2021-01-01 RX ADMIN — IOPAMIDOL 100 ML: 612 INJECTION, SOLUTION INTRAVENOUS at 11:43

## 2021-01-01 RX ADMIN — PREDNISONE 40 MG: 20 TABLET ORAL at 08:22

## 2021-01-01 RX ADMIN — GUAIFENESIN 1200 MG: 600 TABLET, EXTENDED RELEASE ORAL at 20:21

## 2021-01-01 RX ADMIN — FLUTICASONE PROPIONATE 2 SPRAY: 50 SPRAY, METERED NASAL at 08:59

## 2021-01-01 RX ADMIN — DEXAMETHASONE SODIUM PHOSPHATE 12 MG: 10 INJECTION INTRAMUSCULAR; INTRAVENOUS at 18:12

## 2021-01-01 RX ADMIN — OXYCODONE HYDROCHLORIDE AND ACETAMINOPHEN 1 TABLET: 5; 325 TABLET ORAL at 14:56

## 2021-01-01 RX ADMIN — SODIUM CHLORIDE, PRESERVATIVE FREE 10 ML: 5 INJECTION INTRAVENOUS at 08:52

## 2021-01-01 RX ADMIN — ENOXAPARIN SODIUM 50 MG: 60 INJECTION SUBCUTANEOUS at 18:07

## 2021-01-01 RX ADMIN — SODIUM CHLORIDE, PRESERVATIVE FREE 10 ML: 5 INJECTION INTRAVENOUS at 08:17

## 2021-01-01 RX ADMIN — HYDROCODONE BITARTRATE AND ACETAMINOPHEN 2 TABLET: 7.5; 325 TABLET ORAL at 03:33

## 2021-01-01 RX ADMIN — IPRATROPIUM BROMIDE AND ALBUTEROL SULFATE 3 ML: 2.5; .5 SOLUTION RESPIRATORY (INHALATION) at 16:59

## 2021-01-01 RX ADMIN — CEFAZOLIN SODIUM 2 G: 10 INJECTION, POWDER, FOR SOLUTION INTRAVENOUS at 04:34

## 2021-01-01 RX ADMIN — CEFEPIME HYDROCHLORIDE 1 G: 1 INJECTION, POWDER, FOR SOLUTION INTRAMUSCULAR; INTRAVENOUS at 08:20

## 2021-01-01 RX ADMIN — IPRATROPIUM BROMIDE AND ALBUTEROL SULFATE 3 ML: 2.5; .5 SOLUTION RESPIRATORY (INHALATION) at 06:16

## 2021-01-01 RX ADMIN — IPRATROPIUM BROMIDE AND ALBUTEROL SULFATE 3 ML: 2.5; .5 SOLUTION RESPIRATORY (INHALATION) at 07:02

## 2021-01-01 RX ADMIN — APIXABAN 2.5 MG: 2.5 TABLET, FILM COATED ORAL at 08:01

## 2021-01-01 RX ADMIN — IPRATROPIUM BROMIDE AND ALBUTEROL SULFATE 3 ML: 2.5; .5 SOLUTION RESPIRATORY (INHALATION) at 03:37

## 2021-01-01 RX ADMIN — HYDROCODONE BITARTRATE AND ACETAMINOPHEN 1 TABLET: 7.5; 325 TABLET ORAL at 20:00

## 2021-01-01 RX ADMIN — CEFEPIME HYDROCHLORIDE 1 G: 1 INJECTION, POWDER, FOR SOLUTION INTRAMUSCULAR; INTRAVENOUS at 09:13

## 2021-01-01 RX ADMIN — MONTELUKAST SODIUM 10 MG: 10 TABLET, FILM COATED ORAL at 00:04

## 2021-01-01 RX ADMIN — MONTELUKAST SODIUM 10 MG: 10 TABLET, FILM COATED ORAL at 21:31

## 2021-01-01 RX ADMIN — PREDNISONE 40 MG: 20 TABLET ORAL at 09:12

## 2021-01-01 RX ADMIN — GUAIFENESIN 600 MG: 600 TABLET, EXTENDED RELEASE ORAL at 20:44

## 2021-01-01 RX ADMIN — LORAZEPAM 1 MG: 1 TABLET ORAL at 17:58

## 2021-01-01 RX ADMIN — HYDROCODONE BITARTRATE AND ACETAMINOPHEN 1 TABLET: 7.5; 325 TABLET ORAL at 09:05

## 2021-01-01 RX ADMIN — IPRATROPIUM BROMIDE AND ALBUTEROL SULFATE 3 ML: 2.5; .5 SOLUTION RESPIRATORY (INHALATION) at 03:23

## 2021-01-01 RX ADMIN — CETIRIZINE HYDROCHLORIDE 10 MG: 10 TABLET ORAL at 08:00

## 2021-01-01 RX ADMIN — FLUOXETINE 10 MG: 10 CAPSULE ORAL at 20:51

## 2021-01-01 RX ADMIN — BUDESONIDE AND FORMOTEROL FUMARATE DIHYDRATE 2 PUFF: 160; 4.5 AEROSOL RESPIRATORY (INHALATION) at 07:40

## 2021-01-01 RX ADMIN — APIXABAN 2.5 MG: 2.5 TABLET, FILM COATED ORAL at 10:22

## 2021-01-01 RX ADMIN — PROPOFOL 120 MG: 10 INJECTION, EMULSION INTRAVENOUS at 18:37

## 2021-01-01 RX ADMIN — SODIUM CHLORIDE, PRESERVATIVE FREE 10 ML: 5 INJECTION INTRAVENOUS at 20:25

## 2021-01-01 RX ADMIN — OXYCODONE HYDROCHLORIDE AND ACETAMINOPHEN 1 TABLET: 5; 325 TABLET ORAL at 18:42

## 2021-01-01 RX ADMIN — HYDROCODONE BITARTRATE AND ACETAMINOPHEN 1 TABLET: 7.5; 325 TABLET ORAL at 17:11

## 2021-01-01 RX ADMIN — POLYETHYLENE GLYCOL 3350 17 G: 17 POWDER, FOR SOLUTION ORAL at 08:43

## 2021-01-01 RX ADMIN — CEFEPIME HYDROCHLORIDE 1 G: 1 INJECTION, POWDER, FOR SOLUTION INTRAMUSCULAR; INTRAVENOUS at 20:15

## 2021-01-01 RX ADMIN — SODIUM CHLORIDE, PRESERVATIVE FREE 10 ML: 5 INJECTION INTRAVENOUS at 10:22

## 2021-01-01 RX ADMIN — SODIUM CHLORIDE, PRESERVATIVE FREE 10 ML: 5 INJECTION INTRAVENOUS at 20:09

## 2021-01-01 RX ADMIN — METHYLPREDNISOLONE SODIUM SUCCINATE 40 MG: 40 INJECTION, POWDER, FOR SOLUTION INTRAMUSCULAR; INTRAVENOUS at 05:43

## 2021-01-01 RX ADMIN — HYDROCODONE BITARTRATE AND ACETAMINOPHEN 2 TABLET: 7.5; 325 TABLET ORAL at 12:53

## 2021-01-01 RX ADMIN — FLUTICASONE PROPIONATE 2 SPRAY: 50 SPRAY, METERED NASAL at 08:22

## 2021-01-01 RX ADMIN — ROPIVACAINE HYDROCHLORIDE 20 ML: 5 INJECTION, SOLUTION EPIDURAL; INFILTRATION; PERINEURAL at 18:06

## 2021-01-01 RX ADMIN — ENOXAPARIN SODIUM 50 MG: 60 INJECTION SUBCUTANEOUS at 06:11

## 2021-01-01 RX ADMIN — ACETAMINOPHEN 650 MG: 325 TABLET, FILM COATED ORAL at 09:23

## 2021-01-01 RX ADMIN — OXYCODONE HYDROCHLORIDE AND ACETAMINOPHEN 1 TABLET: 5; 325 TABLET ORAL at 08:24

## 2021-01-01 RX ADMIN — LIDOCAINE 1 PATCH: 50 PATCH CUTANEOUS at 20:28

## 2021-01-01 RX ADMIN — BUDESONIDE AND FORMOTEROL FUMARATE DIHYDRATE 2 PUFF: 160; 4.5 AEROSOL RESPIRATORY (INHALATION) at 18:54

## 2021-01-01 RX ADMIN — POLYETHYLENE GLYCOL 3350 17 G: 17 POWDER, FOR SOLUTION ORAL at 08:29

## 2021-01-01 RX ADMIN — BUDESONIDE 0.25 MG: 0.25 INHALANT RESPIRATORY (INHALATION) at 06:35

## 2021-01-01 RX ADMIN — DOCUSATE SODIUM 100 MG: 100 CAPSULE ORAL at 08:07

## 2021-01-01 RX ADMIN — APIXABAN 2.5 MG: 2.5 TABLET, FILM COATED ORAL at 09:12

## 2021-01-01 RX ADMIN — OXYCODONE HYDROCHLORIDE AND ACETAMINOPHEN 1 TABLET: 5; 325 TABLET ORAL at 20:08

## 2021-01-01 RX ADMIN — GUAIFENESIN 1200 MG: 600 TABLET, EXTENDED RELEASE ORAL at 20:24

## 2021-01-01 RX ADMIN — GUAIFENESIN 1200 MG: 600 TABLET, EXTENDED RELEASE ORAL at 08:20

## 2021-01-01 RX ADMIN — APIXABAN 2.5 MG: 2.5 TABLET, FILM COATED ORAL at 09:09

## 2021-01-01 RX ADMIN — ROPINIROLE HYDROCHLORIDE 2 MG: 1 TABLET, FILM COATED ORAL at 08:47

## 2021-01-01 RX ADMIN — IPRATROPIUM BROMIDE 0.5 MG: 0.5 SOLUTION RESPIRATORY (INHALATION) at 20:18

## 2021-01-01 RX ADMIN — IPRATROPIUM BROMIDE 0.5 MG: 0.5 SOLUTION RESPIRATORY (INHALATION) at 20:59

## 2021-01-01 RX ADMIN — POTASSIUM CHLORIDE 40 MEQ: 10 CAPSULE, COATED, EXTENDED RELEASE ORAL at 15:53

## 2021-01-01 RX ADMIN — POLYETHYLENE GLYCOL 3350 17 G: 17 POWDER, FOR SOLUTION ORAL at 09:13

## 2021-01-01 RX ADMIN — BUDESONIDE AND FORMOTEROL FUMARATE DIHYDRATE 2 PUFF: 160; 4.5 AEROSOL RESPIRATORY (INHALATION) at 07:06

## 2021-01-01 RX ADMIN — METHYLPREDNISOLONE SODIUM SUCCINATE 40 MG: 40 INJECTION, POWDER, LYOPHILIZED, FOR SOLUTION INTRAMUSCULAR; INTRAVENOUS at 15:26

## 2021-01-01 RX ADMIN — DOCUSATE SODIUM 50 MG AND SENNOSIDES 8.6 MG 2 TABLET: 8.6; 5 TABLET, FILM COATED ORAL at 21:16

## 2021-01-01 RX ADMIN — OXYCODONE HYDROCHLORIDE AND ACETAMINOPHEN 1 TABLET: 5; 325 TABLET ORAL at 04:22

## 2021-01-01 RX ADMIN — FLUOXETINE 10 MG: 10 CAPSULE ORAL at 09:17

## 2021-01-01 RX ADMIN — MONTELUKAST SODIUM 10 MG: 10 TABLET, FILM COATED ORAL at 20:16

## 2021-01-01 RX ADMIN — CEFEPIME HYDROCHLORIDE 1 G: 1 INJECTION, POWDER, FOR SOLUTION INTRAMUSCULAR; INTRAVENOUS at 20:29

## 2021-01-01 RX ADMIN — HYDROCODONE BITARTRATE AND ACETAMINOPHEN 1 TABLET: 7.5; 325 TABLET ORAL at 21:15

## 2021-01-01 RX ADMIN — GUAIFENESIN 1200 MG: 600 TABLET, EXTENDED RELEASE ORAL at 08:48

## 2021-01-01 RX ADMIN — LIDOCAINE 2 PATCH: 50 PATCH CUTANEOUS at 22:37

## 2021-01-01 RX ADMIN — IPRATROPIUM BROMIDE AND ALBUTEROL SULFATE 3 ML: 2.5; .5 SOLUTION RESPIRATORY (INHALATION) at 01:17

## 2021-01-01 RX ADMIN — IPRATROPIUM BROMIDE 0.5 MG: 0.5 SOLUTION RESPIRATORY (INHALATION) at 15:21

## 2021-01-01 RX ADMIN — CETIRIZINE HYDROCHLORIDE 10 MG: 10 TABLET ORAL at 08:48

## 2021-01-01 RX ADMIN — ROPINIROLE HYDROCHLORIDE 2 MG: 1 TABLET, FILM COATED ORAL at 20:28

## 2021-01-01 RX ADMIN — METHYLPREDNISOLONE SODIUM SUCCINATE 60 MG: 125 INJECTION, POWDER, FOR SOLUTION INTRAMUSCULAR; INTRAVENOUS at 00:29

## 2021-01-01 RX ADMIN — METHYLPREDNISOLONE SODIUM SUCCINATE 40 MG: 40 INJECTION, POWDER, FOR SOLUTION INTRAMUSCULAR; INTRAVENOUS at 08:27

## 2021-01-01 RX ADMIN — GUAIFENESIN 1200 MG: 600 TABLET, EXTENDED RELEASE ORAL at 21:02

## 2021-01-01 RX ADMIN — SODIUM CHLORIDE, PRESERVATIVE FREE 10 ML: 5 INJECTION INTRAVENOUS at 20:29

## 2021-01-01 RX ADMIN — FAMOTIDINE 20 MG: 20 TABLET, FILM COATED ORAL at 09:06

## 2021-01-01 RX ADMIN — OXYCODONE HYDROCHLORIDE AND ACETAMINOPHEN 1 TABLET: 5; 325 TABLET ORAL at 05:46

## 2021-01-01 RX ADMIN — ROPINIROLE HYDROCHLORIDE 2 MG: 1 TABLET, FILM COATED ORAL at 21:16

## 2021-01-01 RX ADMIN — LIDOCAINE HYDROCHLORIDE 50 MG: 20 INJECTION, SOLUTION EPIDURAL; INFILTRATION; INTRACAUDAL; PERINEURAL at 18:37

## 2021-01-01 RX ADMIN — SODIUM CHLORIDE, PRESERVATIVE FREE 10 ML: 5 INJECTION INTRAVENOUS at 10:18

## 2021-01-01 RX ADMIN — GUAIFENESIN 600 MG: 600 TABLET, EXTENDED RELEASE ORAL at 21:00

## 2021-01-01 RX ADMIN — ENOXAPARIN SODIUM 40 MG: 40 INJECTION SUBCUTANEOUS at 09:43

## 2021-01-01 RX ADMIN — IPRATROPIUM BROMIDE 0.5 MG: 0.5 SOLUTION RESPIRATORY (INHALATION) at 10:17

## 2021-01-01 RX ADMIN — IPRATROPIUM BROMIDE 0.5 MG: 0.5 SOLUTION RESPIRATORY (INHALATION) at 10:50

## 2021-01-01 RX ADMIN — IPRATROPIUM BROMIDE AND ALBUTEROL SULFATE 3 ML: 2.5; .5 SOLUTION RESPIRATORY (INHALATION) at 19:49

## 2021-01-01 RX ADMIN — PREDNISONE 30 MG: 20 TABLET ORAL at 08:20

## 2021-01-01 RX ADMIN — HYDROCODONE BITARTRATE AND ACETAMINOPHEN 2 TABLET: 7.5; 325 TABLET ORAL at 19:53

## 2021-01-01 RX ADMIN — FAMOTIDINE 20 MG: 20 TABLET, FILM COATED ORAL at 17:03

## 2021-01-01 RX ADMIN — ROPINIROLE HYDROCHLORIDE 2 MG: 1 TABLET, FILM COATED ORAL at 21:41

## 2021-01-01 RX ADMIN — BUMETANIDE 1 MG: 0.25 INJECTION, SOLUTION INTRAMUSCULAR; INTRAVENOUS at 12:55

## 2021-01-01 RX ADMIN — CEFAZOLIN 1 G: 330 INJECTION, POWDER, FOR SOLUTION INTRAMUSCULAR; INTRAVENOUS at 18:50

## 2021-01-01 RX ADMIN — CARVEDILOL 3.12 MG: 3.12 TABLET, FILM COATED ORAL at 17:41

## 2021-01-01 RX ADMIN — IPRATROPIUM BROMIDE AND ALBUTEROL SULFATE 3 ML: 2.5; .5 SOLUTION RESPIRATORY (INHALATION) at 07:06

## 2021-01-01 RX ADMIN — IPRATROPIUM BROMIDE AND ALBUTEROL SULFATE 3 ML: 2.5; .5 SOLUTION RESPIRATORY (INHALATION) at 15:18

## 2021-01-01 RX ADMIN — APIXABAN 2.5 MG: 2.5 TABLET, FILM COATED ORAL at 08:57

## 2021-01-01 RX ADMIN — DOCUSATE SODIUM 100 MG: 100 CAPSULE ORAL at 08:57

## 2021-01-01 RX ADMIN — MORPHINE SULFATE 10 MG: 100 SOLUTION ORAL at 11:36

## 2021-01-01 RX ADMIN — CARVEDILOL 3.12 MG: 3.12 TABLET, FILM COATED ORAL at 17:18

## 2021-01-01 RX ADMIN — SODIUM CHLORIDE, PRESERVATIVE FREE 10 ML: 5 INJECTION INTRAVENOUS at 08:29

## 2021-01-01 RX ADMIN — FAMOTIDINE 20 MG: 20 TABLET, FILM COATED ORAL at 09:12

## 2021-01-01 RX ADMIN — BUDESONIDE 0.25 MG: 0.25 INHALANT RESPIRATORY (INHALATION) at 19:16

## 2021-01-01 RX ADMIN — FAMOTIDINE 20 MG: 20 TABLET, FILM COATED ORAL at 09:17

## 2021-01-01 RX ADMIN — APIXABAN 2.5 MG: 2.5 TABLET, FILM COATED ORAL at 20:44

## 2021-01-01 RX ADMIN — HYDROCODONE BITARTRATE AND ACETAMINOPHEN 1 TABLET: 7.5; 325 TABLET ORAL at 03:24

## 2021-01-01 RX ADMIN — GUAIFENESIN 1200 MG: 600 TABLET, EXTENDED RELEASE ORAL at 09:09

## 2021-01-01 RX ADMIN — CARVEDILOL 3.12 MG: 3.12 TABLET, FILM COATED ORAL at 09:17

## 2021-01-01 RX ADMIN — OXYCODONE HYDROCHLORIDE AND ACETAMINOPHEN 1 TABLET: 5; 325 TABLET ORAL at 12:45

## 2021-01-01 RX ADMIN — METHYLPREDNISOLONE SODIUM SUCCINATE 40 MG: 40 INJECTION, POWDER, LYOPHILIZED, FOR SOLUTION INTRAMUSCULAR; INTRAVENOUS at 06:08

## 2021-01-01 RX ADMIN — IPRATROPIUM BROMIDE AND ALBUTEROL SULFATE 3 ML: 2.5; .5 SOLUTION RESPIRATORY (INHALATION) at 21:32

## 2021-01-01 RX ADMIN — DOCUSATE SODIUM 100 MG: 100 CAPSULE ORAL at 09:12

## 2021-01-01 RX ADMIN — FAMOTIDINE 20 MG: 10 INJECTION INTRAVENOUS at 08:19

## 2021-01-01 RX ADMIN — GUAIFENESIN 1200 MG: 600 TABLET, EXTENDED RELEASE ORAL at 09:12

## 2021-01-01 RX ADMIN — SODIUM CHLORIDE 1 G: 900 INJECTION INTRAVENOUS at 20:43

## 2021-01-01 RX ADMIN — ONDANSETRON 4 MG: 2 INJECTION INTRAMUSCULAR; INTRAVENOUS at 19:11

## 2021-01-01 RX ADMIN — GUAIFENESIN 600 MG: 600 TABLET, EXTENDED RELEASE ORAL at 08:24

## 2021-01-01 RX ADMIN — FUROSEMIDE 20 MG: 10 INJECTION, SOLUTION INTRAMUSCULAR; INTRAVENOUS at 11:14

## 2021-01-01 RX ADMIN — HYDROCODONE BITARTRATE AND ACETAMINOPHEN 1 TABLET: 7.5; 325 TABLET ORAL at 22:42

## 2021-01-01 SDOH — SOCIAL STABILITY - SOCIAL INSECURITY: DISSAPEARANCE AND DEATH OF FAMILY MEMBER: Z63.4

## 2021-01-13 NOTE — TELEPHONE ENCOUNTER
Caller: Heide Newsome    Relationship: Self    Best call back number: [phone]  Medication needed:   Requested Prescriptions     Pending Prescriptions Disp Refills   • rOPINIRole (REQUIP) 2 MG tablet 90 tablet 5     Sig: Take 1 tablet by mouth 3 (Three) Times a Day. 2 mg PO during daytime, 4 mg QHS       When do you need the refill by: 1-13-21    What details did the patient provide when requesting the medication:    Does the patient have less than a 3 day supply:  [x] Yes  [] No    What is the patient's preferred pharmacy: [facility] [address] [place] [address].[facility] [address] [facility] [phone] [facility] [phone]

## 2021-02-08 NOTE — TELEPHONE ENCOUNTER
Caller: Heide Newsome    Relationship: Self    Best call back number: [phone]     Medication needed:   Requested Prescriptions     Pending Prescriptions Disp Refills   • rOPINIRole (REQUIP) 2 MG tablet 90 tablet 5     Sig: Take 1 tablet by mouth 3 (Three) Times a Day. 2 mg PO during daytime, 4 mg QHS       When do you need the refill by: 2/8/21    What details did the patient provide when requesting the medication: PATIENT STATES THAT HER MEDICATION IS NOT WORKING AS WELL ANYMORE. SHE NEEDS A HIGHER DOSAGE AS SHE IS USING MORE OF HER MEDICATION THROUGHOUT THE DAY AND IS RUNNING OUT SOONER.    Does the patient have less than a 3 day supply:  [x] Yes  [] No    What is the patient's preferred pharmacy: [facility] [address] [place] [address].[facility] 62 [facility] [phone] [facility] [phone] FX

## 2021-05-19 NOTE — OUTREACH NOTE
Prep Survey      Responses   [facility] patient discharged from?  [facility]   Is LACE score < 7 ?  No   Eligibility  [facility]   Hospital  [facility]   Date of Admission  07/07/20   Date of Discharge  07/13/20   Discharge Disposition  Home-Health Care S[facility]   Discharge diagnosis  Pulmonary emphysema   COVID-19 Test Status  Negative   Does the patient have one of the following disease processes/diagnoses(primary or secondary)?  Other   Does the patient have Home health ordered?  Yes   What is the Home health agency?   [facility]   Is there a DME ordered?  No   Prep survey completed?  Yes          Lisa Ramon RN         within normal limits

## 2021-07-22 NOTE — TELEPHONE ENCOUNTER
Caller: Heide Newsome    Relationship: Self    Medication needed:   Requested Prescriptions     Pending Prescriptions Disp Refills   • rOPINIRole (REQUIP) 2 MG tablet 90 tablet 5     Sig: Take 1 tablet by mouth 3 (Three) Times a Day. 2 mg PO during daytime, 4 mg QHS     What is the patient's preferred pharmacy: [facility] [address] [place] [address].[facility] 62 [facility] [phone] [facility] [phone] FX

## 2021-08-16 NOTE — TELEPHONE ENCOUNTER
Caller: Heide Newsome    Relationship to patient: Self    Best call back number: [phone]     Patient is needing: PATIENT IS HAVING LOTS OF SINUS PRESSURE, CONGESTION, STOPPED UP, EAR PAIN. PATIENT DOES NOT WANT TO COME IN FOR AN APPT, DUE TO FEAR OF GETTING COVID. PATIENT WOULD LIKE TO KNOW IF SOME MEDICATION CAN BE CALLED IN.     PATIENT SAID SHE IS WILLING TO DO A TELEPHONE VISIT, BUT DOES NOT HAVE A WAY TO DO A VIDEO VISIT.

## 2021-08-17 NOTE — TELEPHONE ENCOUNTER
Covid test pend to chart    Attempted to reach patient left voicemail to contact the office regarding appt scheduled for this afternoon

## 2021-08-17 NOTE — TELEPHONE ENCOUNTER
Contacted patient, does not wish to have covid test.  Requesting medication sent to her pharmacy, [facility]. States that mucinex makes her feel unwell and causes her to have difficulty sleeping. Also, patient reports worsening depression, says that her son committed suicide in her home less than a month ago. Rescheduled medicare wellness visit for November,  appt reminder mailed to patient.

## 2021-08-18 NOTE — TELEPHONE ENCOUNTER
PATIENT CALLED IN TO APOLOGIZE TO FABIAN. SHE SAID SHE WASN'T AWARE THE MEDICATION DOSAGE WAS INCREASED, AND SHE APOLOGIZES FOR HANGING UP ON HER

## 2021-08-18 NOTE — TELEPHONE ENCOUNTER
Patient called in inquiring if meds have been sent in. Advised patient trelegy was sent in and patient stated that's no what she wants. She wants an antibiotic.

## 2021-08-18 NOTE — TELEPHONE ENCOUNTER
Prednisone antibiotics in a.m. given patient's history of very severe COPD.  I also sent in a trial of Prozac to see if this will help her mood, ideally I would like to see her for follow-up in 6 weeks to see if this medication is beneficial for her mood.
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
## 2021-08-31 PROBLEM — S72.002A CLOSED LEFT HIP FRACTURE (HCC): Status: ACTIVE | Noted: 2021-01-01

## 2021-09-01 PROBLEM — M25.552 ACUTE PAIN OF LEFT HIP: Status: ACTIVE | Noted: 2021-01-01

## 2021-09-01 NOTE — PLAN OF CARE
Goal Outcome Evaluation:      OT consult received, checked on pt, RN requested therapy check back on 09-, pt currently tachycardic, on 10 L O2, with O2 sat between 89-90%. OT will continue to follow.

## 2021-09-01 NOTE — PLAN OF CARE
Goal Outcome Evaluation:  Plan of Care Reviewed With: patient        Progress: no change  Outcome Summary: Pt arrived from PACU late lastnight. When first arrived pt was wearing a ventimask with O2 sat staying in the low 80's, an additional 4L NC was applied. ISO encouraged. Pt weaned  on 5L Humidified NC. BP low. IVF infusing as ordered. Drsg to L hip remains cdi. Q2 turning when pt allows. PPP. No neuro changes noted. Sinus tachy on tele. Safety maintained.

## 2021-09-01 NOTE — CONSULTS
Orthopaedic Inpatient Consultation    NAME:  Heide Newsome   : 1945  MRN: [id number]    2021 12:25 PM    Requesting Physician: Ari Mckee MD    CHIEF COMPLAINT:  left hip pain    HISTORY OF PRESENT ILLNESS:   The patient is a 76 y.o. female who sustained a mechanical fall onto her left hip resulting in the acute onset of left hip pain in the inability to bear weight.  Pain is located in the left hip, rated a 3/5, dull and constant, worse with movement, better with rest and medication.  There are no associated symptoms.      Past Medical History:    Past Medical History:   Diagnosis Date   • Cancer (CMS/HCC), colon    • Chronic respiratory failure (CMS/HCC), 4 L nasal cannula continuously    • COPD (chronic obstructive pulmonary disease) (CMS/[facility])    • Restless leg syndrome        Past Surgical History:    Past Surgical History:   Procedure Laterality Date   • APPENDECTOMY     • CARPAL TUNNEL RELEASE Left    • COLON RESECTION     • FOOT SURGERY Bilateral     hammer toe   • FRACTURE SURGERY Left     Arm   • HYSTERECTOMY     • WRIST FRACTURE SURGERY Right        Current Medications:   Prior to Admission medications    Medication Sig Start Date End Date Taking? Authorizing Provider   fluticasone (Flonase) 50 MCG/ACT nasal spray 2 sprays in each nostril daily to control ear pressure and popping. 21  Yes Elina France MD   Fluticasone-Umeclidin-Vilant (TRELEGY) 200-62.5-25 MCG/INH inhaler Inhale 1 puff Daily. 21  Yes Pato Cooney DO   guaiFENesin (Mucinex) 600 MG 12 hr tablet Take 2 tablets by mouth 2 (Two) Times a Day. 20  Yes Pato Cooney DO   ipratropium-albuterol (DUO-NEB) 0.5-2.5 mg/3 ml nebulizer Take 3 mL by nebulization Every 4 (Four) Hours As Needed for Wheezing.   Yes Provider, MD Alisha   montelukast (Singulair) 10 MG tablet Take 1 tablet by mouth Every Night. 20  Yes Pato Cooney DO   rOPINIRole (REQUIP) 2 MG tablet 2 mg PO up to QID prn  restless legsduring daytime, 4 mg dose QHS 21  Yes Elina France MD   sodium chloride (OCEAN) 0.65 % nasal spray 2 sprays into the nostril(s) as directed by provider As Needed for Congestion. 19  Yes Inna Haile APRN   Ventolin  (90 Base) MCG/ACT inhaler INHALE 2 PUFFS BY MOUTH EVERY 4 HOURS AS NEEDED FOR WHEEZING OR  SHORTNESS  OF  AIR 21  Yes Pato Cooney DO   azithromycin (Zithromax Z-Jeff) 250 MG tablet Take 2 tablets by mouth on day 1, then 1 tablet daily on days 2-5 21  Pato Cooney DO   FLUoxetine (PROzac) 20 MG capsule Take 1 capsule by mouth Daily. 21  Pato Cooney DO   predniSONE (DELTASONE) 20 MG tablet Take 2 tablets by mouth Daily. 21  Pato Cooney DO       Allergies:  Tetracyclines & related, Penicillins, and Tape    Social History:   Social History     Socioeconomic History   • Marital status:      Spouse name: Not on file   • Number of children: Not on file   • Years of education: Not on file   • Highest education level: Not on file   Tobacco Use   • Smoking status: Former Smoker     Quit date: 2019     Years since quittin.6   • Smokeless tobacco: Never Used   Substance and Sexual Activity   • Alcohol use: No   • Drug use: No   • Sexual activity: Never       Family History:   Family History   Problem Relation Age of Onset   • Colon cancer Mother    • Cancer Mother    • COPD Father    • Diabetes Father    • Heart disease Father    • Cancer Sister    • Diabetes Sister    • Cancer Brother    • Cancer Paternal Grandmother    • Cancer Paternal Grandfather        REVIEW OF SYSTEMS:  14 point review of systems has been reviewed from the patient's emergency room visit, reviewed with the patient on today's date with no new changes.    PHYSICAL EXAM:      Physical Examination:  Vitals:   Vitals:    21 1801 21 1805 21 1808 21 1938   BP: 146/64 116/83 106/74 157/71   BP  Location:   Left arm Left arm   Patient Position:   Lying Lying   Pulse: 110 98 102 109   Resp: 22 18 20 13   Temp:    98.4 °F (36.9 °C)   TempSrc:    Temporal   SpO2: 97% 97% 96% 93%   Weight:       Height:         General:  Appears stated age, no distress.  Orientation:  Alert and oriented to time, place, and person.  Mood and Affect:  Cooperative and pleasant.  Gait:  Resting comfortably in bed.  Cardiovascular:  Symmetric 1-2 plus pulses in upper and lower extremities.  Lymph:  No cervical or inguinal lymphadenopathy noted.  Sensation:  Grossly intact to light touch.  DTR:  Normal, no pathologic reflexes.  Coordination/balance:  Normal    Musculoskeletal:  Right upper extremity exam:  There is no tenderness to palpation about the shoulder, elbow, wrist or hand.  Full motion.  Stability normal with provocative tests, 5/5 strength, and skin is normal.      Left upper extremity exam:  There is no tenderness to palpation about the shoulder, elbow, wrist or hand. Full motion.  Stability normal with provocative tests, 5/5 strength, and skin is normal.     Right lower extremity exam:  There is no tenderness to palpation about the hip, knee, ankle or foot.  Full motion  Stability normal with provocative tests, 5/5 strength, and skin is normal.     Left lower extremity exam:  There is no tenderness to palpation about the knee, ankle or foot, but there is obvious pain about the left hip.  There is bony crepitus with palpation.  This causes the patient discomfort.  The muscle compartments are soft and compressible.  The overlying skin is intact.  There is pain with any attempted active or passive range of motion to the left hip.  The left lower extremity is shortened but not obviously rotated.        DATA:    LAB RESULTS:      Lab 08/31/21  1306   WBC 11.65*   HEMOGLOBIN 12.4   HEMATOCRIT 42.1   PLATELETS 222   NEUTROS ABS 10.10*   IMMATURE GRANS (ABS) 0.10*   LYMPHS ABS 0.76   MONOS ABS 0.57   EOS ABS 0.08   MCV 90.3    PROTIME 11.8   APTT 25.8         Lab 08/31/21  1306   SODIUM 138   POTASSIUM 3.9   CHLORIDE 98   CO2 35.0*   ANION GAP 5.0   BUN 16   CREATININE 0.37*   GLUCOSE 168*   CALCIUM 9.6         Lab 08/31/21  1306   TOTAL PROTEIN 6.2   ALBUMIN 3.60   GLOBULIN 2.6   ALT (SGPT) 13   AST (SGOT) 16   BILIRUBIN 0.7   ALK PHOS 73         Lab 08/31/21  1306   PROTIME 11.8   INR 0.94                 Brief Urine Lab Results     None        Microbiology Results (last 10 days)     Procedure Component Value - Date/Time    COVID-19,[facility] IN-HOUSE,Nasal Swab No Transport Media 3-4 HR TAT - Swab, Nasal Cavity [[id number]]  (Normal) Collected: 08/31/21 1307    Lab Status: Final result Specimen: Swab from Nasal Cavity Updated: 08/31/21 1433     COVID19 Not Detected    Narrative:      Fact sheet for providers: https://www.fda.gov/media/877028/download     Fact sheet for patients: https://www.[employer].gov/media/020459/download    Test performed by PCR.    Consider negative results in combination with clinical observations, patient history, and epidemiological information.             ----------------------------------------------------------------------------------------------------------------------  I have reviewed the radiology images above and agree with the findings dictated below    Radiology: Peripheral Block    Result Date: 8/31/2021  Lorenzo Maldonado CRNA     8/31/2021  6:09 PM Peripheral Block Pre-sedation assessment completed: 8/31/2021 5:56 PM Patient reassessed immediately prior to procedure Patient location during procedure: holding area Start time: 8/31/2021 6:03 PM Stop time: 8/31/2021 6:06 PM Reason for block: procedure for pain, at surgeon's request, post-op pain management and Requested by  Performed by CRNA: Lorenzo Maldonado CRNA Preanesthetic Checklist Completed: patient identified, IV checked, site marked, risks and benefits discussed, surgical consent, monitors and equipment checked, pre-op evaluation and  timeout performed Prep: Pt Position: supine Sterile barriers:mask, gloves and cap Prep: Betadine Patient monitoring: blood pressure monitoring, continuous pulse oximetry and EKG Procedure Sedation:yes Guidance:ultrasound guided and fascial plane identified and local anesthetic infiltrated in iliaca compartment ULTRASOUND INTERPRETATION. Using ultrasound guidance a 20 G gauge needle was placed in close proximity to the nerve, at which point, under ultrasound guidance anesthetic was injected in the area of the nerve and spread of the anesthesia was seen on ultrasound in close proximity thereto.  There were no abnormalities seen on ultrasound; a digital image was taken; and the patient tolerated the procedure with no complications. Images:still images obtained (picture printed and placed in patients chart) Laterality:left Block Type:fascia iliaca compartment Injection Technique:single-shot Needle Type:echogenic Needle Gauge:20 G Resistance on Injection: none Medications Used: ropivacaine (NAROPIN) injection 0.5 %, 20 mL ropivacaine (NAROPIN) 0.2% injection, 20 mL Med admintered at 8/31/2021 6:06 PM Post Assessment Injection Assessment: negative aspiration for heme, no paresthesia on injection and incremental injection Patient Tolerance:comfortable throughout block Complications:no     XR Hip With or Without Pelvis 2 - 3 View Left    Result Date: 8/31/2021  XR HIP W OR WO PELVIS 2-3 VIEW LEFT- 8/31/2021 1:41 PM CDT  HISTORY: fall, pain  COMPARISON: None  FINDINGS: Frontal and lateral views of the left hip were obtained.  Additional AP pelvis.  Varus impacted Acute left intertrochanteric fracture with foreshortening. No subluxation at the hip joint.   Pelvic ring is intact. Sacral arches are intact. No gross soft tissue abnormality is visualized.      1. Acute varus impacted left intertrochanteric fracture with foreshortening.   This report was finalized on 08/31/2021 14:06 by Dr Jordy Kinney, .        ----------------------------------------------------------------------------------------------------------------------  Assessment:    1)  Acute traumatic displaced intertrochanteric fracture of the left femur, initial encounter for closed fracture    Pulmonary emphysema (CMS/HCC)    Chronic respiratory failure with hypoxia (CMS/HCC)    Closed left hip fracture (CMS/HCC)    Fall       Plan:  1) to OR for trochanteric entry femoral nailing - we discussed the risks, benefits, and alternatives and the patient wishes to proceed  2) Admit postop for pain control, PT/OT  3) patient has been n.p.o. since 10 AM    Electronically signed by Valdemar Chaudhari MD on 8/31/2021 at 19:44 CDT

## 2021-09-01 NOTE — PROGRESS NOTES
Orthopedic Surgery Progress Note    Heide Newsome  9/1/2021      Subjective:     Systemic or Specific Complaints: Patient resting comfortably in bed this afternoon.  Reports minimal postoperative left hip discomfort.    Objective:     Patient Vitals for the past 24 hrs:   BP Temp Temp src Pulse Resp SpO2   09/01/21 1123 116/62 98.1 °F (36.7 °C) Oral (!) 127 18 97 %   09/01/21 0935 -- -- -- (!) 131 22 93 %   09/01/21 0930 -- -- -- (!) 133 22 92 %   09/01/21 0835 -- -- -- (!) 143 -- --   09/01/21 0700 107/63 98.6 °F (37 °C) Oral (!) 127 18 92 %   09/01/21 0606 -- -- -- 118 20 92 %   09/01/21 0600 -- -- -- 118 20 90 %   09/01/21 0422 93/55 98.1 °F (36.7 °C) Oral 114 20 91 %   09/01/21 0036 -- -- -- 120 24 90 %   09/01/21 0029 -- -- -- (!) 123 24 95 %   09/01/21 0028 -- -- -- -- -- 95 %   09/01/21 0005 109/71 98.3 °F (36.8 °C) Axillary (!) 129 22 92 %   08/31/21 2340 97/62 98.4 °F (36.9 °C) Axillary (!) 127 22 92 %   08/31/21 2310 110/64 -- -- (!) 124 22 92 %   08/31/21 2300 96/63 98.5 °F (36.9 °C) Axillary (!) 131 26 (!) 88 %   08/31/21 2243 -- 98.8 °F (37.1 °C) Temporal 120 13 90 %   08/31/21 2230 99/53 -- -- 118 14 91 %   08/31/21 2215 95/55 -- -- 116 15 91 %   08/31/21 2200 126/90 -- -- (!) 128 18 (!) 89 %   08/31/21 2150 121/61 -- -- (!) 130 -- 90 %   08/31/21 2145 98/59 -- -- (!) 132 26 (!) 88 %   08/31/21 2130 98/58 -- -- (!) 128 24 (!) 85 %   08/31/21 2115 101/63 -- -- (!) 132 24 (!) 88 %   08/31/21 2107 -- -- -- -- -- (!) 87 %   08/31/21 2100 90/62 -- -- 116 12 92 %   08/31/21 2045 96/58 -- -- 115 14 97 %   08/31/21 2041 -- -- -- 118 12 94 %   08/31/21 2030 99/62 -- -- (!) 132 24 (!) 87 %   08/31/21 2015 110/63 -- -- (!) 125 20 92 %   08/31/21 2000 116/65 -- -- 118 22 92 %   08/31/21 1953 113/60 -- -- 110 20 94 %   08/31/21 1948 121/57 -- -- 112 20 92 %   08/31/21 1943 145/63 -- -- 110 12 92 %   08/31/21 1938 157/71 98.4 °F (36.9 °C) Temporal 109 13 93 %   08/31/21 1808 106/74 -- -- 102 20 96 %    08/31/21 1805 116/83 -- -- 98 18 97 %   08/31/21 1801 146/64 -- -- 110 22 97 %   08/31/21 1756 146/64 -- -- 115 22 98 %   08/31/21 1746 -- -- -- 107 18 96 %   08/31/21 1534 139/68 98.2 °F (36.8 °C) Oral 101 18 100 %   08/31/21 1500 -- -- -- 102 -- 100 %   08/31/21 1445 -- -- -- 105 -- 100 %   08/31/21 1430 -- -- -- 101 -- 100 %   08/31/21 1400 -- -- -- 90 -- 100 %   08/31/21 1340 -- -- -- 95 -- 100 %   08/31/21 1316 107/71 -- -- 106 -- --   08/31/21 1311 -- -- -- -- -- 92 %   08/31/21 1302 -- -- -- 102 -- 92 %   08/31/21 1301 109/71 -- -- -- -- --       left lower  General: alert, appears stated age and cooperative   Wound: covered             Dressing: Clean, dry, intact   Extremity: Distal NVI           DVT Exam: No evidence of DVT                   Data Review:  Lab Results (last 24 hours)     Procedure Component Value Units Date/Time    Hemoglobin A1c [[id number]]  (Normal) Collected: 09/01/21 0510    Specimen: Blood Updated: 09/01/21 0641     Hemoglobin A1C 5.40 %     Narrative:      Hemoglobin A1C Ranges:    Increased Risk for Diabetes  5.7% to 6.4%  Diabetes                     >= 6.5%  Diabetic Goal                < 7.0%    POC Glucose Once [[id number]]  (Normal) Collected: 09/01/21 0623    Specimen: Blood Updated: 09/01/21 0634     Glucose 126 mg/dL      Comment: : [id number] Bernarda TeriMeter ID: [id number]       Comprehensive Metabolic Panel [[id number]]  (Abnormal) Collected: 09/01/21 0510    Specimen: Blood Updated: 09/01/21 0626     Glucose 152 mg/dL      BUN 23 mg/dL      Creatinine 0.81 mg/dL      Sodium 137 mmol/L      Potassium 5.2 mmol/L      Chloride 100 mmol/L      CO2 32.0 mmol/L      Calcium 8.8 mg/dL      Total Protein 6.0 g/dL      Albumin 3.20 g/dL      ALT (SGPT) 13 U/L      AST (SGOT) 18 U/L      Alkaline Phosphatase 73 U/L      Total Bilirubin 0.4 mg/dL      eGFR Non African Amer 69 mL/min/1.73      Globulin 2.8 gm/dL      A/G Ratio 1.1 g/dL      BUN/Creatinine Ratio 28.4     Anion Gap  5.0 mmol/L     Narrative:      GFR Normal >60  Chronic Kidney Disease <60  Kidney Failure <15      Lipid Panel [[id number]]  (Abnormal) Collected: 09/01/21 0510    Specimen: Blood Updated: 09/01/21 0621     Total Cholesterol 182 mg/dL      Triglycerides 56 mg/dL      HDL Cholesterol 89 mg/dL      LDL Cholesterol  82 mg/dL      VLDL Cholesterol 11 mg/dL      LDL/HDL Ratio 0.92    Narrative:      Cholesterol Reference Ranges  (U.[place]. Department of Health and Human Services ATP III Classifications)    Desirable          <200 mg/dL  Borderline High    200-239 mg/dL  High Risk          >240 mg/dL      Triglyceride Reference Ranges  (U.S. Department of Health and Human Services ATP III Classifications)    Normal           <150 mg/dL  Borderline High  150-199 mg/dL  High             200-499 mg/dL  Very High        >500 mg/dL    HDL Reference Ranges  (U.S. Department of Health and Human Services ATP III Classifcations)    Low     <40 mg/dl (major risk factor for CHD)  High    >60 mg/dl ('negative' risk factor for CHD)        LDL Reference Ranges  (U.S. Department of Health and Human Services ATP III Classifcations)    Optimal          <100 mg/dL  Near Optimal     100-129 mg/dL  Borderline High  130-159 mg/dL  High             160-189 mg/dL  Very High        >189 mg/dL    TSH [[id number]]  (Normal) Collected: 09/01/21 0510    Specimen: Blood Updated: 09/01/21 0621     TSH 0.465 uIU/mL     CBC Auto Differential [[id number]]  (Abnormal) Collected: 09/01/21 0510    Specimen: Blood Updated: 09/01/21 0617     WBC 18.27 10*3/mm3      RBC 4.70 10*6/mm3      Hemoglobin 12.7 g/dL      Hematocrit 43.1 %      MCV 91.7 fL      MCH 27.0 pg      MCHC 29.5 g/dL      RDW 13.3 %      RDW-SD 44.8 fl      MPV 12.3 fL      Platelets 198 10*3/mm3      Neutrophil % 91.9 %      Lymphocyte % 2.6 %      Monocyte % 4.6 %      Eosinophil % 0.0 %      Basophil % 0.2 %      Immature Grans % 0.7 %      Neutrophils, Absolute 16.78 10*3/mm3      Lymphocytes,  Absolute 0.48 10*3/mm3      Monocytes, Absolute 0.84 10*3/mm3      Eosinophils, Absolute 0.00 10*3/mm3      Basophils, Absolute 0.04 10*3/mm3      Immature Grans, Absolute 0.13 10*3/mm3      nRBC 0.0 /100 WBC     POC Glucose Once [[id number]]  (Normal) Collected: 09/01/21 0024    Specimen: Blood Updated: 09/01/21 0035     Glucose 128 mg/dL      Comment: : [id number] Bernarda TeriMeter ID: [id number]           Imaging Results (Last 24 Hours)     Procedure Component Value Units Date/Time    XR Hip With or Without Pelvis 2 - 3 View Left [[id number]] Collected: 08/31/21 1951     Updated: 09/01/21 0657    Narrative:      EXAMINATION: C-arm in OR left hip 8/31/2021     Fluoroscopy time: 33.3 seconds.     Dose: 3.36018 mGy. 6 images are submitted for interpretation.     FINDINGS: C-arm was used intraoperatively during open reduction internal  fixation for an intertrochanteric fracture of the left hip. There is  good restoration of anatomic alignment. The films are available to the  OR for review.  This report was finalized on 08/31/2021 19:52 by Dr. Brennon Lucas MD.    FL C Arm During Surgery [[id number]] Collected: 08/31/21 1951     Updated: 09/01/21 0657    Narrative:      EXAMINATION: C-arm in OR left hip 8/31/2021     Fluoroscopy time: 33.3 seconds.     Dose: 3.10431 mGy. 6 images are submitted for interpretation.     FINDINGS: C-arm was used intraoperatively during open reduction internal  fixation for an intertrochanteric fracture of the left hip. There is  good restoration of anatomic alignment. The films are available to the  OR for review.  This report was finalized on 08/31/2021 19:52 by Dr. Brennon Lucas MD.          Assessment:   1 Day Post-Op status post trochanteric entry femoral nailing due to a highly displaced and comminuted intertrochanteric left femur fracture    Plan:      1:  DVT prophylaxis, ICE, elevate  2:  Pain control  3:  Physical therapy/Occupational therapy  4:  Anticipate discharge  once discharge placement arranged and if pain well controlled  5:  Toe-touch weightbearing left lower extremity x6 weeks      Valdemar Chaudhari MD

## 2021-09-01 NOTE — NURSING NOTE
PT ASLEEP--BP 99/53. ST-115-120. O2 SAT 89-91%-OCC 92% ON 50% VENTIMASK. OK TO GO TO ROOM AND KEEP ON O2 SAT MONITOR-CAN WEAN BACK TO 4-5LPM/NC AS TOLERATED.

## 2021-09-01 NOTE — NURSING NOTE
Notified yajaira joseph crna of pt o2 sat 85-88% with hr 130/min. Has had 1200ml ivf for or/pacu.due to void-no james Was better after duoneb at 2030 but did not last.  History of copd/emphesema--on home o2 4lpm/nc. rec order for another duoneb and lasix.

## 2021-09-01 NOTE — DISCHARGE INSTRUCTIONS
Lower Extremity Post-op Instructions  Dr. CONLEY      POST-OP CARE: Please follow these instructions closely!    IMPORTANT PHONE NUMBERS:  • For emergencies, please call 911  • You may reach Dr. Conley or his medical assistants at [phone], M-F 8:0am-5:00pm  • After 5pm or on the weekends, please call the answering service which can be reached from the number above   • Call immediately if you have any of the following symptoms:  - Elevated temperature above 101.5 degrees for more than 48 hours after surgery  - Persistent drainage  from wound  - Severe pain around surgical site  - Calf pain    Weight Bearing:   __X___ Touch-Toe Weight-bearing     Bathing:  DO NOT SOAK the dressings or incisions in water.  Pat the dressing and incision sites dry after showering.  _X__ Keep your dressings in place until follow-up and you may shower on the 3rd day following surgery; please cover your things and incisions thoroughly    Dressings: Do NOT remove dressing/splint unless unless told to do so. SOME DRAINAGE IS NORMAL!  • If you have a splint or cast, do NOT get wet!!    • DO NOT touch, remove, or apply ointment to the incision and/or steri strips  • Steri strips may fall off on their own  • Signs of infection that warrant a phone call to our clinical line:  o Excessive drainage or redness  o Red streaking coming away from the incision  o Increased pain  o Increased temperature above 101 degrees    Sutures:  If your physician uses sutures in your knee or ankle, they will dissolve on their own and will not need to be removed.  Black sutures occasionally used will need to be removed 10-14 days after surgery.    Elevate: Place 2 pillows under your ankle to get the incision area above the level of the heart to help in swelling (a recliner is not elevated!!!)    Ice: Ice your surgery site 5-6 times per day for 20 minutes at a time with dressing in place. You should wait at least 30 minutes before icing again to avoid ice irritation.  It may be difficult at first to ice the surgery area due to the amount of dressing, but continue to be diligent with icing.  Your dressings will be taken down at your first post-op appointment.     Range of motion:   - For the knee- It is important to gain gain full extension (knee straight) as soon as possible following surgery.         Ice to decrease swelling --> Knee fully straight --> Walk without a limp!!!  - NO PILLOWS UNDER THE KNEE, ONLY under the ankle  - For the foot/ankle- range of motion restrictions will be given to you at your first post-op appointment. Until that time, avoid any unnecessary range o f motion.  - Physical therapy- Your physical therapy status will be discussed with you at your first post-op appointment.   **Achieving range of motion goals and decreasing swelling/inflammation are the primary focus for the first two (2) weeks following surgery. **    Medications: You will be discharged with the appropriate medications following your surgery. Fill these at the pharmacy and take them as directed on the label.   Possible medications that will be prescribed are below.  You may or may not receive all of these. Occasionally, additional medications may be given with specific instructions.  Percocet/Lortab (oxycodone/hydrocodone with tylenol) - Pain Medication.  o Take one tablet every 4-6 hours. DO NOT EXCEED 4,000mg of Tylenol in 24 hours.        **Itching is not an allergy - take benadryl or an over the counter allergy medication (claritin, Zyrtec) if needed  **DO NOT MIX WITH ALCOHOL, DRIVE WHILE TAKING, OR TAKE EXTRA TYLENOL*   Zofran - Anti-nausea medication to help prevent nausea and vomiting after surgery.     Eliquis 2.5 mg - all patients with lower extremity surgery should take one 2.5 mg tablet every 12 hours (twice daily) for 42 days after surgery    DO NOT TAKE NSAID'S (ex. IBUPROFEN, MOTRIN, ALEVE, ETC) AFTER A BROKEN BONE HAS BEEN REPAIRED OR AFTER ACL SURGERY - THESE MEDICATIONS  WILL SLOW THE HEALING PROCESS!!!    **Starting January 2021, all narcotic medication must be prescribed electronically to your pharmacy.  Be sure to notify nursing of your preferred pharmacy.      **If you are running low on pain medications, please notify us if you need a refill 24-48 hours prior to when you run out, so we can make arrangements to refill the prescription for you if we determine it is necessary**

## 2021-09-01 NOTE — BRIEF OP NOTE
HIP TROCHANTERIC NAILING SHORT WITH INTRAMEDULLARY HIP SCREW  Progress Note    Heide ERNST Jaimesocorromonique  8/31/2021    Pre-op Diagnosis:   Left intertrochanteric femur fracture       Post-Op Diagnosis Codes:  Same    Procedure/CPT® Codes:  Procedure(s):  LEFT HIP TROCHANTERIC NAILING SHORT WITH INTRAMEDULLARY HIP SCREW    Surgeon(s):  Valdemar Chaudhari MD    Anesthesia: General    Staff:   Circulator: Laura Cash RN; Ann Krishnan RN  Scrub Person: Amanda Germain; Haroldo Mason     Estimated Blood Loss: < 50 mL    Urine Voided: * No values recorded between 8/31/2021  6:35 PM and 8/31/2021  7:37 PM *    Specimens:                None    Drains: * No LDAs found *    Findings: Left intertrochanteric femur fracture    Complications: None    Valdemar Chaudhari MD     Date: 8/31/2021  Time: 19:52 CDT

## 2021-09-01 NOTE — ANESTHESIA POSTPROCEDURE EVALUATION
NEONATOLOGY DAILY PROGRESS NOTE      Subjective     Kin Baez is a 2 week old old former Gestational Age: 36w2d, date of Birth 2019, birthweight 1915 g male infant admitted 2021 12:16 PM and was a   Inborn   baby.    Corrected Gestational Age: 39w1d      Kin Baez requires  Intensive Care for Hypoglycemia, Respiratory Distress of  and SGA/IUGR. With the need for continuous cardiorespiratory monitoring, monitoring of feeding tolerance, and temperature control.    Overnight Events  Patient with only NG feeds and was able to have sugars in the 60s -80s.       .Objective     Vital signs reviewed  Visit Vitals  BP 81/42 (BP Location: LLE - Left lower extremity)   Pulse 150   Temp 98.1 °F (36.7 °C) (Axillary)   Resp 40   Ht 18.31\" (46.5 cm)   Wt (!) 2100 g   HC 32.5 cm (12.8\")   SpO2 96%   BMI 9.71 kg/m²        Current Weight (!) 2100 g (21 2100)   Most recent weights:  Weight    21 0000 21 1200 21 0852 21 2100   Weight: (!) 1920 g (!) 2055 g (!) 2055 g (!) 2100 g     Weight Change Since Birth: 10%       Apnea/Bradycardia/Desaturation in last 24 hours    No data found.      Lines,Tube Drains    Intubation  Necessity/Indication: Not Intubated  Readiness for Extubation discussed: N/A 2021    Urinary Catheter  Necessity/Indication: Not Catheterized  Continued need for Urinary Catheter discussed: N/A 2021    Central Line  Type of Central Line: UVC Single lumen   Date inserted: - discontinued     PICC line   Necessity/Indication: Fluid/Blood Products, Limited Peripheral Access  Continued need for Central Line discussed : YES 2021    Chest Tube  No 2021    Surgical Drains  NO 2021      Fluids  Based off a Dosing Weight: 2100 g    Intake/Output:    Intake/Output  Report      700 -  0659 700 -  0659     "Patient: Heide Newsome    Procedure Summary     Date: 08/31/21 Room / Location: [facility] OR  /  [facility] OR    Anesthesia Start: 1835 Anesthesia Stop: 1942    Procedure: LEFT HIP TROCHANTERIC NAILING SHORT WITH INTRAMEDULLARY HIP SCREW (Left Hip) Diagnosis:     Surgeons: Valdemar Chaudhari MD Provider: Pablo Thorpe CRNA    Anesthesia Type: Not recorded ASA Status: 4          Anesthesia Type: No value filed.    Vitals  Vitals Value Taken Time   BP 99/53 08/31/21 2230   Temp 98.8 °F (37.1 °C) 08/31/21 2243   Pulse 120 08/31/21 2243   Resp 13 08/31/21 2243   SpO2 90 % 08/31/21 2243           Post Anesthesia Care and Evaluation    Patient location during evaluation: PACU  Patient participation: complete - patient participated  Level of consciousness: awake and alert  Pain management: adequate  Airway patency: patent  Anesthetic complications: No anesthetic complications  PONV Status: none  Cardiovascular status: acceptable and hemodynamically stable  Respiratory status: acceptable  Hydration status: acceptable    Comments: Blood pressure 107/63, pulse (!) 131, temperature 98.6 °F (37 °C), temperature source Oral, resp. rate 22, height 160 cm (63\"), weight 59 kg (130 lb), SpO2 93 %.    Patient discharged from PACU based upon Sandra score. Please see RN notes for further details      " I.V. (mL/kg) 64.5 (30.71) 2 (0.95)    NG/GT (mL/kg) 234.5 (111.67) 13 (6.19)    Total Intake(mL/kg) 299 (142.38) 15 (7.14)    Urine (mL/kg/hr) 170 (3.37)     Stool (mL/kg/hr) 0 (0)     Total Output(mL/kg) 170 (80.95)     Net +129 +15          Urine Occurrence 3 x     Stool Occurrence 2 x           I/O this shift:  In: 15 [I.V.:2; NG/GT:13]  Out: -       Urine: Urine Occurrence  Min: 1  Max: 1 Last Stool: 1 (09/01/21 1600)8/18 (x2)    UOP: see I/O table    Labs (Last 24 hours)  Recent Results (from the past 24 hour(s))   GLUCOSE, BEDSIDE - POINT OF CARE    Collection Time: 09/01/21  8:47 AM   Result Value Ref Range    GLUCOSE, BEDSIDE - POINT OF CARE 76 54 - 117 mg/dL   GLUCOSE, BEDSIDE - POINT OF CARE    Collection Time: 09/01/21 12:06 PM   Result Value Ref Range    GLUCOSE, BEDSIDE - POINT OF CARE 80 54 - 117 mg/dL   GLUCOSE, BEDSIDE - POINT OF CARE    Collection Time: 09/01/21  3:50 PM   Result Value Ref Range    GLUCOSE, BEDSIDE - POINT OF CARE 78 54 - 117 mg/dL   GLUCOSE, BEDSIDE - POINT OF CARE    Collection Time: 09/01/21  8:05 PM   Result Value Ref Range    GLUCOSE, BEDSIDE - POINT OF CARE 64 54 - 117 mg/dL   GLUCOSE, BEDSIDE - POINT OF CARE    Collection Time: 09/02/21 12:04 AM   Result Value Ref Range    GLUCOSE, BEDSIDE - POINT OF CARE 75 54 - 117 mg/dL   GLUCOSE, BEDSIDE - POINT OF CARE    Collection Time: 09/02/21  3:51 AM   Result Value Ref Range    GLUCOSE, BEDSIDE - POINT OF CARE 86 54 - 117 mg/dL        Bilirubin, Total (mg/dL)   Date Value   2021 2.7 (H)   2021 12.8 (H)         Medications  Current Facility-Administered Medications   Medication   • heparin 250 Units in 500 mL of Dextrose 20% fabiola continuous infusion          Vitals    24 Hour Range   Temperature   Temp  Min: 97.7 °F (36.5 °C)  Max: 98.2 °F (36.8 °C)   Pulse   Pulse  Min: 130  Max: 178   Respiratory   Resp  Min: 24  Max: 88   Blood Pressure   BP  Min: 81/42  Max: 81/42   Pulse Oximetry    SpO2  Min: 96 %  Max: 100 %        Physical Exam    Physical Exam  Constitutional:       General: He is not in acute distress.     Appearance: He is not toxic-appearing.   HENT:      Head: Normocephalic. Anterior fontanelle is flat.      Nose:      Comments: NG tube     Mouth/Throat:      Mouth: Mucous membranes are moist.      Pharynx: Oropharynx is clear.   Cardiovascular:      Rate and Rhythm: Normal rate and regular rhythm.      Heart sounds: Normal heart sounds.   Pulmonary:      Effort: Pulmonary effort is normal.      Breath sounds: Normal breath sounds.   Abdominal:      General: Bowel sounds are normal.      Palpations: Abdomen is soft.   Genitourinary:     Penis: Normal and uncircumcised.       Testes: Normal.   Musculoskeletal:         General: No swelling or deformity. Normal range of motion.      Comments: Moving extremities. PICC line in R. extremity   Skin:     General: Skin is warm.      Capillary Refill: Capillary refill takes less than 2 seconds.   Neurological:      Motor: No abnormal muscle tone.      Primitive Reflexes: Suck normal.      Comments: Moving extremities appropriately, withdrawing to pain            Assessment & Plan  by system     Former Gestational Age: 36w2d male infant, now corrected to 39w1d    Thermoregulation:    Baby is under - Open crib      Fluids, Electrolytes and Nutrition:  Assessment:   Late  infant  Serum Electrolytes - Ordered.  S/p KCL rider     - NG/OG: YES  - TF: 170  - PO% -0   D20 @  2 ml/hr  DBM/BM 26 kcal 13 ml per hour continuous feeds        Plan:  - Continue   - Increase feeds by 0.5ml and decrease D20 by 0.5ml for glucoses > 70 x 2      Respiratory System:  Assessment: Respiratory Distress of Deputy ( not a diagnosis)     - Current support settings:RA (since 1700)  - Surfactant: Not given    Plan:  - Blood gas and CXR on admission - Yes  - Wean Respiratory Support as tolerated if applicable  - Clinically monitor    Apnea/Bradycardia/Desaturations:  Assessment:  None    - Last Apnea:   ;    - Intervention:    - Last Bradycardia: length each event lasted (in sec) over the past 24 hours:  ;    - Interventions:          Plan:  - Monitor clinically for spells     Cardio Vascular System:   Assessment: Cardiomegaly  - Noted to have cardiomegaly on initial CXR.  No concerns noted prenatally  - 21 echo: Mild biventricular hypertrophy, without outflow tract obstruction.  Mild hypertrophy of interventricular septum.  - Echo  PHTN 40% systemic, coronary dilatation    Plan:  Monitor clinically  - Repeat echo end of sept     HEENT:   Assessment: None  -     Plan:  - No active issues     Gastrointestinal System:  Assessment: None  - AUS : No hepatosplenomegaly    Plan:  - No active issues     Renal System:  Assessment: None  -   Plan:  - No active issues    Hematology:  Assessment: At risk for Hyperbilirubinemia and Thrombocytopenia    Baby's blood type is A Rh Positive;  Tania: unknown  Maternal blood type is   Information for the patient's mother:  Eleni Baez [[id number]]   A Rh Positive     Last Bilirubin:   Bilirubin, Total (mg/dL)   Date Value   2021 (H)   LOW RISK    PLT (K/mcL)   Date Value   2021 153     Hematology/Oncology on consult ()  S/p IVIG   S/p platelet transfusion 8/15, 8/16x 2, , 8/18 x2, , 8/23 x2  S/p FFP ,   S/p cryoprecipitate , ,   Abdominal u/s : No evidence of hepatosplenomegaly  Vitamin K given   Stable transcutaneous bilirubin levels  827/-hemoglobin 8.8 and platelets 153  No signs of bleeding  Plan:  -CBC 9/3  - DIC prn  - Platelet goal >50,000 per heme-onc      Infectious Disease:  Assessment: Observation/Evaluation for infectious condition ruled out    Early onset sepsis screen:   Sepsis Risk Calculator Data      Admission (Current) from [facility] in [facility]  INTENSIVE CARE   Gestational age (weeks) 36 weeks   Gestational age (weeks) 36   Gestational  age (days) 2 days   Gestational age (days) 2   Highest maternal antepartum temp (F) 99   Highest maternal antepartum temp (F) 99   ROM (hours) 0 hours   Maternal GBS status 0   Type of intrapartum antibiotics No antibiotics or any antibiotics less than 2 hrs prior to birth   Type of intrapartum antibiotics 0          Risk at Birth: 1000  Risk - Well Appearin  Risk - Equivocal: 1000  Risk - Clinical Illness: 1000    Clinical Exam:  Clinical Illness - Persistent need for NCPAP / HFNC / mechanical ventilation (outside of the delivery room) and Need for supplemental O2 > 2 hours to maintain oxygen saturations > 90% (outside of the delivery room)    Plan for EOS  - EOS Risk at Birth or after Clinical Exam: Greater or equal to 3 per 1,000 live births - Blood Culture, CBC, Empiric Antibiotics and Vitals per NICU  - Blood culture and CBC on admission - Yes  - Baby was started on Ampicillin/Gentamicin x 36 hours pending Blood Cultures.    Antibiotic Decision Making  - No     S/p Ampicillin x 36 and Gentamycin   urine CMV and toxo levels - negative    Labs:  BCx : NGTD    Endocrine System:  Assessment:  Hypoglycemia and SGA/IUGR  - Endocrine on consult  Critical lab results:  Insulin: 7 wnl  GH: 11.8 wnl  Cortisol:15.4 wnl    - NBS with not enough sample for Congential Hypothyroid      Plan:  - See FEN  - Send thyroid studies on 9/3    Central Nervous System:  Assessment: Microcephaly  - HUS : Within normal limit for gestational age brain sonogram.  No  evidence of intra-ventricular hemorrhage.  HUS :  New left-sided hemorrhage, probably grade 1 or 2.   HUS : Bilateral grade 2 intraventricular hemorrhage likely new on the right and evolved on the left    HUS : Evolution of left-sided germinal matrix hemorrhage.  Right   sided hemorrhage within the cardiothymic which appears more prominent than   on the previous examination.     Plan:   Repeat head US the week of      Ophthalmology:  Assessment: None        Genetics:  Assessment: None  - Mother is SMA carrier, father not tested    Plan:  - No active issues      Musculo Skeletal:  Assessment: breech presentation  -    Plan:  - Consider hip ultrasound at 4-6 weeks    Skin:  Assessment:  -    Plan:      Other:       Therapies:               Screenings & Procedures   Immunizations:   There is no immunization history on file for this patient.  [place] Hearing Test:    [place] ROP Eye Exam Needed?:   No  Car Seat Screen:    CCHD Screening:   Screening complete:    Right hand reading %:    Foot reading %:    CHD:    Circumcision:    [place] State Screen- date drawn (most recent results): 21 ((admit)-pending, -pending) (21 1200)  Last 3 results: 21 ((admit)-pending, -pending) (21 1200)  Is patient a Synagis Candidate:   ( if yes, see Immunizations above for date of administration)      Parents plan to follow-up as an outpatient following discharge home with - TBD    I have spoken with the nursing staff and the healthcare team and reviewed findings and plan of management.    I have reviewed infants current condition and plan of management with Mother by telephone.    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 David Azevedo. Findings confirmed and discussed with NICU team: nursing, pediatric resident, medical student,  nurse  practitioner, and  fellow. Agree with assessment and plan as outlined in note with the following revisions and/or edits described in the note..

## 2021-09-01 NOTE — CASE MANAGEMENT/SOCIAL WORK
Continued Stay Note  [facility]     Patient Name: Heide Newsome  MRN: [id number]  Today's Date: 9/1/2021    Admit Date: 8/31/2021    Discharge Plan     Row Name 09/01/21 1447       Plan    Plan Comments  Spoke to pt about dc planning and rehab. Pt states she is aware that she is toe touch weightbearing on left side but does plan home at dc. Discussed snf for rehab/swing bed but pt states her spouse and her son (retired paramedic) will be with her 24/7. Pt states she is agreeable to  and has used [facility] ([place] in the past. Pt has most DME at home but states she may need a wheelchair. Pt requests  check  to dc regarding WC.        Discharge Codes    No documentation.             FLAKITA Chambers

## 2021-09-01 NOTE — PROGRESS NOTES
[facility] Medicine Services  INPATIENT PROGRESS NOTE    Length of Stay: 1  Date of Admission: 8/31/2021  Primary Care Physician: Pato Cooney DO    Subjective   Chief Complaint: Left hip pain  HPI   To ER 8/31 after fall.  She slipped on coffee that spilled on the floor and landed on left hip. Complained of left hip pain and unable to stand or bear weight.  No presyncopal episode, dizziness prior to fall.  No loss of consciousness.  History severe COPD on chronic oxygen 4.5 L at home.  Impacted left intertrochanteric fracture.  Dr. Chaudhari took to surgery yesterday for nailing left hip fracture.    Sitting up in bed working on iPad.  Nurses reported shortness of breath this morning.  She is on high flow oxygen at 10 L.  Normally wears oxygen at 4.5 L.  Sinus tachycardia on EKG.  Expiratory wheezes noted.  Have started Solu-Medrol and given a dose of Lasix.  She denies chest pain or palpitations.  Denies nausea, vomiting or abdominal pain.  Reports left hip pain level 8 out of 10.  Physical therapy did not work with her earlier today due to elevated heart rate and requiring increased oxygen.    Review of Systems   Constitutional: Positive for activity change (After fall). Negative for chills, fatigue and fever.   HENT: Negative for congestion and trouble swallowing.    Eyes: Negative for photophobia and visual disturbance.   Respiratory: Positive for shortness of breath. Negative for cough.    Cardiovascular: Negative for chest pain, palpitations and leg swelling.   Gastrointestinal: Negative for constipation, diarrhea, nausea and vomiting.   Endocrine: Negative for cold intolerance, heat intolerance and polyuria.   Genitourinary: Negative for dysuria, frequency and urgency.   Musculoskeletal: Positive for gait problem (After fall).   Skin: Negative for color change, pallor, rash and wound.   Allergic/Immunologic: Negative for immunocompromised state.   Neurological: Positive  for weakness. Negative for light-headedness.   Hematological: Negative for adenopathy. Does not bruise/bleed easily.   Psychiatric/Behavioral: Negative for agitation, behavioral problems and confusion.      All pertinent negatives and positives are as above. All other systems have been reviewed and are negative unless otherwise stated.     Objective    Temp:  [98.1 °F (36.7 °C)-98.8 °F (37.1 °C)] 98.1 °F (36.7 °C)  Heart Rate:  [] 127  Resp:  [12-26] 18  BP: ()/(53-90) 116/62  Physical Exam  Vitals and nursing note reviewed.   Constitutional:       Comments: Sitting up in bed.  Oxygen at 10 L.   HENT:      Head: Normocephalic and atraumatic.      Nose: No congestion.      Mouth/Throat:      Pharynx: Oropharynx is clear. No oropharyngeal exudate.   Eyes:      Extraocular Movements: Extraocular movements intact.      Pupils: Pupils are equal, round, and reactive to light.   Cardiovascular:      Rate and Rhythm: Regular rhythm. Tachycardia present.      Heart sounds: No murmur heard.        Comments: Sinus tachycardia 126 on telemetry.  Pulmonary:      Breath sounds: Wheezing (Expiratory wheezes anterior and posterior.) present. No rhonchi.      Comments: Oxygen 10 L   Abdominal:      Palpations: Abdomen is soft.      Tenderness: There is no abdominal tenderness.   Genitourinary:     Comments: Voiding.  Musculoskeletal:         General: Tenderness (Left hip) present.      Cervical back: Normal range of motion and neck supple.   Skin:     General: Skin is warm and dry.      Comments: Left hip dressing dry and intact   Neurological:      General: No focal deficit present.      Mental Status: She is alert and oriented to person, place, and time.   Psychiatric:         Mood and Affect: Mood normal.         Behavior: Behavior normal.         Thought Content: Thought content normal.         Judgment: Judgment normal.       Results Review:  I have reviewed the labs, radiology results, and diagnostic  studies.    Laboratory Data:      Results from last 7 days   Lab Units 09/01/21  0510 08/31/21  1306   WBC 10*3/mm3 18.27* 11.65*   HEMOGLOBIN g/dL 12.7 12.4   HEMATOCRIT % 43.1 42.1   PLATELETS 10*3/mm3 198 222     Results from last 7 days   Lab Units 09/01/21  0510 08/31/21  1306 08/31/21  1306   SODIUM mmol/L 137  --  138   POTASSIUM mmol/L 5.2  --  3.9   CHLORIDE mmol/L 100  --  98   CO2 mmol/L 32.0*  --  35.0*   BUN mg/dL 23  --  16   CREATININE mg/dL 0.81  --  0.37*   GLUCOSE mg/dL 152*   < > 168*   CALCIUM mg/dL 8.8  --  9.6   ALT (SGPT) U/L 13  --  13    < > = values in this interval not displayed.     Culture Data:    No results found for: BLOODCX, URINECX, WOUNDCX, MRSACX, RESPCX, STOOLCX    Radiology Data:   Imaging Results (Last 7 Days)       Procedure Component Value Units Date/Time    XR Hip With or Without Pelvis 2 - 3 View Left [[id number]] Collected: 08/31/21 1951     Updated: 09/01/21 0657    Narrative:      EXAMINATION: C-arm in OR left hip 8/31/2021     Fluoroscopy time: 33.3 seconds.     Dose: 3.84531 mGy. 6 images are submitted for interpretation.     FINDINGS: C-arm was used intraoperatively during open reduction internal  fixation for an intertrochanteric fracture of the left hip. There is  good restoration of anatomic alignment. The films are available to the  OR for review.  This report was finalized on 08/31/2021 19:52 by Dr. Brennon Lucas MD.    FL C Arm During Surgery [[id number]] Collected: 08/31/21 1951     Updated: 09/01/21 0657    Narrative:      EXAMINATION: C-arm in OR left hip 8/31/2021     Fluoroscopy time: 33.3 seconds.     Dose: 3.71124 mGy. 6 images are submitted for interpretation.     FINDINGS: C-arm was used intraoperatively during open reduction internal  fixation for an intertrochanteric fracture of the left hip. There is  good restoration of anatomic alignment. The films are available to the  OR for review.  This report was finalized on 08/31/2021 19:52 by Dr. Willett  MD Lance.    XR Hip With or Without Pelvis 2 - 3 View Left [[id number]] Collected: 08/31/21 1405     Updated: 08/31/21 1409    Narrative:      XR HIP W OR WO PELVIS 2-3 VIEW LEFT- 8/31/2021 1:41 PM CDT     HISTORY: fall, pain     COMPARISON: None      FINDINGS:   Frontal and lateral views of the left hip were obtained.  Additional AP  pelvis.     Varus impacted Acute left intertrochanteric fracture with  foreshortening. No subluxation at the hip joint.       Pelvic ring is intact. Sacral arches are intact. No gross soft tissue  abnormality is visualized.        Impression:      1. Acute varus impacted left intertrochanteric fracture with  foreshortening.        This report was finalized on 08/31/2021 14:06 by Dr Jordy Kinney, .            Intake/Output    Intake/Output Summary (Last 24 hours) at 9/1/2021 1359  Last data filed at 9/1/2021 1124  Gross per 24 hour   Intake 1373 ml   Output 200 ml   Net 1173 ml     Scheduled Meds  apixaban, 2.5 mg, Oral, Q12H  budesonide-formoterol, 2 puff, Inhalation, BID - RT  ceFAZolin, 2 g, Intravenous, Q8H  fluticasone, 1 spray, Each Nare, Daily  guaiFENesin, 1,200 mg, Oral, BID  ipratropium-albuterol, 3 mL, Nebulization, Q4H - RT  methylPREDNISolone sodium succinate, 40 mg, Intravenous, Q8H  montelukast, 10 mg, Oral, Nightly  polyethylene glycol, 17 g, Oral, Daily  rOPINIRole, 2 mg, Oral, Nightly  sodium chloride, 10 mL, Intravenous, Q12H      I have reviewed the patient current medications.     Assessment/Plan     Active Hospital Problems    Diagnosis     **Closed left hip fracture (CMS/HCC)     Acute on chronic respiratory failure with hypoxia (CMS/HCC)     Acute pain of left hip     Fall     Chronic respiratory failure with hypoxia (CMS/HCC)     Pulmonary emphysema (CMS/HCC)     COPD, very severe (CMS/HCC)      Plan:  1.  To ER 8/31 after fall.  She slipped on coffee that spilled on the floor and landed on left hip. Complained of left hip pain and unable to stand or bear  weight.  No presyncopal episode, dizziness prior to fall.  No loss of consciousness.   2.  X-ray left hip acute valgus impacted left intertrochanteric fracture with foreshortening.  3.  Cephalomedullary nailing left closed displaced intertrochanteric hip fracture 8/31 Dr. Chaudhari  4.  Physical therapy consulted.  Toe-touch weightbearing left lower extremity for 6 weeks.  5.  DVT prophylaxis with Eliquis 2.5 mg twice daily  6.  Chronic hypoxic respiratory failure wears oxygen at 4.5 L at home.  Shortness of breath earlier today now on oxygen at 10 L.  Symbicort twice daily, duo nebs every 4 hours.  Incentive spirometry, Mucinex.  7.  Sinus tachycardia per telemetry.  EKG ordered and reviewed today sinus tachycardia.  Will not give beta-blocker due to severe COPD.  8.  Expiratory wheezes anterior and posterior.  Add Solu-Medrol 40 mg IV every 8 hours.  Lasix 40 mg IV x1 dose.  Decrease IV fluids to 40 mL/h.  9.  Potassium 5.2.  Discontinue lactated Ringer's.  BMP in a.m.  10.  Leukocytosis.  WBC 11.6 on admission 18.27 today.  Suspect reactive.  Denies dysuria.  Check urinalysis per in and out cath.  11.  Reviewed home medications.  12.  Social service for discharge planning.  Will need skilled facility as toe-touch weightbearing for 6 weeks.    CODE STATUS/Advanced Care Planning:.  Full code  Patient surrogate decision maker is her son, Jensen Salcedo.    The above documentation resulted from a face-to-face encounter by me Joy RUELAS, [facility].    Discharge Planning: I expect patient to be discharged to skilled nursing facility in 2-3 days.    Electronically signed by JESSENIA Rosado, 9/1/2021, 13:59 CDT.    .I personally evaluated and examined the patient in conjunction with JESSENIA Quiles and agree with the assessment, treatment plan, and disposition of the patient as recorded by her. My history, exam, and further recommendations are: I have reviewed and agree with the plans. Kt      Electronically  signed by Ari Mckee MD, 9/1/2021, 18:17 CDT.

## 2021-09-01 NOTE — OP NOTE
Patient Name: Ciara  MRN: [id number]  : 1945    DATE of SURGERY: 2021    SURGEON: Valdemar Chaudhari MD    ASSISTANT: NONE     PREOPERATIVE DIAGNOSIS: Acute traumatic displaced intertrochanteric fracture of the Left femur, initial encounter for closed fracture    POSTOPERATIVE DIAGNOSIS: Acute traumatic displaced intertrochanteric fracture of the Left femur, initial encounter for closed fracture    PROCEDURE PERFORMED: Cephalomedullary Nailing Left closed displaced Intertrochanteric hip fracture      IMPLANTS: Synthes medium TFN    ANESTHESIA USED: General endotracheal anesthesia    OPERATIVE INDICATIONS: 76 y.o. female status post fall onto her left hip resulting in the acute onset of left hip pain and inability to bear weight.  Surgical indications include fracture displacement, stabilization of fracture, and mobilization of the patient.  Risks include, but are not limited to, anesthesia, bleeding, infection, pain, damage to local structures, need for further surgery, malunion, nonunion, fracture displacement, failure of hardware, intraoperative death.  Risks, benefits, and alternatives were discussed and the patient wishes to proceed with surgery.    ESTIMATED BLOOD LOSS: < 50 mL    DRAINS: none     COMPLICATIONS: none    SPECIMENS: none    FINDINGS: see op note    PROCEDURE in DETAIL:  The patient was seen in the preoperative holding room, the informed consent was reviewed and signed, and the correct operative extremity marked with the patient’s agreement.  The patient was transported to the operating room, where a timeout was performed identifying the correct patient and operative site.  Perioperative antibiotics were administered prior to incision.    Once anesthetized, both feet were placed into well-padded boots and the patient was positioned on the fracture table.  Traction and internal rotation were applied to the operative extremity and the fracture was noted to adequately align. A sterile  prep and drape was then performed.    A guidepin was placed at the tip of the greater trochanter on the AP plane and in line with the intramedullary canal on the lateral view.  The wire was advanced to the level of the lesser trochanter followed by introduction of the starting reamer.     The nail of choice was then placed into the intramedullary canal.  Utilizing the attachment jig, a guidepin was then placed into the central aspect of the femoral head on both the AP and lateral views.  Length was measured for the spiral blade and the path of the screw was drilled to the appropriate depth.  The spiral blade was placed without complication and the set screw was placed proximally, loosened a 1/4 turn to allow for compression of the fracture.    Again utilizing the attachment jig, a distal interlocking screw was placed into the static portion of the nail gaining excellent purchase. C-arm images were used in multiple planes showing adequate alignment of the fracture without hardware complication.    Incisions were irrigated, closed in layers, and the skin was sealed with adhesive skin dressing.  A sterile dressing was applied, the patient awakened, transported the recovery room in stable condition.    POSTOPERATIVE PLAN:  1) TTWB LLE x 6 weeks  2) DVT prophylaxis x 6 weeks  3) PT/OT    Electronically signed by Valdemar Chaudhari MD on 8/31/2021 at 19:53 CDT

## 2021-09-02 NOTE — PLAN OF CARE
"Goal Outcome Evaluation:  Plan of Care Reviewed With: patient   Patient appears asleep but arouses upon voice. Eyes open spontaneously. SOA with noticeable labored breathing. O2 sat dropped occassionally at 10 Lt High flow. Increased to 15 L/PM on high flow. MD aware of the situation. New orders receive for x-ray, ABG's, IV ABT, pulmonology consultation. Patient responds with \"I don't know \" while asking for any pain or discomfort. Morphine IV given to relieve her discomfort. IVF infusing per order. Fair output. Intermittent incontinent bladder X 1. Otherwise uses bedpan. Positioning and Weight shifting assistance provided Q 2 hout. Dressing site dry and intact. IV intact. Poor eating, drinking fair. Heart beat went up. Placed on Bipap by RT. IV lasix given per order. Continue to monitor.   "

## 2021-09-02 NOTE — THERAPY EVALUATION
Acute Care - Occupational Therapy Initial Evaluation  [facility]     Patient Name: Heide Newsome  : 1945  MRN: [id number]  Today's Date: 2021  Onset of Illness/Injury or Date of Surgery: 21  Date of Referral to OT: 21  Referring Physician: Ari Mckee MD     Admit Date: 2021       ICD-10-CM ICD-9-CM   1. Fall, initial encounter  W19.XXXA E888.9   2. Closed fracture of left hip, initial encounter (CMS/HCC)  S72.002A 820.8   3. Impaired mobility  Z74.09 799.89   4. Decreased activities of daily living (ADL)  Z78.9 V49.89     Patient Active Problem List   Diagnosis   • Pulmonary emphysema (CMS/HCC)   • Acute on chronic respiratory failure with hypoxia (CMS/HCC)   • COPD, very severe (CMS/HCC)   • Severe malnutrition (CMS/HCC)   • History of colon cancer   • Thrombocytopenia (CMS/HCC)   • Anemia   • Hyperglycemia, drug-induced   • Pneumonia   • Chronic respiratory failure with hypoxia (CMS/HCC)   • COPD with acute exacerbation (CMS/HCC)   • Adrenal nodule (CMS/HCC)   • Closed left hip fracture (CMS/HCC)   • Fall   • Acute pain of left hip     Past Medical History:   Diagnosis Date   • Cancer (CMS/HCC), colon    • Chronic respiratory failure (CMS/HCC), 4 L nasal cannula continuously    • COPD (chronic obstructive pulmonary disease) (CMS/HCC)    • Restless leg syndrome      Past Surgical History:   Procedure Laterality Date   • APPENDECTOMY     • CARPAL TUNNEL RELEASE Left    • COLON RESECTION     • FOOT SURGERY Bilateral     hammer toe   • FRACTURE SURGERY Left     Arm   • HIP TROCHANTERIC NAILING WITH INTRAMEDULLARY HIP SCREW Left 2021    Procedure: LEFT HIP TROCHANTERIC NAILING SHORT WITH INTRAMEDULLARY HIP SCREW;  Surgeon: Valdemar Chaudhari MD;  Location: [facility];  Service: Orthopedics;  Laterality: Left;   • HYSTERECTOMY     • WRIST FRACTURE SURGERY Right             OT ASSESSMENT FLOWSHEET (last 12 hours)      OT Evaluation and Treatment     Row Name 21 1110                    OT Time and Intention    Subjective Information  complains of;weakness;fatigue;dyspnea  -AC (r) PF (t) AC (c)        Document Type  evaluation  -AC (r) PF (t) AC (c)        Mode of Treatment  occupational therapy;co-treatment  -AC (r) PF (t) AC (c)        Symptoms Noted During/After Treatment  shortness of breath  -AC (r) PF (t) AC (c)           General Information    Patient Profile Reviewed  yes  -AC (r) PF (t) AC (c)        Onset of Illness/Injury or Date of Surgery  08/31/21  -AC (r) PF (t) AC (c)        Referring Physician  Ari Mckee MD   -AC (r) PF (t) AC (c)        Prior Level of Function  min assist:;cooking;bathing Pt confused and poor historian   -AC (r) PF (t) AC (c)        Equipment Currently Used at Home  rollator  -AC (r) PF (t) AC (c)        Pertinent History of Current Functional Problem  Left hip pain, SOB, fatigue, weakness, gait problem, Left hip fracture, chronic 4.5 L of oxygen at home nc, Left hip trochanteric nailing short with intramedullary hip screw, PMH: end stage COPD  -AC (r) PF (t) AC (c)        Existing Precautions/Restrictions  fall;oxygen therapy device and L/min;weight bearing  -AC (r) PF (t) AC (c)        Limitations/Impairments  safety/cognitive  -AC (r) PF (t) AC (c)        Barriers to Rehab  medically complex;cognitive status;physical barrier  -AC (r) PF (t) AC (c)           Living Environment    Current Living Arrangements  home/apartment/condo tub shower w/ shower seat   -AC (r) PF (t) AC (c)        Home Accessibility  stairs to enter home  -AC (r) PF (t) AC (c)        Lives With  spouse;child(nomi), adult  -AC (r) PF (t) AC (c)           Home Main Entrance    Number of Stairs, Main Entrance  one no stairs within home   -AC (r) PF (t) AC (c)        Stair Railings, Main Entrance  railings on both sides of stairs  -AC (r) PF (t) AC (c)           Cognition    Orientation Status (Cognition)  oriented x 4  -AC (r) PF (t) AC (c)        Personal Safety Interventions   elopement precautions initiated;fall prevention program maintained;gait belt;muscle strengthening facilitated;nonskid shoes/slippers when out of bed;supervised activity  -AC (r) PF (t) AC (c)           Pain Assessment    Additional Documentation  Pain Scale: Numbers Pre/Post-Treatment (Group)  -AC (r) PF (t) AC (c)           Pain Scale: Numbers Pre/Post-Treatment    Pretreatment Pain Rating  0/10 - no pain  -AC (r) PF (t) AC (c)           Pain Scale: FACES Pre/Post-Treatment    Pain: FACES Scale, Pretreatment  4-->hurts little more  -AC (r) PF (t) AC (c)        Posttreatment Pain Rating  4-->hurts little more  -AC (r) PF (t) AC (c)        Pre/Posttreatment Pain Comment  Pain when EOB   -AC (r) PF (t) AC (c)           Range of Motion Comprehensive    Comment, General Range of Motion  Unable to formally assess due to cognition, observed R shoulder AROM when attemping to follow commands   -AC (r) PF (t) AC (c)           Strength Comprehensive (MMT)    Comment, General Manual Muscle Testing (MMT) Assessment  Unable to access due to pt cognitive status    -AC (r) PF (t) AC (c)           Mobility    Extremity Weight-bearing Status  left lower extremity  -AC (r) PF (t) AC (c)        Left Lower Extremity (Weight-bearing Status)  toe touch weight-bearing (TTWB)  -AC (r) PF (t) AC (c)           Bed Mobility    Bed Mobility  rolling left;rolling right;supine-sit;sit-supine;scooting/bridging  -AC (r) PF (t) AC (c)        Rolling Left [place] (Bed Mobility)  verbal cues;2 person assist;nonverbal cues (demo/gesture);maximum assist (25% patient effort)  -AC (r) PF (t) AC (c)        Rolling Right [place] (Bed Mobility)  verbal cues;2 person assist;nonverbal cues (demo/gesture);maximum assist (25% patient effort)  -AC (r) PF (t) AC (c)        Scooting/Bridging [place] (Bed Mobility)  dependent (less than 25% patient effort);2 person assist  -AC (r) PF (t) AC (c)        Supine-Sit [place] (Bed Mobility)  verbal  cues;1 person assist;maximum assist (25% patient effort)  -AC (r) PF (t) AC (c)        Sit-Supine [place] (Bed Mobility)  verbal cues;maximum assist (25% patient effort);2 person assist;nonverbal cues (demo/gesture)  -AC (r) PF (t) AC (c)        Assistive Device (Bed Mobility)  bed rails;draw sheet;head of bed elevated  -AC (r) PF (t) AC (c)           Functional Mobility    Functional Mobility- Ind. Level  moderate assist (50% patient effort)  -AC (r) PF (t) AC (c)        Functional Mobility- Device  rolling walker  -AC (r) PF (t) AC (c)        Functional Mobility-Maintain WBing  assist to maintain weight bearing status;cues to maintain weight bearing status  -AC (r) PF (t) AC (c)        Functional Mobility- Safety Issues  supplemental O2  -AC (r) PF (t) AC (c)        Functional Mobility- Comment  Pt was able to stand but unable to ambulate due to decreased endurance   -AC (r) PF (t) AC (c)           Transfer Assessment/Treatment    Transfers  sit-stand transfer;stand-sit transfer  -AC (r) PF (t) AC (c)        Maintains Weight-bearing Status (Transfers)  verbal cues to maintain;physical assist to maintain;nonverbal cues (demo/gesture) to maintain  -AC (r) PF (t) AC (c)           Transfers    Sit-Stand [place] (Transfers)  verbal cues;1 person assist;maximum assist (25% patient effort)  -AC (r) PF (t) AC (c)        Stand-Sit [place] (Transfers)  minimum assist (75% patient effort);verbal cues;1 person assist  -AC (r) PF (t) AC (c)           Sit-Stand Transfer    Assistive Device (Sit-Stand Transfers)  walker, front-wheeled  -AC (r) PF (t) AC (c)           Stand-Sit Transfer    Assistive Device (Stand-Sit Transfers)  walker, front-wheeled  -AC (r) PF (t) AC (c)           Safety Issues, Functional Mobility    Safety Issues Affecting Function (Mobility)  ability to follow commands;awareness of need for assistance;insight into deficits/self-awareness;safety precaution awareness;safety precautions  follow-through/compliance;sequencing abilities  -AC (r) PF (t) AC (c)        Impairments Affecting Function (Mobility)  balance;cognition;coordination;endurance/activity tolerance;grasp;pain;postural/trunk control;range of motion (ROM);shortness of breath;strength  -AC (r) PF (t) AC (c)        Cognitive Impairments, Mobility Safety/Performance  attention;awareness, need for assistance;insight into deficits/self-awareness;safety precaution awareness;sequencing abilities  -AC (r) PF (t) AC (c)           Balance    Balance Assessment  sitting static balance;sitting dynamic balance;standing static balance  -AC (r) PF (t) AC (c)        Static Sitting Balance  WFL;supported;sitting, edge of bed  -AC (r) PF (t) AC (c)        Dynamic Sitting Balance  mild impairment;supported;sitting, edge of bed Pt tends to lean laterally to the right   -AC (r) PF (t) AC (c)        Static Standing Balance  severe impairment;supported;standing  -AC (r) PF (t) AC (c)           Activities of Daily Living    BADL Assessment/Intervention  -- Expected level of performance, Dep for all ADLs  -AC (r) PF (t) AC (c)           BADL Safety/Performance    Impairments, BADL Safety/Performance  balance;cognition;endurance/activity tolerance;grasp/prehension;coordination;maintains weight bearing status;pain;range of motion;strength;trunk/postural control  -AC (r) PF (t) AC (c)        Cognitive Impairments, BADL Safety/Performance  attention;awareness, need for assistance;insight into deficits/self-awareness;safety precaution awareness;sequencing abilities  -AC (r) PF (t) AC (c)           Wound 08/31/21 1914 Left lateral thigh Incision    Wound - Properties Group Placement Date: 08/31/21  -YESY Placement Time: 1914 -[place] Side: Left  -YESY Orientation: lateral  -YESY Location: thigh  -YESY Primary Wound Type: Incision  -YESY    Retired Wound - Properties Group Date first assessed: 08/31/21  -YESY Time first assessed: 1914 -YESY Side: Left  -YESY Location: thigh  -YESY  Primary Wound Type: Incision  -YESY       Plan of Care Review    Plan of Care Reviewed With  patient  -AC (r) PF (t) AC (c)        Progress  no change  -AC (r) PF (t) AC (c)        Outcome Summary  OT eval completed. Pt oriented x4, but displayed cognition deficits during eval. Pt reported 0/10 pain. Pt came to EOB with Min A and with assistance of safety bed rails. Pt AROM RUE WFL, Pt AROM LUE not tested due to a decrease in cognition and difficulty following directions. Pts strength not assessed due to decline in cognition. Pt was dep for gown to be changed. Pt was left supine in bed with call light near. Pt is currently on 12 L of O2. O2 saturation ranged between 88-94% during seated activity. Pt would benefit from continued OT services to increase BUE AROM, BUE strength, transfer ability, and ADL independence. D/C recommendation to SNF.   -AC (r) PF (t) AC (c)           OT Goals    Transfer Goal Selection (OT)  transfer, OT goal 1  -AC (r) PF (t) AC (c)        Dressing Goal Selection (OT)  dressing, OT goal 1  -AC (r) PF (t) AC (c)        Toileting Goal Selection (OT)  toileting, OT goal 1  -AC (r) PF (t) AC (c)        ROM Goal Selection (OT)  --  -AC (r) PF (t) AC (c)           Transfer Goal 1 (OT)    Activity/Assistive Device (Transfer Goal 1, OT)  commode, bedside without drop arms  -AC (r) PF (t) AC (c)        [place] Level/Cues Needed (Transfer Goal 1, OT)  minimum assist (75% or more patient effort)  -AC (r) PF (t) AC (c)        Time Frame (Transfer Goal 1, OT)  long term goal (LTG);10 days  -AC (r) PF (t) AC (c)        Progress/Outcome (Transfer Goal 1, OT)  goal ongoing  -AC (r) PF (t) AC (c)           Dressing Goal 1 (OT)    Activity/Device (Dressing Goal 1, OT)  lower body dressing  -AC (r) PF (t) AC (c)        [place]/Cues Needed (Dressing Goal 1, OT)  1 person assist;minimum assist (75% or more patient effort)  -AC (r) PF (t) AC (c)        Time Frame (Dressing Goal 1, OT)  long term goal  (LTG);10 days  -AC (r) PF (t) AC (c)        Progress/Outcome (Dressing Goal 1, OT)  goal ongoing  -AC (r) PF (t) AC (c)           Toileting Goal 1 (OT)    Activity/Device (Toileting Goal 1, OT)  adjust/manage clothing;perform perineal hygiene;grab bar/safety frame;commode, bedside without drop arms  -AC (r) PF (t) AC (c)        [place] Level/Cues Needed (Toileting Goal 1, OT)  1 person assist;minimum assist (75% or more patient effort)  -AC (r) PF (t) AC (c)        Time Frame (Toileting Goal 1, OT)  long term goal (LTG);10 days  -AC (r) PF (t) AC (c)        Progress/Outcome (Toileting Goal 1, OT)  goal ongoing  -AC (r) PF (t) AC (c)           Positioning and Restraints    Pre-Treatment Position  in bed  -AC (r) PF (t) AC (c)        Post Treatment Position  bed  -AC (r) PF (t) AC (c)        In Bed  side lying right;fowlers;call light within reach;encouraged to call for assist;side rails up x2  -AC (r) PF (t) AC (c)           Therapy Assessment/Plan (OT)    Date of Referral to OT  08/31/21  -AC (r) PF (t) AC (c)        OT Diagnosis  Decreased ADL   -AC (r) PF (t) AC (c)        Rehab Potential (OT)  good, to achieve stated therapy goals  -AC (r) PF (t) AC (c)        Criteria for Skilled Therapeutic Interventions Met (OT)  yes;meets criteria;skilled treatment is necessary  -AC (r) PF (t) AC (c)        Therapy Frequency (OT)  5 times/wk  -AC (r) PF (t) AC (c)        Predicted Duration of Therapy Intervention (OT)  10 days   -AC (r) PF (t) AC (c)        Planned Therapy Interventions (OT)  activity tolerance training;BADL retraining;functional balance retraining;occupation/activity based interventions;patient/caregiver education/training;ROM/therapeutic exercise;strengthening exercise;transfer/mobility retraining  -AC (r) PF (t) AC (c)           Therapy Plan Review/Discharge Plan (OT)    Therapy Plan Review (OT)  patient  -AC (r) PF (t) AC (c)        Anticipated Discharge Disposition (OT)  skilled nursing facility   -AC (r) PF (t) AC (c)          User Key  (r) = Recorded By, (t) = Taken By, (c) = Cosigned By    Initials Name Effective Dates    AC Pérez Lance, OTR/L, CNT 04/09/19 -     Laura Lynne RN 06/16/21 -     PF Kevan Rai Jr., OT Student 08/04/21 -            Occupational Therapy Education                 Title: PT OT SLP Therapies (In Progress)     Topic: Occupational Therapy (In Progress)     Point: ADL training (In Progress)     Description:   Instruct learner(s) on proper safety adaptation and remediation techniques during self care or transfers.   Instruct in proper use of assistive devices.              Learning Progress Summary           Patient Acceptance, E, NR,NL by PF at 9/2/2021 1258    Comment: ADL tolerance, Proper body mechanics, Weight bearing status,                   Point: Precautions (In Progress)     Description:   Instruct learner(s) on prescribed precautions during self-care and functional transfers.              Learning Progress Summary           Patient Acceptance, E, NR,NL by PF at 9/2/2021 1258    Comment: ADL tolerance, Proper body mechanics, Weight bearing status,                   Point: Body mechanics (In Progress)     Description:   Instruct learner(s) on proper positioning and spine alignment during self-care, functional mobility activities and/or exercises.              Learning Progress Summary           Patient Acceptance, E, NR,NL by PF at 9/2/2021 1258    Comment: ADL tolerance, Proper body mechanics, Weight bearing status,                               User Key     Initials Effective Dates Name Provider Type Discipline    PF 08/04/21 -  Kevan Rai Jr., OT Student OT Student OT                  OT Recommendation and Plan  Planned Therapy Interventions (OT): activity tolerance training, BADL retraining, functional balance retraining, occupation/activity based interventions, patient/caregiver education/training, ROM/therapeutic exercise, strengthening exercise,  transfer/mobility retraining  Therapy Frequency (OT): 5 times/wk  Plan of Care Review  Plan of Care Reviewed With: patient  Progress: no change  Outcome Summary: OT eval completed. Pt oriented x4, but displayed cognition deficits during eval. Pt reported 0/10 pain. Pt came to EOB with Min A and with assistance of safety bed rails. Pt AROM RUE WFL, Pt AROM LUE not tested due to a decrease in cognition and difficulty following directions. Pts strength not assessed due to decline in cognition. Pt was dep for gown to be changed. Pt was left supine in bed with call light near. Pt is currently on 12 L of O2. O2 saturation ranged between 88-94% during seated activity. Pt would benefit from continued OT services to increase BUE AROM, BUE strength, transfer ability, and ADL independence. D/C recommendation to SNF.   Plan of Care Reviewed With: patient  Outcome Summary: OT eval completed. Pt oriented x4, but displayed cognition deficits during eval. Pt reported 0/10 pain. Pt came to EOB with Min A and with assistance of safety bed rails. Pt AROM RUE WFL, Pt AROM LUE not tested due to a decrease in cognition and difficulty following directions. Pts strength not assessed due to decline in cognition. Pt was dep for gown to be changed. Pt was left supine in bed with call light near. Pt is currently on 12 L of O2. O2 saturation ranged between 88-94% during seated activity. Pt would benefit from continued OT services to increase BUE AROM, BUE strength, transfer ability, and ADL independence. D/C recommendation to SNF.     Outcome Measures     Row Name 09/02/21 1110             How much help from another is currently needed...    Putting on and taking off regular lower body clothing?  2  -AC (r) PF (t) AC (c)      Bathing (including washing, rinsing, and drying)  2  -AC (r) PF (t) AC (c)      Toileting (which includes using toilet bed pan or urinal)  2  -AC (r) PF (t) AC (c)      Putting on and taking off regular upper body clothing   3  -AC (r) PF (t) AC (c)      Taking care of personal grooming (such as brushing teeth)  3  -AC (r) PF (t) AC (c)      Eating meals  3  -AC (r) PF (t) AC (c)      AM-PAC 6 Clicks Score (OT)  15  -AC (r) PF (t)         Functional Assessment    Outcome Measure Options  AM-PAC 6 Clicks Daily Activity (OT)  -AC (r) PF (t) AC (c)        User Key  (r) = Recorded By, (t) = Taken By, (c) = Cosigned By    Initials Name Provider Type    ANGI CasiPérez, OTR/L, CNT Occupational Therapist    Kevan Smith Jr., OT Student OT Student          Time Calculation:   Time Calculation- OT     Row Name 09/02/21 1259             Time Calculation- OT    OT Start Time  1110 +10 min chart review  -AC (r) PF (t) AC (c)      OT Stop Time  1200  -AC (r) PF (t) AC (c)      OT Time Calculation (min)  50 min  -AC (r) PF (t)      OT Received On  09/02/21  -AC (r) PF (t) AC (c)      OT Goal Re-Cert Due Date  09/12/21  -AC (r) PF (t) AC (c)        User Key  (r) = Recorded By, (t) = Taken By, (c) = Cosigned By    Initials Name Provider Type    ANGI Pérez Lance, OTR/L, CNT Occupational Therapist    Kevan Smith Jr., OT Student OT Student                 KEVAN MARIE, OT Student  9/2/2021

## 2021-09-02 NOTE — PLAN OF CARE
Goal Outcome Evaluation:  Plan of Care Reviewed With: patient        Progress: no change  Outcome Summary: PT evaluation completed. The patient presents alert and oriented x3. She is unable to move her L LE without assist. She is currently on 12L of supplemental O2 with O2 saturation ranging from 88-94% during sitting activity. Her motor planning and ability to follow directions is severely limited at this time. PT will continue to work with the patient on strengthening, motor planning, and increased mobiltiy that in turn will help with her ventilation. Recommend discharge to SNF.

## 2021-09-02 NOTE — PLAN OF CARE
Problem: Adult Inpatient Plan of Care  Goal: Plan of Care Review  Recent Flowsheet Documentation  Taken 9/2/2021 1115 by Balbina Hudson, PT, DPT, NCS  Progress: no change  Plan of Care Reviewed With: patient  Outcome Summary: PT evaluation completed. The patient presents alert and oriented x3. She is unable to move her L LE without assist. She is currently on 12L of supplemental O2 with O2 saturation ranging from 88-94% during sitting activity. Her motor planning and ability to follow directions is severely limited at this time. PT will continue to work with the patient on strengthening, motor planning, and increased mobiltiy that in turn will help with her ventilation. Recommend discharge to SNF.

## 2021-09-02 NOTE — PROGRESS NOTES
Dr Mckee and JOSE Oqeundo was consulting in [facility] about pt.  Both was shown ABG results.  Dr Mckee stated he has consulted Pulmonary and they will work with ABG.  Pt states she is fine and does not feel short of breath.

## 2021-09-02 NOTE — PROGRESS NOTES
[facility] Medicine Services  INPATIENT PROGRESS NOTE    Patient Name: Heide Newsome  Date of Admission: 8/31/2021  Today's Date: 09/02/21  Length of Stay: 2  Primary Care Physician: Pato Cooney DO    Subjective   Chief Complaint: Shortness of breath  HPI   She was seen and examined in conjunction with Dr. Mckee.  Discussed with her nurse Abril.  She was sitting up in bed on 12 L heated high flow nasal cannula with SPO2 89-90%.  She has an occasional nonproductive cough.  Afebrile.  Reports she is short of breath.  Denies chest pain or pressure.      States she does have severe COPD on chronic oxygen 4.5 L at home which she has been using for over 10 years.  Does not believe that she has seen a pulmonologist before.  She has been referred to pulmonology when previously hospitalized.  She is unsure if she has ever had pulmonary function test.    Review of Systems   All pertinent negatives and positives are as above. All other systems have been reviewed and are negative unless otherwise stated.     Objective    Temp:  [97.1 °F (36.2 °C)-98.2 °F (36.8 °C)] 97.1 °F (36.2 °C)  Heart Rate:  [] 141  Resp:  [16-32] 20  BP: ()/(52-74) 141/74  Physical Exam  Constitutional:       Appearance: She is well-developed. She is ill-appearing.      Comments: 12 L high flow with SPO2 89-90%.  Tachypneic. Unable to complete sentences due to shortness of breath. Patient seen and examined in conjunction with Dr. Mckee. Discussed with her nurse, Denise.   HENT:      Head: Normocephalic and atraumatic.   Eyes:      General: No scleral icterus.     Conjunctiva/sclera: Conjunctivae normal.      Pupils: Pupils are equal, round, and reactive to light.   Neck:      Vascular: No JVD.   Cardiovascular:      Rate and Rhythm: Regular rhythm. Tachycardia present.      Heart sounds: Normal heart sounds.   Pulmonary:      Effort: Tachypnea and accessory muscle usage present.      Breath  sounds: Wheezing present.   Abdominal:      General: Bowel sounds are normal. There is no distension.      Palpations: Abdomen is soft.      Tenderness: There is no abdominal tenderness.   Musculoskeletal:         General: Normal range of motion.      Cervical back: Neck supple.   Lymphadenopathy:      Cervical: No cervical adenopathy.   Skin:     General: Skin is warm and dry.      Comments: Left hip dressing dry and intact   Neurological:      Mental Status: She is alert and oriented to person, place, and time.      Cranial Nerves: No cranial nerve deficit.   Psychiatric:         Behavior: Behavior normal.       Results Review:  I have reviewed the labs, radiology results, and diagnostic studies.    Laboratory Data:   Results from last 7 days   Lab Units 09/01/21  0510 08/31/21  1306   WBC 10*3/mm3 18.27* 11.65*   HEMOGLOBIN g/dL 12.7 12.4   HEMATOCRIT % 43.1 42.1   PLATELETS 10*3/mm3 198 222        Results from last 7 days   Lab Units 09/02/21  0532 09/01/21  0510 08/31/21  1306   SODIUM mmol/L 139 137 138   POTASSIUM mmol/L 4.8 5.2 3.9   CHLORIDE mmol/L 94* 100 98   CO2 mmol/L 30.0* 32.0* 35.0*   BUN mg/dL 38* 23 16   CREATININE mg/dL 0.86 0.81 0.37*   CALCIUM mg/dL 8.4* 8.8 9.6   BILIRUBIN mg/dL  --  0.4 0.7   ALK PHOS U/L  --  73 73   ALT (SGPT) U/L  --  13 13   AST (SGOT) U/L  --  18 16   GLUCOSE mg/dL 163* 152* 168*     I have reviewed the patient's current medications.     Assessment/Plan     Active Hospital Problems    Diagnosis    • **Closed left hip fracture (CMS/HCC)    • Acute pain of left hip    • Fall    • Chronic respiratory failure with hypoxia (CMS/HCC)    • Pulmonary emphysema (CMS/HCC)    • Acute on chronic respiratory failure with hypoxia (CMS/HCC)    • COPD, very severe (CMS/HCC)      Plan:  1.  Patient presented on 8/31 after a fall.  She reportedly slipped on coffee that was spilled on the floor and landed on her left hip.  She had immediate onset left hip pain and inability to bear weight.   Upon evaluation the emergency department, he was found to have acute valgus impacted left intertrochanteric fracture with foreshortening.  2.  Orthopedics evaluating patient.  She underwent cephalomedullary nailing left closed displaced intertrochanteric hip fracture 8/31 Dr. Chaudhari.  Toe-touch weightbearing left lower extremity for 6 weeks.  3.  She continues to have increased oxygen needs from 10 L high flow to 12 L today.  Consult pulmonology.  IV Solu-Medrol, Pulmicort and Atrovent.  Continue Mucinex.  Encourage use of incentive spirometry.  Chest x-ray.  4.  Initiate Levaquin for possible pneumonia.  5.  Sinus tachycardia per telemetry with rates 100-140s.  Duo nebs discontinued in favor of Pulmicort and Atrovent. Continue to monitor rates.  6.  Physical and Occupational Therapy to evaluate and treat.  7.  DVT prophylaxis with Eliquis 2.5 mg twice daily.    Discharge Planning: I expect the patient to be discharged to go home with home health versus skilled nursing facility placement pending patient's progress.    Electronically signed by JESSENIA Knox, 09/02/21, 14:10 CDT.

## 2021-09-02 NOTE — PLAN OF CARE
Goal Outcome Evaluation:  Plan of Care Reviewed With: patient      Patient alert and oriented. SOA with O2. NC high flow. Heart rate rhythm regular but tachycardia. MD aware. Med given per md order. Pain complains intermittently. Wound site clean and intact. IV intact. IVF as ordered. Denies numbing and tingling. Continue to monitor.

## 2021-09-02 NOTE — PLAN OF CARE
Goal Outcome Evaluation:  Plan of Care Reviewed With: patient        Progress: no change  Outcome Summary: OT eval completed. Pt oriented x4, but displayed cognition deficits during eval. Pt reported 0/10 pain. Pt came to EOB with Min A and with assistance of safety bed rails. Pt AROM RUE WFL, Pt AROM LUE not tested due to a decrease in cognition and difficulty following directions. Pts strength not assessed due to decline in cognition. Pt was dep for gown to be changed. Pt was left supine in bed with call light near. Pt is currently on 12 L of O2. O2 saturation ranged between 88-94% during seated activity. Pt would benefit from continued OT services to increase BUE AROM, BUE strength, transfer ability, and ADL independence. D/C recommendation to SNF.

## 2021-09-02 NOTE — THERAPY EVALUATION
Patient Name: Heide Newsome  : 1945    MRN: [id number]                              Today's Date: 2021       Admit Date: 2021    Visit Dx:     ICD-10-CM ICD-9-CM   1. Fall, initial encounter  W19.XXXA E888.9   2. Closed fracture of left hip, initial encounter (CMS/[facility])  S72.002A 820.8   3. Impaired mobility  Z74.09 799.89     Patient Active Problem List   Diagnosis   • Pulmonary emphysema (CMS/HCC)   • Acute on chronic respiratory failure with hypoxia (CMS/HCC)   • COPD, very severe (CMS/HCC)   • Severe malnutrition (CMS/HCC)   • History of colon cancer   • Thrombocytopenia (CMS/HCC)   • Anemia   • Hyperglycemia, drug-induced   • Pneumonia   • Chronic respiratory failure with hypoxia (CMS/HCC)   • COPD with acute exacerbation (CMS/HCC)   • Adrenal nodule (CMS/HCC)   • Closed left hip fracture (CMS/HCC)   • Fall   • Acute pain of left hip     Past Medical History:   Diagnosis Date   • Cancer (CMS/HCC), colon    • Chronic respiratory failure (CMS/HCC), 4 L nasal cannula continuously    • COPD (chronic obstructive pulmonary disease) (CMS/HCC)    • Restless leg syndrome      Past Surgical History:   Procedure Laterality Date   • APPENDECTOMY     • CARPAL TUNNEL RELEASE Left    • COLON RESECTION     • FOOT SURGERY Bilateral     hammer toe   • FRACTURE SURGERY Left     Arm   • HIP TROCHANTERIC NAILING WITH INTRAMEDULLARY HIP SCREW Left 2021    Procedure: LEFT HIP TROCHANTERIC NAILING SHORT WITH INTRAMEDULLARY HIP SCREW;  Surgeon: Valdemar Chaudhari MD;  [facility]: [facility];  Service: Orthopedics;  Laterality: Left;   • HYSTERECTOMY     • WRIST FRACTURE SURGERY Right      General Information     Row Name 21 1115          Physical Therapy Time and Intention    Document Type  evaluation s/p L trochanteric s/p fall at home  -MS     Mode of Treatment  physical therapy;co-treatment  -MS     Row Name 21 1115          General Information    Patient Profile Reviewed  yes  -MS     Prior Level of  Function  independent:;all household mobility pt confused and poor historian  -MS     Existing Precautions/Restrictions  oxygen therapy device and L/min  -MS     Barriers to Rehab  medically complex;cognitive status;physical barrier  -MS     Row Name 09/02/21 1115          Living Environment    Lives With  spouse;child(nomi), adult  -MS     Row Name 09/02/21 1115          Cognition    Orientation Status (Cognition)  oriented to;person;place;time  -MS     Row Name 09/02/21 1115          Safety Issues, Functional Mobility    Safety Issues Affecting Function (Mobility)  ability to follow commands;insight into deficits/self-awareness;safety precaution awareness;safety precautions follow-through/compliance;sequencing abilities  -MS     Impairments Affecting Function (Mobility)  cognition;endurance/activity tolerance;pain;range of motion (ROM);shortness of breath;strength  -MS       User Key  (r) = Recorded By, (t) = Taken By, (c) = Cosigned By    Initials Name Provider Type    MS Balbina Hudson, PT, DPT, NCS Physical Therapist        Mobility     Row Name 09/02/21 1115          Bed Mobility    Bed Mobility  scooting/bridging  -MS     Rolling Left [place] (Bed Mobility)  maximum assist (25% patient effort);2 person assist;verbal cues;nonverbal cues (demo/gesture)  -MS     Rolling Right [place] (Bed Mobility)  maximum assist (25% patient effort);2 person assist;verbal cues;nonverbal cues (demo/gesture)  -MS     Scooting/Bridging [place] (Bed Mobility)  dependent (less than 25% patient effort);2 person assist  -MS     Supine-Sit [place] (Bed Mobility)  maximum assist (25% patient effort);2 person assist;verbal cues;nonverbal cues (demo/gesture)  -MS     Sit-Supine [place] (Bed Mobility)  maximum assist (25% patient effort);2 person assist;verbal cues;nonverbal cues (demo/gesture)  -MS     Assistive Device (Bed Mobility)  draw sheet;head of bed elevated  -MS     Row Name 09/02/21 1115           Sit-Stand Transfer    Sit-Stand [place] (Transfers)  maximum assist (25% patient effort);2 person assist;verbal cues;nonverbal cues (demo/gesture)  -MS     Assistive Device (Sit-Stand Transfers)  walker, front-wheeled  -MS     Row Name 09/02/21 1115          Mobility    Extremity Weight-bearing Status  left lower extremity  -MS     Left Lower Extremity (Weight-bearing Status)  toe touch weight-bearing (TTWB)  -MS       User Key  (r) = Recorded By, (t) = Taken By, (c) = Cosigned By    Initials Name Provider Type    Balbina Banda, PT, DPT, NCS Physical Therapist        Obj/Interventions     Row Name 09/02/21 1115          Range of Motion Comprehensive    Comment, General Range of Motion  pt able minimally move L LE due to pain and weakness from surgery, all others WFL  -MS     Row Name 09/02/21 1115          Strength Comprehensive (MMT)    Comment, General Manual Muscle Testing (MMT) Assessment  L LE defered, all others grossly 4-/5  -MS     [facility] Name 09/02/21 1115          Motor Skills    Motor Skills  -- unable to follow directions for testing, pt demonstrated perseveration for use of R UE  -MS     [facility] Name 09/02/21 1115          Balance    Static Sitting Balance  WFL;supported;sitting, edge of bed  -MS     Static Standing Balance  supported;severe impairment  -MS     Row Name 09/02/21 1115          Sensory Assessment (Somatosensory)    Sensory Assessment (Somatosensory)  sensation intact  -MS       User Key  (r) = Recorded By, (t) = Taken By, (c) = Cosigned By    Initials Name Provider Type    Balbina Banda, PT, DPT, NCS Physical Therapist        Goals/Plan     Row Name 09/02/21 1115          Bed Mobility Goal 1 (PT)    Activity/Assistive Device (Bed Mobility Goal 1, PT)  bed mobility activities, all  -MS     [place] Level/Cues Needed (Bed Mobility Goal 1, PT)  minimum assist (75% or more patient effort);tactile cues required;verbal cues required  -MS     Time Frame (Bed Mobility Goal 1, PT)   long term goal (LTG);by discharge  -MS     Progress/Outcomes (Bed Mobility Goal 1, PT)  goal ongoing  -MS     Row Name 09/02/21 1115          Transfer Goal 1 (PT)    Activity/Assistive Device (Transfer Goal 1, PT)  sit-to-stand/stand-to-sit;bed-to-chair/chair-to-bed;walker, rolling  -MS     [place] Level/Cues Needed (Transfer Goal 1, PT)  moderate assist (50-74% patient effort);tactile cues required;verbal cues required  -MS     Time Frame (Transfer Goal 1, PT)  long term goal (LTG);by discharge  -MS     Progress/Outcome (Transfer Goal 1, PT)  goal ongoing  -MS       User Key  (r) = Recorded By, (t) = Taken By, (c) = Cosigned By    Initials Name Provider Type    MS Tristan Balbina R, PT, DPT, NCS Physical Therapist        Clinical Impression     Row Name 09/02/21 1115          Pain    Additional Documentation  Pain Scale: FACES Pre/Post-Treatment (Group)  -MS     Row Name 09/02/21 1115          Pain Scale: FACES Pre/Post-Treatment    Pain: FACES Scale, Pretreatment  4-->hurts little more  -MS     Posttreatment Pain Rating  4-->hurts little more  -MS     Pre/Posttreatment Pain Comment  intermitted groans in pain  -MS     Row Name 09/02/21 1115          Plan of Care Review    Plan of Care Reviewed With  patient  -MS     Progress  no change  -MS     Outcome Summary  PT evaluation completed. The patient presents alert and oriented x3. She is unable to move her L LE without assist. She is currently on 12L of supplemental O2 with O2 saturation ranging from 88-94% during sitting activity. Her motor planning and ability to follow directions is severely limited at this time. PT will continue to work with the patient on strengthening, motor planning, and increased mobiltiy that in turn will help with her ventilation. Recommend discharge to SNF.  -MS     Row Name 09/02/21 1115          Therapy Assessment/Plan (PT)    Patient/Family Therapy Goals Statement (PT)  go home  -MS     Rehab Potential (PT)  good, to achieve stated  therapy goals  -MS     Criteria for Skilled Interventions Met (PT)  yes;meets criteria;skilled treatment is necessary  -MS     Predicted Duration of Therapy Intervention (PT)  until discharge  -MS     Row Name 09/02/21 1115          Positioning and Restraints    Post Treatment Position  bed  -MS     In Bed  side lying right;sitting EOB;call light within reach;encouraged to call for assist;side rails up x2;notified nsg  -MS       User Key  (r) = Recorded By, (t) = Taken By, (c) = Cosigned By    Initials Name Provider Type    Balbina Banda KETAN, PT, DPT, NCS Physical Therapist        Outcome Measures     Row Name 09/02/21 1115          How much help from another person do you currently need...    Turning from your back to your side while in flat bed without using bedrails?  2  -MS     Moving from lying on back to sitting on the side of a flat bed without bedrails?  2  -MS     Moving to and from a bed to a chair (including a wheelchair)?  1  -MS     Standing up from a chair using your arms (e.g., wheelchair, bedside chair)?  2  -MS     Climbing 3-5 steps with a railing?  1  -MS     To walk in hospital room?  1  -MS     AM-PAC 6 Clicks Score (PT)  9  -MS     Row Name 09/02/21 1115 09/02/21 1110       Functional Assessment    Outcome Measure Options  AM-PAC 6 Clicks Basic Mobility (PT)  -MS  AM-PAC 6 Clicks Daily Activity (OT)  (Pended)   -PF      User Key  (r) = Recorded By, (t) = Taken By, (c) = Cosigned By    Initials Name Provider Type    MS Balbina Hudson, PT, DPT, NCS Physical Therapist    PF Kevan Rai Jr., OT Student OT Student                       Physical Therapy Education                 Title: PT OT SLP Therapies (In Progress)     Topic: Physical Therapy (In Progress)     Point: Mobility training (In Progress)     Learning Progress Summary           Patient Acceptance, E, NR by MS at 9/2/2021 1342    Comment: role of PT in her care, TTWB of L LE                   Point: Home exercise program (Not  Started)     Learner Progress:  Not documented in this visit.          Point: Body mechanics (Not Started)     Learner Progress:  Not documented in this visit.          Point: Precautions (Not Started)     Learner Progress:  Not documented in this visit.                      User Key     Initials Effective Dates Name Provider Type Discipline    MS 06/19/18 -  Balbina Hudson, PT, DPT, NCS Physical Therapist PT              PT Recommendation and Plan  Planned Therapy Interventions (PT): balance training, bed mobility training, patient/family education, strengthening, transfer training  Plan of Care Reviewed With: patient  Progress: no change  Outcome Summary: PT evaluation completed. The patient presents alert and oriented x3. She is unable to move her L LE without assist. She is currently on 12L of supplemental O2 with O2 saturation ranging from 88-94% during sitting activity. Her motor planning and ability to follow directions is severely limited at this time. PT will continue to work with the patient on strengthening, motor planning, and increased mobiltiy that in turn will help with her ventilation. Recommend discharge to SNF.     Time Calculation:   PT Charges     Row Name 09/02/21 1115             Time Calculation    Start Time  1115  -MS      Stop Time  1210  -MS      Time Calculation (min)  55 min  -MS      PT Received On  09/02/21  -MS      PT Goal Re-Cert Due Date  09/12/21  -MS         Untimed Charges    PT Eval/Re-eval Minutes  55  -MS         Total Minutes    Untimed Charges Total Minutes  55  -MS       Total Minutes  55  -MS        User Key  (r) = Recorded By, (t) = Taken By, (c) = Cosigned By    Initials Name Provider Type    MS Balbina Hudson, PT, DPT, NCS Physical Therapist        Therapy Charges for Today     Code Description Service Date Service Provider Modifiers Qty    03577692232 HC PT EVAL MOD COMPLEXITY 4 9/2/2021 Balbina Hudson, PT, DPT, NCS GP 1          PT G-Codes  Outcome Measure  Options: AM-PAC 6 Clicks Basic Mobility (PT)  AM-PAC 6 Clicks Score (PT): 9  AM-PAC 6 Clicks Score (OT): (P) 15    Balbina Hudson, PT, DPT, NCS  9/2/2021

## 2021-09-03 NOTE — PROGRESS NOTES
PULMONARY AND CRITICAL CARE PROGRESS NOTE - [facility]    Patient: Heide Newsome  1945   MR# [id number]   Acct# [id number]  09/03/21   08:46 CDT  Referring Provider: Ari Mckee MD    Chief Complaint: Shortness of breath  Interval history: Patient seen awake and alert resting in bed.  Oxygen saturation 99% on BiPAP 16/8 and FiO2 0.5.  I transitioned her to 10 L high flow nasal cannula.  Oxygen saturation 94%.  She tells me her breathing is about the same.  She reports some mild pain in her left hip that is controlled with Tylenol.  She remains afebrile.  No other issues reported.  Meds:  apixaban, 2.5 mg, Oral, Q12H  budesonide, 0.25 mg, Nebulization, BID - RT  cetirizine, 10 mg, Oral, Daily  fluticasone, 1 spray, Each Nare, Daily  guaiFENesin, 1,200 mg, Oral, BID  ipratropium, 0.5 mg, Nebulization, 4x Daily - RT  levoFLOXacin, 500 mg, Intravenous, Q24H  methylPREDNISolone sodium succinate, 60 mg, Intravenous, Q8H  montelukast, 10 mg, Oral, Nightly  polyethylene glycol, 17 g, Oral, Daily  rOPINIRole, 2 mg, Oral, Nightly  sodium chloride, 10 mL, Intravenous, Q12H  sodium polystyrene, 15 g, Oral, Once         Review of Systems:   Review of Systems   Constitutional: Negative for fever.   HENT: Negative for congestion.    Respiratory: Positive for cough, shortness of breath and wheezing.    Gastrointestinal: Negative for nausea.   Genitourinary: Negative for difficulty urinating.   Musculoskeletal: Positive for joint swelling.   Neurological: Positive for weakness.   Psychiatric/Behavioral: Negative for agitation.     Physical Exam:  SpO2 Percentage    09/03/21 0741 09/03/21 0825 09/03/21 0843   SpO2: 97% 93% 95%     Temp:  [96.9 °F (36.1 °C)-98.4 °F (36.9 °C)] 96.9 °F (36.1 °C)  Heart Rate:  [102-152] 120  Resp:  [20-35] 20  BP: ()/(56-78) 133/74    Intake/Output Summary (Last 24 hours) at 9/3/2021 0846  Last data filed at 9/3/2021 0702  Gross per 24 hour   Intake 1533.08 ml    Output 150 ml   Net 1383.08 ml     Physical Exam  Constitutional:       General: She is not in acute distress.     Interventions: Nasal cannula in place.   HENT:      Head: Normocephalic.      Nose: Nose normal.      Mouth/Throat:      Mouth: Mucous membranes are dry.   Eyes:      General: No scleral icterus.  Cardiovascular:      Rate and Rhythm: Tachycardia present.      Comments: Rate 117  Pulmonary:      Effort: No respiratory distress.      Breath sounds: Wheezing (mild expiratory) present.   Abdominal:      General: There is no distension.   Musculoskeletal:      Right lower leg: No edema.      Left lower leg: No edema.   Skin:     Findings: Bruising present.   Neurological:      Mental Status: She is alert and oriented to person, place, and time.   Psychiatric:         Mood and Affect: Mood normal.         Behavior: Behavior is cooperative.       Laboratory Data:  Results from last 7 days   Lab Units 09/03/21  0556 09/01/21  0510 08/31/21  1306   WBC 10*3/mm3 9.91 18.27* 11.65*   HEMOGLOBIN g/dL 9.9* 12.7 12.4   PLATELETS 10*3/mm3 141 198 222     Results from last 7 days   Lab Units 09/03/21  0556 09/02/21  0532 09/01/21  0510 08/31/21  1306 08/31/21  1306   SODIUM mmol/L 136 139 137   < > 138   POTASSIUM mmol/L 5.7* 4.8 5.2   < > 3.9   BUN mg/dL 46* 38* 23   < > 16   CREATININE mg/dL 0.76 0.86 0.81   < > 0.37*   INR   --   --   --   --  0.94    < > = values in this interval not displayed.     Results from last 7 days   Lab Units 09/03/21  0357 09/02/21  2243 09/02/21  1515   PH, ARTERIAL pH units 7.309* 7.339* 7.277*   PCO2, ARTERIAL mm Hg 62.1* 57.3* 63.8*   PO2 ART mm Hg 85.4 75.8* 65.2*   FIO2 % 50 50  --      No results found for: BLOODCX, URINECX, WOUNDCX, MRSACX, RESPCX, STOOLCX  Recent films:  XR Chest 1 View    Result Date: 9/2/2021  Impression:  1.  Trace right pleural effusion suspected. 2.  Diffuse coarsening of interstitium. May reflect developing interstitial edema.  This report was finalized  on 09/02/2021 15:50 by Dr. Purvi Liu MD.    Films reviewed personally by me.  My interpretation: None today 9-3  Pulmonary Assessment:  1. Acute on chronic hypoxic and hypercapnic respiratory failure   2. COPD exacerbation, no PFT available  3. Fall with left hip fracture, status post femoral nailing  4. Protein calorie malnutrition  5. Anemia, stable  6. History of heart failure  7. Hyperkalemia    Recommend:   · Continue supplemental oxygen and titrate for saturation percent.  Currently on 10 L high flow cannula.  Baseline home oxygen 4.5 L  · Continue BiPAP as needed  · Continue Pulmicort and ipratropium nebs  · Continue guaifenesin  · Antibiotic-levofloxacin D #2  · Decrease Solu-Medrol to 40 every 8  · DVT prophylaxis, Eliquis  · Mobilize as tolerated when okay with Ortho  · Encourage incentive spirometer  · Encourage p.o. nutrition, add boost  · Further recommendations per Dr. Stahl    Electronically signed by JESSENIA Stacy, 09/03/21, 08:46 CDT     ATTESTATION OF CLINICAL NOTE:  I have reviewed the notes, assessments, and/or procedures performed by JESSENIA Stacy, I concur with her/his documentation of Heide Newsome. Patient was seen in the follow-up visit in pulmonary rounds in medical floor today.    She is doing little better. She was on BiPAP last night is currently on oxygen. She has some anxiety issues. Her respiratory status stable. She is doing incentive spirometry physical therapy and also is on Eliquis. No other acute events overnight.    Physical examination unremarkable. Elderly  female resting. On oxygen. HEENT atraumatic normocephalic. Neck: Supple. Heart: Sounds normal. Lungs: Diminished air entry few crackles. Abdomen: Soft nondistended extremities: No edema.    Continue current treatment plan continue bronchodilator treatments supplemental oxygen and BiPAP. Continue allergy medications. Incentive spirometry and flutter valve. Complete antibiotic for few days  and start oral steroid taper for plan to discharge home. Plan for outpatient pulmonary clinic follow-up. Patient will need reevaluation for home oxygen before discharge. She is already on home oxygen 4.5 L and is still requiring 10 L. Continue DVT stress ulcer prophylaxis, pain anxiety:. PT OT. Nutritional support. Repeat labs as needed. CODE STATUS: Full. Overall prognosis: Guarded. Pulmonary team will continue following her and make further recommendations.    I have seen and examined patient personally, performing a face-to-face diagnostic evaluation with plan of care reviewed and developed with APRN and nursing staff. I have addended and/or modified the above history of present illness, physical examination, and assessment and plan to reflect my findings and impressions. Essential elements of the care plan were discussed with APRN above.  Agree with findings and assessment/plan as documented above.    Yasmine Stahl MD  Pulmonologist/Intensivist  9/3/2021 12:32 CDT

## 2021-09-03 NOTE — NURSING NOTE
Per son's request, spoke with Dr. Mckee and cancelled morphine orders.  Patient has PO pain medication available.  Also reviewed patient's orientation with Dr. Mckee and he verified that patient is able to make her own decisions.  Son Jensen stated he is POA but we do not have paperwork on file.

## 2021-09-03 NOTE — THERAPY TREATMENT NOTE
Patient Name: Heide Newsome  : 1945    MRN: [id number]                              Today's Date: 9/3/2021       Admit Date: 2021    Visit Dx:     ICD-10-CM ICD-9-CM   1. Fall, initial encounter  W19.XXXA E888.9   2. Closed fracture of left hip, initial encounter (CMS/[facility])  S72.002A 820.8   3. Impaired mobility  Z74.09 799.89   4. Decreased activities of daily living (ADL)  Z78.9 V49.89     Patient Active Problem List   Diagnosis   • Pulmonary emphysema (CMS/HCC)   • Acute on chronic respiratory failure with hypoxia (CMS/HCC)   • COPD, very severe (CMS/HCC)   • Severe malnutrition (CMS/HCC)   • History of colon cancer   • Thrombocytopenia (CMS/HCC)   • Anemia   • Hyperglycemia, drug-induced   • Pneumonia   • Chronic respiratory failure with hypoxia (CMS/HCC)   • COPD with acute exacerbation (CMS/HCC)   • Adrenal nodule (CMS/HCC)   • Closed left hip fracture (CMS/[facility])   • Fall   • Acute pain of left hip     Past Medical History:   Diagnosis Date   • Cancer (CMS/HCC), colon    • Chronic respiratory failure (CMS/HCC), 4 L nasal cannula continuously    • COPD (chronic obstructive pulmonary disease) (CMS/HCC)    • Restless leg syndrome      Past Surgical History:   Procedure Laterality Date   • APPENDECTOMY     • CARPAL TUNNEL RELEASE Left    • COLON RESECTION     • FOOT SURGERY Bilateral     hammer toe   • FRACTURE SURGERY Left     Arm   • HIP TROCHANTERIC NAILING WITH INTRAMEDULLARY HIP SCREW Left 2021    Procedure: LEFT HIP TROCHANTERIC NAILING SHORT WITH INTRAMEDULLARY HIP SCREW;  Surgeon: Valdemar Chaudhari MD;  Location: [facility];  Service: Orthopedics;  Laterality: Left;   • HYSTERECTOMY     • WRIST FRACTURE SURGERY Right      General Information     Row Name 21 1403          Physical Therapy Time and Intention    Document Type  therapy note (daily note)  -MS (r) NN (t) MS (c)     Mode of Treatment  physical therapy;co-treatment  -MS (r) NN (t) MS (c)     Row Name 21 1401           General Information    Patient Profile Reviewed  yes  -MS (r) NN (t) MS (c)     Existing Precautions/Restrictions  oxygen therapy device and L/min  -MS (r) NN (t) MS (c)     Barriers to Rehab  medically complex;cognitive status;physical barrier  -MS (r) NN (t) MS (c)     Row Name 09/03/21 1403          Cognition    Orientation Status (Cognition)  oriented x 4  -MS (r) NN (t) MS (c)     Row Name 09/03/21 1403          Safety Issues, Functional Mobility    Safety Issues Affecting Function (Mobility)  ability to follow commands;insight into deficits/self-awareness;safety precaution awareness;safety precautions follow-through/compliance;sequencing abilities  -MS (r) NN (t) MS (c)     Impairments Affecting Function (Mobility)  cognition;endurance/activity tolerance;pain;range of motion (ROM);shortness of breath;strength  -MS (r) NN (t) MS (c)     Cognitive Impairments, Mobility Safety/Performance  attention;awareness, need for assistance;insight into deficits/self-awareness;safety precaution awareness;sequencing abilities  -MS (r) NN (t) MS (c)       User Key  (r) = Recorded By, (t) = Taken By, (c) = Cosigned By    Initials Name Provider Type    Balbina Banda R, PT, DPT, NCS Physical Therapist    NN Paola Carvalho, ELVIN Student PT Student        Mobility     Row Name 09/03/21 1403          Bed Mobility    Bed Mobility  scooting/bridging;supine-sit;rolling right  -MS (r) NN (t) MS (c)     Rolling Right [place] (Bed Mobility)  maximum assist (25% patient effort);2 person assist;verbal cues;nonverbal cues (demo/gesture)  -MS (r) NN (t) MS (c)     Scooting/Bridging [place] (Bed Mobility)  dependent (less than 25% patient effort);2 person assist  -MS (r) NN (t) MS (c)     Supine-Sit [place] (Bed Mobility)  maximum assist (25% patient effort);2 person assist;verbal cues;nonverbal cues (demo/gesture)  -MS (r) NN (t) MS (c)     Sit-Supine [place] (Bed Mobility)  maximum assist (25% patient effort);2 person  assist;verbal cues;nonverbal cues (demo/gesture)  -MS (r) NN (t) MS (c)     Assistive Device (Bed Mobility)  draw sheet;head of bed elevated;bed rails  -MS (r) NN (t) MS (c)     Row Name 09/03/21 1403          Sit-Stand Transfer    Sit-Stand [place] (Transfers)  maximum assist (25% patient effort);2 person assist;verbal cues;nonverbal cues (demo/gesture)  -MS (r) NN (t) MS (c)     Assistive Device (Sit-Stand Transfers)  walker, front-wheeled  -MS (r) NN (t) MS (c)     Row Name 09/03/21 1403          Mobility    Extremity Weight-bearing Status  left lower extremity  -MS (r) NN (t) MS (c)     Left Lower Extremity (Weight-bearing Status)  toe touch weight-bearing (TTWB)  -MS (r) NN (t) MS (c)       User Key  (r) = Recorded By, (t) = Taken By, (c) = Cosigned By    Initials Name Provider Type    Balbina Banda, PT, DPT, NCS Physical Therapist    NN Paola Carvalho, PT Student PT Student        Obj/Interventions     Row Name 09/03/21 1403          Range of Motion Comprehensive    Comment, General Range of Motion  pt minimally able to move LLE due to pain and weakness  -MS (r) NN (t) MS (c)     Row Name 09/03/21 1403          Balance    Balance Assessment  sitting static balance;standing static balance;sitting dynamic balance  -MS (r) NN (t) MS (c)     Static Sitting Balance  WNL;sitting, edge of bed  -MS (r) NN (t) MS (c)     Dynamic Sitting Balance  sitting, edge of bed;mild impairment  -MS (r) NN (t) MS (c)     Static Standing Balance  supported;severe impairment  -MS (r) NN (t) MS (c)     Balance Interventions  sit to stand  -MS (r) NN (t) MS (c)     Comment, Balance  pt attempted to try and stand from EOB, due to weakness and SOB pt is only able to stand for 30s, pt requires several minutes between each movement due to SOB  -MS (r) NN (t) MS (c)     Row Name 09/03/21 1403          Sensory Assessment (Somatosensory)    Sensory Assessment (Somatosensory)  sensation intact  -MS (r) NN (t) MS (c)       User Key   (r) = Recorded By, (t) = Taken By, (c) = Cosigned By    Initials Name Provider Type    Balbina Banda R, PT, DPT, NCS Physical Therapist    NN Paola Carvalho, ELVIN Student PT Student        Goals/Plan    No documentation.       Clinical Impression     Row Name 09/03/21 1403          Pain    Additional Documentation  Pain Scale: Numbers Pre/Post-Treatment (Group)  -MS (r) NN (t) MS (c)     Row Name 09/03/21 1404          Pain Scale: Numbers Pre/Post-Treatment    Pretreatment Pain Rating  3/10  -MS (r) NN (t) MS (c)     Pain Location - Side  Left  -MS (r) NN (t) MS (c)     Pain Location - Orientation  incisional  -MS (r) NN (t) MS (c)     Pain Location  hip  -MS (r) NN (t) MS (c)     Pain Intervention(s)  Medication (See MAR);Repositioned;Rest  -MS (r) NN (t) MS (c)     Row Name 09/03/21 7285          Plan of Care Review    Plan of Care Reviewed With  patient  -MS (r) NN (t) MS (c)     Progress  no change  -MS (r) NN (t) MS (c)     Outcome Summary  PT tx completed: pt presents AOx4. She is unable to move LLE without moderate assistance. Pt requires time inbetween each movement to catch her breath and reorient herself. Pt is only able to stand for 30s and is unable to maintain WB status for prolonged period. PT will continue to work on strength and increased mobility. Recommended D/C to SNF.  -MS (r) NN (t) MS (c)     Row Name 09/03/21 140          Vital Signs    Pre SpO2 (%)  90  -MS (r) NN (t) MS (c)     O2 Delivery Pre Treatment  hi-flow  -MS (r) NN (t) MS (c)     Post SpO2 (%)  92  -MS (r) NN (t) MS (c)     O2 Delivery Post Treatment  hi-flow  -MS (r) NN (t) MS (c)     Pre Patient Position  Supine  -MS (r) NN (t) MS (c)     Post Patient Position  Supine  -MS (r) NN (t) MS (c)     Row Name 09/03/21 1402          Positioning and Restraints    Pre-Treatment Position  in bed  -MS (r) NN (t) MS (c)     Post Treatment Position  bed  -MS (r) NN (t) MS (c)     In Bed  side lying right;call light within reach;encouraged  to call for assist;side rails up x2  -MS (r) NN (t) MS (c)       User Key  (r) = Recorded By, (t) = Taken By, (c) = Cosigned By    Initials Name Provider Type    Balbina Banda, PT, DPT, NCS Physical Therapist    NN Paola Carvalho, PT Student PT Student        Outcome Measures     Row Name 09/03/21 1457          How much help from another person do you currently need...    Turning from your back to your side while in flat bed without using bedrails?  2  -MS (r) NN (t) MS (c)     Moving from lying on back to sitting on the side of a flat bed without bedrails?  2  -MS (r) NN (t) MS (c)     Moving to and from a bed to a chair (including a wheelchair)?  1  -MS (r) NN (t) MS (c)     Standing up from a chair using your arms (e.g., wheelchair, bedside chair)?  2  -MS (r) NN (t) MS (c)     Climbing 3-5 steps with a railing?  1  -MS (r) NN (t) MS (c)     To walk in hospital room?  1  -MS (r) NN (t) MS (c)     AM-PAC 6 Clicks Score (PT)  9  -MS (r) NN (t)     Row Name 09/03/21 1457 09/03/21 1400       Functional Assessment    Outcome Measure Options  AM-PAC 6 Clicks Basic Mobility (PT)  -MS (r) NN (t) MS (c)  AM-PAC 6 Clicks Daily Activity (OT)  (Pended)   -PF      User Key  (r) = Recorded By, (t) = Taken By, (c) = Cosigned By    Initials Name Provider Type    Balbina Banda R, PT, DPT, NCS Physical Therapist    PF Kevan Rai Jr., OT Student OT Student    Paola Galan, PT Student PT Student                       Physical Therapy Education                 Title: PT OT SLP Therapies (In Progress)     Topic: Physical Therapy (Done)     Point: Mobility training (Done)     Learning Progress Summary           Patient Acceptance, E, VU,NR by NN at 9/3/2021 1403    Comment: role of PT, TTWB of LLE    Acceptance, E, NR by MS at 9/2/2021 1342    Comment: role of PT in her care, TTWB of L LE                   Point: Home exercise program (Done)     Learning Progress Summary           Patient Acceptance, GURJIT ERNST,NR by NN  at 9/3/2021 1403    Comment: role of PT, TTWB of LLE                   Point: Body mechanics (Done)     Learning Progress Summary           Patient Acceptance, E, VU,NR by NN at 9/3/2021 1403    Comment: role of PT, TTWB of LLE                   Point: Precautions (Done)     Learning Progress Summary           Patient Acceptance, E, VU,NR by NN at 9/3/2021 1403    Comment: role of PT, TTWB of LLE                               User Key     Initials Effective Dates Name Provider Type Discipline    MS 06/19/18 -  Balbina Hudson, PT, DPT, NCS Physical Therapist PT    NN 08/18/21 -  Paola Carvalho, PT Student PT Student PT              PT Recommendation and Plan     Plan of Care Reviewed With: patient  Progress: no change  Outcome Summary: PT tx completed: pt presents AOx4. She is unable to move LLE without moderate assistance. Pt requires time inbetween each movement to catch her breath and reorient herself. Pt is only able to stand for 30s and is unable to maintain WB status for prolonged period. PT will continue to work on strength and increased mobility. Recommended D/C to SNF.     Time Calculation:   PT Charges     Row Name 09/03/21 1403             Time Calculation    Start Time  1403  -MS (r) NN (t) MS (c)      Stop Time  1433  -MS (r) NN (t) MS (c)      Time Calculation (min)  30 min  -MS (r) NN (t)      PT Non-Billable Time (min)  15 min  -MS (r) NN (t) MS (c)      PT Received On  09/03/21  -MS (r) NN (t) MS (c)         Time Calculation- PT    Total Timed Code Minutes- PT  15 minute(s)  -MS (r) NN (t) MS (c)         Timed Charges    70591 - PT Therapeutic Activity Minutes  15  -MS (r) NN (t) MS (c)         Untimed Charges    PT Group Therapy Minutes  --  -MS (r) NN (t) MS (c)         Total Minutes    Timed Charges Total Minutes  15  -MS (r) NN (t)      Untimed Charges Total Minutes  --  (Pended)   -NN       Total Minutes  15  -MS (r) NN (t)        User Key  (r) = Recorded By, (t) = Taken By, (c) = Cosigned By     Initials Name Provider Type    Balbina Banda R, PT, DPT, NCS Physical Therapist    Paola Galan, PT Student PT Student            PT G-Codes  Outcome Measure Options: AM-PAC 6 Clicks Basic Mobility (PT)  AM-PAC 6 Clicks Score (PT): 9  AM-PAC 6 Clicks Score (OT): (P) 13    Paola Carvaloh, PT Student  9/3/2021

## 2021-09-03 NOTE — NURSING NOTE
Pt alert and oriented x4.  , Bi-pap on.  LFA IV w/ NS @ 20.  Surgical incision on left hip clean, dry, intact.  Voiding in bedpan.  Tele on running sinus tach.  Call light in reach, safety maintained.

## 2021-09-03 NOTE — NURSING NOTE
"Patient had a visitor, who identified himself as her son and \"POA\"- however he before I could ask him his name he started to curse and make statements about how we were giving her too much pain medications. I was trying to help calm the situation down, but he further excalated things with continued cursing and demanded that I \"get out of here.\" When I continued to try and explain things- he again started accusing me (the nurse) and yelled \"get out of this room\" I remained as I actually thought he might remove the patient's bipap machine from her, rendering her O2 unstable- and when I didn't leave- the visitor jumped up and left the room cursing as he was leaving. Charge nurse aware and present for some of the event.   "

## 2021-09-03 NOTE — CASE MANAGEMENT/SOCIAL WORK
Continued Stay Note   Pedro     Patient Name: Heide Newsome  MRN: [id number]  Today's Date: 9/3/2021    Admit Date: 8/31/2021    Discharge Plan     Row Name 09/03/21 1641       Plan    Plan Comments  Pt still plans to dc home with spouse and son when medically stable. SNF has been recommended but pt/family do not want placement. [facility] is being requested.        Discharge Codes    No documentation.             FLAKITA Chambers

## 2021-09-03 NOTE — CONSULTS
PULMONARY & CRITICAL CARE CONSULT - [facility]    21, 22:49 CDT  Patient Care Team:  Pato Cooney DO as PCP - General (Family Medicine)  Name: Heide Newsome  : 1945  MRN: [id number]  Contact Serial Number [id number]    Chief complaint: Shortness. of breath, acute on chronic respiratory failure, fall with left hip fracture s/p nailing done by orthopedic.  History of COPD, tobacco abuse in the past and chronic respiratory on home oxygen    HPI:  We have been consulted by Ari Mckee MD to see this 76 y.o. female born on 1945.  Patient presented to the hospital on  after a mechanical fall at ground level.  She had a left hip pain and noted to have intertrochanteric fracture which was operated by orthopedics and the nail placement was done.  Following surgery patient was noted to have respiratory problem with worsening respiratory status and arterial blood gas hypercapnia with some compensation.  She was placed on the BiPAP and pulmonary consult was requested.    Patient at the time of my visit was comfortable and was on BiPAP 16/8 with rate of 16 and 50% FiO2 and oxygen saturation was in the mid 90s.  She was unable to talk to me but reported she was a former smoker and has COPD and is on home oxygen 4.5 L all the time.  She uses Trelegy Ellipta at home also uses DuoNeb.  To my knowledge she has not seen by a pulmonologist and was mostly managed by the primary care provider Dr. Cooney.  She reported that she probably had a CPAP at home but I did not find it in her records.  She also has restless leg syndrome.  She was started on Pulmicort nebulizer and Atrovent Glazer.  She is also getting Flonase nasal spray and Singulair for nasal allergy.  She is on Eliquis and she is getting ropinirole Requip for her restless leg syndrome.    Patient denies any cough fever shortness of breath but reported me she is having some hip pain and feeling tired and exhausted.  She  is tested negative for Covid and to my knowledge she is vaccinated for Covid.  She had several hospital admissions in the last year and the year before with COPD exacerbation and pneumonia.  Her chest x-ray done today showed some pulmonary edema and chronic interstitial changes.  No definite pneumonia was identified but she was started on empiric treatment with levofloxacin for possible tracheobronchitis and also started on Solu-Medrol for COPD exacerbation.        Past Medical History:   has a past medical history of Cancer (CMS/[facility]), colon, Chronic respiratory failure (CMS/[facility]), 4 L nasal cannula continuously, COPD (chronic obstructive pulmonary disease) (CMS/[facility]), and Restless leg syndrome.   has a past surgical history that includes Hysterectomy; Appendectomy; Colectomy; Foot surgery (Bilateral); Wrist fracture surgery (Right); Carpal tunnel release (Left); Fracture surgery (Left); and Hip Trochanteric Nailing (Left, 8/31/2021).  Allergies   Allergen Reactions   • Tetracyclines & Related Anaphylaxis   • Penicillins Itching   • Tape Other (See Comments)     Skin tear     Medications:  apixaban, 2.5 mg, Oral, Q12H  budesonide, 0.25 mg, Nebulization, BID - RT  fluticasone, 1 spray, Each Nare, Daily  guaiFENesin, 1,200 mg, Oral, BID  ipratropium, 0.5 mg, Nebulization, 4x Daily - RT  levoFLOXacin, 500 mg, Intravenous, Q24H  methylPREDNISolone sodium succinate, 60 mg, Intravenous, Q8H  montelukast, 10 mg, Oral, Nightly  polyethylene glycol, 17 g, Oral, Daily  rOPINIRole, 2 mg, Oral, Nightly  sodium chloride, 10 mL, Intravenous, Q12H      sodium chloride, 40 mL/hr, Last Rate: 40 mL/hr (09/02/21 1315)      Family History:  Family History   Problem Relation Age of Onset   • Colon cancer Mother    • Cancer Mother    • COPD Father    • Diabetes Father    • Heart disease Father    • Cancer Sister    • Diabetes Sister    • Cancer Brother    • Cancer Paternal Grandmother    • Cancer Paternal Grandfather      Social History:    reports that she quit smoking about 2 years ago. She has never used smokeless tobacco. She reports that she does not drink alcohol and does not use drugs.  Review of Systems:  Review of Systems   Constitutional: Positive for fatigue. Negative for fever.   HENT: Positive for congestion, postnasal drip and sinus pressure.    Eyes: Negative.    Respiratory: Positive for chest tightness and shortness of breath.    Cardiovascular: Negative.    Gastrointestinal: Negative.    Genitourinary: Negative.    Musculoskeletal: Positive for arthralgias and back pain.   Skin: Negative.    Allergic/Immunologic: Positive for environmental allergies.   Neurological: Positive for weakness.   Hematological: Negative.    Psychiatric/Behavioral: Negative.       Physical Exam:  Temp:  [97.1 °F (36.2 °C)-98.4 °F (36.9 °C)] 98.4 °F (36.9 °C)  Heart Rate:  [] 118  Resp:  [18-35] 22  BP: ()/(52-78) 115/78    Intake/Output Summary (Last 24 hours) at 9/2/2021 5389  Last data filed at 9/2/2021 1726  Gross per 24 hour   Intake 1533.08 ml   Output --   Net 1533.08 ml         08/31/21  1227   Weight: 59 kg (130 lb)     SpO2 Percentage    09/02/21 1923 09/02/21 1928 09/02/21 1945   SpO2: 97% 93% 93%     Physical Exam  Vitals reviewed.   Constitutional:       Appearance: She is ill-appearing.      Comments: Elderly  female in no distress   HENT:      Head: Normocephalic and atraumatic.      Right Ear: There is no impacted cerumen.      Left Ear: There is no impacted cerumen.      Nose: No congestion or rhinorrhea.      Mouth/Throat:      Mouth: Mucous membranes are moist.      Pharynx: Oropharynx is clear. No oropharyngeal exudate or posterior oropharyngeal erythema.   Eyes:      General: No scleral icterus.        Right eye: No discharge.         Left eye: No discharge.      Conjunctiva/sclera: Conjunctivae normal.      Pupils: Pupils are equal, round, and reactive to light.   Neck:      Vascular: No carotid bruit.    Cardiovascular:      Rate and Rhythm: Normal rate and regular rhythm.      Pulses: Normal pulses.      Heart sounds: No murmur heard.   Friction rub present. No gallop.    Pulmonary:      Effort: Pulmonary effort is normal. No respiratory distress.      Breath sounds: Normal breath sounds. No wheezing or rales.      Comments: Decreased breath sound bilaterally  Abdominal:      General: Abdomen is flat. Bowel sounds are normal. There is no distension.      Palpations: Abdomen is soft. There is no mass.      Tenderness: There is no abdominal tenderness. There is no guarding.   Genitourinary:     Comments: Not examined  Musculoskeletal:         General: No swelling, tenderness, deformity or signs of injury. Normal range of motion.      Cervical back: Normal range of motion and neck supple. No rigidity or tenderness.      Right lower leg: No edema.      Left lower leg: No edema.   Lymphadenopathy:      Cervical: No cervical adenopathy.   Skin:     General: Skin is warm and dry.      Capillary Refill: Capillary refill takes less than 2 seconds.      Coloration: Skin is not jaundiced.      Findings: No bruising, erythema or rash.   Neurological:      General: No focal deficit present.      Mental Status: She is oriented to person, place, and time.      Cranial Nerves: No cranial nerve deficit.      Sensory: No sensory deficit.      Motor: Weakness present.      Deep Tendon Reflexes: Reflexes normal.   Psychiatric:         Mood and Affect: Mood normal.         Behavior: Behavior normal.         Thought Content: Thought content normal.         Judgment: Judgment normal.       Results from last 7 days   Lab Units 09/01/21  0510 08/31/21  1306   WBC 10*3/mm3 18.27* 11.65*   HEMOGLOBIN g/dL 12.7 12.4   PLATELETS 10*3/mm3 198 222     Results from last 7 days   Lab Units 09/02/21  0532 09/01/21  0510 08/31/21  1306   SODIUM mmol/L 139 137 138   POTASSIUM mmol/L 4.8 5.2 3.9   CO2 mmol/L 30.0* 32.0* 35.0*   BUN mg/dL 38* 23 16    CREATININE mg/dL 0.86 0.81 0.37*   GLUCOSE mg/dL 163* 152* 168*     Results from last 7 days   Lab Units 09/02/21  2243 09/02/21  1515   PH, ARTERIAL pH units 7.339* 7.277*   PCO2, ARTERIAL mm Hg 57.3* 63.8*   PO2 ART mm Hg 75.8* 65.2*   FIO2 % 50  --      Lab Results   Component Value Date    PROBNP 204.0 07/07/2020     No results found for: BLOODCX, URINECX, WOUNDCX, MRSACX, RESPCX, STOOLCX  Recent radiology:   Imaging Results (Last 72 Hours)     Procedure Component Value Units Date/Time    XR Chest 1 View [[id number]] Collected: 09/02/21 1549     Updated: 09/02/21 1554    Narrative:      Exam:   XR CHEST 1 VW-       Date:  9/2/2021      History:  Female, age  76 years;hypoxia, shortness of breath;  W19.XXXA-Unspecified fall, initial encounter; S72.002A-Fracture of  unspecified part of neck of left femur, initial encounter for closed  fracture; Z74.09-Other reduced mobility; Z78.9-Other specified health  status     COMPARISON:  Chest x-ray dated 7/7/2020     Findings :     The heart and mediastinum are normal in size. Opacity along the right  major fissure, favored to reflect fluid signal. Emphysema. Increased  coarsening of interstitium diffusely. No measurable pneumothorax.  The  bones show no acute pathology.         Impression:      Impression:     1.  Trace right pleural effusion suspected.  2.  Diffuse coarsening of interstitium. May reflect developing  interstitial edema.     This report was finalized on 09/02/2021 15:50 by Dr. Purvi Liu MD.    XR Hip With or Without Pelvis 2 - 3 View Left [[id number]] Collected: 08/31/21 1951     Updated: 09/01/21 0657    Narrative:      EXAMINATION: C-arm in OR left hip 8/31/2021     Fluoroscopy time: 33.3 seconds.     Dose: 3.82613 mGy. 6 images are submitted for interpretation.     FINDINGS: C-arm was used intraoperatively during open reduction internal  fixation for an intertrochanteric fracture of the left hip. There is  good restoration of anatomic alignment.  The films are available to the  OR for review.  This report was finalized on 08/31/2021 19:52 by Dr. Brennon Lucas MD.    FL C Arm During Surgery [[id number]] Collected: 08/31/21 1951     Updated: 09/01/21 0657    Narrative:      EXAMINATION: C-arm in OR left hip 8/31/2021     Fluoroscopy time: 33.3 seconds.     Dose: 3.85152 mGy. 6 images are submitted for interpretation.     FINDINGS: C-arm was used intraoperatively during open reduction internal  fixation for an intertrochanteric fracture of the left hip. There is  good restoration of anatomic alignment. The films are available to the  OR for review.  This report was finalized on 08/31/2021 19:52 by Dr. Brennon Lucas MD.    XR Hip With or Without Pelvis 2 - 3 View Left [[id number]] Collected: 08/31/21 1405     Updated: 08/31/21 1409    Narrative:      XR HIP W OR WO PELVIS 2-3 VIEW LEFT- 8/31/2021 1:41 PM CDT     HISTORY: fall, pain     COMPARISON: None      FINDINGS:   Frontal and lateral views of the left hip were obtained.  Additional AP  pelvis.     Varus impacted Acute left intertrochanteric fracture with  foreshortening. No subluxation at the hip joint.       Pelvic ring is intact. Sacral arches are intact. No gross soft tissue  abnormality is visualized.        Impression:      1. Acute varus impacted left intertrochanteric fracture with  foreshortening.        This report was finalized on 08/31/2021 14:06 by Dr Jordy Kinney, .        My radiograph interpretation/independent review of imaging: Reviewed  Other test results (not lab or imaging): Results for orders placed during the hospital encounter of 05/06/19    Adult Transthoracic Echo Complete With Contrast if Necessary Per Protocol    Interpretation Summary  · There is mild mitral valve prolapse of the anterior mitral leaflet.  · Right ventricular cavity is mildly dilated.  · Left ventricular systolic function is normal.    NORMAL LV AND RV SYSTOLIC FUNCTION  RV SYSTOLIC PRESSURE NOT  ASSESSED  MILD MVP WITH TRIVIAL MR  MILD RV ENLARGEMENT  Reviewed  Independent review of ekg: Agree with current plan  Problem List as identified by Epic (may contain historical, inactive problems)  Patient Active Problem List   Diagnosis   • Pulmonary emphysema (CMS/HCC)   • Acute on chronic respiratory failure with hypoxia (CMS/HCC)   • COPD, very severe (CMS/HCC)   • Severe malnutrition (CMS/HCC)   • History of colon cancer   • Thrombocytopenia (CMS/HCC)   • Anemia   • Hyperglycemia, drug-induced   • Pneumonia   • Chronic respiratory failure with hypoxia (CMS/HCC)   • COPD with acute exacerbation (CMS/HCC)   • Adrenal nodule (CMS/HCC)   • Closed left hip fracture (CMS/HCC)   • Fall   • Acute pain of left hip     Pulmonary Assessment:  New problem (to me), with additional workup planned: Acute on chronic hypoxic respiratory failure.  S/p surgery for left hip fracture, COPD with mild acute exacerbation, CHF acute on chronic diastolic, chronic respiratory failure on home oxygen, hypercapnic respiratory failure with CO2 retention, allergic rhinitis  New problem (to me), no additional workup planned: She of tobacco abuse, protein calorie malnutrition, history of colon cancer  Other problems either stable, failing to improve or worsenin. Acute on chronic hypoxic and hypercapnic respiratory failure  2. Acute examination of COPD  3. Very severe COPD  4. Hypoxemia and chronic respiratory failure on home oxygen  5. Allergic rhinitis  6. Fall and left hip fracture status post intramedullary nail placement  7. Protein calorie malnutrition  8. Anemia  9. History of heart failure  10. Anxiety and depression    Recommend/plan:   · She has mild COPD exacerbation and will be treated with IV steroids bronchodilator treatment and supplemental oxygen with BiPAP.  · BiPAP with current settings of 16/8 with rate of 16 and FiO2 50%.  Repeat blood gas tomorrow morning.  · Continue bronchial treatment with Pulmicort and Atrovent.   Albuterol is avoided for arrhythmias.  · Probably patient was using Trelegy Ellipta at home with DuoNeb which should not be used together because DuoNeb also contrast ipratropium which can cause side effects from overuse of anticholinergic medications which is also a component of Trelegy Ellipta.  · Continue IV Solu-Medrol and plan for orals prednisone taper in a few days.  · Use high flow oxygen when off BiPAP and encourage incentive spirometry improve pulmonary compliance and clearance.  · She is getting empiric treatment with levofloxacin but I do not see evidence of pneumonia and antibiotic coverage could be deescalated.  · Continue Eliquis for anticoagulation.  She has high risk of thromboembolism after hip surgery and immobility.  · She will definitely need outpatient pulmonary function test and she will need to monitor her lung function and adjust lung medications.  Trelegy Ellipta could be continued but she should not use DuoNeb and should use albuterol nebulizer inhaler which was elective.  · Continue Flonase and Singulair for nasal allergy and she was started on Zyrtec.  · She had some pain medications following the hip surgery which may worsen her respiratory status due to respiratory depression and narcotics should be avoided.  · DVT and stress ulcer prophylaxis adequate pain and anxiety control plan for follow-up in the pulmonary clinic as an outpatient.  Courage incentive spirometry to improve pulmonary compliance.  · Try to discharge home when she is back to her baseline oxygen and plan for pulmonary clinic follow-up  · CODE STATUS: Full.  Overall prognosis guarded but she appears to be stable.  · Pulmonary team will continue following her and make further recommendations  · Total time spent in seeing this patient as inpatient pulmonary consult was 45 minutes.    Thank you for this consult.  We will follow along.    Electronically signed by     Yasmine Stahl MD,   Pulmonologist/intensivist    09/02/21, 10:49 PM CDT.

## 2021-09-03 NOTE — THERAPY TREATMENT NOTE
Acute Care - Occupational Therapy Treatment Note  [facility]     Patient Name: Heide Newsome  : 1945  MRN: [id number]  Today's Date: 9/3/2021  Onset of Illness/Injury or Date of Surgery: 21  Date of Referral to OT: 21  Referring Physician: Ari Mckee MD     Admit Date: 2021       ICD-10-CM ICD-9-CM   1. Fall, initial encounter  W19.XXXA E888.9   2. Closed fracture of left hip, initial encounter (CMS/HCC)  S72.002A 820.8   3. Impaired mobility  Z74.09 799.89   4. Decreased activities of daily living (ADL)  Z78.9 V49.89     Patient Active Problem List   Diagnosis   • Pulmonary emphysema (CMS/HCC)   • Acute on chronic respiratory failure with hypoxia (CMS/HCC)   • COPD, very severe (CMS/HCC)   • Severe malnutrition (CMS/HCC)   • History of colon cancer   • Thrombocytopenia (CMS/HCC)   • Anemia   • Hyperglycemia, drug-induced   • Pneumonia   • Chronic respiratory failure with hypoxia (CMS/HCC)   • COPD with acute exacerbation (CMS/HCC)   • Adrenal nodule (CMS/HCC)   • Closed left hip fracture (CMS/HCC)   • Fall   • Acute pain of left hip     Past Medical History:   Diagnosis Date   • Cancer (CMS/HCC), colon    • Chronic respiratory failure (CMS/HCC), 4 L nasal cannula continuously    • COPD (chronic obstructive pulmonary disease) (CMS/HCC)    • Restless leg syndrome      Past Surgical History:   Procedure Laterality Date   • APPENDECTOMY     • CARPAL TUNNEL RELEASE Left    • COLON RESECTION     • FOOT SURGERY Bilateral     hammer toe   • FRACTURE SURGERY Left     Arm   • HIP TROCHANTERIC NAILING WITH INTRAMEDULLARY HIP SCREW Left 2021    Procedure: LEFT HIP TROCHANTERIC NAILING SHORT WITH INTRAMEDULLARY HIP SCREW;  Surgeon: Valdemar Chaudhari MD;  Location: [facility];  Service: Orthopedics;  Laterality: Left;   • HYSTERECTOMY     • WRIST FRACTURE SURGERY Right             OT ASSESSMENT FLOWSHEET (last 12 hours)      OT Evaluation and Treatment     Row Name 21 1400                    OT Time and Intention    Subjective Information  complains of;weakness;fatigue;dyspnea  -AC (r) PF (t) AC (c)        Document Type  therapy note (daily note)  -AC (r) PF (t) AC (c)        Mode of Treatment  occupational therapy;co-treatment  -AC (r) PF (t) AC (c)        Symptoms Noted During/After Treatment  shortness of breath  -AC (r) PF (t) AC (c)           General Information    Patient Profile Reviewed  yes  -AC (r) PF (t) AC (c)        Existing Precautions/Restrictions  fall;oxygen therapy device and L/min;weight bearing TTWB LLE  -AC (r) PF (t) AC (c)           Cognition    Orientation Status (Cognition)  oriented x 4  -AC (r) PF (t) AC (c)           Pain Assessment    Additional Documentation  Pain Scale: Numbers Pre/Post-Treatment (Group)  -AC (r) PF (t) AC (c)           Pain Scale: Numbers Pre/Post-Treatment    Pretreatment Pain Rating  4/10  -AC (r) PF (t) AC (c)        Pain Location - Side  Left  -AC (r) PF (t) AC (c)        Pain Location  hip  -AC (r) PF (t) AC (c)        Pain Intervention(s)  Medication (See MAR);Repositioned;Ambulation/increased activity  -AC (r) PF (t) AC (c)           Bed Mobility    Bed Mobility  rolling left;rolling right;scooting/bridging;supine-sit;sit-supine  -AC (r) PF (t) AC (c)        Rolling Left [place] (Bed Mobility)  maximum assist (25% patient effort);2 person assist;verbal cues;nonverbal cues (demo/gesture)  -AC (r) PF (t) AC (c)        Rolling Right [place] (Bed Mobility)  maximum assist (25% patient effort);2 person assist;verbal cues;nonverbal cues (demo/gesture)  -AC (r) PF (t) AC (c)        Scooting/Bridging [place] (Bed Mobility)  dependent (less than 25% patient effort);2 person assist;verbal cues;nonverbal cues (demo/gesture)  -AC (r) PF (t) AC (c)        Supine-Sit [place] (Bed Mobility)  maximum assist (25% patient effort);verbal cues;2 person assist  -AC (r) PF (t) AC (c)        Sit-Supine [place] (Bed Mobility)  maximum assist  (25% patient effort);2 person assist;verbal cues;nonverbal cues (demo/gesture)  -AC (r) PF (t) AC (c)        Bed Mobility, Safety Issues  decreased use of legs for bridging/pushing  -AC (r) PF (t) AC (c)        Assistive Device (Bed Mobility)  bed rails;draw sheet;head of bed elevated  -AC (r) PF (t) AC (c)           Functional Mobility    Functional Mobility- Ind. Level  --  -AC (r) PF (t) AC (c)        Functional Mobility- Device  --  -AC (r) PF (t) AC (c)        Functional Mobility-Maintain WBing  --  -AC (r) PF (t) AC (c)        Functional Mobility- Safety Issues  --  -AC (r) PF (t) AC (c)        Functional Mobility- Comment  --  -AC (r) PF (t) AC (c)           Transfer Assessment/Treatment    Transfers  sit-stand transfer;stand-sit transfer  -AC (r) PF (t) AC (c)        Maintains Weight-bearing Status (Transfers)  physical assist to maintain;verbal cues to maintain;nonverbal cues (demo/gesture) to maintain  -AC (r) PF (t) AC (c)           Transfers    Sit-Stand [place] (Transfers)  maximum assist (25% patient effort);verbal cues  -AC (r) PF (t) AC (c)        Stand-Sit [place] (Transfers)  minimum assist (75% patient effort);verbal cues;nonverbal cues (demo/gesture)  -AC (r) PF (t) AC (c)           Sit-Stand Transfer    Assistive Device (Sit-Stand Transfers)  walker, front-wheeled  -AC (r) PF (t) AC (c)           Stand-Sit Transfer    Assistive Device (Stand-Sit Transfers)  walker, front-wheeled  -AC (r) PF (t) AC (c)           Wound 08/31/21 1914 Left lateral thigh Incision    Wound - Properties Group Placement Date: 08/31/21  -YESY Placement Time: 1914  -YESY Side: Left  -YESY Orientation: lateral  -YESY Location: thigh  -YESY Primary Wound Type: Incision  -YESY    Retired Wound - Properties Group Date first assessed: 08/31/21  -YESY Time first assessed: 1914  -YESY Side: Left  -YESY Location: thigh  -YESY Primary Wound Type: Incision  -YESY       Plan of Care Review    Plan of Care Reviewed With  patient  -AC (r) PF (t)  AC (c)        Progress  no change  -AC (r) PF (t) AC (c)        Outcome Summary  OT treatment completed. Pt oriented x4. Pt reports 4/10 pain in left hip. Pt came to EOB sitting with Max A x2 and with the assistance of bed rails. Pt required numerous rest breaks in order to catch her breath once EOB. Pt was able to perfrom sit to stand with Max A x2. Pt required verbal/nonverbal cues in order to maintain TTWB. Pt became fatigued and needed to lay back down in bed. Pt perfromed sit to supine bed mobility with Max Ax 2 and with the use of bed rails. Pt was left positioned in bed with call light near. Pt would benefit from continued OT services to increase BADL task completion, Knowledge of energy conservation techniques, and to promote safe body mechanics. D/C recommendation SNF   -AC (r) PF (t) AC (c)           Positioning and Restraints    Pre-Treatment Position  in bed  -AC (r) PF (t) AC (c)        Post Treatment Position  bed  -AC (r) PF (t) AC (c)        In Bed  side lying right;fowlers;call light within reach;encouraged to call for assist;side rails up x2  -AC (r) PF (t) AC (c)           Therapy Plan Review/Discharge Plan (OT)    Anticipated Discharge Disposition (OT)  skilled nursing facility  -AC (r) PF (t) AC (c)          User Key  (r) = Recorded By, (t) = Taken By, (c) = Cosigned By    Initials Name Effective Dates    Pérez Henderson, OTR/L, JOSE 04/09/19 -     Laura Lynne RN 06/16/21 -     Kevan Smith Jr., OT Student 08/04/21 -            Occupational Therapy Education                 Title: PT OT SLP Therapies (In Progress)     Topic: Occupational Therapy (In Progress)     Point: ADL training (Done)     Description:   Instruct learner(s) on proper safety adaptation and remediation techniques during self care or transfers.   Instruct in proper use of assistive devices.              Learning Progress Summary           Patient Acceptance, E, VU,NR by PF at 9/3/2021 1500    Comment: OT  process, Energy conservation, Weight bearing status, Endurance training    Acceptance, E, NR,NL by PF at 9/2/2021 1258    Comment: ADL tolerance, Proper body mechanics, Weight bearing status,                   Point: Home exercise program (Not Started)     Description:   Instruct learner(s) on appropriate technique for monitoring, assisting and/or progressing therapeutic exercises/activities.              Learner Progress:  Not documented in this visit.          Point: Precautions (Done)     Description:   Instruct learner(s) on prescribed precautions during self-care and functional transfers.              Learning Progress Summary           Patient Acceptance, E, VU,NR by PF at 9/3/2021 1500    Comment: OT process, Energy conservation, Weight bearing status, Endurance training    Acceptance, E, NR,NL by PF at 9/2/2021 1258    Comment: ADL tolerance, Proper body mechanics, Weight bearing status,                   Point: Body mechanics (Done)     Description:   Instruct learner(s) on proper positioning and spine alignment during self-care, functional mobility activities and/or exercises.              Learning Progress Summary           Patient Acceptance, E, VU,NR by PF at 9/3/2021 1500    Comment: OT process, Energy conservation, Weight bearing status, Endurance training    Acceptance, E, NR,NL by PF at 9/2/2021 1258    Comment: ADL tolerance, Proper body mechanics, Weight bearing status,                               User Key     Initials Effective Dates Name Provider Type Discipline     08/04/21 -  Kevan Rai Jr., OT Student OT Student OT                  OT Recommendation and Plan  Planned Therapy Interventions (OT): activity tolerance training, BADL retraining, functional balance retraining, occupation/activity based interventions, patient/caregiver education/training, ROM/therapeutic exercise, strengthening exercise, transfer/mobility retraining  Therapy Frequency (OT): 5 times/wk  Plan of Care  Review  Plan of Care Reviewed With: patient  Progress: no change  Outcome Summary: OT treatment completed. Pt oriented x4. Pt reports 4/10 pain in left hip. Pt came to EOB sitting with Max A x2 and with the assistance of bed rails. Pt required numerous rest breaks in order to catch her breath once EOB. Pt was able to perfrom sit to stand with Max A x2. Pt required verbal/nonverbal cues in order to maintain TTWB. Pt became fatigued and needed to lay back down in bed. Pt perfromed sit to supine bed mobility with Max Ax 2 and with the use of bed rails. Pt was left positioned in bed with call light near. Pt would benefit from continued OT services to increase BADL task completion, Knowledge of energy conservation techniques, and to promote safe body mechanics. D/C recommendation SNF   Plan of Care Reviewed With: patient  Outcome Summary: OT treatment completed. Pt oriented x4. Pt reports 4/10 pain in left hip. Pt came to EOB sitting with Max A x2 and with the assistance of bed rails. Pt required numerous rest breaks in order to catch her breath once EOB. Pt was able to perfrom sit to stand with Max A x2. Pt required verbal/nonverbal cues in order to maintain TTWB. Pt became fatigued and needed to lay back down in bed. Pt perfromed sit to supine bed mobility with Max Ax 2 and with the use of bed rails. Pt was left positioned in bed with call light near. Pt would benefit from continued OT services to increase BADL task completion, Knowledge of energy conservation techniques, and to promote safe body mechanics. D/C recommendation SNF     Outcome Measures     Row Name 09/03/21 1400 09/02/21 1110          How much help from another is currently needed...    Putting on and taking off regular lower body clothing?  2  -AC (r) PF (t) AC (c)  2  -AC (r) PF (t) AC (c)     Bathing (including washing, rinsing, and drying)  2  -AC (r) PF (t) AC (c)  2  -AC (r) PF (t) AC (c)     Toileting (which includes using toilet bed pan or  urinal)  2  -AC (r) PF (t) AC (c)  2  -AC (r) PF (t) AC (c)     Putting on and taking off regular upper body clothing  2  -AC (r) PF (t) AC (c)  3  -AC (r) PF (t) AC (c)     Taking care of personal grooming (such as brushing teeth)  2  -AC (r) PF (t) AC (c)  3  -AC (r) PF (t) AC (c)     Eating meals  3  -AC (r) PF (t) AC (c)  3  -AC (r) PF (t) AC (c)     AM-PAC 6 Clicks Score (OT)  13  -AC (r) PF (t)  15  -AC (r) PF (t)        Functional Assessment    Outcome Measure Options  AM-PAC 6 Clicks Daily Activity (OT)  -AC (r) PF (t) AC (c)  AM-PAC 6 Clicks Daily Activity (OT)  -AC (r) PF (t) AC (c)       User Key  (r) = Recorded By, (t) = Taken By, (c) = Cosigned By    Initials Name Provider Type    Pérez Henderson, OTR/L, JOSE Occupational Therapist    Kevan Smith Jr., OT Student OT Student          Time Calculation:   Time Calculation- OT     Row Name 09/03/21 1505             Time Calculation- OT    OT Start Time  1400  -AC (r) PF (t) AC (c)      OT Stop Time  1430  -AC (r) PF (t) AC (c)      OT Time Calculation (min)  30 min  -AC (r) PF (t)      Total Timed Code Minutes- OT  15 minute(s)  -AC (r) PF (t) AC (c)      OT Received On  09/03/21  -AC (r) PF (t) AC (c)        User Key  (r) = Recorded By, (t) = Taken By, (c) = Cosigned By    Initials Name Provider Type    Pérez Henderson, OTR/L, CNT Occupational Therapist    Kevan Smith Jr., OT Student OT Student                 KEVAN MARIE, OT Student  9/3/2021

## 2021-09-03 NOTE — PLAN OF CARE
Goal Outcome Evaluation:  Plan of Care Reviewed With: patient        Progress: no change  Outcome Summary: (P) OT treatment completed. Pt oriented x4. Pt reports 4/10 pain in left hip. Pt came to EOB sitting with Max A x2 and with the assistance of bed rails. Pt required numerous rest breaks in order to catch her breath once EOB. Pt was able to perfrom sit to stand with Max A x2. Pt required verbal/nonverbal cues in order to maintain TTWB. Pt became fatigued and needed to lay back down in bed. Pt perfromed sit to supine bed mobility with Max Ax 2 and with the use of bed rails. Pt was left positioned in bed with call light near. Pt would benefit from continued OT services to increase BADL task completion, Knowledge of energy conservation techniques, and to promote safe body mechanics. D/C recommendation SNF

## 2021-09-03 NOTE — PLAN OF CARE
Goal Outcome Evaluation:  Plan of Care Reviewed With: patient        Progress: no change  Outcome Summary: PT tx completed: pt presents AOx4. She is unable to move LLE without moderate assistance. Pt requires time inbetween each movement to catch her breath and reorient herself. Pt is only able to stand for 30s and is unable to maintain WB status for prolonged period. PT will continue to work on strength and increased mobility. Recommended D/C to SNF.

## 2021-09-03 NOTE — NURSING NOTE
0904 this morning son Jensen was in the room when I stepped in to answer the patient's call light. He immediately asked who the charge nurse was today and stated it was Tiny. He stared yelling and swearing and tapping on the board saying that it was not Tiny that she couldn't have been here all night and all day. He would not let me get a word in and was very aggressive and agitated. He continues yelling and swearing and asked fro someone from management. At the point I stepped out of the room. Helga from Pt who was several rooms down the rodrigez heard the yelling and aggressive to and was on alert along with Marcelino. I called for security at this poin because I was told in report that he had been very aggressive on the previous shift. Unit coordinator, house supervisor and security attempted to talk to him about his concerns but he continued to swear at everyone and act irrational.

## 2021-09-03 NOTE — PROGRESS NOTES
"    [facility] Medicine Services  INPATIENT PROGRESS NOTE    Patient Name: Heide Newsome  Date of Admission: 8/31/2021  Today's Date: 09/03/21  Length of Stay: 3  Primary Care Physician: Pato Cooney DO    Subjective   Chief Complaint: Shortness of breath  HPI   She was sitting up in bed resting comfortably.  States her breathing feels some better today as compared to yesterday.  She did wear BiPAP throughout the night and has since been transitioned to 8 L high flow nasal cannula.  Oxygen saturation 90-93%.  Afebrile.  Denies chest pain or pressure.  Appetite is improving, although she was unable to eat lunch as the meat \"was too tough.\"  She had a bowel movement today.  She has been urinating more today.    Long discussion with patient regarding recommendation for discharge to skilled nursing facility at time of discharge.  Patient states \"it is a lot to think about.\"  Reports that she lives home with her son Jensen and   who is not in the best of health and also has dementia.  States that her son Flaquita is her primary caretaker.  He does work and is not able to assist 24/7 care.  Patient states that prior to admission she had fallen as she was taking care of her  making her breakfast.  Concerned that she is not safe to return home at this time.  Strongly recommend skilled nursing facility placement.  Patient tells me \"I will think about it.\"    Review of Systems   All pertinent negatives and positives are as above. All other systems have been reviewed and are negative unless otherwise stated.     Objective    Temp:  [96.9 °F (36.1 °C)-98.4 °F (36.9 °C)] 96.9 °F (36.1 °C)  Heart Rate:  [102-152] 113  Resp:  [20-35] 20  BP: ()/(56-78) 133/74  Physical Exam  Constitutional:       Appearance: She is well-developed. She is ill-appearing.      Comments: 8 L high flow with SPO2 90-93%.  No respiratory distress.  No family at bedside.. Discussed with her nurse, " alex.   HENT:      Head: Normocephalic and atraumatic.   Eyes:      General: No scleral icterus.     Conjunctiva/sclera: Conjunctivae normal.      Pupils: Pupils are equal, round, and reactive to light.   Neck:      Vascular: No JVD.   Cardiovascular:      Rate and Rhythm: Regular rhythm. Tachycardia present.      Heart sounds: 108-130s on telemetry  Pulmonary:      Effort: No respiratory distress.       Breath sounds: Wheezing (mild expiratory-improved compared to yesterday) present.   Abdominal:      General: Bowel sounds are normal. There is no distension.      Palpations: Abdomen is soft.      Tenderness: There is no abdominal tenderness.   Musculoskeletal:         General: Normal range of motion.      Cervical back: Neck supple.   Lymphadenopathy:      Cervical: No cervical adenopathy.   Skin:     General: Skin is warm and dry.      Comments: Left hip dressing dry and intact   Neurological:      Mental Status: She is alert and oriented to person, place, and time.      Cranial Nerves: No cranial nerve deficit.   Psychiatric:         Behavior: Behavior normal.     Results Review:  I have reviewed the labs, radiology results, and diagnostic studies.    Laboratory Data:   Results from last 7 days   Lab Units 09/03/21  0556 09/01/21  0510 08/31/21  1306   WBC 10*3/mm3 9.91 18.27* 11.65*   HEMOGLOBIN g/dL 9.9* 12.7 12.4   HEMATOCRIT % 33.5* 43.1 42.1   PLATELETS 10*3/mm3 141 198 222        Results from last 7 days   Lab Units 09/03/21  0556 09/02/21  0532 09/01/21  0510 08/31/21  1306 08/31/21  1306   SODIUM mmol/L 136 139 137   < > 138   POTASSIUM mmol/L 5.7* 4.8 5.2   < > 3.9   CHLORIDE mmol/L 99 94* 100   < > 98   CO2 mmol/L 32.0* 30.0* 32.0*   < > 35.0*   BUN mg/dL 46* 38* 23   < > 16   CREATININE mg/dL 0.76 0.86 0.81   < > 0.37*   CALCIUM mg/dL 8.9 8.4* 8.8   < > 9.6   BILIRUBIN mg/dL  --   --  0.4  --  0.7   ALK PHOS U/L  --   --  73  --  73   ALT (SGPT) U/L  --   --  13  --  13   AST (SGOT) U/L  --   --  18   --  16   GLUCOSE mg/dL 204* 163* 152*   < > 168*    < > = values in this interval not displayed.     I have reviewed the patient's current medications.     Assessment/Plan     Active Hospital Problems    Diagnosis    • **Closed left hip fracture (CMS/HCC)    • Acute pain of left hip    • Fall    • Chronic respiratory failure with hypoxia (CMS/HCC)    • Pulmonary emphysema (CMS/HCC)    • Acute on chronic respiratory failure with hypoxia (CMS/HCC)    • COPD, very severe (CMS/HCC)      Plan:  1.  Patient presented on 8/31 after a fall.  She reportedly slipped on coffee that was spilled on the floor and landed on her left hip.  She had immediate onset left hip pain and inability to bear weight.  Upon evaluation the emergency department, he was found to have acute valgus impacted left intertrochanteric fracture with foreshortening.  2.  Orthopedics evaluating patient.  She underwent cephalomedullary nailing left closed displaced intertrochanteric hip fracture 8/31 Dr. Chaudhari.  Toe-touch weightbearing left lower extremity for 6 weeks.  3.  Pulmonology evaluating patient for acute on chronic respiratory failure.  Arterial blood gas on 9/2 showed hypercapnia.  She was placed on BiPAP.  ABG some improved this morning.  Supplemental oxygen has been weaned to 8 L high flow.  She normally wears 4.5 L as outpatient.  IV Solu-Medrol dose decreased to 40 mg every 8 hours, Pulmicort and Atrovent.  Continue Mucinex.  Encourage use of incentive spirometry.   4.  She was started on Levaquin for possible pneumonia 9/2.  Chest x-ray showed trace right pleural effusion suspected.  Diffuse coarsening of interstitium.  He reflect developing interstitial edema.  She was given a one-time dose of IV Lasix on 9/1 and again on 9/2.  Will discuss with Dr. Mckee regarding continuation of Levaquin as chest x-ray does not show findings concerning for pneumonia, afebrile, no sputum production, and leukocytosis has resolved.  5.  Sinus tachycardia  per telemetry with rates 100-130s. Continue to monitor rates.  6.  Physical and Occupational Therapy to evaluate and treat.  7.  DVT prophylaxis with Eliquis 2.5 mg twice daily.     Discharge Planning: I expect the patient to be discharged to go home with home health versus skilled nursing facility placement pending patient's progress.    Electronically signed by JESSENIA Knox, 09/03/21, 13:36 CDT.

## 2021-09-04 NOTE — PLAN OF CARE
Goal Outcome Evaluation:  Plan of Care Reviewed With: patient   Patient alert and oriented. Can be forgetful at times. NOrco given for pain this shift x2. Tylenol given this morning while transitioning from bipap to highflow at 8l. Patient able to maintain sat above 90% this shift on the 8L highflow-humidified o2. HR elevated especially while working with PT this pm- up to 150s. Able to sit and stand with PT today. Voiding and BM today per bed pan. Continue to monitor. Son did return this PM and gave Josy, unit coordinator, POA papers to copy. He stayed shortly and left without incident. Use cation due to outburst of aggression from patient's son when he is here-radhika system recommended at minimum.

## 2021-09-04 NOTE — THERAPY TREATMENT NOTE
Acute Care - Physical Therapy Treatment Note  [facility]     Patient Name: Heide Newsome  : 1945  MRN: [id number]  Today's Date: 2021   Onset of Illness/Injury or Date of Surgery: 21  Visit Dx:     ICD-10-CM ICD-9-CM   1. Fall, initial encounter  W19.XXXA E888.9   2. Closed fracture of left hip, initial encounter (CMS/[facility])  S72.002A 820.8   3. Impaired mobility  Z74.09 799.89   4. Decreased activities of daily living (ADL)  Z78.9 V49.89     Patient Active Problem List   Diagnosis   • Pulmonary emphysema (CMS/HCC)   • Acute on chronic respiratory failure with hypoxia (CMS/HCC)   • COPD, very severe (CMS/HCC)   • Severe malnutrition (CMS/HCC)   • History of colon cancer   • Thrombocytopenia (CMS/HCC)   • Anemia   • Hyperglycemia, drug-induced   • Pneumonia   • Chronic respiratory failure with hypoxia (CMS/HCC)   • COPD with acute exacerbation (CMS/HCC)   • Adrenal nodule (CMS/HCC)   • Closed left hip fracture (CMS/[facility])   • Fall   • Acute pain of left hip     Past Medical History:   Diagnosis Date   • Cancer (CMS/HCC), colon    • Chronic respiratory failure (CMS/[facility]), 4 L nasal cannula continuously    • COPD (chronic obstructive pulmonary disease) (CMS/[facility])    • Restless leg syndrome      Past Surgical History:   Procedure Laterality Date   • APPENDECTOMY     • CARPAL TUNNEL RELEASE Left    • COLON RESECTION     • FOOT SURGERY Bilateral     hammer toe   • FRACTURE SURGERY Left     Arm   • HIP TROCHANTERIC NAILING WITH INTRAMEDULLARY HIP SCREW Left 2021    Procedure: LEFT HIP TROCHANTERIC NAILING SHORT WITH INTRAMEDULLARY HIP SCREW;  Surgeon: Valdemar Chaudhari MD;  Location: [facility];  Service: Orthopedics;  Laterality: Left;   • HYSTERECTOMY     • WRIST FRACTURE SURGERY Right         PT Assessment (last 12 hours)      PT Evaluation and Treatment     Row Name 21 1315 21 1035       Physical Therapy Time and Intention    Subjective Information  complains of;fatigue  -TB  complains  of;weakness;fatigue;dyspnea  -TB    Document Type  therapy note (daily note)  -TB  therapy note (daily note)  -TB    Mode of Treatment  physical therapy  -TB  physical therapy  -TB    Patient Effort  good  -TB  good  -TB    Row Name 09/04/21 1315 09/04/21 1035       General Information    Existing Precautions/Restrictions  oxygen therapy device and L/min;non-weight bearing TTWB LLE  -TB  oxygen therapy device and L/min;non-weight bearing TTWB LLE  -TB    Row Name 09/04/21 1035          Pain Scale: Numbers Pre/Post-Treatment    Pretreatment Pain Rating  3/10  -TB     Pain Location - Side  Left  -TB     Pain Location - Orientation  incisional  -TB     Pain Location  hip  -TB     Row Name 09/04/21 1035          Pain Scale: Word Pre/Post-Treatment    Pain Intervention(s)  Medication (See MAR);Ambulation/increased activity  -TB     Row Name 09/04/21 1315 09/04/21 1035       Bed Mobility    Bed Mobility  sit-sidelying;scooting/bridging  -TB  scooting/bridging;supine-sit  -TB    Scooting/Bridging Darwin (Bed Mobility)  dependent (less than 25% patient effort);2 person assist  -TB  dependent (less than 25% patient effort)  -TB    Supine-Sit Darwin (Bed Mobility)  --  maximum assist (25% patient effort);2 person assist;verbal cues  -TB    Sit-Sidelying Darwin (Bed Mobility)  maximum assist (25% patient effort);2 person assist;verbal cues  -TB  --    Assistive Device (Bed Mobility)  draw sheet  -TB  draw sheet;head of bed elevated  -TB    Row Name 09/04/21 1315 09/04/21 1035       Transfers    Transfers  chair-bed transfer  -TB  sit-stand transfer;stand-sit transfer;stand pivot/stand step transfer  -TB    Chair-Bed Darwin (Transfers)  maximum assist (25% patient effort);2 person assist;verbal cues  -TB  --    Sit-Stand Darwin (Transfers)  --  maximum assist (25% patient effort);2 person assist;verbal cues  -TB    Stand-Sit Darwin (Transfers)  --  minimum assist (75% patient effort);2 person  assist;verbal cues  -TB    Row Name 09/04/21 1035          Sit-Stand Transfer    Assistive Device (Sit-Stand Transfers)  walker, front-wheeled  -TB     Row Name 09/04/21 1035          Stand-Sit Transfer    Assistive Device (Stand-Sit Transfers)  walker, front-wheeled  -TB     Row Name 09/04/21 1035          Stand Pivot/Stand Step Transfer    Stand Pivot/Stand Step Merna (Transfers)  maximum assist (25% patient effort);2 person assist;verbal cues  -TB     Assistive Device (Stand Pivot Stand Step Transfer)  other (see comments) HHA  -TB     Row Name 09/04/21 1035          Motor Skills    Therapeutic Exercise  ankle;knee  -TB     Row Name 09/04/21 1035          Knee (Therapeutic Exercise)    Knee (Therapeutic Exercise)  AROM (active range of motion)  -TB     Knee AROM (Therapeutic Exercise)  bilateral;LAQ (long arc quad);sitting;other (see comments) x10  -TB     Row Name 09/04/21 1035          Ankle (Therapeutic Exercise)    Ankle (Therapeutic Exercise)  AROM (active range of motion)  -TB     Ankle AROM (Therapeutic Exercise)  bilateral;dorsiflexion;plantarflexion;sitting;other (see comments) x10  -TB     Row Name             Wound 08/31/21 1914 Left lateral thigh Incision    Wound - Properties Group Placement Date: 08/31/21  - Placement Time: 1914  -YESY Side: Left  -YESY Orientation: lateral  -YESY Location: thigh  -YESY Primary Wound Type: Incision  -YESY    Retired Wound - Properties Group Date first assessed: 08/31/21  - Time first assessed: 1914  -YESY Side: Left  -YESY Location: thigh  -YESY Primary Wound Type: Incision  -YESY    Row Name 09/04/21 1035          Plan of Care Review    Plan of Care Reviewed With  patient  -TB     Progress  improving  -TB     Outcome Summary  Pt agreeable to therapy. Bed mob Max x2 to get to EOB. Sitting balance WNL. Performed AROM BLE x10. Stood w/ Max x2 for about 15-20 seconds. 2nd stand attempt went ahead and did a stand pivot transfer to the chair. Pt needed cues to maintian WB  precautions. Will cont current POC.  -TB     Row Name 09/04/21 1315 09/04/21 1035       Positioning and Restraints    Pre-Treatment Position  sitting in chair/recliner  -TB  in bed  -TB    Post Treatment Position  bed  -TB  chair  -TB    In Bed  fowlers;call light within reach;encouraged to call for assist;side rails up x2;heels elevated  -TB  --    In Chair  --  notified nsg;reclined;sitting;call light within reach;encouraged to call for assist  -TB      User Key  (r) = Recorded By, (t) = Taken By, (c) = Cosigned By    Initials Name Provider Type    Laura Lynne, RN Registered Nurse    TB Remedios Thomas, PTA Physical Therapy Assistant        Physical Therapy Education                 Title: PT OT SLP Therapies (In Progress)     Topic: Physical Therapy (Done)     Point: Mobility training (Done)     Learning Progress Summary           Patient Acceptance, E, VU,NR by NN at 9/3/2021 1403    Comment: role of PT, TTWB of LLE    Acceptance, E, NR by MS at 9/2/2021 1342    Comment: role of PT in her care, TTWB of L LE                   Point: Home exercise program (Done)     Learning Progress Summary           Patient Acceptance, E, VU,NR by NN at 9/3/2021 1403    Comment: role of PT, TTWB of LLE                   Point: Body mechanics (Done)     Learning Progress Summary           Patient Acceptance, E, VU,NR by NN at 9/3/2021 1403    Comment: role of PT, TTWB of LLE                   Point: Precautions (Done)     Learning Progress Summary           Patient Acceptance, E, VU,NR by NN at 9/3/2021 1403    Comment: role of PT, TTWB of LLE                               User Key     Initials Effective Dates Name Provider Type Discipline    MS 06/19/18 -  Balbina Hudson, PT, DPT, NCS Physical Therapist PT    NN 08/18/21 -  Paola Carvalho, ELVIN Student PT Student PT              PT Recommendation and Plan     Plan of Care Reviewed With: patient  Progress: improving  Outcome Summary: Pt agreeable to therapy. Bed mob  Max x2 to get to EOB. Sitting balance WNL. Performed AROM BLE x10. Stood w/ Max x2 for about 15-20 seconds. 2nd stand attempt went ahead and did a stand pivot transfer to the chair. Pt needed cues to maintian WB precautions. Will cont current POC.  Outcome Measures     Row Name 09/03/21 1400 09/02/21 1110          How much help from another is currently needed...    Putting on and taking off regular lower body clothing?  2  -AC (r) PF (t) AC (c)  2  -AC (r) PF (t) AC (c)     Bathing (including washing, rinsing, and drying)  2  -AC (r) PF (t) AC (c)  2  -AC (r) PF (t) AC (c)     Toileting (which includes using toilet bed pan or urinal)  2  -AC (r) PF (t) AC (c)  2  -AC (r) PF (t) AC (c)     Putting on and taking off regular upper body clothing  2  -AC (r) PF (t) AC (c)  3  -AC (r) PF (t) AC (c)     Taking care of personal grooming (such as brushing teeth)  2  -AC (r) PF (t) AC (c)  3  -AC (r) PF (t) AC (c)     Eating meals  3  -AC (r) PF (t) AC (c)  3  -AC (r) PF (t) AC (c)     AM-PAC 6 Clicks Score (OT)  13  -AC (r) PF (t)  15  -AC (r) PF (t)        Functional Assessment    Outcome Measure Options  AM-PAC 6 Clicks Daily Activity (OT)  -AC (r) PF (t) AC (c)  AM-PAC 6 Clicks Daily Activity (OT)  -AC (r) PF (t) AC (c)       User Key  (r) = Recorded By, (t) = Taken By, (c) = Cosigned By    Initials Name Provider Type    Pérez Henderson, OTR/L, CNT Occupational Therapist    Kevan Smith Jr., OT Student OT Student           Time Calculation:   PT Charges     Row Name 09/04/21 1333 09/04/21 1330          Time Calculation    Start Time  1035  -TB  1315  -TB     Stop Time  1103  -TB  1323  -TB     Time Calculation (min)  28 min  -TB  8 min  -TB     PT Received On  09/04/21  -TB  09/04/21  -TB        Time Calculation- PT    Total Timed Code Minutes- PT  28 minute(s)  -TB  8 minute(s)  -TB       User Key  (r) = Recorded By, (t) = Taken By, (c) = Cosigned By    Initials Name Provider Type    TB Remedios Thomas,  ERNIE Physical Therapy Assistant        Therapy Charges for Today     Code Description Service Date Service Provider Modifiers Qty    52530122614 HC PT THERAPEUTIC ACT EA 15 MIN 9/4/2021 Remedios Thomas, ERNIE GP 1    22474067634 HC PT THERAPEUTIC ACT EA 15 MIN 9/4/2021 Remedios Thomas, ERNIE GP 2          PT G-Codes  Outcome Measure Options: AM-PAC 6 Clicks Basic Mobility (PT)  AM-PAC 6 Clicks Score (PT): 9  AM-PAC 6 Clicks Score (OT): 13    Remedios Thomas PTA  9/4/2021

## 2021-09-04 NOTE — PLAN OF CARE
Goal Outcome Evaluation:  Plan of Care Reviewed With: patient        Progress: no change  Outcome Summary: Pt alert and oriented x4.  Pt has purewick that is working well.  Pt on 8L high flow O2.  Sats maintaining in the 90's.  Tele on, sinus tach.  C/o hip pain treated with PRN pain medication.  JASMYN GARCIA w/ Ns @ 20.  Call light in reach, safety maintained

## 2021-09-04 NOTE — PLAN OF CARE
Goal Outcome Evaluation:  Plan of Care Reviewed With: patient        Progress: improving  Outcome Summary: Pt agreeable to therapy. Bed mob Max x2 to get to EOB. Sitting balance WNL. Performed AROM BLE x10. Stood w/ Max x2 for about 15-20 seconds. 2nd stand attempt went ahead and did a stand pivot transfer to the chair. Pt needed cues to maintian WB precautions. Will cont current POC.

## 2021-09-04 NOTE — CASE MANAGEMENT/SOCIAL WORK
Continued Stay Note   [facility]     Patient Name: Heide Newsome  MRN: [id number]  Today's Date: 9/4/2021    Admit Date: 8/31/2021    Discharge Plan     Row Name 09/04/21 1533       Plan    Plan Comments  SW called and spoke with pt about placement per JESSENIA Tolbert request. Pt states that she is going to have to speak with her family prior to making any decisions at this time. SW will be in contact with her tomorrow        Discharge Codes    No documentation.             Alice Lo

## 2021-09-04 NOTE — PLAN OF CARE
Goal Outcome Evaluation:              Outcome Summary: Pt AxOx4, O2 3.5 NC w/ sats above 90%.  Prn pain medication given x 2 for pain.  Prn Ativan given x 1 for anxiety.  Voiding w/ purewick in place.  Pt up to chair for several hours this shift.  Tele in place HR sinus tach 110-120's Didi Cuellar aware.  Call light within reach, safety maintained.

## 2021-09-04 NOTE — PROGRESS NOTES
"                                              LOS: 4 days   Patient Care Team:  Pato Cooney DO as PCP - General (Family Medicine)    Chief Complaint:  F/up respiratory failure, COPD, suspected pneumonia    Subjective   Interval History  I reviewed the admission note, progress notes, PMH, PSH, Family hx, social history, imagings and prior records on this admission, summarized the finding in my note and formulated a transition of care plan.      Use the PAP at night.  Currently on oxygen 4 L/min.  She uses 4.5 L at home she said.  She reported mild cough but no sputum.  No shortness of breath at rest    REVIEW OF SYSTEMS:   CARDIOVASCULAR: No chest pain, chest pressure or chest discomfort. No palpitations or edema.   GASTROINTESTINAL: No anorexia, nausea, vomiting or diarrhea. No abdominal pain or blood.   Constitutional: No fever or chills    Ventilator/Non-Invasive Ventilation Settings (From admission, onward)     Start     Ordered    09/02/21 1654  NIPPV (CPAP or BIPAP)  Until Discontinued     Comments: RT to consult and manage   Question Answer Comment   Indication: Acute Respiratory Failure    Type: BIPAP    NIPPV Mask Interface: Full Face Mask    IPAP 18    EPAP 6    Breath Rate 22    Titrate for SPO2 90%    Titrate for SPO2 88% - 92%        09/02/21 1655                      Physical Exam:     Vital Signs  Temp:  [97.9 °F (36.6 °C)-98.1 °F (36.7 °C)] 98.1 °F (36.7 °C)  Heart Rate:  [108-121] 115  Resp:  [18-20] 20  BP: (128-144)/(74-95) 128/74    Intake/Output Summary (Last 24 hours) at 9/4/2021 1843  Last data filed at 9/4/2021 1800  Gross per 24 hour   Intake --   Output 325 ml   Net -325 ml     Flowsheet Rows      First Filed Value   Admission Height  160 cm (63\") Documented at 08/31/2021 1227   Admission Weight  59 kg (130 lb) Documented at 08/31/2021 1227          PPE used per hospital policy    General Appearance:   Alert, cooperative, in no acute distress   ENMT:  Mallampati score 3, moist mucous " membrane   Eyes:  Pupils equal and reactive to light. EOMI   Neck:   Trachea midline. No thyromegaly.   Lungs:    Diminished air entry bilaterally.  Mild wheezing on the right.  Minimal crackles at the left base.  Nonlabored breathing    Heart:   Regular rhythm and normal rate, normal S1 and S2, no         murmur   Skin:   No rash but ecchymosis in arms and legs   Abdomen:     Soft. No tenderness. No HSM.   Neuro:  Conscious, alert, oriented x3. Strength 5/5 in upper and lower  ext   Extremities:  No cyanosis, clubbing but mild edema.  Warm extremities and well-perfused          Results Review:        Results from last 7 days   Lab Units 09/04/21  0622 09/03/21  0556 09/03/21  0556 09/02/21  0532 09/02/21  0532   SODIUM mmol/L 140  --  136  --  139   POTASSIUM mmol/L 5.0  --  5.7*  --  4.8   CHLORIDE mmol/L 101  --  99  --  94*   CO2 mmol/L 36.0*  --  32.0*  --  30.0*   BUN mg/dL 28*  --  46*  --  38*   CREATININE mg/dL 0.43*  --  0.76  --  0.86   GLUCOSE mg/dL 163*   < > 204*   < > 163*   CALCIUM mg/dL 9.6  --  8.9  --  8.4*    < > = values in this interval not displayed.         Results from last 7 days   Lab Units 09/04/21  0622 09/03/21  0556 09/01/21  0510   WBC 10*3/mm3 9.51 9.91 18.27*   HEMOGLOBIN g/dL 10.2* 9.9* 12.7   HEMATOCRIT % 34.3 33.5* 43.1   PLATELETS 10*3/mm3 156 141 198     Results from last 7 days   Lab Units 08/31/21  1306   INR  0.94   APTT seconds 25.8           I reviewed the patient's new clinical results.        Medication Review:   apixaban, 2.5 mg, Oral, Q12H  budesonide, 0.25 mg, Nebulization, BID - RT  cetirizine, 10 mg, Oral, Daily  fluticasone, 1 spray, Each Nare, Daily  guaiFENesin, 1,200 mg, Oral, BID  ipratropium, 0.5 mg, Nebulization, 4x Daily - RT  lidocaine, 2 patch, Transdermal, Q24H  methylPREDNISolone sodium succinate, 40 mg, Intravenous, Q12H  montelukast, 10 mg, Oral, Nightly  polyethylene glycol, 17 g, Oral, Daily  rOPINIRole, 2 mg, Oral, Nightly  senna-docusate sodium, 1  tablet, Oral, BID  sodium chloride, 10 mL, Intravenous, Q12H             Diagnostic imaging:  I personally and independently reviewed the following images:  CXR 9/2/2021: Interstitial infiltrates on the left and consolidation/loculated pleural effusion right middle chest.      Assessment     1. Acute hypercapnic and hypoxic respiratory failure  2. Chronic hypoxic respiratory failure, on oxygen 4.5 L at baseline  3. COPD exacerbation  4. Abnormal CXR: Interstitial edema ?, consolidation/loculated right effusion at the level of the fissure  5. Left hip fracture s/p repair 8/31  6. Protein calorie malnutrition    All problems new to me    Plan     · Change Solu-Medrol to prednisone 40 mg daily and titrate down gradually  · Oxygen by nasal cannula, currently at baseline  · PAP at night  · Check proBNP  · Continue empiric antibiotics  · Bronchodilators with budesonide twice daily and ipratropium 4 times a day.  · I-S  · Eliquis prophylaxis      Rhonda Sotelo MD  09/04/21  18:43 CDT            This note was dictated utilizing Dragon dictation

## 2021-09-04 NOTE — PROGRESS NOTES
[facility] Medicine Services  INPATIENT PROGRESS NOTE    Patient Name: Heide Newsome  Date of Admission: 8/31/2021  Today's Date: 09/04/21  Length of Stay: 4  Primary Care Physician: Pato Cooney DO    Subjective   Chief Complaint: Shortness of breath  HPI   She was sitting up in bed resting comfortably.  States her breathing feels similar to yesterday.  Supplemental oxygen has been weaned to 6 L.  Discussed with respiratory therapist at bedside, continue to wean supplemental oxygen as tolerated to home use 4.5 L nasal cannula.  Add flutter valve.  Denies cough.  Afebrile.  Appetite improving.  Denies nausea, vomiting or abdominal pain.  Postoperative pain tolerable with use of pain medication.  Yesterday, she was only able to stand for 30s and was unable to maintain weightbearing status for prolonged period.  Therapy strongly recommends discharge to skilled nursing facility.  I had a long discussion with patient yesterday regarding recommendation for placement.  Patient states she is now agreeable for placement.  Discussed with her son Jensen and  at bedside who are also in agreement.    Review of Systems   All pertinent negatives and positives are as above. All other systems have been reviewed and are negative unless otherwise stated.     Objective    Temp:  [97.6 °F (36.4 °C)-98 °F (36.7 °C)] 97.9 °F (36.6 °C)  Heart Rate:  [108-129] 121  Resp:  [18-20] 18  BP: (130-146)/(73-95) 144/95  Physical Exam  Constitutional:       Appearance: She is well-developed. She is ill-appearing.      Comments: 6 L high flow.  No respiratory distress.  Son and  at bedside.  Discussed with her nurse, jermaine.   HENT:      Head: Normocephalic and atraumatic.   Eyes:      General: No scleral icterus.     Conjunctiva/sclera: Conjunctivae normal.      Pupils: Pupils are equal, round, and reactive to light.   Neck:      Vascular: No JVD.   Cardiovascular:      Rate and Rhythm:  Regular rhythm. Tachycardia present.      Heart sounds: 108-130s on telemetry  Pulmonary:      Effort: No respiratory distress.       Breath sounds: Wheezing (mild expiratory-improved compared to yesterday) present.   Abdominal:      General: Bowel sounds are normal. There is no distension.      Palpations: Abdomen is soft.      Tenderness: There is no abdominal tenderness.   Musculoskeletal:         General: Normal range of motion.      Cervical back: Neck supple.   Lymphadenopathy:      Cervical: No cervical adenopathy.   Skin:     General: Skin is warm and dry.      Comments: Left hip dressing dry/intact with mild surrounding peribruising  Neurological:      Mental Status: She is alert and oriented to person, place, and time.      Cranial Nerves: No cranial nerve deficit.   Psychiatric:         Behavior: Behavior normal.     Results Review:  I have reviewed the labs, radiology results, and diagnostic studies.    Laboratory Data:   Results from last 7 days   Lab Units 09/04/21  0622 09/03/21  0556 09/01/21  0510   WBC 10*3/mm3 9.51 9.91 18.27*   HEMOGLOBIN g/dL 10.2* 9.9* 12.7   HEMATOCRIT % 34.3 33.5* 43.1   PLATELETS 10*3/mm3 156 141 198        Results from last 7 days   Lab Units 09/04/21  0622 09/03/21  0556 09/02/21  0532 09/01/21  0510 09/01/21  0510 08/31/21  1306 08/31/21  1306   SODIUM mmol/L 140 136 139   < > 137   < > 138   POTASSIUM mmol/L 5.0 5.7* 4.8   < > 5.2   < > 3.9   CHLORIDE mmol/L 101 99 94*   < > 100   < > 98   CO2 mmol/L 36.0* 32.0* 30.0*   < > 32.0*   < > 35.0*   BUN mg/dL 28* 46* 38*   < > 23   < > 16   CREATININE mg/dL 0.43* 0.76 0.86   < > 0.81   < > 0.37*   CALCIUM mg/dL 9.6 8.9 8.4*   < > 8.8   < > 9.6   BILIRUBIN mg/dL  --   --   --   --  0.4  --  0.7   ALK PHOS U/L  --   --   --   --  73  --  73   ALT (SGPT) U/L  --   --   --   --  13  --  13   AST (SGOT) U/L  --   --   --   --  18  --  16   GLUCOSE mg/dL 163* 204* 163*   < > 152*   < > 168*    < > = values in this interval not  displayed.     I have reviewed the patient's current medications.     Assessment/Plan     Active Hospital Problems    Diagnosis    • **Closed left hip fracture (CMS/HCC)    • Acute pain of left hip    • Fall    • Chronic respiratory failure with hypoxia (CMS/HCC)    • Pulmonary emphysema (CMS/HCC)    • Acute on chronic respiratory failure with hypoxia (CMS/HCC)    • COPD, very severe (CMS/HCC)      Plan:  1.  Patient presented on 8/31 after a fall.  She reportedly slipped on coffee that was spilled on the floor and landed on her left hip.  She had immediate onset left hip pain and inability to bear weight.  Upon evaluation in the emergency department, she was found to have acute valgus impacted left intertrochanteric fracture with foreshortening.  2.  Orthopedics evaluating patient.  She underwent cephalomedullary nailing left closed displaced intertrochanteric hip fracture 8/31 Dr. Chaudhari.  Toe-touch weightbearing left lower extremity for 6 weeks.  3.  Pulmonology evaluating patient for acute on chronic respiratory failure.  Arterial blood gas on 9/2 showed hypercapnia.  She was placed on BiPAP.  Supplemental oxygen has been weaned to 6 L high flow.  She normally wears 4.5 L as outpatient.  IV Solu-Medrol dose decreased to 40 mg every 12 hours, Pulmicort and Atrovent.  Continue Mucinex.  Encourage use of incentive spirometry.  Add flutter valve.  4.  She was started on Levaquin for possible pneumonia 9/2.  Chest x-ray showed trace right pleural effusion suspected.  Diffuse coarsening of interstitium.  May reflect developing interstitial edema.  She was given a one-time dose of IV Lasix on 9/1 and again on 9/2.  Levaquin discontinued on 9/3 as chest x-ray does not show findings concerning for pneumonia, afebrile, no sputum production, and absence of leukocytosis.  5.  Sinus tachycardia per telemetry with rates 100-130s. Continue to monitor rates.  6.  Physical and Occupational Therapy to evaluate and treat.  7.  DVT  prophylaxis with Eliquis 2.5 mg twice daily.  8.  Weight gain,  made aware to discuss with patient and family regarding placement.     Discharge Planning: I expect the patient to be discharged to skilled nursing facility placement once arrangements are made.    Electronically signed by JESSENIA Knox, 09/04/21, 10:49 CDT.

## 2021-09-04 NOTE — THERAPY TREATMENT NOTE
Acute Care - Physical Therapy Treatment Note  [facility]     Patient Name: Heide Newsome  : 1945  MRN: [id number]  Today's Date: 2021   Onset of Illness/Injury or Date of Surgery: 21  Visit Dx:     ICD-10-CM ICD-9-CM   1. Fall, initial encounter  W19.XXXA E888.9   2. Closed fracture of left hip, initial encounter (CMS/[facility])  S72.002A 820.8   3. Impaired mobility  Z74.09 799.89   4. Decreased activities of daily living (ADL)  Z78.9 V49.89     Patient Active Problem List   Diagnosis   • Pulmonary emphysema (CMS/HCC)   • Acute on chronic respiratory failure with hypoxia (CMS/HCC)   • COPD, very severe (CMS/HCC)   • Severe malnutrition (CMS/HCC)   • History of colon cancer   • Thrombocytopenia (CMS/HCC)   • Anemia   • Hyperglycemia, drug-induced   • Pneumonia   • Chronic respiratory failure with hypoxia (CMS/HCC)   • COPD with acute exacerbation (CMS/HCC)   • Adrenal nodule (CMS/HCC)   • Closed left hip fracture (CMS/[facility])   • Fall   • Acute pain of left hip     Past Medical History:   Diagnosis Date   • Cancer (CMS/HCC), colon    • Chronic respiratory failure (CMS/[facility]), 4 L nasal cannula continuously    • COPD (chronic obstructive pulmonary disease) (CMS/[facility])    • Restless leg syndrome      Past Surgical History:   Procedure Laterality Date   • APPENDECTOMY     • CARPAL TUNNEL RELEASE Left    • COLON RESECTION     • FOOT SURGERY Bilateral     hammer toe   • FRACTURE SURGERY Left     Arm   • HIP TROCHANTERIC NAILING WITH INTRAMEDULLARY HIP SCREW Left 2021    Procedure: LEFT HIP TROCHANTERIC NAILING SHORT WITH INTRAMEDULLARY HIP SCREW;  Surgeon: Valdemar Chaudhari MD;  Location: [facility];  Service: Orthopedics;  Laterality: Left;   • HYSTERECTOMY     • WRIST FRACTURE SURGERY Right         PT Assessment (last 12 hours)      PT Evaluation and Treatment     Row Name 21 1315 21 1035       Physical Therapy Time and Intention    Subjective Information  complains of;fatigue  -TB  complains  of;weakness;fatigue;dyspnea  -TB    Document Type  therapy note (daily note)  -TB  therapy note (daily note)  -TB    Mode of Treatment  physical therapy  -TB  physical therapy  -TB    Patient Effort  good  -TB  good  -TB    Row Name 09/04/21 1315 09/04/21 1035       General Information    Existing Precautions/Restrictions  oxygen therapy device and L/min;non-weight bearing TTWB LLE  -TB  oxygen therapy device and L/min;non-weight bearing TTWB LLE  -TB    Row Name 09/04/21 1035          Pain Scale: Numbers Pre/Post-Treatment    Pretreatment Pain Rating  3/10  -TB     Pain Location - Side  Left  -TB     Pain Location - Orientation  incisional  -TB     Pain Location  hip  -TB     Row Name 09/04/21 1035          Pain Scale: Word Pre/Post-Treatment    Pain Intervention(s)  Medication (See MAR);Ambulation/increased activity  -TB     Row Name 09/04/21 1315 09/04/21 1035       Bed Mobility    Bed Mobility  sit-sidelying;scooting/bridging  -TB  scooting/bridging;supine-sit  -TB    Scooting/Bridging [place] (Bed Mobility)  dependent (less than 25% patient effort);2 person assist  -TB  dependent (less than 25% patient effort)  -TB    Supine-Sit [place] (Bed Mobility)  --  maximum assist (25% patient effort);2 person assist;verbal cues  -TB    Sit-Sidelying [place] (Bed Mobility)  maximum assist (25% patient effort);2 person assist;verbal cues  -TB  --    Assistive Device (Bed Mobility)  draw sheet  -TB  draw sheet;head of bed elevated  -TB    Row Name 09/04/21 1315 09/04/21 1035       Transfers    Transfers  chair-bed transfer  -TB  sit-stand transfer;stand-sit transfer;stand pivot/stand step transfer  -TB    Chair-Bed [place] (Transfers)  maximum assist (25% patient effort);2 person assist;verbal cues  -TB  --    Sit-Stand [place] (Transfers)  --  maximum assist (25% patient effort);2 person assist;verbal cues  -TB    Stand-Sit [place] (Transfers)  --  minimum assist (75% patient effort);2 person  assist;verbal cues  -TB    Row Name 09/04/21 1035          Sit-Stand Transfer    Assistive Device (Sit-Stand Transfers)  walker, front-wheeled  -TB     Row Name 09/04/21 1035          Stand-Sit Transfer    Assistive Device (Stand-Sit Transfers)  walker, front-wheeled  -TB     Row Name 09/04/21 1035          Stand Pivot/Stand Step Transfer    Stand Pivot/Stand Step [place] (Transfers)  maximum assist (25% patient effort);2 person assist;verbal cues  -TB     Assistive Device (Stand Pivot Stand Step Transfer)  other (see comments) HHA  -TB     Row Name 09/04/21 1035          Motor Skills    Therapeutic Exercise  ankle;knee  -TB     Row Name 09/04/21 1035          Knee (Therapeutic Exercise)    Knee (Therapeutic Exercise)  AROM (active range of motion)  -TB     Knee AROM (Therapeutic Exercise)  bilateral;LAQ (long arc quad);sitting;other (see comments) x10  -TB     Row Name 09/04/21 1035          Ankle (Therapeutic Exercise)    Ankle (Therapeutic Exercise)  AROM (active range of motion)  -TB     Ankle AROM (Therapeutic Exercise)  bilateral;dorsiflexion;plantarflexion;sitting;other (see comments) x10  -TB     Row Name             Wound 08/31/21 1914 Left lateral thigh Incision    Wound - Properties Group Placement Date: 08/31/21  -YESY Placement Time: 1914  -YESY Side: Left  -YESY Orientation: lateral  -YESY Location: thigh  -YESY Primary Wound Type: Incision  -YESY    Retired Wound - Properties Group Date first assessed: 08/31/21  -YESY Time first assessed: 1914  -YESY Side: Left  -YESY Location: thigh  -YESY Primary Wound Type: Incision  -YESY    Row Name 09/04/21 1035          Plan of Care Review    Plan of Care Reviewed With  patient  -TB     Progress  improving  -TB     Outcome Summary  Pt agreeable to therapy.   -TB     Row Name 09/04/21 1315          Positioning and Restraints    Pre-Treatment Position  sitting in chair/recliner  -TB     Post Treatment Position  bed  -TB     In Bed  fowlers;call light within reach;encouraged to  call for assist;side rails up x2;heels elevated  -TB       User Key  (r) = Recorded By, (t) = Taken By, (c) = Cosigned By    Initials Name Provider Type    YESY Laura Cash, RN Registered Nurse    Remedios Gutiérrez, PTA Physical Therapy Assistant        Physical Therapy Education                 Title: PT OT SLP Therapies (In Progress)     Topic: Physical Therapy (Done)     Point: Mobility training (Done)     Learning Progress Summary           Patient Acceptance, E, VU,NR by NN at 9/3/2021 1403    Comment: role of PT, TTWB of LLE    Acceptance, E, NR by MS at 9/2/2021 1342    Comment: role of PT in her care, TTWB of L LE                   Point: Home exercise program (Done)     Learning Progress Summary           Patient Acceptance, E, VU,NR by NN at 9/3/2021 1403    Comment: role of PT, TTWB of LLE                   Point: Body mechanics (Done)     Learning Progress Summary           Patient Acceptance, E, VU,NR by NN at 9/3/2021 1403    Comment: role of PT, TTWB of LLE                   Point: Precautions (Done)     Learning Progress Summary           Patient Acceptance, E, VU,NR by NN at 9/3/2021 1403    Comment: role of PT, TTWB of LLE                               User Key     Initials Effective Dates Name Provider Type Discipline    MS 06/19/18 -  Balbina Hudson, PT, DPT, NCS Physical Therapist PT    NN 08/18/21 -  Paola Carvalho, PT Student PT Student PT              PT Recommendation and Plan     Plan of Care Reviewed With: patient  Progress: improving  Outcome Summary: Pt agreeable to therapy.   Outcome Measures     Row Name 09/03/21 1400 09/02/21 1110          How much help from another is currently needed...    Putting on and taking off regular lower body clothing?  2  -AC (r) PF (t) AC (c)  2  -AC (r) PF (t) AC (c)     Bathing (including washing, rinsing, and drying)  2  -AC (r) PF (t) AC (c)  2  -AC (r) PF (t) AC (c)     Toileting (which includes using toilet bed pan or urinal)  2  -AC (r)  PF (t) AC (c)  2  -AC (r) PF (t) AC (c)     Putting on and taking off regular upper body clothing  2  -AC (r) PF (t) AC (c)  3  -AC (r) PF (t) AC (c)     Taking care of personal grooming (such as brushing teeth)  2  -AC (r) PF (t) AC (c)  3  -AC (r) PF (t) AC (c)     Eating meals  3  -AC (r) PF (t) AC (c)  3  -AC (r) PF (t) AC (c)     AM-PAC 6 Clicks Score (OT)  13  -AC (r) PF (t)  15  -AC (r) PF (t)        Functional Assessment    Outcome Measure Options  AM-PAC 6 Clicks Daily Activity (OT)  -AC (r) PF (t) AC (c)  AM-PAC 6 Clicks Daily Activity (OT)  -AC (r) PF (t) AC (c)       User Key  (r) = Recorded By, (t) = Taken By, (c) = Cosigned By    Initials Name Provider Type    AC Pérez Lance N, OTR/L, CNT Occupational Therapist    PF Kevan Rai Jr., OT Student OT Student           Time Calculation:   PT Charges     Row Name 09/04/21 1330             Time Calculation    Start Time  1315  -TB      Stop Time  1323  -TB      Time Calculation (min)  8 min  -TB      PT Received On  09/04/21  -TB         Time Calculation- PT    Total Timed Code Minutes- PT  8 minute(s)  -TB        User Key  (r) = Recorded By, (t) = Taken By, (c) = Cosigned By    Initials Name Provider Type    TB Remedios Thomas PTA Physical Therapy Assistant        Therapy Charges for Today     Code Description Service Date Service Provider Modifiers Qty    55955377981 HC PT THERAPEUTIC ACT EA 15 MIN 9/4/2021 Remedios Thomas PTA GP 1          PT G-Codes  Outcome Measure Options: AM-PAC 6 Clicks Basic Mobility (PT)  AM-PAC 6 Clicks Score (PT): 9  AM-PAC 6 Clicks Score (OT): 13    Remedios Thomas PTA  9/4/2021

## 2021-09-05 NOTE — PLAN OF CARE
Goal Outcome Evaluation:  Plan of Care Reviewed With: patient        Progress: no change  Outcome Summary: vss. no change in neuro/nv status. drsg x 2 c/d/i, sl dried drainage, mild edema present,ppp. C/o soreness, denied need for prn. Assist w/ turning and placing on bedpan. Safety maintained.

## 2021-09-05 NOTE — THERAPY TREATMENT NOTE
Acute Care - Physical Therapy Treatment Note  [facility]     Patient Name: Heide Newsome  : 1945  MRN: [id number]  Today's Date: 2021   Onset of Illness/Injury or Date of Surgery: 21  Visit Dx:     ICD-10-CM ICD-9-CM   1. Fall, initial encounter  W19.XXXA E888.9   2. Closed fracture of left hip, initial encounter (CMS/[facility])  S72.002A 820.8   3. Impaired mobility  Z74.09 799.89   4. Decreased activities of daily living (ADL)  Z78.9 V49.89     Patient Active Problem List   Diagnosis   • Pulmonary emphysema (CMS/HCC)   • Acute on chronic respiratory failure with hypoxia (CMS/HCC)   • COPD, very severe (CMS/HCC)   • Severe malnutrition (CMS/HCC)   • History of colon cancer   • Thrombocytopenia (CMS/HCC)   • Anemia   • Hyperglycemia, drug-induced   • Pneumonia   • Chronic respiratory failure with hypoxia (CMS/HCC)   • COPD with acute exacerbation (CMS/HCC)   • Adrenal nodule (CMS/HCC)   • Closed left hip fracture (CMS/[facility])   • Fall   • Acute pain of left hip     Past Medical History:   Diagnosis Date   • Cancer (CMS/HCC), colon    • Chronic respiratory failure (CMS/[facility]), 4 L nasal cannula continuously    • COPD (chronic obstructive pulmonary disease) (CMS/[facility])    • Restless leg syndrome      Past Surgical History:   Procedure Laterality Date   • APPENDECTOMY     • CARPAL TUNNEL RELEASE Left    • COLON RESECTION     • FOOT SURGERY Bilateral     hammer toe   • FRACTURE SURGERY Left     Arm   • HIP TROCHANTERIC NAILING WITH INTRAMEDULLARY HIP SCREW Left 2021    Procedure: LEFT HIP TROCHANTERIC NAILING SHORT WITH INTRAMEDULLARY HIP SCREW;  Surgeon: Valdemar Chaudhari MD;  Location: [facility];  Service: Orthopedics;  Laterality: Left;   • HYSTERECTOMY     • WRIST FRACTURE SURGERY Right         PT Assessment (last 12 hours)      PT Evaluation and Treatment     Row Name 21 1325 21 1114       Physical Therapy Time and Intention    Subjective Information  complains of;weakness;fatigue;pain   -NW  complains of;weakness;pain;dyspnea  -NW    Document Type  therapy note (daily note)  -NW  therapy note (daily note)  -NW    Mode of Treatment  physical therapy  -NW  physical therapy  -NW    Row Name 09/05/21 1101          Physical Therapy Time and Intention    Subjective Information  --  -NW     Document Type  --  -NW     Mode of Treatment  --  -NW     Comment  ck bk pt on bed pan and getting breathing rx  -NW     Row Name 09/05/21 1325 09/05/21 1114       General Information    Existing Precautions/Restrictions  oxygen therapy device and L/min;non-weight bearing TTWB LLE  -NW  oxygen therapy device and L/min;non-weight bearing TTWB LLE  -NW    Row Name 09/05/21 1114          Pain    Additional Documentation  Pain Scale: FACES Pre/Post-Treatment (Group)  -     Row Name 09/05/21 1114          Pain Scale: Numbers Pre/Post-Treatment    Pretreatment Pain Rating  --  -     Row Name 09/05/21 1325 09/05/21 1114       Pain Scale: Word Pre/Post-Treatment    Pain: Word Scale, Pretreatment  6 - moderate-severe pain  -NW  2 - mild pain  -NW    Posttreatment Pain Rating  8 - severe pain  -NW  8 - severe pain  -NW    Pain Location - Side  Left  -NW  Left  -NW    Pain Location  hip  -NW  hip  -NW    Row Name 09/05/21 1325 09/05/21 1114       Bed Mobility    Bed Mobility  scooting/bridging;supine-sit  -NW  scooting/bridging;supine-sit  -NW    Scooting/Bridging [place] (Bed Mobility)  --  --  -NW    Supine-Sit [place] (Bed Mobility)  --  verbal cues;moderate assist (50% patient effort);2 person assist  -NW    Sit-Supine [place] (Bed Mobility)  verbal cues;moderate assist (50% patient effort);2 person assist  -NW  -- chair  -NW    Assistive Device (Bed Mobility)  draw sheet;head of bed elevated  -NW  draw sheet;head of bed elevated  -NW    Row Name 09/05/21 1325 09/05/21 1114       Transfers    Transfers  sit-stand transfer;stand-sit transfer;stand pivot/stand step transfer  -NW  sit-stand transfer;stand-sit  transfer;stand pivot/stand step transfer  -NW    Sit-Stand [place] (Transfers)  moderate assist (50% patient effort);2 person assist  -NW  moderate assist (50% patient effort);2 person assist  -NW    Stand-Sit [place] (Transfers)  minimum assist (75% patient effort);2 person assist;verbal cues  -NW  minimum assist (75% patient effort);2 person assist;verbal cues  -NW    Row Name 09/05/21 1325 09/05/21 1114       Sit-Stand Transfer    Assistive Device (Sit-Stand Transfers)  walker, front-wheeled  -NW  walker, front-wheeled  -NW    Row Name 09/05/21 1325 09/05/21 1114       Stand-Sit Transfer    Assistive Device (Stand-Sit Transfers)  walker, front-wheeled  -NW  walker, front-wheeled  -NW    Row Name 09/05/21 1325 09/05/21 1114       Stand Pivot/Stand Step Transfer    Stand Pivot/Stand Step [place] (Transfers)  verbal cues;moderate assist (50% patient effort);2 person assist  -NW  verbal cues;moderate assist (50% patient effort);2 person assist  -NW    Assistive Device (Stand Pivot Stand Step Transfer)  other (see comments)  -NW  other (see comments)  -NW    Row Name 09/05/21 1325 09/05/21 1114       Gait/Stairs (Locomotion)    Comment (Gait/Stairs)  unable to maintain TTWB had to pivot transfer btb   -NW  (S) stood pivot to [facility] HH stood x3 rwx fro [facility] pt unable to txfer pulled chair up behind in place of bsc  -NW    Row Name 09/05/21 1325 09/05/21 1114       Safety Issues, Functional Mobility    Safety Issues Affecting Function (Mobility)  safety precaution awareness  -NW  safety precaution awareness  -NW    Impairments Affecting Function (Mobility)  strength;pain  -NW  strength;pain  -NW    Row Name             Wound 08/31/21 1914 Left lateral thigh Incision    Wound - Properties Group Placement Date: 08/31/21  -YESY Placement Time: 1914  -YESY Side: Left  -YESY Orientation: lateral  -YESY Location: thigh  -YESY Primary Wound Type: Incision  -YESY    Retired Wound - Properties Group Date first assessed: 08/31/21   -YESY Time first assessed: 1914  -YESY Side: Left  -YESY Location: thigh  -YESY Primary Wound Type: Incision  -YESY    Row Name 09/05/21 1325 09/05/21 1114       Positioning and Restraints    Pre-Treatment Position  sitting in chair/recliner  -NW  in bed  -NW    Post Treatment Position  bed  -NW  chair  -NW    In Bed  fowlers;call light within reach;encouraged to call for assist;notified nsg  -NW  --    In Chair  --  reclined;call light within reach;encouraged to call for assist;notified nsg  -NW      User Key  (r) = Recorded By, (t) = Taken By, (c) = Cosigned By    Initials Name Provider Type    NW Sarah Landers, PTA Physical Therapy Assistant    Laura Lynne, RN Registered Nurse        Physical Therapy Education                 Title: PT OT SLP Therapies (In Progress)     Topic: Physical Therapy (Done)     Point: Mobility training (Done)     Learning Progress Summary           Patient Acceptance, E, VU,NR by NN at 9/3/2021 1403    Comment: role of PT, TTWB of LLE    Acceptance, E, NR by MS at 9/2/2021 1342    Comment: role of PT in her care, TTWB of L LE                   Point: Home exercise program (Done)     Learning Progress Summary           Patient Acceptance, E, VU,NR by NN at 9/3/2021 1403    Comment: role of PT, TTWB of LLE                   Point: Body mechanics (Done)     Learning Progress Summary           Patient Acceptance, E, VU,NR by NN at 9/3/2021 1403    Comment: role of PT, TTWB of LLE                   Point: Precautions (Done)     Learning Progress Summary           Patient Acceptance, E, VU,NR by NN at 9/3/2021 1403    Comment: role of PT, TTWB of LLE                               User Key     Initials Effective Dates Name Provider Type Discipline    MS 06/19/18 -  Balbina Hudson, PT, DPT, NCS Physical Therapist PT    NN 08/18/21 -  Paola Carvalho, ELVIN Student PT Student PT              PT Recommendation and Plan     Plan of Care Reviewed With: patient  Progress: no change  Outcome Summary:  Needs encouragement for OOB. Pt SOA however o2 remains in 90s. Pt anxious and needs cues and reassurance. Bed mobility modx2 using draw sheet and bedrails. sit-stand mod x2 pivot to [facility] HH max 2. pt then stood x3 from [facility] mod x2 unable to step and maintain TTWB had to pull chair up in place of bsc. Pt will need cont PT upon d/c  Outcome Measures     Row Name 09/03/21 1400             How much help from another is currently needed...    Putting on and taking off regular lower body clothing?  2  -AC (r) PF (t) AC (c)      Bathing (including washing, rinsing, and drying)  2  -AC (r) PF (t) AC (c)      Toileting (which includes using toilet bed pan or urinal)  2  -AC (r) PF (t) AC (c)      Putting on and taking off regular upper body clothing  2  -AC (r) PF (t) AC (c)      Taking care of personal grooming (such as brushing teeth)  2  -AC (r) PF (t) AC (c)      Eating meals  3  -AC (r) PF (t) AC (c)      AM-PAC 6 Clicks Score (OT)  13  -AC (r) PF (t)         Functional Assessment    Outcome Measure Options  AM-PAC 6 Clicks Daily Activity (OT)  -AC (r) PF (t) AC (c)        User Key  (r) = Recorded By, (t) = Taken By, (c) = Cosigned By    Initials Name Provider Type    Pérez Henderson, OTR/L, CNT Occupational Therapist    Kevan Smith Jr., OT Student OT Student           Time Calculation:   PT Charges     Row Name 09/05/21 1450 09/05/21 1138          Time Calculation    Start Time  1325  -NW  1114  -NW     Stop Time  1340  -NW  1138  -NW     Time Calculation (min)  15 min  -NW  24 min  -NW     PT Received On  --  09/05/21  -NW     PT Goal Re-Cert Due Date  --  09/12/21  -NW        Time Calculation- PT    Total Timed Code Minutes- PT  15 minute(s)  -NW  24 minute(s)  -NW        Timed Charges    63930 - PT Therapeutic Activity Minutes  15  -NW  24  -NW        Total Minutes    Timed Charges Total Minutes  15  -NW  24  -NW      Total Minutes  15  -NW  24  -NW       User Key  (r) = Recorded By, (t) = Taken By, (c) =  Cosigned By    Initials Name Provider Type    NW Sarah Landers PTA Physical Therapy Assistant        Therapy Charges for Today     Code Description Service Date Service Provider Modifiers Qty    11851959919 HC PT THERAPEUTIC ACT EA 15 MIN 9/5/2021 Sarah Landers PTA GP 2    [id number] HC PT THERAPEUTIC ACT EA 15 MIN 9/5/2021 Sarah Landers PTA GP 1          PT G-Codes  Outcome Measure Options: AM-PAC 6 Clicks Basic Mobility (PT)  AM-PAC 6 Clicks Score (PT): 9  AM-PAC 6 Clicks Score (OT): 13    Sarah Landers PTA  9/5/2021

## 2021-09-05 NOTE — PROGRESS NOTES
"    [facility] Medicine Services  INPATIENT PROGRESS NOTE    Patient Name: Heide Newsome  Date of Admission: 8/31/2021  Today's Date: 09/05/21  Length of Stay: 5  Primary Care Physician: Pato Cooney DO    Subjective   Chief Complaint: shortness of breath  HPI   She was sitting up in bed.  Discussed with pulmonologist Dr. Sotelo chest x-ray obtained on 9/2.  Supplemental oxygen has been weaned to home use 4 L nasal cannula.  Reports she feels tired today.  States her breathing feels similar to yesterday.  Denies cough.  Afebrile.  She is tolerating a diet.  Reports she has had a couple bowel movements but no \"good bowel movement yet.\"  She is requesting for something to help her bowels move but politely declines Dulcolax suppository.    Long discussion with patient on 9/3 and again on 9/4 regarding recommendation for skilled nursing facility placement.  Son at bedside yesterday was telling me he also agreed with placement.  However,  spoke with patient about placement on 9/4 patient reported she needed to speak with family prior to making any decisions.    Review of Systems   All pertinent negatives and positives are as above. All other systems have been reviewed and are negative unless otherwise stated.     Objective    Temp:  [98.1 °F (36.7 °C)-98.3 °F (36.8 °C)] 98.3 °F (36.8 °C)  Heart Rate:  [] 131  Resp:  [18-22] 20  BP: (111-137)/(61-77) 111/61  Physical Exam  Constitutional:       Appearance: She is well-developed. She is ill-appearing.      Comments: 4 L high nasal cannula.  No respiratory distress.  No family at bedside. Discussed with her nurse, carla.   HENT:      Head: Normocephalic and atraumatic.   Eyes:      General: No scleral icterus.     Conjunctiva/sclera: Conjunctivae normal.      Pupils: Pupils are equal, round, and reactive to light.   Neck:      Vascular: No JVD.   Cardiovascular:      Rate and Rhythm: Regular " rhythm. Tachycardia present.      Heart sounds:  Last night 90s-120.  Currently 110-120 on telemetry.  Pulmonary:      Effort: No respiratory distress.       Breath sounds: Diminished breath sounds.  Abdominal:      General: Bowel sounds are normal. There is no distension.      Palpations: Abdomen is soft.      Tenderness: There is no abdominal tenderness.   Musculoskeletal:         General: Normal range of motion.      Cervical back: Neck supple.   Lymphadenopathy:      Cervical: No cervical adenopathy.   Skin:     General: Skin is warm and dry.      Comments: Left hip dressing dry and intact   Neurological:      Mental Status: She is alert and oriented to person, place, and time.      Cranial Nerves: No cranial nerve deficit.   Psychiatric:         Behavior: Behavior normal.     Results Review:  I have reviewed the labs, radiology results, and diagnostic studies.    Laboratory Data:   Results from last 7 days   Lab Units 09/04/21  0622 09/03/21  0556 09/01/21  0510   WBC 10*3/mm3 9.51 9.91 18.27*   HEMOGLOBIN g/dL 10.2* 9.9* 12.7   HEMATOCRIT % 34.3 33.5* 43.1   PLATELETS 10*3/mm3 156 141 198        Results from last 7 days   Lab Units 09/04/21  0622 09/03/21  0556 09/02/21  0532 09/01/21  0510 09/01/21  0510 08/31/21  1306 08/31/21  1306   SODIUM mmol/L 140 136 139   < > 137   < > 138   POTASSIUM mmol/L 5.0 5.7* 4.8   < > 5.2   < > 3.9   CHLORIDE mmol/L 101 99 94*   < > 100   < > 98   CO2 mmol/L 36.0* 32.0* 30.0*   < > 32.0*   < > 35.0*   BUN mg/dL 28* 46* 38*   < > 23   < > 16   CREATININE mg/dL 0.43* 0.76 0.86   < > 0.81   < > 0.37*   CALCIUM mg/dL 9.6 8.9 8.4*   < > 8.8   < > 9.6   BILIRUBIN mg/dL  --   --   --   --  0.4  --  0.7   ALK PHOS U/L  --   --   --   --  73  --  73   ALT (SGPT) U/L  --   --   --   --  13  --  13   AST (SGOT) U/L  --   --   --   --  18  --  16   GLUCOSE mg/dL 163* 204* 163*   < > 152*   < > 168*    < > = values in this interval not displayed.     I have reviewed the patient's current  medications.     Assessment/Plan     Active Hospital Problems    Diagnosis    • **Closed left hip fracture (CMS/HCC)    • Acute pain of left hip    • Fall    • Chronic respiratory failure with hypoxia (CMS/HCC)    • Pulmonary emphysema (CMS/HCC)    • Acute on chronic respiratory failure with hypoxia (CMS/HCC)    • COPD, very severe (CMS/HCC)      Plan:  1.  Patient presented on 8/31 after a fall.  She reportedly slipped on coffee that was spilled on the floor and landed on her left hip.  She had immediate onset left hip pain and inability to bear weight.  Upon evaluation in the emergency department, she was found to have acute valgus impacted left intertrochanteric fracture with foreshortening.  2.  Orthopedics evaluating patient.  She underwent cephalomedullary nailing left closed displaced intertrochanteric hip fracture 8/31 Dr. Chaudhari.  Toe-touch weightbearing left lower extremity for 6 weeks.  3.  Pulmonology evaluating patient for acute on chronic respiratory failure.  Arterial blood gas on 9/2 showed hypercapnia.  She was placed on BiPAP.  Supplemental oxygen has been weaned to home use 4 L nasal cannula.  Prednisone 40 mg daily. Pulmicort and Atrovent.  Continue Mucinex.  Encourage use of incentive spirometry and flutter valve.  4.  Chest x-ray showed trace right pleural effusion suspected.  Diffuse coarsening of interstitium.  May reflect developing interstitial edema.  She was given a one-time dose of IV Lasix on 9/1 and again on 9/2.  Levaquin discontinued on 9/3 as chest x-ray does not show findings concerning for pneumonia, afebrile, no sputum production, and absence of leukocytosis.  5.  Sinus tachycardia per telemetry with rates 100-130s. Seems to be improving some.  Continue to monitor rates.  6.  Physical and Occupational Therapy to evaluate and treat.  7.  DVT prophylaxis with Eliquis 2.5 mg twice daily.  8.  Alice  made aware to discuss with patient and family regarding  placement.     Discharge Planning: I expect the patient to be discharged to skilled nursing facility placement once arrangements are made.    Electronically signed by JESSENIA Knox, 09/05/21, 12:17 CDT.

## 2021-09-05 NOTE — PLAN OF CARE
Goal Outcome Evaluation:              Outcome Summary: Pt AxOx4. O2 4L NC to maintain O2 in 90's.  Pain medication given x 1 for hip/back pain with good results.  BM this shift.  .  Tele in place ST at times notified Didi Cuellar.  Pt up to chair for part of shift.  Call light within reach.  Safety maintained.

## 2021-09-05 NOTE — PLAN OF CARE
Goal Outcome Evaluation:  Plan of Care Reviewed With: patient        Progress: no change  Outcome Summary: Needs encouragement for OOB. Pt SOA however o2 remains in 90s. Pt anxious and needs cues and reassurance. Bed mobility modx2 using draw sheet and bedrails. sit-stand mod x2 pivot to bsc HH max 2. pt then stood x3 from bsc mod x2 unable to step and maintain TTWB had to pull chair up in place of bsc. Pt will need cont PT upon d/c

## 2021-09-05 NOTE — PROGRESS NOTES
"                                              LOS: 5 days   Patient Care Team:  Pato Cooney DO as PCP - General (Family Medicine)    Chief Complaint:  F/up respiratory failure, COPD, suspected pneumonia    Subjective   Interval History    She did not use the Pap last night.  Currently on oxygen 4 L/min.  She uses 4.5 L at home she said.  She reported no cough or dyspnea.    REVIEW OF SYSTEMS:   CARDIOVASCULAR: No chest pain, chest pressure or chest discomfort. No palpitations or edema.   GASTROINTESTINAL: No anorexia, nausea, vomiting or diarrhea. No abdominal pain or blood.   Constitutional: No fever or chills    Ventilator/Non-Invasive Ventilation Settings (From admission, onward)     Start     Ordered    09/02/21 1654  NIPPV (CPAP or BIPAP)  Until Discontinued     Comments: RT to consult and manage   Question Answer Comment   Indication: Acute Respiratory Failure    Type: BIPAP    NIPPV Mask Interface: Full Face Mask    IPAP 18    EPAP 6    Breath Rate 22    Titrate for SPO2 90%    Titrate for SPO2 88% - 92%        09/02/21 1655                      Physical Exam:     Vital Signs  Temp:  [98.2 °F (36.8 °C)-98.6 °F (37 °C)] 98.6 °F (37 °C)  Heart Rate:  [] 106  Resp:  [16-22] 16  BP: (107-137)/(49-77) 107/49    Intake/Output Summary (Last 24 hours) at 9/5/2021 1828  Last data filed at 9/5/2021 1800  Gross per 24 hour   Intake --   Output 1900 ml   Net -1900 ml     Flowsheet Rows      First Filed Value   Admission Height  160 cm (63\") Documented at 08/31/2021 1227   Admission Weight  59 kg (130 lb) Documented at 08/31/2021 1227          PPE used per hospital policy    General Appearance:   Alert, cooperative, in no acute distress   ENMT:  Mallampati score 3, moist mucous membrane   Eyes:  Pupils equal and reactive to light. EOMI   Neck:   Trachea midline. No thyromegaly.   Lungs:    Significantly diminished air entry bilaterally.  Minimal crackles at the bases.    Heart:   Regular rhythm and normal " rate, normal S1 and S2, no         murmur   Skin:   No rash but ecchymosis in arms and legs   Abdomen:     Soft. No tenderness. No HSM.   Neuro:  Conscious, alert, oriented x3. Strength 5/5 in upper and lower  ext   Extremities:  No cyanosis, clubbing but mild edema.  Warm extremities and well-perfused          Results Review:        Results from last 7 days   Lab Units 09/04/21  0622 09/03/21  0556 09/03/21  0556 09/02/21  0532 09/02/21  0532   SODIUM mmol/L 140  --  136  --  139   POTASSIUM mmol/L 5.0  --  5.7*  --  4.8   CHLORIDE mmol/L 101  --  99  --  94*   CO2 mmol/L 36.0*  --  32.0*  --  30.0*   BUN mg/dL 28*  --  46*  --  38*   CREATININE mg/dL 0.43*  --  0.76  --  0.86   GLUCOSE mg/dL 163*   < > 204*   < > 163*   CALCIUM mg/dL 9.6  --  8.9  --  8.4*    < > = values in this interval not displayed.         Results from last 7 days   Lab Units 09/04/21  0622 09/03/21  0556 09/01/21  0510   WBC 10*3/mm3 9.51 9.91 18.27*   HEMOGLOBIN g/dL 10.2* 9.9* 12.7   HEMATOCRIT % 34.3 33.5* 43.1   PLATELETS 10*3/mm3 156 141 198     Results from last 7 days   Lab Units 08/31/21  1306   INR  0.94   APTT seconds 25.8     Results from last 7 days   Lab Units 09/05/21  0505   PROBNP pg/mL 12,361.0*       I reviewed the patient's new clinical results.        Medication Review:   apixaban, 2.5 mg, Oral, Q12H  budesonide, 0.25 mg, Nebulization, BID - RT  carvedilol, 3.125 mg, Oral, BID With Meals  cetirizine, 10 mg, Oral, Daily  fluticasone, 1 spray, Each Nare, Daily  guaiFENesin, 1,200 mg, Oral, BID  ipratropium, 0.5 mg, Nebulization, 4x Daily - RT  lidocaine, 2 patch, Transdermal, Q24H  montelukast, 10 mg, Oral, Nightly  polyethylene glycol, 17 g, Oral, Daily  predniSONE, 40 mg, Oral, Daily  rOPINIRole, 2 mg, Oral, Nightly  senna-docusate sodium, 1 tablet, Oral, BID  sodium chloride, 10 mL, Intravenous, Q12H             Diagnostic imaging:  I personally and independently reviewed the following images:  CXR 9/2/2021: Interstitial  infiltrates on the left and consolidation/loculated pleural effusion right middle chest.      Assessment     1. Acute hypercapnic and hypoxic respiratory failure  2. Chronic hypoxic respiratory failure, on oxygen 4.5 L at baseline.  On PAP at home but not compliant  3. COPD exacerbation  4. Abnormal CXR: Probably interstitial edema and small loculated right pleural effusion at the fissure  5. Left hip fracture s/p repair 8/31  6. Protein calorie malnutrition      Plan     · Reduce prednisone to 30 mg tomorrow and taper off gradually  · Oxygen by nasal cannula, currently at baseline  · PAP at night  · Check echo.  proBNP noted to be extremely elevated and there is interstitial edema and loculated pleural fluid (thought to be simple and not infected)  · Agree off antibiotics for now  · Bronchodilators with budesonide twice daily and ipratropium 4 times a day.  · I-S  · Eliquis prophylaxis  · On discharge, she requires follow-up with pulmonology to address the issue with intolerance to home Pap (seems to have mask issues)    Discussed with hospitalist NP    Rhonda Sotelo MD  09/05/21  18:28 CDT            This note was dictated utilizing Dragon dictation

## 2021-09-06 NOTE — PROGRESS NOTES
"                                              LOS: 6 days   Patient Care Team:  Pato Cooney DO as PCP - General (Family Medicine)    Chief Complaint:  F/up respiratory failure, COPD, suspected pneumonia    Subjective   Interval History    No cough.  Denies dyspnea at rest.  On oxygen 4 L/min.  She tried PAP overnight but could not tolerate it more than 1 an hour and 15 minutes.  She stated that the mask hurt her nose.    REVIEW OF SYSTEMS:     GASTROINTESTINAL: No anorexia, nausea, vomiting or diarrhea. No abdominal pain or blood.   Constitutional: No fever or chills    Ventilator/Non-Invasive Ventilation Settings (From admission, onward)     Start     Ordered    09/02/21 1654  NIPPV (CPAP or BIPAP)  Until Discontinued     Comments: RT to consult and manage   Question Answer Comment   Indication: Acute Respiratory Failure    Type: BIPAP    NIPPV Mask Interface: Full Face Mask    IPAP 18    EPAP 6    Breath Rate 22    Titrate for SPO2 90%    Titrate for SPO2 88% - 92%        09/02/21 1655                      Physical Exam:     Vital Signs  Temp:  [97.8 °F (36.6 °C)-98.6 °F (37 °C)] 97.8 °F (36.6 °C)  Heart Rate:  [] 102  Resp:  [16-20] 20  BP: (107-140)/(49-72) 110/51    Intake/Output Summary (Last 24 hours) at 9/6/2021 1450  Last data filed at 9/6/2021 0328  Gross per 24 hour   Intake --   Output 1450 ml   Net -1450 ml     Flowsheet Rows      First Filed Value   Admission Height  160 cm (63\") Documented at 08/31/2021 1227   Admission Weight  59 kg (130 lb) Documented at 08/31/2021 1227          PPE used per hospital policy    General Appearance:   Alert, cooperative, in no acute distress   ENMT:  Mallampati score 3, moist mucous membrane   Eyes:  Pupils equal and reactive to light. EOMI   Neck:   Trachea midline. No thyromegaly.   Lungs:    Significantly diminished air entry bilaterally.  Minimal crackles at the bases.    Heart:   Regular rhythm and normal rate, normal S1 and S2, no         murmur "   Skin:   No rash but ecchymosis in arms and legs   Abdomen:     Soft. No tenderness. No HSM.   Neuro:  Conscious, alert, oriented x3. Strength 5/5 in upper and lower  ext   Extremities:  No cyanosis, clubbing but mild edema in legs.  Warm extremities and well-perfused          Results Review:        Results from last 7 days   Lab Units 09/06/21  0525 09/04/21  0622 09/04/21  0622 09/03/21  0556 09/03/21  0556   SODIUM mmol/L 143  --  140  --  136   POTASSIUM mmol/L 4.5  --  5.0  --  5.7*   CHLORIDE mmol/L 100  --  101  --  99   CO2 mmol/L 42.0*  --  36.0*  --  32.0*   BUN mg/dL 25*  --  28*  --  46*   CREATININE mg/dL 0.46*  --  0.43*  --  0.76   GLUCOSE mg/dL 105*   < > 163*   < > 204*   CALCIUM mg/dL 9.6  --  9.6  --  8.9    < > = values in this interval not displayed.         Results from last 7 days   Lab Units 09/06/21  0525 09/04/21  0622 09/03/21  0556   WBC 10*3/mm3 8.34 9.51 9.91   HEMOGLOBIN g/dL 9.2* 10.2* 9.9*   HEMATOCRIT % 31.1* 34.3 33.5*   PLATELETS 10*3/mm3 133* 156 141     Results from last 7 days   Lab Units 08/31/21  1306   INR  0.94   APTT seconds 25.8     Results from last 7 days   Lab Units 09/05/21  0505   PROBNP pg/mL 12,361.0*       I reviewed the patient's new clinical results.        Medication Review:   apixaban, 2.5 mg, Oral, Q12H  budesonide, 0.25 mg, Nebulization, BID - RT  carvedilol, 3.125 mg, Oral, BID With Meals  cetirizine, 10 mg, Oral, Daily  fluticasone, 1 spray, Each Nare, Daily  guaiFENesin, 1,200 mg, Oral, BID  ipratropium, 0.5 mg, Nebulization, 4x Daily - RT  lidocaine, 2 patch, Transdermal, Nightly  montelukast, 10 mg, Oral, Nightly  polyethylene glycol, 17 g, Oral, Daily  predniSONE, 30 mg, Oral, Daily  rOPINIRole, 2 mg, Oral, Nightly  senna-docusate sodium, 1 tablet, Oral, BID  sodium chloride, 10 mL, Intravenous, Q12H             Diagnostic imaging:  I personally and independently reviewed the following images:  CXR 9/2/2021: Interstitial infiltrates on the left and  consolidation/loculated pleural effusion right middle chest.      Assessment     1. Acute hypercapnic and hypoxic respiratory failure, improved  2. Chronic hypoxic respiratory failure, on oxygen 4.5 L at baseline.  On PAP at home but not compliant  3. COPD exacerbation, improved  4. Abnormal CXR: Probably interstitial edema and small loculated right pleural effusion at the fissure  5. Pulmonary hypertension, severe on echo 9/6/21, likely group 3.  6. Left hip fracture s/p repair 8/31  7. Protein calorie malnutrition      Plan     · Reduce prednisone to 20 mg tomorrow for 2 days and then stop  · Oxygen by nasal cannula, currently at baseline  · PAP at night as tolerated (she has not been tolerating well)  · Noted echo with no sign of left CHF or volume overload  · Agree off antibiotics for now  · Bronchodilators with budesonide twice daily and ipratropium 4 times a day.  · I-S  · Eliquis prophylaxis  · On discharge, she requires follow-up with pulmonology to address the issue with intolerance to home Pap (seems to have mask issues)        Rhonda Sotelo MD  09/06/21  14:54 CDT            This note was dictated utilizing [employer] dictation

## 2021-09-06 NOTE — PLAN OF CARE
Goal Outcome Evaluation:  Plan of Care Reviewed With: patient        Progress: improving  Outcome Summary: Pt agreeable to therapy. Bed mob Max A to get to EOB. Performed AROM BLE x15 w/ increased time and breaks. Pt had to be assisted back to bed due having to do a procedure.

## 2021-09-06 NOTE — DISCHARGE PLACEMENT REQUEST
"Heide Benitez (76 y.o. Female)     Date of Birth Social Security Number Address Home Phone MRN    1945  [id number]  [facility] [phone] [id number]    Jainism Marital Status          Orthodoxy        Admission Date Admission Type Admitting Provider Attending Provider Department, Room/Bed    8/31/21 Emergency oRberto Hobbs MD Moore, Jonathan Scott, MD [facility] 3A, 337/1    Discharge Date Discharge Disposition Discharge Destination                       Attending Provider: Roberto Hobbs MD    Allergies: Tetracyclines & Related, Penicillins, Tape    Isolation: None   Infection: None   Code Status: CPR    Ht: 160 cm (63\")   Wt: 59 kg (130 lb)    Admission Cmt: None   Principal Problem: Closed left hip fracture (CMS/[facility]) [S72.002A]                 Active Insurance as of 8/31/2021     Primary Coverage     Payor Plan Insurance Group Employer/Plan Group    HUMANA MEDICARE REPLACEMENT HUMANA MEDICARE REPLACEMENT [id number]     Payor Plan Address Payor Plan Phone Number Payor Plan Fax Number Effective Dates    [address] [address] [phone]  1/1/2018 - None Entered    [facility] [id number]       Subscriber Name Subscriber Birth Date Member ID       HEIDE BENITEZ 1945 [id number]                 Emergency Contacts      (Rel.) Home Phone Work Phone Mobile Phone    TON BENITEZ (Spouse) [phone] -- --    ROSEYLAUREN SANTOS (Son) -- -- [phone]               History & Physical      Ari Mckee MD at 08/31/21 [address] [place] [address] Medicine Services  HISTORY AND PHYSICAL    Date of Admission: 8/31/2021  Primary Care Physician: Pato Cooney DO    Subjective     Chief Complaint: Left hip fracture.    History of Present Illness   Patient is a 76-year-old presented ER complaining left hip pain.  Symptoms started this morning when she was making breakfast and making coffee.  The coffee container " spilled and patient slipped and fell and landed on her left hip.  Patient has symptoms of severe pain after the fall and cannot move her left hip.  Denies any head concussion or loss of consciousness.    Patient has chronic end-stage COPD, on chronic 4.5 L of oxygen at home.    Review of Systems   Constitutional: Positive for activity change, appetite change and fatigue. Negative for chills and fever.   HENT: Negative for hearing loss, nosebleeds, tinnitus and trouble swallowing.    Eyes: Negative for visual disturbance.   Respiratory: Positive for shortness of breath. Negative for cough, chest tightness and wheezing.    Cardiovascular: Negative for chest pain, palpitations and leg swelling.   Gastrointestinal: Negative for abdominal distention, abdominal pain, blood in stool, constipation, diarrhea, nausea and vomiting.   Endocrine: Negative for cold intolerance, heat intolerance, polydipsia, polyphagia and polyuria.   Genitourinary: Negative for decreased urine volume, difficulty urinating, dysuria, flank pain, frequency and hematuria.   Musculoskeletal: Positive for arthralgias, gait problem and myalgias. Negative for joint swelling.        Left hip pain.   Skin: Negative for rash.   Allergic/Immunologic: Negative for immunocompromised state.   Neurological: Positive for weakness. Negative for dizziness, syncope, light-headedness and headaches.   Hematological: Negative for adenopathy. Does not bruise/bleed easily.   Psychiatric/Behavioral: Negative for confusion and sleep disturbance. The patient is not nervous/anxious.         Otherwise complete ROS reviewed and negative except as mentioned in the HPI.      Past Medical History:   Past Medical History:   Diagnosis Date   • Cancer (CMS/HCC), colon    • Chronic respiratory failure (CMS/HCC), 4 L nasal cannula continuously    • COPD (chronic obstructive pulmonary disease) (CMS/HCC)    • Restless leg syndrome        Past Surgical History:  Past Surgical History:    Procedure Laterality Date   • APPENDECTOMY     • CARPAL TUNNEL RELEASE Left    • COLON RESECTION     • FOOT SURGERY Bilateral     hammer toe   • FRACTURE SURGERY Left     Arm   • HYSTERECTOMY     • WRIST FRACTURE SURGERY Right        Family History: family history includes COPD in her father; Cancer in her brother, mother, paternal grandfather, paternal grandmother, and sister; Colon cancer in her mother; Diabetes in her father and sister; Heart disease in her father.    Social History:  reports that she quit smoking about 2 years ago. She has never used smokeless tobacco. She reports that she does not drink alcohol and does not use drugs.    Medications:  Prior to Admission medications    Medication Sig Start Date End Date Taking? Authorizing Provider   fluticasone (Flonase) 50 MCG/ACT nasal spray 2 sprays in each nostril daily to control ear pressure and popping. 7/23/21  Yes Elina France MD   Fluticasone-Umeclidin-Vilant (TRELEGY) 200-62.5-25 MCG/INH inhaler Inhale 1 puff Daily. 8/17/21  Yes Pato Cooney, DO   guaiFENesin (Mucinex) 600 MG 12 hr tablet Take 2 tablets by mouth 2 (Two) Times a Day. 12/8/20  Yes Pato Cooney, DO   ipratropium-albuterol (DUO-NEB) 0.5-2.5 mg/3 ml nebulizer Take 3 mL by nebulization Every 4 (Four) Hours As Needed for Wheezing.   Yes Provider, MD Alisha   montelukast (Singulair) 10 MG tablet Take 1 tablet by mouth Every Night. 12/8/20  Yes Pato Cooney, DO   rOPINIRole (REQUIP) 2 MG tablet 2 mg PO up to QID prn restless legsduring daytime, 4 mg dose QHS 7/23/21  Yes Elina France MD   sodium chloride (OCEAN) 0.65 % nasal spray 2 sprays into the nostril(s) as directed by provider As Needed for Congestion. 5/12/19  Yes Inna Haile APRN   Ventolin  (90 Base) MCG/ACT inhaler INHALE 2 PUFFS BY MOUTH EVERY 4 HOURS AS NEEDED FOR WHEEZING OR  SHORTNESS  OF  AIR 6/14/21  Yes Pato Cooney, DO   azithromycin (Zithromax Z-Jeff) 250 MG tablet  "Take 2 tablets by mouth on day 1, then 1 tablet daily on days 2-5 8/18/21   Pato Cooney, DO   FLUoxetine (PROzac) 20 MG capsule Take 1 capsule by mouth Daily. 8/18/21   Pato Cooney, DO   predniSONE (DELTASONE) 20 MG tablet Take 2 tablets by mouth Daily. 8/18/21   Pato Cooney, DO     Allergies:  Allergies   Allergen Reactions   • Tetracyclines & Related Anaphylaxis   • Penicillins Itching   • Tape Other (See Comments)     Skin tear       Objective     Vital Signs: /71 (BP Location: Left arm)   Pulse 91   Temp 98 °F (36.7 °C) (Oral)   Resp 20   Ht 160 cm (63\")   Wt 59 kg (130 lb)   SpO2 100%   BMI 23.03 kg/m²   Physical Exam  Vitals and nursing note reviewed.   Constitutional:       Appearance: She is well-developed.      Comments: Cachectic.  Chronically ill-appearing   HENT:      Head: Normocephalic.   Eyes:      Conjunctiva/sclera: Conjunctivae normal.      Pupils: Pupils are equal, round, and reactive to light.   Neck:      Vascular: No JVD.   Cardiovascular:      Rate and Rhythm: Normal rate and regular rhythm.      Heart sounds: Normal heart sounds. No murmur heard.   No friction rub. No gallop.    Pulmonary:      Effort: No respiratory distress.      Breath sounds: No wheezing or rales.      Comments: Diminished breath sound bilateral.  Clear.  Currently on 4 L of oxygen.    Chest:      Chest wall: No tenderness.   Abdominal:      General: Bowel sounds are normal. There is no distension.      Palpations: Abdomen is soft.      Tenderness: There is no abdominal tenderness. There is no guarding or rebound.   Musculoskeletal:         General: No tenderness.      Cervical back: Neck supple.      Comments: Left hip fracture.   Skin:     General: Skin is warm and dry.      Capillary Refill: Capillary refill takes 2 to 3 seconds.      Findings: No rash.   Neurological:      Mental Status: She is alert and oriented to person, place, and time.      Cranial Nerves: No cranial nerve deficit. "      Motor: Weakness present. No abnormal muscle tone.      Coordination: Coordination abnormal.      Gait: Gait abnormal.      Deep Tendon Reflexes: Reflexes normal.   Psychiatric:         Behavior: Behavior normal.         Thought Content: Thought content normal.             Results Reviewed:    Lab Results (last 24 hours)     Procedure Component Value Units Date/Time    Comprehensive Metabolic Panel [[id number]]  (Abnormal) Collected: 08/31/21 1306    Specimen: Blood Updated: 08/31/21 1405     Glucose 168 mg/dL      BUN 16 mg/dL      Creatinine 0.37 mg/dL      Sodium 138 mmol/L      Potassium 3.9 mmol/L      Chloride 98 mmol/L      CO2 35.0 mmol/L      Calcium 9.6 mg/dL      Total Protein 6.2 g/dL      Albumin 3.60 g/dL      ALT (SGPT) 13 U/L      AST (SGOT) 16 U/L      Alkaline Phosphatase 73 U/L      Total Bilirubin 0.7 mg/dL      eGFR Non African Amer >150 mL/min/1.73      Globulin 2.6 gm/dL      A/G Ratio 1.4 g/dL      BUN/Creatinine Ratio 43.2     Anion Gap 5.0 mmol/L     Narrative:      GFR Normal >60  Chronic Kidney Disease <60  Kidney Failure <15      Protime-INR [[id number]]  (Normal) Collected: 08/31/21 1306    Specimen: Blood Updated: 08/31/21 1343     Protime 11.8 Seconds      INR 0.94    aPTT [[id number]]  (Normal) Collected: 08/31/21 1306    Specimen: Blood Updated: 08/31/21 1343     PTT 25.8 seconds     CBC & Differential [[id number]]  (Abnormal) Collected: 08/31/21 1306    Specimen: Blood Updated: 08/31/21 1338    Narrative:      The following orders were created for panel order CBC & Differential.  Procedure                               Abnormality         Status                     ---------                               -----------         ------                     CBC Auto Differential[[id number]]        Abnormal            Final result                 Please view results for these tests on the individual orders.    CBC Auto Differential [[id number]]  (Abnormal) Collected: 08/31/21 1306     Specimen: Blood Updated: 08/31/21 1338     WBC 11.65 10*3/mm3      RBC 4.66 10*6/mm3      Hemoglobin 12.4 g/dL      Hematocrit 42.1 %      MCV 90.3 fL      MCH 26.6 pg      MCHC 29.5 g/dL      RDW 13.2 %      RDW-SD 43.6 fl      MPV 12.2 fL      Platelets 222 10*3/mm3      Neutrophil % 86.7 %      Lymphocyte % 6.5 %      Monocyte % 4.9 %      Eosinophil % 0.7 %      Basophil % 0.3 %      Immature Grans % 0.9 %      Neutrophils, Absolute 10.10 10*3/mm3      Lymphocytes, Absolute 0.76 10*3/mm3      Monocytes, Absolute 0.57 10*3/mm3      Eosinophils, Absolute 0.08 10*3/mm3      Basophils, Absolute 0.04 10*3/mm3      Immature Grans, Absolute 0.10 10*3/mm3      nRBC 0.0 /100 WBC     COVID-19,[facility] IN-HOUSE,Nasal Swab No Transport Media 3-4 HR TAT - Swab, Nasal Cavity [[id number]] Collected: 08/31/21 1307    Specimen: Swab from Nasal Cavity Updated: 08/31/21 1334           Radiology Data:    Imaging Results (Last 24 Hours)     Procedure Component Value Units Date/Time    XR Hip With or Without Pelvis 2 - 3 View Left [[id number]] Collected: 08/31/21 1405     Updated: 08/31/21 1409    Narrative:      XR HIP W OR WO PELVIS 2-3 VIEW LEFT- 8/31/2021 1:41 PM CDT     HISTORY: fall, pain     COMPARISON: None      FINDINGS:   Frontal and lateral views of the left hip were obtained.  Additional AP  pelvis.     Varus impacted Acute left intertrochanteric fracture with  foreshortening. No subluxation at the hip joint.       Pelvic ring is intact. Sacral arches are intact. No gross soft tissue  abnormality is visualized.        Impression:      1. Acute varus impacted left intertrochanteric fracture with  foreshortening.        This report was finalized on 08/31/2021 14:06 by Dr Jordy Kinney, .          I have personally reviewed and interpreted the radiology studies and ECG obtained at time of admission.     Assessment / Plan      Assessment & Plan  Active Hospital Problems    Diagnosis    • Closed left hip fracture (CMS/HCC)    •  Chronic respiratory failure with hypoxia (CMS/HCC)    • Pulmonary emphysema (CMS/HCC)      Plans    Left hip fracture.  Dr. Chaudhari stated he would try to do the hip today, patient need to be n.p.o. Tdap was given in ER.  Morphine was given in ER.  Morphine as needed.  Norco as needed.  X-ray left hip-Acute varus impacted left intertrochanteric fracture with foreshortening.    COPD/emphysema/chronic respiratory failure.  Patient on chronic 4.5 L of oxygen at home.  Trelegy.  Guaifenesin.  Duo nebs as needed.  Singulair.  Albuterol as needed    Restless leg . Requip at night.    Allergic rhinitis.  Flonase.  Ocean Beecher as needed    COVID-19 pending.    Code Status: Full code.  If patient unable make medical decision for self her son Jensen Salcedo will make it for her.     I discussed the patient's findings and my recommendations with: Patient.    Estimated length of stay: 2 to 5 days.    Electronically signed by Ari Mckee MD, 08/31/21, 2:27 PM CDT.              Electronically signed by Ari Mckee MD at 08/31/21 1450          Physician Progress Notes (most recent note)      Rhonda Sotelo MD at 09/05/21 1828                                                        LOS: 5 days   Patient Care Team:  Pato Cooney DO as PCP - General (Family Medicine)    Chief Complaint:  F/up respiratory failure, COPD, suspected pneumonia    Subjective   Interval History    She did not use the Pap last night.  Currently on oxygen 4 L/min.  She uses 4.5 L at home she said.  She reported no cough or dyspnea.    REVIEW OF SYSTEMS:   CARDIOVASCULAR: No chest pain, chest pressure or chest discomfort. No palpitations or edema.   GASTROINTESTINAL: No anorexia, nausea, vomiting or diarrhea. No abdominal pain or blood.   Constitutional: No fever or chills    Ventilator/Non-Invasive Ventilation Settings (From admission, onward)     Start     Ordered    09/02/21 1654  NIPPV (CPAP or BIPAP)  Until Discontinued     Comments: RT to consult  "and manage   Question Answer Comment   Indication: Acute Respiratory Failure    Type: BIPAP    NIPPV Mask Interface: Full Face Mask    IPAP 18    EPAP 6    Breath Rate 22    Titrate for SPO2 90%    Titrate for SPO2 88% - 92%        09/02/21 0315                      Physical Exam:     Vital Signs  Temp:  [98.2 °F (36.8 °C)-98.6 °F (37 °C)] 98.6 °F (37 °C)  Heart Rate:  [] 106  Resp:  [16-22] 16  BP: (107-137)/(49-77) 107/49    Intake/Output Summary (Last 24 hours) at 9/5/2021 1828  Last data filed at 9/5/2021 1800  Gross per 24 hour   Intake --   Output 1900 ml   Net -1900 ml     Flowsheet Rows      First Filed Value   Admission Height  160 cm (63\") Documented at 08/31/2021 1227   Admission Weight  59 kg (130 lb) Documented at 08/31/2021 1227          PPE used per hospital policy    General Appearance:   Alert, cooperative, in no acute distress   ENMT:  Mallampati score 3, moist mucous membrane   Eyes:  Pupils equal and reactive to light. EOMI   Neck:   Trachea midline. No thyromegaly.   Lungs:    Significantly diminished air entry bilaterally.  Minimal crackles at the bases.    Heart:   Regular rhythm and normal rate, normal S1 and S2, no         murmur   Skin:   No rash but ecchymosis in arms and legs   Abdomen:     Soft. No tenderness. No HSM.   Neuro:  Conscious, alert, oriented x3. Strength 5/5 in upper and lower  ext   Extremities:  No cyanosis, clubbing but mild edema.  Warm extremities and well-perfused          Results Review:        Results from last 7 days   Lab Units 09/04/21  0622 09/03/21  0556 09/03/21  0556 09/02/21  0532 09/02/21  0532   SODIUM mmol/L 140  --  136  --  139   POTASSIUM mmol/L 5.0  --  5.7*  --  4.8   CHLORIDE mmol/L 101  --  99  --  94*   CO2 mmol/L 36.0*  --  32.0*  --  30.0*   BUN mg/dL 28*  --  46*  --  38*   CREATININE mg/dL 0.43*  --  0.76  --  0.86   GLUCOSE mg/dL 163*   < > 204*   < > 163*   CALCIUM mg/dL 9.6  --  8.9  --  8.4*    < > = values in this interval not " displayed.         Results from last 7 days   Lab Units 09/04/21  0622 09/03/21  0556 09/01/21  0510   WBC 10*3/mm3 9.51 9.91 18.27*   HEMOGLOBIN g/dL 10.2* 9.9* 12.7   HEMATOCRIT % 34.3 33.5* 43.1   PLATELETS 10*3/mm3 156 141 198     Results from last 7 days   Lab Units 08/31/21  1306   INR  0.94   APTT seconds 25.8     Results from last 7 days   Lab Units 09/05/21  0505   PROBNP pg/mL 12,361.0*       I reviewed the patient's new clinical results.        Medication Review:   apixaban, 2.5 mg, Oral, Q12H  budesonide, 0.25 mg, Nebulization, BID - RT  carvedilol, 3.125 mg, Oral, BID With Meals  cetirizine, 10 mg, Oral, Daily  fluticasone, 1 spray, Each Nare, Daily  guaiFENesin, 1,200 mg, Oral, BID  ipratropium, 0.5 mg, Nebulization, 4x Daily - RT  lidocaine, 2 patch, Transdermal, Q24H  montelukast, 10 mg, Oral, Nightly  polyethylene glycol, 17 g, Oral, Daily  predniSONE, 40 mg, Oral, Daily  rOPINIRole, 2 mg, Oral, Nightly  senna-docusate sodium, 1 tablet, Oral, BID  sodium chloride, 10 mL, Intravenous, Q12H             Diagnostic imaging:  I personally and independently reviewed the following images:  CXR 9/2/2021: Interstitial infiltrates on the left and consolidation/loculated pleural effusion right middle chest.      Assessment     1. Acute hypercapnic and hypoxic respiratory failure  2. Chronic hypoxic respiratory failure, on oxygen 4.5 L at baseline.  On PAP at home but not compliant  3. COPD exacerbation  4. Abnormal CXR: Probably interstitial edema and small loculated right pleural effusion at the fissure  5. Left hip fracture s/p repair 8/31  6. Protein calorie malnutrition      Plan     · Reduce prednisone to 30 mg tomorrow and taper off gradually  · Oxygen by nasal cannula, currently at baseline  · PAP at night  · Check echo.  proBNP noted to be extremely elevated and there is interstitial edema and loculated pleural fluid (thought to be simple and not infected)  · Agree off antibiotics for  now  · Bronchodilators with budesonide twice daily and ipratropium 4 times a day.  · I-S  · Eliquis prophylaxis  · On discharge, she requires follow-up with pulmonology to address the issue with intolerance to home Pap (seems to have mask issues)    Discussed with hospitalist NP    Rhonda Sotelo MD  21  18:28 CDT            This note was dictated utilizing Dragon dictation    Electronically signed by Rhonda Sotelo MD at 21 1831          Consult Notes (most recent note)      Yasmine Stahl MD at 21 2249      Consult Orders    1. Inpatient Pulmonology Consult [[id number]] ordered by Didi Cuellar APRN at 21 1313                     PULMONARY & CRITICAL CARE CONSULT - [facility]    21, 22:49 CDT  Patient Care Team:  Pato Cooney DO as PCP - General (Family Medicine)  Name: Heide Newsome  : 1945  MRN: [id number]  Contact Serial Number [id number]    Chief complaint: Shortness. of breath, acute on chronic respiratory failure, fall with left hip fracture s/p nailing done by orthopedic.  History of COPD, tobacco abuse in the past and chronic respiratory on home oxygen    HPI:  We have been consulted by Ari Mckee MD to see this 76 y.o. female born on 1945.  Patient presented to the hospital on  after a mechanical fall at ground level.  She had a left hip pain and noted to have intertrochanteric fracture which was operated by orthopedics and the nail placement was done.  Following surgery patient was noted to have respiratory problem with worsening respiratory status and arterial blood gas hypercapnia with some compensation.  She was placed on the BiPAP and pulmonary consult was requested.    Patient at the time of my visit was comfortable and was on BiPAP 16/8 with rate of 16 and 50% FiO2 and oxygen saturation was in the mid 90s.  She was unable to talk to me but reported she was a former smoker and has COPD and is on home oxygen 4.5 L all the  time.  She uses Trelegy Ellipta at home also uses DuoNeb.  To my knowledge she has not seen by a pulmonologist and was mostly managed by the primary care provider Dr. Cooney.  She reported that she probably had a CPAP at home but I did not find it in her records.  She also has restless leg syndrome.  She was started on Pulmicort nebulizer and Atrovent Glazer.  She is also getting Flonase nasal spray and Singulair for nasal allergy.  She is on Eliquis and she is getting ropinirole Requip for her restless leg syndrome.    Patient denies any cough fever shortness of breath but reported me she is having some hip pain and feeling tired and exhausted.  She is tested negative for Covid and to my knowledge she is vaccinated for Covid.  She had several hospital admissions in the last year and the year before with COPD exacerbation and pneumonia.  Her chest x-ray done today showed some pulmonary edema and chronic interstitial changes.  No definite pneumonia was identified but she was started on empiric treatment with levofloxacin for possible tracheobronchitis and also started on Solu-Medrol for COPD exacerbation.        Past Medical History:   has a past medical history of Cancer (CMS/[facility]), colon, Chronic respiratory failure (CMS/[facility]), 4 L nasal cannula continuously, COPD (chronic obstructive pulmonary disease) (CMS/[facility]), and Restless leg syndrome.   has a past surgical history that includes Hysterectomy; Appendectomy; Colectomy; Foot surgery (Bilateral); Wrist fracture surgery (Right); Carpal tunnel release (Left); Fracture surgery (Left); and Hip Trochanteric Nailing (Left, 8/31/2021).  Allergies   Allergen Reactions   • Tetracyclines & Related Anaphylaxis   • Penicillins Itching   • Tape Other (See Comments)     Skin tear     Medications:  apixaban, 2.5 mg, Oral, Q12H  budesonide, 0.25 mg, Nebulization, BID - RT  fluticasone, 1 spray, Each Nare, Daily  guaiFENesin, 1,200 mg, Oral, BID  ipratropium, 0.5 mg, Nebulization,  4x Daily - RT  levoFLOXacin, 500 mg, Intravenous, Q24H  methylPREDNISolone sodium succinate, 60 mg, Intravenous, Q8H  montelukast, 10 mg, Oral, Nightly  polyethylene glycol, 17 g, Oral, Daily  rOPINIRole, 2 mg, Oral, Nightly  sodium chloride, 10 mL, Intravenous, Q12H      sodium chloride, 40 mL/hr, Last Rate: 40 mL/hr (09/02/21 1315)      Family History:  Family History   Problem Relation Age of Onset   • Colon cancer Mother    • Cancer Mother    • COPD Father    • Diabetes Father    • Heart disease Father    • Cancer Sister    • Diabetes Sister    • Cancer Brother    • Cancer Paternal Grandmother    • Cancer Paternal Grandfather      Social History:   reports that she quit smoking about 2 years ago. She has never used smokeless tobacco. She reports that she does not drink alcohol and does not use drugs.  Review of Systems:  Review of Systems   Constitutional: Positive for fatigue. Negative for fever.   HENT: Positive for congestion, postnasal drip and sinus pressure.    Eyes: Negative.    Respiratory: Positive for chest tightness and shortness of breath.    Cardiovascular: Negative.    Gastrointestinal: Negative.    Genitourinary: Negative.    Musculoskeletal: Positive for arthralgias and back pain.   Skin: Negative.    Allergic/Immunologic: Positive for environmental allergies.   Neurological: Positive for weakness.   Hematological: Negative.    Psychiatric/Behavioral: Negative.       Physical Exam:  Temp:  [97.1 °F (36.2 °C)-98.4 °F (36.9 °C)] 98.4 °F (36.9 °C)  Heart Rate:  [] 118  Resp:  [18-35] 22  BP: ()/(52-78) 115/78    Intake/Output Summary (Last 24 hours) at 9/2/2021 7832  Last data filed at 9/2/2021 1726  Gross per 24 hour   Intake 1533.08 ml   Output --   Net 1533.08 ml         08/31/21  1227   Weight: 59 kg (130 lb)     SpO2 Percentage    09/02/21 1923 09/02/21 1928 09/02/21 1945   SpO2: 97% 93% 93%     Physical Exam  Vitals reviewed.   Constitutional:       Appearance: She is  ill-appearing.      Comments: Elderly  female in no distress   HENT:      Head: Normocephalic and atraumatic.      Right Ear: There is no impacted cerumen.      Left Ear: There is no impacted cerumen.      Nose: No congestion or rhinorrhea.      Mouth/Throat:      Mouth: Mucous membranes are moist.      Pharynx: Oropharynx is clear. No oropharyngeal exudate or posterior oropharyngeal erythema.   Eyes:      General: No scleral icterus.        Right eye: No discharge.         Left eye: No discharge.      Conjunctiva/sclera: Conjunctivae normal.      Pupils: Pupils are equal, round, and reactive to light.   Neck:      Vascular: No carotid bruit.   Cardiovascular:      Rate and Rhythm: Normal rate and regular rhythm.      Pulses: Normal pulses.      Heart sounds: No murmur heard.   Friction rub present. No gallop.    Pulmonary:      Effort: Pulmonary effort is normal. No respiratory distress.      Breath sounds: Normal breath sounds. No wheezing or rales.      Comments: Decreased breath sound bilaterally  Abdominal:      General: Abdomen is flat. Bowel sounds are normal. There is no distension.      Palpations: Abdomen is soft. There is no mass.      Tenderness: There is no abdominal tenderness. There is no guarding.   Genitourinary:     Comments: Not examined  Musculoskeletal:         General: No swelling, tenderness, deformity or signs of injury. Normal range of motion.      Cervical back: Normal range of motion and neck supple. No rigidity or tenderness.      Right lower leg: No edema.      Left lower leg: No edema.   Lymphadenopathy:      Cervical: No cervical adenopathy.   Skin:     General: Skin is warm and dry.      Capillary Refill: Capillary refill takes less than 2 seconds.      Coloration: Skin is not jaundiced.      Findings: No bruising, erythema or rash.   Neurological:      General: No focal deficit present.      Mental Status: She is oriented to person, place, and time.      Cranial Nerves: No  cranial nerve deficit.      Sensory: No sensory deficit.      Motor: Weakness present.      Deep Tendon Reflexes: Reflexes normal.   Psychiatric:         Mood and Affect: Mood normal.         Behavior: Behavior normal.         Thought Content: Thought content normal.         Judgment: Judgment normal.       Results from last 7 days   Lab Units 09/01/21  0510 08/31/21  1306   WBC 10*3/mm3 18.27* 11.65*   HEMOGLOBIN g/dL 12.7 12.4   PLATELETS 10*3/mm3 198 222     Results from last 7 days   Lab Units 09/02/21  0532 09/01/21  0510 08/31/21  1306   SODIUM mmol/L 139 137 138   POTASSIUM mmol/L 4.8 5.2 3.9   CO2 mmol/L 30.0* 32.0* 35.0*   BUN mg/dL 38* 23 16   CREATININE mg/dL 0.86 0.81 0.37*   GLUCOSE mg/dL 163* 152* 168*     Results from last 7 days   Lab Units 09/02/21  2243 09/02/21  1515   PH, ARTERIAL pH units 7.339* 7.277*   PCO2, ARTERIAL mm Hg 57.3* 63.8*   PO2 ART mm Hg 75.8* 65.2*   FIO2 % 50  --      Lab Results   Component Value Date    PROBNP 204.0 07/07/2020     No results found for: BLOODCX, URINECX, WOUNDCX, MRSACX, RESPCX, STOOLCX  Recent radiology:   Imaging Results (Last 72 Hours)     Procedure Component Value Units Date/Time    XR Chest 1 View [[id number]] Collected: 09/02/21 1549     Updated: 09/02/21 1554    Narrative:      Exam:   XR CHEST 1 VW-       Date:  9/2/2021      History:  Female, age  76 years;hypoxia, shortness of breath;  W19.XXXA-Unspecified fall, initial encounter; S72.002A-Fracture of  unspecified part of neck of left femur, initial encounter for closed  fracture; Z74.09-Other reduced mobility; Z78.9-Other specified health  status     COMPARISON:  Chest x-ray dated 7/7/2020     Findings :     The heart and mediastinum are normal in size. Opacity along the right  major fissure, favored to reflect fluid signal. Emphysema. Increased  coarsening of interstitium diffusely. No measurable pneumothorax.  The  bones show no acute pathology.         Impression:      Impression:     1.  Trace  right pleural effusion suspected.  2.  Diffuse coarsening of interstitium. May reflect developing  interstitial edema.     This report was finalized on 09/02/2021 15:50 by Dr. Purvi Liu MD.    XR Hip With or Without Pelvis 2 - 3 View Left [[id number]] Collected: 08/31/21 1951     Updated: 09/01/21 0657    Narrative:      EXAMINATION: C-arm in OR left hip 8/31/2021     Fluoroscopy time: 33.3 seconds.     Dose: 3.53628 mGy. 6 images are submitted for interpretation.     FINDINGS: C-arm was used intraoperatively during open reduction internal  fixation for an intertrochanteric fracture of the left hip. There is  good restoration of anatomic alignment. The films are available to the  OR for review.  This report was finalized on 08/31/2021 19:52 by Dr. Brennon Lucas MD.    FL C Arm During Surgery [[id number]] Collected: 08/31/21 1951     Updated: 09/01/21 0657    Narrative:      EXAMINATION: C-arm in OR left hip 8/31/2021     Fluoroscopy time: 33.3 seconds.     Dose: 3.94684 mGy. 6 images are submitted for interpretation.     FINDINGS: C-arm was used intraoperatively during open reduction internal  fixation for an intertrochanteric fracture of the left hip. There is  good restoration of anatomic alignment. The films are available to the  OR for review.  This report was finalized on 08/31/2021 19:52 by Dr. Brennon Lucas MD.    XR Hip With or Without Pelvis 2 - 3 View Left [[id number]] Collected: 08/31/21 1405     Updated: 08/31/21 1409    Narrative:      XR HIP W OR WO PELVIS 2-3 VIEW LEFT- 8/31/2021 1:41 PM CDT     HISTORY: fall, pain     COMPARISON: None      FINDINGS:   Frontal and lateral views of the left hip were obtained.  Additional AP  pelvis.     Varus impacted Acute left intertrochanteric fracture with  foreshortening. No subluxation at the hip joint.       Pelvic ring is intact. Sacral arches are intact. No gross soft tissue  abnormality is visualized.        Impression:      1. Acute varus impacted  left intertrochanteric fracture with  foreshortening.        This report was finalized on 2021 14:06 by Dr Jordy Kinney, .        My radiograph interpretation/independent review of imaging: Reviewed  Other test results (not lab or imaging): Results for orders placed during the hospital encounter of 19    Adult Transthoracic Echo Complete With Contrast if Necessary Per Protocol    Interpretation Summary  · There is mild mitral valve prolapse of the anterior mitral leaflet.  · Right ventricular cavity is mildly dilated.  · Left ventricular systolic function is normal.    NORMAL LV AND RV SYSTOLIC FUNCTION  RV SYSTOLIC PRESSURE NOT ASSESSED  MILD MVP WITH TRIVIAL MR  MILD RV ENLARGEMENT  Reviewed  Independent review of ekg: Agree with current plan  Problem List as identified by Epic (may contain historical, inactive problems)  Patient Active Problem List   Diagnosis   • Pulmonary emphysema (CMS/HCC)   • Acute on chronic respiratory failure with hypoxia (CMS/HCC)   • COPD, very severe (CMS/HCC)   • Severe malnutrition (CMS/HCC)   • History of colon cancer   • Thrombocytopenia (CMS/HCC)   • Anemia   • Hyperglycemia, drug-induced   • Pneumonia   • Chronic respiratory failure with hypoxia (CMS/HCC)   • COPD with acute exacerbation (CMS/HCC)   • Adrenal nodule (CMS/HCC)   • Closed left hip fracture (CMS/HCC)   • Fall   • Acute pain of left hip     Pulmonary Assessment:  New problem (to me), with additional workup planned: Acute on chronic hypoxic respiratory failure.  S/p surgery for left hip fracture, COPD with mild acute exacerbation, CHF acute on chronic diastolic, chronic respiratory failure on home oxygen, hypercapnic respiratory failure with CO2 retention, allergic rhinitis  New problem (to me), no additional workup planned: She of tobacco abuse, protein calorie malnutrition, history of colon cancer  Other problems either stable, failing to improve or worsenin. Acute on chronic hypoxic and  hypercapnic respiratory failure  2. Acute examination of COPD  3. Very severe COPD  4. Hypoxemia and chronic respiratory failure on home oxygen  5. Allergic rhinitis  6. Fall and left hip fracture status post intramedullary nail placement  7. Protein calorie malnutrition  8. Anemia  9. History of heart failure  10. Anxiety and depression    Recommend/plan:   · She has mild COPD exacerbation and will be treated with IV steroids bronchodilator treatment and supplemental oxygen with BiPAP.  · BiPAP with current settings of 16/8 with rate of 16 and FiO2 50%.  Repeat blood gas tomorrow morning.  · Continue bronchial treatment with Pulmicort and Atrovent.  Albuterol is avoided for arrhythmias.  · Probably patient was using Trelegy Ellipta at home with DuoNeb which should not be used together because DuoNeb also contrast ipratropium which can cause side effects from overuse of anticholinergic medications which is also a component of Trelegy Ellipta.  · Continue IV Solu-Medrol and plan for orals prednisone taper in a few days.  · Use high flow oxygen when off BiPAP and encourage incentive spirometry improve pulmonary compliance and clearance.  · She is getting empiric treatment with levofloxacin but I do not see evidence of pneumonia and antibiotic coverage could be deescalated.  · Continue Eliquis for anticoagulation.  She has high risk of thromboembolism after hip surgery and immobility.  · She will definitely need outpatient pulmonary function test and she will need to monitor her lung function and adjust lung medications.  Trelegy Ellipta could be continued but she should not use DuoNeb and should use albuterol nebulizer inhaler which was elective.  · Continue Flonase and Singulair for nasal allergy and she was started on Zyrtec.  · She had some pain medications following the hip surgery which may worsen her respiratory status due to respiratory depression and narcotics should be avoided.  · DVT and stress ulcer  prophylaxis adequate pain and anxiety control plan for follow-up in the pulmonary clinic as an outpatient.  Courage incentive spirometry to improve pulmonary compliance.  · Try to discharge home when she is back to her baseline oxygen and plan for pulmonary clinic follow-up  · CODE STATUS: Full.  Overall prognosis guarded but she appears to be stable.  · Pulmonary team will continue following her and make further recommendations  · Total time spent in seeing this patient as inpatient pulmonary consult was 45 minutes.    Thank you for this consult.  We will follow along.    Electronically signed by     Yasmine Stahl MD,   Pulmonologist/intensivist   21, 10:49 PM CDT.        Electronically signed by Yasmine Stahl MD at 21 [phone]          Physical Therapy Notes (most recent note)      Sarah Landers PTA at 21 1451  Version 1 of 1         Acute Care - Physical Therapy Treatment Note  [facility]     Patient Name: Heide Newsome  : 1945  MRN: [id number]  Today's Date: 2021   Onset of Illness/Injury or Date of Surgery: 21  Visit Dx:     ICD-10-CM ICD-9-CM   1. Fall, initial encounter  W19.XXXA E888.9   2. Closed fracture of left hip, initial encounter (CMS/HCC)  S72.002A 820.8   3. Impaired mobility  Z74.09 799.89   4. Decreased activities of daily living (ADL)  Z78.9 V49.89     Patient Active Problem List   Diagnosis   • Pulmonary emphysema (CMS/HCC)   • Acute on chronic respiratory failure with hypoxia (CMS/HCC)   • COPD, very severe (CMS/HCC)   • Severe malnutrition (CMS/HCC)   • History of colon cancer   • Thrombocytopenia (CMS/HCC)   • Anemia   • Hyperglycemia, drug-induced   • Pneumonia   • Chronic respiratory failure with hypoxia (CMS/HCC)   • COPD with acute exacerbation (CMS/HCC)   • Adrenal nodule (CMS/HCC)   • Closed left hip fracture (CMS/HCC)   • Fall   • Acute pain of left hip     Past Medical History:   Diagnosis Date   • Cancer (CMS/HCC), colon    • Chronic  respiratory failure (CMS/HCC), 4 L nasal cannula continuously    • COPD (chronic obstructive pulmonary disease) (CMS/HCC)    • Restless leg syndrome      Past Surgical History:   Procedure Laterality Date   • APPENDECTOMY     • CARPAL TUNNEL RELEASE Left    • COLON RESECTION     • FOOT SURGERY Bilateral     hammer toe   • FRACTURE SURGERY Left     Arm   • HIP TROCHANTERIC NAILING WITH INTRAMEDULLARY HIP SCREW Left 8/31/2021    Procedure: LEFT HIP TROCHANTERIC NAILING SHORT WITH INTRAMEDULLARY HIP SCREW;  Surgeon: Valdemar Chaudhari MD;  Location: [facility];  Service: Orthopedics;  Laterality: Left;   • HYSTERECTOMY     • WRIST FRACTURE SURGERY Right         PT Assessment (last 12 hours)      PT Evaluation and Treatment     Row Name 09/05/21 1325 09/05/21 1114       Physical Therapy Time and Intention    Subjective Information  complains of;weakness;fatigue;pain  -NW  complains of;weakness;pain;dyspnea  -NW    Document Type  therapy note (daily note)  -NW  therapy note (daily note)  -NW    Mode of Treatment  physical therapy  -NW  physical therapy  -NW    Row Name 09/05/21 1101          Physical Therapy Time and Intention    Subjective Information  --  -NW     Document Type  --  -NW     Mode of Treatment  --  -NW     Comment  ck bk pt on bed pan and getting breathing rx  -NW     Row Name 09/05/21 1325 09/05/21 1114       General Information    Existing Precautions/Restrictions  oxygen therapy device and L/min;non-weight bearing TTWB LLE  -NW  oxygen therapy device and L/min;non-weight bearing TTWB LLE  -NW    Row Name 09/05/21 1114          Pain    Additional Documentation  Pain Scale: FACES Pre/Post-Treatment (Group)  -NW     Row Name 09/05/21 1114          Pain Scale: Numbers Pre/Post-Treatment    Pretreatment Pain Rating  --  -NW     Row Name 09/05/21 1325 09/05/21 1114       Pain Scale: Word Pre/Post-Treatment    Pain: Word Scale, Pretreatment  6 - moderate-severe pain  -NW  2 - mild pain  -NW    Posttreatment  Pain Rating  8 - severe pain  -NW  8 - severe pain  -NW    Pain Location - Side  Left  -NW  Left  -NW    Pain Location  hip  -NW  hip  -NW    Row Name 09/05/21 1325 09/05/21 1114       Bed Mobility    Bed Mobility  scooting/bridging;supine-sit  -NW  scooting/bridging;supine-sit  -NW    Scooting/Bridging [place] (Bed Mobility)  --  --  -NW    Supine-Sit [place] (Bed Mobility)  --  verbal cues;moderate assist (50% patient effort);2 person assist  -NW    Sit-Supine [place] (Bed Mobility)  verbal cues;moderate assist (50% patient effort);2 person assist  -NW  -- chair  -NW    Assistive Device (Bed Mobility)  draw sheet;head of bed elevated  -NW  draw sheet;head of bed elevated  -NW    Row Name 09/05/21 1325 09/05/21 1114       Transfers    Transfers  sit-stand transfer;stand-sit transfer;stand pivot/stand step transfer  -NW  sit-stand transfer;stand-sit transfer;stand pivot/stand step transfer  -NW    Sit-Stand [place] (Transfers)  moderate assist (50% patient effort);2 person assist  -NW  moderate assist (50% patient effort);2 person assist  -NW    Stand-Sit [place] (Transfers)  minimum assist (75% patient effort);2 person assist;verbal cues  -NW  minimum assist (75% patient effort);2 person assist;verbal cues  -NW    Row Name 09/05/21 1325 09/05/21 1114       Sit-Stand Transfer    Assistive Device (Sit-Stand Transfers)  walker, front-wheeled  -NW  walker, front-wheeled  -NW    Row Name 09/05/21 1325 09/05/21 1114       Stand-Sit Transfer    Assistive Device (Stand-Sit Transfers)  walker, front-wheeled  -NW  walker, front-wheeled  -NW    Row Name 09/05/21 1325 09/05/21 1114       Stand Pivot/Stand Step Transfer    Stand Pivot/Stand Step [place] (Transfers)  verbal cues;moderate assist (50% patient effort);2 person assist  -NW  verbal cues;moderate assist (50% patient effort);2 person assist  -NW    Assistive Device (Stand Pivot Stand Step Transfer)  other (see comments)  -NW  other (see  comments)  -NW    Row Name 09/05/21 1325 09/05/21 1114       Gait/Stairs (Locomotion)    Comment (Gait/Stairs)  unable to maintain TTWB had to pivot transfer btb   -NW  (S) stood pivot to bsc HH stood x3 rwx fro [facility] pt unable to txfer pulled chair up behind in place of bsc  -NW    Row Name 09/05/21 1325 09/05/21 1114       Safety Issues, Functional Mobility    Safety Issues Affecting Function (Mobility)  safety precaution awareness  -NW  safety precaution awareness  -NW    Impairments Affecting Function (Mobility)  strength;pain  -NW  strength;pain  -NW    Row Name             Wound 08/31/21 1914 Left lateral thigh Incision    Wound - Properties Group Placement Date: 08/31/21  -YESY Placement Time: 1914  -YESY Side: Left  -YESY Orientation: lateral  -YESY Location: thigh  -YESY Primary Wound Type: Incision  -YESY    Retired Wound - Properties Group Date first assessed: 08/31/21  -YESY Time first assessed: 1914  -YESY Side: Left  -YESY Location: thigh  -YESY Primary Wound Type: Incision  -YESY    Row Name 09/05/21 1325 09/05/21 1114       Positioning and Restraints    Pre-Treatment Position  sitting in chair/recliner  -NW  in bed  -NW    Post Treatment Position  bed  -NW  chair  -NW    In Bed  fowlers;call light within reach;encouraged to call for assist;notified nsg  -NW  --    In Chair  --  reclined;call light within reach;encouraged to call for assist;notified nsg  -NW      User Key  (r) = Recorded By, (t) = Taken By, (c) = Cosigned By    Initials Name Provider Type    NW Sarah Landers, PTA Physical Therapy Assistant    Laura Lynne, RN Registered Nurse        Physical Therapy Education                 Title: PT OT SLP Therapies (In Progress)     Topic: Physical Therapy (Done)     Point: Mobility training (Done)     Learning Progress Summary           Patient Acceptance, E, VU,NR by NN at 9/3/2021 1403    Comment: role of PT, TTWB of LLE    Acceptance, E, NR by MS at 9/2/2021 1342    Comment: role of PT in her care, TTWB of L  LE                   Point: Home exercise program (Done)     Learning Progress Summary           Patient Acceptance, E, VU,NR by NN at 9/3/2021 1403    Comment: role of PT, TTWB of LLE                   Point: Body mechanics (Done)     Learning Progress Summary           Patient Acceptance, E, VU,NR by NN at 9/3/2021 1403    Comment: role of PT, TTWB of LLE                   Point: Precautions (Done)     Learning Progress Summary           Patient Acceptance, E, VU,NR by NN at 9/3/2021 1403    Comment: role of PT, TTWB of LLE                               User Key     Initials Effective Dates Name Provider Type Discipline    MS 06/19/18 -  Balbina Hudson, PT, DPT, NCS Physical Therapist PT    NN 08/18/21 -  Paola Carvalho, PT Student PT Student PT              PT Recommendation and Plan     Plan of Care Reviewed With: patient  Progress: no change  Outcome Summary: Needs encouragement for OOB. Pt SOA however o2 remains in 90s. Pt anxious and needs cues and reassurance. Bed mobility modx2 using draw sheet and bedrails. sit-stand mod x2 pivot to [facility] HH max 2. pt then stood x3 from [facility] mod x2 unable to step and maintain TTWB had to pull chair up in place of bsc. Pt will need cont PT upon d/c  Outcome Measures     Row Name 09/03/21 1400             How much help from another is currently needed...    Putting on and taking off regular lower body clothing?  2  -AC (r) PF (t) AC (c)      Bathing (including washing, rinsing, and drying)  2  -AC (r) PF (t) AC (c)      Toileting (which includes using toilet bed pan or urinal)  2  -AC (r) PF (t) AC (c)      Putting on and taking off regular upper body clothing  2  -AC (r) PF (t) AC (c)      Taking care of personal grooming (such as brushing teeth)  2  -AC (r) PF (t) AC (c)      Eating meals  3  -AC (r) PF (t) AC (c)      AM-PAC 6 Clicks Score (OT)  13  -AC (r) PF (t)         Functional Assessment    Outcome Measure Options  AM-PAC 6 Clicks Daily Activity (OT)  -AC (r) PF (t)  AC (c)        User Key  (r) = Recorded By, (t) = Taken By, (c) = Cosigned By    Initials Name Provider Type    AC Pérez Lance, OTR/L, CNT Occupational Therapist    Kevan Smith Jr., OT Student OT Student           Time Calculation:   PT Charges     Row Name 09/05/21 1450 09/05/21 1138          Time Calculation    Start Time  1325  -NW  1114  -NW     Stop Time  1340  -NW  1138  -NW     Time Calculation (min)  15 min  -NW  24 min  -NW     PT Received On  --  09/05/21  -NW     PT Goal Re-Cert Due Date  --  09/12/21  -NW        Time Calculation- PT    Total Timed Code Minutes- PT  15 minute(s)  -NW  24 minute(s)  -NW        Timed Charges    04155 - PT Therapeutic Activity Minutes  15  -NW  24  -NW        Total Minutes    Timed Charges Total Minutes  15  -NW  24  -NW      Total Minutes  15  -NW  24  -NW       User Key  (r) = Recorded By, (t) = Taken By, (c) = Cosigned By    Initials Name Provider Type    NW Sarah Landers PTA Physical Therapy Assistant        Therapy Charges for Today     Code Description Service Date Service Provider Modifiers Qty    01287825756 HC PT THERAPEUTIC ACT EA 15 MIN 9/5/2021 Sarah Landers PTA GP 2    10774605206 HC PT THERAPEUTIC ACT EA 15 MIN 9/5/2021 Sarah Landers PTA GP 1          PT G-Codes  Outcome Measure Options: AM-PAC 6 Clicks Basic Mobility (PT)  AM-PAC 6 Clicks Score (PT): 9  AM-PAC 6 Clicks Score (OT): 13    Sarah Landers PTA  9/5/2021      Electronically signed by Sarah Landers PTA at 09/05/21 1451       Occupational Therapy Notes (most recent note)    No notes exist for this encounter.         Discharge Summary    No notes of this type exist for this encounter.

## 2021-09-06 NOTE — PROGRESS NOTES
[facility] Medicine Services  INPATIENT PROGRESS NOTE    Patient Name: Heide Newsome  Date of Admission: 8/31/2021  Today's Date: 09/06/21  Length of Stay: 6  Primary Care Physician: Pato Cooney DO    Subjective   Chief Complaint: shortness of breath  HPI   She was sitting up in bed resting comfortably.  She had just finished transthoracic echocardiogram.  States her breathing continues to improve.  She is on home use 4 L nasal cannula.  Reports that she used to have CPAP at home but had to give it back as she could not afford monthly cost of $95 a month.  Denies cough.  Afebrile.  Appetite improving.  She is urinating well.  She had several good bowel movements yesterday.    Review of Systems   All pertinent negatives and positives are as above. All other systems have been reviewed and are negative unless otherwise stated.     Objective    Temp:  [97.8 °F (36.6 °C)-98.6 °F (37 °C)] 97.8 °F (36.6 °C)  Heart Rate:  [] 86  Resp:  [16-20] 20  BP: (107-140)/(49-72) 110/51  Physical Exam  Constitutional:       Appearance: She is well-developed. She is ill-appearing.      Comments: 4 L nasal cannula.  No respiratory distress.  No family at bedside.  HENT:      Head: Normocephalic and atraumatic.   Eyes:      General: No scleral icterus.     Conjunctiva/sclera: Conjunctivae normal.      Pupils: Pupils are equal, round, and reactive to light.   Neck:      Vascular: No JVD.   Cardiovascular:      Rate and Rhythm: Regular rhythm. Normal rate present.      Heart sounds:  Sinus 78-89  Pulmonary:      Effort: No respiratory distress.       Breath sounds: Diminished breath sounds.  Abdominal:      General: Bowel sounds are normal. There is no distension.      Palpations: Abdomen is soft.      Tenderness: There is no abdominal tenderness.   Musculoskeletal:         General: Normal range of motion.      Cervical back: Neck supple.   Lymphadenopathy:      Cervical: No cervical  adenopathy.   Skin:     General: Skin is warm and dry.      Comments: Left hip dressing dry and intact   Neurological:      Mental Status: She is alert and oriented to person, place, and time.      Cranial Nerves: No cranial nerve deficit.   Psychiatric:         Behavior: Behavior normal.     Results Review:  I have reviewed the labs, radiology results, and diagnostic studies.    Laboratory Data:   Results from last 7 days   Lab Units 09/06/21  0525 09/04/21 0622 09/03/21  0556   WBC 10*3/mm3 8.34 9.51 9.91   HEMOGLOBIN g/dL 9.2* 10.2* 9.9*   HEMATOCRIT % 31.1* 34.3 33.5*   PLATELETS 10*3/mm3 133* 156 141        Results from last 7 days   Lab Units 09/06/21  0525 09/04/21  0622 09/03/21  0556 09/02/21  0532 09/01/21  0510 08/31/21  1306 08/31/21  1306   SODIUM mmol/L 143 140 136   < > 137   < > 138   POTASSIUM mmol/L 4.5 5.0 5.7*   < > 5.2   < > 3.9   CHLORIDE mmol/L 100 101 99   < > 100   < > 98   CO2 mmol/L 42.0* 36.0* 32.0*   < > 32.0*   < > 35.0*   BUN mg/dL 25* 28* 46*   < > 23   < > 16   CREATININE mg/dL 0.46* 0.43* 0.76   < > 0.81   < > 0.37*   CALCIUM mg/dL 9.6 9.6 8.9   < > 8.8   < > 9.6   BILIRUBIN mg/dL  --   --   --   --  0.4  --  0.7   ALK PHOS U/L  --   --   --   --  73  --  73   ALT (SGPT) U/L  --   --   --   --  13  --  13   AST (SGOT) U/L  --   --   --   --  18  --  16   GLUCOSE mg/dL 105* 163* 204*   < > 152*   < > 168*    < > = values in this interval not displayed.     I have reviewed the patient's current medications.     Assessment/Plan     Active Hospital Problems    Diagnosis    • **Closed left hip fracture (CMS/HCC)    • Acute pain of left hip    • Fall    • Chronic respiratory failure with hypoxia (CMS/HCC)    • Pulmonary emphysema (CMS/HCC)    • Acute on chronic respiratory failure with hypoxia (CMS/HCC)    • COPD, very severe (CMS/HCC)      Plan:  1.  Patient presented on 8/31 after a fall.  She reportedly slipped on coffee that was spilled on the floor and landed on her left hip.  She  had immediate onset left hip pain and inability to bear weight.  Upon evaluation in the emergency department, she was found to have acute valgus impacted left intertrochanteric fracture with foreshortening.  2.  Orthopedics evaluating patient.  She underwent cephalomedullary nailing left closed displaced intertrochanteric hip fracture 8/31 Dr. Chaudhari.  Toe-touch weightbearing left lower extremity for 6 weeks.  3.  Pulmonology evaluating patient for acute on chronic respiratory failure.  Arterial blood gas on 9/2 showed hypercapnia.  She was placed on BiPAP.  Supplemental oxygen has been weaned to home use 4 L nasal cannula.  Prednisone 30 mg daily. Pulmicort and Atrovent.  Continue Mucinex.  Encourage use of incentive spirometry and flutter valve.  4.  Chest x-ray showed trace right pleural effusion suspected.  Diffuse coarsening of interstitium.  May reflect developing interstitial edema.  She was given a one-time dose of IV Lasix on 9/1 and again on 9/2.  BNP on 9/5 12,361.  Check transthoracic echocardiogram.  5.  Levaquin discontinued on 9/3 as chest x-ray does not show findings concerning for pneumonia, afebrile, no sputum production, and absence of leukocytosis.  Monitor off of antibiotics.  6.  Physical and Occupational Therapy to evaluate and treat.  7.  DVT prophylaxis with Eliquis 2.5 mg twice daily.  8.  Alice  made aware to discuss with patient and family regarding placement.  Referral made to [facility].   will look into situation with CPAP tomorrow as no one is in office today due to holiday.     Discharge Planning: I expect the patient to be discharged to skilled nursing facility placement once arrangements are made.    Electronically signed by JESSENIA Knox, 09/06/21, 11:46 CDT.

## 2021-09-06 NOTE — PLAN OF CARE
Goal Outcome Evaluation:  Plan of Care Reviewed With: patient        Progress: no change  Outcome Summary: VSS. No change in neuro/nv status this shift. Drsg x 2 c/d/i, sl dried drainage noted, mild edema. C/o intermit pain, prn given. Pt did wear cpap for short time, approx 1:20 min. Assist w/bed mobility. Safety maintained.

## 2021-09-06 NOTE — CASE MANAGEMENT/SOCIAL WORK
Continued Stay Note  [facility]     Patient Name: Heide Newsome  MRN: [id number]  Today's Date: 9/6/2021    Admit Date: 8/31/2021    Discharge Plan     Row Name 09/06/21 1008       Plan    Plan Comments  SW spoke with pt in regards to placement. Pt asked SW to call and speak with Jensen, son. SW spoke with Jensen who is requesting referral to [facility]. SW faxed info and will await possible bed offer        Discharge Codes    No documentation.             Alice Lo

## 2021-09-06 NOTE — PLAN OF CARE
Goal Outcome Evaluation:           Progress: improving  Outcome Summary: Pt A&Ox4. VSS. C/o pain this shift, medicated with PRN with relief. Up x2. O2@4L-. Purewick in place. Dressings CDI. Echo done this shift. Safety maintained, will continue to monitor.

## 2021-09-06 NOTE — THERAPY TREATMENT NOTE
Acute Care - Physical Therapy Treatment Note  [facility]     Patient Name: Heide Newsome  : 1945  MRN: [id number]  Today's Date: 2021   Onset of Illness/Injury or Date of Surgery: 21  Visit Dx:     ICD-10-CM ICD-9-CM   1. Fall, initial encounter  W19.XXXA E888.9   2. Closed fracture of left hip, initial encounter (CMS/[facility])  S72.002A 820.8   3. Impaired mobility  Z74.09 799.89   4. Decreased activities of daily living (ADL)  Z78.9 V49.89     Patient Active Problem List   Diagnosis   • Pulmonary emphysema (CMS/HCC)   • Acute on chronic respiratory failure with hypoxia (CMS/[facility])   • COPD, very severe (CMS/HCC)   • Severe malnutrition (CMS/HCC)   • History of colon cancer   • Thrombocytopenia (CMS/[facility])   • Anemia   • Hyperglycemia, drug-induced   • Pneumonia   • Chronic respiratory failure with hypoxia (CMS/HCC)   • COPD with acute exacerbation (CMS/[facility])   • Adrenal nodule (CMS/[facility])   • Closed left hip fracture (CMS/[facility])   • Fall   • Acute pain of left hip     Past Medical History:   Diagnosis Date   • Cancer (CMS/HCC), colon    • Chronic respiratory failure (CMS/[facility]), 4 L nasal cannula continuously    • COPD (chronic obstructive pulmonary disease) (CMS/[facility])    • Restless leg syndrome      Past Surgical History:   Procedure Laterality Date   • APPENDECTOMY     • CARPAL TUNNEL RELEASE Left    • COLON RESECTION     • FOOT SURGERY Bilateral     hammer toe   • FRACTURE SURGERY Left     Arm   • HIP TROCHANTERIC NAILING WITH INTRAMEDULLARY HIP SCREW Left 2021    Procedure: LEFT HIP TROCHANTERIC NAILING SHORT WITH INTRAMEDULLARY HIP SCREW;  Surgeon: Valdemar Chaudhari MD;  Location: [facility];  Service: Orthopedics;  Laterality: Left;   • HYSTERECTOMY     • WRIST FRACTURE SURGERY Right         PT Assessment (last 12 hours)      PT Evaluation and Treatment     Row Name 21 1335 21 1124       Physical Therapy Time and Intention    Subjective Information  no complaints  -TB  complains  of;fatigue  -TB    Document Type  therapy note (daily note)  -TB  therapy note (daily note)  -TB    Mode of Treatment  physical therapy  -TB  physical therapy  -TB    Patient Effort  good  -TB  good  -TB    Row Name 09/06/21 1335 09/06/21 1124       General Information    Existing Precautions/Restrictions  oxygen therapy device and L/min;non-weight bearing TTWB LLE  -TB  oxygen therapy device and L/min;non-weight bearing TTWB LLE  -TB    Row Name 09/06/21 1124          Bed Mobility    Bed Mobility  scooting/bridging;supine-sit;sit-supine  -TB     Scooting/Bridging [place] (Bed Mobility)  maximum assist (25% patient effort);verbal cues  -TB     Supine-Sit [place] (Bed Mobility)  maximum assist (25% patient effort);verbal cues  -TB     Sit-Supine [place] (Bed Mobility)  maximum assist (25% patient effort);verbal cues  -TB     Assistive Device (Bed Mobility)  draw sheet;head of bed elevated  -TB     Row Name 09/06/21 1335 09/06/21 1135       Motor Skills    Therapeutic Exercise  -- AROM-AAROM BLEs x15  -TB  --  -TB    Row Name 09/06/21 1124          Motor Skills    Therapeutic Exercise  ankle;knee  -TB     Row Name 09/06/21 1124          Knee (Therapeutic Exercise)    Knee (Therapeutic Exercise)  AROM (active range of motion)  -TB     Knee AROM (Therapeutic Exercise)  bilateral;LAQ (long arc quad);sitting;other (see comments) x15  -TB     Row Name 09/06/21 1124          Ankle (Therapeutic Exercise)    Ankle (Therapeutic Exercise)  AROM (active range of motion)  -TB     Ankle AROM (Therapeutic Exercise)  bilateral;dorsiflexion;plantarflexion;sitting;other (see comments) x15  -TB     Row Name             Wound 08/31/21 1914 Left lateral thigh Incision    Wound - Properties Group Placement Date: 08/31/21  -YESY Placement Time: 1914  -YESY Side: Left  -YESY Orientation: lateral  -YESY Location: thigh  -YESY Primary Wound Type: Incision  -YESY    Retired Wound - Properties Group Date first assessed: 08/31/21  -YESY Time  first assessed: 1914  -YESY Side: Left  - Location: thigh  -YESY Primary Wound Type: Incision  -YESY    Row Name 09/06/21 1124          Plan of Care Review    Plan of Care Reviewed With  patient  -TB     Outcome Summary  Pt agreeable to therapy. Bed mob Max A to get to EOB. Performed AROM BLE x15 w/ increased time and breaks. Pt had to be assisted back to bed due having to do a procedure.  -TB     Row Name 09/06/21 1335 09/06/21 1135       Positioning and Restraints    Pre-Treatment Position  in bed  -TB  --  -TB    Post Treatment Position  bed  -TB  --  -TB    In Bed  fowlers;call light within reach;encouraged to call for assist;side rails up x2  -TB  --  -TB      User Key  (r) = Recorded By, (t) = Taken By, (c) = Cosigned By    Initials Name Provider Type    Laura Lynne, RN Registered Nurse    Remedios Gutiérrze, PTA Physical Therapy Assistant        Physical Therapy Education                 Title: PT OT SLP Therapies (In Progress)     Topic: Physical Therapy (Done)     Point: Mobility training (Done)     Learning Progress Summary           Patient Acceptance, E, VU,NR by NN at 9/3/2021 1403    Comment: role of PT, TTWB of LLE    Acceptance, E, NR by MS at 9/2/2021 1342    Comment: role of PT in her care, TTWB of L LE                   Point: Home exercise program (Done)     Learning Progress Summary           Patient Acceptance, E, VU,NR by NN at 9/3/2021 1403    Comment: role of PT, TTWB of LLE                   Point: Body mechanics (Done)     Learning Progress Summary           Patient Acceptance, E, VU,NR by NN at 9/3/2021 1403    Comment: role of PT, TTWB of LLE                   Point: Precautions (Done)     Learning Progress Summary           Patient Acceptance, E, VU,NR by NN at 9/3/2021 1403    Comment: role of PT, TTWB of LLE                               User Key     Initials Effective Dates Name Provider Type Discipline    MS 06/19/18 -  Balbina Hudson, PT, DPT, NCS Physical Therapist PT     NN 08/18/21 -  Paola Carvalho, PT Student PT Student PT              PT Recommendation and Plan     Plan of Care Reviewed With: patient  Progress: improving  Outcome Summary: Pt agreeable to therapy. Bed mob Max A to get to EOB. Performed AROM BLE x15 w/ increased time and breaks. Pt had to be assisted back to bed due having to do a procedure.  Outcome Measures     Row Name 09/03/21 1400             How much help from another is currently needed...    Putting on and taking off regular lower body clothing?  2  -AC (r) PF (t) AC (c)      Bathing (including washing, rinsing, and drying)  2  -AC (r) PF (t) AC (c)      Toileting (which includes using toilet bed pan or urinal)  2  -AC (r) PF (t) AC (c)      Putting on and taking off regular upper body clothing  2  -AC (r) PF (t) AC (c)      Taking care of personal grooming (such as brushing teeth)  2  -AC (r) PF (t) AC (c)      Eating meals  3  -AC (r) PF (t) AC (c)      AM-PAC 6 Clicks Score (OT)  13  -AC (r) PF (t)         Functional Assessment    Outcome Measure Options  AM-PAC 6 Clicks Daily Activity (OT)  -AC (r) PF (t) AC (c)        User Key  (r) = Recorded By, (t) = Taken By, (c) = Cosigned By    Initials Name Provider Type    AC Pérez Lance N, OTR/L, CNT Occupational Therapist    PF Kevan Rai Jr., OT Student OT Student           Time Calculation:   PT Charges     Row Name 09/06/21 1352 09/06/21 1351          Time Calculation    Start Time  1335  -TB  1124  -TB     Stop Time  1347  -TB  1134  -TB     Time Calculation (min)  12 min  -TB  10 min  -TB     PT Received On  09/06/21  -TB  09/06/21  -TB        Time Calculation- PT    Total Timed Code Minutes- PT  12 minute(s)  -TB  10 minute(s)  -TB       User Key  (r) = Recorded By, (t) = Taken By, (c) = Cosigned By    Initials Name Provider Type    Remedios Gutiérrez, PTA Physical Therapy Assistant        Therapy Charges for Today     Code Description Service Date Service Provider Modifiers Qty     32869710058 HC PT THER PROC EA 15 MIN 9/6/2021 Remedios Thomas, PTA GP 1    25312352105 HC PT THER PROC EA 15 MIN 9/6/2021 Remedios Thomas, PTA GP 1          PT G-Codes  Outcome Measure Options: AM-PAC 6 Clicks Basic Mobility (PT)  AM-PAC 6 Clicks Score (PT): 9  AM-PAC 6 Clicks Score (OT): 13    Remedios Thomas PTA  9/6/2021

## 2021-09-07 NOTE — PROGRESS NOTES
[facility] Medicine Services  INPATIENT PROGRESS NOTE    Patient Name: Heide Newsome  Date of Admission: 8/31/2021  Today's Date: 09/07/21  Length of Stay: 7  Primary Care Physician: Pato Cooney DO    Subjective   Chief Complaint: postoperative pain  HPI   She was sitting up in bed resting comfortably.  She is on home use 4 L nasal cannula.  States her breathing feels at baseline.  Denies cough.  Afebrile.  She is tolerating a diet.  She is eager for discharge to rehab.    Discussed with  Aby to check on BiPAP.  [facility], await bed offer.  She will require pre-CERT.    Review of Systems   All pertinent negatives and positives are as above. All other systems have been reviewed and are negative unless otherwise stated.     Objective    Temp:  [97.8 °F (36.6 °C)-98.9 °F (37.2 °C)] 97.8 °F (36.6 °C)  Heart Rate:  [] 102  Resp:  [16-22] 16  BP: (105-138)/(57-76) 105/64  Physical Exam  Constitutional:       Appearance: She is well-developed.       Comments: Chronically ill-appearing.  Home use 4 L nasal cannula.  No respiratory distress.  No family at bedside.  Discussed with her nurse, Maxine  HENT:      Head: Normocephalic and atraumatic.   Eyes:      General: No scleral icterus.     Conjunctiva/sclera: Conjunctivae normal.      Pupils: Pupils are equal, round, and reactive to light.   Neck:      Vascular: No JVD.   Cardiovascular:      Rate and Rhythm: Regular rhythm. Normal rate present.      Heart sounds:  Sinus 74-84  Pulmonary:      Effort: No respiratory distress.       Breath sounds: Diminished breath sounds.  Abdominal:      General: Bowel sounds are normal. There is no distension.      Palpations: Abdomen is soft.      Tenderness: There is no abdominal tenderness.   Musculoskeletal:         General: Normal range of motion.      Cervical back: Neck supple.   Lymphadenopathy:      Cervical: No cervical  adenopathy.   Skin:     General: Skin is warm and dry.      Comments: Left hip dressing dry and intact   Neurological:      Mental Status: She is alert and oriented to person, place, and time.      Cranial Nerves: No cranial nerve deficit.   Psychiatric:         Behavior: Behavior normal.     Results Review:  I have reviewed the labs, radiology results, and diagnostic studies.    Laboratory Data:   Results from last 7 days   Lab Units 09/06/21  0525 09/04/21 0622 09/03/21  0556   WBC 10*3/mm3 8.34 9.51 9.91   HEMOGLOBIN g/dL 9.2* 10.2* 9.9*   HEMATOCRIT % 31.1* 34.3 33.5*   PLATELETS 10*3/mm3 133* 156 141        Results from last 7 days   Lab Units 09/06/21  0525 09/04/21  0622 09/03/21  0556 09/02/21  0532 09/01/21  0510 08/31/21  1306 08/31/21  1306   SODIUM mmol/L 143 140 136   < > 137   < > 138   POTASSIUM mmol/L 4.5 5.0 5.7*   < > 5.2   < > 3.9   CHLORIDE mmol/L 100 101 99   < > 100   < > 98   CO2 mmol/L 42.0* 36.0* 32.0*   < > 32.0*   < > 35.0*   BUN mg/dL 25* 28* 46*   < > 23   < > 16   CREATININE mg/dL 0.46* 0.43* 0.76   < > 0.81   < > 0.37*   CALCIUM mg/dL 9.6 9.6 8.9   < > 8.8   < > 9.6   BILIRUBIN mg/dL  --   --   --   --  0.4  --  0.7   ALK PHOS U/L  --   --   --   --  73  --  73   ALT (SGPT) U/L  --   --   --   --  13  --  13   AST (SGOT) U/L  --   --   --   --  18  --  16   GLUCOSE mg/dL 105* 163* 204*   < > 152*   < > 168*    < > = values in this interval not displayed.     I have reviewed the patient's current medications.     Assessment/Plan     Active Hospital Problems    Diagnosis    • **Closed left hip fracture (CMS/HCC)    • Acute pain of left hip    • Fall    • Chronic respiratory failure with hypoxia (CMS/HCC)    • Pulmonary emphysema (CMS/HCC)    • Acute on chronic respiratory failure with hypoxia (CMS/HCC)    • COPD, very severe (CMS/HCC)      Plan:  1.  Patient presented on 8/31 after a fall.  She reportedly slipped on coffee that was spilled on the floor and landed on her left hip.  She  had immediate onset left hip pain and inability to bear weight.  Upon evaluation in the emergency department, she was found to have acute valgus impacted left intertrochanteric fracture with foreshortening.  2.  Orthopedics evaluating patient.  She underwent cephalomedullary nailing left closed displaced intertrochanteric hip fracture 8/31 Dr. Chaudhari.  Toe-touch weightbearing left lower extremity for 6 weeks.  3.  Pulmonology evaluating patient for acute on chronic respiratory failure.  Arterial blood gas on 9/2 showed hypercapnia.  She was placed on BiPAP.  Supplemental oxygen has been weaned to home use 4 L nasal cannula.  Prednisone 20 mg daily x2 days then discontinue. Pulmicort and Atrovent.  Continue Mucinex.  Encourage use of incentive spirometry and flutter valve.  4.  Chest x-ray showed trace right pleural effusion suspected.  Diffuse coarsening of interstitium.  May reflect developing interstitial edema.  She was given a one-time dose of IV Lasix on 9/1 and again on 9/2.  BNP on 9/5 12,361.  Transthoracic echocardiogram on 9/6 showed preserved ejection fraction.  Moderate to severe pulmonary hypertension.  5.  Levaquin discontinued on 9/3 as chest x-ray does not show findings concerning for pneumonia, afebrile, no sputum production, and absence of leukocytosis.  Monitor off of antibiotics.  6.  Physical and Occupational Therapy to evaluate and treat.  7.  DVT prophylaxis with Eliquis 2.5 mg twice daily.  8.  [facility] is unable to offer bed.  Referral made to [facility], await bed offer.  She will require pre-CERT.  Aby is checking on BiPAP.     Discharge Planning: I expect the patient to be discharged to skilled nursing facility placement once arrangements are made.    Electronically signed by JESSENIA Knox, 09/07/21, 10:42 CDT.

## 2021-09-07 NOTE — PLAN OF CARE
Goal Outcome Evaluation:  Plan of Care Reviewed With: patient        Progress: improving  Outcome Summary: Pt agreeable to therapy. Max A to get from supine to EOB. Seated AROM BLEs x15. Rest breaks as needed due to fatigue and SOA. Stand pivot transfer w/ Max x2. Cues to follow WB restrictions.

## 2021-09-07 NOTE — PLAN OF CARE
Goal Outcome Evaluation:            A&Ox4.  VSS.  4 L NC.  .  Tele in place ST.  No C/O pain.  Voiding bedpan.  Frequency.  Scattered bruising.  Dressings marked dried drainage.  Call light within reach.  Will continue to monitor.

## 2021-09-07 NOTE — PLAN OF CARE
Goal Outcome Evaluation:  Plan of Care Reviewed With: patient        Progress: improving   Pt alert and oriented x4. VSS. C/o pain relieved with PRN meds. GARRISON. PPP. SCD for VTE. . Dressing dry, intact, dry drainage. Voiding via purwick. Plans to d/c to [facility]. Call light within reach. Safety maintained.

## 2021-09-07 NOTE — THERAPY TREATMENT NOTE
Acute Care - Physical Therapy Treatment Note  [facility]     Patient Name: Heide Newsome  : 1945  MRN: [id number]  Today's Date: 2021   Onset of Illness/Injury or Date of Surgery: 21  Visit Dx:     ICD-10-CM ICD-9-CM   1. Fall, initial encounter  W19.XXXA E888.9   2. Closed fracture of left hip, initial encounter (CMS/[facility])  S72.002A 820.8   3. Impaired mobility  Z74.09 799.89   4. Decreased activities of daily living (ADL)  Z78.9 V49.89     Patient Active Problem List   Diagnosis   • Pulmonary emphysema (CMS/HCC)   • Acute on chronic respiratory failure with hypoxia (CMS/HCC)   • COPD, very severe (CMS/HCC)   • Severe malnutrition (CMS/HCC)   • History of colon cancer   • Thrombocytopenia (CMS/HCC)   • Anemia   • Hyperglycemia, drug-induced   • Pneumonia   • Chronic respiratory failure with hypoxia (CMS/HCC)   • COPD with acute exacerbation (CMS/HCC)   • Adrenal nodule (CMS/[facility])   • Closed left hip fracture (CMS/[facility])   • Fall   • Acute pain of left hip     Past Medical History:   Diagnosis Date   • Cancer (CMS/HCC), colon    • Chronic respiratory failure (CMS/[facility]), 4 L nasal cannula continuously    • COPD (chronic obstructive pulmonary disease) (CMS/[facility])    • Restless leg syndrome      Past Surgical History:   Procedure Laterality Date   • APPENDECTOMY     • CARPAL TUNNEL RELEASE Left    • COLON RESECTION     • FOOT SURGERY Bilateral     hammer toe   • FRACTURE SURGERY Left     Arm   • HIP TROCHANTERIC NAILING WITH INTRAMEDULLARY HIP SCREW Left 2021    Procedure: LEFT HIP TROCHANTERIC NAILING SHORT WITH INTRAMEDULLARY HIP SCREW;  Surgeon: Valdemar Chaudhari MD;  Location: [facility];  Service: Orthopedics;  Laterality: Left;   • HYSTERECTOMY     • WRIST FRACTURE SURGERY Right         PT Assessment (last 12 hours)      PT Evaluation and Treatment     Row Name 21 1342 21 1135       Physical Therapy Time and Intention    Subjective Information  complains of;fatigue  -TB  complains  of;pain  -TB    Document Type  therapy note (daily note)  -TB  therapy note (daily note)  -TB    Mode of Treatment  physical therapy  -TB  physical therapy  -TB    Patient Effort  good  -TB  good  -TB    Row Name 09/07/21 1342 09/07/21 1135       General Information    Existing Precautions/Restrictions  oxygen therapy device and L/min;non-weight bearing TTWB  -TB  oxygen therapy device and L/min;non-weight bearing TTWB LLE  -TB    Row Name 09/07/21 1342 09/07/21 1135       Pain Scale: Numbers Pre/Post-Treatment    Pretreatment Pain Rating  2/10  -TB  2/10  -TB    Pain Location - Side  Left  -TB  Left  -TB    Pain Location - Orientation  incisional  -TB  incisional  -TB    Pain Location  hip  -TB  hip  -TB    Row Name 09/07/21 1342 09/07/21 1135       Bed Mobility    Bed Mobility  sit-supine;scooting/bridging  -TB  scooting/bridging;supine-sit  -TB    Scooting/Bridging [place] (Bed Mobility)  maximum assist (25% patient effort);verbal cues  -TB  maximum assist (25% patient effort);verbal cues  -TB    Supine-Sit [place] (Bed Mobility)  --  moderate assist (50% patient effort);verbal cues  -TB    Sit-Supine [place] (Bed Mobility)  maximum assist (25% patient effort);verbal cues  -TB  --    Assistive Device (Bed Mobility)  head of bed elevated  -TB  head of bed elevated;bed rails  -TB    Row Name 09/07/21 1342 09/07/21 1135       Transfers    Transfers  toilet transfer;chair-bed transfer;sit-stand transfer;stand-sit transfer  -TB  bed-chair transfer  -TB    Bed-Chair [place] (Transfers)  --  maximum assist (25% patient effort);2 person assist;verbal cues  -TB    Chair-Bed [place] (Transfers)  maximum assist (25% patient effort);2 person assist;verbal cues  -TB  --    Assistive Device (Bed-Chair Transfers)  --  other (see comments) HHA  -TB    Sit-Stand [place] (Transfers)  contact guard;minimum assist (75% patient effort);verbal cues  -TB  --    Stand-Sit [place] (Transfers)  contact  guard;verbal cues  -TB  --    [place] Level (Toilet Transfer)  maximum assist (25% patient effort);2 person assist;verbal cues  -TB  --    Assistive Device (Toilet Transfer)  commode, bedside without drop arms  -TB  --    Row Name 09/07/21 1342          Chair-Bed Transfer    Assistive Device (Chair-Bed Transfers)  other (see comments) HHA  -TB     Row Name 09/07/21 1342          Sit-Stand Transfer    Assistive Device (Sit-Stand Transfers)  walker, front-wheeled  -TB     Row Name 09/07/21 1342          Stand-Sit Transfer    Assistive Device (Stand-Sit Transfers)  walker, front-wheeled  -TB     Row Name 09/07/21 1342          Toilet Transfer    Type (Toilet Transfer)  stand pivot/stand step  -TB     Row Name 09/07/21 1342 09/07/21 1135       Balance    Balance Assessment  standing static balance;standing dynamic balance  -TB  sitting static balance;sitting dynamic balance  -TB    Static Sitting Balance  --  WFL;supported;sitting, edge of bed  -TB    Dynamic Sitting Balance  --  WFL;supported;sitting, edge of bed  -TB    Static Standing Balance  mild impairment;supported;standing  -TB  --    Dynamic Standing Balance  mild impairment;supported;standing  -TB  --    Row Name 09/07/21 1135          Motor Skills    Therapeutic Exercise  -- AROM BLEs x15  -TB     Row Name             Wound 08/31/21 1914 Left lateral thigh Incision    Wound - Properties Group Placement Date: 08/31/21  - Placement Time: 1914 -[place] Side: Left  - Orientation: lateral  -YESY Location: thigh  -YESY Primary Wound Type: Incision  -YESY    Retired Wound - Properties Group Date first assessed: 08/31/21  - Time first assessed: 1914 -YESY Side: Left  -YESY Location: thigh  -YESY Primary Wound Type: Incision  -YESY    Row Name 09/07/21 1135          Plan of Care Review    Plan of Care Reviewed With  patient  -TB     Progress  improving  -TB     Outcome Summary  Pt agreeable to therapy. Max A to get from supine to EOB. Seated AROM BLEs x15. Rest breaks as  needed due to fatigue and SOA. Stand pivot transfer w/ Max x2. Cues to follow WB restrictions.  -TB     Row Name 09/07/21 1342 09/07/21 1135       Positioning and Restraints    Pre-Treatment Position  sitting in chair/recliner  -TB  in bed  -TB    Post Treatment Position  bed  -TB  chair  -TB    In Bed  fowlers;call light within reach;encouraged to call for assist;exit alarm on;notified nsg;side rails up x2;heels elevated  -TB  --    In Chair  --  notified nsg;reclined;sitting;call light within reach;encouraged to call for assist;legs elevated  -TB      User Key  (r) = Recorded By, (t) = Taken By, (c) = Cosigned By    Initials Name Provider Type    Laura Lynne, RN Registered Nurse    Remedios Gutiérrez, PTA Physical Therapy Assistant        Physical Therapy Education                 Title: PT OT SLP Therapies (In Progress)     Topic: Physical Therapy (Done)     Point: Mobility training (Done)     Learning Progress Summary           Patient Acceptance, E, VU,NR by NN at 9/3/2021 1403    Comment: role of PT, TTWB of LLE    Acceptance, E, NR by MS at 9/2/2021 1342    Comment: role of PT in her care, TTWB of L LE                   Point: Home exercise program (Done)     Learning Progress Summary           Patient Acceptance, E, VU,NR by NN at 9/3/2021 1403    Comment: role of PT, TTWB of LLE                   Point: Body mechanics (Done)     Learning Progress Summary           Patient Acceptance, E, VU,NR by NN at 9/3/2021 1403    Comment: role of PT, TTWB of LLE                   Point: Precautions (Done)     Learning Progress Summary           Patient Acceptance, E, VU,NR by NN at 9/3/2021 1403    Comment: role of PT, TTWB of LLE                               User Key     Initials Effective Dates Name Provider Type Discipline    MS 06/19/18 -  Balbina Hudson, PT, DPT, NCS Physical Therapist PT    NN 08/18/21 -  Paola Carvalho, ELVIN Student PT Student PT              PT Recommendation and Plan     Plan of  Care Reviewed With: patient  Progress: improving  Outcome Summary: Pt agreeable to therapy. Max A to get from supine to EOB. Seated AROM BLEs x15. Rest breaks as needed due to fatigue and SOA. Stand pivot transfer w/ Max x2. Cues to follow WB restrictions.       Time Calculation:   PT Charges     Row Name 09/07/21 1412 09/07/21 1408          Time Calculation    Start Time  1342  -TB  1135  -TB     Stop Time  1356  -TB  1158  -TB     Time Calculation (min)  14 min  -TB  23 min  -TB     PT Received On  09/07/21  -TB  09/07/21  -TB        Time Calculation- PT    Total Timed Code Minutes- PT  14 minute(s)  -TB  23 minute(s)  -TB       User Key  (r) = Recorded By, (t) = Taken By, (c) = Cosigned By    Initials Name Provider Type    TB Remedios Thomas, PTA Physical Therapy Assistant        Therapy Charges for Today     Code Description Service Date Service Provider Modifiers Qty    08899554579 HC PT THER PROC EA 15 MIN 9/6/2021 Remedios Thomas, PTA GP 1    18757621612 HC PT THER PROC EA 15 MIN 9/6/2021 Remedios Thomas, PTA GP 1    23819675799 HC PT THERAPEUTIC ACT EA 15 MIN 9/7/2021 Remedios Thomas, PTA GP 1    07282671367 HC PT THER PROC EA 15 MIN 9/7/2021 Remedios Thomas, PTA GP 1    21968869477 HC PT THERAPEUTIC ACT EA 15 MIN 9/7/2021 Remedios Thomas, PTA GP 1          PT G-Codes  Outcome Measure Options: AM-PAC 6 Clicks Basic Mobility (PT)  AM-PAC 6 Clicks Score (PT): 9  AM-PAC 6 Clicks Score (OT): 13    Remedios Thomas PTA  9/7/2021

## 2021-09-07 NOTE — PROGRESS NOTES
PULMONARY AND CRITICAL CARE PROGRESS NOTE - [facility]    Patient: Heide Newsome  1945   MR# [id number]   Acct# [id number]  09/07/21   09:55 CDT  Referring Provider: Roberto Hobbs MD    Chief Complaint: Shortness of breath  Interval history: The patient is resting in bed on 4 L nasal cannula.  The patient is waiting to eat breakfast.  O2 sat 95%, heart rate 92.  The patient asks if she is being discharged to rehab today.  The patient states that she used to have a CPAP at home, but she could not afford it.  No other aggravating or alleviating factors.     Meds:  apixaban, 2.5 mg, Oral, Q12H  budesonide, 0.25 mg, Nebulization, BID - RT  carvedilol, 3.125 mg, Oral, BID With Meals  cetirizine, 10 mg, Oral, Daily  fluticasone, 1 spray, Each Nare, Daily  guaiFENesin, 1,200 mg, Oral, BID  ipratropium, 0.5 mg, Nebulization, 4x Daily - RT  lidocaine, 2 patch, Transdermal, Nightly  montelukast, 10 mg, Oral, Nightly  polyethylene glycol, 17 g, Oral, Daily  predniSONE, 20 mg, Oral, Daily With Breakfast  rOPINIRole, 2 mg, Oral, Nightly  senna-docusate sodium, 1 tablet, Oral, BID  sodium chloride, 10 mL, Intravenous, Q12H         Review of Systems:   Review of Systems   Constitutional: Negative for chills and fever.   Respiratory: Negative for cough and shortness of breath.    Cardiovascular: Negative for chest pain.   Gastrointestinal: Negative for diarrhea, nausea and vomiting.     Physical Exam:  SpO2 Percentage    09/07/21 0644 09/07/21 0652 09/07/21 0700   SpO2: 94% 96% 95%     Temp:  [97.8 °F (36.6 °C)-98.9 °F (37.2 °C)] 97.8 °F (36.6 °C)  Heart Rate:  [] 97  Resp:  [16-22] 16  BP: (105-138)/(57-76) 105/64    Intake/Output Summary (Last 24 hours) at 9/7/2021 0955  Last data filed at 9/6/2021 1700  Gross per 24 hour   Intake --   Output 1300 ml   Net -1300 ml     Physical Exam  Constitutional:       General: She is not in acute distress.     Interventions: Nasal cannula in place.    HENT:      Head: Normocephalic.      Nose: Nose normal.   Eyes:      General: No scleral icterus.  Cardiovascular:      Rate and Rhythm: Normal rate.   Pulmonary:      Effort: No respiratory distress.   Abdominal:      General: There is no distension.   Musculoskeletal:      Right lower leg: No edema.      Left lower leg: No edema.   Neurological:      Mental Status: She is alert.      Comments: alert   Psychiatric:         Mood and Affect: Mood normal.         Behavior: Behavior is cooperative.       Laboratory Data:  Results from last 7 days   Lab Units 09/06/21  0525 09/04/21  0622 09/03/21  0556   WBC 10*3/mm3 8.34 9.51 9.91   HEMOGLOBIN g/dL 9.2* 10.2* 9.9*   PLATELETS 10*3/mm3 133* 156 141     Results from last 7 days   Lab Units 09/06/21  0525 09/04/21  0622 09/03/21  0556 09/01/21  0510 08/31/21  1306   SODIUM mmol/L 143 140 136   < > 138   POTASSIUM mmol/L 4.5 5.0 5.7*   < > 3.9   BUN mg/dL 25* 28* 46*   < > 16   CREATININE mg/dL 0.46* 0.43* 0.76   < > 0.37*   INR   --   --   --   --  0.94    < > = values in this interval not displayed.     Results from last 7 days   Lab Units 09/03/21  0357 09/02/21  2243 09/02/21  1515   PH, ARTERIAL pH units 7.309* 7.339* 7.277*   PCO2, ARTERIAL mm Hg 62.1* 57.3* 63.8*   PO2 ART mm Hg 85.4 75.8* 65.2*   FIO2 % 50 50  --      No results found for: BLOODCX, URINECX, WOUNDCX, MRSACX, RESPCX, STOOLCX  Recent films:  No radiology results for the last day  Films reviewed personally by me.  My interpretation: None today    Pulmonary Assessment:  1. Acute on chronic hypoxic and hypercapnic respiratory failure   2. COPD exacerbation, no PFT available  3. Fall with left hip fracture, status post femoral nailing  4. Protein calorie malnutrition  5. Anemia, stable  6. History of heart failure  7. Hyperkalemia    Recommend:   · Continue supplemental oxygen and titrate for saturation percent.  Currently on 4L high flow cannula.  Baseline home oxygen 4.5 L  · Continue BiPAP as  needed, case management to check on home machine per hospitalist  · Continue Pulmicort and ipratropium nebs  · Continue guaifenesin  · Antibiotic-has been DC'd   · Prednisone 20 mg orally daily for 2 days then DC  · DVT prophylaxis, Eliquis  · Mobilize as tolerated when okay with Ortho  · Encourage incentive spirometer  · Encourage p.o. nutrition, add boost  · Further recommendations per Dr. Stahl    Electronically signed by JESSENIA Mendez, 09/07/21, 09:55 CDT     ATTESTATION OF CLINICAL NOTE:  I have reviewed the notes, assessments, and/or procedures performed by JESSENIA Mendez.  I concur with her/his documentation of Heide Newsome.  Patient was seen as a follow-up visit in medical floor today.    She is doing well and she is currently on 4 L oxygen through the nasal, although she was 4.5 L at home.  She did not use any BiPAP last night and told me today that she has a CPAP or BiPAP at home which she has not used due to too much pressure but she is willing to use it again.  She is willing to do another sleep study and get back on the machine if needed.  She is otherwise stable and denies any shortness of breath any other new complaints.    Physical examination unremarkable. Elderly  female resting. On oxygen. HEENT atraumatic normocephalic. Neck: Supple. Heart: Sounds normal. Lungs: Diminished air entry few crackles. Abdomen: Soft nondistended extremities: No edema.  Psych: Patient appears less anxious and comfortable.     Continue current treatment plan continue bronchodilator treatments supplemental oxygen and BiPAP needs to be used at night and patient will definitely need outpatient work-up for sleep apnea and get back on the CPAP or BiPAP if needed.  I ordered a blood gas tomorrow morning for deciding whether she will need BiPAP at the time of discharge and she will need to wait before she could be arranged to BiPAP at home BiPAP or CPAP would be checked out by case  management.. Continue allergy medications. Incentive spirometry and flutter valve. Complete antibiotic for few days and start oral steroid taper for plan to discharge home. Plan for outpatient pulmonary clinic follow-up. Patient will need reevaluation for home oxygen before discharge. She is already on home oxygen 4.5 L and is requiring 4 L.  She has anxiety issues.. Continue DVT stress ulcer prophylaxis, pain anxiety:. PT OT. Nutritional support. Repeat labs as needed. CODE STATUS: Full. Overall prognosis: Guarded. Pulmonary team will continue following her and make further recommendations.    I have seen and examined patient personally, performing a face-to-face diagnostic evaluation with plan of care reviewed and developed with APRN and nursing staff. I have addended and/or modified the above history of present illness, physical examination, and assessment and plan to reflect my findings and impressions. Essential elements of the care plan were discussed with APRN above.  Agree with findings and assessment/plan as documented above.    Yasmine Stahl MD  Pulmonologist/Intensivist  9/7/2021 19:35 CDT

## 2021-09-07 NOTE — THERAPY TREATMENT NOTE
Acute Care - Physical Therapy Treatment Note  [facility]     Patient Name: Heide Newsome  : 1945  MRN: [id number]  Today's Date: 2021   Onset of Illness/Injury or Date of Surgery: 21  Visit Dx:     ICD-10-CM ICD-9-CM   1. Fall, initial encounter  W19.XXXA E888.9   2. Closed fracture of left hip, initial encounter (CMS/[facility])  S72.002A 820.8   3. Impaired mobility  Z74.09 799.89   4. Decreased activities of daily living (ADL)  Z78.9 V49.89     Patient Active Problem List   Diagnosis   • Pulmonary emphysema (CMS/HCC)   • Acute on chronic respiratory failure with hypoxia (CMS/[facility])   • COPD, very severe (CMS/HCC)   • Severe malnutrition (CMS/HCC)   • History of colon cancer   • Thrombocytopenia (CMS/[facility])   • Anemia   • Hyperglycemia, drug-induced   • Pneumonia   • Chronic respiratory failure with hypoxia (CMS/HCC)   • COPD with acute exacerbation (CMS/[facility])   • Adrenal nodule (CMS/[facility])   • Closed left hip fracture (CMS/[facility])   • Fall   • Acute pain of left hip     Past Medical History:   Diagnosis Date   • Cancer (CMS/HCC), colon    • Chronic respiratory failure (CMS/[facility]), 4 L nasal cannula continuously    • COPD (chronic obstructive pulmonary disease) (CMS/[facility])    • Restless leg syndrome      Past Surgical History:   Procedure Laterality Date   • APPENDECTOMY     • CARPAL TUNNEL RELEASE Left    • COLON RESECTION     • FOOT SURGERY Bilateral     hammer toe   • FRACTURE SURGERY Left     Arm   • HIP TROCHANTERIC NAILING WITH INTRAMEDULLARY HIP SCREW Left 2021    Procedure: LEFT HIP TROCHANTERIC NAILING SHORT WITH INTRAMEDULLARY HIP SCREW;  Surgeon: Valdemar Chaudhari MD;  Location: [facility];  Service: Orthopedics;  Laterality: Left;   • HYSTERECTOMY     • WRIST FRACTURE SURGERY Right         PT Assessment (last 12 hours)      PT Evaluation and Treatment     Row Name 21 1135          Physical Therapy Time and Intention    Subjective Information  complains of;pain  -TB     Document Type  therapy  note (daily note)  -TB     Mode of Treatment  physical therapy  -TB     Patient Effort  good  -TB     Row Name 09/07/21 1135          General Information    Existing Precautions/Restrictions  oxygen therapy device and L/min;non-weight bearing TTWB LLE  -TB     Row Name 09/07/21 1135          Pain Scale: Numbers Pre/Post-Treatment    Pretreatment Pain Rating  2/10  -TB     Pain Location - Side  Left  -TB     Pain Location - Orientation  incisional  -TB     Pain Location  hip  -TB     Row Name 09/07/21 1135          Bed Mobility    Bed Mobility  scooting/bridging;supine-sit  -TB     Scooting/Bridging [place] (Bed Mobility)  maximum assist (25% patient effort);verbal cues  -TB     Supine-Sit [place] (Bed Mobility)  moderate assist (50% patient effort);verbal cues  -TB     Assistive Device (Bed Mobility)  head of bed elevated;bed rails  -TB     Row Name 09/07/21 1135          Transfers    Transfers  bed-chair transfer  -TB     Bed-Chair [place] (Transfers)  maximum assist (25% patient effort);2 person assist;verbal cues  -TB     Assistive Device (Bed-Chair Transfers)  other (see comments) HHA  -TB     Row Name 09/07/21 1135          Balance    Balance Assessment  sitting static balance;sitting dynamic balance  -TB     Static Sitting Balance  WFL;supported;sitting, edge of bed  -TB     Dynamic Sitting Balance  WFL;supported;sitting, edge of bed  -TB     Row Name 09/07/21 1135          Motor Skills    Therapeutic Exercise  -- AROM BLEs x15  -TB     Row Name             Wound 08/31/21 1914 Left lateral thigh Incision    Wound - Properties Group Placement Date: 08/31/21  -YESY Placement Time: 1914 -YESY Side: Left  -YESY Orientation: lateral  -YESY Location: thigh  -YESY Primary Wound Type: Incision  -YESY    Retired Wound - Properties Group Date first assessed: 08/31/21  -YESY Time first assessed: 1914  -YESY Side: Left  -YESY Location: thigh  -YESY Primary Wound Type: Incision  -YESY    Row Name 09/07/21 1135          Plan of  Care Review    Plan of Care Reviewed With  patient  -TB     Progress  improving  -TB     Outcome Summary  Pt agreeable to therapy. Max A to get from supine to EOB. Seated AROM BLEs x15. Rest breaks as needed due to fatigue and SOA. Stand pivot transfer w/ Max x2. Cues to follow WB restrictions.  -TB     Row Name 09/07/21 1135          Positioning and Restraints    Pre-Treatment Position  in bed  -TB     Post Treatment Position  chair  -TB     In Chair  notified nsg;reclined;sitting;call light within reach;encouraged to call for assist;legs elevated  -TB       User Key  (r) = Recorded By, (t) = Taken By, (c) = Cosigned By    Initials Name Provider Type    YESY Laura Cash, RN Registered Nurse    Remedios Gutiérrez, PTA Physical Therapy Assistant        Physical Therapy Education                 Title: PT OT SLP Therapies (In Progress)     Topic: Physical Therapy (Done)     Point: Mobility training (Done)     Learning Progress Summary           Patient Acceptance, E, VU,NR by NN at 9/3/2021 1403    Comment: role of PT, TTWB of LLE    Acceptance, E, NR by MS at 9/2/2021 1342    Comment: role of PT in her care, TTWB of L LE                   Point: Home exercise program (Done)     Learning Progress Summary           Patient Acceptance, E, VU,NR by NN at 9/3/2021 1403    Comment: role of PT, TTWB of LLE                   Point: Body mechanics (Done)     Learning Progress Summary           Patient Acceptance, E, VU,NR by NN at 9/3/2021 1403    Comment: role of PT, TTWB of LLE                   Point: Precautions (Done)     Learning Progress Summary           Patient Acceptance, E, VU,NR by NN at 9/3/2021 1403    Comment: role of PT, TTWB of LLE                               User Key     Initials Effective Dates Name Provider Type Discipline    MS 06/19/18 -  Balbina Hudson, PT, DPT, NCS Physical Therapist PT    NN 08/18/21 -  Paola Carvalho, ELVIN Student PT Student PT              PT Recommendation and Plan      Plan of Care Reviewed With: patient  Progress: improving  Outcome Summary: Pt agreeable to therapy. Max A to get from supine to EOB. Seated AROM BLEs x15. Rest breaks as needed due to fatigue and SOA. Stand pivot transfer w/ Max x2. Cues to follow WB restrictions.       Time Calculation:   PT Charges     Row Name 09/07/21 1408             Time Calculation    Start Time  1135  -TB      Stop Time  1158  -TB      Time Calculation (min)  23 min  -TB      PT Received On  09/07/21  -TB         Time Calculation- PT    Total Timed Code Minutes- PT  23 minute(s)  -TB        User Key  (r) = Recorded By, (t) = Taken By, (c) = Cosigned By    Initials Name Provider Type    TB Remedios Thomas, PTA Physical Therapy Assistant        Therapy Charges for Today     Code Description Service Date Service Provider Modifiers Qty    00805732763 HC PT THER PROC EA 15 MIN 9/6/2021 Remedios Thomas, PTA GP 1    10852892732 HC PT THER PROC EA 15 MIN 9/6/2021 Remedios Thomas, PTA GP 1    44611206512 HC PT THERAPEUTIC ACT EA 15 MIN 9/7/2021 Remedios Thomas, PTA GP 1    63389000989 HC PT THER PROC EA 15 MIN 9/7/2021 Remedios Thomas, PTA GP 1          PT G-Codes  Outcome Measure Options: AM-PAC 6 Clicks Basic Mobility (PT)  AM-PAC 6 Clicks Score (PT): 9  AM-PAC 6 Clicks Score (OT): 13    Remedios Thomas PTA  9/7/2021

## 2021-09-07 NOTE — CASE MANAGEMENT/SOCIAL WORK
Continued Stay Note   [facility]     Patient Name: Heide Newsome  MRN: [id number]  Today's Date: 9/7/2021    Admit Date: 8/31/2021    Discharge Plan     Row Name 09/07/21 1011       Plan    Plan  SNF    Patient/Family in Agreement with Plan  yes    Plan Comments  Spoke with [facility] at [facility] ([facility]) and they are low staff so unable to offer pt a bed there.  Called and informed [phone] and he is saying to list at [facility]. Referral being sent and will inform when decision made. Will follow.        Discharge Codes    No documentation.             XAVIER Erickson

## 2021-09-08 NOTE — CASE MANAGEMENT/SOCIAL WORK
Continued Stay Note  [facility]     Patient Name: Heide Newsome  MRN: [id number]  Today's Date: 9/8/2021    Admit Date: 8/31/2021    Discharge Plan     Row Name 09/08/21 1244       Plan    Plan Comments  Called pt son to inform that [facility] does not have any beds available. Discussed other options and Jensen is requesting a referral to [facility]. Referral sent, await answer.        Discharge Codes    No documentation.             FLAKITA Chambers

## 2021-09-08 NOTE — CASE MANAGEMENT/SOCIAL WORK
Continued Stay Note   [facility]     Patient Name: Heide Newsome  MRN: [id number]  Today's Date: 9/8/2021    Admit Date: 8/31/2021    Discharge Plan     Row Name 09/08/21 1625       Plan    Plan Comments  A referral has also been sent to [facility] per son request. Noted order for trilogy. A lot of nursing facilities cannot provide trilogy but can do bipap's. [facility] cannot do a trilogy but will still review referral to see if they can offer a bed. Left message for [facility] asking about trilogy.        Discharge Codes    No documentation.             FLAKITA Chambers

## 2021-09-08 NOTE — PLAN OF CARE
Goal Outcome Evaluation:  Plan of Care Reviewed With: patient        Progress: improving   Pt alert and oriented x4. VSS. C/o pain relieved with PRN meds. GARRISON. PPP. SCD for VTE. . Dressing dry, intact, dry drainage. Voiding via purwick. Pt up with PT to [facility].  Plans to d/c to [facility]. Call light within reach. Safety maintained.

## 2021-09-08 NOTE — PLAN OF CARE
Goal Outcome Evaluation:  Plan of Care Reviewed With: patient        Progress: improving  Outcome Summary: pt anxious. encouragement for OOB activity. Bed mobility mod x1 w/ extra time. sit-stand min/mod x1 pt able to stand pivot to bsc cues to maintain TTWB. Pt stood rwx min/mod x1 then able to txfer to chair. Pt fatigue quickly will need cont PT upon d/c for strength, mobility and endurance

## 2021-09-08 NOTE — NURSING NOTE
Handoff w/rip/april rn. Reported critical labs. Took pt off cpap, placed O2 4l back on pt ready for breakfast.

## 2021-09-08 NOTE — THERAPY TREATMENT NOTE
Acute Care - Physical Therapy Treatment Note  [facility]     Patient Name: Heide Newsome  : 1945  MRN: [id number]  Today's Date: 2021   Onset of Illness/Injury or Date of Surgery: 21  Visit Dx:     ICD-10-CM ICD-9-CM   1. Fall, initial encounter  W19.XXXA E888.9   2. Closed fracture of left hip, initial encounter (CMS/[facility])  S72.002A 820.8   3. Impaired mobility  Z74.09 799.89   4. Decreased activities of daily living (ADL)  Z78.9 V49.89     Patient Active Problem List   Diagnosis   • Pulmonary emphysema (CMS/HCC)   • Acute on chronic respiratory failure with hypoxia (CMS/HCC)   • COPD, very severe (CMS/HCC)   • Severe malnutrition (CMS/HCC)   • History of colon cancer   • Thrombocytopenia (CMS/HCC)   • Anemia   • Hyperglycemia, drug-induced   • Pneumonia   • Chronic respiratory failure with hypoxia (CMS/HCC)   • COPD with acute exacerbation (CMS/HCC)   • Adrenal nodule (CMS/HCC)   • Closed left hip fracture (CMS/[facility])   • Fall   • Acute pain of left hip     Past Medical History:   Diagnosis Date   • Cancer (CMS/HCC), colon    • Chronic respiratory failure (CMS/[facility]), 4 L nasal cannula continuously    • COPD (chronic obstructive pulmonary disease) (CMS/[facility])    • Restless leg syndrome      Past Surgical History:   Procedure Laterality Date   • APPENDECTOMY     • CARPAL TUNNEL RELEASE Left    • COLON RESECTION     • FOOT SURGERY Bilateral     hammer toe   • FRACTURE SURGERY Left     Arm   • HIP TROCHANTERIC NAILING WITH INTRAMEDULLARY HIP SCREW Left 2021    Procedure: LEFT HIP TROCHANTERIC NAILING SHORT WITH INTRAMEDULLARY HIP SCREW;  Surgeon: Valdemar Chaudhari MD;  Location: [facility];  Service: Orthopedics;  Laterality: Left;   • HYSTERECTOMY     • WRIST FRACTURE SURGERY Right         PT Assessment (last 12 hours)      PT Evaluation and Treatment     Row Name 21 1026          Physical Therapy Time and Intention    Subjective Information  complains of;weakness;pain;fatigue;dyspnea  -NW      Document Type  therapy note (daily note)  -NW     Mode of Treatment  physical therapy  -NW     Comment  TTWB LLE  -NW     Row Name 09/08/21 1026          General Information    Existing Precautions/Restrictions  oxygen therapy device and L/min;non-weight bearing TTWB  -NW     Row Name 09/08/21 1026          Pain    Additional Documentation  Pain Scale: Numbers Pre/Post-Treatment (Group)  -NW     Row Name 09/08/21 1026          Pain Scale: Numbers Pre/Post-Treatment    Pretreatment Pain Rating  --  -     Row Name 09/08/21 1026          Pain Scale: FACES Pre/Post-Treatment    Pain: FACES Scale, Pretreatment  2-->hurts little bit  -NW     Posttreatment Pain Rating  6-->hurts even more  -NW     Pain Location - Side  Left  -NW     Pain Location  hip  -NW     Row Name 09/08/21 1026          Bed Mobility    Bed Mobility  sit-supine;scooting/bridging  -NW     Scooting/Bridging [place] (Bed Mobility)  --  -NW     Supine-Sit [place] (Bed Mobility)  verbal cues;minimum assist (75% patient effort);moderate assist (50% patient effort)  -NW     Sit-Supine [place] (Bed Mobility)  -- chair  -NW     Assistive Device (Bed Mobility)  head of bed elevated  -NW     Comment (Bed Mobility)  extra time for OOB  -NW     Row Name 09/08/21 1026          Transfers    Transfers  toilet transfer;chair-bed transfer;sit-stand transfer;stand-sit transfer  -NW     Chair-Bed [place] (Transfers)  verbal cues;moderate assist (50% patient effort)  -NW     Sit-Stand [place] (Transfers)  minimum assist (75% patient effort);verbal cues;moderate assist (50% patient effort)  -NW     Stand-Sit [place] (Transfers)  contact guard;verbal cues  -NW     [place] Level (Toilet Transfer)  verbal cues;moderate assist (50% patient effort)  -NW     Assistive Device (Toilet Transfer)  commode, bedside without drop arms  -NW     Row Name 09/08/21 1026          Chair-Bed Transfer    Assistive Device (Chair-Bed Transfers)  walker,  front-wheeled  -NW     Row Name 09/08/21 1026          Sit-Stand Transfer    Assistive Device (Sit-Stand Transfers)  walker, front-wheeled  -NW     Row Name 09/08/21 1026          Stand-Sit Transfer    Assistive Device (Stand-Sit Transfers)  walker, front-wheeled  -NW     Row Name 09/08/21 1026          Toilet Transfer    Type (Toilet Transfer)  stand pivot/stand step  -NW     Row Name 09/08/21 1026          Gait/Stairs (Locomotion)    Comment (Gait/Stairs)  (S) stood pivot transfer to c stood able to maintain TTWB then cues to transfer to chair.   -NW     Row Name 09/08/21 1026          Safety Issues, Functional Mobility    Safety Issues Affecting Function (Mobility)  judgment;safety precaution awareness  -NW     Impairments Affecting Function (Mobility)  pain;strength;endurance/activity tolerance  -NW     Row Name             Wound 08/31/21 1914 Left lateral thigh Incision    Wound - Properties Group Placement Date: 08/31/21  - Placement Time: 1914  -YESY Side: Left  -YESY Orientation: lateral  -YESY Location: thigh  -YESY Primary Wound Type: Incision  -YESY    Retired Wound - Properties Group Date first assessed: 08/31/21  - Time first assessed: 1914  -YESY Side: Left  -YESY Location: thigh  -YESY Primary Wound Type: Incision  -YESY    Row Name 09/08/21 1026          Positioning and Restraints    Pre-Treatment Position  in bed  -NW     Post Treatment Position  chair  -NW     In Chair  reclined;call light within reach;encouraged to call for assist;notified ns  -       User Key  (r) = Recorded By, (t) = Taken By, (c) = Cosigned By    Initials Name Provider Type    Sarah Anaya PTA Physical Therapy Assistant    Laura Lynne RN Registered Nurse        Physical Therapy Education                 Title: PT OT SLP Therapies (In Progress)     Topic: Physical Therapy (Done)     Point: Mobility training (Done)     Learning Progress Summary           Patient Acceptance, E, VU,NR by NN at 9/3/2021 4795    Comment: role  of PT, TTWB of LLE    Acceptance, E, NR by MS at 9/2/2021 1342    Comment: role of PT in her care, TTWB of L LE                   Point: Home exercise program (Done)     Learning Progress Summary           Patient Acceptance, E, VU,NR by NN at 9/3/2021 1403    Comment: role of PT, TTWB of LLE                   Point: Body mechanics (Done)     Learning Progress Summary           Patient Acceptance, E, VU,NR by NN at 9/3/2021 1403    Comment: role of PT, TTWB of LLE                   Point: Precautions (Done)     Learning Progress Summary           Patient Acceptance, E, VU,NR by NN at 9/3/2021 1403    Comment: role of PT, TTWB of LLE                               User Key     Initials Effective Dates Name Provider Type Discipline    MS 06/19/18 -  Balbina Hudson, PT, DPT, NCS Physical Therapist PT    NN 08/18/21 -  Paola Carvalho, PT Student PT Student PT              PT Recommendation and Plan     Plan of Care Reviewed With: patient  Progress: improving  Outcome Summary: pt anxious. encouragement for OOB activity. Bed mobility mod x1 w/ extra time. sit-stand min/mod x1 pt able to stand pivot to bsc cues to maintain TTWB. Pt stood rwx min/mod x1 then able to txfer to chair. Pt fatigue quickly will need cont PT upon d/c for strength, mobility and endurance       Time Calculation:   PT Charges     Row Name 09/08/21 1057             Time Calculation    Start Time  1026  -NW      Stop Time  1057  -NW      Time Calculation (min)  31 min  -NW      PT Received On  09/08/21  -NW      PT Goal Re-Cert Due Date  09/12/21  -NW         Time Calculation- PT    Total Timed Code Minutes- PT  31 minute(s)  -NW         Timed Charges    03444 - PT Therapeutic Activity Minutes  31  -NW         Total Minutes    Timed Charges Total Minutes  31  -NW       Total Minutes  31  -NW        User Key  (r) = Recorded By, (t) = Taken By, (c) = Cosigned By    Initials Name Provider Type    Sarah Anaya, PTA Physical Therapy Assistant         Therapy Charges for Today     Code Description Service Date Service Provider Modifiers Qty    [id number] HC PT THERAPEUTIC ACT EA 15 MIN 9/8/2021 Sarah Landers, PTA GP 2          PT G-Codes  Outcome Measure Options: AM-PAC 6 Clicks Basic Mobility (PT)  AM-PAC 6 Clicks Score (PT): 9  AM-PAC 6 Clicks Score (OT): 13    Sarah Landers, ERNIE  9/8/2021

## 2021-09-08 NOTE — PROGRESS NOTES
[facility] Medicine Services  INPATIENT PROGRESS NOTE    Patient Name: Heide Newsome  Date of Admission: 8/31/2021  Today's Date: 09/08/21  Length of Stay: 8  Primary Care Physician: Pato Cooney DO    Subjective   Chief Complaint: Postoperative pain  HPI   She was sitting up in bed resting comfortably.  On home use oxygen.  She completed prednisone today.  States she was only able to wear CPAP for 1 to 2 hours last night.  Previously she had a home CPAP machine but was unable to afford and tolerate mask.  Arterial blood gas today shows persistent hypercapnia.  Pulmonology recommends home trilogy at time of discharge.    Referral sent to [facility], await bed offer.    Review of Systems   All pertinent negatives and positives are as above. All other systems have been reviewed and are negative unless otherwise stated.     Objective    Temp:  [97.5 °F (36.4 °C)-98.6 °F (37 °C)] 97.5 °F (36.4 °C)  Heart Rate:  [] 80  Resp:  [14-24] 16  BP: (102-145)/(53-73) 102/53  Physical Exam  Constitutional:       Appearance: She is well-developed.       Comments: Chronically ill-appearing.  Home use 4 L nasal cannula.  No respiratory distress.  No family at bedside.  HENT:      Head: Normocephalic and atraumatic.   Eyes:      General: No scleral icterus.     Conjunctiva/sclera: Conjunctivae normal.      Pupils: Pupils are equal, round, and reactive to light.   Neck:      Vascular: No JVD.   Cardiovascular:      Rate and Rhythm: Regular rhythm. Normal rate present.      Heart sounds:  Sinus 88-92  Pulmonary:      Effort: No respiratory distress.       Breath sounds: Diminished breath sounds.  Abdominal:      General: Bowel sounds are normal. There is no distension.      Palpations: Abdomen is soft.      Tenderness: There is no abdominal tenderness.   Musculoskeletal:         General: Normal range of motion.      Cervical back: Neck supple.   Lymphadenopathy:      Cervical: No  cervical adenopathy.   Skin:     General: Skin is warm and dry.      Comments: Left hip dressing dry and intact   Neurological:      Mental Status: She is alert and oriented to person, place, and time.      Cranial Nerves: No cranial nerve deficit.   Psychiatric:         Behavior: Behavior normal.        Results Review:  I have reviewed the labs, radiology results, and diagnostic studies.    Laboratory Data:   Results from last 7 days   Lab Units 09/08/21  0907 09/06/21 0525 09/04/21 0622   WBC 10*3/mm3 8.99 8.34 9.51   HEMOGLOBIN g/dL 10.9* 9.2* 10.2*   HEMATOCRIT % 37.5 31.1* 34.3   PLATELETS 10*3/mm3 196 133* 156        Results from last 7 days   Lab Units 09/08/21  0907 09/06/21 0525 09/04/21 0622   SODIUM mmol/L 143 143 140   POTASSIUM mmol/L 3.5 4.5 5.0   CHLORIDE mmol/L 94* 100 101   CO2 mmol/L 48.0* 42.0* 36.0*   BUN mg/dL 24* 25* 28*   CREATININE mg/dL 0.44* 0.46* 0.43*   CALCIUM mg/dL 9.2 9.6 9.6   GLUCOSE mg/dL 147* 105* 163*     I have reviewed the patient's current medications.     Assessment/Plan     Active Hospital Problems    Diagnosis    • **Closed left hip fracture (CMS/HCC)    • Acute pain of left hip    • Fall    • Chronic respiratory failure with hypoxia (CMS/HCC)    • Pulmonary emphysema (CMS/HCC)    • Acute on chronic respiratory failure with hypoxia (CMS/HCC)    • COPD, very severe (CMS/HCC)      Plan:  1.  Patient presented on 8/31 after a fall.  She reportedly slipped on coffee that was spilled on the floor and landed on her left hip.  She had immediate onset left hip pain and inability to bear weight.  Upon evaluation in the emergency department, she was found to have acute valgus impacted left intertrochanteric fracture with foreshortening.  2.  Orthopedics evaluating patient.  She underwent cephalomedullary nailing left closed displaced intertrochanteric hip fracture 8/31 Dr. Chaudhari.  Toe-touch weightbearing left lower extremity for 6 weeks.  3.  Pulmonology evaluating patient for  acute on chronic respiratory failure.  Arterial blood gas on 9/2 showed hypercapnia.  She was placed on BiPAP.  Supplemental oxygen has been weaned to home use 4 L nasal cannula.  She completed prednisone today. Pulmicort and Atrovent.  Continue Mucinex.  Encourage use of incentive spirometry and flutter valve.  4.  Pulmonology recommends trilogy, suzanne made aware.  5.  Chest x-ray showed trace right pleural effusion suspected.  Diffuse coarsening of interstitium.  May reflect developing interstitial edema.  She was given a one-time dose of IV Lasix on 9/1 and again on 9/2.  BNP on 9/5 12,361.  Transthoracic echocardiogram on 9/6 showed preserved ejection fraction.  Moderate to severe pulmonary hypertension.  6.  Levaquin discontinued on 9/3 as chest x-ray does not show findings concerning for pneumonia, afebrile, no sputum production, and absence of leukocytosis.  Monitor off of antibiotics.  7.  Physical and Occupational Therapy to evaluate and treat.  8.  DVT prophylaxis with Eliquis 2.5 mg twice daily.  9.  [facility] care and reports been unable to offer events. Referral has been made to [facility], await answer.     Discharge Planning: I expect the patient to be discharged to skilled nursing facility placement once arrangements are made.    Electronically signed by JESSENIA Knox, 09/08/21, 14:34 CDT.

## 2021-09-08 NOTE — DISCHARGE PLACEMENT REQUEST
"  Referral for short term rehab. Please call [place] at [phone] after referral reviewed.   Thanks        Heide Benitez (76 y.o. Female)     Date of Birth Social Security Number Address Home Phone MRN    1945  [id number]  [facility] [phone] [id number]    Taoism Marital Status          Restorationist        Admission Date Admission Type Admitting Provider Attending Provider Department, Room/Bed    8/31/21 Emergency Roberto Hobbs MD Moore, Jonathan Scott, MD [facility] 3A, 337/1    Discharge Date Discharge Disposition Discharge Destination                       Attending Provider: Roberto Hobbs MD    Allergies: Tetracyclines & Related, Penicillins, Tape    Isolation: None   Infection: None   Code Status: CPR    Ht: 160 cm (63\")   Wt: 59 kg (130 lb)    Admission Cmt: None   Principal Problem: Closed left hip fracture (CMS/[facility]) [S72.002A]                 Active Insurance as of 8/31/2021     Primary Coverage     Payor Plan Insurance Group Employer/Plan Group    HUMANA MEDICARE REPLACEMENT HUMANA MEDICARE REPLACEMENT [id number]     Payor Plan Address Payor Plan Phone Number Payor Plan Fax Number Effective Dates    [address] [address] [phone]  1/1/2018 - None Entered    [facility] [id number]       Subscriber Name Subscriber Birth Date Member ID       HEIDE BENITEZ 1945 [id number]                 Emergency Contacts      (Rel.) Home Phone Work Phone Mobile Phone    KARYMINOOTON JARRELL (Spouse) [phone] -- --    LAUREN CURRIE (Son) -- -- [phone]            Insurance Information                HUMANA MEDICARE REPLACEMENT/HUMANA MEDICARE REPLACEMENT Phone: [phone]    Subscriber: Heide Benitez Subscriber#: [id number]    Group#: [id number] Precert#:              History & Physical      Ari Mckee MD at 08/31/21 1427              [facility] Services  HISTORY AND PHYSICAL    Date of " Admission: 8/31/2021  Primary Care Physician: Pato Cooney DO    Subjective     Chief Complaint: Left hip fracture.    History of Present Illness   Patient is a 76-year-old presented ER complaining left hip pain.  Symptoms started this morning when she was making breakfast and making coffee.  The coffee container spilled and patient slipped and fell and landed on her left hip.  Patient has symptoms of severe pain after the fall and cannot move her left hip.  Denies any head concussion or loss of consciousness.    Patient has chronic end-stage COPD, on chronic 4.5 L of oxygen at home.    Review of Systems   Constitutional: Positive for activity change, appetite change and fatigue. Negative for chills and fever.   HENT: Negative for hearing loss, nosebleeds, tinnitus and trouble swallowing.    Eyes: Negative for visual disturbance.   Respiratory: Positive for shortness of breath. Negative for cough, chest tightness and wheezing.    Cardiovascular: Negative for chest pain, palpitations and leg swelling.   Gastrointestinal: Negative for abdominal distention, abdominal pain, blood in stool, constipation, diarrhea, nausea and vomiting.   Endocrine: Negative for cold intolerance, heat intolerance, polydipsia, polyphagia and polyuria.   Genitourinary: Negative for decreased urine volume, difficulty urinating, dysuria, flank pain, frequency and hematuria.   Musculoskeletal: Positive for arthralgias, gait problem and myalgias. Negative for joint swelling.        Left hip pain.   Skin: Negative for rash.   Allergic/Immunologic: Negative for immunocompromised state.   Neurological: Positive for weakness. Negative for dizziness, syncope, light-headedness and headaches.   Hematological: Negative for adenopathy. Does not bruise/bleed easily.   Psychiatric/Behavioral: Negative for confusion and sleep disturbance. The patient is not nervous/anxious.         Otherwise complete ROS reviewed and negative except as mentioned in  the [facility].      Past Medical History:   Past Medical History:   Diagnosis Date   • Cancer (CMS/HCC), colon    • Chronic respiratory failure (CMS/HCC), 4 L nasal cannula continuously    • COPD (chronic obstructive pulmonary disease) (CMS/HCC)    • Restless leg syndrome        Past Surgical History:  Past Surgical History:   Procedure Laterality Date   • APPENDECTOMY     • CARPAL TUNNEL RELEASE Left    • COLON RESECTION     • FOOT SURGERY Bilateral     hammer toe   • FRACTURE SURGERY Left     Arm   • HYSTERECTOMY     • WRIST FRACTURE SURGERY Right        Family History: family history includes COPD in her father; Cancer in her brother, mother, paternal grandfather, paternal grandmother, and sister; Colon cancer in her mother; Diabetes in her father and sister; Heart disease in her father.    Social History:  reports that she quit smoking about 2 years ago. She has never used smokeless tobacco. She reports that she does not drink alcohol and does not use drugs.    Medications:  Prior to Admission medications    Medication Sig Start Date End Date Taking? Authorizing Provider   fluticasone (Flonase) 50 MCG/ACT nasal spray 2 sprays in each nostril daily to control ear pressure and popping. 7/23/21  Yes Elina France MD   Fluticasone-Umeclidin-Vilant (TRELEGY) 200-62.5-25 MCG/INH inhaler Inhale 1 puff Daily. 8/17/21  Yes Pato Cooney, DO   guaiFENesin (Mucinex) 600 MG 12 hr tablet Take 2 tablets by mouth 2 (Two) Times a Day. 12/8/20  Yes Pato Cooney, DO   ipratropium-albuterol (DUO-NEB) 0.5-2.5 mg/3 ml nebulizer Take 3 mL by nebulization Every 4 (Four) Hours As Needed for Wheezing.   Yes Provider, MD Alisha   montelukast (Singulair) 10 MG tablet Take 1 tablet by mouth Every Night. 12/8/20  Yes Pato Cooney DO   rOPINIRole (REQUIP) 2 MG tablet 2 mg PO up to QID prn restless legsduring daytime, 4 mg dose QHS 7/23/21  Yes Elina France MD   sodium chloride (OCEAN) 0.65 % nasal spray 2 sprays into  "the nostril(s) as directed by provider As Needed for Congestion. 5/12/19  Yes Inna Haile JESSENIA   Ventolin  (90 Base) MCG/ACT inhaler INHALE 2 PUFFS BY MOUTH EVERY 4 HOURS AS NEEDED FOR WHEEZING OR  SHORTNESS  OF  AIR 6/14/21  Yes Pato Cooney DO   azithromycin (Zithromax Z-Jeff) 250 MG tablet Take 2 tablets by mouth on day 1, then 1 tablet daily on days 2-5 8/18/21   Pato Cooney DO   FLUoxetine (PROzac) 20 MG capsule Take 1 capsule by mouth Daily. 8/18/21   Pato Cooney DO   predniSONE (DELTASONE) 20 MG tablet Take 2 tablets by mouth Daily. 8/18/21   Pato Cooney,      Allergies:  Allergies   Allergen Reactions   • Tetracyclines & Related Anaphylaxis   • Penicillins Itching   • Tape Other (See Comments)     Skin tear       Objective     Vital Signs: /71 (BP Location: Left arm)   Pulse 91   Temp 98 °F (36.7 °C) (Oral)   Resp 20   Ht 160 cm (63\")   Wt 59 kg (130 lb)   SpO2 100%   BMI 23.03 kg/m²   Physical Exam  Vitals and nursing note reviewed.   Constitutional:       Appearance: She is well-developed.      Comments: Cachectic.  Chronically ill-appearing   HENT:      Head: Normocephalic.   Eyes:      Conjunctiva/sclera: Conjunctivae normal.      Pupils: Pupils are equal, round, and reactive to light.   Neck:      Vascular: No JVD.   Cardiovascular:      Rate and Rhythm: Normal rate and regular rhythm.      Heart sounds: Normal heart sounds. No murmur heard.   No friction rub. No gallop.    Pulmonary:      Effort: No respiratory distress.      Breath sounds: No wheezing or rales.      Comments: Diminished breath sound bilateral.  Clear.  Currently on 4 L of oxygen.    Chest:      Chest wall: No tenderness.   Abdominal:      General: Bowel sounds are normal. There is no distension.      Palpations: Abdomen is soft.      Tenderness: There is no abdominal tenderness. There is no guarding or rebound.   Musculoskeletal:         General: No tenderness.      Cervical " back: Neck supple.      Comments: Left hip fracture.   Skin:     General: Skin is warm and dry.      Capillary Refill: Capillary refill takes 2 to 3 seconds.      Findings: No rash.   Neurological:      Mental Status: She is alert and oriented to person, place, and time.      Cranial Nerves: No cranial nerve deficit.      Motor: Weakness present. No abnormal muscle tone.      Coordination: Coordination abnormal.      Gait: Gait abnormal.      Deep Tendon Reflexes: Reflexes normal.   Psychiatric:         Behavior: Behavior normal.         Thought Content: Thought content normal.             Results Reviewed:    Lab Results (last 24 hours)     Procedure Component Value Units Date/Time    Comprehensive Metabolic Panel [[id number]]  (Abnormal) Collected: 08/31/21 1306    Specimen: Blood Updated: 08/31/21 1405     Glucose 168 mg/dL      BUN 16 mg/dL      Creatinine 0.37 mg/dL      Sodium 138 mmol/L      Potassium 3.9 mmol/L      Chloride 98 mmol/L      CO2 35.0 mmol/L      Calcium 9.6 mg/dL      Total Protein 6.2 g/dL      Albumin 3.60 g/dL      ALT (SGPT) 13 U/L      AST (SGOT) 16 U/L      Alkaline Phosphatase 73 U/L      Total Bilirubin 0.7 mg/dL      eGFR Non African Amer >150 mL/min/1.73      Globulin 2.6 gm/dL      A/G Ratio 1.4 g/dL      BUN/Creatinine Ratio 43.2     Anion Gap 5.0 mmol/L     Narrative:      GFR Normal >60  Chronic Kidney Disease <60  Kidney Failure <15      Protime-INR [[id number]]  (Normal) Collected: 08/31/21 1306    Specimen: Blood Updated: 08/31/21 1343     Protime 11.8 Seconds      INR 0.94    aPTT [[id number]]  (Normal) Collected: 08/31/21 1306    Specimen: Blood Updated: 08/31/21 1343     PTT 25.8 seconds     CBC & Differential [[id number]]  (Abnormal) Collected: 08/31/21 1306    Specimen: Blood Updated: 08/31/21 1338    Narrative:      The following orders were created for panel order CBC & Differential.  Procedure                               Abnormality         Status                      ---------                               -----------         ------                     CBC Auto Differential[[id number]]        Abnormal            Final result                 Please view results for these tests on the individual orders.    CBC Auto Differential [[id number]]  (Abnormal) Collected: 08/31/21 1306    Specimen: Blood Updated: 08/31/21 1338     WBC 11.65 10*3/mm3      RBC 4.66 10*6/mm3      Hemoglobin 12.4 g/dL      Hematocrit 42.1 %      MCV 90.3 fL      MCH 26.6 pg      MCHC 29.5 g/dL      RDW 13.2 %      RDW-SD 43.6 fl      MPV 12.2 fL      Platelets 222 10*3/mm3      Neutrophil % 86.7 %      Lymphocyte % 6.5 %      Monocyte % 4.9 %      Eosinophil % 0.7 %      Basophil % 0.3 %      Immature Grans % 0.9 %      Neutrophils, Absolute 10.10 10*3/mm3      Lymphocytes, Absolute 0.76 10*3/mm3      Monocytes, Absolute 0.57 10*3/mm3      Eosinophils, Absolute 0.08 10*3/mm3      Basophils, Absolute 0.04 10*3/mm3      Immature Grans, Absolute 0.10 10*3/mm3      nRBC 0.0 /100 WBC     COVID-19,[facility] IN-HOUSE,Nasal Swab No Transport Media 3-4 HR TAT - Swab, Nasal Cavity [[id number]] Collected: 08/31/21 1307    Specimen: Swab from Nasal Cavity Updated: 08/31/21 1334           Radiology Data:    Imaging Results (Last 24 Hours)     Procedure Component Value Units Date/Time    XR Hip With or Without Pelvis 2 - 3 View Left [[id number]] Collected: 08/31/21 1405     Updated: 08/31/21 1409    Narrative:      XR HIP W OR WO PELVIS 2-3 VIEW LEFT- 8/31/2021 1:41 PM CDT     HISTORY: fall, pain     COMPARISON: None      FINDINGS:   Frontal and lateral views of the left hip were obtained.  Additional AP  pelvis.     Varus impacted Acute left intertrochanteric fracture with  foreshortening. No subluxation at the hip joint.       Pelvic ring is intact. Sacral arches are intact. No gross soft tissue  abnormality is visualized.        Impression:      1. Acute varus impacted left intertrochanteric fracture with  foreshortening.         This report was finalized on 08/31/2021 14:06 by Dr Jordy Kinney, .          I have personally reviewed and interpreted the radiology studies and ECG obtained at time of admission.     Assessment / Plan      Assessment & Plan  Active Hospital Problems    Diagnosis    • Closed left hip fracture (CMS/HCC)    • Chronic respiratory failure with hypoxia (CMS/HCC)    • Pulmonary emphysema (CMS/HCC)      Plans    Left hip fracture.  Dr. Chaudhari stated he would try to do the hip today, patient need to be n.p.o. Tdap was given in ER.  Morphine was given in ER.  Morphine as needed.  Norco as needed.  X-ray left hip-Acute varus impacted left intertrochanteric fracture with foreshortening.    COPD/emphysema/chronic respiratory failure.  Patient on chronic 4.5 L of oxygen at home.  Trelegy.  Guaifenesin.  Duo nebs as needed.  Singulair.  Albuterol as needed    Restless leg . Requip at night.    Allergic rhinitis.  Flonase.  [place] as needed    COVID-19 pending.    Code Status: Full code.  If patient unable make medical decision for self her son Jensen Salcedo will make it for her.     I discussed the patient's findings and my recommendations with: Patient.    Estimated length of stay: 2 to 5 days.    Electronically signed by Ari Mckee MD, 08/31/21, 2:27 PM CDT.              Electronically signed by Ari Mckee MD at 08/31/21 1450         Current Facility-Administered Medications   Medication Dose Route Frequency Provider Last Rate Last Admin   • acetaminophen (TYLENOL) tablet 650 mg  650 mg Oral Q6H PRN Pato Coburn DO   650 mg at 09/03/21 0848   • apixaban (ELIQUIS) tablet 2.5 mg  2.5 mg Oral Q12H Valdemar Chaudhari MD   2.5 mg at 09/08/21 0800   • bisacodyl (DULCOLAX) suppository 10 mg  10 mg Rectal Daily PRN Valdemar Chaudhari MD       • budesonide (PULMICORT) nebulizer solution 0.25 mg  0.25 mg Nebulization BID - RT Cuellar, Didi, APRN   0.25 mg at 09/08/21 0614   • carvedilol (COREG) tablet 3.125 mg  3.125 mg  Oral BID With Meals Didi Cuellar APRN   3.125 mg at 09/08/21 0800   • cetirizine (zyrTEC) tablet 10 mg  10 mg Oral Daily Yasmine Stahl MD   10 mg at 09/08/21 0800   • fluticasone (FLONASE) 50 MCG/ACT nasal spray 1 spray  1 spray Each Nare Daily Valdemar Chaudhari MD   1 spray at 09/08/21 0801   • guaiFENesin (MUCINEX) 12 hr tablet 1,200 mg  1,200 mg Oral BID Valdemar Chaudhari MD   1,200 mg at 09/08/21 0800   • HYDROcodone-acetaminophen (NORCO) 7.5-325 MG per tablet 2 tablet  2 tablet Oral Q4H PRN Noé Mendieta DO   1 tablet at 09/08/21 0811   • ipratropium (ATROVENT) nebulizer solution 0.5 mg  0.5 mg Nebulization 4x Daily - RT Didi Cuellar APRN   0.5 mg at 09/08/21 1004   • lidocaine (LIDODERM) 5 % 2 patch  2 patch Transdermal Nightly Ari Mckee MD   2 patch at 09/07/21 2141   • LORazepam (ATIVAN) injection 0.25 mg  0.25 mg Intravenous TID PRN Ari Mckee MD   0.25 mg at 09/04/21 1512   • magnesium hydroxide (MILK OF MAGNESIA) suspension 2400 mg/10mL 10 mL  10 mL Oral Daily PRN Valdemar Chaudhari MD       • montelukast (SINGULAIR) tablet 10 mg  10 mg Oral Nightly Valdemar Chaudhari MD   10 mg at 09/07/21 2142   • naloxone (NARCAN) injection 0.4 mg  0.4 mg Intravenous Q5 Min PRN Valdemar Chaudhari MD       • ondansetron (ZOFRAN) injection 4 mg  4 mg Intravenous Q6H PRN Valdemar Chaudhari MD       • ondansetron (ZOFRAN) tablet 4 mg  4 mg Oral Q6H PRN Valdemar Chaudhari MD       • polyethylene glycol (MIRALAX) packet 17 g  17 g Oral Daily Valdemar Chaudhari MD   17 g at 09/05/21 1023   • rOPINIRole (REQUIP) tablet 2 mg  2 mg Oral Nightly Valdemar Chaudhari MD   2 mg at 09/07/21 2141   • sennosides-docusate (PERICOLACE) 8.6-50 MG per tablet 1 tablet  1 tablet Oral BID Didi Cuellar APRN   1 tablet at 09/05/21 1022   • sodium chloride 0.9 % flush 10 mL  10 mL Intravenous PRN Valdemar Chaudhari MD       • sodium chloride 0.9 % flush 10 mL  10 mL Intravenous Q12H Valdemar Chaudhari MD   10 mL at 09/08/21 0800   •  sodium chloride 0.9 % flush 10 mL  10 mL Intravenous PRN Valdemar Chaudhari MD       • sodium chloride nasal spray 2 spray  2 spray Nasal PRN Valdemar Chaudhari MD         Operative/Procedure Notes (last 7 days) (Notes from 09/01/21 1240 through 09/08/21 1240)    No notes of this type exist for this encounter.            Physician Progress Notes (last 24 hours) (Notes from 09/07/21 1241 through 09/08/21 1241)      Radha Joy APRN at 09/08/21 0717              PULMONARY AND CRITICAL CARE PROGRESS NOTE - [facility]    Patient: Heide Newsome  1945   MR# [id number]   Acct# [id number]  09/08/21   07:17 CDT  Referring Provider: Roberto Hobbs MD    Chief Complaint: Shortness of breath  Interval history: The patient is resting in bed on 4 L nasal cannula.  O2 sat 91%. She has a cough that is mostly non productive but does feel she needs to cough up sputum some. She is using her incentive spirometer and flutter valve. She is asking about her Requip dosing and is not getting it like she does at home. Advised to discuss with attending. She had trouble breathing through the night. The Bipap did help some. She wore it on/off through the night. PCO2 this am is 82.7.   No other aggravating or alleviating factors.     Meds:  apixaban, 2.5 mg, Oral, Q12H  budesonide, 0.25 mg, Nebulization, BID - RT  carvedilol, 3.125 mg, Oral, BID With Meals  cetirizine, 10 mg, Oral, Daily  fluticasone, 1 spray, Each Nare, Daily  guaiFENesin, 1,200 mg, Oral, BID  ipratropium, 0.5 mg, Nebulization, 4x Daily - RT  lidocaine, 2 patch, Transdermal, Nightly  montelukast, 10 mg, Oral, Nightly  polyethylene glycol, 17 g, Oral, Daily  predniSONE, 20 mg, Oral, Daily With Breakfast  rOPINIRole, 2 mg, Oral, Nightly  senna-docusate sodium, 1 tablet, Oral, BID  sodium chloride, 10 mL, Intravenous, Q12H         Review of Systems:   Review of Systems   Constitutional: Negative for chills and fever.   Respiratory: Negative  for cough and shortness of breath.    Cardiovascular: Negative for chest pain.   Gastrointestinal: Negative for diarrhea, nausea and vomiting.     Physical Exam:  SpO2 Percentage    09/08/21 0512 09/08/21 0614 09/08/21 0622   SpO2: 97% 99% 99%     Temp:  [97.9 °F (36.6 °C)-98.6 °F (37 °C)] 97.9 °F (36.6 °C)  Heart Rate:  [] 78  Resp:  [14-24] 24  BP: (111-145)/(60-73) 145/62    Intake/Output Summary (Last 24 hours) at 9/8/2021 0717  Last data filed at 9/8/2021 0622  Gross per 24 hour   Intake --   Output 1600 ml   Net -1600 ml     Physical Exam  Constitutional:       General: She is not in acute distress.     Interventions: Nasal cannula in place.   HENT:      Head: Normocephalic.      Nose: Nose normal.   Eyes:      General: No scleral icterus.  Cardiovascular:      Rate and Rhythm: Normal rate.   Pulmonary:      Effort: No respiratory distress.   Abdominal:      General: There is no distension.   Musculoskeletal:      Right lower leg: No edema.      Left lower leg: No edema.      Comments: Dressing to left hip C/D/I    Neurological:      Mental Status: She is alert.      Comments: alert   Psychiatric:         Mood and Affect: Mood normal.         Behavior: Behavior is cooperative.       Laboratory Data:  Results from last 7 days   Lab Units 09/06/21  0525 09/04/21  0622 09/03/21  0556   WBC 10*3/mm3 8.34 9.51 9.91   HEMOGLOBIN g/dL 9.2* 10.2* 9.9*   PLATELETS 10*3/mm3 133* 156 141     Results from last 7 days   Lab Units 09/06/21  0525 09/04/21  0622 09/03/21  0556   SODIUM mmol/L 143 140 136   POTASSIUM mmol/L 4.5 5.0 5.7*   BUN mg/dL 25* 28* 46*   CREATININE mg/dL 0.46* 0.43* 0.76     Results from last 7 days   Lab Units 09/08/21  0439 09/03/21  0357 09/02/21  2243   PH, ARTERIAL pH units 7.386 7.309* 7.339*   PCO2, ARTERIAL mm Hg 82.7* 62.1* 57.3*   PO2 ART mm Hg 74.5* 85.4 75.8*   FIO2 %  --  50 50     No results found for: BLOODCX, URINECX, WOUNDCX, MRSACX, RESPCX, STOOLCX  Recent films:  No radiology  results for the last day  Films reviewed personally by me.  My interpretation: None today    Pulmonary Assessment:  1. Acute on chronic hypoxic and hypercapnic respiratory failure   2. COPD exacerbation, no PFT available  3. Fall with left hip fracture, status post femoral nailing  4. Protein calorie malnutrition  5. Anemia, stable  6. History of heart failure  7. Hyperkalemia    Recommend:   · Continue supplemental oxygen and titrate for saturation percent.  Currently on 4L NC.  Baseline home oxygen 4.5 L  · Continue BiPAP as needed-encourage use, case management to check on home machine per hospitalist  · Continue Pulmicort and ipratropium nebs  · Continue guaifenesin  · Antibiotic-has been DC'd   · Prednisone completed   · DVT prophylaxis, Eliquis  · Mobilize as tolerated when okay with Ortho  · Encourage incentive spirometer  · Encourage p.o. nutrition, add boost    Electronically signed by JESSENIA Nicolas, 21, 07:17 CDT             Electronically signed by Radha Joy APRN at 21 0948       Consult Notes (last 24 hours) (Notes from 21 1241 through 21 1241)    No notes of this type exist for this encounter.            Physical Therapy Notes (last 48 hours) (Notes from 21 1241 through 21 1241)      Remedios Thomas, PTA at 21 1353  Version 1 of 1         Acute Care - Physical Therapy Treatment Note   [facility]     Patient Name: Heide Newsome  : 1945  MRN: [id number]  Today's Date: 2021   Onset of Illness/Injury or Date of Surgery: 21  Visit Dx:     ICD-10-CM ICD-9-CM   1. Fall, initial encounter  W19.XXXA E888.9   2. Closed fracture of left hip, initial encounter (CMS/[facility])  S72.002A 820.8   3. Impaired mobility  Z74.09 799.89   4. Decreased activities of daily living (ADL)  Z78.9 V49.89     Patient Active Problem List   Diagnosis   • Pulmonary emphysema (CMS/[facility])   • Acute on chronic respiratory failure with hypoxia  (CMS/HCC)   • COPD, very severe (CMS/HCC)   • Severe malnutrition (CMS/HCC)   • History of colon cancer   • Thrombocytopenia (CMS/HCC)   • Anemia   • Hyperglycemia, drug-induced   • Pneumonia   • Chronic respiratory failure with hypoxia (CMS/HCC)   • COPD with acute exacerbation (CMS/HCC)   • Adrenal nodule (CMS/HCC)   • Closed left hip fracture (CMS/HCC)   • Fall   • Acute pain of left hip     Past Medical History:   Diagnosis Date   • Cancer (CMS/HCC), colon    • Chronic respiratory failure (CMS/HCC), 4 L nasal cannula continuously    • COPD (chronic obstructive pulmonary disease) (CMS/HCC)    • Restless leg syndrome      Past Surgical History:   Procedure Laterality Date   • APPENDECTOMY     • CARPAL TUNNEL RELEASE Left    • COLON RESECTION     • FOOT SURGERY Bilateral     hammer toe   • FRACTURE SURGERY Left     Arm   • HIP TROCHANTERIC NAILING WITH INTRAMEDULLARY HIP SCREW Left 8/31/2021    Procedure: LEFT HIP TROCHANTERIC NAILING SHORT WITH INTRAMEDULLARY HIP SCREW;  Surgeon: Valdemar Chaudhari MD;  Location: [facility];  Service: Orthopedics;  Laterality: Left;   • HYSTERECTOMY     • WRIST FRACTURE SURGERY Right         PT Assessment (last 12 hours)      PT Evaluation and Treatment     Row Name 09/06/21 1335 09/06/21 1124       Physical Therapy Time and Intention    Subjective Information  no complaints  -TB  complains of;fatigue  -TB    Document Type  therapy note (daily note)  -TB  therapy note (daily note)  -TB    Mode of Treatment  physical therapy  -TB  physical therapy  -TB    Patient Effort  good  -TB  good  -TB    Row Name 09/06/21 1335 09/06/21 1124       General Information    Existing Precautions/Restrictions  oxygen therapy device and L/min;non-weight bearing TTWB LLE  -TB  oxygen therapy device and L/min;non-weight bearing TTWB LLE  -TB    Row Name 09/06/21 1124          Bed Mobility    Bed Mobility  scooting/bridging;supine-sit;sit-supine  -TB     Scooting/Bridging [place] (Bed Mobility)   maximum assist (25% patient effort);verbal cues  -TB     Supine-Sit [place] (Bed Mobility)  maximum assist (25% patient effort);verbal cues  -TB     Sit-Supine [place] (Bed Mobility)  maximum assist (25% patient effort);verbal cues  -TB     Assistive Device (Bed Mobility)  draw sheet;head of bed elevated  -TB     Row Name 09/06/21 1335 09/06/21 1135       Motor Skills    Therapeutic Exercise  -- AROM-AAROM BLEs x15  -TB  --  -TB    Row Name 09/06/21 1124          Motor Skills    Therapeutic Exercise  ankle;knee  -TB     Row Name 09/06/21 1124          Knee (Therapeutic Exercise)    Knee (Therapeutic Exercise)  AROM (active range of motion)  -TB     Knee AROM (Therapeutic Exercise)  bilateral;LAQ (long arc quad);sitting;other (see comments) x15  -TB     Row Name 09/06/21 1124          Ankle (Therapeutic Exercise)    Ankle (Therapeutic Exercise)  AROM (active range of motion)  -TB     Ankle AROM (Therapeutic Exercise)  bilateral;dorsiflexion;plantarflexion;sitting;other (see comments) x15  -TB     Row Name             Wound 08/31/21 1914 Left lateral thigh Incision    Wound - Properties Group Placement Date: 08/31/21  -YESY Placement Time: 1914  -YESY Side: Left  -YESY Orientation: lateral  -YESY Location: thigh  -YESY Primary Wound Type: Incision  -YESY    Retired Wound - Properties Group Date first assessed: 08/31/21  -YESY Time first assessed: 1914  -YESY Side: Left  -YESY Location: thigh  -YESY Primary Wound Type: Incision  -YESY    Row Name 09/06/21 1124          Plan of Care Review    Plan of Care Reviewed With  patient  -TB     Outcome Summary  Pt agreeable to therapy. Bed mob Max A to get to EOB. Performed AROM BLE x15 w/ increased time and breaks. Pt had to be assisted back to bed due having to do a procedure.  -TB     Row Name 09/06/21 1335 09/06/21 1135       Positioning and Restraints    Pre-Treatment Position  in bed  -TB  --  -TB    Post Treatment Position  bed  -TB  --  -TB    In Bed  fowlers;call light within  reach;encouraged to call for assist;side rails up x2  -TB  --  -TB      User Key  (r) = Recorded By, (t) = Taken By, (c) = Cosigned By    Initials Name Provider Type    Laura Lynne, RN Registered Nurse    Remedios Gutiérrez, PTA Physical Therapy Assistant        Physical Therapy Education                 Title: PT OT SLP Therapies (In Progress)     Topic: Physical Therapy (Done)     Point: Mobility training (Done)     Learning Progress Summary           Patient Acceptance, E, VU,NR by NN at 9/3/2021 1403    Comment: role of PT, TTWB of LLE    Acceptance, E, NR by MS at 9/2/2021 1342    Comment: role of PT in her care, TTWB of L LE                   Point: Home exercise program (Done)     Learning Progress Summary           Patient Acceptance, E, VU,NR by NN at 9/3/2021 1403    Comment: role of PT, TTWB of LLE                   Point: Body mechanics (Done)     Learning Progress Summary           Patient Acceptance, E, VU,NR by NN at 9/3/2021 1403    Comment: role of PT, TTWB of LLE                   Point: Precautions (Done)     Learning Progress Summary           Patient Acceptance, E, VU,NR by NN at 9/3/2021 1403    Comment: role of PT, TTWB of LLE                               User Key     Initials Effective Dates Name Provider Type Discipline    MS 06/19/18 -  Balbina Hudson, PT, DPT, NCS Physical Therapist PT    NN 08/18/21 -  Paola Carvalho, PT Student PT Student PT              PT Recommendation and Plan     Plan of Care Reviewed With: patient  Progress: improving  Outcome Summary: Pt agreeable to therapy. Bed mob Max A to get to EOB. Performed AROM BLE x15 w/ increased time and breaks. Pt had to be assisted back to bed due having to do a procedure.  Outcome Measures     Row Name 09/03/21 1400             How much help from another is currently needed...    Putting on and taking off regular lower body clothing?  2  -AC (r) PF (t) AC (c)      Bathing (including washing, rinsing, and drying)  2   -AC (r) PF (t) AC (c)      Toileting (which includes using toilet bed pan or urinal)  2  -AC (r) PF (t) AC (c)      Putting on and taking off regular upper body clothing  2  -AC (r) PF (t) AC (c)      Taking care of personal grooming (such as brushing teeth)  2  -AC (r) PF (t) AC (c)      Eating meals  3  -AC (r) PF (t) AC (c)      AM-PAC 6 Clicks Score (OT)  13  -AC (r) PF (t)         Functional Assessment    Outcome Measure Options  AM-PAC 6 Clicks Daily Activity (OT)  -AC (r) PF (t) AC (c)        User Key  (r) = Recorded By, (t) = Taken By, (c) = Cosigned By    Initials Name Provider Type    AC Pérez Lance, OTR/L, CNT Occupational Therapist    Kevan Smith Jr., OT Student OT Student           Time Calculation:   PT Charges     Row Name 09/06/21 1352 09/06/21 1351          Time Calculation    Start Time  1335  -TB  1124  -TB     Stop Time  1347  -TB  1134  -TB     Time Calculation (min)  12 min  -TB  10 min  -TB     PT Received On  09/06/21  -TB  09/06/21  -TB        Time Calculation- PT    Total Timed Code Minutes- PT  12 minute(s)  -TB  10 minute(s)  -TB       User Key  (r) = Recorded By, (t) = Taken By, (c) = Cosigned By    Initials Name Provider Type    TB Remedios Thomas PTA Physical Therapy Assistant        Therapy Charges for Today     Code Description Service Date Service Provider Modifiers Qty    05956982520 HC PT THER PROC EA 15 MIN 9/6/2021 Remedios Thomas PTA GP 1    14954932474 HC PT THER PROC EA 15 MIN 9/6/2021 Remedios Thomas, ERNIE GP 1          PT G-Codes  Outcome Measure Options: AM-PAC 6 Clicks Basic Mobility (PT)  AM-PAC 6 Clicks Score (PT): 9  AM-PAC 6 Clicks Score (OT): 13    Remedios Thomas PTA  9/6/2021      Electronically signed by Remedios Thomas PTA at 09/06/21 1353     Remedios Thomas, PTA at 09/06/21 1353  Version 1 of 1       Goal Outcome Evaluation:  Plan of Care Reviewed With: patient        Progress: improving  Outcome Summary: Pt  agreeable to therapy. Bed mob Max A to get to EOB. Performed AROM BLE x15 w/ increased time and breaks. Pt had to be assisted back to bed due having to do a procedure.    Electronically signed by Remedios Thomas PTA at 21 1353     Remedios Thomas PTA at 21 1157  Version 1 of 1       Goal Outcome Evaluation:  Plan of Care Reviewed With: patient        Progress: improving  Outcome Summary: Pt agreeable to therapy. Max A to get from supine to EOB. Seated AROM BLEs x15. Rest breaks as needed due to fatigue and SOA. Stand pivot transfer w/ Max x2. Cues to follow WB restrictions.    Electronically signed by Remedios Thomas PTA at 21 1408     Remedios Thomas PTA at 21 1151  Version 1 of 1         Acute Care - Physical Therapy Treatment Note   [place]     Patient Name: Heide Newsome  : 1945  MRN: [id number]  Today's Date: 2021   Onset of Illness/Injury or Date of Surgery: 21  Visit Dx:     ICD-10-CM ICD-9-CM   1. Fall, initial encounter  W19.XXXA E888.9   2. Closed fracture of left hip, initial encounter (CMS/[facility])  S72.002A 820.8   3. Impaired mobility  Z74.09 799.89   4. Decreased activities of daily living (ADL)  Z78.9 V49.89     Patient Active Problem List   Diagnosis   • Pulmonary emphysema (CMS/HCC)   • Acute on chronic respiratory failure with hypoxia (CMS/HCC)   • COPD, very severe (CMS/HCC)   • Severe malnutrition (CMS/HCC)   • History of colon cancer   • Thrombocytopenia (CMS/HCC)   • Anemia   • Hyperglycemia, drug-induced   • Pneumonia   • Chronic respiratory failure with hypoxia (CMS/HCC)   • COPD with acute exacerbation (CMS/HCC)   • Adrenal nodule (CMS/HCC)   • Closed left hip fracture (CMS/HCC)   • Fall   • Acute pain of left hip     Past Medical History:   Diagnosis Date   • Cancer (CMS/HCC), colon    • Chronic respiratory failure (CMS/HCC), 4 L nasal cannula continuously    • COPD (chronic obstructive pulmonary disease) (CMS/HCC)     • Restless leg syndrome      Past Surgical History:   Procedure Laterality Date   • APPENDECTOMY     • CARPAL TUNNEL RELEASE Left    • COLON RESECTION     • FOOT SURGERY Bilateral     hammer toe   • FRACTURE SURGERY Left     Arm   • HIP TROCHANTERIC NAILING WITH INTRAMEDULLARY HIP SCREW Left 8/31/2021    Procedure: LEFT HIP TROCHANTERIC NAILING SHORT WITH INTRAMEDULLARY HIP SCREW;  Surgeon: Valdemar Chaudhari MD;  Location: [facility] OR;  Service: Orthopedics;  Laterality: Left;   • HYSTERECTOMY     • WRIST FRACTURE SURGERY Right         PT Assessment (last 12 hours)      PT Evaluation and Treatment     Row Name 09/07/21 1135          Physical Therapy Time and Intention    Subjective Information  complains of;pain  -TB     Document Type  therapy note (daily note)  -TB     Mode of Treatment  physical therapy  -TB     Patient Effort  good  -TB     Row Name 09/07/21 1135          General Information    Existing Precautions/Restrictions  oxygen therapy device and L/min;non-weight bearing TTWB LLE  -TB     Row Name 09/07/21 1135          Pain Scale: Numbers Pre/Post-Treatment    Pretreatment Pain Rating  2/10  -TB     Pain Location - Side  Left  -TB     Pain Location - Orientation  incisional  -TB     Pain Location  hip  -TB     Row Name 09/07/21 1135          Bed Mobility    Bed Mobility  scooting/bridging;supine-sit  -TB     Scooting/Bridging [place] (Bed Mobility)  maximum assist (25% patient effort);verbal cues  -TB     Supine-Sit [place] (Bed Mobility)  moderate assist (50% patient effort);verbal cues  -TB     Assistive Device (Bed Mobility)  head of bed elevated;bed rails  -TB     Row Name 09/07/21 1135          Transfers    Transfers  bed-chair transfer  -TB     Bed-Chair [place] (Transfers)  maximum assist (25% patient effort);2 person assist;verbal cues  -TB     Assistive Device (Bed-Chair Transfers)  other (see comments) HHA  -TB     Row Name 09/07/21 1135          Balance    Balance Assessment   sitting static balance;sitting dynamic balance  -TB     Static Sitting Balance  WFL;supported;sitting, edge of bed  -TB     Dynamic Sitting Balance  WFL;supported;sitting, edge of bed  -TB     Row Name 09/07/21 1135          Motor Skills    Therapeutic Exercise  -- AROM BLEs x15  -TB     Row Name             Wound 08/31/21 1914 Left lateral thigh Incision    Wound - Properties Group Placement Date: 08/31/21  -YESY Placement Time: 1914 -YESY Side: Left  -YESY Orientation: lateral  -YESY Location: thigh  -YESY Primary Wound Type: Incision  -YESY    Retired Wound - Properties Group Date first assessed: 08/31/21  -YESY Time first assessed: 1914 -YESY Side: Left  -YESY Location: thigh  -YESY Primary Wound Type: Incision  -YESY    Row Name 09/07/21 1135          Plan of Care Review    Plan of Care Reviewed With  patient  -TB     Progress  improving  -TB     Outcome Summary  Pt agreeable to therapy. Max A to get from supine to EOB. Seated AROM BLEs x15. Rest breaks as needed due to fatigue and SOA. Stand pivot transfer w/ Max x2. Cues to follow WB restrictions.  -TB     Row Name 09/07/21 1135          Positioning and Restraints    Pre-Treatment Position  in bed  -TB     Post Treatment Position  chair  -TB     In Chair  notified nsg;reclined;sitting;call light within reach;encouraged to call for assist;legs elevated  -TB       User Key  (r) = Recorded By, (t) = Taken By, (c) = Cosigned By    Initials Name Provider Type    Laura Lynne, RN Registered Nurse    Remedios Gutiérrez, ERNIE Physical Therapy Assistant        Physical Therapy Education                 Title: PT OT SLP Therapies (In Progress)     Topic: Physical Therapy (Done)     Point: Mobility training (Done)     Learning Progress Summary           Patient Acceptance, E, VU,NR by NN at 9/3/2021 1403    Comment: role of PT, TTWB of LLE    Acceptance, E, NR by MS at 9/2/2021 1342    Comment: role of PT in her care, TTWB of L LE                   Point: Home exercise program  (Done)     Learning Progress Summary           Patient Acceptance, E, VU,NR by NN at 9/3/2021 1403    Comment: role of PT, TTWB of LLE                   Point: Body mechanics (Done)     Learning Progress Summary           Patient Acceptance, E, VU,NR by NN at 9/3/2021 1403    Comment: role of PT, TTWB of LLE                   Point: Precautions (Done)     Learning Progress Summary           Patient Acceptance, E, VU,NR by NN at 9/3/2021 1403    Comment: role of PT, TTWB of LLE                               User Key     Initials Effective Dates Name Provider Type Discipline    MS 06/19/18 -  Balbina Hudson R, PT, DPT, NCS Physical Therapist PT    NN 08/18/21 -  Paola Carvalho, PT Student PT Student PT              PT Recommendation and Plan     Plan of Care Reviewed With: patient  Progress: improving  Outcome Summary: Pt agreeable to therapy. Max A to get from supine to EOB. Seated AROM BLEs x15. Rest breaks as needed due to fatigue and SOA. Stand pivot transfer w/ Max x2. Cues to follow WB restrictions.       Time Calculation:   PT Charges     Row Name 09/07/21 1408             Time Calculation    Start Time  1135  -TB      Stop Time  1158  -TB      Time Calculation (min)  23 min  -TB      PT Received On  09/07/21  -TB         Time Calculation- PT    Total Timed Code Minutes- PT  23 minute(s)  -TB        User Key  (r) = Recorded By, (t) = Taken By, (c) = Cosigned By    Initials Name Provider Type    TB Remedios Thomas, PTA Physical Therapy Assistant        Therapy Charges for Today     Code Description Service Date Service Provider Modifiers Qty    09823382336 HC PT THER PROC EA 15 MIN 9/6/2021 Remedios Thomas R, PTA GP 1    22650947103 HC PT THER PROC EA 15 MIN 9/6/2021 Remedios Thomas R, PTA GP 1    66820145521 HC PT THERAPEUTIC ACT EA 15 MIN 9/7/2021 Remedios Thomas R, PTA GP 1    23796913440 HC PT THER PROC EA 15 MIN 9/7/2021 Remedios Thomas, PTA GP 1          PT G-Codes  Outcome Measure  Options: AM-PAC 6 Clicks Basic Mobility (PT)  AM-PAC 6 Clicks Score (PT): 9  AM-PAC 6 Clicks Score (OT): 13    Remedios Thomas PTA  2021      Electronically signed by Remedios Thomas PTA at 21 1409     Remedios Thomas PTA at 21 1413  Version 1 of 1         Acute Care - Physical Therapy Treatment Note   [place]     Patient Name: Heide Newsome  : 1945  MRN: [id number]  Today's Date: 2021   Onset of Illness/Injury or Date of Surgery: 21  Visit Dx:     ICD-10-CM ICD-9-CM   1. Fall, initial encounter  W19.XXXA E888.9   2. Closed fracture of left hip, initial encounter (CMS/HCC)  S72.002A 820.8   3. Impaired mobility  Z74.09 799.89   4. Decreased activities of daily living (ADL)  Z78.9 V49.89     Patient Active Problem List   Diagnosis   • Pulmonary emphysema (CMS/HCC)   • Acute on chronic respiratory failure with hypoxia (CMS/HCC)   • COPD, very severe (CMS/HCC)   • Severe malnutrition (CMS/HCC)   • History of colon cancer   • Thrombocytopenia (CMS/HCC)   • Anemia   • Hyperglycemia, drug-induced   • Pneumonia   • Chronic respiratory failure with hypoxia (CMS/HCC)   • COPD with acute exacerbation (CMS/HCC)   • Adrenal nodule (CMS/HCC)   • Closed left hip fracture (CMS/HCC)   • Fall   • Acute pain of left hip     Past Medical History:   Diagnosis Date   • Cancer (CMS/HCC), colon    • Chronic respiratory failure (CMS/HCC), 4 L nasal cannula continuously    • COPD (chronic obstructive pulmonary disease) (CMS/HCC)    • Restless leg syndrome      Past Surgical History:   Procedure Laterality Date   • APPENDECTOMY     • CARPAL TUNNEL RELEASE Left    • COLON RESECTION     • FOOT SURGERY Bilateral     hammer toe   • FRACTURE SURGERY Left     Arm   • HIP TROCHANTERIC NAILING WITH INTRAMEDULLARY HIP SCREW Left 2021    Procedure: LEFT HIP TROCHANTERIC NAILING SHORT WITH INTRAMEDULLARY HIP SCREW;  Surgeon: Valdemar Chaudhari MD;  Location: [facility] OR;  Service:  Orthopedics;  Laterality: Left;   • HYSTERECTOMY     • WRIST FRACTURE SURGERY Right         PT Assessment (last 12 hours)      PT Evaluation and Treatment     Row Name 09/07/21 1342 09/07/21 1135       Physical Therapy Time and Intention    Subjective Information  complains of;fatigue  -TB  complains of;pain  -TB    Document Type  therapy note (daily note)  -TB  therapy note (daily note)  -TB    Mode of Treatment  physical therapy  -TB  physical therapy  -TB    Patient Effort  good  -TB  good  -TB    Row Name 09/07/21 1342 09/07/21 1135       General Information    Existing Precautions/Restrictions  oxygen therapy device and L/min;non-weight bearing TTWB  -TB  oxygen therapy device and L/min;non-weight bearing TTWB LLE  -TB    Row Name 09/07/21 1342 09/07/21 1135       Pain Scale: Numbers Pre/Post-Treatment    Pretreatment Pain Rating  2/10  -TB  2/10  -TB    Pain Location - Side  Left  -TB  Left  -TB    Pain Location - Orientation  incisional  -TB  incisional  -TB    Pain Location  hip  -TB  hip  -TB    Row Name 09/07/21 [facility] 09/07/21 1135       Bed Mobility    Bed Mobility  sit-supine;scooting/bridging  -TB  scooting/bridging;supine-sit  -TB    Scooting/Bridging [place] (Bed Mobility)  maximum assist (25% patient effort);verbal cues  -TB  maximum assist (25% patient effort);verbal cues  -TB    Supine-Sit [place] (Bed Mobility)  --  moderate assist (50% patient effort);verbal cues  -TB    Sit-Supine [place] (Bed Mobility)  maximum assist (25% patient effort);verbal cues  -TB  --    Assistive Device (Bed Mobility)  head of bed elevated  -TB  head of bed elevated;bed rails  -TB    Row Name 09/07/21 1342 09/07/21 1135       Transfers    Transfers  toilet transfer;chair-bed transfer;sit-stand transfer;stand-sit transfer  -TB  bed-chair transfer  -TB    Bed-Chair [place] (Transfers)  --  maximum assist (25% patient effort);2 person assist;verbal cues  -TB    Chair-Bed [place] (Transfers)   maximum assist (25% patient effort);2 person assist;verbal cues  -TB  --    Assistive Device (Bed-Chair Transfers)  --  other (see comments) HHA  -TB    Sit-Stand [place] (Transfers)  contact guard;minimum assist (75% patient effort);verbal cues  -TB  --    Stand-Sit [place] (Transfers)  contact guard;verbal cues  -TB  --    [place] Level (Toilet Transfer)  maximum assist (25% patient effort);2 person assist;verbal cues  -TB  --    Assistive Device (Toilet Transfer)  commode, bedside without drop arms  -TB  --    Row Name 09/07/21 1342          Chair-Bed Transfer    Assistive Device (Chair-Bed Transfers)  other (see comments) HHA  -TB     Row Name 09/07/21 1342          Sit-Stand Transfer    Assistive Device (Sit-Stand Transfers)  walker, front-wheeled  -TB     Row Name 09/07/21 1342          Stand-Sit Transfer    Assistive Device (Stand-Sit Transfers)  walker, front-wheeled  -TB     Row Name 09/07/21 1342          Toilet Transfer    Type (Toilet Transfer)  stand pivot/stand step  -TB     Row Name 09/07/21 1342 09/07/21 1135       Balance    Balance Assessment  standing static balance;standing dynamic balance  -TB  sitting static balance;sitting dynamic balance  -TB    Static Sitting Balance  --  WFL;supported;sitting, edge of bed  -TB    Dynamic Sitting Balance  --  WFL;supported;sitting, edge of bed  -TB    Static Standing Balance  mild impairment;supported;standing  -TB  --    Dynamic Standing Balance  mild impairment;supported;standing  -TB  --    Row Name 09/07/21 1135          Motor Skills    Therapeutic Exercise  -- AROM BLEs x15  -TB     Row Name             Wound 08/31/21 1914 Left lateral thigh Incision    Wound - Properties Group Placement Date: 08/31/21  -YESY Placement Time: 1914 -[place] Side: Left  -YESY Orientation: lateral  -YESY Location: thigh  -YESY Primary Wound Type: Incision  -YESY    Retired Wound - Properties Group Date first assessed: 08/31/21  -YESY Time first assessed: 1914 -[facility] Side: Left   -YESY Location: thigh  -YESY Primary Wound Type: Incision  -YESY    Row Name 09/07/21 1135          Plan of Care Review    Plan of Care Reviewed With  patient  -TB     Progress  improving  -TB     Outcome Summary  Pt agreeable to therapy. Max A to get from supine to EOB. Seated AROM BLEs x15. Rest breaks as needed due to fatigue and SOA. Stand pivot transfer w/ Max x2. Cues to follow WB restrictions.  -TB     Row Name 09/07/21 1342 09/07/21 1135       Positioning and Restraints    Pre-Treatment Position  sitting in chair/recliner  -TB  in bed  -TB    Post Treatment Position  bed  -TB  chair  -TB    In Bed  fowlers;call light within reach;encouraged to call for assist;exit alarm on;notified nsg;side rails up x2;heels elevated  -TB  --    In Chair  --  notified nsg;reclined;sitting;call light within reach;encouraged to call for assist;legs elevated  -TB      User Key  (r) = Recorded By, (t) = Taken By, (c) = Cosigned By    Initials Name Provider Type    Laura Lynne, RN Registered Nurse    Remedios Gutiérrez PTA Physical Therapy Assistant        Physical Therapy Education                 Title: PT OT SLP Therapies (In Progress)     Topic: Physical Therapy (Done)     Point: Mobility training (Done)     Learning Progress Summary           Patient Acceptance, E, VU,NR by NN at 9/3/2021 1403    Comment: role of PT, TTWB of LLE    Acceptance, E, NR by MS at 9/2/2021 1342    Comment: role of PT in her care, TTWB of L LE                   Point: Home exercise program (Done)     Learning Progress Summary           Patient Acceptance, E, VU,NR by NN at 9/3/2021 1403    Comment: role of PT, TTWB of LLE                   Point: Body mechanics (Done)     Learning Progress Summary           Patient Acceptance, E, VU,NR by NN at 9/3/2021 1403    Comment: role of PT, TTWB of LLE                   Point: Precautions (Done)     Learning Progress Summary           Patient Acceptance, E, VU,NR by NN at 9/3/2021 1403    Comment:  role of PT, TTWB of LLE                               User Key     Initials Effective Dates Name Provider Type Discipline    MS 06/19/18 -  Balbina Hudson R, PT, DPT, NCS Physical Therapist PT    NN 08/18/21 -  Paola Carvalho, ELVIN Student PT Student PT              PT Recommendation and Plan     Plan of Care Reviewed With: patient  Progress: improving  Outcome Summary: Pt agreeable to therapy. Max A to get from supine to EOB. Seated AROM BLEs x15. Rest breaks as needed due to fatigue and SOA. Stand pivot transfer w/ Max x2. Cues to follow WB restrictions.       Time Calculation:   PT Charges     Row Name 09/07/21 1412 09/07/21 1408          Time Calculation    Start Time  1342  -TB  1135  -TB     Stop Time  1356  -TB  1158  -TB     Time Calculation (min)  14 min  -TB  23 min  -TB     PT Received On  09/07/21  -TB  09/07/21  -TB        Time Calculation- PT    Total Timed Code Minutes- PT  14 minute(s)  -TB  23 minute(s)  -TB       User Key  (r) = Recorded By, (t) = Taken By, (c) = Cosigned By    Initials Name Provider Type    TB Remedios Thomas PTA Physical Therapy Assistant        Therapy Charges for Today     Code Description Service Date Service Provider Modifiers Qty    47650520214 HC PT THER PROC EA 15 MIN 9/6/2021 Remedios Thomas PTA GP 1    53149434158 HC PT THER PROC EA 15 MIN 9/6/2021 Remedios Thomas PTA GP 1    66715906644 HC PT THERAPEUTIC ACT EA 15 MIN 9/7/2021 Remedios Thomas PTA GP 1    22942742764 HC PT THER PROC EA 15 MIN 9/7/2021 Remedios Thomas PTA GP 1    89621154565 HC PT THERAPEUTIC ACT EA 15 MIN 9/7/2021 Remedios Thomas, PTA GP 1          PT G-Codes  Outcome Measure Options: AM-PAC 6 Clicks Basic Mobility (PT)  AM-PAC 6 Clicks Score (PT): 9  AM-PAC 6 Clicks Score (OT): 13    Remedios Thomas PTA  9/7/2021      Electronically signed by Remedios Thomas PTA at 09/07/21 1414     Sarah Landers, PTA at 09/08/21 1056  Version 1 of 1       Goal  Outcome Evaluation:  Plan of Care Reviewed With: patient        Progress: improving  Outcome Summary: pt anxious. encouragement for OOB activity. Bed mobility mod x1 w/ extra time. sit-stand min/mod x1 pt able to stand pivot to [facility] cues to maintain TTWB. Pt stood rwx min/mod x1 then able to txfer to chair. Pt fatigue quickly will need cont PT upon d/c for strength, mobility and endurance    Electronically signed by Sarah Landers PTA at 21 1056     Sarah Landers PTA at 21 1057  Version 1 of 1         Acute Care - Physical Therapy Treatment Note   [place]     Patient Name: Heide Newsome  : 1945  MRN: [id number]  Today's Date: 2021   Onset of Illness/Injury or Date of Surgery: 21  Visit Dx:     ICD-10-CM ICD-9-CM   1. Fall, initial encounter  W19.XXXA E888.9   2. Closed fracture of left hip, initial encounter (CMS/HCC)  S72.002A 820.8   3. Impaired mobility  Z74.09 799.89   4. Decreased activities of daily living (ADL)  Z78.9 V49.89     Patient Active Problem List   Diagnosis   • Pulmonary emphysema (CMS/HCC)   • Acute on chronic respiratory failure with hypoxia (CMS/HCC)   • COPD, very severe (CMS/HCC)   • Severe malnutrition (CMS/HCC)   • History of colon cancer   • Thrombocytopenia (CMS/HCC)   • Anemia   • Hyperglycemia, drug-induced   • Pneumonia   • Chronic respiratory failure with hypoxia (CMS/HCC)   • COPD with acute exacerbation (CMS/HCC)   • Adrenal nodule (CMS/HCC)   • Closed left hip fracture (CMS/HCC)   • Fall   • Acute pain of left hip     Past Medical History:   Diagnosis Date   • Cancer (CMS/HCC), colon    • Chronic respiratory failure (CMS/HCC), 4 L nasal cannula continuously    • COPD (chronic obstructive pulmonary disease) (CMS/HCC)    • Restless leg syndrome      Past Surgical History:   Procedure Laterality Date   • APPENDECTOMY     • CARPAL TUNNEL RELEASE Left    • COLON RESECTION     • FOOT SURGERY Bilateral     hammer toe   • FRACTURE SURGERY Left      Arm   • HIP TROCHANTERIC NAILING WITH INTRAMEDULLARY HIP SCREW Left 8/31/2021    Procedure: LEFT HIP TROCHANTERIC NAILING SHORT WITH INTRAMEDULLARY HIP SCREW;  Surgeon: Valdemar Chaudhari MD;  Location: [facility];  Service: Orthopedics;  Laterality: Left;   • HYSTERECTOMY     • WRIST FRACTURE SURGERY Right         PT Assessment (last 12 hours)      PT Evaluation and Treatment     Row Name 09/08/21 1026          Physical Therapy Time and Intention    Subjective Information  complains of;weakness;pain;fatigue;dyspnea  -NW     Document Type  therapy note (daily note)  -NW     Mode of Treatment  physical therapy  -NW     Comment  TTWB LLE  -NW     Row Name 09/08/21 1026          General Information    Existing Precautions/Restrictions  oxygen therapy device and L/min;non-weight bearing TTWB  -NW     Row Name 09/08/21 1026          Pain    Additional Documentation  Pain Scale: Numbers Pre/Post-Treatment (Group)  -NW     Row Name 09/08/21 1026          Pain Scale: Numbers Pre/Post-Treatment    Pretreatment Pain Rating  --  -NW     Row Name 09/08/21 1026          Pain Scale: FACES Pre/Post-Treatment    Pain: FACES Scale, Pretreatment  2-->hurts little bit  -NW     Posttreatment Pain Rating  6-->hurts even more  -NW     Pain Location - Side  Left  -NW     Pain Location  hip  -NW     Row Name 09/08/21 1026          Bed Mobility    Bed Mobility  sit-supine;scooting/bridging  -NW     Scooting/Bridging [place] (Bed Mobility)  --  -NW     Supine-Sit [place] (Bed Mobility)  verbal cues;minimum assist (75% patient effort);moderate assist (50% patient effort)  -NW     Sit-Supine [place] (Bed Mobility)  -- chair  -NW     Assistive Device (Bed Mobility)  head of bed elevated  -NW     Comment (Bed Mobility)  extra time for OOB  -NW     Row Name 09/08/21 1026          Transfers    Transfers  toilet transfer;chair-bed transfer;sit-stand transfer;stand-sit transfer  -NW     Chair-Bed [place] (Transfers)  verbal  cues;moderate assist (50% patient effort)  -NW     Sit-Stand [place] (Transfers)  minimum assist (75% patient effort);verbal cues;moderate assist (50% patient effort)  -NW     Stand-Sit [place] (Transfers)  contact guard;verbal cues  -NW     [place] Level (Toilet Transfer)  verbal cues;moderate assist (50% patient effort)  -NW     Assistive Device (Toilet Transfer)  commode, bedside without drop arms  -NW     Row Name 09/08/21 1026          Chair-Bed Transfer    Assistive Device (Chair-Bed Transfers)  walker, front-wheeled  -NW     Row Name 09/08/21 1026          Sit-Stand Transfer    Assistive Device (Sit-Stand Transfers)  walker, front-wheeled  -NW     Row Name 09/08/21 1026          Stand-Sit Transfer    Assistive Device (Stand-Sit Transfers)  walker, front-wheeled  -NW     Row Name 09/08/21 1026          Toilet Transfer    Type (Toilet Transfer)  stand pivot/stand step  -NW     Row Name 09/08/21 1026          Gait/Stairs (Locomotion)    Comment (Gait/Stairs)  (S) stood pivot transfer to [facility] stood able to maintain TTWB then cues to transfer to chair.   -NW     Row Name 09/08/21 1026          Safety Issues, Functional Mobility    Safety Issues Affecting Function (Mobility)  judgment;safety precaution awareness  -NW     Impairments Affecting Function (Mobility)  pain;strength;endurance/activity tolerance  -NW     Row Name             Wound 08/31/21 1914 Left lateral thigh Incision    Wound - Properties Group Placement Date: 08/31/21  -YESY Placement Time: 1914 -YESY Side: Left  -YESY Orientation: lateral  -YESY Location: thigh  -YESY Primary Wound Type: Incision  -YESY    Retired Wound - Properties Group Date first assessed: 08/31/21  -YESY Time first assessed: 1914 -YESY Side: Left  -YESY Location: thigh  -YESY Primary Wound Type: Incision  -YESY    Row Name 09/08/21 1026          Positioning and Restraints    Pre-Treatment Position  in bed  -NW     Post Treatment Position  chair  -NW     In Chair  reclined;call light  within reach;encouraged to call for assist;notified nsg  -NW       User Key  (r) = Recorded By, (t) = Taken By, (c) = Cosigned By    Initials Name Provider Type    NW Sarah Landers, PTA Physical Therapy Assistant    Laura Lynne, RN Registered Nurse        Physical Therapy Education                 Title: PT OT SLP Therapies (In Progress)     Topic: Physical Therapy (Done)     Point: Mobility training (Done)     Learning Progress Summary           Patient Acceptance, E, VU,NR by NN at 9/3/2021 1403    Comment: role of PT, TTWB of LLE    Acceptance, E, NR by MS at 9/2/2021 1342    Comment: role of PT in her care, TTWB of L LE                   Point: Home exercise program (Done)     Learning Progress Summary           Patient Acceptance, E, VU,NR by NN at 9/3/2021 1403    Comment: role of PT, TTWB of LLE                   Point: Body mechanics (Done)     Learning Progress Summary           Patient Acceptance, E, VU,NR by NN at 9/3/2021 1403    Comment: role of PT, TTWB of LLE                   Point: Precautions (Done)     Learning Progress Summary           Patient Acceptance, E, VU,NR by NN at 9/3/2021 1403    Comment: role of PT, TTWB of LLE                               User Key     Initials Effective Dates Name Provider Type Discipline    MS 06/19/18 -  Balbina Hudson, PT, DPT, NCS Physical Therapist PT     08/18/21 -  Paola Carvalho, ELVIN Student PT Student PT              PT Recommendation and Plan     Plan of Care Reviewed With: patient  Progress: improving  Outcome Summary: pt anxious. encouragement for OOB activity. Bed mobility mod x1 w/ extra time. sit-stand min/mod x1 pt able to stand pivot to bsc cues to maintain TTWB. Pt stood rwx min/mod x1 then able to txfer to chair. Pt fatigue quickly will need cont PT upon d/c for strength, mobility and endurance       Time Calculation:   PT Charges     Row Name 09/08/21 1057             Time Calculation    Start Time  1026  -NW      Stop Time  1057   -NW      Time Calculation (min)  31 min  -NW      PT Received On  09/08/21  -NW      PT Goal Re-Cert Due Date  09/12/21  -NW         Time Calculation- PT    Total Timed Code Minutes- PT  31 minute(s)  -NW         Timed Charges    10270 - PT Therapeutic Activity Minutes  31  -NW         Total Minutes    Timed Charges Total Minutes  31  -NW       Total Minutes  31  -NW        User Key  (r) = Recorded By, (t) = Taken By, (c) = Cosigned By    Initials Name Provider Type    NW Sarah Landers PTA Physical Therapy Assistant        Therapy Charges for Today     Code Description Service Date Service Provider Modifiers Qty    96701702851 HC PT THERAPEUTIC ACT EA 15 MIN 9/8/2021 Sarah Landers PTA GP 2          PT G-Codes  Outcome Measure Options: AM-PAC 6 Clicks Basic Mobility (PT)  AM-PAC 6 Clicks Score (PT): 9  AM-PAC 6 Clicks Score (OT): 13    Sarah Landers PTA  9/8/2021      Electronically signed by Sarah Landers PTA at 09/08/21 1058       Occupational Therapy Notes (last 24 hours) (Notes from 09/07/21 1241 through 09/08/21 1241)    No notes exist for this encounter.

## 2021-09-08 NOTE — PROGRESS NOTES
PULMONARY AND CRITICAL CARE PROGRESS NOTE - [facility]    Patient: Heide Newsome  1945   MR# [id number]   Acct# [id number]  09/08/21   07:17 CDT  Referring Provider: Roberto Hobbs MD    Chief Complaint: Shortness of breath  Interval history: The patient is resting in bed on 4 L nasal cannula.  O2 sat 91%. She has a cough that is mostly non productive but does feel she needs to cough up sputum some. She is using her incentive spirometer and flutter valve. She is asking about her Requip dosing and is not getting it like she does at home. Advised to discuss with attending. She had trouble breathing through the night. The Bipap did help some. She wore it on/off through the night. PCO2 this am is 82.7.   No other aggravating or alleviating factors.     Meds:  apixaban, 2.5 mg, Oral, Q12H  budesonide, 0.25 mg, Nebulization, BID - RT  carvedilol, 3.125 mg, Oral, BID With Meals  cetirizine, 10 mg, Oral, Daily  fluticasone, 1 spray, Each Nare, Daily  guaiFENesin, 1,200 mg, Oral, BID  ipratropium, 0.5 mg, Nebulization, 4x Daily - RT  lidocaine, 2 patch, Transdermal, Nightly  montelukast, 10 mg, Oral, Nightly  polyethylene glycol, 17 g, Oral, Daily  predniSONE, 20 mg, Oral, Daily With Breakfast  rOPINIRole, 2 mg, Oral, Nightly  senna-docusate sodium, 1 tablet, Oral, BID  sodium chloride, 10 mL, Intravenous, Q12H         Review of Systems:   Review of Systems   Constitutional: Negative for chills and fever.   Respiratory: Negative for cough and shortness of breath.    Cardiovascular: Negative for chest pain.   Gastrointestinal: Negative for diarrhea, nausea and vomiting.     Physical Exam:  SpO2 Percentage    09/08/21 0512 09/08/21 0614 09/08/21 0622   SpO2: 97% 99% 99%     Temp:  [97.9 °F (36.6 °C)-98.6 °F (37 °C)] 97.9 °F (36.6 °C)  Heart Rate:  [] 78  Resp:  [14-24] 24  BP: (111-145)/(60-73) 145/62    Intake/Output Summary (Last 24 hours) at 9/8/2021 0717  Last data filed at 9/8/2021  0622  Gross per 24 hour   Intake --   Output 1600 ml   Net -1600 ml     Physical Exam  Constitutional:       General: She is not in acute distress.     Interventions: Nasal cannula in place.   HENT:      Head: Normocephalic.      Nose: Nose normal.   Eyes:      General: No scleral icterus.  Cardiovascular:      Rate and Rhythm: Normal rate.   Pulmonary:      Effort: No respiratory distress.   Abdominal:      General: There is no distension.   Musculoskeletal:      Right lower leg: No edema.      Left lower leg: No edema.      Comments: Dressing to left hip C/D/I    Neurological:      Mental Status: She is alert.      Comments: alert   Psychiatric:         Mood and Affect: Mood normal.         Behavior: Behavior is cooperative.       Laboratory Data:  Results from last 7 days   Lab Units 09/06/21  0525 09/04/21  0622 09/03/21  0556   WBC 10*3/mm3 8.34 9.51 9.91   HEMOGLOBIN g/dL 9.2* 10.2* 9.9*   PLATELETS 10*3/mm3 133* 156 141     Results from last 7 days   Lab Units 09/06/21  0525 09/04/21  0622 09/03/21  0556   SODIUM mmol/L 143 140 136   POTASSIUM mmol/L 4.5 5.0 5.7*   BUN mg/dL 25* 28* 46*   CREATININE mg/dL 0.46* 0.43* 0.76     Results from last 7 days   Lab Units 09/08/21  0439 09/03/21  0357 09/02/21  2243   PH, ARTERIAL pH units 7.386 7.309* 7.339*   PCO2, ARTERIAL mm Hg 82.7* 62.1* 57.3*   PO2 ART mm Hg 74.5* 85.4 75.8*   FIO2 %  --  50 50     No results found for: BLOODCX, URINECX, WOUNDCX, MRSACX, RESPCX, STOOLCX  Recent films:  No radiology results for the last day  Films reviewed personally by me.  My interpretation: None today    Pulmonary Assessment:  1. Acute on chronic hypoxic and hypercapnic respiratory failure   2. COPD exacerbation, no PFT available  3. Fall with left hip fracture, status post femoral nailing  4. Protein calorie malnutrition  5. Anemia, stable  6. History of heart failure  7. Hyperkalemia    Recommend:   · Continue supplemental oxygen and titrate for saturation percent.   Currently on 4L NC.  Baseline home oxygen 4.5 L  · Continue BiPAP as needed-encourage use, case management to check on home machine per hospitalist  · Continue Pulmicort and ipratropium nebs  · Continue guaifenesin  · Antibiotic-has been DC'd   · Prednisone completed   · DVT prophylaxis, Eliquis  · Mobilize as tolerated when okay with Ortho  · Encourage incentive spirometer  · Encourage p.o. nutrition, add boost    Electronically signed by JESSENIA Nicolas, 09/08/21, 07:17 CDT       ATTESTATION OF CLINICAL NOTE:  I have reviewed the notes, assessments, and/or procedures performed by JESSENIA Olguin, I concur with her/his documentation of Heide Newsome.  Patient was seen in a follow-up visit in medical floor today.    She is doing well and is currently on 4 L oxygen.  She uses 4.5 L at home.  She used CPAP only for 1 to 2 hours last night.  She told me she did have a CPAP machine at home which has been taken down by the DME company long-term and when she does have another one at home.  She had no other new complaint but appears anxious.  Arterial blood gas shows persistent hypercapnia.    Physical examination unremarkable. Elderly  female resting. On oxygen. HEENT atraumatic normocephalic. Neck: Supple. Heart: Sounds normal. Lungs: Diminished air entry few crackles. Abdomen: Soft nondistended extremities: No edema.  Psych: Patient appears less anxious and comfortable.     Continue current treatment plan continue bronchodilator treatments supplemental oxygen and BiPAP needs to be used at night and patient will definitely need outpatient work-up for sleep apnea.  I talked to the patient about her complaints.  She is not using BiPAP at night long enough and has persistent hypercapnia.  She would need to comply with BiPAP or CPAP and will be discharged home with a home trilogy.  I have requested the case management consult for home trilogy set up prior to discharge.    Continue allergy  medications. Incentive spirometry and flutter valve. Complete antibiotic for few days and start oral steroid taper for plan to discharge home. Patient requires frequent use of NIMV. Intermittent support will not be sufficient. I am ordering nocturnal and daytime volume ventilation to reduce the risk of exacerbation.   The individual cannot maintain spontaneous ventilation for four or more consecutive hours without the assistance of a device. Bi-level (or inferior choice) was considered however I feel that this would be insufficient given the severity of disease, lack of backup battery and continuous alarms that would be required, which are not provided by a bi-level device.  Patient's condition is of severity where back-up battery and continuous alarms are required, which are not provided by bi-level devices.  Interruption of failure to provide NIMV would quickly lead to exacerbation of patient's condition, hospital readmission, and likely harm the patient.  Primary cause of hypercapnia is COPD, with chronic respiratory failure.  Continued use is preferred.      Plan for outpatient pulmonary clinic follow-up. Patient will need reevaluation for home oxygen before discharge. She is already home oxygen 4.5 L and is requiring 4 L now..  She has anxiety issues.. Continue DVT stress ulcer prophylaxis, pain anxiety:. PT OT. Nutritional support. Repeat labs as needed. CODE STATUS: Full. Overall prognosis: Guarded. Pulmonary team will continue following her and make further recommendations.     I have seen and examined patient personally, performing a face-to-face diagnostic evaluation with plan of care reviewed and developed with APRN and nursing staff. I have addended and/or modified the above history of present illness, physical examination, and assessment and plan to reflect my findings and impressions. Essential elements of the care plan were discussed with APRN above.  Agree with findings and assessment/plan as  documented above.    Electronically signed by    Yasmine Stahl MD  Pulmonologist/Intensivist  9/8/2021 14:23 CDT

## 2021-09-08 NOTE — PLAN OF CARE
Goal Outcome Evaluation:  Plan of Care Reviewed With: patient        Progress: no change  Outcome Summary: VSS. No change in nv/neuro status. Drsg c/d/i x 2, sl stained, ppp. C/o intermit pain, prn given. Wore cpap for 1.5 hours during shift. Critical lab for PCO2 called, md notified. Cpap placed back on, pt encouraged to wear as long as tolerated. Safety maintained.

## 2021-09-09 PROBLEM — E44.0 MODERATE MALNUTRITION (HCC): Status: ACTIVE | Noted: 2021-01-01

## 2021-09-09 NOTE — PLAN OF CARE
Goal Outcome Evaluation:            A&OX4.  Confused at times.  Bipap worn for 2-3 hours tonight.  Patient says she cannot breath with Bipap on and has anxiety with Bipap on.  4 L NC.  .  Refuses turns. Z guard to bottom.  Lidocaine patch X2 anterior and posterior left hip changed q 12 hrs.  Tele in place ST.  Purewick in place and changed.  C/O pain medicated with PO prn pain meds with good results.  Resting between care.  Will continue to monitor.

## 2021-09-09 NOTE — PROGRESS NOTES
PULMONARY AND CRITICAL CARE PROGRESS NOTE - [facility]    Patient: Heide Newsome  1945   MR# [id number]   Acct# [id number]  09/09/21   07:14 CDT  Referring Provider: Roberto Hobbs MD    Chief Complaint: Shortness of breath  Interval history: The patient is resting in bed on 4 L nasal cannula.  O2 sat 97%. She is using her incentive spirometer and flutter valve. Per nursing notes patient gets confused at times. She only wore the bipap 2-3 hours last night secondary to anxiety,per nursing note. Patient states the bipap actually wears her out.  No other aggravating or alleviating factors.     Meds:  apixaban, 2.5 mg, Oral, Q12H  budesonide, 0.25 mg, Nebulization, BID - RT  carvedilol, 3.125 mg, Oral, BID With Meals  cetirizine, 10 mg, Oral, Daily  fluticasone, 1 spray, Each Nare, Daily  guaiFENesin, 1,200 mg, Oral, BID  ipratropium, 0.5 mg, Nebulization, 4x Daily - RT  lidocaine, 2 patch, Transdermal, Nightly  montelukast, 10 mg, Oral, Nightly  polyethylene glycol, 17 g, Oral, Daily  rOPINIRole, 2 mg, Oral, Nightly  senna-docusate sodium, 1 tablet, Oral, BID  sodium chloride, 10 mL, Intravenous, Q12H         Review of Systems:   Review of Systems   Constitutional: Negative for chills and fever.   Respiratory: Negative for cough and shortness of breath.    Cardiovascular: Negative for chest pain.   Gastrointestinal: Negative for diarrhea, nausea and vomiting.     Physical Exam:  SpO2 Percentage    09/09/21 0040 09/09/21 0623 09/09/21 0633   SpO2: 97% 96% (!) 87%     Temp:  [97.9 °F (36.6 °C)-98.1 °F (36.7 °C)] 97.9 °F (36.6 °C)  Heart Rate:  [] 109  Resp:  [16-18] 18  BP: (108-135)/(56-65) 108/56    Intake/Output Summary (Last 24 hours) at 9/9/2021 0714  Last data filed at 9/9/2021 0015  Gross per 24 hour   Intake --   Output 1550 ml   Net -1550 ml     Physical Exam  Constitutional:       General: She is not in acute distress.     Interventions: Nasal cannula in place.   HENT:       Head: Normocephalic.      Nose: Nose normal.   Eyes:      General: No scleral icterus.  Cardiovascular:      Rate and Rhythm: Normal rate.   Pulmonary:      Effort: No respiratory distress.   Abdominal:      General: There is no distension.   Musculoskeletal:      Right lower leg: No edema.      Left lower leg: No edema.      Comments: Dressing to left hip C/D/I    Neurological:      Mental Status: She is alert.      Comments: alert   Psychiatric:         Mood and Affect: Mood normal.         Behavior: Behavior is cooperative.       Laboratory Data:  Results from last 7 days   Lab Units 09/08/21  0907 09/06/21  0525 09/04/21  0622   WBC 10*3/mm3 8.99 8.34 9.51   HEMOGLOBIN g/dL 10.9* 9.2* 10.2*   PLATELETS 10*3/mm3 196 133* 156     Results from last 7 days   Lab Units 09/08/21  0907 09/06/21  0525 09/04/21  0622   SODIUM mmol/L 143 143 140   POTASSIUM mmol/L 3.5 4.5 5.0   BUN mg/dL 24* 25* 28*   CREATININE mg/dL 0.44* 0.46* 0.43*     Results from last 7 days   Lab Units 09/08/21  0439 09/03/21  0357 09/02/21  2243   PH, ARTERIAL pH units 7.386 7.309* 7.339*   PCO2, ARTERIAL mm Hg 82.7* 62.1* 57.3*   PO2 ART mm Hg 74.5* 85.4 75.8*   FIO2 %  --  50 50     No results found for: BLOODCX, URINECX, WOUNDCX, MRSACX, RESPCX, STOOLCX  Recent films:  No radiology results for the last day  Films reviewed personally by me.  My interpretation: None today    Pulmonary Assessment:  Acute on chronic hypoxic and hypercapnic respiratory failure   COPD exacerbation, no PFT available  Fall with left hip fracture, status post femoral nailing  Protein calorie malnutrition  Anemia, stable  History of heart failure  Hyperkalemia    Recommend:   Continue supplemental oxygen and titrate for saturation percent.  Currently on 4L NC.  Baseline home oxygen 4.5 L  Continue BiPAP as needed-encourage use, case management to check on home machine per hospitalist. She may not be a candidate for home Trilogy if she can not be more compliant with  Bipap. BETH is also working on SNF placement which is being limited due to possible Trilogy. Left Message for BETH to contact me.   Continue Pulmicort and ipratropium nebs  Continue guaifenesin  Antibiotic-has been DC'd   Prednisone completed   DVT prophylaxis, Eliquis  Mobilize as tolerated when okay with Ortho  Encourage incentive spirometer  Encourage p.o. nutrition, add boost    Electronically signed by JESSENIA Nicolas, 09/09/21, 07:14 CDT     Physician substantive portion:  Patient's O2 saturation is 97 today.  There is question of her getting a trilogy machine.  She is off of BiPAP this morning.  She says she used it last night.  She has been a little confused.  She is says she did not use that machine very much and does not like it.  She is wheezing on exam.  She is awake and alert.  Affect is bright.  There is no skin rash no distress.  Recommend continue current regimen.  She is on aerosol steroids and bronchodilators.  Continue off of systemic steroids.  Case management working on home durable equipment issues.  Mobilize as tolerated.    I have seen and examined patient personally, performing a face-to-face diagnostic evaluation with plan of care reviewed and developed with APRN and nursing staff. I have addended and/or modified the above history of present illness, physical examination, and assessment and plan to reflect my findings and impressions. Essential elements of the care plan were discussed with APRN above.  Agree with findings and assessment/plan as documented above.    Electronically signed by Stevenson Carson MD, on 9/9/2021, 12:30 CDT

## 2021-09-09 NOTE — DISCHARGE SUMMARY
[facility]  DISCHARGE SUMMARY     Date of Admission: 8/31/2021  Date of Discharge:  9/10/2021  Primary Care Physician: Pato Cooney DO    Presenting Problem/History of Present Illness:  Fall, initial encounter [W19.XXXA]     Discharge Diagnoses:  Active Hospital Problems    Diagnosis    • **Closed left hip fracture (CMS/HCC)    • Acute on chronic respiratory failure with hypoxia and hypercapnia (CMS/HCC)    • Moderate malnutrition (CMS/HCC)    • Acute pain of left hip    • Fall    • Chronic respiratory failure with hypoxia (CMS/HCC)    • Pulmonary emphysema (CMS/HCC)    • COPD, very severe (CMS/HCC)      Chief Complaint on Day of Discharge: No complaints    History of Present Illness on Day of Discharge:   Sitting up in bed eating breakfast.  Oxygen at 4 L.  No complaints.  Denies nausea, vomiting or abdominal pain.  Denies chest pain or palpitations.  Denies sputum production.  Remains toe-touch weightbearing left lower extremity.    Consults:   1.  Dr. Valdemar Chaudhari, orthopedics  2.  Dr. Stahl, pulmonology    Procedures Performed: Cephalomedullary Nailing Left closed displaced Intertrochanteric hip fracture   8/31/2021    Pertinent Test Results:   Results for orders placed during the hospital encounter of 08/31/21    Adult Transthoracic Echo Complete W/ Cont if Necessary Per Protocol    Interpretation Summary  · Left ventricular ejection fraction appears to be greater than 70%. Left ventricular systolic function is hyperdynamic (EF > 70%).  · Left ventricular diastolic function was indeterminate.  · The right ventricular cavity is mildly dilated.  · The right atrial cavity is severely dilated.  · There is mild calcification of the aortic valve mainly affecting the non-coronary cusp(s).  · There is mitral valve prolapse of the anterior mitral leaflet. Trace mitral valve regurgitation is present.  · Moderate to severe pulmonary hypertension is present.  ·  There is mild pulmonic valve regurgitation present.    Laboratory Data Last 7 Days:  Results from last 7 days   Lab Units 09/08/21  0907 09/06/21  0525 09/04/21 0622   WBC 10*3/mm3 8.99 8.34 9.51   HEMOGLOBIN g/dL 10.9* 9.2* 10.2*   HEMATOCRIT % 37.5 31.1* 34.3   PLATELETS 10*3/mm3 196 133* 156     Results from last 7 days   Lab Units 09/08/21  0907 09/06/21  0525 09/06/21  0525 09/04/21 0622 09/04/21 0622   SODIUM mmol/L 143  --  143  --  140   POTASSIUM mmol/L 3.5  --  4.5  --  5.0   CHLORIDE mmol/L 94*  --  100  --  101   CO2 mmol/L 48.0*  --  42.0*  --  36.0*   BUN mg/dL 24*  --  25*  --  28*   CREATININE mg/dL 0.44*  --  0.46*  --  0.43*   GLUCOSE mg/dL 147*   < > 105*   < > 163*   CALCIUM mg/dL 9.2  --  9.6  --  9.6    < > = values in this interval not displayed.     Laboratory Data Last 7 Days:    Lab Results (last 7 days)     Procedure Component Value Units Date/Time    Basic Metabolic Panel [[id number]]  (Abnormal) Collected: 09/08/21 0907    Specimen: Blood Updated: 09/08/21 1010     Glucose 147 mg/dL      BUN 24 mg/dL      Creatinine 0.44 mg/dL      Sodium 143 mmol/L      Potassium 3.5 mmol/L      Chloride 94 mmol/L      CO2 48.0 mmol/L      Calcium 9.2 mg/dL      eGFR Non African Amer 140 mL/min/1.73      BUN/Creatinine Ratio 54.5     Anion Gap 1.0 mmol/L     Narrative:      GFR Normal >60  Chronic Kidney Disease <60  Kidney Failure <15      CBC (No Diff) [[id number]]  (Abnormal) Collected: 09/08/21 0907    Specimen: Blood Updated: 09/08/21 0940     WBC 8.99 10*3/mm3      RBC 4.05 10*6/mm3      Hemoglobin 10.9 g/dL      Hematocrit 37.5 %      MCV 92.6 fL      MCH 26.9 pg      MCHC 29.1 g/dL      RDW 13.5 %      RDW-SD 45.0 fl      MPV 12.3 fL      Platelets 196 10*3/mm3     Blood Gas, Arterial - [[id number]]  (Abnormal) Collected: 09/08/21 0439    Specimen: Arterial Blood Updated: 09/08/21 0453     Site Right Brachial     Tristen's Test N/A     pH, Arterial 7.386 pH units      pCO2, Arterial 82.7 mm Hg       Comment: 86 Value above critical limit        pO2, Arterial 74.5 mm Hg      Comment: 84 Value below reference range        HCO3, Arterial 49.6 mmol/L      Comment: 83 Value above reference range        Base Excess, Arterial 21.1 mmol/L      Comment: 83 Value above reference range        O2 Saturation, Arterial 95.7 %      Temperature 37.0 C      Barometric Pressure for Blood Gas 746 mmHg      Modality Nasal Cannula     Flow Rate 4.0 lpm      Ventilator Mode NA     Notified Who JUAN COKER LPN     Notified By [id number]     Notified Time 09/08/2021 04:55     Collected by [id number]     Comment: Meter: [id number]     :  [id number]        pCO2, Temperature Corrected 82.7 mm Hg      pH, Temp Corrected 7.386 pH Units      pO2, Temperature Corrected 74.5 mm Hg     Basic Metabolic Panel [[id number]]  (Abnormal) Collected: 09/06/21 0525    Specimen: Blood Updated: 09/06/21 0601     Glucose 105 mg/dL      BUN 25 mg/dL      Creatinine 0.46 mg/dL      Sodium 143 mmol/L      Potassium 4.5 mmol/L      Chloride 100 mmol/L      CO2 42.0 mmol/L      Calcium 9.6 mg/dL      eGFR Non African Amer 133 mL/min/1.73      BUN/Creatinine Ratio 54.3     Anion Gap 1.0 mmol/L     Narrative:      GFR Normal >60  Chronic Kidney Disease <60  Kidney Failure <15      CBC (No Diff) [[id number]]  (Abnormal) Collected: 09/06/21 0525    Specimen: Blood Updated: 09/06/21 0534     WBC 8.34 10*3/mm3      RBC 3.40 10*6/mm3      Hemoglobin 9.2 g/dL      Hematocrit 31.1 %      MCV 91.5 fL      MCH 27.1 pg      MCHC 29.6 g/dL      RDW 13.4 %      RDW-SD 44.1 fl      MPV 11.3 fL      Platelets 133 10*3/mm3     BNP [[id number]]  (Abnormal) Collected: 09/05/21 0505    Specimen: Blood Updated: 09/05/21 0616     proBNP 12,361.0 pg/mL     Narrative:      Among patients with dyspnea, NT-proBNP is highly sensitive for the detection of acute congestive heart failure. In addition NT-proBNP of <300 pg/ml effectively rules out acute congestive heart failure with 99%  negative predictive value.    Results may be falsely decreased if patient taking Biotin.      Basic Metabolic Panel [[id number]]  (Abnormal) Collected: 09/04/21 0622    Specimen: Blood Updated: 09/04/21 0705     Glucose 163 mg/dL      BUN 28 mg/dL      Creatinine 0.43 mg/dL      Sodium 140 mmol/L      Potassium 5.0 mmol/L      Chloride 101 mmol/L      CO2 36.0 mmol/L      Calcium 9.6 mg/dL      eGFR Non African Amer 144 mL/min/1.73      BUN/Creatinine Ratio 65.1     Anion Gap 3.0 mmol/L     Narrative:      GFR Normal >60  Chronic Kidney Disease <60  Kidney Failure <15      CBC (No Diff) [[id number]]  (Abnormal) Collected: 09/04/21 0622    Specimen: Blood Updated: 09/04/21 0643     WBC 9.51 10*3/mm3      RBC 3.84 10*6/mm3      Hemoglobin 10.2 g/dL      Hematocrit 34.3 %      MCV 89.3 fL      MCH 26.6 pg      MCHC 29.7 g/dL      RDW 13.3 %      RDW-SD 43.5 fl      MPV 12.8 fL      Platelets 156 10*3/mm3     Basic Metabolic Panel [[id number]]  (Abnormal) Collected: 09/03/21 0556    Specimen: Blood Updated: 09/03/21 0646     Glucose 204 mg/dL      BUN 46 mg/dL      Creatinine 0.76 mg/dL      Sodium 136 mmol/L      Potassium 5.7 mmol/L      Chloride 99 mmol/L      CO2 32.0 mmol/L      Calcium 8.9 mg/dL      eGFR Non African Amer 74 mL/min/1.73      BUN/Creatinine Ratio 60.5     Anion Gap 5.0 mmol/L     Narrative:      GFR Normal >60  Chronic Kidney Disease <60  Kidney Failure <15      CBC & Differential [[id number]]  (Abnormal) Collected: 09/03/21 0556    Specimen: Blood Updated: 09/03/21 0625    Narrative:      The following orders were created for panel order CBC & Differential.  Procedure                               Abnormality         Status                     ---------                               -----------         ------                     CBC Auto Differential[[id number]]        Abnormal            Final result                 Please view results for these tests on the individual orders.    CBC Auto Differential  [[id number]]  (Abnormal) Collected: 09/03/21 0556    Specimen: Blood Updated: 09/03/21 0625     WBC 9.91 10*3/mm3      RBC 3.78 10*6/mm3      Hemoglobin 9.9 g/dL      Hematocrit 33.5 %      MCV 88.6 fL      MCH 26.2 pg      MCHC 29.6 g/dL      RDW 13.2 %      RDW-SD 42.9 fl      MPV 12.5 fL      Platelets 141 10*3/mm3      Neutrophil % 90.5 %      Lymphocyte % 3.6 %      Monocyte % 5.1 %      Eosinophil % 0.0 %      Basophil % 0.1 %      Immature Grans % 0.7 %      Neutrophils, Absolute 8.96 10*3/mm3      Lymphocytes, Absolute 0.36 10*3/mm3      Monocytes, Absolute 0.51 10*3/mm3      Eosinophils, Absolute 0.00 10*3/mm3      Basophils, Absolute 0.01 10*3/mm3      Immature Grans, Absolute 0.07 10*3/mm3      nRBC 0.0 /100 WBC     Blood Gas, Arterial - [[id number]]  (Abnormal) Collected: 09/03/21 0357    Specimen: Arterial Blood Updated: 09/03/21 0403     Site Left Radial     Tristen's Test Positive     pH, Arterial 7.309 pH units      Comment: 84 Value below reference range        pCO2, Arterial 62.1 mm Hg      Comment: 83 Value above reference range        pO2, Arterial 85.4 mm Hg      HCO3, Arterial 31.2 mmol/L      Comment: 83 Value above reference range        Base Excess, Arterial 3.7 mmol/L      Comment: 83 Value above reference range        O2 Saturation, Arterial 96.4 %      Temperature 37.0 C      Barometric Pressure for Blood Gas 751 mmHg      Modality BiPap     FIO2 50 %      Ventilator Mode NA     Set [facility] Resp Rate 20.0     IPAP 16     Comment: Meter: [id number]     :  [id number]        EPAP 8     Collected by [id number]     pCO2, Temperature Corrected 62.1 mm Hg      pH, Temp Corrected 7.309 pH Units      pO2, Temperature Corrected 85.4 mm Hg     Blood Gas, Arterial - [[id number]]  (Abnormal) Collected: 09/02/21 2243    Specimen: Arterial Blood Updated: 09/02/21 2243     Site Left Radial     Tristen's Test Positive     pH, Arterial 7.339 pH units      Comment: 84 Value below reference range        pCO2,  Arterial 57.3 mm Hg      Comment: 83 Value above reference range        pO2, Arterial 75.8 mm Hg      Comment: 84 Value below reference range        HCO3, Arterial 30.8 mmol/L      Comment: 83 Value above reference range        Base Excess, Arterial 3.9 mmol/L      Comment: 83 Value above reference range        O2 Saturation, Arterial 95.8 %      Temperature 37.0 C      Barometric Pressure for Blood Gas 751 mmHg      Modality BiPap     FIO2 50 %      Ventilator Mode NA     Set [facility] Resp Rate 20.0     IPAP 16     Comment: Meter: [id number]     :  [id number]        EPAP 8     pCO2, Temperature Corrected 57.3 mm Hg      pH, Temp Corrected 7.339 pH Units      pO2, Temperature Corrected 75.8 mm Hg         Imaging Results (All)     Procedure Component Value Units Date/Time    XR Chest 1 View [[id number]] Collected: 09/02/21 1549     Updated: 09/02/21 1554    Narrative:      Exam:   XR CHEST 1 VW-       Date:  9/2/2021      History:  Female, age  76 years;hypoxia, shortness of breath;  W19.XXXA-Unspecified fall, initial encounter; S72.002A-Fracture of  unspecified part of neck of left femur, initial encounter for closed  fracture; Z74.09-Other reduced mobility; Z78.9-Other specified health  status     COMPARISON:  Chest x-ray dated 7/7/2020     Findings :     The heart and mediastinum are normal in size. Opacity along the right  major fissure, favored to reflect fluid signal. Emphysema. Increased  coarsening of interstitium diffusely. No measurable pneumothorax.  The  bones show no acute pathology.         Impression:      Impression:     1.  Trace right pleural effusion suspected.  2.  Diffuse coarsening of interstitium. May reflect developing  interstitial edema.     This report was finalized on 09/02/2021 15:50 by Dr. Purvi Liu MD.    XR Hip With or Without Pelvis 2 - 3 View Left [[id number]] Collected: 08/31/21 1951     Updated: 09/01/21 0657    Narrative:      EXAMINATION: C-arm in OR left hip  8/31/2021     Fluoroscopy time: 33.3 seconds.     Dose: 3.33804 mGy. 6 images are submitted for interpretation.     FINDINGS: C-arm was used intraoperatively during open reduction internal  fixation for an intertrochanteric fracture of the left hip. There is  good restoration of anatomic alignment. The films are available to the  OR for review.  This report was finalized on 08/31/2021 19:52 by Dr. Brennon Lucas MD.    FL C Arm During Surgery [[id number]] Collected: 08/31/21 1951     Updated: 09/01/21 0657    Narrative:      EXAMINATION: C-arm in OR left hip 8/31/2021     Fluoroscopy time: 33.3 seconds.     Dose: 3.60195 mGy. 6 images are submitted for interpretation.     FINDINGS: C-arm was used intraoperatively during open reduction internal  fixation for an intertrochanteric fracture of the left hip. There is  good restoration of anatomic alignment. The films are available to the  OR for review.  This report was finalized on 08/31/2021 19:52 by Dr. Brennon Lucas MD.    XR Hip With or Without Pelvis 2 - 3 View Left [[id number]] Collected: 08/31/21 1405     Updated: 08/31/21 1409    Narrative:      XR HIP W OR WO PELVIS 2-3 VIEW LEFT- 8/31/2021 1:41 PM CDT     HISTORY: fall, pain     COMPARISON: None      FINDINGS:   Frontal and lateral views of the left hip were obtained.  Additional AP  pelvis.     Varus impacted Acute left intertrochanteric fracture with  foreshortening. No subluxation at the hip joint.       Pelvic ring is intact. Sacral arches are intact. No gross soft tissue  abnormality is visualized.        Impression:      1. Acute varus impacted left intertrochanteric fracture with  foreshortening.        This report was finalized on 08/31/2021 14:06 by Dr Jordy Kinney, .        Hospital Course  Patient is a 76 y.o. female presented to [facility] emergency room 8/31/2021 after a fall at home.  Patient reported that she spilled coffee in the floor, slipped on the wet floor and landed on  her left hip.  She experienced immediate left hip pain and was unable to stand or ambulate.  She denied dizziness, presyncopal episode or loss of consciousness.  X-ray left hip showed acute varus impacted left intertrochanteric fracture with foreshortening.  She has a history of end-stage chronic obstructive pulmonary disease on oxygen 4.5 L.  She received morphine in the emergency room.  Orthopedics consulted and she was seen by Dr. Chaudhari.     Dr. Chaudhari noted acute traumatic displaced intertrochanteric fracture of the left femur.  On 8/31/2021 she was taken to the operating room and Dr. Chaudhari perform cephalomedullary nailing left closed displaced intertrochanteric hip fracture.  Postoperatively, she was allowed toe-touch weightbearing left lower extremity for 6 weeks.  Physical therapy consulted.  She was placed on Eliquis 2.5 mg twice daily for 6 weeks.  Start date 9/1/2021.  Discontinue Eliquis after last dose on 10/12/2021.  Will follow up with Dr. Chaudhari in 2 weeks.    She has a history of chronic respiratory failure with hypoxia and wears oxygen at 4.5 L continuously at home.  Postoperatively, she required oxygen at 10 L.  She was placed on Symbicort twice daily, duo nebs every 4 hours, Mucinex, incentive spirometry.  On 9/2 PCO2 57 on BiPAP.  Pulmonary medicine consulted and she was seen by Dr. Stahl who recommended IV steroids, bronchodilators, supplemental oxygen and BiPAP.  Continue Pulmicort, Atrovent, and avoid albuterol with tachycardia.  It was noted that patient was using Trelegy Ellipta at home with DuoNeb treatment which should not be used together because DuoNeb also contrast ipratropium can cause side effects from overuse of anticholinergic medications which is also a component of Trelegy Ellipta.  She remained on Solu-Medrol IV and transitioned to oral prednisone and tapered prior to discharge.  She was placed on empiric antibiotics with Levaquin but no evidence of pneumonia and no need for  antibiotics to be continued.  Outpatient pulmonary function test recommended when patient recovers.  Flonase and Singulair continued.  Eventually, she was able to be weaned down to oxygen at 4 to 4.5 L which was home use.  BiPAP ordered but patient was not able to tolerate BiPAP.  Patient reported she either had CPAP or BiPAP machine at home but was unable to use due to much pressure and no longer has the machine. She was agreeable to additional sleep study if necessary.  Repeat ABGs 9/8 noted patient was still hypercarbic with PCO2 82.  Dr. Stahl recommended trilogy as patient cannot maintain spontaneous ventilation for more than 4 consecutive hours without assistance of device.  Bilevel considered but would be insufficient given severity of disease, lack of battery backup and continuous alarms that would be required which are not provided by bilevel device.  Failure to provide an IMV could lead to exacerbation of patient's condition, repeated hospital admissions and likely harm to the patient.  Social service consulted for trilogy.  Discussed with JESSENIA Messer, pulmonology on date of discharge with recommendations for Symbicort twice daily and ipratropium nebulizer treatments.  No budesonide or Trelegy at discharge.  Follow-up with Dr. Stahl in 4 weeks.    Chest x-ray showed trace right pleural effusion.  Diffuse coarsening of interstitium.  Patient received dose of Lasix on 9/1 and 9/2.  Levaquin discontinued 9/3 as chest x-ray did not show findings concerning for pneumonia, patient remained afebrile without sputum production and absence of leukocytosis.  She was monitored off antibiotics without any times.    Echocardiogram 9/6 noted ejection fraction greater than 70%.    Physical therapy and occupational therapy consulted.  Patient was allowed toe-touch weightbearing left lower extremity.  Skilled nursing facility recommended.     consulted for discharge planning.  [facility] did not  "have available bed.  [facility] could offer bed but did not accept trilogy.  [facility] offered patient bed and be able to provide trilogy.    On 9/10/2021 she will be discharged to [facility] for ongoing physical therapy and Occupational Therapy prior to discharge home.  Toe-touch weightbearing left lower extremity for 6 weeks per Dr. Chaudhari.  Patient to wear trilogy at night and during naps; otherwise, oxygen at 4 L.  Follow-up with Dr. Chaudhari in 2 weeks.  Follow-up with Dr. Cooney after discharged from [facility].  Follow-up with Dr. Stahl in 4 weeks.    Physical Exam on Discharge:  BP 94/45 (BP Location: Left arm, Patient Position: Lying) Comment: nurse notified  Pulse 74   Temp 97 °F (36.1 °C) (Oral)   Resp 18   Ht 160 cm (63\")   Wt 59 kg (130 lb)   SpO2 92%   BMI 23.03 kg/m²   Physical Exam  Vitals and nursing note reviewed.   Constitutional:       Comments: No distress.  Oxygen at 4 L.   HENT:      Head: Normocephalic and atraumatic.      Nose: No congestion.      Mouth/Throat:      Pharynx: Oropharynx is clear. No oropharyngeal exudate.   Eyes:      Extraocular Movements: Extraocular movements intact.      Pupils: Pupils are equal, round, and reactive to light.   Cardiovascular:      Rate and Rhythm: Normal rate and regular rhythm.      Heart sounds: No murmur heard.     Pulmonary:      Breath sounds: Rhonchi present. No wheezing or rales.      Comments: Oxygen 4 L.  Abdominal:      Palpations: Abdomen is soft.      Tenderness: There is no abdominal tenderness.   Genitourinary:     Comments: Voiding.  Musculoskeletal:         General: Tenderness (Left hip.) present.      Cervical back: Normal range of motion and neck supple.   Skin:     Comments: Left hip incision clean and dry.   Neurological:      General: No focal deficit present.      Mental Status: She is alert and oriented to person, place, and time.   Psychiatric:         Mood and Affect: Mood normal.         Behavior: Behavior normal.         Thought " Content: Thought content normal.         Judgment: Judgment normal.       Condition on Discharge: Stable    Discharge Disposition: [place]    Discharge Diet:   Diet Instructions     Advance Diet As Tolerated        As tolerated    Activity at Discharge:   Activity Instructions     Other Activity Instructions      Activity Instructions: Toe-touch weightbearing left lower extremity for 6 weeks per Dr. Chaudhari      Toe-touch weightbearing left lower extremity for 6 weeks.    Discharge Care Plan / Instructions:   1.  For recurrent fall seek medical attention.  2.  Toe-touch weightbearing for 6 weeks per Dr. Chaudhari  3.  Eliquis 2.5 mg twice daily for 6 weeks.  Start date 9/1/2021.  Discontinue after last dose 10/12/2021.  4.  Oxygen 4-4.5 L.  Trilogy at night and during naps.  5.  Incentive spirometry every 2-4 hours while awake, flutter valve.  6.  Follow-up with Dr. Chaudhari 9/15/21  7.  Follow-up with Dr. Cooney after discharged from [facility] facility.  8.  Follow-up with Dr. Stahl 4 weeks.  9.  Symbicort 2 puffs twice daily per pulmonology  10.  Ipratropium nebulizer treatments 0.5 mg 4 times daily  11.  Discontinue DuoNeb treatments and Trelegy (fluticasone-umeclidin-vilant) per pulmonology.    Discharge Medications:     Discharge Medications      New Medications      Instructions Start Date   acetaminophen 325 MG tablet  Commonly known as: TYLENOL   650 mg, Oral, Every 6 Hours PRN      apixaban 2.5 MG tablet tablet  Commonly known as: ELIQUIS   2.5 mg, Oral, Every 12 Hours Scheduled.  Discontinue after last dose 10/12/2021.      budesonide-formoterol 160-4.5 MCG/ACT inhaler  Commonly known as: SYMBICORT   2 puffs, Inhalation, 2 Times Daily - RT      carvedilol 3.125 MG tablet  Commonly known as: COREG   3.125 mg, Oral, 2 Times Daily With Meals      cetirizine 10 MG tablet  Commonly known as: zyrTEC   10 mg, Oral, Daily      docusate sodium 100 MG capsule  Commonly known as: Colace   100 mg, Oral, 2 Times Daily       ipratropium 0.02 % nebulizer solution  Commonly known as: ATROVENT   0.5 mg, Nebulization, 4 Times Daily - RT      lidocaine 5 %  Commonly known as: LIDODERM   2 patches, Transdermal, Nightly, Remove & Discard patch within 12 hours or as directed by MD      ondansetron 4 MG tablet  Commonly known as: Zofran   4 mg, Oral, Every 8 Hours PRN      oxyCODONE-acetaminophen 7.5-325 MG per tablet  Commonly known as: PERCOCET   1 tablet, Oral, Every 6 Hours PRN      polyethylene glycol 17 g packet  Commonly known as: MIRALAX   17 g, Oral, Daily         Changes to Medications      Instructions Start Date   fluticasone 50 MCG/ACT nasal spray  Commonly known as: Flonase  What changed:   · how much to take  · how to take this  · when to take this   2 sprays in each nostril daily to control ear pressure and popping.      rOPINIRole 2 MG tablet  Commonly known as: REQUIP  What changed:   · how much to take  · how to take this  · when to take this  · additional instructions  · Another medication with the same name was removed. Continue taking this medication, and follow the directions you see here.   2 mg, Oral, Nightly, Take 1 hour before bedtime.         Continue These Medications      Instructions Start Date   albuterol sulfate  (90 Base) MCG/ACT inhaler  Commonly known as: PROVENTIL HFA;VENTOLIN HFA;PROAIR HFA   2 puffs, Inhalation, Every 4 Hours PRN      FLUoxetine 20 MG capsule  Commonly known as: PROzac   20 mg, Oral, Daily      guaiFENesin 600 MG 12 hr tablet  Commonly known as: Mucinex   1,200 mg, Oral, 2 Times Daily      montelukast 10 MG tablet  Commonly known as: Singulair   10 mg, Oral, Nightly      sodium chloride 0.65 % nasal spray   2 sprays, Nasal, As Needed         Stop These Medications    Fluticasone-Umeclidin-Vilant 200-62.5-25 MCG/INH inhaler  Commonly known as: TRELEGY     ipratropium-albuterol 0.5-2.5 mg/3 ml nebulizer  Commonly known as: DUO-NEB          Follow-up Appointments:    Contact  information for follow-up providers     Valdemar Chaudhari MD Follow up on 9/15/2021.    Specialty: Orthopedic Surgery  Why: 1pm  Contact information:  [address]  [facility] [address]  [phone]             Pato Cooney DO Follow up.    Specialty: Family Medicine  Why: After discharged from [facility]  Contact information:  [address]    [facility] [address]  [phone]             Yasmine Stahl MD Follow up.    Specialty: Pulmonary Disease  Why: 4 weeks  Contact information:  [address] [place] [address]  [phone]                   Contact information for after-discharge care     Destination     [facility] [place] .    Service: Skilled Nursing  Contact information:  [address]  [facility] [id number] [phone]                           Future Appointments:  Future Appointments   Date Time Provider Department Center   11/17/2021  2:45 PM Pato Cooney DO [facility]AD     Test Results Pending at Discharge: None    The above documentation resulted from a face-to-face encounter by me Joy RUELAS, [facility].    Electronically signed by JESSENIA Rosado, 9/10/2021, 09:30 CDT.    Time: This discharge process required greater than 35 minutes for completion.    Plan discussed with Carmen Caruso APRN and patient.    Time spent in face-to-face evaluation, chart review, planning and education greater than 35 minutes.

## 2021-09-09 NOTE — THERAPY TREATMENT NOTE
Acute Care - Occupational Therapy Treatment Note  [facility]     Patient Name: Heide Newsome  : 1945  MRN: [id number]  Today's Date: 2021  Onset of Illness/Injury or Date of Surgery: 21  Date of Referral to OT: 21  Referring Physician: Ari Mckee MD     Admit Date: 2021       ICD-10-CM ICD-9-CM   1. Fall, initial encounter  W19.XXXA E888.9   2. Closed fracture of left hip, initial encounter (CMS/HCC)  S72.002A 820.8   3. Impaired mobility  Z74.09 799.89   4. Decreased activities of daily living (ADL)  Z78.9 V49.89     Patient Active Problem List   Diagnosis   • Pulmonary emphysema (CMS/HCC)   • Acute on chronic respiratory failure with hypoxia (CMS/HCC)   • COPD, very severe (CMS/HCC)   • Severe malnutrition (CMS/HCC)   • History of colon cancer   • Thrombocytopenia (CMS/HCC)   • Anemia   • Hyperglycemia, drug-induced   • Pneumonia   • Chronic respiratory failure with hypoxia (CMS/HCC)   • COPD with acute exacerbation (CMS/HCC)   • Adrenal nodule (CMS/HCC)   • Closed left hip fracture (CMS/HCC)   • Fall   • Acute pain of left hip     Past Medical History:   Diagnosis Date   • Cancer (CMS/HCC), colon    • Chronic respiratory failure (CMS/HCC), 4 L nasal cannula continuously    • COPD (chronic obstructive pulmonary disease) (CMS/HCC)    • Restless leg syndrome      Past Surgical History:   Procedure Laterality Date   • APPENDECTOMY     • CARPAL TUNNEL RELEASE Left    • COLON RESECTION     • FOOT SURGERY Bilateral     hammer toe   • FRACTURE SURGERY Left     Arm   • HIP TROCHANTERIC NAILING WITH INTRAMEDULLARY HIP SCREW Left 2021    Procedure: LEFT HIP TROCHANTERIC NAILING SHORT WITH INTRAMEDULLARY HIP SCREW;  Surgeon: Valdemar Chaudhari MD;  Location: [facility];  Service: Orthopedics;  Laterality: Left;   • HYSTERECTOMY     • WRIST FRACTURE SURGERY Right             OT ASSESSMENT FLOWSHEET (last 12 hours)      OT Evaluation and Treatment     Row Name 21 1030                    OT Time and Intention    Subjective Information  complains of;pain;fatigue  -TS        Document Type  therapy note (daily note)  -TS        Mode of Treatment  occupational therapy  -TS        Patient Effort  good  -TS        Comment  TTWB LLE   -TS           General Information    Existing Precautions/Restrictions  oxygen therapy device and L/min;non-weight bearing 4.5 increase to 5L with activity  -TS           Cognition    Personal Safety Interventions  fall prevention program maintained;gait belt;nonskid shoes/slippers when out of bed  -TS           Pain Scale: Numbers Pre/Post-Treatment    Pretreatment Pain Rating  2/10  -TS        Posttreatment Pain Rating  3/10  -TS        Pain Location - Side  Left  -TS        Pain Location - Orientation  incisional  -TS        Pain Location  hip  -TS        Pain Intervention(s)  Repositioned  -TS           Transfer Assessment/Treatment    Transfers  sit-stand transfer;stand-sit transfer  -TS           Transfers    Sit-Stand [place] (Transfers)  contact guard;minimum assist (75% patient effort)  -TS        Stand-Sit [place] (Transfers)  contact guard;minimum assist (75% patient effort)  -TS           Stand Pivot/Stand Step Transfer    Stand Pivot/Stand Step [place] (Transfers)  minimum assist (75% patient effort)  -TS           Activities of Daily Living    BADL Assessment/Intervention  bathing;upper body dressing;lower body dressing;grooming  -TS           Bathing Assessment/Intervention    [place] Level (Bathing)  upper body;lower body;distal lower extremities/feet;set up;moderate assist (50% patient effort)  -TS        Assistive Devices (Bathing)  hand-held shower spray hose;grab bar, tub/shower;shower chair  -TS        Position (Bathing)  supported sitting  -TS           Upper Body Dressing Assessment/Training    [place] Level (Upper Body Dressing)  don;doff;pull-over garment;set up;minimum assist (75% patient effort)  -TS        Position  (Upper Body Dressing)  supported sitting  -TS           Lower Body Dressing Assessment/Training    [place] Level (Lower Body Dressing)  don;doff;socks;set up;maximum assist (25% patient effort)  -TS        Position (Lower Body Dressing)  supported sitting  -TS           Grooming Assessment/Training    [place] Level (Grooming)  wash face, hands;hair care, combing/brushing;set up  -TS        Position (Grooming)  supported sitting  -TS           Wound 08/31/21 1914 Left lateral thigh Incision    Wound - Properties Group Placement Date: 08/31/21  -YESY Placement Time: 1914 -YESY Side: Left  -YESY Orientation: lateral  -YESY Location: thigh  -YESY Primary Wound Type: Incision  -YESY    Retired Wound - Properties Group Date first assessed: 08/31/21  -YESY Time first assessed: 1914 -[place] Side: Left  -YESY Location: thigh  -YESY Primary Wound Type: Incision  -YESY       Plan of Care Review    Plan of Care Reviewed With  patient  -TS        Progress  improving  -TS        Outcome Summary  Pt completed TB bathing task while seated on shower chair. Pt requires increased time and rest breaks due to COPD. Per pt she thinks her last shower may have been a month or so again. Pt was able to assist with washing UB. Max A for washing hair. Pt min A for UB dressing. Pt O2 increased during showering task. Pt would benefit from SNF at discharge due to fatigue, decreased strength and endurance. Continue OT POC   -TS           Positioning and Restraints    Pre-Treatment Position  bathroom  -TS        Post Treatment Position  chair  -TS        In Chair  reclined;call light within reach;encouraged to call for assist  -TS          User Key  (r) = Recorded By, (t) = Taken By, (c) = Cosigned By    Initials Name Effective Dates    TS Farzana Hahn COTA 06/16/21 -     YESY Laura Cash RN 06/16/21 -            Occupational Therapy Education                 Title: PT OT SLP Therapies (In Progress)     Topic: Occupational Therapy (In Progress)      Point: ADL training (Done)     Description:   Instruct learner(s) on proper safety adaptation and remediation techniques during self care or transfers.   Instruct in proper use of assistive devices.              Learning Progress Summary           Patient Acceptance, E, VU,NR by PF at 9/3/2021 1500    Comment: OT process, Energy conservation, Weight bearing status, Endurance training    Acceptance, E, NR,NL by PF at 9/2/2021 1258    Comment: ADL tolerance, Proper body mechanics, Weight bearing status,                   Point: Home exercise program (Not Started)     Description:   Instruct learner(s) on appropriate technique for monitoring, assisting and/or progressing therapeutic exercises/activities.              Learner Progress:  Not documented in this visit.          Point: Precautions (Done)     Description:   Instruct learner(s) on prescribed precautions during self-care and functional transfers.              Learning Progress Summary           Patient Acceptance, E, VU,NR by PF at 9/3/2021 1500    Comment: OT process, Energy conservation, Weight bearing status, Endurance training    Acceptance, E, NR,NL by PF at 9/2/2021 1258    Comment: ADL tolerance, Proper body mechanics, Weight bearing status,                   Point: Body mechanics (Done)     Description:   Instruct learner(s) on proper positioning and spine alignment during self-care, functional mobility activities and/or exercises.              Learning Progress Summary           Patient Acceptance, E, VU,NR by PF at 9/3/2021 1500    Comment: OT process, Energy conservation, Weight bearing status, Endurance training    Acceptance, E, NR,NL by PF at 9/2/2021 1258    Comment: ADL tolerance, Proper body mechanics, Weight bearing status,                               User Key     Initials Effective Dates Name Provider Type Discipline     08/04/21 -  Kevan Rai Jr., OT Student OT Student OT                  OT Recommendation and Plan     Plan of  Care Review  Plan of Care Reviewed With: patient  Progress: improving  Outcome Summary: Pt completed TB bathing task while seated on shower chair. Pt requires increased time and rest breaks due to COPD. Per pt she thinks her last shower may have been a month or so again. Pt was able to assist with washing UB. Max A for washing hair. Pt min A for UB dressing. Pt O2 increased during showering task. Pt would benefit from SNF at discharge due to fatigue, decreased strength and endurance. Continue OT POC   Plan of Care Reviewed With: patient  Outcome Summary: Pt completed TB bathing task while seated on shower chair. Pt requires increased time and rest breaks due to COPD. Per pt she thinks her last shower may have been a month or so again. Pt was able to assist with washing UB. Max A for washing hair. Pt min A for UB dressing. Pt O2 increased during showering task. Pt would benefit from SNF at discharge due to fatigue, decreased strength and endurance. Continue OT POC     Outcome Measures     Row Name 09/09/21 1300             How much help from another is currently needed...    Putting on and taking off regular lower body clothing?  2  -TS      Bathing (including washing, rinsing, and drying)  2  -TS      Toileting (which includes using toilet bed pan or urinal)  3  -TS      Putting on and taking off regular upper body clothing  3  -TS      Taking care of personal grooming (such as brushing teeth)  3  -TS      Eating meals  4  -TS      AM-PAC 6 Clicks Score (OT)  17  -TS        User Key  (r) = Recorded By, (t) = Taken By, (c) = Cosigned By    Initials Name Provider Type    TS Farzana Hahn COTA Occupational Therapy Assistant          Time Calculation:   Time Calculation- OT     Row Name 09/09/21 1358             Time Calculation- OT    OT Start Time  1030  -TS      OT Stop Time  1130  -TS      OT Time Calculation (min)  60 min  -TS      Total Timed Code Minutes- OT  60 minute(s)  -TS      OT Received On   09/09/21  -TS         Timed Charges    74932 - OT Self Care/Mgmt Minutes  60  -TS         Total Minutes    Timed Charges Total Minutes  60  -TS       Total Minutes  60  -TS        User Key  (r) = Recorded By, (t) = Taken By, (c) = Cosigned By    Initials Name Provider Type    TS Farzana Hahn COTA Occupational Therapy Assistant        Therapy Charges for Today     Code Description Service Date Service Provider Modifiers Qty    80371140979 HC OT SELF CARE/MGMT/TRAIN EA 15 MIN 9/9/2021 Farzana Hahn COTA GO 4               IRMA Valencia  9/9/2021

## 2021-09-09 NOTE — PLAN OF CARE
Goal Outcome Evaluation:  Plan of Care Reviewed With: patient        Progress: no change  Outcome Summary: Ntn follow up. Pt reports her appetite is average. Cardiac/CCHO diet. Pt may qualify for malnutrition based on moderate muscle and fat loss. Encouraged oral intake as tolerated. Boost Plus added BID. Cont to follow for plan of care.

## 2021-09-09 NOTE — PROGRESS NOTES
Malnutrition Severity Assessment    Patient Name:  Heide Newsome  YOB: 1945  MRN: [id number]  Admit Date:  8/31/2021    Patient meets criteria for : Moderate (non-severe) Malnutrition (Secondary signs: weakness)    Malnutrition Severity Assessment  Malnutrition Type: Chronic Disease - Related Malnutrition     Malnutrition Type (last 8 hours)      Malnutrition Severity Assessment     Row Name 09/09/21 1404       Malnutrition Severity Assessment    Malnutrition Type  Chronic Disease - Related Malnutrition    Row Name 09/09/21 1404       Insufficient Energy Intake     Insufficient Energy Intake Findings  Severe    Insufficient Energy Intake   <75% of est. energy requirement for > or equal to 1 month    Row Name 09/09/21 1404       Muscle Loss    Loss of Muscle Mass Findings  Moderate    Restorationism Region  Moderate - slight depression    Clavicle Bone Region  Moderate - some protrusion in females, visible in males    Row Name 09/09/21 1404       Fat Loss    Subcutaneous Fat Loss Findings  Moderate    Orbital Region   Moderate -  somewhat hollowness, slightly dark circles    Upper Arm Region  Moderate - some fat tissue, not ample    Row Name 09/09/21 1404       Fluid Accumulation (Edema)    Fluid Acumulation Findings  Mild    Fluid Accumulation   Mild equals 1+ pitting edema    Row Name 09/09/21 1404       Criteria Met (Must meet criteria for severity in at least 2 of these categories: M Wasting, Fat Loss, Fluid, Secondary Signs, Wt. Status, Intake)    Patient meets criteria for   Moderate (non-severe) Malnutrition Secondary signs: weakness        Electronically signed by:  Cindi Canales MS,RDN,AMI  09/09/21 14:15 CDT

## 2021-09-09 NOTE — PROGRESS NOTES
[facility] Medicine Services  INPATIENT PROGRESS NOTE    Length of Stay: 9  Date of Admission: 8/31/2021  Primary Care Physician: Pato Cooney DO    Subjective   Chief Complaint: Follow-up  HPI   Patient seen while up in the bathroom getting a bath and her hair washed.  She denies nausea, vomiting or abdominal pain.  Denies chest pain or palpitations.  Oxygen at 4 L.  Patient able to stand and step transfer to chair with cues.  Toe-touch weightbearing.  Pulmonology has recommended trilogy and social service has been working on facility that can accept trilogy.  Finally, [facility] has offered a bed and can accept trilogy and hopefully bed and trilogy will be available tomorrow.    Review of Systems   Constitutional: Positive for activity change (After fall) and chills. Negative for fever.   HENT: Negative for trouble swallowing.    Eyes: Negative for photophobia and visual disturbance.   Respiratory: Positive for shortness of breath. Negative for wheezing.    Cardiovascular: Negative for chest pain, palpitations and leg swelling.   Gastrointestinal: Negative for constipation, diarrhea, nausea and vomiting.   Endocrine: Negative for cold intolerance, heat intolerance and polyuria.   Genitourinary: Negative for dysuria, frequency and urgency.   Musculoskeletal: Positive for gait problem (After fall).   Skin: Positive for wound (Left hip surgical incision).   Allergic/Immunologic: Negative for immunocompromised state.   Neurological: Positive for weakness. Negative for light-headedness and headaches.   Hematological: Negative for adenopathy. Does not bruise/bleed easily.   Psychiatric/Behavioral: Negative for agitation, behavioral problems and confusion.      All pertinent negatives and positives are as above. All other systems have been reviewed and are negative unless otherwise stated.     Objective    Temp:  [97.2 °F (36.2 °C)-98 °F (36.7 °C)] 98 °F (36.7 °C)  Heart Rate:   [] 94  Resp:  [16-18] 18  BP: (100-108)/(56-60) 100/60  Physical Exam  Vitals and nursing note reviewed.   Constitutional:       Comments: Oxygen at 4 L.  Up in chair and bathroom.   HENT:      Head: Normocephalic and atraumatic.      Nose: No congestion.      Mouth/Throat:      Pharynx: Oropharynx is clear. No oropharyngeal exudate.   Eyes:      Extraocular Movements: Extraocular movements intact.      Pupils: Pupils are equal, round, and reactive to light.   Cardiovascular:      Rate and Rhythm: Normal rate and regular rhythm.      Comments: Normal sinus rhythm  on telemetry.  Pulmonary:      Breath sounds: Rhonchi (Scattered rhonchi.) present.      Comments: Oxygen at 4 L.  Abdominal:      Palpations: Abdomen is soft.      Tenderness: There is no abdominal tenderness.   Genitourinary:     Comments: Voiding.  Musculoskeletal:         General: Tenderness (Left hip.) present.      Cervical back: Normal range of motion and neck supple.   Skin:     General: Skin is warm and dry.   Neurological:      General: No focal deficit present.      Mental Status: She is alert and oriented to person, place, and time.   Psychiatric:         Mood and Affect: Mood normal.         Behavior: Behavior normal.         Thought Content: Thought content normal.         Judgment: Judgment normal.       Results Review:  I have reviewed the labs, radiology results, and diagnostic studies.    Laboratory Data:      Results from last 7 days   Lab Units 09/08/21  0907 09/06/21 0525 09/04/21 0622 09/03/21  0556   WBC 10*3/mm3 8.99 8.34 9.51 9.91   HEMOGLOBIN g/dL 10.9* 9.2* 10.2* 9.9*   HEMATOCRIT % 37.5 31.1* 34.3 33.5*   PLATELETS 10*3/mm3 196 133* 156 141        Results from last 7 days   Lab Units 09/08/21  0907 09/06/21  0525 09/06/21 0525 09/04/21 0622 09/04/21 0622 09/03/21  0556 09/03/21  0556   SODIUM mmol/L 143  --  143  --  140  --  136   POTASSIUM mmol/L 3.5  --  4.5  --  5.0  --  5.7*   CHLORIDE mmol/L 94*  --  100   --  101  --  99   CO2 mmol/L 48.0*  --  42.0*  --  36.0*  --  32.0*   BUN mg/dL 24*  --  25*  --  28*  --  46*   CREATININE mg/dL 0.44*  --  0.46*  --  0.43*  --  0.76   GLUCOSE mg/dL 147*   < > 105*   < > 163*   < > 204*   CALCIUM mg/dL 9.2  --  9.6  --  9.6  --  8.9    < > = values in this interval not displayed.     Culture Data:    No results found for: BLOODCX, URINECX, WOUNDCX, MRSACX, RESPCX, STOOLCX    Radiology Data:   Imaging Results (Last 7 Days)     ** No results found for the last 168 hours. **      Results for orders placed during the hospital encounter of 08/31/21    Adult Transthoracic Echo Complete W/ Cont if Necessary Per Protocol    Interpretation Summary  · Left ventricular ejection fraction appears to be greater than 70%. Left ventricular systolic function is hyperdynamic (EF > 70%).  · Left ventricular diastolic function was indeterminate.  · The right ventricular cavity is mildly dilated.  · The right atrial cavity is severely dilated.  · There is mild calcification of the aortic valve mainly affecting the non-coronary cusp(s).  · There is mitral valve prolapse of the anterior mitral leaflet. Trace mitral valve regurgitation is present.  · Moderate to severe pulmonary hypertension is present.  · There is mild pulmonic valve regurgitation present.    Intake/Output    Intake/Output Summary (Last 24 hours) at 9/9/2021 1621  Last data filed at 9/9/2021 0015  Gross per 24 hour   Intake --   Output 1550 ml   Net -1550 ml     Scheduled Meds  apixaban, 2.5 mg, Oral, Q12H  budesonide, 0.25 mg, Nebulization, BID - RT  carvedilol, 3.125 mg, Oral, BID With Meals  cetirizine, 10 mg, Oral, Daily  fluticasone, 1 spray, Each Nare, Daily  guaiFENesin, 1,200 mg, Oral, BID  ipratropium, 0.5 mg, Nebulization, 4x Daily - RT  lidocaine, 2 patch, Transdermal, Nightly  montelukast, 10 mg, Oral, Nightly  polyethylene glycol, 17 g, Oral, Daily  rOPINIRole, 2 mg, Oral, Nightly  senna-docusate sodium, 1 tablet, Oral,  BID  sodium chloride, 10 mL, Intravenous, Q12H      I have reviewed the patient current medications.     Assessment/Plan     Active Hospital Problems    Diagnosis    • **Closed left hip fracture (CMS/HCC)    • Acute on chronic respiratory failure with hypoxia and hypercapnia (CMS/HCC)    • Acute pain of left hip    • Fall    • Chronic respiratory failure with hypoxia (CMS/HCC)    • Pulmonary emphysema (CMS/HCC)    • COPD, very severe (CMS/HCC)      Plan:  1.  To ER 8/31 after fall.  She slipped on coffee that spilled on the floor and landed on left hip. Complained of left hip pain and unable to stand or bear weight.  No presyncopal episode, dizziness prior to fall.  No loss of consciousness.  X-ray left hip acute valgus impacted left intertrochanteric fracture with foreshortening.  2.  Cephalomedullary nailing left closed displaced intertrochanteric hip fracture 8/31 Dr. Chaudhari.  3.  Toe-touch weightbearing for 6 weeks per Dr. Chaudhari.  4.  DVT prophylaxis with Eliquis 2.5 mg twice daily.  Start date 9/1/2021.  Discontinue after last dose on 10/12/2021.  5.  Chronic hypoxic hypercarbic respiratory failure and wears oxygen at 4.5 L at home.  Required oxygen at 10 L.  Pulmonary consulted.  ABGs 9/2 noted PCO2 57. BiPAP attempted but remains hypercarbic and unable to tolerate BiPAP. Pulmonary recommends trilogy.  Social service assisting with trilogy.  She has weaned back down to oxygen at 4 to 4.5 L.  6.  She completed Solu-Medrol and prednisone.  Antibiotics completed.  7.  Continue Pulmicort, ipratropium nebulizer, Mucinex, incentive spirometry.  8.  Chest x-ray showed trace right pleural effusion suspected.  Diffuse coarsening of interstitium.  May reflect developing interstitial edema.  Received Lasix on 9/1 and 9/2.  Levaquin discontinued 9/3 as chest x-ray without findings for pneumonia, patient afebrile and no sputum production.  9.  Echocardiogram 9/6 noted preserved ejection fraction greater than 70%.  Moderate to  severe pulmonary hypertension.  10.  Boost added.  11.  Home medications reviewed.  12.  Social service for discharge planning.  Referral made to multiple skilled facilities without bed offer or unable to take trilogy.  [facility] has offered bed and can provide trilogy.    CODE STATUS/Advanced Care Planning:.  Full code  Patient surrogate decision maker is her son, Jensen Salcedo.     The above documentation resulted from a face-to-face encounter by me Joy RUELAS, [facility].    Discharge Planning: I expect patient to be discharged to [facility] in 1 day.    Electronically signed by JESSENIA Rosado, 9/9/2021, 16:21 CDT.

## 2021-09-09 NOTE — PLAN OF CARE
Goal Outcome Evaluation:  Plan of Care Reviewed With: patient        Progress: improving   Pt alert and oriented x4. VSS. C/o pain relieved with PRN meds. GARRISON. PPP. SCD for VTE. . o2 4L NC. Dressing dry, intact, dry drainage. Voiding via purwick. Pt up with PT to BSC. Pt sat up in the chair for approx an hour. Call light within reach. Safety maintained.

## 2021-09-09 NOTE — PROGRESS NOTES
Continued Stay Note  [facility]     Patient Name: Heide Newsome  MRN: [id number]  Today's Date: 9/9/2021    Admit Date: 8/31/2021    Discharge Plan     Row Name 09/09/21 0940       Plan    Plan Comments  Bed offered at [facility] but they cannot accept trilogies. Per APRN/Pulmonary pt will require a trilogy. Spoke to Ariana at [facility] and they are reviewing referral now. Ariana is aware that trilogy will be required if bed offered.        Discharge Codes    No documentation.             FLAKITA Chambers

## 2021-09-09 NOTE — PROGRESS NOTES
Continued Stay Note  [facility]     Patient Name: Heide Newsome  MRN: [id number]  Today's Date: 9/9/2021    Admit Date: 8/31/2021    Discharge Plan     Row Name 09/09/21 1243       Plan    Plan Comments  Per Ariana at [facility] they can offer a bed and they can provide a trilogy. Ariana states she will check to see if facility has a trilogy. If not they can order one and it would be available tomorrow. Ariana states she will notify  after she checks on trilogy. Updated pt son (Jensen) and he is agreeable to [facility].    Final Discharge Disposition Code  03 - skilled nursing facility (SNF)        Discharge Codes    No documentation.             FLAKITA Chambers

## 2021-09-09 NOTE — PLAN OF CARE
Goal Outcome Evaluation:  Plan of Care Reviewed With: patient        Progress: improving  Outcome Summary: pt cont to need encouragement for activity get SOA and fatigues easily. Bed mobility min/mod x1. Once sitting EOb pt gets SOA o2 remains in 90s. Pt able to stand and step transfer to chair cues to maintain TTWB mod x1 then transfer to shower chair-chair. Pt will need snf upon d/c for cont strength and mobility.

## 2021-09-10 NOTE — PROGRESS NOTES
Continued Stay Note  [facility]     Patient Name: Heide Newsome  MRN: [id number]  Today's Date: 9/10/2021    Admit Date: 8/31/2021    Discharge Plan     Row Name 09/10/21 1016       Plan    Plan Comments  Called [facility] about trilogy and admissions is in a meeting until 11am. Requested return call after meeting regarding trilogy as pt is ready for dc today. Called and left a message with pt son (Jensen) for an update.        Discharge Codes    No documentation.       Expected Discharge Date and Time     Expected Discharge Date Expected Discharge Time    Sep 10, 2021             FLAKITA Chambers

## 2021-09-10 NOTE — PLAN OF CARE
Goal Outcome Evaluation:         A&Ox4.  Confused  at times. 4 L NC.  .  Tele in place. NS.  Eliquis VTE.  Purewick in place. Refusing turns.  Dressing to lt hip CDI with dried drainage marked on upper lt hip dressing.  Lidocaine patch anterior and posterior lt hip.  Refused Bipap tonight.  Will continue to monitor.

## 2021-09-10 NOTE — PROGRESS NOTES
PULMONARY AND CRITICAL CARE PROGRESS NOTE - [facility]    Patient: Heide Newsome  1945   MR# [id number]   Acct# [id number]  09/10/21   07:14 CDT  Referring Provider: Roberto Hobbs MD    Chief Complaint: Shortness of breath  Interval history: The patient is resting in bed on 4 L nasal cannula.  O2 sat 92%. She reports that the flutter valve will tire her out. She refused the bipap last night as she states it is tight on her face and she can not breath. Patient has been approved for [facility] and they can provide Trilogy.     Meds:  apixaban, 2.5 mg, Oral, Q12H  budesonide, 0.25 mg, Nebulization, BID - RT  carvedilol, 3.125 mg, Oral, BID With Meals  cetirizine, 10 mg, Oral, Daily  fluticasone, 1 spray, Each Nare, Daily  guaiFENesin, 1,200 mg, Oral, BID  ipratropium, 0.5 mg, Nebulization, 4x Daily - RT  lidocaine, 2 patch, Transdermal, Nightly  montelukast, 10 mg, Oral, Nightly  polyethylene glycol, 17 g, Oral, Daily  rOPINIRole, 2 mg, Oral, Nightly  senna-docusate sodium, 1 tablet, Oral, BID  sodium chloride, 10 mL, Intravenous, Q12H         Review of Systems:   Review of Systems   Constitutional: Negative for chills and fever.   Respiratory: Negative for cough and shortness of breath.    Cardiovascular: Negative for chest pain.   Gastrointestinal: Negative for diarrhea, nausea and vomiting.     Physical Exam:  SpO2 Percentage    09/10/21 0100 09/10/21 0632 09/10/21 0638   SpO2: 94% 94% (!) 87%     Temp:  [97.2 °F (36.2 °C)-98 °F (36.7 °C)] 97.9 °F (36.6 °C)  Heart Rate:  [] 112  Resp:  [16-22] 22  BP: ()/(55-60) 104/56  No intake or output data in the 24 hours ending 09/10/21 0714  Physical Exam  Constitutional:       General: She is not in acute distress.     Interventions: Nasal cannula in place.   HENT:      Head: Normocephalic.      Nose: Nose normal.   Eyes:      General: No scleral icterus.  Cardiovascular:      Rate and Rhythm: Normal rate.   Pulmonary:       Effort: No respiratory distress.   Abdominal:      General: There is no distension.   Musculoskeletal:      Right lower leg: No edema.      Left lower leg: No edema.      Comments: Dressing to left hip C/D/I    Neurological:      Mental Status: She is alert.      Comments: alert   Psychiatric:         Mood and Affect: Mood normal.         Behavior: Behavior is cooperative.       Laboratory Data:  Results from last 7 days   Lab Units 09/08/21  0907 09/06/21  0525 09/04/21  0622   WBC 10*3/mm3 8.99 8.34 9.51   HEMOGLOBIN g/dL 10.9* 9.2* 10.2*   PLATELETS 10*3/mm3 196 133* 156     Results from last 7 days   Lab Units 09/08/21  0907 09/06/21  0525 09/04/21  0622   SODIUM mmol/L 143 143 140   POTASSIUM mmol/L 3.5 4.5 5.0   BUN mg/dL 24* 25* 28*   CREATININE mg/dL 0.44* 0.46* 0.43*     Results from last 7 days   Lab Units 09/08/21  0439   PH, ARTERIAL pH units 7.386   PCO2, ARTERIAL mm Hg 82.7*   PO2 ART mm Hg 74.5*     No results found for: BLOODCX, URINECX, WOUNDCX, MRSACX, RESPCX, STOOLCX  Recent films:  No radiology results for the last day  Films reviewed personally by me.  My interpretation: None today    Pulmonary Assessment:  Acute on chronic hypoxic and hypercapnic respiratory failure   COPD exacerbation, no PFT available  Fall with left hip fracture, status post femoral nailing  Protein calorie malnutrition  Anemia, stable  History of heart failure  Hyperkalemia    Recommend:   Continue supplemental oxygen and titrate for saturation percent.  Currently on 4L NC.  Baseline home oxygen 4.5 L  Patient refusing Bipap, has trilogy that will be provided by [facility]/ [place]. She is encouraged to wear it and states she plans to.   Continue ipratropium nebs and add Symbicort at discharge   Continue guaifenesin  Antibiotic-has been DC'd   Prednisone completed   DVT prophylaxis, Eliquis  Mobilize as tolerated when okay with Ortho  Encourage incentive spirometer  Pulmonary will sign off.   Follow up with Dr. Stahl      Electronically signed by JESSENIA Nicolas, 09/10/21, 07:14 CDT     Physician substantive portion:  Patient does not like the aerobic.  She is been arranged for her trilogy machine.  She is awake and alert.  Chest has diminished breath sounds no wheezes.  No accessory muscle use.  She has had problems with noncompliance and nonadherence in the past so hopefully this will do better for her than the other machinery has done.  She is awake and in no distress.  Follow-up arranged.  Continue oxygen as at home.    I have seen and examined patient personally, performing a face-to-face diagnostic evaluation with plan of care reviewed and developed with APRN and nursing staff. I have addended and/or modified the above history of present illness, physical examination, and assessment and plan to reflect my findings and impressions. Essential elements of the care plan were discussed with APRN above.  Agree with findings and assessment/plan as documented above.    Electronically signed by Stevenson Carson MD, on 9/10/2021, 14:57 CDT

## 2021-09-10 NOTE — CASE MANAGEMENT/SOCIAL WORK
Continued Stay Note  [facility]     Patient Name: Heide Newsome  MRN: [id number]  Today's Date: 9/10/2021    Admit Date: 8/31/2021    Discharge Plan     Row Name 09/10/21 1333       Plan    Final Discharge Disposition Code  03 - skilled nursing facility (SNF)    Final Note  Pt is being dcd to [facility], skilled level. Updated pt son (Jensen) of pt dc. Pt will need a new Covid test prior to dc. DC summay has been faxed. Call report number is [phone]        Discharge Codes    No documentation.       Expected Discharge Date and Time     Expected Discharge Date Expected Discharge Time    Sep 10, 2021             FLAKITA Chambers

## 2021-09-10 NOTE — PLAN OF CARE
Goal Outcome Evaluation:  Plan of Care Reviewed With: patient        Progress: improving  Outcome Summary: A&OX4. C/o pain in L hip and heel. PRN medication given x1. Pulses palpable. Voiding incontinent per purewick. Tolerating diet. Plans to d/c to SNF today.

## 2021-09-11 NOTE — THERAPY DISCHARGE NOTE
Acute Care - Physical Therapy Discharge Summary  [facility]       Patient Name: Heide Newsome  : 1945  MRN: [id number]    Today's Date: 2021  Onset of Illness/Injury or Date of Surgery: 21       Referring Physician: Ari Mckee MD       Admit Date: 2021      PT Recommendation and Plan    Visit Dx:    ICD-10-CM ICD-9-CM   1. Fall, initial encounter  W19.XXXA E888.9   2. Closed fracture of left hip, initial encounter (CMS/[facility], located within [facility])  S72.002A 820.8   3. Impaired mobility  Z74.09 799.89   4. Decreased activities of daily living (ADL)  Z78.9 V49.89       Outcome Measures     Row Name 21 1300             How much help from another is currently needed...    Putting on and taking off regular lower body clothing?  2  -TS      Bathing (including washing, rinsing, and drying)  2  -TS      Toileting (which includes using toilet bed pan or urinal)  3  -TS      Putting on and taking off regular upper body clothing  3  -TS      Taking care of personal grooming (such as brushing teeth)  3  -TS      Eating meals  4  -TS      AM-PAC 6 Clicks Score (OT)  17  -TS        User Key  (r) = Recorded By, (t) = Taken By, (c) = Cosigned By    Initials Name Provider Type    TS Farzana Hahn COTA Occupational Therapy Assistant              PT Rehab Goals     Row Name 21 0739             Bed Mobility Goal 1 (PT)    Activity/Assistive Device (Bed Mobility Goal 1, PT)  bed mobility activities, all  -      [place] Level/Cues Needed (Bed Mobility Goal 1, PT)  minimum assist (75% or more patient effort);tactile cues required;verbal cues required  -      Time Frame (Bed Mobility Goal 1, PT)  long term goal (LTG);by discharge  -      Progress/Outcomes (Bed Mobility Goal 1, PT)  goal not met  -         Transfer Goal 1 (PT)    Activity/Assistive Device (Transfer Goal 1, PT)  sit-to-stand/stand-to-sit;bed-to-chair/chair-to-bed;walker, rolling  -      [place] Level/Cues Needed (Transfer Goal  1, PT)  moderate assist (50-74% patient effort);tactile cues required;verbal cues required  -      Time Frame (Transfer Goal 1, PT)  long term goal (LTG);by discharge  -      Progress/Outcome (Transfer Goal 1, PT)  goal not met  -        User Key  (r) = Recorded By, (t) = Taken By, (c) = Cosigned By    Initials Name Provider Type Discipline     Jessica Pickard PTA Physical Therapy Assistant PT              PT Discharge Summary  Reason for Discharge: Discharge from facility  Outcomes Achieved: Refer to plan of care for updates on goals achieved  Discharge Destination: [facility]      Jessica Pickard PTA   9/11/2021

## 2021-09-12 NOTE — THERAPY DISCHARGE NOTE
Acute Care - Occupational Therapy Discharge Summary  [facility]     Patient Name: Heide Newsome  : 1945  MRN: [id number]    Today's Date: 2021  Onset of Illness/Injury or Date of Surgery: 21    Date of Referral to OT: 21  Referring Physician: Ari Mckee MD       Admit Date: 2021        OT Recommendation and Plan    Visit Dx:    ICD-10-CM ICD-9-CM   1. Fall, initial encounter  W19.XXXA E888.9   2. Closed fracture of left hip, initial encounter (CMS/[facility])  S72.002A 820.8   3. Impaired mobility  Z74.09 799.89   4. Decreased activities of daily living (ADL)  Z78.9 V49.89               OT Rehab Goals     Row Name 21 0700             Transfer Goal 1 (OT)    Activity/Assistive Device (Transfer Goal 1, OT)  commode, bedside without drop arms  -MM      [place] Level/Cues Needed (Transfer Goal 1, OT)  minimum assist (75% or more patient effort)  -MM      Time Frame (Transfer Goal 1, OT)  long term goal (LTG);10 days  -MM      Progress/Outcome (Transfer Goal 1, OT)  goal not met  -MM         Dressing Goal 1 (OT)    Activity/Device (Dressing Goal 1, OT)  lower body dressing  -MM      [place]/Cues Needed (Dressing Goal 1, OT)  1 person assist;minimum assist (75% or more patient effort)  -MM      Time Frame (Dressing Goal 1, OT)  long term goal (LTG);10 days  -MM      Progress/Outcome (Dressing Goal 1, OT)  goal not met  -MM         Toileting Goal 1 (OT)    Activity/Device (Toileting Goal 1, OT)  adjust/manage clothing;perform perineal hygiene;grab bar/safety frame;commode, bedside without drop arms  -MM      [place] Level/Cues Needed (Toileting Goal 1, OT)  1 person assist;minimum assist (75% or more patient effort)  -MM      Time Frame (Toileting Goal 1, OT)  long term goal (LTG);10 days  -MM      Progress/Outcome (Toileting Goal 1, OT)  goal not met  -MM        User Key  (r) = Recorded By, (t) = Taken By, (c) = Cosigned By    Initials Name Provider Type Discipline     Delmar Hernandez OTR/L Occupational Therapist OT          Outcome Measures     Row Name 09/09/21 1300             How much help from another is currently needed...    Putting on and taking off regular lower body clothing?  2  -TS      Bathing (including washing, rinsing, and drying)  2  -TS      Toileting (which includes using toilet bed pan or urinal)  3  -TS      Putting on and taking off regular upper body clothing  3  -TS      Taking care of personal grooming (such as brushing teeth)  3  -TS      Eating meals  4  -TS      AM-PAC 6 Clicks Score (OT)  17  -TS        User Key  (r) = Recorded By, (t) = Taken By, (c) = Cosigned By    Initials Name Provider Type    Farzana Escobar COTA Occupational Therapy Assistant          Timed Therapy Charges  Total Units: 4    Charges  Total Units: 4    Procedure Name Documented Minutes Units Code    HC OT SELF CARE/MGMT/TRAIN EA 15 MIN 60  4    80198 (CPT®)               Documented Minutes  Total Minutes: 60    Therapy Provided Minutes    40014 - OT Self Care/Mgmt Minutes 60                    OT Discharge Summary  Anticipated Discharge Disposition (OT): skilled nursing facility  Reason for Discharge: Discharge from facility  Outcomes Achieved: Refer to plan of care for updates on goals achieved  Discharge Destination: SNF      DOMINIQUE Lloyd/LES  9/12/2021

## 2021-09-19 PROBLEM — J44.1 COPD EXACERBATION (HCC): Status: ACTIVE | Noted: 2021-01-01

## 2021-09-19 PROBLEM — J96.02 ACUTE RESPIRATORY FAILURE WITH HYPOXIA AND HYPERCAPNIA (HCC): Status: ACTIVE | Noted: 2021-01-01

## 2021-09-19 PROBLEM — J96.12 CHRONIC RESPIRATORY FAILURE WITH HYPOXIA AND HYPERCAPNIA (HCC): Status: ACTIVE | Noted: 2021-01-01

## 2021-09-19 PROBLEM — J96.10 CHRONIC RESPIRATORY FAILURE (HCC): Status: ACTIVE | Noted: 2021-01-01

## 2021-09-19 PROBLEM — J96.01 ACUTE RESPIRATORY FAILURE WITH HYPOXIA AND HYPERCAPNIA (HCC): Status: ACTIVE | Noted: 2021-01-01

## 2021-09-19 PROBLEM — J96.11 CHRONIC RESPIRATORY FAILURE WITH HYPOXIA AND HYPERCAPNIA (HCC): Status: ACTIVE | Noted: 2021-01-01

## 2021-09-24 PROBLEM — S22.080A COMPRESSION FRACTURE OF T12 VERTEBRA (HCC): Status: ACTIVE | Noted: 2021-01-01

## 2021-09-30 NOTE — OUTREACH NOTE
Call Center TCM Note      Responses   [facility] patient discharged from?  [place]   Does the patient have one of the following disease processes/diagnoses(primary or secondary)?  COPD/Pneumonia   TCM attempt successful?  No [Verbal release out of date ]   Unsuccessful attempts  Attempt 1          Heather Marino RN    9/30/2021, 09:33 EDT

## 2021-09-30 NOTE — OUTREACH NOTE
Prep Survey      Responses   [facility] patient discharged from?  [place]   Is LACE score < 7 ?  No   Emergency Room discharge w/ pulse ox?  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  COPD/Pneumonia   Does the patient have Home health ordered?  Yes   What is the Home health agency?   [facility]    Is there a DME ordered?  Yes   What DME was ordered?  w/c and neb - [facility]   Prep survey completed?  Yes          Flaquita White RN

## 2021-09-30 NOTE — OUTREACH NOTE
Call Center TCM Note      Responses   [facility] patient discharged from?  [place]   Does the patient have one of the following disease processes/diagnoses(primary or secondary)?  COPD/Pneumonia   TCM attempt successful?  No   Unsuccessful attempts  Attempt 2          Heather Marino RN    9/30/2021, 10:49 EDT

## 2021-10-01 NOTE — DISCHARGE PLACEMENT REQUEST
"eHide Benitez (76 y.o. Female)     Date of Birth Social Security Number Address Home Phone MRN    [id number]  [id number]  [facility] [address] [phone] [id number]    Roman Catholic Marital Status          Moravian        Admission Date Admission Type Admitting Provider Attending Provider Department, Room/Bed    9/19/21 Emergency Ari Mckee MD  [address], 445/1    Discharge Date Discharge Disposition Discharge Destination        9/29/2021 Home or Self Care              Attending Provider: (none)   Allergies: Tetracyclines & Related, Penicillins, Tape    Isolation: None   Infection: None   Code Status: Prior    Ht: 157.5 cm (62\")   Wt: 52.9 kg (116 lb 9.6 oz)    Admission Cmt: None   Principal Problem: None                Active Insurance as of 9/19/2021     Primary Coverage     Payor Plan Insurance Group Employer/Plan Group    HUMANA MEDICARE REPLACEMENT HUMANA MEDICARE REPLACEMENT [id number]     Payor Plan Address Payor Plan Phone Number Payor Plan Fax Number Effective Dates    [address] [address] [phone]  1/1/2018 - None Entered    [facility] [id number]       Subscriber Name Subscriber Birth Date Member ID       HEIDE BENITEZ 1945 [id number]                 Emergency Contacts      (Rel.) Home Phone Work Phone Mobile Phone    TON BENITEZ (Spouse) [phone] -- --    ROSEYLAUREN SANTOS (Son) -- -- [phone]        Ambulatory Referral to Home Health [[id number]] (Order [id number])  Order  Date: 9/29/2021 Department: [address] Ordering/Authorizing: Ari Mckee MD   Order History  Outpatient  Date/Time Action Taken User Additional Information   09/29/21 1440 Sign Ari Mckee MD    Order Details    Frequency Duration Priority Order Class   None None Routine Internal Referral   Start Date/Time    Start Date   09/29/21   Order Information    Order Date Service Start Date Start Time   09/29/21 Medicine 09/29/21    Reference " Links    Associated Reports External References   View Encounter Current Health Referral Information   Order Questions    Question Answer Comment   Face to Face Visit Date: 9/29/2021    Follow-up provider for Plan of Care? I treated the patient in an acute care facility and will not continue treatment after discharge.    Follow-up provider: JOSHUA BLOOD    Reason/Clinical Findings End-stage COPD/weakness.    Describe mobility limitations that make leaving home difficult: Weakness.    Nursing/Therapeutic Services Requested Skilled Nursing     Physical Therapy     Occupational Therapy    Skilled nursing orders: Cardiopulmonary assessments     Neurovascular assessments     COPD management    PT orders: Total joint pathway     Therapeutic exercise     Gait Training     Transfer training     Strengthening     Home safety assessment    Weight Bearing Status As Tolerated    Occupational orders: Activities of daily living     Energy conservation     Fine motor     Home safety assessment     Cognition     Strengthening    Frequency: 1 Week 1           Source Order Set / Preference List    Order Set    IP GEN EXPRESS DISCHARGE [[id number]]   Clinical Indications     ICD-10-CM ICD-9-CM   Acute respiratory failure with hypoxia and hypercapnia (HCC)  - Primary     J96.01  J96.02 518.81   Abnormal CT of the chest     R93.89 793.2   Pleural effusion     J90 511.9   Compression deformity of vertebra     M43.9 738.5   Impaired mobility     Z74.09 799.89   Decreased activities of daily living (ADL)     Z78.9 V49.89   Chronic respiratory failure with hypoxia (HCC)     J96.11 518.83  799.02   Compression fracture of T12 vertebra, sequela     S22.080S 905.1   Chronic respiratory failure with hypoxia and hypercapnia (HCC)     J96.11  J96.12 518.83  799.02  786.09   COPD exacerbation (HCC)     J44.1 491.21   Chronic respiratory failure with hypercapnia (HCC)     J96.12 518.83   Moderate malnutrition ([facility])     E44.0 263.0   Acute  pain of left hip     M25.552 719.45   Fall, sequela     W19.XXXS 909.4  E929.3   Closed fracture of left hip, sequela     S72.002S 905.3   Adrenal nodule (HCC)     E27.8 255.8   COPD with acute exacerbation (HCC)     J44.1 491.21   Pneumonia due to infectious organism, unspecified laterality, unspecified part of lung     J18.9 486   Hyperglycemia, drug-induced     R73.9  T50.905A 790.29  E947.9   Anemia, unspecified type     D64.9 285.9   Thrombocytopenia (HCC)     D69.6 287.5   History of colon cancer     Z85.038 V10.05   Severe malnutrition (HCC)     E43 261   COPD, very severe (HCC)     J44.9 496   Acute on chronic respiratory failure with hypoxia and hypercapnia (HCC)     J96.21  J96.22 518.84  786.09  799.02   Pulmonary emphysema, unspecified emphysema type (HCC)     J43.9 492.8   Reprint Order Requisition    Ambulatory Referral to [facility] (Order #[id number]) on 9/29/21   Encounter    View Encounter          Order Provider Info        Office phone Pager E-mail   Ordering User Ari Mckee [phone] -- --   Authorizing Provider Ari Mckee [phone] -- --   Attending Provider When Ordered Ari Mckee [phone] -- --   Linked Charges    No charges linked to this procedure   Tracking Reports  Cosign Tracking Order Transmittal Tracking   Authorized by:  Ari Mckee MD  (NPI: [id number])       Lab Component SmartPhrase Guide    Ambulatory Referral to [facility] (Order #[id number]) on 9/29/21            History & Physical      Ari Mckee MD at 09/19/21 [facility]              [facility] Emergency Room Medicine Services  HISTORY AND PHYSICAL    Date of Admission: 9/19/2021  Primary Care Physician: Pato Cooney DO    Subjective     Chief Complaint: Respiratory failure/fluid overload/pleural effusion/emphysema/COPD    History of Present Illness  Patient is a 76-year  female presented to ER for evaluation of shortness of breath.  Patient is from [place]  nursing home.  Patient chronic history of COPD on chronic 4.5 L of oxygen at home.  Upon arrival to ER patient was in a 77%, patient currently requiring BiPAP.  CT scan shows possible collapse right middle lobe/pulmonary edema/pleural effusion.  Patient is currently on BiPAP.  CT scan also shows compression fracture T12.      Patient had left hip surgery with Dr. Chaudhari in August 31, 2021.  Patient was sent to rehab afterwards.  Patient has been eval for 10 days.  Status post Mao & Mao vaccination 3 months ago.  For Covid    Review of Systems   Constitutional: Positive for activity change, appetite change and fatigue. Negative for chills and fever.   HENT: Negative for hearing loss, nosebleeds, tinnitus and trouble swallowing.    Eyes: Negative for visual disturbance.   Respiratory: Positive for cough, shortness of breath and wheezing. Negative for chest tightness.    Cardiovascular: Negative for chest pain, palpitations and leg swelling.   Gastrointestinal: Negative for abdominal distention, abdominal pain, blood in stool, constipation, diarrhea, nausea and vomiting.   Endocrine: Negative for cold intolerance, heat intolerance, polydipsia, polyphagia and polyuria.   Genitourinary: Negative for decreased urine volume, difficulty urinating, dysuria, flank pain, frequency and hematuria.   Musculoskeletal: Positive for arthralgias, gait problem and myalgias. Negative for joint swelling.   Skin: Negative for rash.   Allergic/Immunologic: Negative for immunocompromised state.   Neurological: Positive for weakness. Negative for dizziness, syncope, light-headedness and headaches.   Hematological: Negative for adenopathy. Does not bruise/bleed easily.   Psychiatric/Behavioral: Negative for confusion and sleep disturbance. The patient is not nervous/anxious.         Otherwise complete ROS reviewed and negative except as mentioned in the HPI.      Past Medical History:   Past Medical History:   Diagnosis Date   • Cancer  (CMS/Formerly [facility]), colon    • Chronic respiratory failure (CMS/Formerly [facility]), 4 L nasal cannula continuously    • COPD (chronic obstructive pulmonary disease) (CMS/Formerly [facility])    • Restless leg syndrome        Past Surgical History:  Past Surgical History:   Procedure Laterality Date   • APPENDECTOMY     • CARPAL TUNNEL RELEASE Left    • COLON RESECTION     • FOOT SURGERY Bilateral     hammer toe   • FRACTURE SURGERY Left     Arm   • HIP TROCHANTERIC NAILING WITH INTRAMEDULLARY HIP SCREW Left 8/31/2021    Procedure: LEFT HIP TROCHANTERIC NAILING SHORT WITH INTRAMEDULLARY HIP SCREW;  Surgeon: Valdemar Chaudhari MD;  [facility]: [facility];  Service: Orthopedics;  Laterality: Left;   • HYSTERECTOMY     • WRIST FRACTURE SURGERY Right        Family History: family history includes COPD in her father; Cancer in her brother, mother, paternal grandfather, paternal grandmother, and sister; Colon cancer in her mother; Diabetes in her father and sister; Heart disease in her father.    Social History:  reports that she quit smoking about 2 years ago. She has never used smokeless tobacco. She reports that she does not drink alcohol and does not use drugs.    Medications:  Prior to Admission medications    Medication Sig Start Date End Date Taking? Authorizing Provider   acetaminophen (TYLENOL) 325 MG tablet Take 2 tablets by mouth Every 6 (Six) Hours As Needed for Mild Pain . 9/9/21   Joy Tsai APRN   albuterol sulfate  (90 Base) MCG/ACT inhaler Inhale 2 puffs Every 4 (Four) Hours As Needed for Wheezing.    Provider, MD Alisha   apixaban (ELIQUIS) 2.5 MG tablet tablet Take 1 tablet by mouth Every 12 (Twelve) Hours for 42 days. 8/31/21 10/12/21  Valdemar Chaudhari MD   budesonide-formoterol (SYMBICORT) 160-4.5 MCG/ACT inhaler Inhale 2 puffs 2 (Two) Times a Day. 9/10/21   Joy Tsai APRN   carvedilol (COREG) 3.125 MG tablet Take 1 tablet by mouth 2 (Two) Times a Day With Meals. 9/9/21   Joy Tsai APRN   cetirizine  (zyrTEC) 10 MG tablet Take 1 tablet by mouth Daily. 9/10/21   Joy Tsai APRN   docusate sodium (Colace) 100 MG capsule Take 1 capsule by mouth 2 (Two) Times a Day. 8/31/21   Valdemar Chaudhari MD   FLUoxetine (PROzac) 20 MG capsule Take 20 mg by mouth Daily.    Provider, MD Alisha   fluticasone (Flonase) 50 MCG/ACT nasal spray 2 sprays in each nostril daily to control ear pressure and popping.  Patient taking differently: 2 sprays into the nostril(s) as directed by provider Daily. 2 sprays in each nostril daily to control ear pressure and popping. 7/23/21   Elina France MD   guaiFENesin (Mucinex) 600 MG 12 hr tablet Take 2 tablets by mouth 2 (Two) Times a Day. 12/8/20   Pato Cooney DO   ipratropium (ATROVENT) 0.02 % nebulizer solution Take 2.5 mL by nebulization 4 (Four) Times a Day. 9/9/21   Joy Tsai APRN   lidocaine (LIDODERM) 5 % Place 2 patches on the skin as directed by provider Every Night. Remove & Discard patch within 12 hours or as directed by MD 9/9/21   Joy Tsai APRN   montelukast (Singulair) 10 MG tablet Take 1 tablet by mouth Every Night. 12/8/20   Pato Cooney DO   ondansetron (Zofran) 4 MG tablet Take 1 tablet by mouth Every 8 (Eight) Hours As Needed for Nausea or Vomiting. 8/31/21   Valdemar Chaudhari MD   oxyCODONE-acetaminophen (PERCOCET) 7.5-325 MG per tablet Take 1 tablet by mouth Every 6 (Six) Hours As Needed (Pain) for up to 10 days. 9/10/21 9/20/21  Joy Tsai APRN   polyethylene glycol (MIRALAX) 17 g packet Take 17 g by mouth Daily. 9/10/21   Joy Tsai APRN   rOPINIRole (REQUIP) 2 MG tablet Take 1 tablet by mouth Every Night. Take 1 hour before bedtime. 9/9/21   Joy Tsai APRN   sodium chloride (OCEAN) 0.65 % nasal spray 2 sprays into the nostril(s) as directed by provider As Needed for Congestion. 5/12/19   Inna Haile APRN     Allergies:  Allergies   Allergen Reactions   • Tetracyclines & Related  "Anaphylaxis   • Penicillins Itching   • Tape Other (See Comments)     Skin tear       Objective     Vital Signs: /67   Pulse 91   Temp 97.8 °F (36.6 °C) (Oral)   Resp 21   Ht 157.5 cm (62\")   Wt 65.8 kg (145 lb)   SpO2 99%   BMI 26.52 kg/m²   Physical Exam  Vitals and nursing note reviewed.   Constitutional:       Appearance: She is well-developed.      Comments: Cachectic, chronically ill, advanced age.   HENT:      Head: Normocephalic.   Eyes:      Conjunctiva/sclera: Conjunctivae normal.      Pupils: Pupils are equal, round, and reactive to light.   Neck:      Vascular: No JVD.   Cardiovascular:      Rate and Rhythm: Normal rate and regular rhythm.      Heart sounds: Normal heart sounds. No murmur heard.   No friction rub. No gallop.    Pulmonary:      Effort: No respiratory distress.      Breath sounds: No wheezing or rales.      Comments: Patient is currently requiring BiPAP.  Wheezing bilateral.  Diminished breath sound bilateral.  Chest:      Chest wall: No tenderness.   Abdominal:      General: Bowel sounds are normal. There is no distension.      Palpations: Abdomen is soft.      Tenderness: There is no abdominal tenderness. There is no guarding or rebound.   Musculoskeletal:         General: No tenderness or deformity.      Cervical back: Neck supple.   Skin:     General: Skin is warm and dry.      Capillary Refill: Capillary refill takes 2 to 3 seconds.      Findings: No rash.   Neurological:      General: No focal deficit present.      Mental Status: She is alert and oriented to person, place, and time.      Cranial Nerves: No cranial nerve deficit.      Motor: Weakness present. No abnormal muscle tone.      Coordination: Coordination abnormal.      Gait: Gait abnormal.      Deep Tendon Reflexes: Reflexes normal.   Psychiatric:         Behavior: Behavior normal.             Results Reviewed:    Lab Results (last 24 hours)     Procedure Component Value Units Date/Time    COVID-19,[facility] " IN-HOUSE,Nasal Swab No Transport Media 3-4 HR TAT - Swab, Nasal Cavity [[id number]]  (Normal) Collected: 09/19/21 1446    Specimen: Swab from Nasal Cavity Updated: 09/19/21 1637     COVID19 Not Detected    Narrative:      Fact sheet for providers: https://www.fda.gov/media/353504/download     Fact sheet for patients: https://www.fda.gov/media/643735/download    Test performed by PCR.    Consider negative results in combination with clinical observations, patient history, and epidemiological information.    Urinalysis, Microscopic Only - Urine, Catheter In/Out [[id number]]  (Abnormal) Collected: 09/19/21 1526    Specimen: Urine, Catheter In/Out Updated: 09/19/21 1601     RBC, UA None Seen /HPF      WBC, UA 0-2 /HPF      Bacteria, UA None Seen /HPF      Squamous Epithelial Cells, UA 0-2 /HPF      Hyaline Casts, UA 3-6 /LPF      Methodology Manual Light Microscopy    [place] Draw [[id number]] Collected: 09/19/21 1437    Specimen: Blood Updated: 09/19/21 1600    Narrative:      The following orders were created for panel order [place] Draw.  Procedure                               Abnormality         Status                     ---------                               -----------         ------                     Green Top (Gel)[[id number]]                                  Final result               Lavender Top[[id number]]                                     Final result               [place] Blood Culture Arie...[[id number]]                      Final result               Arguello Top[[id number]]                                         In process                 Light Blue Top[[id number]]                                   Final result                 Please view results for these tests on the individual orders.    [place] Blood Culture Bottle Set [[id number]] Collected: 09/19/21 1446    Specimen: Blood from Arm, Left Updated: 09/19/21 1600     Extra Tube Hold for add-ons.     Comment: Auto resulted.       Urinalysis With Culture If  Indicated - Urine, Catheter In/Out [[id number]]  (Abnormal) Collected: 09/19/21 1526    Specimen: Urine, Catheter In/Out Updated: 09/19/21 1558     Color, UA Dark Yellow     Appearance, UA Clear     pH, UA 5.5     Specific Gravity, UA 1.022     Glucose, UA Negative     Ketones, UA Negative     Bilirubin, UA Small (1+)     Blood, UA Negative     Protein, UA 30 mg/dL (1+)     Leuk Esterase, UA Negative     Nitrite, UA Negative     Urobilinogen, UA 1.0 E.U./dL    Blood Gas, Arterial - [[id number]]  (Abnormal) Collected: 09/19/21 1600    Specimen: Arterial Blood Updated: 09/19/21 1557     Site Right Brachial     Tristen's Test N/A     pH, Arterial 7.297 pH units      Comment: 84 Value below reference range        pCO2, Arterial 88.7 mm Hg      Comment: 86 Value above critical limit        pO2, Arterial 134.0 mm Hg      Comment: 83 Value above reference range        HCO3, Arterial 43.3 mmol/L      Comment: 83 Value above reference range        Base Excess, Arterial 14.1 mmol/L      Comment: 83 Value above reference range        O2 Saturation, Arterial 99.4 %      Comment: 83 Value above reference range        Temperature 37.0 C      Barometric Pressure for Blood Gas 750 mmHg      Modality Heated HFNC     FIO2 90 %      Flow Rate 35.0 lpm      Ventilator Mode NA     Notified [facility] LES QUIÑONES     Notified By [id number]     Notified Time 09/19/2021 16:04     Collected by [id number]     Comment: Meter: [id number]     :  [id number]        pCO2, Temperature Corrected 88.7 mm Hg      pH, Temp Corrected 7.297 pH Units      pO2, Temperature Corrected 134 mm Hg     Green Top (Gel) [[id number]] Collected: 09/19/21 1437    Specimen: Blood from Arm, Right Updated: 09/19/21 1545     Extra Tube Hold for add-ons.     Comment: Auto resulted.       Lavender Top [[id number]] Collected: 09/19/21 1437    Specimen: Blood from Arm, Right Updated: 09/19/21 1545     Extra Tube hold for add-on     Comment: Auto resulted       Light Blue Top  "[[id number]] Collected: 09/19/21 1437    Specimen: Blood from Arm, Right Updated: 09/19/21 1545     Extra Tube hold for add-on     Comment: Auto resulted       Procalcitonin [[id number]]  (Normal) Collected: 09/19/21 1437    Specimen: Blood from Arm, Right Updated: 09/19/21 1532     Procalcitonin 0.10 ng/mL     Narrative:      As a Marker for Sepsis (Non-Neonates):     1. <0.5 ng/mL represents a low risk of severe sepsis and/or septic shock.  2. >2 ng/mL represents a high risk of severe sepsis and/or septic shock.    As a Marker for Lower Respiratory Tract Infections that require antibiotic therapy:  PCT on Admission     Antibiotic Therapy             6-12 Hrs later  >0.5                          Strongly Recommended            >0.25 - <0.5             Recommended  0.1 - 0.25                  Discouraged                       Remeasure/reassess PCT  <0.1                         Strongly Discouraged         Remeasure/reassess PCT      As 28 day mortality risk marker: \"Change in Procalcitonin Result\" (>80% or <=80%) if Day 0 (or Day 1) and Day 4 values are available. Refer to http://www.[employer]-pct-calculator.com/    Change in PCT <=80 %   A decrease of PCT levels below or equal to 80% defines a positive change in PCT test result representing a higher risk for 28-day all-cause mortality of patients diagnosed with severe sepsis or septic shock.    Change in PCT >80 %   A decrease of PCT levels of more than 80% defines a negative change in PCT result representing a lower risk for 28-day all-cause mortality of patients diagnosed with severe sepsis or septic shock.                Comprehensive Metabolic Panel [[id number]]  (Abnormal) Collected: 09/19/21 1437    Specimen: Blood from Arm, Right Updated: 09/19/21 1531     Glucose 260 mg/dL      BUN 39 mg/dL      Creatinine 0.66 mg/dL      Sodium 131 mmol/L      Potassium 4.7 mmol/L      Chloride 86 mmol/L      CO2 40.0 mmol/L      Calcium 9.2 mg/dL      Total Protein 6.5 g/dL  "     Albumin 3.50 g/dL      ALT (SGPT) 30 U/L      AST (SGOT) 23 U/L      Alkaline Phosphatase 161 U/L      Total Bilirubin 1.1 mg/dL      eGFR Non African Amer 88 mL/min/1.73      Globulin 3.0 gm/dL      A/G Ratio 1.2 g/dL      BUN/Creatinine Ratio 59.1     Anion Gap 5.0 mmol/L     Narrative:      GFR Normal >60  Chronic Kidney Disease <60  Kidney Failure <15      Lactic Acid, Plasma [[id number]]  (Normal) Collected: 09/19/21 1437    Specimen: Blood from Arm, Right Updated: 09/19/21 1528     Lactate 1.1 mmol/L     Troponin [[id number]]  (Normal) Collected: 09/19/21 1437    Specimen: Blood from Arm, Right Updated: 09/19/21 1526     Troponin T 0.012 ng/mL     Narrative:      Troponin T Reference Range:  <= 0.03 ng/mL-   Negative for AMI  >0.03 ng/mL-     Abnormal for myocardial necrosis.  Clinicians would have to utilize clinical acumen, EKG, Troponin and serial changes to determine if it is an Acute Myocardial Infarction or myocardial injury due to an underlying chronic condition.       Results may be falsely decreased if patient taking Biotin.      BNP [[id number]]  (Abnormal) Collected: 09/19/21 1437    Specimen: Blood from Arm, Right Updated: 09/19/21 1525     proBNP 18,617.0 pg/mL     Narrative:      Among patients with dyspnea, NT-proBNP is highly sensitive for the detection of acute congestive heart failure. In addition NT-proBNP of <300 pg/ml effectively rules out acute congestive heart failure with 99% negative predictive value.    Results may be falsely decreased if patient taking Biotin.      CBC & Differential [[id number]]  (Abnormal) Collected: 09/19/21 1437    Specimen: Blood from Arm, Right Updated: 09/19/21 1524    Narrative:      The following orders were created for panel order CBC & Differential.  Procedure                               Abnormality         Status                     ---------                               -----------         ------                     CBC Auto Differential[[id number]]         Abnormal            Final result                 Please view results for these tests on the individual orders.    CBC Auto Differential [[id number]]  (Abnormal) Collected: 09/19/21 1437    Specimen: Blood from Arm, Right Updated: 09/19/21 1524     WBC 8.79 10*3/mm3      RBC 3.95 10*6/mm3      Hemoglobin 10.8 g/dL      Hematocrit 37.1 %      MCV 93.9 fL      MCH 27.3 pg      MCHC 29.1 g/dL      RDW 15.1 %      RDW-SD 50.1 fl      MPV 12.3 fL      Platelets 267 10*3/mm3      Neutrophil % 87.7 %      Lymphocyte % 5.9 %      Monocyte % 4.3 %      Eosinophil % 0.7 %      Basophil % 0.5 %      Immature Grans % 0.9 %      Neutrophils, Absolute 7.71 10*3/mm3      Lymphocytes, Absolute 0.52 10*3/mm3      Monocytes, Absolute 0.38 10*3/mm3      Eosinophils, Absolute 0.06 10*3/mm3      Basophils, Absolute 0.04 10*3/mm3      Immature Grans, Absolute 0.08 10*3/mm3      nRBC 0.3 /100 WBC     Blood Culture - Blood, Arm, Left [[id number]] Collected: 09/19/21 1446    Specimen: Blood from Arm, Left Updated: 09/19/21 1523    Blood Culture - Blood, Arm, Right [[id number]] Collected: 09/19/21 1437    Specimen: Blood from Arm, Right Updated: 09/19/21 1523    aPTT [[id number]]  (Normal) Collected: 09/19/21 1437    Specimen: Blood from Arm, Right Updated: 09/19/21 1513     PTT 26.0 seconds     Protime-INR [[id number]]  (Abnormal) Collected: 09/19/21 1437    Specimen: Blood from Arm, Right Updated: 09/19/21 1513     Protime 14.7 Seconds      INR 1.20    Gray Top [[id number]] Collected: 09/19/21 1437    Specimen: Blood from Arm, Right Updated: 09/19/21 1458    Blood Gas, Arterial - [[id number]]  (Abnormal) Collected: 09/19/21 1436    Specimen: Arterial Blood Updated: 09/19/21 1439     Site Left Brachial     Tristen's Test N/A     pH, Arterial 7.315 pH units      Comment: 84 Value below reference range        pCO2, Arterial 85.8 mm Hg      Comment: 86 Value above critical limit        pO2, Arterial 56.4 mm Hg      Comment: 84 Value below reference  range        HCO3, Arterial 43.7 mmol/L      Comment: 83 Value above reference range        Base Excess, Arterial 14.7 mmol/L      Comment: 83 Value above reference range        O2 Saturation, Arterial 86.8 %      Comment: 84 Value below reference range        Temperature 37.0 C      Barometric Pressure for Blood Gas 751 mmHg      Modality Nasal Cannula     Flow Rate 6.0 lpm      Ventilator Mode NA     Notified Who YAMILEX QUIÑONES APRCARLOS     Notified By [id number]     Notified Time 09/19/2021 14:46     Collected by [id number]     Comment: Meter: [id number]     :  [id number]        pCO2, Temperature Corrected 85.8 mm Hg      pH, Temp Corrected 7.315 pH Units      pO2, Temperature Corrected 56.4 mm Hg            Radiology Data:    Imaging Results (Last 24 Hours)     Procedure Component Value Units Date/Time    CT Angiogram Chest [[id number]] Collected: 09/19/21 1716     Updated: 09/19/21 1731    Narrative:      CT ANGIOGRAM CHEST- 9/19/2021 4:52 PM CDT      HISTORY: shortness of breath, hip surgery on 8/31/21; O2 sat 77%      COMPARISON: CTA chest dated 7/7/2020.      Radiation dose equals  mGy-cm.  Automated exposure control dose  reduction technique was implemented.        TECHNIQUE: Helical tomographic images of the chest were obtained after  the administration of intravenous contrast following angiogram protocol.  Additionally, 3D reformatted images were provided.        FINDINGS:    Pulmonary arteries: There is adequate enhancement of the pulmonary  arteries to evaluate for central and segmental pulmonary emboli. There  are no filling defects within the main, lobar, segmental or visualized  subsegmental pulmonary arteries. The pulmonary arteries are within  normal limits.      Aorta and great vessels: There is atherosclerosis in the aorta without  aneurysm or dissection. There is atherosclerosis in the great vessels.     Visualized neck base: The imaged portion of the base of the neck  appears  unremarkable.      Lungs: Emphysema. Small-to-moderate bilateral pleural effusions. Right  middle lobe is collapsed with filling defect to the right middle lobe  bronchus. There is no pneumothorax.     Heart: Mild to moderate cardiomegaly. There is no pericardial effusion.      Mediastinum and lymph nodes: There is a right infrahilar soft tissue  density which is inseparable from the collapsed right middle lobe.  Evaluation for mediastinal lymphadenopathy is slightly limited by timing  of contrast. There is a subcarinal soft tissue density which is  inseparable from the esophagus. Precarinal lymph node measuring 1.3 cm.  Prevascular space lymph node measuring 0.9 cm. There is no definite left  hilar lymphadenopathy by size criteria.. No axillary lymphadenopathy by  size criteria..      Skeletal and soft tissues: Interval T12 upper endplate compression  deformity, with approximately 10% height loss. Chronic T7 upper endplate  compression deformity..     Upper abdomen: The imaged portion of the upper abdomen demonstrates no  acute process. There is an incompletely visualize soft tissue density in  the left retroperitoneum, measuring 2.6 cm.       Impression:      1.  No evidence of pulmonary embolus.  2.  Small to moderate bilateral pleural effusions with mild to moderate  cardiomegaly, most concerning for fluid overload.  3.  Collapse of the right middle lobe with soft tissue density in the  infrahilar region as well as soft tissue density filling the bronchus to  the right middle lobe. Differential does include neoplastic process.  4.  Interval T12 upper endplate compression deformity, with  approximately 10% height loss. No retropulsion.  5.  Incompletely visualized soft tissue density in the retroperitoneum  on the left. CT abdomen pelvis with contrast may be obtained for further  characterization.        This report was finalized on 09/19/2021 17:28 by Dr. Purvi Liu MD.    XR Chest 1 View  [[id number]] Collected: 09/19/21 1506     Updated: 09/19/21 1514    Narrative:      EXAMINATION: XR CHEST 1 VW- 9/19/2021 3:06 PM CDT     HISTORY: Shortness of breath     COMPARISON: 9/2/2021     FINDINGS:  The heart appears mildly enlarged. Atherosclerotic calcifications are  seen in the aorta. There is trace pleural fluid bilaterally. Fluid is  again noted along the right minor fissure. Chronic interstitial changes  are noted, compatible with emphysema. The pulmonary vasculature is  somewhat indistinct. There is no pneumothorax.       Impression:      Impression: Cardiomegaly with bilateral pleural effusions and fluid  along the minor fissure with findings suggestive of pulmonary edema.  Emphysema.  This report was finalized on 09/19/2021 15:11 by Dr. Armand Stevenson MD.          I have personally reviewed and interpreted the radiology studies and ECG obtained at time of admission.     Assessment / Plan      Assessment & Plan  Active Hospital Problems    Diagnosis    • Acute respiratory failure with hypoxia and hypercapnia (CMS/HCC)      Plans  Respiratory failure/hypoxia/emphysema/fluid overload/small to moderate pleural effusion bilaterally/collapse right middle lobe.  Patient on chronic 4.5 L of oxygen at home.  Patient is currently requiring BiPAP.  Consult pulmonary.  Zosyn antibiotics for now.  DuoNebs 4 times daily.  Incentive spirometer.  Symbicort.  Continue Mucinex.  Singulair.  Flutter valve.  Chest x-ray-Cardiomegaly with bilateral pleural effusions and fluid along the minor fissure with findings suggestive of pulmonary edema.  CTA of the chest-No evidence of pulmonary embolus, small to moderate bilateral pleural effusions with mild to moderate cardiomegaly- most concerning for fluid overload, collapse of the right middle lobe with soft tissue density in the infrahilar region as well as soft tissue density filling the bronchus to the right middle lobe-  differential does include neoplastic process, T12  upper endplate compression deformity, with  approximately 10% height loss- no retropulsion, incompletely visualized soft tissue density in the retroperitoneum on the left.    Compression fracture T12, 10% height loss.  Lidocaine patch for now.  Tylenol as needed.  Family does not want morphine.  Continue home medication of Percocet.    CHF/cardiomegaly/hypertension.  BNP 18,000.  Hypotension.  Low-dose Lasix for now due to low blood pressure.  Hold Coreg due to hypotension.  9/6/2021 Echocardiogram: ·   Left ventricular ejection fraction appears to be greater than 70%. Left ventricular systolic function is hyperdynamic (EF > 70%).  ·           Left ventricular diastolic function was indeterminate.  ·           The right ventricular cavity is mildly dilated.  ·           The right atrial cavity is severely dilated.   ·           There is mild calcification of the aortic valve mainly affecting the non-coronary cusp(s).  ·           There is mitral valve prolapse of the anterior mitral leaflet. Trace mitral valve regurgitation is present.  ·           Moderate to severe pulmonary hypertension is present.  ·           There is mild pulmonic valve regurgitation present.    Recent left hip surgery.  Continue Eliquis prophylaxis for now since patient is now walking well.  Percocet as needed.    Reflux.  Pepcid.  Zofran as needed.    Allergic rhinitis.  Flonase . Zyrtec.    Constipation.  Colace.  MiraLAX daily.    Restless leg . Requip nightly.    Depression.  Continues Prozac.    Blood cultures pending.  Covid-negative.  Patient has been vaccinated 3 months ago with [employer] for Covid.    Nutrition.  Cardiac diet.    Deconditioning.  PT and OT consult.    Code Status: DNR.  DNI.  The patient may make a decision for herself her son Jensen Salcedo will make it for her.     I discussed the patient's findings and my recommendations with: Patient.    Estimated length of stay: 2 to 6 days.    Electronically signed by Ari  ELEAZAR Mckee MD, 09/19/21, 5:47 PM CDT.              Electronically signed by Ari Mckee MD at 09/19/21 1841       Prior to Admission Medications     Prescriptions Last Dose Informant Patient Reported? Taking?    apixaban (ELIQUIS) 2.5 MG tablet tablet 9/18/2021 shelter No Yes    Take 1 tablet by mouth Every 12 (Twelve) Hours for 42 days.    budesonide-formoterol (SYMBICORT) 160-4.5 MCG/ACT inhaler 9/18/2021 Nursing Home No Yes    Inhale 2 puffs 2 (Two) Times a Day.    busPIRone (BUSPAR) 5 MG tablet 9/18/2021 Nursing Home Yes Yes    Take 5 mg by mouth 2 (two) times a day.    carvedilol (COREG) 3.125 MG tablet 9/18/2021 shelter No Yes    Take 1 tablet by mouth 2 (Two) Times a Day With Meals.    cetirizine (zyrTEC) 10 MG tablet 9/18/2021 shelter No Yes    Take 1 tablet by mouth Daily.    docusate sodium (Colace) 100 MG capsule 9/18/2021 shelter No Yes    Take 1 capsule by mouth 2 (Two) Times a Day.    FLUoxetine (PROzac) 20 MG capsule 9/18/2021 shelter Yes Yes    Take 20 mg by mouth Daily.    fluticasone (FLONASE) 50 MCG/ACT nasal spray 9/18/2021 shelter Yes Yes    2 sprays into the nostril(s) as directed by provider Daily.    guaiFENesin (MUCINEX) 600 MG 12 hr tablet 9/18/2021 Nursing [place] Yes Yes    Take 600 mg by mouth 2 (Two) Times a Day.    ipratropium (ATROVENT) 0.02 % nebulizer solution 9/18/2021 shelter No Yes    Take 2.5 mL by nebulization 4 (Four) Times a Day.    lidocaine (LIDODERM) 5 % 9/18/2021 Nursing Home Yes Yes    Place 2 patches on the skin as directed by provider Daily. Apply to left hip. Remove & discard patch within 12 hours or as directed by MD.    montelukast (Singulair) 10 MG tablet 9/18/2021 shelter No Yes    Take 1 tablet by mouth Every Night.    oxyCODONE-acetaminophen (PERCOCET) 7.5-325 MG per tablet 9/17/2021 Nursing Home No Yes    Take 1 tablet by mouth Every 6 (Six) Hours As Needed (Pain) for up to 10 days.    polyethylene glycol (MIRALAX) 17 g  packet 9/18/2021 Nursing Home No Yes    Take 17 g by mouth Daily.    rOPINIRole (REQUIP) 2 MG tablet 9/18/2021 [facility] No Yes    Take 1 tablet by mouth Every Night. Take 1 hour before bedtime.    vitamin D (ERGOCALCIFEROL) 1.25 MG (86492 UT) capsule capsule 9/17/2021 Nursing Home Yes Yes    Take 50,000 Units by mouth 1 (One) Time Per Week.    acetaminophen (TYLENOL) 325 MG tablet Unknown Nursing Home Yes No    Take 650 mg by mouth Every 6 (Six) Hours As Needed for Mild Pain  or Fever.    albuterol sulfate  (90 Base) MCG/ACT inhaler Unknown [facility] Yes No    Inhale 2 puffs Every 4 (Four) Hours As Needed for Wheezing.    ondansetron ODT (ZOFRAN-ODT) 4 MG disintegrating tablet Unknown [facility] Yes No    Place 4 mg on the tongue Every 8 (Eight) Hours As Needed for Nausea or Vomiting.    sodium chloride 0.65 % nasal spray Unknown [facility] Yes No    2 sprays into the nostril(s) as directed by provider Every 6 (Six) Hours As Needed for Congestion.          No current facility-administered medications for this encounter.     Current Outpatient Medications   Medication Sig Dispense Refill   • albuterol sulfate  (90 Base) MCG/ACT inhaler Inhale 2 puffs Every 4 (Four) Hours As Needed for Wheezing. 8.5 g 12   • apixaban (ELIQUIS) 2.5 MG tablet tablet Take 1 tablet by mouth Every 12 (Twelve) Hours for 42 days. 84 tablet 0   • budesonide-formoterol (SYMBICORT) 160-4.5 MCG/ACT inhaler Inhale 2 puffs 2 (Two) Times a Day. 1 each 12   • carvedilol (COREG) 3.125 MG tablet Take 1 tablet by mouth 2 (Two) Times a Day With Meals. 180 tablet 0   • docusate sodium (Colace) 100 MG capsule Take 1 capsule by mouth 2 (Two) Times a Day. 180 capsule 0   • famotidine (PEPCID) 20 MG tablet Take 1 tablet by mouth 2 (Two) Times a Day Before Meals. 60 tablet 2   • FLUoxetine (PROzac) 10 MG capsule Take 1 capsule by mouth Daily. 90 capsule 0   • fluticasone (Flonase) 50 MCG/ACT nasal spray Administer 2 sprays into the  nostrils as directed by provider Daily 16 g 12   • guaiFENesin (MUCINEX) 600 MG 12 hr tablet Take 1 tablet by mouth 2 (Two) Times a Day. 180 tablet 0   • ipratropium-albuterol (DUO-NEB) 0.5-2.5 mg/3 ml nebulizer Take 3 mL by nebulization Every 4 (Four) Hours. 2 boxes 360 mL 12   • montelukast (Singulair) 10 MG tablet Take 1 tablet by mouth Every Night for 360 days. 90 tablet 3   • oxyCODONE-acetaminophen (PERCOCET) 5-325 MG per tablet Take 1 tablet by mouth Every 6 (Six) Hours As Needed for Moderate Pain  or Severe Pain  for up to 4 days. 24 tablet 0   • polyethylene glycol (MIRALAX) 17 GM/SCOOP powder Take 17 g by mouth Daily for 30 days. 510 g 0   • predniSONE (DELTASONE) 20 MG tablet Take 1 tablet by mouth Daily With Breakfast for 10 days, THEN 0.5 tablet Daily With Breakfast for 10 days. 30 tablet 0   • rOPINIRole (REQUIP) 2 MG tablet Take 1 tablet by mouth Every Night. Take 1 hour before bedtime. 90 tablet 0        Physician Progress Notes (most recent note)      Ari Mckee MD at 09/28/21 1341              [facility]  INPATIENT PROGRESS NOTE    Length of Stay: 9  Date of Admission: 9/19/2021  Primary Care Physician: Pato Cooney DO    Subjective   Chief Complaint: Back pain.  Shortness of breath.    HPI   Patient requiring BiPAP at night. Patient fully on 4 L of oxygen. Patient refusing go to rehab at this time. Want to go home with home health. Patient still remain  weak.    Review of Systems   Constitutional: Positive for activity change, appetite change and fatigue. Negative for chills and fever.   HENT: Negative for hearing loss, nosebleeds, tinnitus and trouble swallowing.    Eyes: Negative for visual disturbance.   Respiratory: Positive for cough and shortness of breath. Negative for chest tightness and wheezing.    Cardiovascular: Negative for chest pain, palpitations and leg swelling.   Gastrointestinal: Negative for abdominal distention,  abdominal pain, blood in stool, constipation, diarrhea, nausea and vomiting.   Endocrine: Negative for cold intolerance, heat intolerance, polydipsia, polyphagia and polyuria.   Genitourinary: Negative for decreased urine volume, difficulty urinating, dysuria, flank pain, frequency and hematuria.   Musculoskeletal: Positive for arthralgias, gait problem and myalgias. Negative for joint swelling.   Skin: Negative for rash.   Allergic/Immunologic: Negative for immunocompromised state.   Neurological: Positive for weakness. Negative for dizziness, syncope, light-headedness and headaches.   Hematological: Negative for adenopathy. Does not bruise/bleed easily.   Psychiatric/Behavioral: Negative for confusion and sleep disturbance. The patient is not nervous/anxious.           All pertinent negatives and positives are as above. All other systems have been reviewed and are negative unless otherwise stated.     Objective    Temp:  [97.4 °F (36.3 °C)-98.8 °F (37.1 °C)] 98.7 °F (37.1 °C)  Heart Rate:  [] 85  Resp:  [16-19] 16  BP: (103-128)/(47-71) 103/49    Intake/Output Summary (Last 24 hours) at 9/28/2021 1342  Last data filed at 9/28/2021 1230  Gross per 24 hour   Intake 120 ml   Output 800 ml   Net -680 ml     Physical Exam  Vitals and nursing note reviewed.   Constitutional:       Appearance: She is well-developed.      Comments: Cachectic, advanced age, chronically ill.   HENT:      Head: Normocephalic.   Eyes:      Conjunctiva/sclera: Conjunctivae normal.      Pupils: Pupils are equal, round, and reactive to light.   Neck:      Vascular: No JVD.   Cardiovascular:      Rate and Rhythm: Normal rate and regular rhythm.      Heart sounds: Normal heart sounds. No murmur heard.   No friction rub. No gallop.    Pulmonary:      Effort: No respiratory distress.      Breath sounds: No wheezing or rales.      Comments: Diminished breath sound bilateral, patient is fully on 4 L of oxygen.  Chest:      Chest wall: No  tenderness.   Abdominal:      General: Bowel sounds are normal. There is no distension.      Palpations: Abdomen is soft.      Tenderness: There is no abdominal tenderness. There is no guarding or rebound.   Musculoskeletal:         General: No tenderness or deformity.      Cervical back: Neck supple.      Comments: TLSO brace in place   Skin:     General: Skin is warm and dry.      Capillary Refill: Capillary refill takes 2 to 3 seconds.      Findings: No rash.   Neurological:      Mental Status: She is alert and oriented to person, place, and time.      Cranial Nerves: No cranial nerve deficit.      Motor: Weakness present. No abnormal muscle tone.      Coordination: Coordination abnormal.      Gait: Gait abnormal.      Deep Tendon Reflexes: Reflexes normal.   Psychiatric:         Behavior: Behavior normal.         Thought Content: Thought content normal.   Results Review:  Lab Results (last 24 hours)     Procedure Component Value Units Date/Time    POC Glucose Once [[id number]]  (Abnormal) Collected: 09/28/21 1126    Specimen: Blood Updated: 09/28/21 1138     Glucose 168 mg/dL      Comment: : [id number] [employer] ID: [id number]       POC Glucose Once [[id number]]  (Normal) Collected: 09/28/21 0835    Specimen: Blood Updated: 09/28/21 0846     Glucose 70 mg/dL      Comment: : [id number] [employer] ID: [id number]       POC Glucose Once [[id number]]  (Normal) Collected: 09/27/21 1944    Specimen: Blood Updated: 09/27/21 2020     Glucose 112 mg/dL      Comment: : [id number] [employer]ique[employer] ID: [id number]              Cultures:  No results found for: BLOODCX, URINECX, WOUNDCX, MRSACX, RESPCX, STOOLCX    Radiology Data:    Imaging Results (Last 24 Hours)     ** No results found for the last 24 hours. **          Allergies   Allergen Reactions   • Tetracyclines & Related Anaphylaxis   • Penicillins Itching   • Tape Other (See Comments)     Skin tear       Scheduled meds:   apixaban, 2.5  mg, Oral, Q12H  budesonide-formoterol, 2 puff, Inhalation, BID - RT  carvedilol, 3.125 mg, Oral, BID With Meals  docusate sodium, 100 mg, Oral, BID  famotidine, 20 mg, Oral, BID AC  FLUoxetine, 10 mg, Oral, Daily  fluticasone, 2 spray, Nasal, Daily  guaiFENesin, 1,200 mg, Oral, BID  insulin lispro, 2-7 Units, Subcutaneous, TID AC  ipratropium-albuterol, 3 mL, Nebulization, Q4H - RT  lidocaine, 1 patch, Transdermal, Nightly  montelukast, 10 mg, Oral, Nightly  polyethylene glycol, 17 g, Oral, Daily  predniSONE, 40 mg, Oral, Daily With Breakfast  rOPINIRole, 2 mg, Oral, Nightly  sodium chloride, 10 mL, Intravenous, Q12H        PRN meds:  •  acetaminophen  •  clonazePAM  •  dextrose  •  dextrose  •  glucagon (human recombinant)  •  ondansetron  •  oxyCODONE-acetaminophen  •  sodium chloride    Assessment/Plan       Pulmonary emphysema (CMS/HCC)    Acute on chronic respiratory failure with hypoxia and hypercapnia (CMS/HCC)    COPD, very severe (CMS/HCC)    Severe malnutrition (CMS/HCC)    Chronic respiratory failure with hypoxia (CMS/HCC)    COPD with acute exacerbation (CMS/HCC)    Acute respiratory failure with hypoxia and hypercapnia (CMS/HCC)    Chronic respiratory failure (CMS/HCC)    COPD exacerbation (CMS/HCC)    Chronic respiratory failure with hypoxia and hypercapnia (CMS/HCC)    Compression fracture of T12 vertebra (HCC)      Plan:  Respiratory failure with hypercapnia and hypoxia/emphysema/ patient on chronic 4.5 L of oxygen at home.  Continue p.o. prednisone.   DuoNebs.  Consult pulmonary.  Patient finished course of Zosyn antibiotics.  DuoNebs 4 times daily.  Incentive spirometer.  Symbicort.  Continue Mucinex.  Singulair.  Flutter valve.  Chest x-ray-Cardiomegaly with bilateral pleural effusions and fluid along the minor fissure with findings suggestive of pulmonary edema. Emphysema.  Patient is currently on 4 L of oxygen, this is patient's baseline. Requiring BiPAP at night.   Ordering nocturnal and  daytime volume ventilation. Home BiPap insufficient due to severity of condition.  End-stage COPD/respiratory failure with hypercapnia hypoxia is the primary caue fo CRF/hypercapnia; ventilator required.       Compression fracture T12, 10% height loss.  Lidocaine patch for now.  Tylenol as needed.  Family does not want morphine.  Continue home medication of Percocet.  Neurosurgery consult.  CTA of the chest-No evidence of pulmonary embolus, small to moderate bilateral pleural effusions with mild to moderate cardiomegaly- most concerning for fluid overload, collapse of the right middle lobe with soft tissue density in the infrahilar region as well as soft tissue density filling the bronchus to the right middle lobe-  differential does include neoplastic process, T12 upper endplate compression deformity, with  approximately 10% height loss- no retropulsion, incompletely visualized soft tissue density in the retroperitoneum on the left.  MRI thoracic spine- Acute/subacute compression injury at the superior plate of T12 with loss of height by 10%- No retropulsion, Chronic T7 compression deformity, No abnormal signal or enhancement of the thoracic spinal cord, Scattered bony hemangiomas suspected of the thoracic vertebra, Small bilateral pleural effusions with patchy left upper lobe opacities. .  Recommendation by general surgery-TLSO brace.       Hyperglycemia secondary to steroid.  Sliding scale for now.  Hemoglobin A1 C 5.6.     CHF/cardiomegaly/hypertension.  BNP 18,000.    Continue Coreg..    9/6/2021 Echocardiogram: ·   Left ventricular ejection fraction appears to be greater than 70%. Left ventricular systolic function is hyperdynamic (EF > 70%).  ·           Left ventricular diastolic function was indeterminate.  ·           The right ventricular cavity is mildly dilated.  ·           The right atrial cavity is severely dilated.   ·           There is mild calcification of the aortic valve mainly affecting the  non-coronary cusp(s).  ·           There is mitral valve prolapse of the anterior mitral leaflet. Trace mitral valve regurgitation is present.  ·           Moderate to severe pulmonary hypertension is present.  ·           There is mild pulmonic valve regurgitation present.     Abdomen pain.  I think referral pain from the back.  CT scan abdomen pelvic- previously described area of concern on CT chest 9/19/2021 correlates with a loop of bowel, which on today's exam is filled with gas, thickening and enhancement of the proximal gallbladder and cystic duct,  Developing cholecystitis would be a consideration- irregular gallstone or malignancy is considered less likely, Indeterminate right 1.1 cm adrenal nodule, Changes of prior proximal colon resection, Recent-appearing mild to moderate height loss of T12, Small bilateral pleural effusions with prominent inferior heart size.  Ultrasound gallbladder-Normal right upper quadrant ultrasound, Gallbladder is unremarkable- No evidence of acute cholecystitis, decompressed and with no stones or wall thickening.     Recent left hip surgery By Dr. Chaudhari. Continue Eliquis prophylaxis for now since patient is now walking well.  Percocet as needed.  Lidocaine patch.     Reflux.  Pepcid.  Zofran as needed.     Allergic rhinitis.  Flonase .      Constipation.  Colace.  MiraLAX daily.     Restless leg . Requip nightly.     Depression/anxiety.  Continues Prozac.  Clonazepam as needed.     Status post Covid vaccination with [employer] 3 months ago.  Blood culture-no growth no growth 5 days.  Legionella antigen-negative.  Respiratory panel-negative.  Strep pneumo-negative.  Covid 19-negative.     Nutrition.  Cardiac diet.     Deconditioning.  PT and OT consult.     Discharge Planning: Patient refusing go back to previous nursing home.  Consult  for another placement. DNR.  DNI.   Patient will need BiPAP or trilogy at night.  Patient son Jensen stated he does not want  patient to return to [facility] I want the patient to go home with home health.  Patient was denied [facility].  to make arrangements for BiPAP versus trilogy.    Electronically signed by Ari Mckee MD, 09/28/21, 1:42 PM CDT.                    Electronically signed by Ari Mckee MD at 09/28/21 1519          Consult Notes (most recent note)      Silvestre Aggarwal, JESSENIA at 09/22/21 0834      Consult Orders    1. Inpatient Neurosurgery Consult [[id number]] ordered by Roberto Hobbs MD at 09/21/21 1836          Attestation signed by Brent Najera MD at 09/22/21 1019    I have reviewed this documentation and agree.                    NEUROSURGERY INITIAL HOSPITAL ENCOUNTER    Assessment/Plan:   Heide Newsome is a 76 y.o. female with a significant medical history of a recent surgical repair to the left hip (8/31/2021 Dr. Chaudhari) cancer, chronic respiratory failure with COPD requiring continuous O2 at 4.5 L, RLS, and a former smoker.  She presents with a new problem of worsening dyspnea with a subsequent complaint of thoracolumbar back pain. Physical exam findings of bright awake, oriented x3, follows commands without prompting, left lower extremity weakness secondary to left hip pain, otherwise neurologically intact.  Their imaging shows an age-indeterminate A1, N0, M1 compression deformity to the vertebral body of T12.    Differential Diagnosis:   Age-indeterminate T12 compression deformity    Thoracolumbar Trauma  Classification system  Per National CNS Guidelines a classification scheme that uses readily available clinical data (e.g., computed tomography scans with or without magnetic resonance imaging) to convey injury morphology, such as Thoracolumbar Injury Classification and Severity Scale or the AO Spine Thoracolumbar Spine Injury Classification System, should be used to improve characterization of traumatic thoracolumbar injuries and communication among treating  physicians. (Grade B)    TLICS Score    Subscore   Morphology - Compression = 1  - Burst = 2  - Translation/rotation = 3  - Distraction = 4 1   PLC integrity - Intact = 0  - Suspected = 2  - Injured = 3 0   Neurological status - Intact = 0  - Nerve Root = 2  - Complete Cord = 2  - Incomplete Cord = 3  - Cauda Equina = 3 0    TOTAL 1     0-3 - Nonsurgical management  4 - Surgeons discretion  >4 - Surgical intervention likely    A New Classification of Thoracolumbar Injuries   The Importance of Injury Morphology, the Integrity of the Posterior Ligamentous Complex, and Neurologic Status by Romero Monzon al.    AO Spine Classification System  https://aospine.aofoundation.org/clinical-library-and-tools/ao-spine-classification-systems    Primary Injury  Spinal Level: Primary Injury; Neurological Status; Modifiers  Spinal Level T12: A1 - Wedge/Impaction; N0 - neurologically intact; M1 - indeterminate injury to the tension band, necessitating MRI eval    Pharmacological Treatments  Based on the data found in the literature, there are no agents that are specifically recommended for the pharmacologic treatment of acute thoracolumbar SCI.    Hemodynamic maintenance  Considering published data from pooled (cervical and thoracolumbar) spinal cord injury patient populations, clinicians may choose to maintain mean arterial blood pressures >85 mm Hg in an attempt to improve neurological outcomes.  (Consensus  statement)    Thromboembolic Prophylaxis  Based on published data from pooled (cervical and thoracolumbar) SCI populations--is that the use of thromboprophylaxis is recommended to reduce the risk of VTE events. (consensus statement)    Bracing  Management either with or without a TLSO brace is an option given equivalent improvement in outcomes for neurologically intact patients with thoracic and lumbar burst fractures. Bracing is not associated with increased adverse events compared to no brace. (Grade  B)    Surgery  Anterior, posterior, or a combined approach as the selection of approach does not appear to impact clinical or neurological outcomes. Grade B.  There is grade A evidence for the omission of fusion in instrumented fixation for thoracolumbar burst fractures. There is grade B evidence that percutaneous instrumentation is as effective as open instrumentation for thoracolumbar burst fractures.    It is suggested that “early” surgery be considered as an option in patients with thoracic and lumbar fractures to reduce length of stay and complications. The available literature has defined “early” surgery inconsistently, ranging from <8 hours to <72 hours after injury.    Recommendations:  · Neuro exam every 4 hours, call for decline  · Proceed with MRI of the thoracic spine to age the fracture  · If fracture is found to be acute, TLSO brace.  Brace should be worn at all times except for while lying down.  · X-rays of the lumbar spine upright and supine to assess fracture stability  · Analgesics as needed for pain.  Avoid NSAIDs.  · Hydroxy vitamin D  · Outpatient DEXA scan  · Neurosurgery will continue to follow as needed.  · Additional recommendations may follow per Dr. Najera.    Consult at the request of hospitalist, Dr. Hobbs.    I discussed the patients findings and my recommendations with patient and nursing staff    Thank you very much for this interesting consult.     Level of Risk: High due to:  abrupt change in neurological status  MDM: High  Mod = 32657, Gzjo=[id number]  ___________________________________________________________________    Reason for consult: Thoracolumbar back pain    Chief Complaint:   Chief Complaint   Patient presents with   • Shortness of Breath     HPI: Heide Newsome is a 76 y.o. female with a significant medical history of a recent surgical repair to the left hip (8/31/2021 Dr. Chaudhari) cancer, chronic respiratory failure with COPD requiring continuous O2 at 4.5 L, RLS, and  a former smoker.  She presents with a new problem of worsening dyspnea with a subsequent complaint of thoracolumbar back pain.    On 8/31/2021 Ms. Newsome experienced a mechanical fall from standing height, slipping on water from her coffee pot, resulting in a left hip fracture which was surgically repaired by Dr. Valdemar Chaudhari on 8/31/2021.  At the time of her fall, she additionally reports experiencing an sudden onset of thoracolumbar back pain, however her back discomfort was overshadowed by her hip pain.  On 9/10/2021 she was discharged to [facility] where she has remained until return to the ED on 9/19/2021 with a complaint of worsening dyspnea.  Upon arrival to the ED her O2 sat was 77%.    Currently, Ms. Newsome primary complaint is left hip pain.  She does report thoracolumbar back pain, however states her discomfort has improved since onset.  Her back pain worsens with movement, however states she has not ambulated since being discharged from [facility].  Other than lying still, she is unsure of alleviating factors.  Ms. Newsome reports left leg weakness secondary to her recent hip surgery but denies lower extremity dysesthesias.  She additionally denies fevers, chills, night sweats, unexplained weight loss, saddle anesthesia, or bowel or bladder dysfunction.  In general, she rates the severity of her symptoms 10/10.  No additional concerns at this time.    Review of Systems   Constitutional: Negative.    HENT: Negative.    Eyes: Negative.    Respiratory: Negative.    Cardiovascular: Negative.    Gastrointestinal: Negative.    Endocrine: Negative.    Genitourinary: Negative.    Musculoskeletal: Positive for back pain.   Skin: Negative.    Allergic/Immunologic: Negative.    Neurological: Negative.  Negative for numbness.   Hematological: Negative.    Psychiatric/Behavioral: Negative.    All other systems reviewed and are negative.     Past Medical History:  has a past medical  history of Cancer (CMS/HCC), colon, Chronic respiratory failure (CMS/HCC), 4 L nasal cannula continuously, COPD (chronic obstructive pulmonary disease) (CMS/HCC), and Restless leg syndrome.    Past Surgical History:  has a past surgical history that includes Hysterectomy; Appendectomy; Colectomy; Foot surgery (Bilateral); Wrist fracture surgery (Right); Carpal tunnel release (Left); Fracture surgery (Left); and Hip Trochanteric Nailing (Left, 8/31/2021).    Family History: family history includes COPD in her father; Cancer in her brother, mother, paternal grandfather, paternal grandmother, and sister; Colon cancer in her mother; Diabetes in her father and sister; Heart disease in her father.    Social History:  reports that she quit smoking about 2 years ago. She has never used smokeless tobacco. She reports that she does not drink alcohol and does not use drugs.    Allergies: Tetracyclines & related, Penicillins, and Tape    Home Medications:   Current Facility-Administered Medications:   •  acetaminophen (TYLENOL) tablet 650 mg, 650 mg, Oral, Q6H PRN, Ari Mckee MD  •  apixaban (ELIQUIS) tablet 2.5 mg, 2.5 mg, Oral, Q12H, rAi Mckee MD, 2.5 mg at 09/22/21 0826  •  budesonide-formoterol (SYMBICORT) 160-4.5 MCG/ACT inhaler 2 puff, 2 puff, Inhalation, BID - RT, Ari Mckee MD, 2 puff at 09/22/21 0700  •  cefepime 1 gm IVPB in 100 mL NS (VTB), 1 g, Intravenous, Q12H, Ari Mckee MD, 1 g at 09/22/21 0826  •  cetirizine (zyrTEC) tablet 10 mg, 10 mg, Oral, Daily, Ari Mckee MD, 10 mg at 09/22/21 0827  •  clonazePAM (KlonoPIN) tablet 0.5 mg, 0.5 mg, Oral, BID PRN, Roberto Hobbs MD  •  docusate sodium (COLACE) capsule 100 mg, 100 mg, Oral, BID, Ari Mckee MD, 100 mg at 09/22/21 0827  •  famotidine (PEPCID) tablet 20 mg, 20 mg, Oral, BID AC, Roberto Hobbs MD, 20 mg at 09/22/21 0827  •  FLUoxetine (PROzac) capsule 10 mg, 10 mg, Oral, Daily, Ari Mckee MD, 10 mg at 09/22/21  0827  •  fluticasone (FLONASE) 50 MCG/ACT nasal spray 2 spray, 2 spray, Nasal, Daily, Ari Mckee MD, 2 spray at 09/22/21 0828  •  furosemide (LASIX) injection 20 mg, 20 mg, Intravenous, Q12H, Ari Mckee MD, 20 mg at 09/22/21 0827  •  guaiFENesin (MUCINEX) 12 hr tablet 1,200 mg, 1,200 mg, Oral, BID, Ari Mckee MD, 1,200 mg at 09/22/21 0827  •  ipratropium-albuterol (DUO-NEB) nebulizer solution 3 mL, 3 mL, Nebulization, Q4H - RT, Ari Mckee MD, 3 mL at 09/22/21 0659  •  lidocaine (LIDODERM) 5 % 1 patch, 1 patch, Transdermal, Nightly, Ari Mckee MD, 1 patch at 09/21/21 2018  •  methylPREDNISolone sodium succinate (SOLU-Medrol) injection 40 mg, 40 mg, Intravenous, Q12H, Ari Mckee MD, 40 mg at 09/22/21 0827  •  montelukast (SINGULAIR) tablet 10 mg, 10 mg, Oral, Nightly, Ari Mckee MD, 10 mg at 09/21/21 2016  •  ondansetron (ZOFRAN) injection 4 mg, 4 mg, Intravenous, Q6H PRN, Ari Mckee MD  •  oxyCODONE-acetaminophen (PERCOCET) 5-325 MG per tablet 1 tablet, 1 tablet, Oral, Q6H PRN, Ari Mckee MD, 1 tablet at 09/22/21 0826  •  polyethylene glycol (MIRALAX) packet 17 g, 17 g, Oral, Daily, Ari Mckee MD  •  rOPINIRole (REQUIP) tablet 2 mg, 2 mg, Oral, Nightly, Ari Mckee MD, 2 mg at 09/21/21 2016  •  sodium chloride 0.9 % flush 10 mL, 10 mL, Intravenous, Q12H, Ari Mckee MD, 10 mL at 09/22/21 0828  •  sodium chloride 0.9 % flush 10 mL, 10 mL, Intravenous, PRN, Ari Mckee MD    Medications: Scheduled Meds:apixaban, 2.5 mg, Oral, Q12H  budesonide-formoterol, 2 puff, Inhalation, BID - RT  cefepime, 1 g, Intravenous, Q12H  cetirizine, 10 mg, Oral, Daily  docusate sodium, 100 mg, Oral, BID  famotidine, 20 mg, Oral, BID AC  FLUoxetine, 10 mg, Oral, Daily  fluticasone, 2 spray, Nasal, Daily  furosemide, 20 mg, Intravenous, Q12H  guaiFENesin, 1,200 mg, Oral, BID  ipratropium-albuterol, 3 mL, Nebulization, Q4H - RT  lidocaine, 1 patch, Transdermal,  Nightly  methylPREDNISolone sodium succinate, 40 mg, Intravenous, Q12H  montelukast, 10 mg, Oral, Nightly  polyethylene glycol, 17 g, Oral, Daily  rOPINIRole, 2 mg, Oral, Nightly  sodium chloride, 10 mL, Intravenous, Q12H      Continuous Infusions:   PRN Meds:.•  acetaminophen  •  clonazePAM  •  ondansetron  •  oxyCODONE-acetaminophen  •  sodium chloride    Vital Signs  Temp:  [97.5 °F (36.4 °C)-99.1 °F (37.3 °C)] 97.8 °F (36.6 °C)  Heart Rate:  [61-84] 71  Resp:  [18-26] 20  BP: (106-136)/(59-80) 111/71    Physical Exam  Physical Exam  Vitals and nursing note reviewed.   Constitutional:       General: She is not in acute distress.     Appearance: Normal appearance. She is well-developed, well-groomed and normal weight. She is not ill-appearing, toxic-appearing or diaphoretic.   HENT:      Head: Normocephalic and atraumatic.      Right Ear: Hearing normal.      Left Ear: Hearing normal.   Eyes:      Extraocular Movements: EOM normal.      Conjunctiva/sclera: Conjunctivae normal.      Pupils: Pupils are equal, round, and reactive to light.   Neck:      Trachea: Trachea normal.   Cardiovascular:      Rate and Rhythm: Normal rate and regular rhythm.   Pulmonary:      Effort: Pulmonary effort is normal. No tachypnea, bradypnea, accessory muscle usage or respiratory distress.   Abdominal:      Palpations: Abdomen is soft.   Musculoskeletal:      Cervical back: Full passive range of motion without pain and neck supple.   Skin:     General: Skin is warm and dry.   Neurological:      Mental Status: She is alert and oriented to person, place, and time.      GCS: GCS eye subscore is 4. GCS verbal subscore is 5. GCS motor subscore is 6.      Deep Tendon Reflexes:      Reflex Scores:       Tricep reflexes are 1+ on the right side and 1+ on the left side.       Bicep reflexes are 1+ on the right side and 1+ on the left side.       Brachioradialis reflexes are 1+ on the right side and 1+ on the left side.       Patellar reflexes  are 1+ on the right side and 1+ on the left side.       Achilles reflexes are 1+ on the right side and 1+ on the left side.  Psychiatric:         Speech: Speech normal.         Behavior: Behavior normal. Behavior is cooperative.         Neurologic Exam     Mental Status   Oriented to person, place, and time.   Attention: normal. Concentration: normal.   Speech: speech is normal   Level of consciousness: alert    Bright and awake.  Oriented x3.  Follows commands without prompting.     Cranial Nerves     CN II   Visual fields full to confrontation.     CN III, IV, VI   Pupils are equal, round, and reactive to light.  Extraocular motions are normal.     CN V   Facial sensation intact.     CN VII   Facial expression full, symmetric.     CN VIII   CN VIII normal.     CN IX, X   CN IX normal.     CN XI   CN XI normal.     Motor Exam   Right arm tone: normal  Left arm tone: normal  Right leg tone: normal  Left leg tone: normal    Strength   Right deltoid: 5/5  Left deltoid: 5/5  Right biceps: 5/5  Left biceps: 5/5  Right triceps: 5/5  Left triceps: 5/5  Right wrist extension: 5/5  Left wrist extension: 5/5  Right iliopsoas: 5/5  Right quadriceps: 5/5  Right anterior tibial: 5/5  Left anterior tibial: 5/5  Right gastroc: 5/5  Left gastroc: 5/5  Refuses to move left lower extremity  Right EHL 5/5  Left EHL 5/5       Sensory Exam   Right arm light touch: normal  Left arm light touch: normal  Right leg light touch: normal  Left leg light touch: normal    Gait, Coordination, and Reflexes     Tremor   Resting tremor: absent  Intention tremor: absent  Action tremor: absent    Reflexes   Right brachioradialis: 1+  Left brachioradialis: 1+  Right biceps: 1+  Left biceps: 1+  Right triceps: 1+  Left triceps: 1+  Right patellar: 1+  Left patellar: 1+  Right achilles: 1+  Left achilles: 1+  Right : 4+  Left : 4+  Right plantar: normal  Left plantar: normal  Right Biggs: absent  Left Biggs: absent  Right ankle clonus:  absent  Left ankle clonus: absent  Right pendular knee jerk: absent  Left pendular knee jerk: absent    Results Review:   Independent review and interpretation of imaging  Imaging Results (Last 24 Hours)     ** No results found for the last 24 hours. **        MRI brain:  MRI spine:   CT Head:  CT c-spine:  CT t-spine:      CT l-spine:  X-ray:    I reviewed the patient's new clinical results.  Lab Results (last 24 hours)     Procedure Component Value Units Date/Time    POC Glucose Once [[id number]]  (Abnormal) Collected: 09/21/21 1600    Specimen: Blood Updated: 09/21/21 1611     Glucose 147 mg/dL      Comment: : [id number] Camachoanni Karena) LindseyMeter ID: [id number]       Blood Culture - Blood, Arm, Left [[id number]] Collected: 09/19/21 1446    Specimen: Blood from Arm, Left Updated: 09/21/21 1530     Blood Culture No growth at 2 days    Blood Culture - Blood, Arm, Right [[id number]] Collected: 09/19/21 1437    Specimen: Blood from Arm, Right Updated: 09/21/21 1530     Blood Culture No growth at 2 days        JESSENIA Sams          Electronically signed by Brent Najera MD at 09/22/21 1019            Discharge Summary      Ari Mckee MD at 09/29/21 1419              [facility] Medicine Services  DISCHARGE SUMMARY       Date of Admission: 9/19/2021  Date of Discharge:  9/29/2021  Primary Care Physician: Pato Cooney DO    Presenting Problem/History of Present Illness:  Acute respiratory failure with hypoxia and hypercapnia (HCC) [J96.01, J96.02]     Final Discharge Diagnoses:  Active Hospital Problems    Diagnosis    • Compression fracture of T12 vertebra (HCC)    • Acute respiratory failure with hypoxia and hypercapnia (CMS/HCC)    • Chronic respiratory failure (CMS/HCC)    • COPD exacerbation (CMS/HCC)    • Chronic respiratory failure with hypoxia and hypercapnia (CMS/HCC)    • COPD with acute exacerbation (CMS/HCC)    • Chronic respiratory failure with  hypoxia (CMS/HCC)    • Pulmonary emphysema (CMS/HCC)    • Acute on chronic respiratory failure with hypoxia and hypercapnia (CMS/HCC)    • COPD, very severe (CMS/HCC)    • Severe malnutrition (CMS/HCC)        Consults: Neurosurgery.  Pulmonary.    Pertinent Test Results:   Results for orders placed during the hospital encounter of 08/31/21    Adult Transthoracic Echo Complete W/ Cont if Necessary Per Protocol    Interpretation Summary  · Left ventricular ejection fraction appears to be greater than 70%. Left ventricular systolic function is hyperdynamic (EF > 70%).  · Left ventricular diastolic function was indeterminate.  · The right ventricular cavity is mildly dilated.  · The right atrial cavity is severely dilated.  · There is mild calcification of the aortic valve mainly affecting the non-coronary cusp(s).  · There is mitral valve prolapse of the anterior mitral leaflet. Trace mitral valve regurgitation is present.  · Moderate to severe pulmonary hypertension is present.  · There is mild pulmonic valve regurgitation present.    IMPRESSION: Ultrasound gallbladder.  1. Normal right upper quadrant ultrasound.  2. Gallbladder is unremarkable. No evidence of acute cholecystitis;  decompressed and with no stones or wall thickening.    IMPRESSION: CT scan abdomen pelvic.  1. The previously described area of concern on CT chest 9/19/2021  correlates with a loop of bowel, which on today's exam is filled with  gas.  2. Thickening and enhancement of the proximal gallbladder and cystic  duct. Developing cholecystitis would be a consideration. An irregular  gallstone or malignancy is considered less likely.  3. Indeterminate right 1.1 cm adrenal nodule.  4. Changes of prior proximal colon resection.  5. Recent-appearing mild to moderate height loss of T12.  6. Small bilateral pleural effusions with prominent inferior heart size.    IMPRESSION: CTA of the chest.  1.  No evidence of pulmonary embolus.  2.  Small to moderate  bilateral pleural effusions with mild to moderate  cardiomegaly, most concerning for fluid overload.  3.  Collapse of the right middle lobe with soft tissue density in the  infrahilar region as well as soft tissue density filling the bronchus to  the right middle lobe. Differential does include neoplastic process.  4.  Interval T12 upper endplate compression deformity, with  approximately 10% height loss. No retropulsion.  5.  Incompletely visualized soft tissue density in the retroperitoneum  on the left. CT abdomen pelvis with contrast may be obtained for further  Characterization.    IMPRESSION: MRI of thoracic spine  1. Acute/subacute compression injury at the superior plate of T12 with  loss of height by 10%. No retropulsion.  2. Chronic T7 compression deformity.  3. No abnormal signal or enhancement of the thoracic spinal cord.   4. Scattered bony hemangiomas suspected of the thoracic vertebra.  5. Small bilateral pleural effusions with patchy left upper lobe  opacities. Follow-up thoracic imaging based on CTA chest report  9/19/2019.    IMPRESSION: Chest x-ray  Impression: Cardiomegaly with bilateral pleural effusions and fluid  along the minor fissure with findings suggestive of pulmonary edema.  Emphysema.     Chief Complaint on Day of Discharge: none    History of Present Illness on Day of Discharge:   Patient is a 76-year  female presented to ER for evaluation of shortness of breath.  Patient is from [facility].  Patient chronic history of COPD on chronic 4.5 L of oxygen at home.  Upon arrival to ER patient was in a 77%, patient currently requiring BiPAP.  CT scan shows possible collapse right middle lobe/pulmonary edema/pleural effusion.  Patient is currently on BiPAP.  CT scan also shows compression fracture T12.       Patient had left hip surgery with Dr. Chaudhari in August 31, 2021.  Patient was sent to rehab afterwards.  Patient has been eval for 10 days.  Status post Playmysong  vaccination 3 months ago.  For [facility] Course:  The patient is a 76 y.o. female who presented to [facility] with end-stage COPD/respiratory failure hypercapnia hypoxia/emphysema/compression fracture T12.      Respiratory failure with hypercapnia and hypoxia/emphysema/ patient on chronic 4.5 L of oxygen at home.  Continue p.o. prednisone.   DuoNebs.  Consult pulmonary.  Patient finished course of Zosyn antibiotics.  DuoNebs 4 times daily.  Incentive spirometer.  Symbicort.  Continue Mucinex.  Singulair.  Flutter valve.  Chest x-ray-Cardiomegaly with bilateral pleural effusions and fluid along the minor fissure with findings suggestive of pulmonary edema. Emphysema.  Patient is currently on 4 L of oxygen, this is patient's baseline. Requiring BiPAP at night.   Ordering nocturnal and daytime volume ventilation. Home BiPap insufficient due to severity of condition.  End-stage COPD/respiratory failure with hypercapnia hypoxia is the primary caue fo CRF/hypercapnia; ventilator required.        Compression fracture T12, 10% height loss.  Lidocaine patch for now.  Tylenol as needed.  Family does not want morphine.  Continue home medication of Percocet.  Neurosurgery consult.  CTA of the chest-No evidence of pulmonary embolus, small to moderate bilateral pleural effusions with mild to moderate cardiomegaly- most concerning for fluid overload, collapse of the right middle lobe with soft tissue density in the infrahilar region as well as soft tissue density filling the bronchus to the right middle lobe-  differential does include neoplastic process, T12 upper endplate compression deformity, with  approximately 10% height loss- no retropulsion, incompletely visualized soft tissue density in the retroperitoneum on the left.  MRI thoracic spine- Acute/subacute compression injury at the superior plate of T12 with loss of height by 10%- No retropulsion, Chronic T7 compression deformity, No abnormal signal or  enhancement of the thoracic spinal cord, Scattered bony hemangiomas suspected of the thoracic vertebra, Small bilateral pleural effusions with patchy left upper lobe opacities. .  Recommendation by general surgery-TLSO brace.       Hyperglycemia secondary to steroid.  Sliding scale for now.  Hemoglobin A1 C 5.6.     CHF/cardiomegaly/hypertension.  BNP 18,000.    Continue Coreg..    9/6/2021 Echocardiogram: ·   Left ventricular ejection fraction appears to be greater than 70%. Left ventricular systolic function is hyperdynamic (EF > 70%).  ·           Left ventricular diastolic function was indeterminate.  ·           The right ventricular cavity is mildly dilated.  ·           The right atrial cavity is severely dilated.   ·           There is mild calcification of the aortic valve mainly affecting the non-coronary cusp(s).  ·           There is mitral valve prolapse of the anterior mitral leaflet. Trace mitral valve regurgitation is present.  ·           Moderate to severe pulmonary hypertension is present.  ·           There is mild pulmonic valve regurgitation present.     Abdomen pain.  I think referral pain from the back.  CT scan abdomen pelvic- previously described area of concern on CT chest 9/19/2021 correlates with a loop of bowel, which on today's exam is filled with gas, thickening and enhancement of the proximal gallbladder and cystic duct,  Developing cholecystitis would be a consideration- irregular gallstone or malignancy is considered less likely, Indeterminate right 1.1 cm adrenal nodule, Changes of prior proximal colon resection, Recent-appearing mild to moderate height loss of T12, Small bilateral pleural effusions with prominent inferior heart size.  Ultrasound gallbladder-Normal right upper quadrant ultrasound, Gallbladder is unremarkable- No evidence of acute cholecystitis, decompressed and with no stones or wall thickening.     Recent left hip surgery By Dr. Chaudhari. Continue Eliquis prophylaxis  "for now since patient is now walking well.  Percocet as needed.  Lidocaine patch.     Reflux.  Pepcid.  Zofran as needed.     Allergic rhinitis.  Flonase .      Constipation.  Colace.  MiraLAX daily.     Restless leg . Requip nightly.     Depression/anxiety.  Continues Prozac.  Clonazepam as needed.     Status post Covid vaccination with [employer] 3 months ago.  Blood culture-no growth no growth 5 days.  Legionella antigen-negative.  Respiratory panel-negative.  Strep pneumo-negative.  Covid 19-negative.     Nutrition.  Cardiac diet.     Deconditioning.  PT and OT consult.     Vital signs stable, afebrile.  Patient refusing go back to previous nursing home.  Consult  for another placement. DNR.  DNI.   P patient go home with trilogy at night.  Patient son Jensen stated he does not want patient to return to [place] I want the patient to go home with home health.  [facility].   Patient and family does not want to go to any rehab.  Patient wants to go home with home health.  Patient will need a wheelchair and a nebulizer machine per patient.     Condition on Discharge: Stable.    Physical Exam on Discharge:  /48 (BP Location: Left arm, Patient Position: Lying)   Pulse 95   Temp 98.4 °F (36.9 °C) (Oral)   Resp 16   Ht 157.5 cm (62\")   Wt 52.9 kg (116 lb 9.6 oz)   SpO2 100%   BMI 21.33 kg/m²   Physical Exam  Vitals and nursing note reviewed.   Constitutional:       Appearance: She is well-developed.      Comments: Cachectic, advanced age, chronically ill.   HENT:      Head: Normocephalic.   Eyes:      Conjunctiva/sclera: Conjunctivae normal.      Pupils: Pupils are equal, round, and reactive to light.   Neck:      Vascular: No JVD.   Cardiovascular:      Rate and Rhythm: Normal rate and regular rhythm.      Heart sounds: Normal heart sounds. No murmur heard.   No friction rub. No gallop.    Pulmonary:      Effort: No respiratory distress.      Breath " sounds: No wheezing or rales.      Comments: Diminished breath sound bilateral, patient is fully on 4 L of oxygen.  Chest:      Chest wall: No tenderness.   Abdominal:      General: Bowel sounds are normal. There is no distension.      Palpations: Abdomen is soft.      Tenderness: There is no abdominal tenderness. There is no guarding or rebound.   Musculoskeletal:         General: No tenderness or deformity.      Cervical back: Neck supple.      Comments: TLSO brace in place   Skin:     General: Skin is warm and dry.      Capillary Refill: Capillary refill takes 2 to 3 seconds.      Findings: No rash.   Neurological:      Mental Status: She is alert and oriented to person, place, and time.      Cranial Nerves: No cranial nerve deficit.      Motor: Weakness present. No abnormal muscle tone.      Coordination: Coordination abnormal.      Gait: Gait abnormal.      Deep Tendon Reflexes: Reflexes normal.   Psychiatric:         Behavior: Behavior normal.         Thought Content: Thought content normal.     Discharge Disposition: Discharge home with home family and home health.  Discharge home with trilogy machine and home oxygen.  Patient go home with a wheelchair and a nebulizer machine.  Home or Self Care    Discharge Medications:     Discharge Medications      New Medications      Instructions Start Date   famotidine 20 MG tablet  Commonly known as: PEPCID   20 mg, Oral, 2 Times Daily Before Meals      ipratropium-albuterol 0.5-2.5 mg/3 ml nebulizer  Commonly known as: DUO-NEB   3 mL, Nebulization, Every 4 Hours - RT, 2 boxes      oxyCODONE-acetaminophen 5-325 MG per tablet  Commonly known as: PERCOCET  Replaces: oxyCODONE-acetaminophen 7.5-325 MG per tablet   1 tablet, Oral, Every 6 Hours PRN      predniSONE 20 MG tablet  Commonly known as: DELTASONE   Take 1 tablet by mouth Daily With Breakfast for 10 days, THEN 0.5 tablets Daily With Breakfast for 10 days.   Start Date: September 30, 2021        Changes to  Medications      Instructions Start Date   FLUoxetine 10 MG capsule  Commonly known as: PROzac  What changed:   · medication strength  · how much to take   10 mg, Oral, Daily   Start Date: September 30, 2021     fluticasone 50 MCG/ACT nasal spray  Commonly known as: Flonase  What changed:   · how much to take  · how to take this  · when to take this  · additional instructions  · Another medication with the same name was removed. Continue taking this medication, and follow the directions you see here.   2 sprays, Nasal, Daily   Start Date: September 30, 2021     guaiFENesin 600 MG 12 hr tablet  Commonly known as: MUCINEX  What changed: when to take this   600 mg, Oral, 2 Times Daily      lidocaine 5 %  Commonly known as: LIDODERM  What changed:   · how much to take  · Another medication with the same name was removed. Continue taking this medication, and follow the directions you see here.   1 patch, Transdermal, Nightly, Remove & Discard patch within 12 hours or as directed by MD         Continue These Medications      Instructions Start Date   albuterol sulfate  (90 Base) MCG/ACT inhaler  Commonly known as: PROVENTIL HFA;VENTOLIN HFA;PROAIR HFA   2 puffs, Inhalation, Every 4 Hours PRN      apixaban 2.5 MG tablet tablet  Commonly known as: ELIQUIS   2.5 mg, Oral, Every 12 Hours Scheduled      budesonide-formoterol 160-4.5 MCG/ACT inhaler  Commonly known as: SYMBICORT   2 puffs, Inhalation, 2 Times Daily - RT      carvedilol 3.125 MG tablet  Commonly known as: COREG   3.125 mg, Oral, 2 Times Daily With Meals      docusate sodium 100 MG capsule  Commonly known as: Colace   100 mg, Oral, 2 Times Daily      montelukast 10 MG tablet  Commonly known as: Singulair   10 mg, Oral, Nightly      polyethylene glycol 17 g packet  Commonly known as: MIRALAX   17 g, Oral, Daily      rOPINIRole 2 MG tablet  Commonly known as: REQUIP   2 mg, Oral, Nightly, Take 1 hour before bedtime.         Stop These Medications     acetaminophen 325 MG tablet  Commonly known as: TYLENOL     busPIRone 5 MG tablet  Commonly known as: BUSPAR     cetirizine 10 MG tablet  Commonly known as: zyrTEC     ipratropium 0.02 % nebulizer solution  Commonly known as: ATROVENT     ondansetron ODT 4 MG disintegrating tablet  Commonly known as: ZOFRAN-ODT     oxyCODONE-acetaminophen 7.5-325 MG per tablet  Commonly known as: PERCOCET  Replaced by: oxyCODONE-acetaminophen 5-325 MG per tablet     sodium chloride 0.65 % nasal spray     vitamin D 1.25 MG (06050 UT) capsule capsule  Commonly known as: ERGOCALCIFEROL            Discharge Diet:   Diet Instructions     Advance Diet As Tolerated            Activity at Discharge:   Activity Instructions     Activity as Tolerated            Discharge Care Plan/Instructions: Discharged home family . patient go home with home health.    Follow-up Appointments:   Future Appointments   Date Time Provider Department Center   10/26/2021  9:45 AM Yasmine Stahl MD [facility]   11/17/2021  2:45 PM Pato Cooney DO [facility]   With PCP in 1 week.    Electronically signed by Leon Alvarez MD, 09/29/21, 14:41 CDT.    Time: Greater than 30 minutes.        Electronically signed by Leon Alvarez MD at 09/29/21 1442       Discharge Order (From admission, onward)     Start     Ordered    09/29/21 1426  Discharge patient  Once     Expected Discharge Date: 09/29/21    Discharge Disposition: Home or Self Care    Physician of Record for Attribution - Please select from Treatment Team: LEON ALVAREZ [[id number]]    Review needed by CMO to determine Physician of Record: No       Question Answer Comment   Physician of Record for Attribution - Please select from Treatment Team LEON ALVAREZ    Review needed by CMO to determine Physician of Record No        09/29/21 1152

## 2021-10-01 NOTE — OUTREACH NOTE
Call Center TCM Note      Responses   [facility] patient discharged from?  [place]   Does the patient have one of the following disease processes/diagnoses(primary or secondary)?  COPD/Pneumonia   TCM attempt successful?  No   Unsuccessful attempts  Attempt 3          Alla Morales RN    10/1/2021, 12:52 EDT

## 2021-10-08 NOTE — OUTREACH NOTE
COPD/PN Week 2 Survey      Responses   [facility] patient discharged from?  [place]   Does the patient have one of the following disease processes/diagnoses(primary or secondary)?  COPD/Pneumonia   Was the primary reason for admission:  COPD exacerbation   Week 2 attempt successful?  No   Unsuccessful attempts  Attempt 1          Neeru Acevedo RN

## 2021-10-12 NOTE — OUTREACH NOTE
COPD/PN Week 2 Survey      Responses   [facility] patient discharged from? [place]   Does the patient have one of the following disease processes/diagnoses(primary or secondary)? COPD/Pneumonia   Was the primary reason for admission: COPD exacerbation   Week 2 attempt successful? No   Unsuccessful attempts Attempt 2          Eryn Roberto LPN

## 2021-10-12 NOTE — PROGRESS NOTES
Chief Complaint  Hospital Follow Up Visit ([facility] ED admission 08/30/21-9/10/21, nursing home for 8 days then 9/19/21-9/29/21, COPD)    David Newsome presents to [facility]  History of Present Illness  Here for hospital follow-up after ED admission on 8/30/2021 discharged on 9/10/2021 for fall with resultant left hip fracture, then after nursing home stay for 8 days presented to the hospital for COPD exacerbation with acute respiratory failure with hypoxia and hypercapnia in the setting of very severe COPD on 9/19/2021 discharged on 9/29/2021.  Since hospital discharge patient overall feels well, she was discharged with BiPAP as needed but she says that using nasal cannula oxygen is sufficient for sleeping.  She does need a refill of oxycodone for hip and low back pain, denies constipation. ROS otherwise neg.     Her son, who has a history of alcoholism, committed suicide in her home earlier this year, she denies needing any further mood support    Current Outpatient Medications   Medication Instructions   • albuterol sulfate  (90 Base) MCG/ACT inhaler 2 puffs, Inhalation, Every 4 Hours PRN   • apixaban (ELIQUIS) 2.5 mg, Oral, Every 12 Hours Scheduled   • budesonide-formoterol (SYMBICORT) 160-4.5 MCG/ACT inhaler 2 puffs, Inhalation, 2 Times Daily - RT   • carvedilol (COREG) 3.125 mg, Oral, 2 Times Daily With Meals   • docusate sodium (COLACE) 100 mg, Oral, 2 Times Daily   • famotidine (PEPCID) 20 mg, Oral, 2 Times Daily Before Meals   • FLUoxetine (PROZAC) 10 mg, Oral, Daily   • fluticasone (Flonase) 50 MCG/ACT nasal spray Administer 2 sprays into the nostrils as directed by provider Daily   • guaiFENesin (MUCINEX) 600 mg, Oral, 2 Times Daily   • ipratropium-albuterol (DUO-NEB) 0.5-2.5 mg/3 ml nebulizer 3 mL, Nebulization, Every 4 Hours - RT, 2 boxes   • montelukast (SINGULAIR) 10 mg, Oral, Nightly   • oxyCODONE-acetaminophen (PERCOCET) 5-325 MG per  "tablet 1 tablet, Oral, Every 6 Hours PRN   • polyethylene glycol (MIRALAX) 17 g, Oral, Daily   • predniSONE (DELTASONE) 20 MG tablet Take 1 tablet by mouth Daily With Breakfast for 10 days, THEN 0.5 tablet Daily With Breakfast for 10 days.   • rOPINIRole (REQUIP) 2 mg, Oral, Nightly, Take 1 hour before bedtime.       Objective   Vital Signs:   /73 (BP Location: Left arm, Patient Position: Sitting, Cuff Size: Adult)   Pulse 114   Temp 98 °F (36.7 °C) (Temporal)   Ht 157.5 cm (62\")   SpO2 98% Comment: currently on oxygen  BMI 21.33 kg/m²     Physical Exam  Vitals and nursing note reviewed.   Constitutional:       General: She is not in acute distress.     Appearance: She is not diaphoretic. Ill appearance: chronic.   HENT:      Head: Normocephalic and atraumatic.      Nose: Nose normal.   Eyes:      General: No scleral icterus.        Right eye: No discharge.         Left eye: No discharge.      Conjunctiva/sclera: Conjunctivae normal.   Neck:      Trachea: No tracheal deviation.   Cardiovascular:      Rate and Rhythm: Regular rhythm. Tachycardia present.   Pulmonary:      Comments: Diminished breath sounds throughout with prolonged expiratory phase  Skin:     General: Skin is warm and dry.      Coloration: Skin is not pale.   Neurological:      Mental Status: She is alert and oriented to person, place, and time.   Psychiatric:         Behavior: Behavior normal.         Thought Content: Thought content normal.         Judgment: Judgment normal.        Result Review :                 Assessment and Plan    Diagnoses and all orders for this visit:    1. Closed fracture of left hip, initial encounter ([facility]) (Primary)  -     oxyCODONE-acetaminophen (PERCOCET) 5-325 MG per tablet; Take 1 tablet by mouth Every 6 (Six) Hours As Needed for Moderate Pain  or Severe Pain .  Dispense: 30 tablet; Refill: 0  -     Walker    2. Chronic respiratory failure with hypoxia ([facility])  -     pneumococcal polysaccharide 23-valent " (PNEUMOVAX-23) vaccine 0.5 mL  -     Walker    Continue meds above with refill of oxycodone for as needed use  Pneumovax above with Rx for walker for assistance in ambulation  Follow-up 3 months  Continue [place] O2

## 2021-10-21 NOTE — OUTREACH NOTE
COPD/PN Week 3 Survey      Responses   [facility] patient discharged from? [place]   Does the patient have one of the following disease processes/diagnoses(primary or secondary)? COPD/Pneumonia   Was the primary reason for admission: COPD exacerbation   Week 3 attempt successful? No   Unsuccessful attempts Attempt 1   Rescheduled Revoked  [Multiple calls no answer]          Sidney Gallo RN

## 2021-10-25 NOTE — TELEPHONE ENCOUNTER
I have called son and left a msg to call me back.   He is wanting her appt moved to Weds.   I have no where to put him on that day.   I can move her out a little but on the Weds 27th.   She has appts other places on  Weds and is wanting to get them on all the same day.

## 2021-10-26 PROBLEM — J90 PLEURAL EFFUSION: Status: ACTIVE | Noted: 2021-01-01

## 2021-10-26 PROBLEM — G47.33 OSA (OBSTRUCTIVE SLEEP APNEA): Status: ACTIVE | Noted: 2021-01-01

## 2021-10-26 PROBLEM — Z87.891 FORMER SMOKER: Status: ACTIVE | Noted: 2021-01-01

## 2021-10-26 PROBLEM — Z99.81 DEPENDENCE ON SUPPLEMENTAL OXYGEN: Status: ACTIVE | Noted: 2021-01-01

## 2021-10-26 PROBLEM — G25.81 RESTLESS LEGS SYNDROME (RLS): Status: ACTIVE | Noted: 2021-01-01

## 2021-10-26 NOTE — PROGRESS NOTES
RESPIRATORY DISEASE CLINIC OUTPATIENT PROGRESS NOTE    Patient: Heide Newsome  : 1945  Age: 76 y.o.  Date of Service: 2021    REASON FOR CLINIC VISIT:  Chief Complaint   Patient presents with   • Hospital Follow Up Visit      ER visit r/t acute resp failure   • Pleural Effusion   • COPD     hx of copd; former smoker   • Abnormal Chest X-ray     showed emphysema and pulmonary edema   • Abnormal Imaging     ct showed collapsed right middle lobe and bilateral pleural effusion        Subjective:    History of Present Illness:  Heide Newsome is a 76 y.o. female who presents to the office today to be seen for    Diagnosis Plan   1. COPD, very severe (HCC)     2. Chronic respiratory failure with hypercapnia (HCC)     3. COPD exacerbation (HCC)     4. NICK (obstructive sleep apnea)     5. Restless legs syndrome (RLS)     6. Former smoker     7. Pleural effusion     8. Dependence on supplemental oxygen     .  Other problems per record.  Patient is a very pleasant 76 years old  female who was seen in the pulmonary clinic as a follow-up visit after recent hospital admission last month.    Patient is a former smoker and has very severe chronic obstructive pulmonary disease and did not have any recent pulmonary function test done.  She was on multiple medications at home and used Trelegy Ellipta but later was on Symbicort and DuoNeb during her hospitalization.  She was admitted with acute COPD exacerbation and was discharged home.  She is currently on 3 to 4 L oxygen at home which is her baseline.  She has a home trilogy for chronic hypercapnic respiratory failure but she has difficulty tolerating CPAP and trilogy in the past but she is trying to set it up at home in there right settings and is going to use it regularly.  Home trilogy was actually set up during her hospital discharge last month.  She also has restless leg syndrome and uses ropinirole at home.    She has nasal allergies  and she is using Flonase nasal spray and Singulair but is not on any Claritin.  She has some bleeding from the nose from time to time.  She was in a nursing home before but currently staying at home with her son.  She is already vaccinated for Covid and did not have any other hospitalizations or ER visit urgent care visit since her last visit.  Overall she feels she is little better than last month.  She had a abnormal CT scan of the chest with a right hilar mass noted during the last CT scan which was done during the hospitalization.  Due to her ongoing health issues a follow-up CT scan is ordered in 3 months and depending on the results we will plan for further intervention including a biopsy or PET scan.    She also has vertebral compression fracture noted during this hospitalization and is currently using a support device for mobilization.        PFT done today:  Not done today      No results found for this or any previous visit.         Bronchodilator therapy: Currently using only DuoNeb nebulizer.    Smoking Status:   Social History     Tobacco Use   Smoking Status Former Smoker   • Packs/day: 1.00   • Years: 59.00   • Pack years: 59.00   • Types: Cigarettes   • Start date:    • Quit date:    • Years since quittin.8   Smokeless Tobacco Never Used     Pulm Rehab: no  Sleep: yes    Support System: lives with their family    Code Status:   There are no questions and answers to display.        Review of Systems:  A complete review of systems is performed and all other systems were reviewed and negative as note above in the HPI.  Review of Systems   Constitutional: Positive for fatigue.   HENT: Positive for congestion, postnasal drip and sinus pressure.    Respiratory: Positive for cough and chest tightness.    Cardiovascular: Negative.    Gastrointestinal: Negative.    Endocrine: Negative.    Musculoskeletal: Positive for arthralgias and back pain.   Neurological: Positive for weakness.    Psychiatric/Behavioral: The patient is nervous/anxious.        CAT/ACT Score:  Not done today    Medications:  Outpatient Encounter Medications as of 10/26/2021   Medication Sig Dispense Refill   • albuterol sulfate  (90 Base) MCG/ACT inhaler Inhale 2 puffs Every 4 (Four) Hours As Needed for Wheezing. 8.5 g 12   • carvedilol (COREG) 3.125 MG tablet Take 1 tablet by mouth 2 (Two) Times a Day With Meals. 180 tablet 0   • docusate sodium (Colace) 100 MG capsule Take 1 capsule by mouth 2 (Two) Times a Day. 180 capsule 0   • famotidine (PEPCID) 20 MG tablet Take 1 tablet by mouth 2 (Two) Times a Day Before Meals. 60 tablet 2   • FLUoxetine (PROzac) 10 MG capsule Take 1 capsule by mouth Daily. 90 capsule 0   • fluticasone (Flonase) 50 MCG/ACT nasal spray Administer 2 sprays into the nostrils as directed by provider Daily 16 g 12   • guaiFENesin (MUCINEX) 600 MG 12 hr tablet Take 1 tablet by mouth 2 (Two) Times a Day. 180 tablet 0   • ipratropium-albuterol (DUO-NEB) 0.5-2.5 mg/3 ml nebulizer Take 3 mL by nebulization Every 4 (Four) Hours. 2 boxes 360 mL 12   • montelukast (Singulair) 10 MG tablet Take 1 tablet by mouth Every Night for 360 days. 90 tablet 3   • oxyCODONE-acetaminophen (PERCOCET) 5-325 MG per tablet Take 1 tablet by mouth Every 6 (Six) Hours As Needed for Moderate Pain  or Severe Pain . 30 tablet 0   • rOPINIRole (REQUIP) 2 MG tablet Take 1 tablet by mouth Every Night. Take 1 hour before bedtime. 90 tablet 0   • budesonide-formoterol (SYMBICORT) 160-4.5 MCG/ACT inhaler Inhale 2 puffs 2 (Two) Times a Day. 1 each 12   • polyethylene glycol (MIRALAX) 17 GM/SCOOP powder Take 17 g by mouth Daily for 30 days. 510 g 0   • [DISCONTINUED] apixaban (ELIQUIS) 2.5 MG tablet tablet Take 1 tablet by mouth Every 12 (Twelve) Hours for 42 days. 84 tablet 0     No facility-administered encounter medications on file as of 10/26/2021.       Allergies:  Allergies   Allergen Reactions   • Tetracyclines & Related  "Anaphylaxis   • Penicillins Itching   • Tape Other (See Comments)     Skin tear       Immunizations:  Immunization History   Administered Date(s) Administered   • COVID-19 ([facility]VIS) 03/10/2021   • Flu Vaccine Quad PF >18YRS 03/20/2019   • Fluzone High Dose =>65 Years ([facility] ONLY) 10/12/2021   • Influenza Quad Vaccine (Inpatient) 03/20/2019   • Pneumococcal Polysaccharide (PPSV23) 10/12/2021   • Tdap 08/31/2021       Objective:    Vitals:  /70   Pulse (!) 132   Ht 157.5 cm (62\")   Wt 48.1 kg (106 lb)   SpO2 97% Comment: 4L  BMI 19.39 kg/m²     Physical Exam:  General: Patient is a 76 y.o.  female.  She looks tired and exhausted and in a wheelchair.  Looks stated age. Appears to be in no acute distress.  Eyes: EOMI. PERRLA. Vision intact. No scleral icterus.  Ear, Nose, Mouth and Throat: Hearing is grossly intact. No Leukoplakia, pharyngitis, stomatitis or thrush. Swollen nasal mucosa with post nasal drop.  Neck: Range of motion of neck normal. No thyromegaly or masses. Mallampati Class 3, No JVD.  Respiratory: Clear to auscultation bilaterally. No use of accessory muscles. Decreased breath sounds.  Cardiovascular: Normal heart sounds. Regularly regular rhythm without murmur.  Gastrointestinal: Non tender, non distended, soft. Bowel sounds positive in all four quadrants. No organomegaly.  Skin: No obvious rashes, lesions, ulcers or large amount of bruising. No edema.   Neurological: No new motor deficits. Cranial nerves appear intact.  Psychiatric: Patient is alert and oriented to person, place and time.    Chest Imaging:   CT angiography done last month showed    IMPRESSION:  1.  No evidence of pulmonary embolus.  2.  Small to moderate bilateral pleural effusions with mild to moderate  cardiomegaly, most concerning for fluid overload.  3.  Collapse of the right middle lobe with soft tissue density in the  infrahilar region as well as soft tissue density filling the bronchus to  the right middle " lobe. Differential does include neoplastic process.  4.  Interval T12 upper endplate compression deformity, with  approximately 10% height loss. No retropulsion.  5.  Incompletely visualized soft tissue density in the retroperitoneum  on the left. CT abdomen pelvis with contrast may be obtained for further  characterization.        This report was finalized on 09/19/2021 17:28 by Dr. Purvi Liu MD.    Assessment:  1. COPD, very severe (HCC)    2. Chronic respiratory failure with hypercapnia (HCC)    3. COPD exacerbation (HCC)    4. NICK (obstructive sleep apnea)    5. Restless legs syndrome (RLS)    6. Former smoker    7. Pleural effusion    8. Dependence on supplemental oxygen        Plan/Recommendations:    1.  I talked to the patient and her son Jensen who came with the patient.  Patient has very severe COPD and medications were readjusted.  She was on Symbicort and DuoNeb at the time of discharge from the hospital but was using DuoNeb only.  2.  As patient is very severe COPD I ordered the patient to be started back on Trelegy Ellipta and she will change her DuoNeb to albuterol nebulizer because patient is started on Trelegy.  PFTs ordered before the next clinic visit.  3.  Patient has chronic respiratory failure and hypoxemia on supplemental oxygen and she should continues in 3 to 4 L oxygen all the time as before.  The use of Trelegy Ellipta is strongly recommended for chronic hypercapnic and hypoxic respiratory failure with possibility of underlying sleep apnea and she also had very severe COPD.  4.  For nasal allergy the patient was started on loratadine in addition to Singulair and Flonase nasal spray and due to nosebleed I started her on Omnaris saline nasal spray as well which may be from nasal dryness.  5.  Patient has a right middle lobe collapse with the possibility of endobronchial obstruction and further work-up is needed but due to her poor health condition at this moment I decided to defer on the  bronchoscopy or further intervention but I will order another follow-up CT scan of the chest noncontrast in 3 months time and if the abnormality still there she should be getting further work-up including a bronchoscopy or a PET scan.  She was encouraged to continue incentive spirometry and flutter valve which she was using in the hospital.  6.  Patient should continue ropinirole for restless leg syndrome and when she is more stable she will need a sleep study as well for further evaluation.  Continue all other medications including pain and anxiety medications.  She is very deconditioned and she was advised to start on home pulmonary rehab and information about the [facility] program was given to the patient.  She is currently using the home health care for her regular rehab.   7.  She should continue follow-up with other physician and she is vaccinated for Covid and is going to take the influenza vaccine.  8.  She is advised in the pulmonary clinic in 3 months time with a follow-up PFT and a CT scan of the chest for further evaluation or earlier if needed.  Patient had thoracic  spine compression fracture and she can use support device for some time but it could be taken off if needed.  She had this fracture probably more than 6 to 8 weeks ago.    Follow up:  3 Months    Time Spent:  30 minutes    I appreciate the opportunity of participating in this patient's care. I would like to thank the PCP for the referral.  Please feel free to contact me with any other questions.    Yasmine Stahl MD   Pulmonologist/Intensivist     Electronically signed by: Yasmine Stahl MD, 10/26/2021 10:10 CDT
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
## 2021-11-15 NOTE — TELEPHONE ENCOUNTER
Caller: VIPUL YAO    Relationship: PHYSICAL THERAPIST    Best call back number: [phone]    Who are you requesting to speak with     CLINICAL STAFF    Do you know the name of the person who called:     VIPUL WITH [facility]    What was the call regarding:    REQUESTING 2 WHEELED ROLLING WALKER    Do you require a callback:    YES, PLEASE ADVISE

## 2021-11-19 NOTE — TELEPHONE ENCOUNTER
Spoke to Lauren who reports order will need to be faxed to [employer] in [place]. Order, last office note, and demographic sheet faxed to [facility]

## 2021-11-27 PROBLEM — S22.32XA CLOSED FRACTURE OF ONE RIB OF LEFT SIDE: Status: ACTIVE | Noted: 2021-01-01

## 2021-11-27 NOTE — OUTREACH NOTE
Prep Survey      Responses   [facility] patient discharged from? [place]   Is LACE score < 7 ? No   Emergency Room discharge w/ pulse ox? No   Eligibility [facility]   Date of Admission 11/23/21   Date of Discharge 11/27/21   Discharge Disposition Home or Self Care   Discharge diagnosis Acute respiratory failure with hypoxia and hypercapnia,  COPD exacerbation     Does the patient have one of the following disease processes/diagnoses(primary or secondary)? Other   Does the patient have Home health ordered? Yes   What is the Home health agency?  [facility]    Is there a DME ordered? No   Prep survey completed? Yes          Flaquita White RN

## 2021-11-29 NOTE — OUTREACH NOTE
Call Center TCM Note      Responses   [facility] patient discharged from? [place]   Does the patient have one of the following disease processes/diagnoses(primary or secondary)? Other   TCM attempt successful? Yes  [Verbal release is over two years old]   Call start time 1427   Call end time 1430   Discharge diagnosis Acute respiratory failure with hypoxia and hypercapnia,  COPD exacerbation     Meds reviewed with patient/caregiver? Yes   Is the patient having any side effects they believe may be caused by any medication additions or changes? No   Does the patient have all medications ordered at discharge? Yes   Is the patient taking all medications as directed (includes completed medication regime)? Yes   Does the patient have a primary care provider?  Yes   Does the patient have an appointment with their PCP within 7 days of discharge? Yes   Comments regarding PCP Hospital d/c f/u appt is on 12/2/21 at 11:00 am    Has the patient kept scheduled appointments due by today? N/A   What is the Home health agency?  [facility]    Has home health visited the patient within 72 hours of discharge? Call prior to 72 hours   Psychosocial issues? No   Did the patient receive a copy of their discharge instructions? Yes   Nursing interventions Reviewed instructions with patient   What is the patient's perception of their health status since discharge? Same  [Pt reports she's having lots of pain from cracked rib]   Is the patient/caregiver able to teach back signs and symptoms related to disease process for when to call PCP? Yes   Is the patient/caregiver able to teach back signs and symptoms related to disease process for when to call 911? Yes   Is the patient/caregiver able to teach back the hierarchy of who to call/visit for symptoms/problems? PCP, Specialist, Home health nurse, Urgent Care, ED, 911 Yes   If the patient is a current smoker, are they able to teach back resources for cessation? Not a smoker  [Former  smoker]   TCM call completed? Yes          Leslie Noriega RN    11/29/2021, 14:31 EST
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
## 2021-12-07 NOTE — TELEPHONE ENCOUNTER
Left a vm for patient to call the office to reschedule the appt she missed in our office with Silvestre Aggarwal on 12/1/21 @ 2:15pm. I have also mailed a no show letter to the address listed in her chart.

## 2021-12-07 NOTE — OUTREACH NOTE
Medical Week 2 Survey      Responses   [facility] [place] [facility] [place]   Does the patient have one of the following disease processes/diagnoses(primary or secondary)? Other   Week 2 attempt successful? Yes   Call start time 1528   Discharge diagnosis Acute respiratory failure with hypoxia and hypercapnia,  COPD exacerbation     Call end time 1531   Meds reviewed with patient/caregiver? Yes   Is the patient having any side effects they believe may be caused by any medication additions or changes? No   Does the patient have all medications ordered at discharge? Yes   Is the patient taking all medications as directed (includes completed medication regime)? Yes   Does the patient have a primary care provider?  Yes   Does the patient have an appointment with their PCP within 7 days of discharge? Yes   Has the patient kept scheduled appointments due by today? N/A   Comments Needs to reschedule with PCP   What is the Home health agency?  [facility]    Has home health visited the patient within 72 hours of discharge? Yes   Home health comments HH is finished.   Psychosocial issues? No   Did the patient receive a copy of their discharge instructions? Yes   Nursing interventions Reviewed instructions with patient   What is the patient's perception of their health status since discharge? Improving   Is the patient/caregiver able to teach back signs and symptoms related to disease process for when to call PCP? Yes   Is the patient/caregiver able to teach back signs and symptoms related to disease process for when to call 911? Yes   Is the patient/caregiver able to teach back the hierarchy of who to call/visit for symptoms/problems? PCP, Specialist, Home health nurse, Urgent Care, ED, 911 Yes   If the patient is a current smoker, are they able to teach back resources for cessation? Not a smoker   Additional teach back comments Encouraged to make appts, doing exercises while laying around, she says her ribs  still hurt.   Week 2 Call Completed? Yes   Wrap up additional comments Improving.          Nellie Brenner RN

## 2021-12-08 PROBLEM — R09.02 HYPOXIA: Status: ACTIVE | Noted: 2021-01-01

## 2021-12-08 PROBLEM — J96.20 ACUTE-ON-CHRONIC RESPIRATORY FAILURE (HCC): Status: ACTIVE | Noted: 2021-01-01

## 2021-12-08 NOTE — PLAN OF CARE
Goal Outcome Evaluation:  Plan of Care Reviewed With: patient        Progress: no change  Outcome Summary: Pt becomes very SOA with any movement, even turning in bed.  Pt admitted on 5L per NC.  Sat ranging from 87-89%.  Drops to lower 80s when turning in bed.  Pt requested purewick due to urgency.  Voiding pale yellow urine.  Wound on coccyx red nonblanchable with a few small breaks in skin (see picture on chart).  Nursing wound protocol orders placed.  Cream applied.  Pt self repositions in bed.

## 2021-12-08 NOTE — PROGRESS NOTES
[facility] Medicine Services  INPATIENT PROGRESS NOTE    Length of Stay: 0  Date of Admission: 12/7/2021  Primary Care Physician: Pato Cooney DO    Subjective   Chief Complaint: Follow-up shortness of breath  HPI   She has chronic respiratory failure on 4.5 L continuously at home.  She said her saturation was 62% when she woke up yesterday morning and she was short of breath.  She said she could never get her saturation greater than 78%.  She increased her oxygen to 5 L.  She presented to the emergency room and was given Lasix.  I spoke with JESSENIA Muir with pulmonary this morning and pulmonary medicine was consulted.  She has since been placed on oxygen at 30 L 100%.  Sitting up in bed and desaturates with any conversation.  Lungs are decreased throughout.  No sputum production.  Continue IV steroids, Symbicort and DuoNeb's.  Pulmonary notes no pneumonia located de-escalate Levaquin.  She has had a trilogy device at home but reportedly did not work and stopped functioning and she had not continue to use.  Patient is full code but previously listed as no CPR.  Discussed with her nurse and she will clarify CODE STATUS.  Patient denies chest pain or palpitations.  She denies sputum production.  She denies nausea or vomiting.  Pulmonary considering transfer to unit for closer observation.  Denies fever, chills, cough or congestion.  Says she does not walk at home much because of shortness of breath.    Review of Systems   Constitutional: Positive for fatigue. Negative for activity change, appetite change, chills and fever.   HENT: Negative for congestion and trouble swallowing.    Eyes: Negative for photophobia and visual disturbance.   Respiratory: Positive for shortness of breath. Negative for cough and wheezing.    Cardiovascular: Negative for chest pain, palpitations and leg swelling.   Gastrointestinal: Negative for constipation, diarrhea, nausea and vomiting.    Endocrine: Negative for cold intolerance, heat intolerance and polyuria.   Genitourinary: Negative for dysuria and urgency.   Musculoskeletal: Negative for gait problem.   Skin: Negative for color change, pallor, rash and wound.   Allergic/Immunologic: Negative for immunocompromised state.   Neurological: Positive for weakness. Negative for light-headedness.   Hematological: Negative for adenopathy. Does not bruise/bleed easily.   Psychiatric/Behavioral: Negative for agitation, behavioral problems and confusion.      All pertinent negatives and positives are as above. All other systems have been reviewed and are negative unless otherwise stated.     Objective    Temp:  [97.8 °F (36.6 °C)-98.3 °F (36.8 °C)] 98.3 °F (36.8 °C)  Heart Rate:  [102-122] 102  Resp:  [16-24] 16  BP: (100-129)/(57-98) 112/68  Physical Exam  Vitals and nursing note reviewed.   Constitutional:       Appearance: She is ill-appearing.      Comments: Sitting up in bed.  Oxygen at 30 L, 100%.   HENT:      Head: Normocephalic and atraumatic.      Nose: No congestion or rhinorrhea.      Mouth/Throat:      Pharynx: Oropharynx is clear. No oropharyngeal exudate or posterior oropharyngeal erythema.   Eyes:      Extraocular Movements: Extraocular movements intact.      Pupils: Pupils are equal, round, and reactive to light.   Cardiovascular:      Rate and Rhythm: Normal rate and regular rhythm.      Heart sounds: No murmur heard.      Pulmonary:      Breath sounds: No wheezing, rhonchi or rales.      Comments: Oxygen 30 L, 100%.  Decreased breath sounds throughout.  Abdominal:      Palpations: Abdomen is soft.      Tenderness: There is no abdominal tenderness.   Genitourinary:     Comments:  WIC in place.  Musculoskeletal:         General: No swelling or tenderness.      Cervical back: Normal range of motion and neck supple.   Skin:     General: Skin is warm and dry.      Findings: Bruising (Left knee) present.   Neurological:      General: No focal  deficit present.      Mental Status: She is alert and oriented to person, place, and time.   Psychiatric:         Mood and Affect: Mood normal.         Behavior: Behavior normal.         Thought Content: Thought content normal.         Judgment: Judgment normal.       Results Review:  I have reviewed the labs, radiology results, and diagnostic studies.    Laboratory Data:    Results from last 7 days   Lab Units 12/08/21  0445 12/07/21  2349   WBC 10*3/mm3 9.64 11.91*   HEMOGLOBIN g/dL 11.2* 11.1*   HEMATOCRIT % 41.5 40.5   PLATELETS 10*3/mm3 361 375        Results from last 7 days   Lab Units 12/08/21  0445 12/07/21  2349 12/07/21  2349   SODIUM mmol/L 142  --  143   POTASSIUM mmol/L 4.4  --  4.4   CHLORIDE mmol/L 93*  --  94*   CO2 mmol/L 39.0*  --  40.0*   BUN mg/dL 25*  --  26*   CREATININE mg/dL 0.49*  --  0.45*   GLUCOSE mg/dL 146*   < > 133*   CALCIUM mg/dL 9.9  --  10.0   ALT (SGPT) U/L  --   --  12    < > = values in this interval not displayed.     Culture Data:    No results found for: BLOODCX, URINECX, WOUNDCX, MRSACX, RESPCX, STOOLCX    Radiology Data:   Imaging Results (Last 7 Days)     Procedure Component Value Units Date/Time    XR Chest 1 View [[id number]] Collected: 12/08/21 0718     Updated: 12/08/21 0724    Narrative:      EXAMINATION: Chest 1 view 12/7/2021     HISTORY: Shortness of breath.     FINDINGS: Today's exam is compared to previous study of 11/26/2021.  Upright frontal projection of chest demonstrates emphysematous changes  lungs with hyperinflation lung parenchyma. There is enlargement of the  main pulmonary artery segment and pulmonary arteries suggesting  pulmonary arterial hypertension. Superimposed upon these emphysematous  changes are increased interstitial markings and cardiomegaly.  Radiographic findings would suggest this represents superimposed  congestive failure with mild interstitial edema. Small effusions are  present.       Impression:      1.. Emphysematous changes of the  lungs. There is suspected pulmonary  arterial hypertension.  2. Superimposed cardiomegaly with increased interstitial markings and  Kerley B line suggesting pulmonary venous hypertension with pulmonary  edema. Small effusions are present.  This report was finalized on 12/08/2021 07:21 by Dr. Brennon Lucas MD.        Results for orders placed during the hospital encounter of 08/31/21    Adult Transthoracic Echo Complete W/ Cont if Necessary Per Protocol    Interpretation Summary  · Left ventricular ejection fraction appears to be greater than 70%. Left ventricular systolic function is hyperdynamic (EF > 70%).  · Left ventricular diastolic function was indeterminate.  · The right ventricular cavity is mildly dilated.  · The right atrial cavity is severely dilated.  · There is mild calcification of the aortic valve mainly affecting the non-coronary cusp(s).  · There is mitral valve prolapse of the anterior mitral leaflet. Trace mitral valve regurgitation is present.  · Moderate to severe pulmonary hypertension is present.  · There is mild pulmonic valve regurgitation present.    Intake/Output    Intake/Output Summary (Last 24 hours) at 12/8/2021 1313  Last data filed at 12/8/2021 0723  Gross per 24 hour   Intake --   Output 300 ml   Net -300 ml       Scheduled Meds  budesonide-formoterol, 2 puff, Inhalation, BID - RT  cetirizine, 10 mg, Oral, Daily  docusate sodium, 100 mg, Oral, BID  enoxaparin, 40 mg, Subcutaneous, Q24H  famotidine, 20 mg, Oral, Daily  FLUoxetine, 10 mg, Oral, Daily  guaiFENesin, 600 mg, Oral, BID  ipratropium-albuterol, 3 mL, Nebulization, Q6H - RT  levoFLOXacin, 500 mg, Oral, Q24H  methylPREDNISolone sodium succinate, 40 mg, Intravenous, Q8H  montelukast, 10 mg, Oral, Nightly  rOPINIRole, 2 mg, Oral, Nightly  sodium chloride, 10 mL, Intravenous, Q12H      I have reviewed the patient current medications.     Assessment/Plan     Active Hospital Problems    Diagnosis    • **Acute on chronic  respiratory failure with hypoxia and hypercapnia (HCC)    • Acute-on-chronic respiratory failure (HCC)    • NICK (obstructive sleep apnea)    • Pulmonary emphysema (HCC)    • COPD, very severe (HCC)    • Severe malnutrition (HCC)      Plan:  1.  To ER 12/7/2021 with worsening shortness of breath.  Chronic hypoxic respiratory failure and wears oxygen at 4.5 L continuously.  Increase oxygen to 5 L without improvement.  Work on diet admission short of breath with saturation 62% and reports she can never get oxygen greater than 78%.  Has had trilogy machine in the past but reported she did not work and she no longer uses.  Recent admission 11/22-11/27 with similar symptoms.  Followed by pulmonary at that time.    2.  Acute on chronic hypoxic respiratory failure requiring oxygen at 30 L 100%.  Normally wears oxygen 4.5 L at home.  Continue Symbicort, Mucinex, duo nebs, Solu-Medrol.    3.  Pulmonary consult.  Dr. Stahl transitioned her to Vapotherm this morning.  Patient had trilogy at home but did not use and stopped using after a few days as she felt it was not working.  Continue steroids, Symbicort and duo nebs.  No evidence of pneumonia noted.  Can de-escalate Levaquin.  If respiratory status deteriorates may need BiPAP or intubation.  Patient was DNR on previous admissions currently noted is full code.  I discussed with nurse Calley she is to clarify CODE STATUS with family.  Overall prognosis guarded.    4.  DVT prophylaxis with Lovenox.    5.  Reviewed home medications.  Resumed if appropriate.    6.  Palliative care consult may be appropriate as patient has had repeated admissions.    7.  Social service consulted.  Dr. Cooney contacted APS after home health visit and had contacted his office.  [facility] home health no longer come to patient's home.  Social service on case.    8.  CBC, BMP in a.m.    The patient designates her son Jensen to assist with decision-making.    Additional 35 minutes spent reviewing chart,  assessing patient, discussing with pulmonary medicine, doing treatment plan and ordering lab and follow-up and recommendations for palliative care consult.  The above documentation resulted from a face-to-face encounter by me Joy RUELAS, [facility].    Discharge Planning: I expect patient to be discharged to be determined.    Electronically signed by JESSENIA Rosado, 12/8/2021, 13:13 CST.

## 2021-12-08 NOTE — ED NOTES
The following fall interventions were initiated:    [x] Patient and/or family given education   [x] Call light within reach and educated on how to use   [x] Bed rails up per protocol    [x] Bed locked and in the lowest position   [x] Bed alarm set and on loudest setting   [x] Fall wrist band applied   [x] Non skid footwear applied   [] Room free of clutter   [] Patient items within reach   [] Adequate lighting provided  [] Falls sign present    [] Patient moved closer to nursing station   [] Restraints applied        Elva De Oliveira, RN  12/08/21 0107       Elva De Oliveira, RN  12/08/21 0108

## 2021-12-08 NOTE — H&P
[facility] Medicine Services  HISTORY AND PHYSICAL    Date of Admission: 12/7/2021  Primary Care Physician: Pato Cooney DO    Subjective     Chief Complaint: Shortness of breath    History of Present Illness  76 year old female with PMH of COPD oxygen dependent, RLS, that presents to the ER with complaints of shortness of breath. She was feeling winded since the night before and yesterday morning her oxygen saturation measured at around 60 %. Tried home nebulizers and higher oxygen flow but could not get above 70%. Finally her family convinced her to come to the ER tonight. She is requiring 6 lpm nasal canula to maintain oxygen at around 88%.     Patient denies chest pain, sputum production, fever or body aches.     Review of Systems   Otherwise complete ROS reviewed and negative except as mentioned in the HPI.    Past Medical History:   Past Medical History:   Diagnosis Date   • Cancer (CMS/HCC), colon    • Chronic respiratory failure (CMS/HCC), 4 L nasal cannula continuously    • COPD (chronic obstructive pulmonary disease) ([facility])    • Restless leg syndrome      Past Surgical History:  Past Surgical History:   Procedure Laterality Date   • APPENDECTOMY     • CARPAL TUNNEL RELEASE Left    • COLON RESECTION     • FOOT SURGERY Bilateral     hammer toe   • FRACTURE SURGERY Left     Arm   • HIP TROCHANTERIC NAILING WITH INTRAMEDULLARY HIP SCREW Left 8/31/2021    Procedure: LEFT HIP TROCHANTERIC NAILING SHORT WITH INTRAMEDULLARY HIP SCREW;  Surgeon: Valdemar Chaudhari MD;  Location: [facility];  Service: Orthopedics;  Laterality: Left;   • HYSTERECTOMY     • WRIST FRACTURE SURGERY Right      Social History:  reports that she quit smoking about 2 years ago. Her smoking use included cigarettes. She started smoking about 61 years ago. She has a 59.00 pack-year smoking history. She has never used smokeless tobacco. She reports that she does not drink alcohol and does not use  drugs.    Family History: family history includes COPD in her father; Cancer in her brother, mother, paternal grandfather, paternal grandmother, and sister; Colon cancer in her mother; Diabetes in her father and sister; Heart disease in her father.       Allergies:  Allergies   Allergen Reactions   • Tetracyclines & Related Anaphylaxis   • Penicillins Itching   • Tape Other (See Comments)     Skin tear       Medications:  Prior to Admission medications    Medication Sig Start Date End Date Taking? Authorizing Provider   albuterol (PROVENTIL) (2.5 MG/3ML) 0.083% nebulizer solution Take 2.5 mg by nebulization Every 4 (Four) Hours As Needed for Wheezing. 10/26/21  Yes Yasmine Stahl MD   albuterol sulfate  (90 Base) MCG/ACT inhaler Inhale 2 puffs Every 4 (Four) Hours As Needed for Wheezing. 9/29/21  Yes Ari Mckee MD   FLUoxetine (PROzac) 10 MG capsule Take 1 capsule by mouth Daily. 9/30/21  Yes Ari Mckee MD   Fluticasone-Umeclidin-Vilant (Trelegy Ellipta) 100-62.5-25 MCG/INH inhaler Inhale 1 puff Daily for 90 days. 10/26/21 1/24/22 Yes Yasmine Stahl MD   guaiFENesin (MUCINEX) 600 MG 12 hr tablet Take 1 tablet by mouth 2 (Two) Times a Day. 9/29/21  Yes Ari Mckee MD   loratadine (CLARITIN) 10 MG tablet Take 1 tablet by mouth Daily. 10/26/21  Yes Yasmine Stahl MD   montelukast (Singulair) 10 MG tablet Take 1 tablet by mouth Every Night for 360 days. 9/29/21 9/24/22 Yes Ari Mckee MD   oxyCODONE-acetaminophen (Percocet) 5-325 MG per tablet Take 1 tablet by mouth Every 6 (Six) Hours As Needed for Severe Pain  (severe pain- rib fracture). 11/27/21  Yes Sinan Mccall MD   rOPINIRole (REQUIP) 2 MG tablet Take 1 tablet by mouth 4 (Four) Times a Day Before Meals & at Bedtime for 30 days. Takes one tablet during the day and 2 tablets and bedtime 11/27/21 12/27/21 Yes Sinan Mccall MD   acetaminophen (TYLENOL) 325 MG tablet Take 2 tablets by mouth Every 4 (Four)  "Hours As Needed for Mild Pain . 11/27/21   Sinan Mccall MD   docusate sodium (Colace) 100 MG capsule Take 1 capsule by mouth 2 (Two) Times a Day. 9/29/21   Ari Mckee MD   famotidine (PEPCID) 20 MG tablet Take 1 tablet by mouth 2 (Two) Times a Day Before Meals. 9/29/21   Ari Mckee MD   lidocaine (LIDODERM) 5 % Place 2 patches on the skin as directed by provider Daily. Remove & Discard patch within 12 hours or as directed by MD 11/27/21   Sinan Mccall MD   predniSONE (DELTASONE) 10 MG (48) dose pack Take as directed 11/27/21   Sinan Mccall MD   sodium chloride (Ocean Nasal Spray) 0.65 % nasal spray 1 spray into the nostril(s) as directed by provider As Needed for Congestion. 10/26/21   Yasmine Stahl MD     I have utilized all available immediate resources to obtain, update, and review the patient's current medications.    Objective     Vital Signs: /79   Pulse 114   Temp 98.2 °F (36.8 °C)   Resp 20   Ht 157.5 cm (62\")   Wt 57.2 kg (126 lb)   SpO2 91%   BMI 23.05 kg/m²   Physical Exam  Vitals reviewed.   Constitutional:       General: She is not in acute distress.     Appearance: She is well-developed. She is not toxic-appearing.   HENT:      Head: Normocephalic and atraumatic.      Right Ear: External ear normal.      Left Ear: External ear normal.      Mouth/Throat:      Mouth: Mucous membranes are dry.      Pharynx: Oropharynx is clear.   Eyes:      General:         Right eye: No discharge.         Left eye: No discharge.      Extraocular Movements: Extraocular movements intact.      Conjunctiva/sclera: Conjunctivae normal.      Pupils: Pupils are equal, round, and reactive to light.   Neck:      Vascular: No JVD.   Cardiovascular:      Rate and Rhythm: Normal rate and regular rhythm.      Pulses: Normal pulses.      Heart sounds: Normal heart sounds. No murmur heard.  No friction rub. No gallop.    Pulmonary:      Effort: No respiratory distress. "      Breath sounds: No stridor. Wheezing and rhonchi present. No rales.   Chest:      Chest wall: No tenderness.   Abdominal:      General: Bowel sounds are normal. There is no distension.      Palpations: Abdomen is soft.      Tenderness: There is no abdominal tenderness. There is no guarding or rebound.      Hernia: No hernia is present.   Musculoskeletal:         General: No swelling, tenderness or deformity. Normal range of motion.      Cervical back: Normal range of motion and neck supple. No rigidity or tenderness. No muscular tenderness.      Right lower leg: No edema.      Left lower leg: No edema.   Skin:     General: Skin is warm and dry.      Findings: No erythema or rash.   Neurological:      General: No focal deficit present.      Mental Status: She is alert and oriented to person, place, and time.      Cranial Nerves: No cranial nerve deficit.      Sensory: No sensory deficit.      Motor: No weakness or abnormal muscle tone.      Deep Tendon Reflexes: Reflexes normal.   Psychiatric:         Mood and Affect: Mood normal.         Behavior: Behavior normal.     Results Reviewed:  Lab Results (last 24 hours)     Procedure Component Value Units Date/Time    BNP [[id number]]  (Abnormal) Collected: 12/07/21 2349    Specimen: Blood Updated: 12/08/21 0229     proBNP 8,308.0 pg/mL     Narrative:      Among patients with dyspnea, NT-proBNP is highly sensitive for the detection of acute congestive heart failure. In addition NT-proBNP of <300 pg/ml effectively rules out acute congestive heart failure with 99% negative predictive value.    Results may be falsely decreased if patient taking Biotin.      COVID PRE-OP / PRE-PROCEDURE SCREENING ORDER (NO ISOLATION) - Swab, Nasal Cavity [[id number]]  (Normal) Collected: 12/07/21 2348    Specimen: Swab from Nasal Cavity Updated: 12/08/21 0044    Narrative:      The following orders were created for panel order COVID PRE-OP / PRE-PROCEDURE SCREENING ORDER (NO ISOLATION) -  Swab, Nasal Cavity.  Procedure                               Abnormality         Status                     ---------                               -----------         ------                     COVID-19,[employer] IN-JIN...[[id number]]  Normal              Final result                 Please view results for these tests on the individual orders.    COVID-19,[employer] IN-HOUSE,Nasal Swab No Transport Media 3-4 HR TAT - Swab, Nasal Cavity [[id number]]  (Normal) Collected: 12/07/21 2348    Specimen: Swab from Nasal Cavity Updated: 12/08/21 0044     COVID19 Not Detected    Narrative:      Fact sheet for providers: https://www.fda.gov/media/176093/download     Fact sheet for patients: https://www.fda.gov/media/599210/download    Test performed by PCR.    Consider negative results in combination with clinical observations, patient history, and epidemiological information.    Blood Culture - Blood, Arm, Right [[id number]] Collected: 12/08/21 0028    Specimen: Blood from Arm, Right Updated: 12/08/21 0033    Comprehensive Metabolic Panel [[id number]]  (Abnormal) Collected: 12/07/21 2349    Specimen: Blood Updated: 12/08/21 0019     Glucose 133 mg/dL      BUN 26 mg/dL      Creatinine 0.45 mg/dL      Sodium 143 mmol/L      Potassium 4.4 mmol/L      Chloride 94 mmol/L      CO2 40.0 mmol/L      Calcium 10.0 mg/dL      Total Protein 6.8 g/dL      Albumin 3.40 g/dL      ALT (SGPT) 12 U/L      AST (SGOT) 16 U/L      Alkaline Phosphatase 100 U/L      Total Bilirubin 0.3 mg/dL      eGFR Non African Amer 136 mL/min/1.73      Globulin 3.4 gm/dL      A/G Ratio 1.0 g/dL      BUN/Creatinine Ratio 57.8     Anion Gap 9.0 mmol/L     Narrative:      GFR Normal >60  Chronic Kidney Disease <60  Kidney Failure <15      Lactic Acid, Plasma [[id number]]  (Normal) Collected: 12/07/21 2349    Specimen: Blood Updated: 12/08/21 0016     Lactate 1.4 mmol/L     CBC & Differential [[id number]]  (Abnormal) Collected: 12/07/21 2349    Specimen: Blood Updated:  12/08/21 0001    Narrative:      The following orders were created for panel order CBC & Differential.  Procedure                               Abnormality         Status                     ---------                               -----------         ------                     CBC Auto Differential[[id number]]        Abnormal            Final result                 Please view results for these tests on the individual orders.    CBC Auto Differential [[id number]]  (Abnormal) Collected: 12/07/21 2349    Specimen: Blood Updated: 12/08/21 0001     WBC 11.91 10*3/mm3      RBC 4.45 10*6/mm3      Hemoglobin 11.1 g/dL      Hematocrit 40.5 %      MCV 91.0 fL      MCH 24.9 pg      MCHC 27.4 g/dL      RDW 15.4 %      RDW-SD 50.6 fl      MPV 11.6 fL      Platelets 375 10*3/mm3      Neutrophil % 87.8 %      Lymphocyte % 5.8 %      Monocyte % 5.2 %      Eosinophil % 0.3 %      Basophil % 0.3 %      Immature Grans % 0.6 %      Neutrophils, Absolute 10.46 10*3/mm3      Lymphocytes, Absolute 0.69 10*3/mm3      Monocytes, Absolute 0.62 10*3/mm3      Eosinophils, Absolute 0.03 10*3/mm3      Basophils, Absolute 0.04 10*3/mm3      Immature Grans, Absolute 0.07 10*3/mm3      nRBC 0.0 /100 WBC     Blood Culture - Blood, Arm, Left [[id number]] Collected: 12/07/21 2349    Specimen: Blood from Arm, Left Updated: 12/07/21 2358        Imaging Results (Last 24 Hours)     Procedure Component Value Units Date/Time    XR Chest 1 View [[id number]] Resulted: 12/07/21 2354     Updated: 12/07/21 2357        I have personally reviewed and interpreted the radiology studies and ECG obtained at time of admission.     Assessment / Plan     Assessment:   Active Hospital Problems    Diagnosis    • **Acute on chronic respiratory failure with hypoxia and hypercapnia (HCC)    • NICK (obstructive sleep apnea)    • Pulmonary emphysema (HCC)    • COPD, very severe (HCC)    • Severe malnutrition (HCC)      Plan:   Admit to medical floor  Vitals every 4 hours and pulse  oxymetry  Cardiac diet  IVF saline lock  Solumedrol 40 mg IVP BID  Duoneb 1 UD QID  Albuterol 1 UD neb q4h prn  Empiric Levaquin oral 500 mg daily    Home medications reconciled    DVT prophylaxis >  Lovenox 40 mg SQ daily    Code Status/Advanced Care Plan: Full    The patient's surrogate decision maker is see records.     I discussed my findings and recommendations with the patient.    Estimated length of stay is over 2 midnights.     The patient was seen and examined by me on 12/08/2021 at 0245 AM.    Electronically signed by Angel Nuñez MD, 12/08/21, 02:42 CST.

## 2021-12-08 NOTE — CASE MANAGEMENT/SOCIAL WORK
Continued Stay Note  [facility]     Patient Name: Heide Newsome  MRN: [id number]  Today's Date: 12/8/2021    Admit Date: 12/7/2021     Discharge Plan     Row Name 12/08/21 1154       Plan    Plan Comments Noted from Physician note dated 11-30-21 that APS report was made to central intake and ID number is [id number]. Called Max Young CPS/APS and was provided with number to APS supervisor (Teresa Aggarwal). Called Teresa Aggarwal at [phone] but had to leave a voicemail. Await return call.               Discharge Codes    No documentation.                     FLAKITA Chambers

## 2021-12-08 NOTE — PLAN OF CARE
"Goal Outcome Evaluation:           Progress: no change  Outcome Summary: A&Ox4. Pt becomes extremely SOA with movement and when feeding self. Placed on 30L Vapotherm 100% at beginning of shift with forehead , sats in low to mid 90s. Lovenox. Regular cardiac diet. Placed cardiac monitoring. Purewick. Coccyx red and nonblanchable, cleansed NS, barrier cream. Reminded pt to turn frequently. Discussed with pt code status, pt stated \"I do not want any extreme measures if it comes to that, please let me go.\" Pt called and discussed code staus with son. Placed appropriate orders. Palliative care consult placed for recent readmissions. Call light in reach. Safety maintained.  "

## 2021-12-08 NOTE — CASE MANAGEMENT/SOCIAL WORK
Discharge Planning Assessment  [facility]     Patient Name: Heide Newsome  MRN: [id number]  Today's Date: 12/8/2021    Admit Date: 12/7/2021     Discharge Needs Assessment     Row Name 12/08/21 1036       Living Environment    Lives With spouse; child(nomi), adult    Name(s) of Who Lives With Patient Jensen Fulton, son and spouse who has dementia    Current Living Arrangements home/apartment/condo    Primary Care Provided by child(nomi)    Provides Primary Care For no one, unable/limited ability to care for self    Caregiving Concerns weak ; high oxyygen needs    Family Caregiver if Needed child(nomi), adult    Family Caregiver Names Jensen schwartz    Quality of Family Relationships unable to assess    Able to Return to Prior Arrangements other (see comments)  pending evals    Living Arrangement Comments Son primary caregiver of parents with health needs       Resource/Environmental Concerns    Transportation Concerns car, none       Transition Planning    Patient/Family Anticipates Transition to home with help/services; inpatient rehabilitation facility    Patient/Family Anticipated Services at Transition home health care; skilled nursing    Transportation Anticipated family or friend will provide       Discharge Needs Assessment    Readmission Within the Last 30 Days current reason for admission unrelated to previous admission    Current Outpatient/Agency/Support Group homecare agency; skilled nursing facility    Equipment Currently Used at Home bath bench; commode; hospital bed; nebulizer; noninvasive ventilator; oxygen; respiratory supplies; walker, rolling; wheelchair    Concerns to be Addressed home safety    Concerns Comments home health, refusing to go back d/t unsafe environment    Anticipated Changes Related to Illness inability to care for someone else; inability to care for self    Equipment Needed After Discharge none    Outpatient/Agency/Support Group Needs homecare agency; skilled nursing facility     Discharge Facility/Level of Care Needs home with home health; nursing facility, skilled    Discharge Coordination/Progress Patient not wanting referral to SNF at this time. Pending PT/OT antoinette. Home health that she had recommending SNF. Son not happy with their services. They also wanted to put her spouse in SNF r/t health needs. Patient has been in [facility] and would not go back.  Poor care she said they about killed her d/t poor services. Patient has PCP and rx coverage. Will to go with another  service and possibly SNF referral if recommended by therapy. [facility] is facility of choice. Her spouse has been there before. SW  to follow with referrals as needed.               Discharge Plan    No documentation.               Continued Care and Services - Admitted Since 12/7/2021    Coordination has not been started for this encounter.     Selected Continued Care - Prior Encounters Includes selections from prior encounters from 9/8/2021 to 12/8/2021    Discharged on 9/29/2021 Admission date: 9/19/2021 - Discharge disposition: [place] [facility]    [place]ination     Service Provider Selected Services Address Phone Fax Patient Preferred    [facility]  Skilled Nursing [address] [address] [address] [phone] --          Durable Medical Equipment     Service Provider Selected Services Address Phone Fax Patient Preferred    [facility]  Durable Medical Equipment [address] [facility] [address] [phone] --          Home Medical Care     Service Provider Selected Services Address Phone Fax Patient Preferred    [facility] [place]  Home Health Services [address] [place] [address] [id number] [phone] --                Discharged on 9/10/2021 Admission date: 8/31/2021 - Discharge disposition: Skilled Nursing Facility (DC - External)    Destination     Service Provider Selected Services Address Phone Fax  Patient Preferred    [facility]  Skilled Nursing [address] [address], [facility] [address] [phone] --                       Demographic Summary    No documentation.                Functional Status    No documentation.                Psychosocial    No documentation.                Abuse/Neglect    No documentation.                Legal    No documentation.                Substance Abuse    No documentation.                Patient Forms    No documentation.                   Deb Shipley RN

## 2021-12-08 NOTE — ED PROVIDER NOTES
Subjective   Patient complains of severe shortness of breath.  She says it started this morning when she first awakened and her pulse ox was in the 60% range.  It is waxed and waned during the day but pulse ox is never got above 85 to 90%.  It is mostly stayed in the 80s.  Got worse again tonight and so her family had her brought in to get checked out.  She has had some cough.  She says she was released in the hospital for a broken hip and back and a collapsed lung.  She has had problems with her current lung disease with COPD and uses chronic oxygen therapy at home.      History provided by:  Patient   used: No    Shortness of Breath  Severity:  Severe  Onset quality:  Gradual  Duration:  1 day  Timing:  Constant  Progression:  Waxing and waning  Chronicity:  Recurrent  Context: not activity, not animal exposure, not emotional upset, not fumes, not known allergens, not occupational exposure, not pollens, not smoke exposure, not strong odors, not URI and not weather changes    Relieved by:  Nothing  Worsened by:  Nothing  Ineffective treatments:  None tried  Associated symptoms: cough    Associated symptoms: no abdominal pain, no chest pain, no claudication, no diaphoresis, no ear pain, no fever, no headaches, no hemoptysis, no neck pain, no PND, no rash, no sore throat, no sputum production, no syncope, no vomiting and no wheezing    Risk factors: no recent alcohol use, no family hx of DVT, no hx of cancer, no hx of PE/DVT, no obesity, no oral contraceptive use, no prolonged immobilization, no recent surgery and no tobacco use        Review of Systems   Constitutional: Negative.  Negative for diaphoresis and fever.   HENT: Negative.  Negative for ear pain and sore throat.    Respiratory: Positive for cough and shortness of breath. Negative for hemoptysis, sputum production and wheezing.    Cardiovascular: Negative.  Negative for chest pain, claudication, syncope and PND.   Gastrointestinal:  Negative.  Negative for abdominal pain and vomiting.   Genitourinary: Negative.    Musculoskeletal: Negative.  Negative for neck pain.   Skin: Negative.  Negative for rash.   Neurological: Negative.  Negative for headaches.   Psychiatric/Behavioral: Negative.    All other systems reviewed and are negative.      Past Medical History:   Diagnosis Date   • Cancer (CMS/HCC), colon    • Chronic respiratory failure (CMS/HCC), 4 L nasal cannula continuously    • COPD (chronic obstructive pulmonary disease) ([facility])    • Restless leg syndrome        Allergies   Allergen Reactions   • Tetracyclines & Related Anaphylaxis   • Penicillins Itching   • Tape Other (See Comments)     Skin tear       Past Surgical History:   Procedure Laterality Date   • APPENDECTOMY     • CARPAL TUNNEL RELEASE Left    • COLON RESECTION     • FOOT SURGERY Bilateral     hammer toe   • FRACTURE SURGERY Left     Arm   • HIP TROCHANTERIC NAILING WITH INTRAMEDULLARY HIP SCREW Left 2021    Procedure: LEFT HIP TROCHANTERIC NAILING SHORT WITH INTRAMEDULLARY HIP SCREW;  Surgeon: Valdemar Chaudhari MD;  Location: [facility];  Service: Orthopedics;  Laterality: Left;   • HYSTERECTOMY     • WRIST FRACTURE SURGERY Right        Family History   Problem Relation Age of Onset   • Colon cancer Mother    • Cancer Mother    • COPD Father    • Diabetes Father    • Heart disease Father    • Cancer Sister    • Diabetes Sister    • Cancer Brother    • Cancer Paternal Grandmother    • Cancer Paternal Grandfather        Social History     Socioeconomic History   • Marital status:    Tobacco Use   • Smoking status: Former Smoker     Packs/day: 1.00     Years: 59.00     Pack years: 59.00     Types: Cigarettes     Start date:      Quit date:      Years since quittin.9   • Smokeless tobacco: Never Used   Vaping Use   • Vaping Use: Never used   Substance and Sexual Activity   • Alcohol use: No   • Drug use: No   • Sexual activity: Never       Prior to  Admission medications    Medication Sig Start Date End Date Taking? Authorizing Provider   acetaminophen (TYLENOL) 325 MG tablet Take 2 tablets by mouth Every 4 (Four) Hours As Needed for Mild Pain . 11/27/21   Sinan Mccall MD   albuterol (PROVENTIL) (2.5 MG/3ML) 0.083% nebulizer solution Take 2.5 mg by nebulization Every 4 (Four) Hours As Needed for Wheezing. 10/26/21   Yasmine Stahl MD   albuterol sulfate  (90 Base) MCG/ACT inhaler Inhale 2 puffs Every 4 (Four) Hours As Needed for Wheezing. 9/29/21   Ari Mckee MD   docusate sodium (Colace) 100 MG capsule Take 1 capsule by mouth 2 (Two) Times a Day. 9/29/21   Ari Mckee MD   famotidine (PEPCID) 20 MG tablet Take 1 tablet by mouth 2 (Two) Times a Day Before Meals. 9/29/21   Ari Mckee MD   FLUoxetine (PROzac) 10 MG capsule Take 1 capsule by mouth Daily. 9/30/21   Ari Mckee MD   Fluticasone-Umeclidin-Vilant (Trelegy Ellipta) 100-62.5-25 MCG/INH inhaler Inhale 1 puff Daily for 90 days. 10/26/21 1/24/22  Yasmine Stahl MD   guaiFENesin (MUCINEX) 600 MG 12 hr tablet Take 1 tablet by mouth 2 (Two) Times a Day. 9/29/21   Ari Mckee MD   lidocaine (LIDODERM) 5 % Place 2 patches on the skin as directed by provider Daily. Remove & Discard patch within 12 hours or as directed by MD 11/27/21   Sinan Mccall MD   loratadine (CLARITIN) 10 MG tablet Take 1 tablet by mouth Daily. 10/26/21   Yasmine Stahl MD   montelukast (Singulair) 10 MG tablet Take 1 tablet by mouth Every Night for 360 days. 9/29/21 9/24/22  Ari Mckee MD   oxyCODONE-acetaminophen (Percocet) 5-325 MG per tablet Take 1 tablet by mouth Every 6 (Six) Hours As Needed for Severe Pain  (severe pain- rib fracture). 11/27/21   Sinan Mccall MD   predniSONE (DELTASONE) 10 MG (48) dose pack Take as directed 11/27/21   Sinan Mccall MD   rOPINIRole (REQUIP) 2 MG tablet Take 1 tablet by mouth 4 (Four) Times a Day Before Meals  & at Bedtime for 30 days. Takes one tablet during the day and 2 tablets and bedtime 11/27/21 12/27/21  Sinan Mccall MD   sodium chloride (Ocean Nasal Spray) 0.65 % nasal spray 1 spray into the nostril(s) as directed by provider As Needed for Congestion. 10/26/21   Yasmine Stahl MD       Medications   sodium chloride 0.9 % flush 10 mL (10 mL Intravenous Given 12/8/21 0335)   furosemide (LASIX) injection 40 mg (40 mg Intravenous Given 12/8/21 0335)       Vitals:    12/08/21 0337   BP: 113/76   Pulse: 112   Resp: 22   Temp: 98 °F (36.7 °C)   SpO2: 90%         Objective   Physical Exam  Vitals and nursing note reviewed.   Constitutional:       Appearance: She is well-developed.   HENT:      Head: Normocephalic and atraumatic.   Eyes:      Extraocular Movements: Extraocular movements intact.      Pupils: Pupils are equal, round, and reactive to light.   Cardiovascular:      Rate and Rhythm: Regular rhythm. Tachycardia present.   Pulmonary:      Effort: Tachypnea, accessory muscle usage and respiratory distress present.      Breath sounds: Examination of the right-middle field reveals decreased breath sounds and wheezing. Examination of the left-middle field reveals decreased breath sounds. Examination of the right-lower field reveals rales. Examination of the left-lower field reveals rales. Decreased breath sounds, wheezing and rales present.   Abdominal:      General: Bowel sounds are normal.      Palpations: Abdomen is soft.   Musculoskeletal:         General: Normal range of motion.      Cervical back: Normal range of motion and neck supple.   Skin:     General: Skin is warm and dry.   Neurological:      General: No focal deficit present.      Mental Status: She is alert and oriented to person, place, and time.   Psychiatric:         Mood and Affect: Mood normal.         Behavior: Behavior normal.         Procedures         Lab Results (last 24 hours)     Procedure Component Value Units Date/Time     COVID PRE-OP / PRE-PROCEDURE SCREENING ORDER (NO ISOLATION) - Swab, Nasal Cavity [[id number]]  (Normal) Collected: 12/07/21 2348    Specimen: Swab from Nasal Cavity Updated: 12/08/21 0044    Narrative:      The following orders were created for panel order COVID PRE-OP / PRE-PROCEDURE SCREENING ORDER (NO ISOLATION) - Swab, Nasal Cavity.  Procedure                               Abnormality         Status                     ---------                               -----------         ------                     COVID-19,[facility] [employer] IN-JIN...[[id number]]  Normal              Final result                 Please view results for these tests on the individual orders.    COVID-19,[facility] [employer] IN-HOUSE,Nasal Swab No Transport Media 3-4 HR TAT - Swab, Nasal Cavity [[id number]]  (Normal) Collected: 12/07/21 2348    Specimen: Swab from Nasal Cavity Updated: 12/08/21 0044     COVID19 Not Detected    Narrative:      Fact sheet for providers: https://www.fda.gov/media/079261/download     Fact sheet for patients: https://www.fda.gov/media/495314/download    Test performed by PCR.    Consider negative results in combination with clinical observations, patient history, and epidemiological information.    CBC & Differential [[id number]]  (Abnormal) Collected: 12/07/21 2349    Specimen: Blood Updated: 12/08/21 0001    Narrative:      The following orders were created for panel order CBC & Differential.  Procedure                               Abnormality         Status                     ---------                               -----------         ------                     CBC Auto Differential[[id number]]        Abnormal            Final result                 Please view results for these tests on the individual orders.    Comprehensive Metabolic Panel [[id number]]  (Abnormal) Collected: 12/07/21 2349    Specimen: Blood Updated: 12/08/21 0019     Glucose 133 mg/dL      BUN 26 mg/dL      Creatinine 0.45 mg/dL      Sodium 143 mmol/L      Potassium  4.4 mmol/L      Chloride 94 mmol/L      CO2 40.0 mmol/L      Calcium 10.0 mg/dL      Total Protein 6.8 g/dL      Albumin 3.40 g/dL      ALT (SGPT) 12 U/L      AST (SGOT) 16 U/L      Alkaline Phosphatase 100 U/L      Total Bilirubin 0.3 mg/dL      eGFR Non African Amer 136 mL/min/1.73      Globulin 3.4 gm/dL      A/G Ratio 1.0 g/dL      BUN/Creatinine Ratio 57.8     Anion Gap 9.0 mmol/L     Narrative:      GFR Normal >60  Chronic Kidney Disease <60  Kidney Failure <15      Blood Culture - Blood, Arm, Left [[id number]] Collected: 12/07/21 2349    Specimen: Blood from Arm, Left Updated: 12/07/21 2358    Lactic Acid, Plasma [[id number]]  (Normal) Collected: 12/07/21 2349    Specimen: Blood Updated: 12/08/21 0016     Lactate 1.4 mmol/L     CBC Auto Differential [[id number]]  (Abnormal) Collected: 12/07/21 2349    Specimen: Blood Updated: 12/08/21 0001     WBC 11.91 10*3/mm3      RBC 4.45 10*6/mm3      Hemoglobin 11.1 g/dL      Hematocrit 40.5 %      MCV 91.0 fL      MCH 24.9 pg      MCHC 27.4 g/dL      RDW 15.4 %      RDW-SD 50.6 fl      MPV 11.6 fL      Platelets 375 10*3/mm3      Neutrophil % 87.8 %      Lymphocyte % 5.8 %      Monocyte % 5.2 %      Eosinophil % 0.3 %      Basophil % 0.3 %      Immature Grans % 0.6 %      Neutrophils, Absolute 10.46 10*3/mm3      Lymphocytes, Absolute 0.69 10*3/mm3      Monocytes, Absolute 0.62 10*3/mm3      Eosinophils, Absolute 0.03 10*3/mm3      Basophils, Absolute 0.04 10*3/mm3      Immature Grans, Absolute 0.07 10*3/mm3      nRBC 0.0 /100 WBC     BNP [[id number]]  (Abnormal) Collected: 12/07/21 2349    Specimen: Blood Updated: 12/08/21 0229     proBNP 8,308.0 pg/mL     Narrative:      Among patients with dyspnea, NT-proBNP is highly sensitive for the detection of acute congestive heart failure. In addition NT-proBNP of <300 pg/ml effectively rules out acute congestive heart failure with 99% negative predictive value.    Results may be falsely decreased if patient taking Biotin.       Blood Culture - Blood, Arm, Right [[id number]] Collected: 12/08/21 0028    Specimen: Blood from Arm, Right Updated: 12/08/21 0033          XR Chest 1 View    (Results Pending)       ED Course  ED Course as of 12/08/21 0412   Wed Dec 08, 2021   0411 Patient has obvious hypoxia and signs of CHF on CXR.  Will admit for further care. [TR]      ED Course User Index  [TR] Curtis Ramirez Jr., MD          MDM  Number of Diagnoses or Management Options  Acute congestive heart failure, unspecified heart failure type (HCC): new and requires workup  Hypoxia: new and requires workup     Amount and/or Complexity of Data Reviewed  Clinical lab tests: ordered and reviewed  Tests in the radiology section of CPT®: ordered and reviewed  Tests in the medicine section of CPT®: ordered and reviewed  Decide to obtain previous medical records or to obtain history from someone other than the patient: yes  Discuss the patient with other providers: yes    Risk of Complications, Morbidity, and/or Mortality  Presenting problems: moderate  Diagnostic procedures: moderate  Management options: moderate    Patient Progress  Patient progress: stable      Final diagnoses:   Hypoxia   Acute congestive heart failure, unspecified heart failure type (HCC)          Curtis Ramirez Jr., MD  12/08/21 0412

## 2021-12-08 NOTE — CONSULTS
PULMONARY & CRITICAL CARE CONSULT - [facility]    21, 07:42 CST  Patient Care Team:  Pato Cooney DO as PCP - General (Family Medicine)  Yasmine Stahl MD as Consulting Physician (Pulmonary Disease)  Name: Heide Newsome  : 1945  MRN: [id number]  Contact Serial Number [id number]    Chief complaint: Respiratory distress    HPI:  We have been consulted by Freeman Quezada DO to see this 76 y.o. female born on 1945.  Patient complains of dyspnea in the entirety of, chest for 2 days. Severity: severe.  Aggravating factors: none.   Alleviating factors: medication(s) (none) Associated symptoms: chest pain and weakness. Sputum is clear and scant.  Patient currently is on oxygen at 15 high flow L/min per nasal cannula.. Respiratory history: COPD and chronic respiratory failure (baseline home oxygen is 4.5 L). Upon arrival to the patient's room her O2 sat is 79% on 15 L. She is tachypneic and appears fatigued. After a few minutes her O2 sat came up to 82% but does not appear to be improving. I have ordered Vapotherm and discussed with JOSE Waldron and the charge nurse. The patient tells me that her O2 sat was 62% when she woke up yesterday morning and she had a difficult time keeping it in the 80s until she came to the hospital yesterday evening. BNP was greater than 8000 yesterday and she received some IV Lasix. She describes chest pressure and weakness. If her respiratory status does not improve this morning, she may need to be moved to the unit for closer observation.    Past Medical History:   has a past medical history of Cancer (CMS/HCC), colon, Chronic respiratory failure (CMS/HCC), 4 L nasal cannula continuously, COPD (chronic obstructive pulmonary disease) ([facility]), and Restless leg syndrome.   has a past surgical history that includes Hysterectomy; Appendectomy; Colectomy; Foot surgery (Bilateral); Wrist fracture surgery (Right); Carpal tunnel release (Left); Fracture  surgery (Left); and Hip Trochanteric Nailing (Left, 8/31/2021).  Allergies   Allergen Reactions   • Tetracyclines & Related Anaphylaxis   • Penicillins Itching   • Tape Other (See Comments)     Skin tear     Medications:  budesonide-formoterol, 2 puff, Inhalation, BID - RT  cetirizine, 10 mg, Oral, Daily  docusate sodium, 100 mg, Oral, BID  enoxaparin, 40 mg, Subcutaneous, Q24H  famotidine, 20 mg, Oral, Daily  FLUoxetine, 10 mg, Oral, Daily  guaiFENesin, 600 mg, Oral, BID  ipratropium-albuterol, 3 mL, Nebulization, Q6H - RT  levoFLOXacin, 500 mg, Oral, Q24H  methylPREDNISolone sodium succinate, 40 mg, Intravenous, BID  montelukast, 10 mg, Oral, Nightly  rOPINIRole, 2 mg, Oral, Nightly  sodium chloride, 10 mL, Intravenous, Q12H         Family History:  Family History   Problem Relation Age of Onset   • Colon cancer Mother    • Cancer Mother    • COPD Father    • Diabetes Father    • Heart disease Father    • Cancer Sister    • Diabetes Sister    • Cancer Brother    • Cancer Paternal Grandmother    • Cancer Paternal Grandfather      Social History:   reports that she quit smoking about 2 years ago. Her smoking use included cigarettes. She started smoking about 61 years ago. She has a 59.00 pack-year smoking history. She has never used smokeless tobacco. She reports that she does not drink alcohol and does not use drugs.    Review of Systems:  Review of Systems   Constitutional: Positive for activity change.   HENT: Negative.    Respiratory: Positive for cough, chest tightness and shortness of breath.    Cardiovascular: Negative.  Negative for leg swelling.   Gastrointestinal: Negative.    Skin: Negative.    Neurological: Positive for weakness.   Psychiatric/Behavioral: Negative.       Physical Exam:  Temp:  [97.8 °F (36.6 °C)-98.2 °F (36.8 °C)] 98.2 °F (36.8 °C)  Heart Rate:  [108-122] 119  Resp:  [18-24] 18  BP: (100-129)/(57-98) 129/77    Intake/Output Summary (Last 24 hours) at 12/8/2021 0742  Last data filed at  12/8/2021 0723  Gross per 24 hour   Intake --   Output 300 ml   Net -300 ml         12/07/21  2336 12/08/21  0438   Weight: 57.2 kg (126 lb) 21.9 kg (48 lb 4.8 oz)     SpO2 Percentage    12/08/21 0651 12/08/21 0713 12/08/21 0723   SpO2: (!) 89% 90% 92%     Physical Exam  Vitals and nursing note reviewed.   Constitutional:       General: She is in acute distress.      Appearance: She is well-developed.      Comments: Frail-appearing   HENT:      Head: Normocephalic and atraumatic.   Eyes:      Pupils: Pupils are equal, round, and reactive to light.   Cardiovascular:      Rate and Rhythm: Normal rate and regular rhythm.   Pulmonary:      Effort: Tachypnea and respiratory distress (Mild) present.      Breath sounds: Decreased breath sounds present.   Abdominal:      General: Bowel sounds are normal. There is no distension.      Palpations: Abdomen is soft.   Musculoskeletal:         General: Normal range of motion.      Cervical back: Normal range of motion and neck supple.      Right lower leg: No edema.      Left lower leg: No edema.   Skin:     General: Skin is warm and dry.   Neurological:      Mental Status: She is alert and oriented to person, place, and time.      Motor: Weakness present.       Results from last 7 days   Lab Units 12/08/21  0445 12/07/21  2349   WBC 10*3/mm3 9.64 11.91*   HEMOGLOBIN g/dL 11.2* 11.1*   PLATELETS 10*3/mm3 361 375     Results from last 7 days   Lab Units 12/08/21  0445 12/07/21  2349   SODIUM mmol/L 142 143   POTASSIUM mmol/L 4.4 4.4   CO2 mmol/L 39.0* 40.0*   BUN mg/dL 25* 26*   CREATININE mg/dL 0.49* 0.45*   GLUCOSE mg/dL 146* 133*         Lab Results   Component Value Date    PROBNP 8,308.0 (H) 12/07/2021     No results found for: BLOODCX, URINECX, WOUNDCX, MRSACX, RESPCX, STOOLCX  Recent radiology:   Imaging Results (Last 72 Hours)     Procedure Component Value Units Date/Time    XR Chest 1 View [[id number]] Collected: 12/08/21 0718     Updated: 12/08/21 0724    Narrative:       EXAMINATION: Chest 1 view 12/7/2021     HISTORY: Shortness of breath.     FINDINGS: Today's exam is compared to previous study of 11/26/2021.  Upright frontal projection of chest demonstrates emphysematous changes  lungs with hyperinflation lung parenchyma. There is enlargement of the  main pulmonary artery segment and pulmonary arteries suggesting  pulmonary arterial hypertension. Superimposed upon these emphysematous  changes are increased interstitial markings and cardiomegaly.  Radiographic findings would suggest this represents superimposed  congestive failure with mild interstitial edema. Small effusions are  present.       Impression:      1.. Emphysematous changes of the lungs. There is suspected pulmonary  arterial hypertension.  2. Superimposed cardiomegaly with increased interstitial markings and  Kerley B line suggesting pulmonary venous hypertension with pulmonary  edema. Small effusions are present.  This report was finalized on 12/08/2021 07:21 by Dr. Brennon Lucas MD.        My radiograph interpretation/independent review of imaging: Chronic lung changes, cardiomegaly, pulmonary edema    Other test results (not lab or imaging): Results for orders placed during the hospital encounter of 08/31/21    Adult Transthoracic Echo Complete W/ Cont if Necessary Per Protocol    Interpretation Summary  · Left ventricular ejection fraction appears to be greater than 70%. Left ventricular systolic function is hyperdynamic (EF > 70%).  · Left ventricular diastolic function was indeterminate.  · The right ventricular cavity is mildly dilated.  · The right atrial cavity is severely dilated.  · There is mild calcification of the aortic valve mainly affecting the non-coronary cusp(s).  · There is mitral valve prolapse of the anterior mitral leaflet. Trace mitral valve regurgitation is present.  · Moderate to severe pulmonary hypertension is present.  · There is mild pulmonic valve regurgitation present.        Problem List as identified by Epic (may contain historical, inactive problems)  Patient Active Problem List   Diagnosis   • Pulmonary emphysema (HCC)   • Acute on chronic respiratory failure with hypoxia and hypercapnia (HCC)   • COPD, very severe (HCC)   • Severe malnutrition (HCC)   • History of colon cancer   • Thrombocytopenia (HCC)   • Anemia   • Hyperglycemia, drug-induced   • Pneumonia   • Chronic respiratory failure with hypoxia (HCC)   • COPD with acute exacerbation (HCC)   • Adrenal nodule (HCC)   • Closed left hip fracture (HCC)   • Fall   • Acute pain of left hip   • Moderate malnutrition (HCC)   • Acute respiratory failure with hypoxia and hypercapnia (HCC)   • Chronic respiratory failure (HCC)   • COPD exacerbation (HCC)   • Chronic respiratory failure with hypoxia and hypercapnia (HCC)   • Compression fracture of T12 vertebra (HCC)   • NICK (obstructive sleep apnea)   • Restless legs syndrome (RLS)   • Former smoker   • Pleural effusion   • Dependence on supplemental oxygen   • Closed fracture of one rib of left side   • Hypoxia   • Acute-on-chronic respiratory failure (HCC)     Pulmonary Assessment:  New problem (to me), with additional workup planned: Acute on chronic hypercapnic and hypoxemic respiratory failure      Other problems either stable, failing to improve or worsenin. Stage IV COPD  2. Obstructive sleep apnea  3. Severe pulmonary hypertension  4. Pulmonary edema  5. Former smoker    Recommend/plan:   · Transition to Vapotherm 40 L 100% now to keep sat 90 to 94%.  Discussed with JOSE Waldron.  The patient's room is all the way at the end of the rodrigez and she will need close observation.  If respiratory status worsens, would recommend moving her to the unit.  · Give an additional 20 mg of IV Lasix today.  · Follow-up chest x-ray tomorrow.  · Continue Solu-Medrol but increase to 40 mg every 8 hours.  Will decrease as tolerated.  · Continue bronchodilator treatments with Symbicort and  duo nebs  · Continue Mucinex twice daily  · Encourage incentive spirometer  · DVT prophylaxis: Lovenox  · Stress ulcer prophylaxis: Pepcid  · Antibiotics per attending: Levaquin  · Further recommendations per Dr. Stahl.    Thank you for this consult.  We will follow along.  Electronically signed by JESSENIA Alcantara, 12/08/21, 7:42 AM CST.    ATTESTATION OF CLINICAL NOTE:  I have reviewed the notes, assessments, and/or procedures performed by JESSENIA Alcantara, I concur with her/his documentation of Heide Newsome.  Patient was seen as a new pulmonary consult today.  She is already known to me from prior hospitalizations and she also follows up with me as outpatient.    Patient is 76 years old  female with known history of severe chronic obstructive pulmonary disease and chronic respiratory failure on home oxygen.  She also had a trilogy at home but did not use it regularly and stopped using it after using it only for few days because she felt it was not working.  She presented to the hospital yesterday with increasing shortness of breath.  She was brought by EMS and was on 5 L oxygen on arrival with severe hypoxia and later she needed up to 50 L of oxygen.  She started treatment for COPD exacerbation and also getting Levaquin for possible underlying pneumonia respiratory infection.  She is getting Symbicort and DuoNeb.  This morning due to increasing oxygen requirement she was switched to Vapotherm at 40 L flow and 100% FiO2 and her oxygen saturation varies between 82 high 90s.  Apparently according to the RN patient is oxygen saturation deteriorates rapidly on slightest movement but when she is sleeping she is oxygen saturation 97 to 100%.  Patient also has anxiety issues.  She was noted to have a component of heart failure with bilateral pulmonary infiltrate and her BNP was elevated on admission and she received Lasix which made her breathing little easier but she is still  short of breath on minimal exertion.  She is a former smoker and quit smoking a few years ago.      On physical examination patient is a elderly  female resting comfortably in bed but gets short of breath on minimal exertion.  HEENT: Atraumatic normocephalic.  Neck: Supple no mass no jugular venous distention no lymphadenopathy.  Heart: Sounds normal rate and rhythm regular no murmur.  Lungs: Bibasilar crackles and decreased breath sounds and diffuse wheezes.  Abdomen: Soft nondistended nontender upper extremities: No edema normal pulses normal color.  Neurologic: No focal deficit generalized weakness present.  Skin: No breakdown.  Psych: Patient seems anxious.    Continue current treatment for acute COPD exacerbation with IV steroids and she will be continued on Symbicort and DuoNeb.  She is getting levofloxacin which could be deescalated because there is no definite evidence of pneumonia noted.  Her oxygen requirement remains high and she is currently requiring 40 L flow 100% FiO2 on Vapotherm.  If her respiratory status deteriorates she may need BiPAP or intubation mechanical ventilation.  Patient was DNR in the past but currently she is a full code to my knowledge.  However due to her anxiety I doubt patient will tolerate BiPAP.  She had a home trilogy/BiPAP which she was not using regularly and she claimed it is not working and stopped functioning and oxygen saturation dropped at home.  She got around her device from [employer] and will check with the [employer] for her compliance.  She will be a good candidate for Trelegy Ellipta as a long-term bronchodilator treatment.  She will need incentive spirometry to improve pulmonary compliance.  Continue pain and anxiety control.  DVT and ulcer prophylaxis reviewed.  Physical activity as tolerated.  Nutritional support.  She will continue follow-up visit with me as outpatient in pulmonary clinic.  CODE STATUS full.  Overall prognosis guarded.  Pulmonary  team will continue following her and make further commendations.  We appreciate the consult like to thank Dr. Quezada for the referral.  Total time spent in seeing this patient as inpatient pulmonary consult was 45 minutes.    I have seen and examined patient personally, performing a face-to-face diagnostic evaluation with plan of care reviewed and developed with APRN and nursing staff. I have addended and/or modified the above history of present illness, physical examination, and assessment and plan to reflect my findings and impressions. Essential elements of the care plan were discussed with APRN above.  Agree with findings and assessment/plan as documented above.    Yasmine Stahl MD  Pulmonologist/Intensivist  12/8/2021 11:59 CST

## 2021-12-08 NOTE — PROGRESS NOTES
Malnutrition Severity Assessment    Patient Name:  Heide Newsome  YOB: 1945  MRN: [id number]  Admit Date:  12/7/2021    Patient meets criteria for : Severe Malnutrition (secondary signs of malnutrition: pale skin tone, sacral spine wound, decreased skin turgor)        Malnutrition Severity Assessment  Malnutrition Type: Chronic Disease - Related Malnutrition  Malnutrition Type (last 8 hours)     Malnutrition Severity Assessment     Row Name 12/08/21 1747       Malnutrition Severity Assessment    Malnutrition Type Chronic Disease - Related Malnutrition    Row Name 12/08/21 1747       Insufficient Energy Intake     Insufficient Energy Intake Findings Severe    Insufficient Energy Intake  <50% of est. energy requirement for >or equal to 1 month    Row Name 12/08/21 1747       Muscle Loss    Loss of Muscle Mass Findings Severe    Porter Region Severe - deep hollowing/scooping, lack of muscle to touch, facial bones well defined    Clavicle Bone Region Severe - protruding prominent bone    Acromion Bone Region Severe - squared shoulders, bones, and acromion process protrusion prominent    Scapular Bone Region Severe - prominent bones, depressions easily visible between ribs, scapula, spine, shoulders    Dorsal Hand Region Severe - prominent depression    Row Name 12/08/21 1747       Fat Loss    Subcutaneous Fat Loss Findings Severe    Orbital Region  Severe - pronounced hollowness/depression, dark circles, loose saggy skin    Thoracic & Lumbar Region Severe - ribs visible with prominent depressions, iliac crest very prominent    Row Name 12/08/21 1747       Declining Functional Status    Declining Functional Status Findings Measurably Reduced    Row Name 12/08/21 1747       Criteria Met (Must meet criteria for severity in at least 2 of these categories: M Wasting, Fat Loss, Fluid, Secondary Signs, Wt. Status, Intake)    Patient meets criteria for  Severe Malnutrition  secondary signs of malnutrition:  pale skin tone, sacral spine wound, decreased skin turgor                Electronically signed by:  Kirsten Zhong RDN, AMI  12/08/21 17:51 CST

## 2021-12-08 NOTE — OUTREACH NOTE
Medical Week 3 Survey      Responses   [facility] patient discharged from? [place]   Does the patient have one of the following disease processes/diagnoses(primary or secondary)? Other   Week 3 attempt successful? No   Revoke Readmitted          Nubia Mackenzie RN

## 2021-12-09 NOTE — PLAN OF CARE
Goal Outcome Evaluation:  Plan of Care Reviewed With: patient           Outcome Summary: Patient is alert and oriented times 4, Continues on vapotherm, 30/95 sats in the mis 90's desats with any excertion, denies any pain or discomfort, safety maintained, will continue to monitor.

## 2021-12-09 NOTE — PROGRESS NOTES
PULMONARY AND CRITICAL CARE PROGRESS NOTE - [facility]    Patient: Heide Newsome  1945   MR# [id number]   Acct# [id number]  12/09/21   09:42 CST  Referring Provider: Freeman Quezada DO    Chief Complaint: Acute on chronic hypercapnic and hypoxemic respiratory failure    Interval history: She is on Vapotherm 30 L 95% with a resting O2 sat of 92%.  When she is still and not talking, her O2 sats will go up to 95 to 96%.  I decreased the Vapotherm to 80%.  She looks and feels better today.  She reports that she slept good last night.  No overnight events reported by the patient or nursing.    Meds:  budesonide-formoterol, 2 puff, Inhalation, BID - RT  cetirizine, 10 mg, Oral, Daily  docusate sodium, 100 mg, Oral, BID  enoxaparin, 40 mg, Subcutaneous, Q24H  famotidine, 20 mg, Oral, Daily  FLUoxetine, 10 mg, Oral, Daily  guaiFENesin, 600 mg, Oral, BID  ipratropium-albuterol, 3 mL, Nebulization, Q6H - RT  levoFLOXacin, 500 mg, Oral, Q24H  methylPREDNISolone sodium succinate, 40 mg, Intravenous, Q8H  montelukast, 10 mg, Oral, Nightly  rOPINIRole, 2 mg, Oral, Nightly  sodium chloride, 10 mL, Intravenous, Q12H         Review of Systems:   Review of Systems   HENT: Negative.    Respiratory: Positive for shortness of breath.    Cardiovascular: Negative.    Gastrointestinal: Negative.    Skin: Negative.    Neurological: Positive for weakness.   Psychiatric/Behavioral: Negative.      Physical Exam:  SpO2 Percentage    12/09/21 0628 12/09/21 0634 12/09/21 0740   SpO2: 95% 98% 96%     Temp:  [98 °F (36.7 °C)-98.5 °F (36.9 °C)] 98.1 °F (36.7 °C)  Heart Rate:  [] 113  Resp:  [16-22] 18  BP: (103-125)/(66-72) 103/72    Intake/Output Summary (Last 24 hours) at 12/9/2021 0942  Last data filed at 12/9/2021 0020  Gross per 24 hour   Intake --   Output 500 ml   Net -500 ml     Physical Exam  Vitals and nursing note reviewed.   Constitutional:       Appearance: She is well-developed.      Comments:  Vapotherm in place   HENT:      Head: Normocephalic and atraumatic.   Eyes:      Pupils: Pupils are equal, round, and reactive to light.   Cardiovascular:      Rate and Rhythm: Regular rhythm. Tachycardia present.   Pulmonary:      Effort: No tachypnea or respiratory distress.      Breath sounds: Decreased breath sounds present.   Abdominal:      General: Bowel sounds are normal. There is no distension.      Palpations: Abdomen is soft.   Musculoskeletal:         General: Normal range of motion.      Cervical back: Normal range of motion and neck supple.   Skin:     General: Skin is warm and dry.   Neurological:      Mental Status: She is alert and oriented to person, place, and time.      Motor: Weakness present.       Laboratory Data:  Results from last 7 days   Lab Units 12/09/21  0618 12/08/21  0445 12/07/21  2349   WBC 10*3/mm3 10.24 9.64 11.91*   HEMOGLOBIN g/dL 10.3* 11.2* 11.1*   PLATELETS 10*3/mm3 305 361 375     Results from last 7 days   Lab Units 12/09/21  0618 12/08/21  0445 12/07/21  2349   SODIUM mmol/L 140 142 143   POTASSIUM mmol/L 4.4 4.4 4.4   BUN mg/dL 26* 25* 26*   CREATININE mg/dL 0.53* 0.49* 0.45*         Blood Culture   Date Value Ref Range Status   12/08/2021 No growth at 24 hours  Preliminary   12/07/2021 No growth at 24 hours  Preliminary     Recent films:  XR Chest 1 View    Result Date: 12/8/2021  1.. Emphysematous changes of the lungs. There is suspected pulmonary arterial hypertension. 2. Superimposed cardiomegaly with increased interstitial markings and Kerley B line suggesting pulmonary venous hypertension with pulmonary edema. Small effusions are present. This report was finalized on 12/08/2021 07:21 by Dr. Brennon Lucas MD.    Films reviewed personally by me.  My interpretation: Awaiting chest x-ray for today 12/9/2021    Pulmonary Assessment:  1. Acute on chronic hypercapnic and hypoxemic respiratory failure  2. Stage IV COPD  3. Emphysema  4. Obstructive sleep apnea,  noncompliant with PAP device  5. Severe pulmonary hypertension  6. Pulmonary edema  7. Former smoker    Recommend:   · Continue Vapotherm to keep O2 sats 90 to 94%.  Vapotherm decreased to 30 L 80%.  Titrate up or down as needed.  · Awaiting follow-up chest x-ray that was ordered to be done today  · Continue Solu-Medrol 40 mg every 8 hours.  Will plan to taper down tomorrow if tolerated.  · Continue bronchodilator treatments with Symbicort and DuoNebs.  · Continue Mucinex twice daily  · Encourage incentive spirometer  · DVT prophylaxis: Lovenox  · Stress ulcer prophylaxis: Pepcid  · Antibiotics per attending: Levaquin day #2 of 5  · Palliative care consult is pending    Electronically signed by JESSENIA Alcantara, 12/09/21, 09:42 CST     ATTESTATION OF CLINICAL NOTE:  I have reviewed the notes, assessments, and/or procedures performed by JESSENIA Alcantara, I concur with her/his documentation of Heide Neswome.  Patient was seen in the follow-up visit in pulmonary rounds in recall for today.    Still getting short of breath on minimal exertion has oxygen desaturation and her Vapotherm has been titrated down to 30 L and 80% with oxygen saturation at 92%.  Patient has some anxiety issues.  She had a home BiPAP or trilogy which she was not using and was noncompliant.  She is afebrile and did not of any acute events overnight.  Is currently getting IV Solu-Medrol and also getting Symbicort and DuoNeb for COPD exacerbation.  She is getting empiric levofloxacin treatment.    On physical examination patient is a elderly  female resting comfortably in bed but gets short of breath on minimal exertion.  HEENT: Atraumatic normocephalic.  Neck: Supple no mass no jugular venous distention no lymphadenopathy.  Heart: Sounds normal rate and rhythm regular no murmur.  Lungs: Bibasilar crackles and decreased breath sounds and diffuse wheezes.  Abdomen: Soft nondistended nontender upper extremities:  No edema normal pulses normal color.  Neurologic: No focal deficit generalized weakness present.  Skin: No breakdown.  Psych: Patient seems anxious.    Continue Vapotherm and titrate to keep oxygen saturation more than 92%.  Transitioned off down to the high flow nasal cannula.  Patient is normally on 3 to 4 L oxygen at home.  She had a home trilogy or BiPAP which she was not using and we need to work on that again.  She will definitely benefit from noninvasive positive pressure ventilation due to her underlying severe COPD respiratory failure.  Continue routine respiratory care encourage incentive spirometry and increase mobility as tolerated.  Pain and anxiety control reviewed.  DVT and ulcer prophylaxis.  Patient is on Solu-Medrol which needs to be tapered down which may worsen anxiety.  Patient will need nutritional support and physical activity as tolerated.  Repeat labs and imaging stress from time to time.  She will need outpatient follow-up with me in the pulmonary clinic after discharge and as mentioned we will work with the DME company to find out her complaints of home trilogy and will try to put her back on it if needed.  CODE STATUS: Full code overall prognosis: Guarded.  Pulmonary team will continue following her and make further recommendations.    I have seen and examined patient personally, performing a face-to-face diagnostic evaluation with plan of care reviewed and developed with APRN and nursing staff. I have addended and/or modified the above history of present illness, physical examination, and assessment and plan to reflect my findings and impressions. Essential elements of the care plan were discussed with APRN above.  Agree with findings and assessment/plan as documented above.    Yasmine Stahl MD  Pulmonologist/Intensivist  12/9/2021 18:21 CST

## 2021-12-09 NOTE — PROGRESS NOTES
[facility] Medicine Services  INPATIENT PROGRESS NOTE    Length of Stay: 1  Date of Admission: 12/7/2021  Primary Care Physician: Pato Cooney DO    Subjective   Chief Complaint: Follow-up shortness of breath  HPI   Patient resting in bed.  She states she feels rough today, but does feel better in comparison to admission.  She does seem dyspneic with conversation.  She typically wears between 4-1/2 and 5 L of oxygen at home continuously, currently on Vapotherm at 30 L/min and 80% FiO2.  She states she has not been coughing much since yesterday, cough was previously productive of clear sputum.  She has complaint of bilateral chest pain in her ribs from known fractures.  She denies abdominal pain, nausea, vomiting, or diarrhea.    Review of Systems   All pertinent negatives and positives are as above. All other systems have been reviewed and are negative unless otherwise stated.     Objective    Temp:  [97.6 °F (36.4 °C)-98.5 °F (36.9 °C)] 97.6 °F (36.4 °C)  Heart Rate:  [] 113  Resp:  [16-22] 16  BP: (103-125)/(64-72) 112/64  Physical Exam  Vitals and nursing note reviewed.   Constitutional:       Appearance: She is ill-appearing. She is not toxic-appearing.   HENT:      Head: Normocephalic and atraumatic.   Cardiovascular:      Rate and Rhythm: Regular rhythm. Tachycardia present.      Heart sounds: Normal heart sounds.      Comments: Sinus tach 102-118  Pulmonary:      Breath sounds: No wheezing or rhonchi.      Comments: Tachypneic with conversation.  Accessory muscle usage.  Grossly diminished throughout.  Abdominal:      General: Bowel sounds are normal. There is no distension.      Palpations: Abdomen is soft.      Tenderness: There is no abdominal tenderness.   Musculoskeletal:         General: No swelling or tenderness. Normal range of motion.      Cervical back: Normal range of motion and neck supple. No tenderness.   Skin:     General: Skin is warm and dry.       Findings: No erythema or rash.   Neurological:      General: No focal deficit present.      Mental Status: She is alert and oriented to person, place, and time.   Psychiatric:         Mood and Affect: Mood normal.         Behavior: Behavior normal.         Thought Content: Thought content normal.         Judgment: Judgment normal.     Results Review:  I have reviewed the labs, radiology results, and diagnostic studies.    Laboratory Data:   Results from last 7 days   Lab Units 12/09/21  0618 12/08/21 0445 12/07/21  2349   WBC 10*3/mm3 10.24 9.64 11.91*   HEMOGLOBIN g/dL 10.3* 11.2* 11.1*   HEMATOCRIT % 37.2 41.5 40.5   PLATELETS 10*3/mm3 305 361 375     Results from last 7 days   Lab Units 12/09/21  0618 12/08/21 0445 12/07/21  2349   SODIUM mmol/L 140 142 143   POTASSIUM mmol/L 4.4 4.4 4.4   CHLORIDE mmol/L 90* 93* 94*   CO2 mmol/L 47.0* 39.0* 40.0*   BUN mg/dL 26* 25* 26*   CREATININE mg/dL 0.53* 0.49* 0.45*   CALCIUM mg/dL 9.5 9.9 10.0   BILIRUBIN mg/dL  --   --  0.3   ALK PHOS U/L  --   --  100   ALT (SGPT) U/L  --   --  12   AST (SGOT) U/L  --   --  16   GLUCOSE mg/dL 138* 146* 133*       Intake/Output Summary (Last 24 hours) at 12/9/2021 1152  Last data filed at 12/9/2021 0900  Gross per 24 hour   Intake 240 ml   Output 500 ml   Net -260 ml     I have reviewed the patient current medications.     Assessment/Plan     Active Hospital Problems    Diagnosis    • **Acute on chronic respiratory failure with hypoxia and hypercapnia (HCC)    • Acute-on-chronic respiratory failure (HCC)    • NICK (obstructive sleep apnea)    • Pulmonary emphysema (HCC)    • COPD, very severe (HCC)    • Severe malnutrition (HCC)      Plan:  1.  Patient presented to the emergency department on 12/7/2021 with complaints of shortness of breath.  She typically wears oxygen at 4-1/2 to 5 L at home continuously.  She was recently admitted to our facility from 11/23/2021 through 11/27/2021 with acute on chronic respiratory failure with  hypoxia and hypercapnia.  She was discharged with a trilogy device.  She apparently did not feel this was working correctly and then stopped using this at home.  2.  The patient recently had a CTA of the chest during her last admission which showed no evidence of pulmonary embolus.  Her chest x-ray on this admission showed emphysematous changes and superimposed cardiomegaly with increased interstitial markings and Kerley B-lines suggesting pulmonary edema.  Small pleural effusions present.  Chest x-ray has been repeated today, awaiting radiologist interpretation.  Patient was given a total of 60 mg of IV Lasix yesterday without significant output.  Feel that she may benefit from further diuresis, will see how she responds today to Bumex.  Her last echocardiogram in September showed moderate to severe pulmonary hypertension, severe dilation of the right atrial cavity with hyperdynamic ejection fraction.  Diastolic function indeterminate.  Likely has cor pulmonale secondary to pulmonary hypertension.  3.  Appreciate pulmonology assistance.  Continue DuoNebs, Mucinex, Symbicort.  IV Solu-Medrol taper as per pulmonology service.  Continue oral Levaquin.  Encourage use of incentive spirometer.  Wean supplemental oxygen as tolerated.  May need to see if her family can bring in trilogy device so that it can be serviced by medical equipment company.  4.  Palliative care consulted to discuss goals of care given numerous recent admissions and declining overall status.  5.  Labs in AM  6.  Will consult PT/OT once more appropriate from a respiratory standpoint.    Discharge Planning: I expect the patient to be discharged to ? in ? days.    Electronically signed by JESSENIA Paniagua, 12/9/2021, 11:40 CST.

## 2021-12-09 NOTE — CONSULTS
PULMONARY & CRITICAL CARE CONSULT - [facility]    21, 07:24 CST  Patient Care Team:  Pato Cooney DO as PCP - General (Family Medicine)  Yasmine Stahl MD as Consulting Physician (Pulmonary Disease)  Name: Heide Newsome  : 1945  MRN: [id number]  Contact Serial Number [id number]    Chief complaint: Respiratory distress    HPI:  We have been consulted by Freeman Quezada DO to see this 76 y.o. female born on 1945.  Patient complains of dyspnea in the entirety of, chest for 2 days. Severity: severe.  Aggravating factors: none.   Alleviating factors: medication(s) (none) Associated symptoms: chest pain and weakness. Sputum is clear and scant.  Patient currently is on oxygen at 15 high flow L/min per nasal cannula.. Respiratory history: COPD and chronic respiratory failure (baseline home oxygen is 4.5 L). Upon arrival to the patient's room her O2 sat is 79% on 15 L. She is tachypneic and appears fatigued. After a few minutes her O2 sat came up to 82% but does not appear to be improving. I have ordered Vapotherm and discussed with JOSE Waldron and the charge nurse. The patient tells me that her O2 sat was 62% when she woke up yesterday morning and she had a difficult time keeping it in the 80s until she came to the hospital yesterday evening. BNP was greater than 8000 yesterday and she received some IV Lasix. She describes chest pressure and weakness. If her respiratory status does not improve this morning, she may need to be moved to the unit for closer observation.    Past Medical History:   has a past medical history of Cancer (CMS/HCC), colon, Chronic respiratory failure (CMS/HCC), 4 L nasal cannula continuously, COPD (chronic obstructive pulmonary disease) ([facility]), and Restless leg syndrome.   has a past surgical history that includes Hysterectomy; Appendectomy; Colectomy; Foot surgery (Bilateral); Wrist fracture surgery (Right); Carpal tunnel release (Left); Fracture  surgery (Left); and Hip Trochanteric Nailing (Left, 8/31/2021).  Allergies   Allergen Reactions   • Tetracyclines & Related Anaphylaxis   • Penicillins Itching   • Tape Other (See Comments)     Skin tear     Medications:  budesonide-formoterol, 2 puff, Inhalation, BID - RT  cetirizine, 10 mg, Oral, Daily  docusate sodium, 100 mg, Oral, BID  enoxaparin, 40 mg, Subcutaneous, Q24H  famotidine, 20 mg, Oral, Daily  FLUoxetine, 10 mg, Oral, Daily  guaiFENesin, 600 mg, Oral, BID  ipratropium-albuterol, 3 mL, Nebulization, Q6H - RT  levoFLOXacin, 500 mg, Oral, Q24H  methylPREDNISolone sodium succinate, 40 mg, Intravenous, Q8H  montelukast, 10 mg, Oral, Nightly  rOPINIRole, 2 mg, Oral, Nightly  sodium chloride, 10 mL, Intravenous, Q12H         Family History:  Family History   Problem Relation Age of Onset   • Colon cancer Mother    • Cancer Mother    • COPD Father    • Diabetes Father    • Heart disease Father    • Cancer Sister    • Diabetes Sister    • Cancer Brother    • Cancer Paternal Grandmother    • Cancer Paternal Grandfather      Social History:   reports that she quit smoking about 2 years ago. Her smoking use included cigarettes. She started smoking about 61 years ago. She has a 59.00 pack-year smoking history. She has never used smokeless tobacco. She reports that she does not drink alcohol and does not use drugs.    Review of Systems:  Review of Systems   Constitutional: Positive for activity change.   HENT: Negative.    Respiratory: Positive for cough, chest tightness and shortness of breath.    Cardiovascular: Negative.  Negative for leg swelling.   Gastrointestinal: Negative.    Skin: Negative.    Neurological: Positive for weakness.   Psychiatric/Behavioral: Negative.       Physical Exam:  Temp:  [98 °F (36.7 °C)-98.5 °F (36.9 °C)] 98 °F (36.7 °C)  Heart Rate:  [] 101  Resp:  [16-22] 16  BP: (112-125)/(66-72) 119/67    Intake/Output Summary (Last 24 hours) at 12/9/2021 1856  Last data filed at  12/9/2021 0020  Gross per 24 hour   Intake --   Output 500 ml   Net -500 ml         12/07/21  2336 12/08/21  0438 12/08/21  0900   Weight: 57.2 kg (126 lb) 21.9 kg (48 lb 4.8 oz) 48.3 kg (106 lb 7.7 oz)     SpO2 Percentage    12/09/21 0513 12/09/21 0628 12/09/21 0634   SpO2: 100% 95% 98%     Physical Exam  Vitals and nursing note reviewed.   Constitutional:       General: She is in acute distress.      Appearance: She is well-developed.      Comments: Frail-appearing   HENT:      Head: Normocephalic and atraumatic.   Eyes:      Pupils: Pupils are equal, round, and reactive to light.   Cardiovascular:      Rate and Rhythm: Normal rate and regular rhythm.   Pulmonary:      Effort: Tachypnea and respiratory distress (Mild) present.      Breath sounds: Decreased breath sounds present.   Abdominal:      General: Bowel sounds are normal. There is no distension.      Palpations: Abdomen is soft.   Musculoskeletal:         General: Normal range of motion.      Cervical back: Normal range of motion and neck supple.      Right lower leg: No edema.      Left lower leg: No edema.   Skin:     General: Skin is warm and dry.   Neurological:      Mental Status: She is alert and oriented to person, place, and time.      Motor: Weakness present.       Results from last 7 days   Lab Units 12/09/21  0618 12/08/21  0445 12/07/21  2349   WBC 10*3/mm3 10.24 9.64 11.91*   HEMOGLOBIN g/dL 10.3* 11.2* 11.1*   PLATELETS 10*3/mm3 305 361 375     Results from last 7 days   Lab Units 12/08/21  0445 12/07/21  2349   SODIUM mmol/L 142 143   POTASSIUM mmol/L 4.4 4.4   CO2 mmol/L 39.0* 40.0*   BUN mg/dL 25* 26*   CREATININE mg/dL 0.49* 0.45*   GLUCOSE mg/dL 146* 133*         Lab Results   Component Value Date    PROBNP 8,308.0 (H) 12/07/2021     No results found for: BLOODCX, URINECX, WOUNDCX, MRSACX, RESPCX, STOOLCX  Recent radiology:   Imaging Results (Last 72 Hours)     Procedure Component Value Units Date/Time    XR Chest 1 View [[id number]]  Collected: 12/08/21 0718     Updated: 12/08/21 0724    Narrative:      EXAMINATION: Chest 1 view 12/7/2021     HISTORY: Shortness of breath.     FINDINGS: Today's exam is compared to previous study of 11/26/2021.  Upright frontal projection of chest demonstrates emphysematous changes  lungs with hyperinflation lung parenchyma. There is enlargement of the  main pulmonary artery segment and pulmonary arteries suggesting  pulmonary arterial hypertension. Superimposed upon these emphysematous  changes are increased interstitial markings and cardiomegaly.  Radiographic findings would suggest this represents superimposed  congestive failure with mild interstitial edema. Small effusions are  present.       Impression:      1.. Emphysematous changes of the lungs. There is suspected pulmonary  arterial hypertension.  2. Superimposed cardiomegaly with increased interstitial markings and  Kerley B line suggesting pulmonary venous hypertension with pulmonary  edema. Small effusions are present.  This report was finalized on 12/08/2021 07:21 by Dr. Brennon Lucas MD.        My radiograph interpretation/independent review of imaging: Chronic lung changes, cardiomegaly, pulmonary edema    Other test results (not lab or imaging): Results for orders placed during the hospital encounter of 08/31/21    Adult Transthoracic Echo Complete W/ Cont if Necessary Per Protocol    Interpretation Summary  · Left ventricular ejection fraction appears to be greater than 70%. Left ventricular systolic function is hyperdynamic (EF > 70%).  · Left ventricular diastolic function was indeterminate.  · The right ventricular cavity is mildly dilated.  · The right atrial cavity is severely dilated.  · There is mild calcification of the aortic valve mainly affecting the non-coronary cusp(s).  · There is mitral valve prolapse of the anterior mitral leaflet. Trace mitral valve regurgitation is present.  · Moderate to severe pulmonary hypertension is  present.  · There is mild pulmonic valve regurgitation present.       Problem List as identified by Epic (may contain historical, inactive problems)  Patient Active Problem List   Diagnosis   • Pulmonary emphysema (HCC)   • Acute on chronic respiratory failure with hypoxia and hypercapnia (HCC)   • COPD, very severe (HCC)   • Severe malnutrition (HCC)   • History of colon cancer   • Thrombocytopenia (HCC)   • Anemia   • Hyperglycemia, drug-induced   • Pneumonia   • Chronic respiratory failure with hypoxia (HCC)   • COPD with acute exacerbation (HCC)   • Adrenal nodule (HCC)   • Closed left hip fracture (HCC)   • Fall   • Acute pain of left hip   • Moderate malnutrition (HCC)   • Acute respiratory failure with hypoxia and hypercapnia (HCC)   • Chronic respiratory failure (HCC)   • COPD exacerbation (HCC)   • Chronic respiratory failure with hypoxia and hypercapnia (HCC)   • Compression fracture of T12 vertebra (HCC)   • NICK (obstructive sleep apnea)   • Restless legs syndrome (RLS)   • Former smoker   • Pleural effusion   • Dependence on supplemental oxygen   • Closed fracture of one rib of left side   • Hypoxia   • Acute-on-chronic respiratory failure (HCC)     Pulmonary Assessment:  New problem (to me), with additional workup planned: Acute on chronic hypercapnic and hypoxemic respiratory failure      Other problems either stable, failing to improve or worsenin. Stage IV COPD  2. Obstructive sleep apnea  3. Severe pulmonary hypertension  4. Pulmonary edema  5. Former smoker    Recommend/plan:   · Transition to Vapotherm 40 L 100% now to keep sat 90 to 94%.  Discussed with JOSE Waldron.  The patient's room is all the way at the end of the rodrigez and she will need close observation.  If respiratory status worsens, would recommend moving her to the unit.  · Give an additional 20 mg of IV Lasix today.  · Follow-up chest x-ray tomorrow.  · Continue Solu-Medrol but increase to 40 mg every 8 hours.  Will decrease as  tolerated.  · Continue bronchodilator treatments with Symbicort and duo nebs  · Continue Mucinex twice daily  · Encourage incentive spirometer  · DVT prophylaxis: Lovenox  · Stress ulcer prophylaxis: Pepcid  · Antibiotics per attending: Levaquin  · Further recommendations per Dr. Stahl.    Thank you for this consult.  We will follow along.  Electronically signed by JESSENIA Alcantara, 12/08/21, 7:42 AM CST.    ATTESTATION OF CLINICAL NOTE:  I have reviewed the notes, assessments, and/or procedures performed by JESSENIA Alcantara, I concur with her/his documentation of Heide Newsome.  Patient was seen as a new pulmonary consult today.  She is already known to me from prior hospitalizations and she also follows up with me as outpatient.    Patient is 76 years old  female with known history of severe chronic obstructive pulmonary disease and chronic respiratory failure on home oxygen.  She also had a trilogy at home but did not use it regularly and stopped using it after using it only for few days because she felt it was not working.  She presented to the hospital yesterday with increasing shortness of breath.  She was brought by EMS and was on 5 L oxygen on arrival with severe hypoxia and later she needed up to 50 L of oxygen.  She started treatment for COPD exacerbation and also getting Levaquin for possible underlying pneumonia respiratory infection.  She is getting Symbicort and DuoNeb.  This morning due to increasing oxygen requirement she was switched to Vapotherm at 40 L flow and 100% FiO2 and her oxygen saturation varies between 82 high 90s.  Apparently according to the RN patient is oxygen saturation deteriorates rapidly on slightest movement but when she is sleeping she is oxygen saturation 97 to 100%.  Patient also has anxiety issues.  She was noted to have a component of heart failure with bilateral pulmonary infiltrate and her BNP was elevated on admission and she  received Lasix which made her breathing little easier but she is still short of breath on minimal exertion.  She is a former smoker and quit smoking a few years ago.      On physical examination patient is a elderly  female resting comfortably in bed but gets short of breath on minimal exertion.  HEENT: Atraumatic normocephalic.  Neck: Supple no mass no jugular venous distention no lymphadenopathy.  Heart: Sounds normal rate and rhythm regular no murmur.  Lungs: Bibasilar crackles and decreased breath sounds and diffuse wheezes.  Abdomen: Soft nondistended nontender upper extremities: No edema normal pulses normal color.  Neurologic: No focal deficit generalized weakness present.  Skin: No breakdown.  Psych: Patient seems anxious.    Continue current treatment for acute COPD exacerbation with IV steroids and she will be continued on Symbicort and DuoNeb.  She is getting levofloxacin which could be deescalated because there is no definite evidence of pneumonia noted.  Her oxygen requirement remains high and she is currently requiring 40 L flow 100% FiO2 on Vapotherm.  If her respiratory status deteriorates she may need BiPAP or intubation mechanical ventilation.  Patient was DNR in the past but currently she is a full code to my knowledge.  However due to her anxiety I doubt patient will tolerate BiPAP.  She had a home trilogy/BiPAP which she was not using regularly and she claimed it is not working and stopped functioning and oxygen saturation dropped at home.  She got around her device from [employer] and will check with the [employer] for her compliance.  She will be a good candidate for Trelegy Ellipta as a long-term bronchodilator treatment.  She will need incentive spirometry to improve pulmonary compliance.  Continue pain and anxiety control.  DVT and ulcer prophylaxis reviewed.  Physical activity as tolerated.  Nutritional support.  She will continue follow-up visit with me as outpatient in  pulmonary clinic.  CODE STATUS full.  Overall prognosis guarded.  Pulmonary team will continue following her and make further commendations.  We appreciate the consult like to thank Dr. Quezada for the referral.  Total time spent in seeing this patient as inpatient pulmonary consult was 45 minutes.    I have seen and examined patient personally, performing a face-to-face diagnostic evaluation with plan of care reviewed and developed with APRN and nursing staff. I have addended and/or modified the above history of present illness, physical examination, and assessment and plan to reflect my findings and impressions. Essential elements of the care plan were discussed with APRN above.  Agree with findings and assessment/plan as documented above.    Yasmine Stahl MD  Pulmonologist/Intensivist  12/9/2021 07:24 CST

## 2021-12-09 NOTE — DISCHARGE PLACEMENT REQUEST
"Call Back: Omaira Barrett BSESPERANZA [phone]    Heide Benitez (76 y.o. Female)             Date of Birth Social Security Number Address Home Phone MRN    [id number]  [id number]  [facility] [address] [phone] [id number]    Quaker Marital Status             Protestant        Admission Date Admission Type Admitting Provider Attending Provider Department, Room/Bed    12/7/21 Emergency Freeman Quezada DO Robinson, Maurice S, DO [facility] 3A, 329/1    Discharge Date Discharge Disposition Discharge Destination                         Attending Provider: Freeman Quezada DO    Allergies: Tetracyclines & Related, Penicillins, Tape    Isolation: None   Infection: None   Code Status: No CPR   Advance Care Planning Activity    Ht: 157.5 cm (62\")   Wt: 48.3 kg (106 lb 7.7 oz)    Admission Cmt: None   Principal Problem: Acute on chronic respiratory failure with hypoxia and hypercapnia (HCC) [J96.21,J96.22]                 Active Insurance as of 12/7/2021     Primary Coverage     Payor Plan Insurance Group Employer/Plan Group    HUMANA MEDICARE REPLACEMENT HUMANA MEDICARE REPLACEMENT [id number]     Payor Plan Address Payor Plan Phone Number Payor Plan Fax Number Effective Dates    [address] [address] [phone]  1/1/2018 - None Entered    [facility] [address]       Subscriber Name Subscriber Birth Date Member ID       HEIDE BENITEZ 1945 [id number]                 Emergency Contacts      (Rel.) Home Phone Work Phone Mobile Phone    LAUREN CURRIE (Son) [phone] -- [phone]               History & Physical      Angel Nuñez MD at 12/08/21 0242              [facility] Medicine Services  HISTORY AND PHYSICAL    Date of Admission: 12/7/2021  Primary Care Physician: Pato Cooney DO    Subjective     Chief Complaint: Shortness of breath    History of Present Illness  76 year old female with PMH of COPD oxygen dependent, " JHONATAN, that presents to the ER with complaints of shortness of breath. She was feeling winded since the night before and yesterday morning her oxygen saturation measured at around 60 %. Tried home nebulizers and higher oxygen flow but could not get above 70%. Finally her family convinced her to come to the ER tonight. She is requiring 6 lpm nasal canula to maintain oxygen at around 88%.     Patient denies chest pain, sputum production, fever or body aches.     Review of Systems   Otherwise complete ROS reviewed and negative except as mentioned in the HPI.    Past Medical History:   Past Medical History:   Diagnosis Date   • Cancer (CMS/HCC), colon    • Chronic respiratory failure (CMS/HCC), 4 L nasal cannula continuously    • COPD (chronic obstructive pulmonary disease) ([facility])    • Restless leg syndrome      Past Surgical History:  Past Surgical History:   Procedure Laterality Date   • APPENDECTOMY     • CARPAL TUNNEL RELEASE Left    • COLON RESECTION     • FOOT SURGERY Bilateral     hammer toe   • FRACTURE SURGERY Left     Arm   • HIP TROCHANTERIC NAILING WITH INTRAMEDULLARY HIP SCREW Left 8/31/2021    Procedure: LEFT HIP TROCHANTERIC NAILING SHORT WITH INTRAMEDULLARY HIP SCREW;  Surgeon: Valdemar Chaudhari MD;  [facility]: [facility];  Service: Orthopedics;  Laterality: Left;   • HYSTERECTOMY     • WRIST FRACTURE SURGERY Right      Social History:  reports that she quit smoking about 2 years ago. Her smoking use included cigarettes. She started smoking about 61 years ago. She has a 59.00 pack-year smoking history. She has never used smokeless tobacco. She reports that she does not drink alcohol and does not use drugs.    Family History: family history includes COPD in her father; Cancer in her brother, mother, paternal grandfather, paternal grandmother, and sister; Colon cancer in her mother; Diabetes in her father and sister; Heart disease in her father.       Allergies:  Allergies   Allergen Reactions   • Tetracyclines  & Related Anaphylaxis   • Penicillins Itching   • Tape Other (See Comments)     Skin tear       Medications:  Prior to Admission medications    Medication Sig Start Date End Date Taking? Authorizing Provider   albuterol (PROVENTIL) (2.5 MG/3ML) 0.083% nebulizer solution Take 2.5 mg by nebulization Every 4 (Four) Hours As Needed for Wheezing. 10/26/21  Yes Yasmine Stahl MD   albuterol sulfate  (90 Base) MCG/ACT inhaler Inhale 2 puffs Every 4 (Four) Hours As Needed for Wheezing. 9/29/21  Yes Ari Mckee MD   FLUoxetine (PROzac) 10 MG capsule Take 1 capsule by mouth Daily. 9/30/21  Yes Ari Mckee MD   Fluticasone-Umeclidin-Vilant (Trelegy Ellipta) 100-62.5-25 MCG/INH inhaler Inhale 1 puff Daily for 90 days. 10/26/21 1/24/22 Yes Yasmine Stahl MD   guaiFENesin (MUCINEX) 600 MG 12 hr tablet Take 1 tablet by mouth 2 (Two) Times a Day. 9/29/21  Yes Ari Mckee MD   loratadine (CLARITIN) 10 MG tablet Take 1 tablet by mouth Daily. 10/26/21  Yes Yasmine Stahl MD   montelukast (Singulair) 10 MG tablet Take 1 tablet by mouth Every Night for 360 days. 9/29/21 9/24/22 Yes Ari Mckee MD   oxyCODONE-acetaminophen (Percocet) 5-325 MG per tablet Take 1 tablet by mouth Every 6 (Six) Hours As Needed for Severe Pain  (severe pain- rib fracture). 11/27/21  Yes Sinan Mccall MD   rOPINIRole (REQUIP) 2 MG tablet Take 1 tablet by mouth 4 (Four) Times a Day Before Meals & at Bedtime for 30 days. Takes one tablet during the day and 2 tablets and bedtime 11/27/21 12/27/21 Yes Sinan Mccall MD   acetaminophen (TYLENOL) 325 MG tablet Take 2 tablets by mouth Every 4 (Four) Hours As Needed for Mild Pain . 11/27/21   Sinan Mccall MD   docusate sodium (Colace) 100 MG capsule Take 1 capsule by mouth 2 (Two) Times a Day. 9/29/21   Ari Mckee MD   famotidine (PEPCID) 20 MG tablet Take 1 tablet by mouth 2 (Two) Times a Day Before Meals. 9/29/21   Ari Mckee MD   lidocaine  "(LIDODERM) 5 % Place 2 patches on the skin as directed by provider Daily. Remove & Discard patch within 12 hours or as directed by MD 11/27/21   Sinan Mccall MD   predniSONE (DELTASONE) 10 MG (48) dose pack Take as directed 11/27/21   Sinan Mccall MD   sodium chloride (Ocean Nasal Spray) 0.65 % nasal spray 1 spray into the nostril(s) as directed by provider As Needed for Congestion. 10/26/21   Yasmine Stahl MD     I have utilized all available immediate resources to obtain, update, and review the patient's current medications.    Objective     Vital Signs: /79   Pulse 114   Temp 98.2 °F (36.8 °C)   Resp 20   Ht 157.5 cm (62\")   Wt 57.2 kg (126 lb)   SpO2 91%   BMI 23.05 kg/m²   Physical Exam  Vitals reviewed.   Constitutional:       General: She is not in acute distress.     Appearance: She is well-developed. She is not toxic-appearing.   HENT:      Head: Normocephalic and atraumatic.      Right Ear: External ear normal.      Left Ear: External ear normal.      Mouth/Throat:      Mouth: Mucous membranes are dry.      Pharynx: Oropharynx is clear.   Eyes:      General:         Right eye: No discharge.         Left eye: No discharge.      Extraocular Movements: Extraocular movements intact.      Conjunctiva/sclera: Conjunctivae normal.      Pupils: Pupils are equal, round, and reactive to light.   Neck:      Vascular: No JVD.   Cardiovascular:      Rate and Rhythm: Normal rate and regular rhythm.      Pulses: Normal pulses.      Heart sounds: Normal heart sounds. No murmur heard.  No friction rub. No gallop.    Pulmonary:      Effort: No respiratory distress.      Breath sounds: No stridor. Wheezing and rhonchi present. No rales.   Chest:      Chest wall: No tenderness.   Abdominal:      General: Bowel sounds are normal. There is no distension.      Palpations: Abdomen is soft.      Tenderness: There is no abdominal tenderness. There is no guarding or rebound.      Hernia: No " hernia is present.   Musculoskeletal:         General: No swelling, tenderness or deformity. Normal range of motion.      Cervical back: Normal range of motion and neck supple. No rigidity or tenderness. No muscular tenderness.      Right lower leg: No edema.      Left lower leg: No edema.   Skin:     General: Skin is warm and dry.      Findings: No erythema or rash.   Neurological:      General: No focal deficit present.      Mental Status: She is alert and oriented to person, place, and time.      Cranial Nerves: No cranial nerve deficit.      Sensory: No sensory deficit.      Motor: No weakness or abnormal muscle tone.      Deep Tendon Reflexes: Reflexes normal.   Psychiatric:         Mood and Affect: Mood normal.         Behavior: Behavior normal.     Results Reviewed:  Lab Results (last 24 hours)     Procedure Component Value Units Date/Time    BNP [[id number]]  (Abnormal) Collected: 12/07/21 2349    Specimen: Blood Updated: 12/08/21 0229     proBNP 8,308.0 pg/mL     Narrative:      Among patients with dyspnea, NT-proBNP is highly sensitive for the detection of acute congestive heart failure. In addition NT-proBNP of <300 pg/ml effectively rules out acute congestive heart failure with 99% negative predictive value.    Results may be falsely decreased if patient taking Biotin.      COVID PRE-OP / PRE-PROCEDURE SCREENING ORDER (NO ISOLATION) - Swab, Nasal Cavity [[id number]]  (Normal) Collected: 12/07/21 2348    Specimen: Swab from Nasal Cavity Updated: 12/08/21 0044    Narrative:      The following orders were created for panel order COVID PRE-OP / PRE-PROCEDURE SCREENING ORDER (NO ISOLATION) - Swab, Nasal Cavity.  Procedure                               Abnormality         Status                     ---------                               -----------         ------                     COVID-19,[employer] [employer] IN-JIN...[[id number]]  Normal              Final result                 Please view results for these tests on  the individual orders.    COVID-19,[employer] [employer] IN-HOUSE,Nasal Swab No Transport Media 3-4 HR TAT - Swab, Nasal Cavity [[id number]]  (Normal) Collected: 12/07/21 2348    Specimen: Swab from Nasal Cavity Updated: 12/08/21 0044     COVID19 Not Detected    Narrative:      Fact sheet for providers: https://www.fda.gov/media/068904/download     Fact sheet for patients: https://www.fda.gov/media/198386/download    Test performed by PCR.    Consider negative results in combination with clinical observations, patient history, and epidemiological information.    Blood Culture - Blood, Arm, Right [[id number]] Collected: 12/08/21 0028    Specimen: Blood from Arm, Right Updated: 12/08/21 0033    Comprehensive Metabolic Panel [[id number]]  (Abnormal) Collected: 12/07/21 2349    Specimen: Blood Updated: 12/08/21 0019     Glucose 133 mg/dL      BUN 26 mg/dL      Creatinine 0.45 mg/dL      Sodium 143 mmol/L      Potassium 4.4 mmol/L      Chloride 94 mmol/L      CO2 40.0 mmol/L      Calcium 10.0 mg/dL      Total Protein 6.8 g/dL      Albumin 3.40 g/dL      ALT (SGPT) 12 U/L      AST (SGOT) 16 U/L      Alkaline Phosphatase 100 U/L      Total Bilirubin 0.3 mg/dL      eGFR Non African Amer 136 mL/min/1.73      Globulin 3.4 gm/dL      A/G Ratio 1.0 g/dL      BUN/Creatinine Ratio 57.8     Anion Gap 9.0 mmol/L     Narrative:      GFR Normal >60  Chronic Kidney Disease <60  Kidney Failure <15      Lactic Acid, Plasma [[id number]]  (Normal) Collected: 12/07/21 2349    Specimen: Blood Updated: 12/08/21 0016     Lactate 1.4 mmol/L     CBC & Differential [[id number]]  (Abnormal) Collected: 12/07/21 2349    Specimen: Blood Updated: 12/08/21 0001    Narrative:      The following orders were created for panel order CBC & Differential.  Procedure                               Abnormality         Status                     ---------                               -----------         ------                     CBC Auto Differential[[id number]]        Abnormal             Final result                 Please view results for these tests on the individual orders.    CBC Auto Differential [[id number]]  (Abnormal) Collected: 12/07/21 2349    Specimen: Blood Updated: 12/08/21 0001     WBC 11.91 10*3/mm3      RBC 4.45 10*6/mm3      Hemoglobin 11.1 g/dL      Hematocrit 40.5 %      MCV 91.0 fL      MCH 24.9 pg      MCHC 27.4 g/dL      RDW 15.4 %      RDW-SD 50.6 fl      MPV 11.6 fL      Platelets 375 10*3/mm3      Neutrophil % 87.8 %      Lymphocyte % 5.8 %      Monocyte % 5.2 %      Eosinophil % 0.3 %      Basophil % 0.3 %      Immature Grans % 0.6 %      Neutrophils, Absolute 10.46 10*3/mm3      Lymphocytes, Absolute 0.69 10*3/mm3      Monocytes, Absolute 0.62 10*3/mm3      Eosinophils, Absolute 0.03 10*3/mm3      Basophils, Absolute 0.04 10*3/mm3      Immature Grans, Absolute 0.07 10*3/mm3      nRBC 0.0 /100 WBC     Blood Culture - Blood, Arm, Left [[id number]] Collected: 12/07/21 2349    Specimen: Blood from Arm, Left Updated: 12/07/21 2358        Imaging Results (Last 24 Hours)     Procedure Component Value Units Date/Time    XR Chest 1 View [[id number]] Resulted: 12/07/21 2354     Updated: 12/07/21 2357        I have personally reviewed and interpreted the radiology studies and ECG obtained at time of admission.     Assessment / Plan     Assessment:   Active Hospital Problems    Diagnosis    • **Acute on chronic respiratory failure with hypoxia and hypercapnia (HCC)    • NICK (obstructive sleep apnea)    • Pulmonary emphysema (HCC)    • COPD, very severe (HCC)    • Severe malnutrition (HCC)      Plan:   Admit to medical floor  Vitals every 4 hours and pulse oxymetry  Cardiac diet  IVF saline lock  Solumedrol 40 mg IVP BID  Duoneb 1 UD QID  Albuterol 1 UD neb q4h prn  Empiric Levaquin oral 500 mg daily    Home medications reconciled    DVT prophylaxis >  Lovenox 40 mg SQ daily    Code Status/Advanced Care Plan: Full    The patient's surrogate decision maker is see records.     I  discussed my findings and recommendations with the patient.    Estimated length of stay is over 2 midnights.     The patient was seen and examined by me on 12/08/2021 at 0245 AM.    Electronically signed by Angel Nuñez MD, 12/08/21, 02:42 CST.              Electronically signed by Angel Nuñez MD at 12/08/21 0356          Physician Progress Notes (most recent note)      Denisse German APRN at 12/09/21 1139              [facility] Medicine Services  INPATIENT PROGRESS NOTE    Length of Stay: 1  Date of Admission: 12/7/2021  Primary Care Physician: Pato Cooney DO    Subjective   Chief Complaint: Follow-up shortness of breath  HPI   Patient resting in bed.  She states she feels rough today, but does feel better in comparison to admission.  She does seem dyspneic with conversation.  She typically wears between 4-1/2 and 5 L of oxygen at home continuously, currently on Vapotherm at 30 L/min and 80% FiO2.  She states she has not been coughing much since yesterday, cough was previously productive of clear sputum.  She has complaint of bilateral chest pain in her ribs from known fractures.  She denies abdominal pain, nausea, vomiting, or diarrhea.    Review of Systems   All pertinent negatives and positives are as above. All other systems have been reviewed and are negative unless otherwise stated.     Objective    Temp:  [97.6 °F (36.4 °C)-98.5 °F (36.9 °C)] 97.6 °F (36.4 °C)  Heart Rate:  [] 113  Resp:  [16-22] 16  BP: (103-125)/(64-72) 112/64  Physical Exam  Vitals and nursing note reviewed.   Constitutional:       Appearance: She is ill-appearing. She is not toxic-appearing.   HENT:      Head: Normocephalic and atraumatic.   Cardiovascular:      Rate and Rhythm: Regular rhythm. Tachycardia present.      Heart sounds: Normal heart sounds.      Comments: Sinus tach 102-118  Pulmonary:      Breath sounds: No wheezing or rhonchi.      Comments:  Tachypneic with conversation.  Accessory muscle usage.  Grossly diminished throughout.  Abdominal:      General: Bowel sounds are normal. There is no distension.      Palpations: Abdomen is soft.      Tenderness: There is no abdominal tenderness.   Musculoskeletal:         General: No swelling or tenderness. Normal range of motion.      Cervical back: Normal range of motion and neck supple. No tenderness.   Skin:     General: Skin is warm and dry.      Findings: No erythema or rash.   Neurological:      General: No focal deficit present.      Mental Status: She is alert and oriented to person, place, and time.   Psychiatric:         Mood and Affect: Mood normal.         Behavior: Behavior normal.         Thought Content: Thought content normal.         Judgment: Judgment normal.     Results Review:  I have reviewed the labs, radiology results, and diagnostic studies.    Laboratory Data:   Results from last 7 days   Lab Units 12/09/21  0618 12/08/21 0445 12/07/21  2349   WBC 10*3/mm3 10.24 9.64 11.91*   HEMOGLOBIN g/dL 10.3* 11.2* 11.1*   HEMATOCRIT % 37.2 41.5 40.5   PLATELETS 10*3/mm3 305 361 375     Results from last 7 days   Lab Units 12/09/21  0618 12/08/21  0445 12/07/21  2349   SODIUM mmol/L 140 142 143   POTASSIUM mmol/L 4.4 4.4 4.4   CHLORIDE mmol/L 90* 93* 94*   CO2 mmol/L 47.0* 39.0* 40.0*   BUN mg/dL 26* 25* 26*   CREATININE mg/dL 0.53* 0.49* 0.45*   CALCIUM mg/dL 9.5 9.9 10.0   BILIRUBIN mg/dL  --   --  0.3   ALK PHOS U/L  --   --  100   ALT (SGPT) U/L  --   --  12   AST (SGOT) U/L  --   --  16   GLUCOSE mg/dL 138* 146* 133*       Intake/Output Summary (Last 24 hours) at 12/9/2021 1152  Last data filed at 12/9/2021 0900  Gross per 24 hour   Intake 240 ml   Output 500 ml   Net -260 ml     I have reviewed the patient current medications.     Assessment/Plan     Active Hospital Problems    Diagnosis    • **Acute on chronic respiratory failure with hypoxia and hypercapnia (HCC)    • Acute-on-chronic  respiratory failure (HCC)    • NICK (obstructive sleep apnea)    • Pulmonary emphysema (HCC)    • COPD, very severe (HCC)    • Severe malnutrition (HCC)      Plan:  1.  Patient presented to the emergency department on 12/7/2021 with complaints of shortness of breath.  She typically wears oxygen at 4-1/2 to 5 L at home continuously.  She was recently admitted to our facility from 11/23/2021 through 11/27/2021 with acute on chronic respiratory failure with hypoxia and hypercapnia.  She was discharged with a trilogy device.  She apparently did not feel this was working correctly and then stopped using this at home.  2.  The patient recently had a CTA of the chest during her last admission which showed no evidence of pulmonary embolus.  Her chest x-ray on this admission showed emphysematous changes and superimposed cardiomegaly with increased interstitial markings and Kerley B-lines suggesting pulmonary edema.  Small pleural effusions present.  Chest x-ray has been repeated today, awaiting radiologist interpretation.  Patient was given a total of 60 mg of IV Lasix yesterday without significant output.  Feel that she may benefit from further diuresis, will see how she responds today to Bumex.  Her last echocardiogram in September showed moderate to severe pulmonary hypertension, severe dilation of the right atrial cavity with hyperdynamic ejection fraction.  Diastolic function indeterminate.  Likely has cor pulmonale secondary to pulmonary hypertension.  3.  Appreciate pulmonology assistance.  Continue DuoNebs, Mucinex, Symbicort.  IV Solu-Medrol taper as per pulmonology service.  Continue oral Levaquin.  Encourage use of incentive spirometer.  Wean supplemental oxygen as tolerated.  May need to see if her family can bring in trilogy device so that it can be serviced by medical equipment company.  4.  Palliative care consulted to discuss goals of care given numerous recent admissions and declining overall status.  5.   Labs in AM  6.  Will consult PT/OT once more appropriate from a respiratory standpoint.    Discharge Planning: I expect the patient to be discharged to ? in ? days.    Electronically signed by JESSENIA Paniagua, 12/9/2021, 11:40 CST.      Electronically signed by Denisse German APRN at 12/09/21 8337

## 2021-12-09 NOTE — CONSULTS
"Palliative Care Initial Consult   Attending Physician: Freeman Quezada DO  Referring Provider: JESSENIA Quiles    Reason for Referral: assistance with clarification of goals of care and Support for patient family  Family/Support: Jensen, son    Code Status and Medical Interventions:   Ordered at: 12/08/21 1348     Medical Intervention Limits:    NO intubation (DNI)    NO cardioversion    NO antiarrhythmic drugs    NO artificial nutrition    NO vasopressors     Level Of Support Discussed With:    Patient     Code Status (Patient has no pulse and is not breathing):    No CPR (Do Not Attempt to Resuscitate)     Medical Interventions (Patient has pulse or is breathing):    Limited Support     Goals of Care: TBD.    HPI:   76 y.o. female with COPD stage IV-pulmonary emphysema, colon cancer, pulmonary hypertension-severe, chronic respiratory failure on home O2, former tobacco use, recent rib fracture and compression fracture of T12 vertebra, and restless leg syndrome.  Recent hospitalization 11/23-11/27 and 9/19-9/29 due to acute respiratory failure with hypoxia; 8/31-9/10 due to fall, left hip fracture, respiratory failure, and acute pain.  Patient presented to [facility] on 12/7/2021 related to shortness of breath.  She required high flow oxygen on admission.  According to chart review patient has a home trilogy device that was not functioning prior to this admission.  Of particular note, there is an open APS investigation that was initiated by PCP.  She is currently on Vapotherm FiO2 80% / 30 L.  Her bedside monitor is alarming and she is in the process of calling for assistance.  She is visibly anxious and has significant accessory muscle usage.  States \"when that thing alarms it gets me upset and makes my breathing worse\".  She asked for her fan to be adjusted.  Once alarm silenced and fan adjusted patient seemed to settle back down but still with significant accessory muscle usage and breathless " "at rest.  Conversation worsens her breathing.  Palliative Care Spoke With: patient states that she resides at home with her  who has dementia.  Her son, Jensen states with the couple at nighttime.  Patient reports during the day they are alone and unable to afford private pay caregivers in the home setting.  States that it will middle son committed suicide in July of this year.  States that she did recently discharge to [facility] and was in \"worse shape when she left there then when she entered\" and subsequently needed additional rehospitalization.  Her baseline quality of life and functional status is extremely poor and she is able to transfer from bed to bedside potty chair.  Reports that even with this minimal exertion she has to rest for period of time for oxygenation to improve prior to transferring back.  Discussed her trilogy device in the home setting.  States that she is able to go on the trilogy for 3 to 4 hours during her exacerbations and this seems to provide her with some relief of her dyspnea.  When asked why she did not use the trilogy device prior to this admission states \"I did not want to scare my \". States that they have in the past tried to get both she and her  admitted to [facility] without success.  She is not open to discharge back to [facility]. Due to the Palliative Care Topics Discussed: palliative care, goals of care, care options, resuscitation status, [facility] and discharge options we will establish an advance care plan.   Advance Care Planning   Advance Care Planning Discussion: Care conference with patient.  We explored patient's poor long-term prognosis secondary to stage IV COPD, chronic respiratory failure, severe pulmonary hypertension, and multiple comorbidities.  States that she is \"tired of living like this\".  As such, we reviewed medical priorities including CODE STATUS and medical interventions and noted that " "patient is agreeable to minimal interventions including blood products, antibiotics, and BiPAP use. We also discussed transitions to comfort care and possibility of hospice services though given her current living conditions, demented spouse, debilitated state, poor functional status, and lack of caregivers in the home during the day a safe discharge plan would need to be instituted prior to acceptance.  Unfortunately patient continues to require high level of oxygen support and this would need to be weaned down if discharge was planned.  She has not previously applied for Medicaid benefits though may obviously qualify.  She is open to discharge to [facility], but of course would like to discuss options with her son further.  She is open for me reaching out to son but states \"you have to be gentle with him because he is going through a lot after the loss of his brother\".  We also discussed more effective symptom management given her end-stage COPD and she is open to a trial of scheduled opiates.  Spoke with patient's son, Jensen via telephone with regard to conversation as detailed above.  Jensen was open to conversation and we discussed transitions to comfort care, as well as potential discharge options.  Comfort care expectations and potential medication use discussed at length.  Jensen will plan to talk with his brother and father and potentially come to facility this afternoon to speak further with patient with regard to care goals forward.  Jensen states patient has successfully acquired Medicaid and this is pending starting the first of the month.  This said that may be an opportunity if discharge is successful to transition with hospice services.  Son would be open to [facility] or [facility] as they have had good luck out of these facilities in the past.  We also discussed more aggressive symptom management given patient's shortness of breath at rest and anxiety.  Son seems to have a " "good prognostic awareness with patient's end-stage disease and states \"I wish she would just go peacefully in her sleep\".     Review of Systems   Constitutional: Positive for malaise/fatigue.   Cardiovascular: Positive for dyspnea on exertion. Negative for leg swelling.   Respiratory: Positive for shortness of breath.    Hematologic/Lymphatic: Negative for bleeding problem.   Musculoskeletal: Positive for muscle weakness.   Gastrointestinal: Negative for nausea.   Genitourinary: Negative for dysuria.   Neurological: Positive for weakness.   Psychiatric/Behavioral: The patient is nervous/anxious.      Past Medical History:   Diagnosis Date   • Cancer (CMS/HCC), colon    • Chronic respiratory failure (CMS/HCC), 4 L nasal cannula continuously    • COPD (chronic obstructive pulmonary disease) ([facility])    • Restless leg syndrome      Past Surgical History:   Procedure Laterality Date   • APPENDECTOMY     • CARPAL TUNNEL RELEASE Left    • COLON RESECTION     • FOOT SURGERY Bilateral     hammer toe   • FRACTURE SURGERY Left     Arm   • HIP TROCHANTERIC NAILING WITH INTRAMEDULLARY HIP SCREW Left 2021    Procedure: LEFT HIP TROCHANTERIC NAILING SHORT WITH INTRAMEDULLARY HIP SCREW;  Surgeon: Valdemar Chaudhari MD;  Location: [facility];  Service: Orthopedics;  Laterality: Left;   • HYSTERECTOMY     • WRIST FRACTURE SURGERY Right      Social History     Socioeconomic History   • Marital status:    Tobacco Use   • Smoking status: Former Smoker     Packs/day: 1.00     Years: 59.00     Pack years: 59.00     Types: Cigarettes     Start date:      Quit date: 2019     Years since quittin.9   • Smokeless tobacco: Never Used   Vaping Use   • Vaping Use: Never used   Substance and Sexual Activity   • Alcohol use: No   • Drug use: No   • Sexual activity: Never       Current Facility-Administered Medications   Medication Dose Route Frequency Provider Last Rate Last Admin   • acetaminophen (TYLENOL) tablet 650 mg  650 mg " Oral Q4H PRN Angel Nuñez MD       • albuterol (PROVENTIL) nebulizer solution 0.083% 2.5 mg/3mL  2.5 mg Nebulization Q4H PRN Angel Nuñez MD       • budesonide-formoterol (SYMBICORT) 80-4.5 MCG/ACT inhaler 2 puff  2 puff Inhalation BID - RT Angel Nuñez MD   2 puff at 12/09/21 0628   • cetirizine (zyrTEC) tablet 10 mg  10 mg Oral Daily Angel Nuñez MD   10 mg at 12/08/21 0943   • docusate sodium (COLACE) capsule 100 mg  100 mg Oral BID Angel Nuñez MD   100 mg at 12/08/21 2100   • enoxaparin (LOVENOX) syringe 40 mg  40 mg Subcutaneous Q24H Angel Nuñez MD   40 mg at 12/08/21 0943   • famotidine (PEPCID) tablet 20 mg  20 mg Oral Daily Angel Nuñez MD   20 mg at 12/08/21 0943   • FLUoxetine (PROzac) capsule 10 mg  10 mg Oral Daily Angel Nuñez MD   10 mg at 12/08/21 0946   • guaiFENesin (MUCINEX) 12 hr tablet 600 mg  600 mg Oral BID Angel Nuñez MD   600 mg at 12/08/21 2100   • ipratropium-albuterol (DUO-NEB) nebulizer solution 3 mL  3 mL Nebulization Q6H - RT Angel Nuñez MD   3 mL at 12/09/21 0628   • levoFLOXacin (LEVAQUIN) tablet 500 mg  500 mg Oral Q24H Angel Nuñez MD   500 mg at 12/08/21 0942   • methylPREDNISolone sodium succinate (SOLU-Medrol) injection 40 mg  40 mg Intravenous Q8H Alfredo Pop APRN   40 mg at 12/09/21 0216   • montelukast (SINGULAIR) tablet 10 mg  10 mg Oral Nightly Angel Nuñez MD   10 mg at 12/08/21 2100   • oxyCODONE-acetaminophen (PERCOCET) 5-325 MG per tablet 1 tablet  1 tablet Oral Q6H PRN Angel Nuñez MD       • rOPINIRole (REQUIP) tablet 2 mg  2 mg Oral Nightly Angel Nuñez MD   2 mg at 12/08/21 2100   • sodium chloride 0.9 % flush 10 mL  10 mL Intravenous PRN Angel Nuñez MD   10 mL at 12/08/21 0335   • sodium chloride 0.9 % flush 10 mL  10 mL Intravenous Q12H Angel Nuñez MD   10  "mL at 12/08/21 2100   • sodium chloride 0.9 % flush 10 mL  10 mL Intravenous PRN Angel Nuñez MD       • sodium chloride nasal spray 1 spray  1 spray Nasal PRN Angel Nuñez MD            •  acetaminophen  •  albuterol  •  oxyCODONE-acetaminophen  •  [COMPLETED] Insert peripheral IV **AND** sodium chloride  •  sodium chloride  •  sodium chloride    Allergies   Allergen Reactions   • Tetracyclines & Related Anaphylaxis   • Penicillins Itching   • Tape Other (See Comments)     Skin tear     Current medication reviewed for route, type, dose and frequency and are current per MAR at time of dictation.      Intake/Output Summary (Last 24 hours) at 12/9/2021 0744  Last data filed at 12/9/2021 0020  Gross per 24 hour   Intake --   Output 500 ml   Net -500 ml       Physical Exam:    Diagnostics: Reviewed  /72 (BP Location: Right arm, Patient Position: Lying)   Pulse 113   Temp 98.1 °F (36.7 °C) (Axillary)   Resp 18   Ht 157.5 cm (62\")   Wt 48.3 kg (106 lb 7.7 oz)   SpO2 96%   BMI 19.48 kg/m²     Vitals and nursing note reviewed.   Constitutional:       Appearance: Cachectic and frail. Ill-appearing and chronically ill-appearing.      Interventions: Nasal cannula in place.   Eyes:      General: Lids are normal.      Pupils: Pupils are equal, round, and reactive to light.   HENT:      Head: Normocephalic.   Pulmonary:      Effort: Tachypnea and accessory muscle usage present.      Breath sounds: Decreased air movement present. Decreased breath sounds present.      Comments: Vapotherm 30 L FiO2 80%.  Breathlessness at rest and worsened with conversation  Cardiovascular:      Tachycardia present.   Abdominal:      Palpations: Abdomen is soft.   Musculoskeletal:      Cervical back: Neck supple. Skin:     General: Skin is warm and dry.   Genitourinary:     Comments: Voiding without difficulty  Neurological:      Mental Status: Alert and oriented to person, place, and time.   Psychiatric:         " "Mood and Affect: Mood is anxious.         Cognition and Memory: Cognition normal.     Patient status: Disease state: Deteriorating despite treatments.  Functional status: Palliative Performance Scale Score: Performance 40% based on the following measures: Ambulation: Mainly in bed, Activity and Evidence of Disease: Unable to do any work, extensive evidence of disease, Self-Care: Mainly assistance required,  Intake: Normal or reduced, LOC: Full, drowsy or confusion   Nutritional status: Albumin 3.40. Body mass index is 19.48 kg/m².         Family support: The patient receives support from her son..  Advance Directives: Advance Directive Status: Patient has advance directive, copy in chart   POA/Healthcare surrogate-son/POA-Jensen Denisse.    Impression/Problem List:    1.  Stage IV COPD  2.  Acute on chronic respiratory failure  3.  Severe pulmonary hypertension  4.  Pulmonary edema  5.  Former smoker  6.  Obstructive sleep apnea    Recommendations/Plan:  1. plan: Goals of care include CODE STATUS no CPR/limited support interventions per attending.    2.  Palliative care encounter  -Poor long-term prognosis secondary to stage IV COPD, chronic respiratory failure, severe pulmonary hypertension, and multiple comorbidities.  -home trilogy device that was not functioning prior to this admission   -open APS investigation that was initiated by PCP    -Discussed transitions of care including focus of comfort with patient and son.  Family to further consider options. Jensen will plan to talk with his brother and father and potentially come to facility this afternoon to speak further with patient with regard to care goals forward.   Discussed possible discharge options including nursing facility placement with hospice services in the event of successful weaning of oxygenation. Son seems to have a good prognostic awareness with patient's end-stage disease and states \"I wish she would just go peacefully in her sleep\". See detailed " conversation above.    -Son states Medicaid benefits will start at first of the month.  So potentially discharged to nursing facility with hospice services. Son would be open to [facility] or [facility] as they have had good luck out of these facilities in the past.      3.  Dyspnea  -End-stage COPD  -Trial of scheduled opiates and as needed    4.  Anxiety  -Add Atarax as needed      Thank you for this consult and allowing us to participate in patient's plan of care. Palliative Care Team will continue to follow patient.     Time spent: 69 minutes spent reviewing medical and medication records, assessing and examining patient, discussing with family, answering questions, providing some guidance about a plan and documentation of care, and coordinating care with other healthcare members, with > 50% time spent face to face.     Aliya Muse, APRN  12/9/2021

## 2021-12-09 NOTE — PROGRESS NOTES
Continued Stay Note   [place]     Patient Name: Heide Newsome  MRN: [id number]  Today's Date: 12/9/2021    Admit Date: 12/7/2021     Discharge Plan     Row Name 12/09/21 1307       Plan    Plan Undetermined    Plan Comments SW called and left a message for Inna Valentin [phone] as she has been assigned to patient's case. Will follow for return call and further planning for this patient.           MAGDA RicoW

## 2021-12-09 NOTE — PROGRESS NOTES
Continued Stay Note   [place]     Patient Name: Heide Newsome  MRN: [id number]  Today's Date: 12/9/2021    Admit Date: 12/7/2021     Discharge Plan     Row Name 12/09/21 1413       Plan    Plan SNF Referrals    Plan Comments Patient and  now agree for referrals to two skilled nursing facilities: [facility] and [facility]. SW faxed referrals to both of these facilities. Will follow for bed offer(s).                      MAGDA RicoW

## 2021-12-09 NOTE — PLAN OF CARE
Goal Outcome Evaluation:  Plan of Care Reviewed With: patient           Outcome Summary: A/O. VSS. Skin warm, dry. Resp even, labored. Sats decrease mid 80s with slightest of activity. No c/o voiced. On Lovenox for VTE prophylaxis.  Palliative care did speak with patient today. Upset that 3 people went in and basically told her she was dying. ''not a good feeling''. No acute distress noted.

## 2021-12-10 NOTE — PLAN OF CARE
Goal Outcome Evaluation:  Plan of Care Reviewed With: patient           Outcome Summary: A/O. VSS. Skin warm, dry. Resp labored at times with minimal exertion. Able to successfully decrease oxygen requirement today. Tolerated well. Looks better today. Spirits may be a bit better today. Tablet at bedside to occupy her time. No acute distress noted.

## 2021-12-10 NOTE — PLAN OF CARE
"  Problem: Palliative Care  Goal: Enhanced Quality of Life  Outcome: Ongoing, Progressing  Intervention: Optimize Psychosocial Wellbeing  Flowsheets (Taken 12/10/2021 0853)  Supportive Measures: active listening utilized     Patient was alert and oriented. No family at bedside. She stated her  and son visited last night. She stated, \"I just want to go to sleep and not wake up\". She is fearful of leaving her  and son. She stated she feels she is SOB after exerting any energy. She feels her pulse ox is incorrect and that she is not in the 90's. Being SOB causes anxiety which she is aware does not help. She states the hydrocodone medication helps her breathing. She stated she was \"actually able to relax last night\" after taking it. She would prefer to go home at discharge, but aware she would need 24/7 caregiver support. She is open to SNF and hopeful her spouse would be able to join her. She feels her son is supportive of her and has her best interest at heart. She has good prognotic awareness though appears to be overwhelmed and depressed about the trajectory of her illness. She smiled discussing her boys and  growing up though she is understandably upset about her middle son's suicide this past July. She stated this hurt her very much. She was appreciative of conversation.     Will continue to follow and provide support.    STEVE Walls  12/10/2021      "

## 2021-12-10 NOTE — PLAN OF CARE
Goal Outcome Evaluation:  Plan of Care Reviewed With: patient        Progress: no change  Outcome Summary: Pt has been A&O, occasionally has some subtle hallucinations like seeing a mouse and people across the [place]igez, answers everything correctly though. Sats maintained on Vapotherm. Anxious at times, able to rest between care. HR tachy, up to 130-140s at times.

## 2021-12-10 NOTE — PROGRESS NOTES
Palliative Care Daily Progress Note   Chief complaint-follow up goals of care/advanced care planning, support for patient/family and hospice referral/discussion    Code Status:   Code Status and Medical Interventions:   Ordered at: 12/08/21 1348     Medical Intervention Limits:    NO intubation (DNI)    NO cardioversion    NO antiarrhythmic drugs    NO artificial nutrition    NO vasopressors     Level Of Support Discussed With:    Patient     Code Status (Patient has no pulse and is not breathing):    No CPR (Do Not Attempt to Resuscitate)     Medical Interventions (Patient has pulse or is breathing):    Limited Support      Advanced Directives: Advance Directive Status: Patient has advance directive, copy in chart   Goals of Care: Ongoing.     S: Medical record reviewed. Events noted.  Laying in bed without apparent distress.  Reports her breathing is much improved compared to yesterday.  She believes scheduled opiates have contributed to this.  She is currently on Vapotherm 25 L, FiO2 70%.  Saturation 97%.  She does not appear breathless with conversation or at rest today which is much improved compared to yesterday.  No family currently at bedside. Due to the Palliative Care Topics Discussed: goals of care, care options, [facility] and discharge options we will establish an advance care plan.   Advance Care Planning   Advance Care Planning Discussion: Spoke with patient in room and son via telephone.  Patient is pleased that her breathing has improved significantly compared to yesterday.  We discussed medical priorities to include continued weaning of oxygen and hopeful transition to nasal cannula so that she can discharge to skilled nursing facility closer to be with her .  We discussed in the event of further decline or worsening of respiratory status transitions to comfort care could be arranged at that time.  She verbalized understanding.  We further discussed discharge plans at skilled facility, as well  as, recommendations to include hospice services as her goal is not to pursue aggressive care.  She is agreeable to this plan.  In conversation with son states they had a good conversation overnight and he is currently planning on bringing patient's spouse who has dementia for a visit as it is their wedding anniversary today.  Questions answered to patient and family satisfaction.      Review of Systems   Constitutional: Positive for malaise/fatigue.   Cardiovascular: Positive for dyspnea on exertion. Negative for leg swelling.   Respiratory: Positive for shortness of breath.    Hematologic/Lymphatic: Negative for bleeding problem.   Musculoskeletal: Positive for muscle weakness.   Gastrointestinal: Negative for nausea.   Genitourinary: Negative for dysuria.   Neurological: Positive for weakness.   Psychiatric/Behavioral: The patient is nervous/anxious.      Pain Assessment  Preferred Pain Scale: number (Numeric Rating Pain Scale)    O:     Intake/Output Summary (Last 24 hours) at 12/10/2021 1449  Last data filed at 12/10/2021 0315  Gross per 24 hour   Intake --   Output 400 ml   Net -400 ml       Diagnostics: Reviewed    Current Facility-Administered Medications   Medication Dose Route Frequency Provider Last Rate Last Admin   • acetaminophen (TYLENOL) tablet 650 mg  650 mg Oral Q4H PRN Angel Nuñez MD       • albuterol (PROVENTIL) nebulizer solution 0.083% 2.5 mg/3mL  2.5 mg Nebulization Q4H PRN Angel Nuñez MD       • budesonide-formoterol (SYMBICORT) 80-4.5 MCG/ACT inhaler 2 puff  2 puff Inhalation BID - RT Angel Nuñez MD   2 puff at 12/10/21 0644   • cetirizine (zyrTEC) tablet 10 mg  10 mg Oral Daily Angel Nuñez MD   10 mg at 12/10/21 0830   • docusate sodium (COLACE) capsule 100 mg  100 mg Oral BID Angel Nuñez MD   100 mg at 12/10/21 0830   • enoxaparin (LOVENOX) syringe 40 mg  40 mg Subcutaneous Q24H Angel Nuñez MD   40 mg at 12/10/21  0830   • famotidine (PEPCID) tablet 20 mg  20 mg Oral Daily Angel Nuñez MD   20 mg at 12/10/21 0830   • FLUoxetine (PROzac) capsule 10 mg  10 mg Oral Daily Angel Nuñez MD   10 mg at 12/10/21 0830   • guaiFENesin (MUCINEX) 12 hr tablet 600 mg  600 mg Oral BID Angel Nuñez MD   600 mg at 12/10/21 0830   • HYDROcodone-acetaminophen (NORCO) 7.5-325 MG per tablet 1 tablet  1 tablet Oral TID Aliya Muse APRN   1 tablet at 12/10/21 0830   • hydrOXYzine (ATARAX) tablet 50 mg  50 mg Oral Q6H PRN Aliya Muse APRN   50 mg at 12/09/21 1622   • ipratropium (ATROVENT) nebulizer solution 0.5 mg  0.5 mg Nebulization 4x Daily - RT Denisse German APRN   0.5 mg at 12/10/21 1133   • levoFLOXacin (LEVAQUIN) tablet 500 mg  500 mg Oral Q24H Angel Nuñez MD   500 mg at 12/10/21 0831   • methylPREDNISolone sodium succinate (SOLU-Medrol) injection 40 mg  40 mg Intravenous Q12H Yoselyn Rodas, APRN       • metoprolol tartrate (LOPRESSOR) tablet 12.5 mg  12.5 mg Oral Q12H Denisse German APRN   12.5 mg at 12/10/21 1159   • montelukast (SINGULAIR) tablet 10 mg  10 mg Oral Nightly Angel Nuñez MD   10 mg at 12/09/21 2037   • oxyCODONE-acetaminophen (PERCOCET) 5-325 MG per tablet 1 tablet  1 tablet Oral Q6H PRN Angel Nuñez MD       • rOPINIRole (REQUIP) tablet 2 mg  2 mg Oral Nightly Angel Nuñez MD   2 mg at 12/09/21 2037   • sodium chloride 0.9 % flush 10 mL  10 mL Intravenous PRN Angel Nuñez MD   10 mL at 12/08/21 0335   • sodium chloride 0.9 % flush 10 mL  10 mL Intravenous Q12H Angel Nuñez MD   10 mL at 12/10/21 1327   • sodium chloride 0.9 % flush 10 mL  10 mL Intravenous PRN Angel Nuñez MD       • sodium chloride nasal spray 1 spray  1 spray Nasal PRN Angel Nuñez MD            •  acetaminophen  •  albuterol  •  hydrOXYzine  •  oxyCODONE-acetaminophen  •  [COMPLETED] Insert  "peripheral IV **AND** sodium chloride  •  sodium chloride  •  sodium chloride    A:    /75 (BP Location: Left arm, Patient Position: Lying)   Pulse (!) 133   Temp 99 °F (37.2 °C) (Oral)   Resp 22   Ht 157.5 cm (62\")   Wt 48.3 kg (106 lb 7.7 oz)   SpO2 90%   BMI 19.48 kg/m²     Vitals and nursing note reviewed.   Constitutional:       Appearance: Cachectic and frail. Ill-appearing and chronically ill-appearing.      Interventions: Nasal cannula in place.   Eyes:      General: Lids are normal.      Pupils: Pupils are equal, round, and reactive to light.   HENT:      Head: Normocephalic.   Pulmonary:      Effort: Accessory muscle usage present.  Does not appear breathless at rest or with conversation.     Comments: Vapotherm 25 L FiO2 70%.    Cardiovascular:      Tachycardia present.   Abdominal:      Palpations: Abdomen is soft.   Musculoskeletal:      Cervical back: Neck supple. Skin:     General: Skin is warm and dry.   Genitourinary:     Comments: Voiding without difficulty  Neurological:      Mental Status: Alert and oriented to person, place, and time.   Psychiatric:         Mood and Affect: Mood is calm and actually smiling.         Cognition and Memory: Cognition normal.      Patient status: Disease state: Deteriorating despite treatments.  Functional status: Palliative Performance Scale Score: Performance 40% based on the following measures: Ambulation: Mainly in bed, Activity and Evidence of Disease: Unable to do any work, extensive evidence of disease, Self-Care: Mainly assistance required,  Intake: Normal or reduced, LOC: Full, drowsy or confusion   Nutritional status: Albumin 3.40. Body mass index is 19.48 kg/m².      Family support: The patient receives support from her son..  Advance Directives: Advance Directive Status: Patient has advance directive, copy in chart   POA/Healthcare surrogate-son/APURVASHERMANDutch Salcedo.     Impression/Problem List:     1.  Stage IV COPD  2.  Acute on chronic " "respiratory failure  3.  Severe pulmonary hypertension  4.  Pulmonary edema  5.  Former smoker  6.  Obstructive sleep apnea     Recommendations/Plan:  1. plan: Goals of care include CODE STATUS no CPR/limited support interventions per attending.     2.  Palliative care encounter  -Poor long-term prognosis secondary to stage IV COPD, chronic respiratory failure, severe pulmonary hypertension, and multiple comorbidities.  -home trilogy device that was not functioning prior to this admission   -open APS investigation that was initiated by PCP    12/10-Currently Vapotherm 25 L, FiO2 70%.  O2 saturation 97%.  Respiratory therapy at bedside reports getting ready to further wean down oxygen this afternoon.    -Patient wishes to continue weaning of oxygen (Vapotherm) and hopeful will be able to d/c to SNF closer to .       -Plan for discharge to nursing facility with hospice services per discussion with patient today \"I am tired\".     -Son states Medicaid benefits will start at first of the month.  Son would be open to [facility] or [facility] as they have had good luck out of these facilities in the past.  SW notified.     3.  Dyspnea  -End-stage COPD  -Continue trial of scheduled opiates and as needed     4.  Anxiety  -Continue Atarax as needed        Thank you for allowing us to participate in patient's plan of care. Palliative Care Team will continue to follow patient.     Time spent: 53 minutes spent reviewing medical and medication records, assessing and examining patient, discussing with family, answering questions, providing some guidance about a plan and documentation of care, and coordinating care with other healthcare members, with > 50% time spent face to face.   18 minutes spent on advance care planning    Aliya Muse, JESSENIA  12/10/2021  "

## 2021-12-10 NOTE — PROGRESS NOTES
Continued Stay Note   [facility]     Patient Name: Heide Newsome  MRN: [id number]  Today's Date: 12/10/2021    Admit Date: 12/7/2021     Discharge Plan     Row Name 12/10/21 1021       Plan    Plan SNF    Plan Comments SW spoke to [facility] and she is evaluating patient's chart. [facility] is her first choice for SNF placement. Referral to [facility] is also pending.             MAGDA RicoW

## 2021-12-10 NOTE — PROGRESS NOTES
"    PULMONARY AND CRITICAL CARE PROGRESS NOTE - [facility]    Patient: Heide Newsome  1945   MR# [id number]   Acct# [id number]  12/10/21   08:30 CST  Referring Provider: Freeman Quezada DO    Chief Complaint: Acute on chronic hypercapnic and hypoxemic respiratory failure    Interval history: Patient is seen awake and alert resting in bed eating breakfast.  Oxygen saturation 93% on Vapotherm 30 L and FiO2 0.8.  Respiratory rate 26.  She tells me she just \"wants us to let her go\" she states \"I am just wore out\".  Palliative care has been following.  Remains afebrile.  No new events overnight.    Meds:  budesonide-formoterol, 2 puff, Inhalation, BID - RT  cetirizine, 10 mg, Oral, Daily  docusate sodium, 100 mg, Oral, BID  enoxaparin, 40 mg, Subcutaneous, Q24H  famotidine, 20 mg, Oral, Daily  FLUoxetine, 10 mg, Oral, Daily  guaiFENesin, 600 mg, Oral, BID  HYDROcodone-acetaminophen, 1 tablet, Oral, TID  ipratropium-albuterol, 3 mL, Nebulization, Q6H - RT  levoFLOXacin, 500 mg, Oral, Q24H  montelukast, 10 mg, Oral, Nightly  rOPINIRole, 2 mg, Oral, Nightly  sodium chloride, 10 mL, Intravenous, Q12H         Review of Systems:   Review of Systems   Constitutional: Positive for fatigue.   HENT: Negative.    Respiratory: Positive for shortness of breath.    Cardiovascular: Negative.    Gastrointestinal: Negative.    Neurological: Positive for weakness.     Physical Exam:  SpO2 Percentage    12/10/21 0332 12/10/21 0644 12/10/21 0730   SpO2: 93% 93% 96%     Temp:  [97.2 °F (36.2 °C)-99.1 °F (37.3 °C)] 98.2 °F (36.8 °C)  Heart Rate:  [] 101  Resp:  [16-24] 20  BP: (101-121)/(48-66) 115/48    Intake/Output Summary (Last 24 hours) at 12/10/2021 0830  Last data filed at 12/10/2021 0315  Gross per 24 hour   Intake 600 ml   Output 1400 ml   Net -800 ml     Physical Exam  Vitals and nursing note reviewed.   Constitutional:       Appearance: She is well-developed.      Comments: Vapotherm in place "   HENT:      Head: Normocephalic and atraumatic.   Cardiovascular:      Rate and Rhythm: Regular rhythm. Tachycardia present.   Pulmonary:      Effort: No tachypnea or respiratory distress.      Breath sounds: Decreased breath sounds present.   Abdominal:      General: There is no distension.      Palpations: Abdomen is soft.   Musculoskeletal:      Cervical back: Normal range of motion and neck supple.   Skin:     General: Skin is warm and dry.   Neurological:      Mental Status: She is alert and oriented to person, place, and time.      Motor: Weakness present.       Laboratory Data:  Results from last 7 days   Lab Units 12/10/21  0452 12/09/21  0618 12/08/21  0445   WBC 10*3/mm3 12.75* 10.24 9.64   HEMOGLOBIN g/dL 9.9* 10.3* 11.2*   PLATELETS 10*3/mm3 265 305 361     Results from last 7 days   Lab Units 12/10/21  0452 12/09/21  0618 12/08/21  0445   SODIUM mmol/L 139 140 142   POTASSIUM mmol/L 4.3 4.4 4.4   BUN mg/dL 36* 26* 25*   CREATININE mg/dL 0.48* 0.53* 0.49*         Blood Culture   Date Value Ref Range Status   12/08/2021 No growth at 24 hours  Preliminary   12/07/2021 No growth at 24 hours  Preliminary     Recent films:  XR Chest 1 View    Result Date: 12/9/2021  1. Mild cardiomegaly changes of interstitial pulmonary edema and small to moderate bilateral pleural effusions.   This report was finalized on 12/09/2021 11:58 by Dr. Zeb Gardner MD.    Films reviewed personally by me.  My interpretation: Bilateral interstitial infiltrates, bilateral pleural effusions 12-10-21    Pulmonary Assessment:  1. Acute on chronic hypercapnic and hypoxemic respiratory failure  2. Stage IV COPD  3. Emphysema  4. Obstructive sleep apnea, noncompliant with PAP device  5. Severe pulmonary hypertension  6. Pulmonary edema  7. Former smoker    Recommend:   · Continue Vapotherm to keep O2 sats 90 to 94%.  Currently on Vapotherm 30 L FiO2 0.8  · Taper Solu-Medrol to 40 mg every 12 hours  · Continue bronchodilator treatments  with Symbicort and DuoNebs.  · Continue Mucinex twice daily  · Encourage incentive spirometer  · DVT prophylaxis: Lovenox  · Stress ulcer prophylaxis: Pepcid  · Antibiotics per attending: Levaquin day #3 of 5  · Palliative care following appreciate their assistance  · Further recommendations per Dr. Stahl    Electronically signed by JESSENIA Stacy, 12/10/21, 08:30 CST     ATTESTATION OF CLINICAL NOTE:  I have reviewed the notes, assessments, and/or procedures performed by JESSENIA Stacy, I concur with her/his documentation of Heide NewsomeMoraima Browne was seen in the follow-up visit in pulmonary rounds in medical floor today.    Doing little better and less short of breath and is currently on Vapotherm 30 L flow and 60% FiO2.  She had her Solu-Medrol taper down to 40 mg twice a day.  She is not using any BiPAP or CPAP.  She is getting Symbicort and DuoNeb for bronchial treatment and doing some incentive spirometry.  She has anxiety issues.  She is on levofloxacin for possible bronchitis versus pneumonia.  Palliative care had seen the patient.  Patient is currently limited code.  She appears comfortable and did not have any other acute complaints.    On physical examination patient is a elderly  female resting comfortably on Vapotherm in bed but gets short of breath on minimal exertion.  HEENT: Atraumatic normocephalic.  Neck: Supple no mass no jugular venous distention no lymphadenopathy.  Heart: Sounds normal rate and rhythm regular no murmur.  Lungs: Bibasilar crackles and decreased breath sounds and diffuse wheezes.  Abdomen: Soft nondistended nontender upper extremities: No edema normal pulses normal color.  Neurologic: No focal deficit generalized weakness present.  Skin: No breakdown.  Psych: Patient seems anxious.     Continue Vapotherm and try to transition to high flow oxygen as tolerated. She had a home trilogy or BiPAP which she was not using and we need to work on that again.  She  will definitely benefit from noninvasive positive pressure ventilation due to her underlying severe COPD respiratory failure.  Encourage  incentive spirometry and increase mobility as tolerated.  Bronchodilator treatment with Symbicort and DuoNeb and taper steroid.  De-escalate antibiotic treatment after 5 days. Pain and anxiety control reviewed.  DVT and ulcer prophylaxis.  Continue nutritional support and physical activity as tolerated.  Repeat labs and imaging stress from time to time.  She will need outpatient follow-up with me in the pulmonary clinic after discharge and as mentioned we will work with the DME company to find out her compliance home trilogy and will try to put her back on it if needed. Palliative care had seen the patient. CODE STATUS: Limited code.  Overall prognosis: Guarded.  Pulmonary team will continue following her and make further recommendations.     I have seen and examined patient personally, performing a face-to-face diagnostic evaluation with plan of care reviewed and developed with APRN and nursing staff. I have addended and/or modified the above history of present illness, physical examination, and assessment and plan to reflect my findings and impressions. Essential elements of the care plan were discussed with APRN above.  Agree with findings and assessment/plan as documented above.    Yasmine Stahl MD  Pulmonologist/Intensivist  12/10/2021 16:29 CST

## 2021-12-10 NOTE — NURSING NOTE
Outcome Summary: A/O. VSS. Skin warm, dry. Resp even, labored. Sats decrease mid 80s with slightest of activity. No c/o voiced. On Lovenox for VTE prophylaxis.  Palliative care did speak with patient today. Patient upset and concerned of family being ''OK'' once she passes. ''just a bad feeling''. No acute distress noted.

## 2021-12-10 NOTE — PROGRESS NOTES
"    [facility] Medicine Services  INPATIENT PROGRESS NOTE    Length of Stay: 2  Date of Admission: 12/7/2021  Primary Care Physician: Pato Cooney DO    Subjective   Chief Complaint: Shortness of breath  HPI   Patient is resting in bed playing on her iPad.  She states that she is feeling a little better today in comparison to yesterday, although still very short of breath.  She still has pain in her chest from rib fractures, worse with cough and deep inspiration.  She is coughing up clear phlegm.  She denies abdominal pain, nausea, or vomiting.  She does perceive palpitations she states \"constantly\".    Review of Systems   All pertinent negatives and positives are as above. All other systems have been reviewed and are negative unless otherwise stated.     Objective    Temp:  [97.2 °F (36.2 °C)-99.1 °F (37.3 °C)] 98.2 °F (36.8 °C)  Heart Rate:  [] 102  Resp:  [16-24] 18  BP: (101-121)/(48-66) 115/48  Physical Exam  Vitals and nursing note reviewed.   Constitutional:       Appearance: She is ill-appearing. She is not toxic-appearing.   HENT:      Head: Normocephalic and atraumatic.   Cardiovascular:      Rate and Rhythm: Regular rhythm. Tachycardia present.      Heart sounds: Normal heart sounds.      Comments: Sinus tach 100-133  Pulmonary:      Breath sounds: No wheezing or rhonchi.      Comments: Tachypneic with conversation.  Accessory muscle usage.  Grossly diminished throughout.  Abdominal:      General: Bowel sounds are normal. There is no distension.      Palpations: Abdomen is soft.      Tenderness: There is no abdominal tenderness.   Musculoskeletal:         General: No swelling or tenderness. Normal range of motion.      Cervical back: Normal range of motion and neck supple. No tenderness.   Skin:     General: Skin is warm and dry.      Findings: No erythema or rash.   Neurological:      General: No focal deficit present.      Mental Status: She is alert and " oriented to person, place, and time.   Psychiatric:         Mood and Affect: Mood normal.         Behavior: Behavior normal.         Thought Content: Thought content normal.         Judgment: Judgment normal.      Results Review:  I have reviewed the labs, radiology results, and diagnostic studies.    Laboratory Data:   Results from last 7 days   Lab Units 12/10/21  0452 12/09/21  0618 12/08/21  0445   WBC 10*3/mm3 12.75* 10.24 9.64   HEMOGLOBIN g/dL 9.9* 10.3* 11.2*   HEMATOCRIT % 36.9 37.2 41.5   PLATELETS 10*3/mm3 265 305 361     Results from last 7 days   Lab Units 12/10/21  0452 12/09/21 0618 12/08/21 0445 12/07/21  2349 12/07/21  2349   SODIUM mmol/L 139 140 142   < > 143   POTASSIUM mmol/L 4.3 4.4 4.4   < > 4.4   CHLORIDE mmol/L 88* 90* 93*   < > 94*   CO2 mmol/L 48.0* 47.0* 39.0*   < > 40.0*   BUN mg/dL 36* 26* 25*   < > 26*   CREATININE mg/dL 0.48* 0.53* 0.49*   < > 0.45*   CALCIUM mg/dL 9.3 9.5 9.9   < > 10.0   BILIRUBIN mg/dL  --   --   --   --  0.3   ALK PHOS U/L  --   --   --   --  100   ALT (SGPT) U/L  --   --   --   --  12   AST (SGOT) U/L  --   --   --   --  16   GLUCOSE mg/dL 116* 138* 146*   < > 133*    < > = values in this interval not displayed.     Radiology Data:   Imaging Results (Last 24 Hours)     Procedure Component Value Units Date/Time    XR Chest 1 View [[id number]] Collected: 12/09/21 1155     Updated: 12/09/21 1201    Narrative:      EXAM: XR CHEST 1 VW- 12/9/2021 11:44 AM CST     HISTORY: Follow-up for pulmonary edema; R09.02-Hypoxemia; I50.9-Heart  failure, unspecified       COMPARISON: December 7, 2021.     TECHNIQUE: Frontal radiograph of the chest     FINDINGS:   Bilateral interstitial infiltrates are present. Small to moderate  bilateral pleural effusions are now present. Pleural effusions represent  a change December 7, 2021. Cardiac silhouettes mildly enlarged..      The osseous structures and surrounding soft tissues demonstrate no acute  abnormality.          Impression:       1. Mild cardiomegaly changes of interstitial pulmonary edema and small  to moderate bilateral pleural effusions.        This report was finalized on 12/09/2021 11:58 by Dr. Zeb Gardner MD.          Intake/Output Summary (Last 24 hours) at 12/10/2021 1044  Last data filed at 12/10/2021 0315  Gross per 24 hour   Intake 360 ml   Output 1400 ml   Net -1040 ml     I have reviewed the patient current medications.     Assessment/Plan     Active Hospital Problems    Diagnosis    • **Acute on chronic respiratory failure with hypoxia and hypercapnia (HCC)    • Acute-on-chronic respiratory failure (HCC)    • NICK (obstructive sleep apnea)    • Pulmonary emphysema (HCC)    • COPD, very severe (HCC)    • Severe malnutrition (HCC)      Plan:  1.  Patient presented to the emergency department on 12/7/2021 with complaints of shortness of breath.  She typically wears oxygen at 4-1/2 to 5 L at home continuously.  She was recently admitted to our facility from 11/23/2021 through 11/27/2021 with acute on chronic respiratory failure with hypoxia and hypercapnia.  She was discharged with a trilogy device.  She apparently did not feel this was working correctly and then stopped using this at home.  2.  The patient recently had a CTA of the chest during her last admission which showed no evidence of pulmonary embolus.  Her chest x-ray on this admission showed emphysematous changes and superimposed cardiomegaly with increased interstitial markings and Kerley B-lines suggesting pulmonary edema.  Small pleural effusions present.  Repeat chest x-ray yesterday showed mild cardiomegaly changes of interstitial pulmonary edema and small to moderate bilateral pleural effusions.  She was given a total of 60 mg of IV Lasix on admission without significant output.  She did seem to respond well to IV Bumex yesterday, see intake and output summary as above.  Due to slight contraction alkalosis and electrolyte abnormalities, would like to give a  "dose of Diamox today.  Her last echocardiogram in September showed moderate to severe pulmonary hypertension, severe dilation of the right atrial cavity with hyperdynamic ejection fraction.  Diastolic function indeterminate.  Likely has cor pulmonale secondary to pulmonary hypertension.  3.  Patient's heart rate is noted to be more elevated today.  She states that she perceives palpitations \"all the time\".  Will discontinue duo nebs in favor of plain Atrovent for now to see if this helps, will also add low-dose Lopressor.  Monitor heart rate and blood pressure closely.  4.  Appreciate pulmonology assistance.  Continue Mucinex, Symbicort.  IV Solu-Medrol being tapered per pulmonology service.  Continue oral Levaquin.  Encourage use of incentive spirometer.  Continue to wean supplemental oxygen as tolerated.  5.  The patient's family will be bringing in her trilogy device, will see if  can set up a time with her medical equipment company to come service her device while hospitalized as she feels it is not working properly.  6.  Palliative care notes reviewed, appreciate their assistance.  Patient has requested discharge to skilled nursing facility for further rehabilitation efforts.   is following, awaiting bed offers.  Will consult PT and OT when more appropriate from a respiratory standpoint.  7.  Lovenox for DVT prophylaxis.  8.  Labs in a.m.    Discharge Planning: I expect the patient to be discharged to SNF in ? days.    Electronically signed by JESSENIA Paniagua, 12/10/2021, 10:41 CST.    "

## 2021-12-10 NOTE — PROGRESS NOTES
Continued Stay Note   [place]     Patient Name: Heide Newsome  MRN: [id number]  Today's Date: 12/10/2021    Admit Date: 12/7/2021     Discharge Plan     Row Name 12/10/21 1356       Plan    Plan SNF    Plan Comments SW left a message for admissions coordinator at [facility] to check status of referral. Have not received decisions from either [facility] or [facility].             MAGDA RicoW

## 2021-12-11 NOTE — DISCHARGE PLACEMENT REQUEST
"Updated info. Plan is for dc to skilled nursing facility with hospice services under Medicaid pending. Please call [place] at [phone] after info received.   Thanks            Heide Benitez (76 y.o. Female)             Date of Birth Social Security Number Address Home Phone MRN    1945  [id number]  [facility] [address] [phone] [id number]    Yazidism Marital Status             Anabaptism        Admission Date Admission Type Admitting Provider Attending Provider Department, Room/Bed    12/7/21 Emergency Freeman Quezada DO Robinson, Maurice S, DO [facility] 3A, 329/1    Discharge Date Discharge Disposition Discharge Destination                         Attending Provider: Freeman Quezada DO    Allergies: Tetracyclines & Related, Penicillins, Tape    Isolation: None   Infection: None   Code Status: No CPR   Advance Care Planning Activity    Ht: 157.5 cm (62\")   Wt: 48.3 kg (106 lb 7.7 oz)    Admission Cmt: None   Principal Problem: Acute on chronic respiratory failure with hypoxia and hypercapnia (HCC) [J96.21,J96.22]                 Active Insurance as of 12/7/2021     Primary Coverage     Payor Plan Insurance Group Employer/Plan Group    HUMANA MEDICARE REPLACEMENT HUMANA MEDICARE REPLACEMENT [id number]     Payor Plan Address Payor Plan Phone Number Payor Plan Fax Number Effective Dates    [address] [address] [phone]  1/1/2018 - None Entered    [facility] [phone]       Subscriber Name Subscriber Birth Date Member ID       HEIDE BENITEZ 1945 [id number]                 Emergency Contacts      (Rel.) Home Phone Work Phone Mobile Phone    LAUREN CURRIE (Son) [phone] -- [phone]              Current Facility-Administered Medications   Medication Dose Route Frequency Provider Last Rate Last Admin   • acetaminophen (TYLENOL) tablet 650 mg  650 mg Oral Q4H PRN Denisse German APRN        Or   • acetaminophen (TYLENOL) 160 MG/5ML " solution 650 mg  650 mg Oral Q4H PRN Denisse German APRN        Or   • acetaminophen (TYLENOL) suppository 650 mg  650 mg Rectal Q4H PRN Denisse German APRN       • acetaminophen (TYLENOL) tablet 650 mg  650 mg Oral Q4H PRN Angel Nuñez MD       • albuterol (PROVENTIL) nebulizer solution 0.083% 2.5 mg/3mL  2.5 mg Nebulization Q4H PRN Angel Nuñez MD       • budesonide-formoterol (SYMBICORT) 80-4.5 MCG/ACT inhaler 2 puff  2 puff Inhalation BID - RT Angel Nuñez MD   2 puff at 12/11/21 0634   • diphenoxylate-atropine (LOMOTIL) 2.5-0.025 MG per tablet 1 tablet  1 tablet Oral Q2H PRN Denisse German APRN       • FLUoxetine (PROzac) capsule 10 mg  10 mg Oral Daily Angel Nuñez MD   10 mg at 12/11/21 0905   • Glycerin-Hypromellose- (ARTIFICIAL TEARS) 0.2-0.2-1 % ophthalmic solution solution 1 drop  1 drop Both Eyes Q30 Min PRN Denisse German APRN       • glycopyrrolate (ROBINUL) injection 0.2 mg  0.2 mg Intravenous Q2H PRN Denisse German APRN        Or   • glycopyrrolate (ROBINUL) injection 0.2 mg  0.2 mg Subcutaneous Q2H PRN Denisse German APRN        Or   • glycopyrrolate (ROBINUL) injection 0.4 mg  0.4 mg Intravenous Q2H PRN Denisse German APRN        Or   • glycopyrrolate (ROBINUL) injection 0.4 mg  0.4 mg Subcutaneous Q2H PRN Denisse German APRN       • guaiFENesin (MUCINEX) 12 hr tablet 600 mg  600 mg Oral BID Angel Nuñez MD   600 mg at 12/11/21 0906   • HYDROcodone-acetaminophen (NORCO) 7.5-325 MG per tablet 1 tablet  1 tablet Oral TID Aliya Muse APRN   1 tablet at 12/11/21 0905   • hydrOXYzine (ATARAX) tablet 50 mg  50 mg Oral Q6H PRN Aliya Muse APRN   50 mg at 12/09/21 1622   • ipratropium (ATROVENT) nebulizer solution 0.5 mg  0.5 mg Nebulization 4x Daily - RT Denisse German APRN   0.5 mg at 12/11/21 1020   • LORazepam (ATIVAN) tablet 0.5 mg  0.5 mg Oral Q1H PRN Denisse German,  APRN        Or   • LORazepam (ATIVAN) injection 0.5 mg  0.5 mg Intravenous Q1H PRN Woeltz, Denisse K, APRN        Or   • LORazepam (ATIVAN) injection 0.5 mg  0.5 mg Intramuscular Q1H PRN Woeltz, Denisse K, APRN        Or   • LORazepam (ATIVAN) injection 0.5 mg  0.5 mg Subcutaneous Q1H PRN Woeltz, Denisse K, APRN        Or   • LORazepam (ATIVAN) 2 MG/ML concentrated solution 0.5 mg  0.5 mg Oral Q1H PRN Woeltz, Denisse K, APRN        Or   • LORazepam (ATIVAN) 2 MG/ML concentrated solution 0.5 mg  0.5 mg Sublingual Q1H PRN Woeltz, Denisse K, APRN       • LORazepam (ATIVAN) tablet 1 mg  1 mg Oral Q1H PRN Woeltz, Denisse K, APRN        Or   • LORazepam (ATIVAN) injection 1 mg  1 mg Intravenous Q1H PRN Woeltz, Denisse K, APRN        Or   • LORazepam (ATIVAN) injection 1 mg  1 mg Intramuscular Q1H PRN Woeltz, Denisse K, APRN        Or   • LORazepam (ATIVAN) injection 1 mg  1 mg Subcutaneous Q1H PRN Woeltz, Denisse K, APRN        Or   • LORazepam (ATIVAN) 2 MG/ML concentrated solution 1 mg  1 mg Oral Q1H PRN Woeltz, Denisse K, APRN        Or   • LORazepam (ATIVAN) 2 MG/ML concentrated solution 1 mg  1 mg Sublingual Q1H PRN Woeltz, Denisse K, APRN       • LORazepam (ATIVAN) tablet 2 mg  2 mg Oral Q1H PRN Woeltz, Denisse K, APRN        Or   • LORazepam (ATIVAN) injection 2 mg  2 mg Intravenous Q1H PRN Woeltz, Denisse K, APRN        Or   • LORazepam (ATIVAN) injection 2 mg  2 mg Intramuscular Q1H PRN Woeltz, Denisse K, APRN        Or   • LORazepam (ATIVAN) injection 2 mg  2 mg Subcutaneous Q1H PRN Woeltz, Denisse K, APRN        Or   • LORazepam (ATIVAN) 2 MG/ML concentrated solution 2 mg  2 mg Oral Q1H PRN Woeltz, Denisse K, APRN        Or   • LORazepam (ATIVAN) 2 MG/ML concentrated solution 2 mg  2 mg Sublingual Q1H PRN Denisse German APRN       • metoprolol tartrate (LOPRESSOR) tablet 12.5 mg  12.5 mg Oral Q12H Denisse German APRN   12.5 mg at 12/11/21 0906   • montelukast (SINGULAIR) tablet 10 mg   "10 mg Oral Nightly Angel Nuñez MD   10 mg at 12/10/21 2047   • Morphine sulfate (PF) injection 2 mg  2 mg Intravenous Q1H PRN Woeltheriberto, Denisse K, APRN        Or   • morphine concentrated solution 10 mg  10 mg Oral Q3H PRN Woeltz, Denisse K, APRN       • ondansetron (ZOFRAN) tablet 4 mg  4 mg Oral Q6H PRN Woeltz, Denisse K, APRN        Or   • ondansetron (ZOFRAN) injection 4 mg  4 mg Intravenous Q6H PRN Woeltheriberto Denisse K, APRN       • oxyCODONE-acetaminophen (PERCOCET) 5-325 MG per tablet 1 tablet  1 tablet Oral Q6H PRN Angel Nuñez MD       • rOPINIRole (REQUIP) tablet 2 mg  2 mg Oral Nightly Angel Nuñez MD   2 mg at 12/10/21 2047   • sodium chloride 0.9 % flush 10 mL  10 mL Intravenous PRN Angel Nuñez MD   10 mL at 12/08/21 0335   • sodium chloride 0.9 % flush 10 mL  10 mL Intravenous Q12H Angel Nuñez MD   10 mL at 12/10/21 1327   • sodium chloride 0.9 % flush 10 mL  10 mL Intravenous PRN Angel Nuñez MD       • sodium chloride nasal spray 1 spray  1 spray Nasal PRN Angel Nuñez MD         Orders (last 24 hrs)      Start     Ordered    12/11/21 1149  Morphine sulfate (PF) injection 2 mg  Every 1 Hour PRN        \"Or\" Linked Group Details    12/11/21 1150    12/11/21 1149  morphine concentrated solution 10 mg  Every 3 Hours PRN        \"Or\" Linked Group Details    12/11/21 1150    12/11/21 1149  glycopyrrolate (ROBINUL) injection 0.2 mg  Every 2 Hours PRN        \"Or\" Linked Group Details    12/11/21 1150    12/11/21 1149  glycopyrrolate (ROBINUL) injection 0.2 mg  Every 2 Hours PRN        \"Or\" Linked Group Details    12/11/21 1150    12/11/21 1149  glycopyrrolate (ROBINUL) injection 0.4 mg  Every 2 Hours PRN        \"Or\" Linked Group Details    12/11/21 1150    12/11/21 1149  glycopyrrolate (ROBINUL) injection 0.4 mg  Every 2 Hours PRN        \"Or\" Linked Group Details    12/11/21 1150    12/11/21 1149  ondansetron (ZOFRAN) tablet " "4 mg  Every 6 Hours PRN        \"Or\" Linked Group Details    12/11/21 1150    12/11/21 1149  ondansetron (ZOFRAN) injection 4 mg  Every 6 Hours PRN        \"Or\" Linked Group Details    12/11/21 1150    12/11/21 1149  Maintain IV Access  Continuous        Comments: Subcutaneous Access Site If Unable to Obtain IV Access    12/11/21 1150    12/11/21 1149  Vital Signs Per Unit Protocol  Per Hospital Policy         12/11/21 1150    12/11/21 1149  VTE Prophylaxis Not Indicated: >/= 4 (High Risk); Comfort Measures Only  Once         12/11/21 1150    12/11/21 1149  Oxygen Therapy- Nasal Cannula; 2 LPM; Titrate for SPO2: Per Policy  Continuous        Comments: Add humidification to oxygen as needed for patient comfort.    12/11/21 1150    12/11/21 1149  Advance Diet as Tolerated  Until Discontinued         12/11/21 1150 12/11/21 1148  diphenoxylate-atropine (LOMOTIL) 2.5-0.025 MG per tablet 1 tablet  Every 2 Hours PRN         12/11/21 1150    12/11/21 1148  Glycerin-Hypromellose- (ARTIFICIAL TEARS) 0.2-0.2-1 % ophthalmic solution solution 1 drop  Every 30 Minutes PRN         12/11/21 1150    12/11/21 1148  acetaminophen (TYLENOL) tablet 650 mg  Every 4 Hours PRN        \"Or\" Linked Group Details    12/11/21 1150    12/11/21 1148  acetaminophen (TYLENOL) 160 MG/5ML solution 650 mg  Every 4 Hours PRN        \"Or\" Linked Group Details    12/11/21 1150    12/11/21 1148  acetaminophen (TYLENOL) suppository 650 mg  Every 4 Hours PRN        \"Or\" Linked Group Details    12/11/21 1150    12/11/21 1148  LORazepam (ATIVAN) tablet 1 mg  Every 1 Hour PRN        \"Or\" Linked Group Details    12/11/21 1150    12/11/21 1148  LORazepam (ATIVAN) injection 1 mg  Every 1 Hour PRN        \"Or\" Linked Group Details    12/11/21 1150    12/11/21 1148  LORazepam (ATIVAN) injection 1 mg  Every 1 Hour PRN        \"Or\" Linked Group Details    12/11/21 1150    12/11/21 1148  LORazepam (ATIVAN) injection 1 mg  Every 1 Hour PRN        \"Or\" Linked Group " "Details    12/11/21 1150    12/11/21 1148  LORazepam (ATIVAN) 2 MG/ML concentrated solution 1 mg  Every 1 Hour PRN        \"Or\" Linked Group Details    12/11/21 1150    12/11/21 1148  LORazepam (ATIVAN) 2 MG/ML concentrated solution 1 mg  Every 1 Hour PRN        \"Or\" Linked Group Details    12/11/21 1150    12/11/21 1148  LORazepam (ATIVAN) tablet 2 mg  Every 1 Hour PRN        \"Or\" Linked Group Details    12/11/21 1150    12/11/21 1148  LORazepam (ATIVAN) injection 2 mg  Every 1 Hour PRN        \"Or\" Linked Group Details    12/11/21 1150    12/11/21 1148  LORazepam (ATIVAN) injection 2 mg  Every 1 Hour PRN        \"Or\" Linked Group Details    12/11/21 1150    12/11/21 1148  LORazepam (ATIVAN) injection 2 mg  Every 1 Hour PRN        \"Or\" Linked Group Details    12/11/21 1150    12/11/21 1148  LORazepam (ATIVAN) 2 MG/ML concentrated solution 2 mg  Every 1 Hour PRN        \"Or\" Linked Group Details    12/11/21 1150    12/11/21 1148  LORazepam (ATIVAN) 2 MG/ML concentrated solution 2 mg  Every 1 Hour PRN        \"Or\" Linked Group Details    12/11/21 1150    12/11/21 1148  LORazepam (ATIVAN) tablet 0.5 mg  Every 1 Hour PRN        \"Or\" Linked Group Details    12/11/21 1150    12/11/21 1148  LORazepam (ATIVAN) injection 0.5 mg  Every 1 Hour PRN        \"Or\" Linked Group Details    12/11/21 1150    12/11/21 1148  LORazepam (ATIVAN) injection 0.5 mg  Every 1 Hour PRN        \"Or\" Linked Group Details    12/11/21 1150    12/11/21 1148  LORazepam (ATIVAN) injection 0.5 mg  Every 1 Hour PRN        \"Or\" Linked Group Details    12/11/21 1150    12/11/21 1148  LORazepam (ATIVAN) 2 MG/ML concentrated solution 0.5 mg  Every 1 Hour PRN        \"Or\" Linked Group Details    12/11/21 1150    12/11/21 1148  LORazepam (ATIVAN) 2 MG/ML concentrated solution 0.5 mg  Every 1 Hour PRN        \"Or\" Linked Group Details    12/11/21 1150    12/11/21 1147  Code Status and Medical Interventions:  Continuous         12/11/21 1148    12/11/21 0600  Basic " Metabolic Panel  Morning Draw         12/10/21 1049    12/11/21 0600  CBC (No Diff)  Morning Draw         12/10/21 1049    12/10/21 1600  methylPREDNISolone sodium succinate (SOLU-Medrol) injection 40 mg  Every 12 Hours,   Status:  Discontinued         12/10/21 0838    12/10/21 1449  Case Management  Consult  Once        Provider:  (Not yet assigned)    12/10/21 1449    12/10/21 1230  ipratropium (ATROVENT) nebulizer solution 0.5 mg  4 Times Daily - RT         12/10/21 1040    12/10/21 1130  metoprolol tartrate (LOPRESSOR) tablet 12.5 mg  Every 12 Hours Scheduled         12/10/21 1041    12/09/21 1600  HYDROcodone-acetaminophen (NORCO) 7.5-325 MG per tablet 1 tablet  3 Times Daily         12/09/21 1142    12/09/21 1142  hydrOXYzine (ATARAX) tablet 50 mg  Every 6 Hours PRN         12/09/21 1142    12/09/21 0600  Document Pulse Oximetry - On Room Air / Home O2 Level  Daily      Comments: Reapply Oxygen if O2 Sat Drops Below 88%    12/08/21 0418    12/09/21 0600  Tobacco Cessation Education  Daily      Comments: Document in Education Activity    12/08/21 0418    12/09/21 0600  Respiratory Treatment Education (MDI / Spacer / Nebulizer)  Daily      Comments: Document in Education Activity    12/08/21 0418    12/09/21 0600  COPD Education  Daily      Comments: Document in Education Activity    12/08/21 0418    12/08/21 2100  montelukast (SINGULAIR) tablet 10 mg  Nightly         12/08/21 0418    12/08/21 2100  rOPINIRole (REQUIP) tablet 2 mg  Nightly         12/08/21 0418    12/08/21 1800  Dietary Nutrition Supplements Boost Plus (Ensure Enlive, Ensure Plus); strawberry  Daily With Breakfast & Dinner       12/08/21 1752    12/08/21 0900  docusate sodium (COLACE) capsule 100 mg  2 Times Daily,   Status:  Discontinued         12/08/21 0418 12/08/21 0900  famotidine (PEPCID) tablet 20 mg  Daily,   Status:  Discontinued         12/08/21 0418 12/08/21 0900  FLUoxetine (PROzac) capsule 10 mg  Daily          "12/08/21 0418 12/08/21 0900  guaiFENesin (MUCINEX) 12 hr tablet 600 mg  2 Times Daily         12/08/21 0418 12/08/21 0900  cetirizine (zyrTEC) tablet 10 mg  Daily,   Status:  Discontinued         12/08/21 0418 12/08/21 0900  sodium chloride 0.9 % flush 10 mL  Every 12 Hours Scheduled         12/08/21 0418 12/08/21 0900  enoxaparin (LOVENOX) syringe 40 mg  Every 24 Hours,   Status:  Discontinued         12/08/21 0418 12/08/21 0800  Cough / Deep Breathe  Every 4 Hours       12/08/21 0418 12/08/21 0800  levoFLOXacin (LEVAQUIN) tablet 500 mg  Every 24 Hours,   Status:  Discontinued         12/08/21 0418 12/08/21 0700  budesonide-formoterol (SYMBICORT) 80-4.5 MCG/ACT inhaler 2 puff  2 Times Daily - RT         12/08/21 0418 12/08/21 0600  Incentive Spirometry  Every 4 Hours While Awake       12/08/21 0418 12/08/21 0418  oxyCODONE-acetaminophen (PERCOCET) 5-325 MG per tablet 1 tablet  Every 6 Hours PRN         12/08/21 0418    12/08/21 0418  sodium chloride nasal spray 1 spray  As Needed         12/08/21 0418    12/08/21 0418  sodium chloride 0.9 % flush 10 mL  As Needed         12/08/21 0418    12/08/21 0418  acetaminophen (TYLENOL) tablet 650 mg  Every 4 Hours PRN         12/08/21 0418    12/08/21 0418  albuterol (PROVENTIL) nebulizer solution 0.083% 2.5 mg/3mL  Every 4 Hours PRN         12/08/21 0418    12/07/21 2343  sodium chloride 0.9 % flush 10 mL  As Needed        \"And\" Linked Group Details    12/07/21 2343    Unscheduled  Up With Assistance  As Needed       12/11/21 1150    Pending  ECG 12 Lead  Once         Pending    --  SCANNED EKG         12/07/21 0000    --  SCANNED - TELEMETRY           12/07/21 0000    --  SCANNED - TELEMETRY           12/07/21 0000    --  SCANNED - TELEMETRY           12/07/21 0000    --  SCANNED - TELEMETRY           12/07/21 0000                   Physician Progress Notes (last 24 hours)      Denisse German, APRN at 12/11/21 1151              [facility] " "[facility] Medicine Services  INPATIENT PROGRESS NOTE    Length of Stay: 3  Date of Admission: 12/7/2021  Primary Care Physician: Pato Cooney DO    Subjective   Chief Complaint: Follow-up shortness of breath  HPI   Patient resting in bed.  She does appear more comfortable today and states she is feeling less short of breath.  She denies any chest pain presently.  She states she has not been coughing as much.  She tells me \"I am tired and I do not want to keep fighting\".  She agrees to transition to hospice/comfort care at this time and does not want to continue to come back and forth to the hospital.    Review of Systems   All pertinent negatives and positives are as above. All other systems have been reviewed and are negative unless otherwise stated.     Objective    Temp:  [97.9 °F (36.6 °C)-99 °F (37.2 °C)] 98.3 °F (36.8 °C)  Heart Rate:  [] 93  Resp:  [16-22] 18  BP: ()/(49-75) 93/50  Physical Exam  Vitals and nursing note reviewed.   Constitutional:       Appearance: She is ill-appearing. She is not toxic-appearing.   HENT:      Head: Normocephalic and atraumatic.   Cardiovascular:      Rate and Rhythm: Regular rhythm. Tachycardia present.      Heart sounds: Normal heart sounds.      Comments: Sinus-sinus tach   Pulmonary:      Breath sounds: No wheezing or rhonchi.      Comments: Grossly diminished throughout.  Much less labored today.  Abdominal:      General: Bowel sounds are normal. There is no distension.      Palpations: Abdomen is soft.      Tenderness: There is no abdominal tenderness.   Musculoskeletal:         General: No swelling or tenderness. Normal range of motion.      Cervical back: Normal range of motion and neck supple. No tenderness.   Skin:     General: Skin is warm and dry.      Findings: No erythema or rash.   Neurological:      General: No focal deficit present.      Mental Status: She is alert and oriented to person, place, and time. "   Psychiatric:         Mood and Affect: Mood normal.         Behavior: Behavior normal.         Thought Content: Thought content normal.         Judgment: Judgment normal.     Results Review:  I have reviewed the labs, radiology results, and diagnostic studies.    Laboratory Data:   Results from last 7 days   Lab Units 12/11/21  0518 12/10/21  0452 12/09/21  0618   WBC 10*3/mm3 8.55 12.75* 10.24   HEMOGLOBIN g/dL 10.2* 9.9* 10.3*   HEMATOCRIT % 37.4 36.9 37.2   PLATELETS 10*3/mm3 243 265 305     Results from last 7 days   Lab Units 12/11/21  0518 12/10/21  0452 12/09/21  0618 12/08/21  0445 12/07/21  2349   SODIUM mmol/L 141 139 140   < > 143   POTASSIUM mmol/L 3.8 4.3 4.4   < > 4.4   CHLORIDE mmol/L 90* 88* 90*   < > 94*   CO2 mmol/L 48.0* 48.0* 47.0*   < > 40.0*   BUN mg/dL 33* 36* 26*   < > 26*   CREATININE mg/dL 0.51* 0.48* 0.53*   < > 0.45*   CALCIUM mg/dL 9.8 9.3 9.5   < > 10.0   BILIRUBIN mg/dL  --   --   --   --  0.3   ALK PHOS U/L  --   --   --   --  100   ALT (SGPT) U/L  --   --   --   --  12   AST (SGOT) U/L  --   --   --   --  16   GLUCOSE mg/dL 100* 116* 138*   < > 133*    < > = values in this interval not displayed.     I have reviewed the patient current medications.     Assessment/Plan     Active Hospital Problems    Diagnosis    • **Acute on chronic respiratory failure with hypoxia and hypercapnia (HCC)    • Acute-on-chronic respiratory failure (HCC)    • NICK (obstructive sleep apnea)    • Pulmonary emphysema (HCC)    • COPD, very severe (HCC)    • Severe malnutrition (HCC)      Plan:  1.  Patient presented to the emergency department on 12/7/2021 with complaints of shortness of breath.  She typically wears oxygen at 4-1/2 to 5 L at home continuously.  She was recently admitted to our facility from 11/23/2021 through 11/27/2021 with acute on chronic respiratory failure with hypoxia and hypercapnia.  She was discharged with a trilogy device.  She apparently did not feel this was working correctly and  then stopped using this at home.  2.  The patient recently had a CTA of the chest during her last admission which showed no evidence of pulmonary embolus.  Her chest x-ray on this admission showed emphysematous changes and superimposed cardiomegaly with increased interstitial markings and Kerley B-lines suggesting pulmonary edema.  Small pleural effusions present.  Repeat chest x-ray 12/9 showed mild cardiomegaly and changes of interstitial pulmonary edema with small to moderate bilateral pleural effusions.  She has been given IV Lasix, IV Bumex and Diamox for diuresis.  Her last echocardiogram in September showed moderate to severe pulmonary hypertension, severe dilation of the right atrial cavity with hyperdynamic ejection fraction.  Diastolic function indeterminate.  Likely has cor pulmonale secondary to pulmonary hypertension.  3.  The patient's heart rate is improved with discontinuation of DuoNebs in favor of plain Atrovent in addition of Lopressor.  4.  Appreciate palliative care assistance.  The patient is in agreement she would like to transition to comfort care.  Comfort care orders placed with as needed medications for pain/air hunger, agitation, nausea, and secretions.  Other unnecessary medications that are not aligned with comfort have been discontinued.  This was discussed with her son, family is coming in today to see the patient.  5.   is following.  Patient may transition to hospice care at skilled nursing facility if clinically appropriate on Monday.  Will monitor over the weekend.    Discharge Planning: I expect the patient to be discharged to ? in ? days.    Electronically signed by JESSENIA Paniagua, 12/11/2021, 11:51 CST.      Electronically signed by Denisse German APRN at 12/11/21 3166     Aliya Muse APRN at 12/10/21 1412          Palliative Care Daily Progress Note   Chief complaint-follow up goals of care/advanced care planning, support for  patient/family and hospice referral/discussion    Code Status:   Code Status and Medical Interventions:   Ordered at: 12/08/21 1348     Medical Intervention Limits:    NO intubation (DNI)    NO cardioversion    NO antiarrhythmic drugs    NO artificial nutrition    NO vasopressors     Level Of Support Discussed With:    Patient     Code Status (Patient has no pulse and is not breathing):    No CPR (Do Not Attempt to Resuscitate)     Medical Interventions (Patient has pulse or is breathing):    Limited Support      Advanced Directives: Advance Directive Status: Patient has advance directive, copy in chart   Goals of Care: Ongoing.     S: Medical record reviewed. Events noted.  Laying in bed without apparent distress.  Reports her breathing is much improved compared to yesterday.  She believes scheduled opiates have contributed to this.  She is currently on Vapotherm 25 L, FiO2 70%.  Saturation 97%.  She does not appear breathless with conversation or at rest today which is much improved compared to yesterday.  No family currently at bedside. Due to the Palliative Care Topics Discussed: goals of care, care options, [facility] and discharge options we will establish an advance care plan.   Advance Care Planning   Advance Care Planning Discussion: Spoke with patient in room and son via telephone.  Patient is pleased that her breathing has improved significantly compared to yesterday.  We discussed medical priorities to include continued weaning of oxygen and hopeful transition to nasal cannula so that she can discharge to skilled nursing facility closer to be with her .  We discussed in the event of further decline or worsening of respiratory status transitions to comfort care could be arranged at that time.  She verbalized understanding.  We further discussed discharge plans at skilled facility, as well as, recommendations to include hospice services as her goal is not to pursue aggressive care.  She is agreeable  to this plan.  In conversation with son states they had a good conversation overnight and he is currently planning on bringing patient's spouse who has dementia for a visit as it is their wedding anniversary today.  Questions answered to patient and family satisfaction.     Review of Systems   Constitutional: Positive for malaise/fatigue.   Cardiovascular: Positive for dyspnea on exertion. Negative for leg swelling.   Respiratory: Positive for shortness of breath.    Hematologic/Lymphatic: Negative for bleeding problem.   Musculoskeletal: Positive for muscle weakness.   Gastrointestinal: Negative for nausea.   Genitourinary: Negative for dysuria.   Neurological: Positive for weakness.   Psychiatric/Behavioral: The patient is nervous/anxious.      Pain Assessment  Preferred Pain Scale: number (Numeric Rating Pain Scale)    O:     Intake/Output Summary (Last 24 hours) at 12/10/2021 1449  Last data filed at 12/10/2021 0315  Gross per 24 hour   Intake --   Output 400 ml   Net -400 ml       Diagnostics: Reviewed    Current Facility-Administered Medications   Medication Dose Route Frequency Provider Last Rate Last Admin   • acetaminophen (TYLENOL) tablet 650 mg  650 mg Oral Q4H PRN Angel Nuñez MD       • albuterol (PROVENTIL) nebulizer solution 0.083% 2.5 mg/3mL  2.5 mg Nebulization Q4H PRN Angel Nuñez MD       • budesonide-formoterol (SYMBICORT) 80-4.5 MCG/ACT inhaler 2 puff  2 puff Inhalation BID - RT Angel Nuñez MD   2 puff at 12/10/21 0644   • cetirizine (zyrTEC) tablet 10 mg  10 mg Oral Daily Angel Nuñez MD   10 mg at 12/10/21 0830   • docusate sodium (COLACE) capsule 100 mg  100 mg Oral BID Angel Nuñez MD   100 mg at 12/10/21 0830   • enoxaparin (LOVENOX) syringe 40 mg  40 mg Subcutaneous Q24H Angel Nuñez MD   40 mg at 12/10/21 0830   • famotidine (PEPCID) tablet 20 mg  20 mg Oral Daily Angel Nuñez MD   20 mg at 12/10/21 0830    • FLUoxetine (PROzac) capsule 10 mg  10 mg Oral Daily Angel Nuñez MD   10 mg at 12/10/21 0830   • guaiFENesin (MUCINEX) 12 hr tablet 600 mg  600 mg Oral BID Angel Nuñez MD   600 mg at 12/10/21 0830   • HYDROcodone-acetaminophen (NORCO) 7.5-325 MG per tablet 1 tablet  1 tablet Oral TID Aliya Muse APRN   1 tablet at 12/10/21 0830   • hydrOXYzine (ATARAX) tablet 50 mg  50 mg Oral Q6H PRN Aliya Muse APRN   50 mg at 12/09/21 1622   • ipratropium (ATROVENT) nebulizer solution 0.5 mg  0.5 mg Nebulization 4x Daily - RT Denisse German APRN   0.5 mg at 12/10/21 1133   • levoFLOXacin (LEVAQUIN) tablet 500 mg  500 mg Oral Q24H Angel Nuñez MD   500 mg at 12/10/21 0831   • methylPREDNISolone sodium succinate (SOLU-Medrol) injection 40 mg  40 mg Intravenous Q12H Yoselyn Rodas, APRN       • metoprolol tartrate (LOPRESSOR) tablet 12.5 mg  12.5 mg Oral Q12H Denisse German APRCARLOS   12.5 mg at 12/10/21 1159   • montelukast (SINGULAIR) tablet 10 mg  10 mg Oral Nightly Angel Nuñez MD   10 mg at 12/09/21 2037   • oxyCODONE-acetaminophen (PERCOCET) 5-325 MG per tablet 1 tablet  1 tablet Oral Q6H PRN Angel Nuñez MD       • rOPINIRole (REQUIP) tablet 2 mg  2 mg Oral Nightly Angel Nuñez MD   2 mg at 12/09/21 2037   • sodium chloride 0.9 % flush 10 mL  10 mL Intravenous PRN Angel Nuñez MD   10 mL at 12/08/21 0335   • sodium chloride 0.9 % flush 10 mL  10 mL Intravenous Q12H Angel Nuñez MD   10 mL at 12/10/21 1327   • sodium chloride 0.9 % flush 10 mL  10 mL Intravenous PRN Angel Nuñez MD       • sodium chloride nasal spray 1 spray  1 spray Nasal PRN Angel Nuñez MD            •  acetaminophen  •  albuterol  •  hydrOXYzine  •  oxyCODONE-acetaminophen  •  [COMPLETED] Insert peripheral IV **AND** sodium chloride  •  sodium chloride  •  sodium chloride    A:    /75 (BP Location: Left  "arm, Patient Position: Lying)   Pulse (!) 133   Temp 99 °F (37.2 °C) (Oral)   Resp 22   Ht 157.5 cm (62\")   Wt 48.3 kg (106 lb 7.7 oz)   SpO2 90%   BMI 19.48 kg/m²     Vitals and nursing note reviewed.   Constitutional:       Appearance: Cachectic and frail. Ill-appearing and chronically ill-appearing.      Interventions: Nasal cannula in place.   Eyes:      General: Lids are normal.      Pupils: Pupils are equal, round, and reactive to light.   HENT:      Head: Normocephalic.   Pulmonary:      Effort: Accessory muscle usage present.  Does not appear breathless at rest or with conversation.     Comments: Vapotherm 25 L FiO2 70%.    Cardiovascular:      Tachycardia present.   Abdominal:      Palpations: Abdomen is soft.   Musculoskeletal:      Cervical back: Neck supple. Skin:     General: Skin is warm and dry.   Genitourinary:     Comments: Voiding without difficulty  Neurological:      Mental Status: Alert and oriented to person, place, and time.   Psychiatric:         Mood and Affect: Mood is calm and actually smiling.         Cognition and Memory: Cognition normal.      Patient status: Disease state: Deteriorating despite treatments.  Functional status: Palliative Performance Scale Score: Performance 40% based on the following measures: Ambulation: Mainly in bed, Activity and Evidence of Disease: Unable to do any work, extensive evidence of disease, Self-Care: Mainly assistance required,  Intake: Normal or reduced, LOC: Full, drowsy or confusion   Nutritional status: Albumin 3.40. Body mass index is 19.48 kg/m².      Family support: The patient receives support from her son..  Advance Directives: Advance Directive Status: Patient has advance directive, copy in chart   POA/Healthcare surrogate-son/Rubi Salcedo.     Impression/Problem List:     1.  Stage IV COPD  2.  Acute on chronic respiratory failure  3.  Severe pulmonary hypertension  4.  Pulmonary edema  5.  Former smoker  6.  Obstructive sleep " "apnea     Recommendations/Plan:  1. plan: Goals of care include CODE STATUS no CPR/limited support interventions per attending.     2.  Palliative care encounter  -Poor long-term prognosis secondary to stage IV COPD, chronic respiratory failure, severe pulmonary hypertension, and multiple comorbidities.  -home trilogy device that was not functioning prior to this admission   -open APS investigation that was initiated by PCP    12/10-Currently Vapotherm 25 L, FiO2 70%.  O2 saturation 97%.  Respiratory therapy at bedside reports getting ready to further wean down oxygen this afternoon.    -Patient wishes to continue weaning of oxygen (Vapotherm) and hopeful will be able to d/c to SNF closer to .       -Plan for discharge to nursing facility with hospice services per discussion with patient today \"I am tired\".     -Son states Medicaid benefits will start at first of the month.  Son would be open to [facility] or [facility] as they have had good luck out of these facilities in the past.  SW notified.     3.  Dyspnea  -End-stage COPD  -Continue trial of scheduled opiates and as needed     4.  Anxiety  -Continue Atarax as needed        Thank you for allowing us to participate in patient's plan of care. Palliative Care Team will continue to follow patient.     Time spent: 53 minutes spent reviewing medical and medication records, assessing and examining patient, discussing with family, answering questions, providing some guidance about a plan and documentation of care, and coordinating care with other healthcare members, with > 50% time spent face to face.   18 minutes spent on advance care planning    JESSENIA Corbin  12/10/2021    Electronically signed by Aliya Muse APRN at 12/10/21 1501       Consult Notes (last 24 hours)  Notes from 12/10/21 1310 through 12/11/21 1310   No notes of this type exist for this encounter.         "

## 2021-12-11 NOTE — PROGRESS NOTES
"    [facility] Medicine Services  INPATIENT PROGRESS NOTE    Length of Stay: 3  Date of Admission: 12/7/2021  Primary Care Physician: Pato Cooney DO    Subjective   Chief Complaint: Follow-up shortness of breath  HPI   Patient resting in bed.  She does appear more comfortable today and states she is feeling less short of breath.  She denies any chest pain presently.  She states she has not been coughing as much.  She tells me \"I am tired and I do not want to keep fighting\".  She agrees to transition to hospice/comfort care at this time and does not want to continue to come back and forth to the hospital.    Review of Systems   All pertinent negatives and positives are as above. All other systems have been reviewed and are negative unless otherwise stated.     Objective    Temp:  [97.9 °F (36.6 °C)-99 °F (37.2 °C)] 98.3 °F (36.8 °C)  Heart Rate:  [] 93  Resp:  [16-22] 18  BP: ()/(49-75) 93/50  Physical Exam  Vitals and nursing note reviewed.   Constitutional:       Appearance: She is ill-appearing. She is not toxic-appearing.   HENT:      Head: Normocephalic and atraumatic.   Cardiovascular:      Rate and Rhythm: Regular rhythm. Tachycardia present.      Heart sounds: Normal heart sounds.      Comments: Sinus-sinus tach   Pulmonary:      Breath sounds: No wheezing or rhonchi.      Comments: Grossly diminished throughout.  Much less labored today.  Abdominal:      General: Bowel sounds are normal. There is no distension.      Palpations: Abdomen is soft.      Tenderness: There is no abdominal tenderness.   Musculoskeletal:         General: No swelling or tenderness. Normal range of motion.      Cervical back: Normal range of motion and neck supple. No tenderness.   Skin:     General: Skin is warm and dry.      Findings: No erythema or rash.   Neurological:      General: No focal deficit present.      Mental Status: She is alert and oriented to person, place, " and time.   Psychiatric:         Mood and Affect: Mood normal.         Behavior: Behavior normal.         Thought Content: Thought content normal.         Judgment: Judgment normal.     Results Review:  I have reviewed the labs, radiology results, and diagnostic studies.    Laboratory Data:   Results from last 7 days   Lab Units 12/11/21  0518 12/10/21  0452 12/09/21  0618   WBC 10*3/mm3 8.55 12.75* 10.24   HEMOGLOBIN g/dL 10.2* 9.9* 10.3*   HEMATOCRIT % 37.4 36.9 37.2   PLATELETS 10*3/mm3 243 265 305     Results from last 7 days   Lab Units 12/11/21 0518 12/10/21  0452 12/09/21  0618 12/08/21 0445 12/07/21  2349   SODIUM mmol/L 141 139 140   < > 143   POTASSIUM mmol/L 3.8 4.3 4.4   < > 4.4   CHLORIDE mmol/L 90* 88* 90*   < > 94*   CO2 mmol/L 48.0* 48.0* 47.0*   < > 40.0*   BUN mg/dL 33* 36* 26*   < > 26*   CREATININE mg/dL 0.51* 0.48* 0.53*   < > 0.45*   CALCIUM mg/dL 9.8 9.3 9.5   < > 10.0   BILIRUBIN mg/dL  --   --   --   --  0.3   ALK PHOS U/L  --   --   --   --  100   ALT (SGPT) U/L  --   --   --   --  12   AST (SGOT) U/L  --   --   --   --  16   GLUCOSE mg/dL 100* 116* 138*   < > 133*    < > = values in this interval not displayed.     I have reviewed the patient current medications.     Assessment/Plan     Active Hospital Problems    Diagnosis    • **Acute on chronic respiratory failure with hypoxia and hypercapnia (HCC)    • Acute-on-chronic respiratory failure (HCC)    • NICK (obstructive sleep apnea)    • Pulmonary emphysema (HCC)    • COPD, very severe (HCC)    • Severe malnutrition (HCC)      Plan:  1.  Patient presented to the emergency department on 12/7/2021 with complaints of shortness of breath.  She typically wears oxygen at 4-1/2 to 5 L at home continuously.  She was recently admitted to our facility from 11/23/2021 through 11/27/2021 with acute on chronic respiratory failure with hypoxia and hypercapnia.  She was discharged with a trilogy device.  She apparently did not feel this was working  correctly and then stopped using this at home.  2.  The patient recently had a CTA of the chest during her last admission which showed no evidence of pulmonary embolus.  Her chest x-ray on this admission showed emphysematous changes and superimposed cardiomegaly with increased interstitial markings and Kerley B-lines suggesting pulmonary edema.  Small pleural effusions present.  Repeat chest x-ray 12/9 showed mild cardiomegaly and changes of interstitial pulmonary edema with small to moderate bilateral pleural effusions.  She has been given IV Lasix, IV Bumex and Diamox for diuresis.  Her last echocardiogram in September showed moderate to severe pulmonary hypertension, severe dilation of the right atrial cavity with hyperdynamic ejection fraction.  Diastolic function indeterminate.  Likely has cor pulmonale secondary to pulmonary hypertension.  3.  The patient's heart rate is improved with discontinuation of DuoNebs in favor of plain Atrovent in addition of Lopressor.  4.  Appreciate palliative care assistance.  The patient is in agreement she would like to transition to comfort care.  Comfort care orders placed with as needed medications for pain/air hunger, agitation, nausea, and secretions.  Other unnecessary medications that are not aligned with comfort have been discontinued.  This was discussed with her son, family is coming in today to see the patient.  5.   is following.  Patient may transition to hospice care at skilled nursing facility if clinically appropriate on Monday.  Will monitor over the weekend.    Discharge Planning: I expect the patient to be discharged to ? in ? days.    Electronically signed by JESSENIA Paniagua, 12/11/2021, 11:51 CST.

## 2021-12-11 NOTE — PLAN OF CARE
Goal Outcome Evaluation: Patient is alerted and oriented, patient is bedridden with Turns Q2. Currently on vapotherm 25/60 with sats . Respiratory therapist evaluation patient for transition to NC.  in place. No complaints of pain. BP is low but that has been the trend for a few days. Patient is being transitioned to comfort care and dc to SNF with hospice is planned. Family has been visiting with patient today. Telemetry has been dc'd per MD order. Fall risk protocol. Bed alarm on and call light is in place.

## 2021-12-11 NOTE — CASE MANAGEMENT/SOCIAL WORK
Continued Stay Note   Pedro     Patient Name: Heide Newsome  MRN: [id number]  Today's Date: 12/11/2021    Admit Date: 12/7/2021     Discharge Plan     Row Name 12/11/21 1322       Plan    Plan Comments Faxed updates to [facility] and [facility] [place]. Noted possible dc to snf with hospice services first of week. Will follow up on Monday for answers.               Discharge Codes    No documentation.                     FLAKITA Chambers

## 2021-12-12 NOTE — PLAN OF CARE
Goal Outcome Evaluation:  Plan of Care Reviewed With: patient        Patient was A&O this evening but CO being tired. She was sleeping well through the shift, no pain or anxiety meds required. Still on Vapotherm for comfort, no family at bedside, have not tapered down O2 yet. Voiding still per chantel.

## 2021-12-12 NOTE — PLAN OF CARE
Problem: Asthma Comorbidity  Goal: Maintenance of Asthma Control  Outcome: Ongoing, Progressing     Problem: COPD Comorbidity  Goal: Maintenance of COPD Symptom Control  Outcome: Ongoing, Progressing     Problem: Diabetes Comorbidity  Goal: Blood Glucose Level Within Desired Range  Outcome: Ongoing, Progressing     Problem: Heart Failure Comorbidity  Goal: Maintenance of Heart Failure Symptom Control  Outcome: Ongoing, Progressing     Problem: Hypertension Comorbidity  Goal: Blood Pressure in Desired Range  Outcome: Ongoing, Progressing     Problem: Obstructive Sleep Apnea Risk or Actual (Comorbidity Management)  Goal: Unobstructed Breathing During Sleep  Outcome: Ongoing, Progressing     Problem: Pain Chronic (Persistent) (Comorbidity Management)  Goal: Acceptable Pain Control and Functional Ability  Outcome: Ongoing, Progressing     Problem: Seizure Disorder Comorbidity  Goal: Maintenance of Seizure Control  Outcome: Ongoing, Progressing     Problem: Fall Injury Risk  Goal: Absence of Fall and Fall-Related Injury  Outcome: Ongoing, Progressing  Intervention: Promote Injury-Free Environment  Recent Flowsheet Documentation  Taken 12/12/2021 1400 by Shahnaz Jeter RN  Safety Promotion/Fall Prevention: safety round/check completed  Taken 12/12/2021 1246 by Shahnaz Jeter RN  Safety Promotion/Fall Prevention: safety round/check completed  Taken 12/12/2021 1200 by Shahnaz Jeter RN  Safety Promotion/Fall Prevention: safety round/check completed  Taken 12/12/2021 1000 by Shahnaz Jeter RN  Safety Promotion/Fall Prevention: safety round/check completed  Taken 12/12/2021 0900 by Shahnaz Jeter RN  Safety Promotion/Fall Prevention: safety round/check completed  Taken 12/12/2021 0800 by Shahnaz Jeter RN  Safety Promotion/Fall Prevention: safety round/check completed     Problem: End-of-Life Care  Goal: Comfort, Peace and Preserved Dignity  Outcome: Ongoing, Progressing     Problem: Skin Injury Risk  Increased  Goal: Skin Health and Integrity  Outcome: Ongoing, Progressing   Goal Outcome Evaluation:

## 2021-12-12 NOTE — PROGRESS NOTES
[facility] Medicine Services  INPATIENT PROGRESS NOTE    Length of Stay: 4  Date of Admission: 12/7/2021  Primary Care Physician: Pato Cooney DO    Subjective   Chief Complaint: Follow-up  HPI   Patient resting comfortably in bed.  She states she is feeling better overall.  She is currently on 5 L nasal cannula with oxygen saturation of 89%.  Her shortness of breath seems to be improved.  She seems very relaxed and denies any pain.  She states she has not been coughing quite as much.  She does state that she would like to try to have a bowel movement, does not feel uncomfortable necessarily but has not moved her bowels in 3 days.    Review of Systems   All pertinent negatives and positives are as above. All other systems have been reviewed and are negative unless otherwise stated.     Objective    Temp:  [97.4 °F (36.3 °C)-98.3 °F (36.8 °C)] 97.9 °F (36.6 °C)  Heart Rate:  [] 106  Resp:  [18-22] 18  BP: ()/(55-68) 116/55  Physical Exam  Vitals and nursing note reviewed.   Constitutional:       Appearance: She is ill-appearing. She is not toxic-appearing.   HENT:      Head: Normocephalic and atraumatic.   Cardiovascular:      Rate and Rhythm: Regular rhythm.      Heart sounds: Normal heart sounds.   Pulmonary:      Breath sounds: No wheezing or rhonchi.      Comments: Grossly diminished throughout.  Much less labored today.  Abdominal:      General: Bowel sounds are normal. There is no distension.      Palpations: Abdomen is soft.      Tenderness: There is no abdominal tenderness.   Musculoskeletal:         General: No swelling or tenderness. Normal range of motion.      Cervical back: Normal range of motion and neck supple. No tenderness.   Skin:     General: Skin is warm and dry.  Pale with bilateral forearm bruising.     Findings: No erythema or rash.   Neurological:      General: No focal deficit present.      Mental Status: She is alert and oriented to  person, place, and time.   Psychiatric:         Mood and Affect: Mood normal.         Behavior: Behavior normal.         Thought Content: Thought content normal.         Judgment: Judgment normal.     Results Review:  I have reviewed the labs, radiology results, and diagnostic studies.    Laboratory Data:   Results from last 7 days   Lab Units 12/11/21  0518 12/10/21  0452 12/09/21  0618   WBC 10*3/mm3 8.55 12.75* 10.24   HEMOGLOBIN g/dL 10.2* 9.9* 10.3*   HEMATOCRIT % 37.4 36.9 37.2   PLATELETS 10*3/mm3 243 265 305     Results from last 7 days   Lab Units 12/11/21 0518 12/10/21  0452 12/09/21  0618 12/08/21 0445 12/07/21  2349   SODIUM mmol/L 141 139 140   < > 143   POTASSIUM mmol/L 3.8 4.3 4.4   < > 4.4   CHLORIDE mmol/L 90* 88* 90*   < > 94*   CO2 mmol/L 48.0* 48.0* 47.0*   < > 40.0*   BUN mg/dL 33* 36* 26*   < > 26*   CREATININE mg/dL 0.51* 0.48* 0.53*   < > 0.45*   CALCIUM mg/dL 9.8 9.3 9.5   < > 10.0   BILIRUBIN mg/dL  --   --   --   --  0.3   ALK PHOS U/L  --   --   --   --  100   ALT (SGPT) U/L  --   --   --   --  12   AST (SGOT) U/L  --   --   --   --  16   GLUCOSE mg/dL 100* 116* 138*   < > 133*    < > = values in this interval not displayed.     I have reviewed the patient current medications.     Assessment/Plan     Active Hospital Problems    Diagnosis    • **Acute on chronic respiratory failure with hypoxia and hypercapnia (HCC)    • Acute-on-chronic respiratory failure (HCC)    • NICK (obstructive sleep apnea)    • Pulmonary emphysema (HCC)    • COPD, very severe (HCC)    • Severe malnutrition (HCC)      Plan:  1.  Patient presented to the emergency department on 12/7/2021 with complaints of shortness of breath.  She typically wears oxygen at 4-1/2 to 5 L at home continuously.  She was recently admitted to our facility from 11/23/2021 through 11/27/2021 with acute on chronic respiratory failure with hypoxia and hypercapnia.  She was discharged with a trilogy device.  She apparently did not feel this  was working correctly and then stopped using this at home.  2.  The patient recently had a CTA of the chest during her last admission which showed no evidence of pulmonary embolus.  Her chest x-ray on this admission showed emphysematous changes and superimposed cardiomegaly with increased interstitial markings and Kerley B-lines suggesting pulmonary edema.  Small pleural effusions present.  Repeat chest x-ray 12/9 showed mild cardiomegaly and changes of interstitial pulmonary edema with small to moderate bilateral pleural effusions.  She has been given IV Lasix, IV Bumex and Diamox for diuresis.  Her last echocardiogram in September showed moderate to severe pulmonary hypertension, severe dilation of the right atrial cavity with hyperdynamic ejection fraction.  Diastolic function indeterminate.  Likely has cor pulmonale secondary to pulmonary hypertension.  3.  The patient's heart rate is improved with discontinuation of DuoNebs in favor of plain Atrovent in addition of Lopressor.  4.  Add Senokot daily for bowel regimen and will give a Dulcolax suppository today.  5.  Appreciate palliative care assistance.  The patient has transitioned to comfort care.  Orders placed with as needed medications for pain/air hunger, agitation, nausea, and secretions.  Other unnecessary medications that are not aligned with comfort care have been discontinued.  6.  Continue weaning supplemental oxygen, patient no longer on Vapotherm and now on 5 L nasal cannula.  7.   is following.  Patient may transition to hospice care at skilled nursing facility if clinically appropriate on Monday.    Discharge Planning: I expect the patient to be discharged to ? in ? days.    Electronically signed by JESSENIA Paniagua, 12/12/2021, 12:27 CST.

## 2021-12-13 NOTE — PLAN OF CARE
Goal Outcome Evaluation:   Patient alert and oriented. Mild confusion to situation at night and needs reorientation. Vital signs stable. 5L nc. Bm 12/12. Refused pm dulcolax. Pain medications scheduled. Awaiting discharge planning

## 2021-12-13 NOTE — PLAN OF CARE
Goal Outcome Evaluation:            Pt alert and oriented x3, denies pain, and weaning down O2. Planning to go home with hospice . Continue to turn often and bedalarm in place.

## 2021-12-13 NOTE — CASE MANAGEMENT/SOCIAL WORK
Continued Stay Note   Griffith     Patient Name: Heide Newsome  MRN: [id number]  Today's Date: 12/13/2021    Admit Date: 12/7/2021     Discharge Plan     Row Name 12/13/21 1554       Plan    Plan Comments [facility] cannot accept pt at present time. Just spoke to Carlene at [facility] and she states they will discuss referral in the morning meeting tomorrow at 9am to see if they can accept pt. Carrie states she will update SW before noon tomorrow with an answer. Updated pt and asked if she would be agreeable for referrals to be sent to other [facility] and she declined. Pt states if [facility] cannot accept she will dc back home.               Discharge Codes    No documentation.                     FLAKITA Chambers

## 2021-12-13 NOTE — PROGRESS NOTES
"Palliative Care Daily Progress Note   Chief complaint-follow up comfort care    Code Status:   Code Status and Medical Interventions:   Ordered at: 12/11/21 1148     Level Of Support Discussed With:    Patient     Code Status (Patient has no pulse and is not breathing):    No CPR (Do Not Attempt to Resuscitate)     Medical Interventions (Patient has pulse or is breathing):    Comfort Measures      Advanced Directives: Advance Directive Status: Patient has advance directive, copy in chart   Goals of Care: Ongoing.     S: Medical record reviewed. Events noted.  Laying in bed without apparent distress.  Transition to comfort measures over the weekend.  Currently on 4 L via nasal cannula with accessory muscle usage, though she states her breathing is okay this morning.  Denies anxiety.  No family currently at bedside.  Awaiting discharge to nursing facility for hospice services. Due to the Palliative Care Topics Discussed: goals of care, care options, [facility] and discharge options we will establish an advance care plan.   Advance Care Planning   Advance Care Planning Discussion: Spoke with patient in room.  States that she elected to transition to comfort measures over the weekend \"just about waited too long\" and \"I am tired\".  Based on current goals we reviewed and initiated a MOST document to include no CPR and comfort measures.  Attending to complete.  Discussed additional comfort care adjuvants for breakthrough symptoms needs.    Review of Systems   Constitutional: Positive for malaise/fatigue.   Cardiovascular: Positive for dyspnea on exertion. Negative for leg swelling.   Respiratory: Positive for shortness of breath.    Hematologic/Lymphatic: Negative for bleeding problem.   Musculoskeletal: Positive for muscle weakness.   Gastrointestinal: Negative for nausea.   Genitourinary: Negative for dysuria.   Neurological: Positive for weakness.   Psychiatric/Behavioral: The patient is nervous/anxious.      Pain " Assessment  Preferred Pain Scale: number (Numeric Rating Pain Scale)  Pain Location: back  Pain Description: aching    O:     Intake/Output Summary (Last 24 hours) at 12/13/2021 1032  Last data filed at 12/13/2021 0717  Gross per 24 hour   Intake --   Output 1500 ml   Net -1500 ml       Diagnostics: Reviewed    Current Facility-Administered Medications   Medication Dose Route Frequency Provider Last Rate Last Admin   • acetaminophen (TYLENOL) tablet 650 mg  650 mg Oral Q4H PRN Denisse German APRN        Or   • acetaminophen (TYLENOL) 160 MG/5ML solution 650 mg  650 mg Oral Q4H PRN Denisse German APRN        Or   • acetaminophen (TYLENOL) suppository 650 mg  650 mg Rectal Q4H PRN Denisse German APRN       • acetaminophen (TYLENOL) tablet 650 mg  650 mg Oral Q4H PRN Angel Nuñez MD       • albuterol (PROVENTIL) nebulizer solution 0.083% 2.5 mg/3mL  2.5 mg Nebulization Q4H PRN Angel Nuñez MD       • bisacodyl (DULCOLAX) suppository 10 mg  10 mg Rectal Once Denisse German APRN       • budesonide-formoterol (SYMBICORT) 80-4.5 MCG/ACT inhaler 2 puff  2 puff Inhalation BID - RT Angel Nuñez MD   2 puff at 12/13/21 0632   • diphenoxylate-atropine (LOMOTIL) 2.5-0.025 MG per tablet 1 tablet  1 tablet Oral Q2H PRN Denisse German APRN       • FLUoxetine (PROzac) capsule 10 mg  10 mg Oral Daily Angel Nuñez MD   10 mg at 12/13/21 0827   • Glycerin-Hypromellose- (ARTIFICIAL TEARS) 0.2-0.2-1 % ophthalmic solution solution 1 drop  1 drop Both Eyes Q30 Min PRN Denisse German APRN       • glycopyrrolate (ROBINUL) injection 0.2 mg  0.2 mg Intravenous Q2H PRN Denisse German APRN        Or   • glycopyrrolate (ROBINUL) injection 0.2 mg  0.2 mg Subcutaneous Q2H PRN Denisse German APRN        Or   • glycopyrrolate (ROBINUL) injection 0.4 mg  0.4 mg Intravenous Q2H PRN Denisse German APRN        Or   • glycopyrrolate (ROBINUL) injection 0.4  mg  0.4 mg Subcutaneous Q2H PRN Denisse German, APRN       • guaiFENesin (MUCINEX) 12 hr tablet 600 mg  600 mg Oral BID Angel Nuñez MD   600 mg at 12/13/21 0827   • HYDROcodone-acetaminophen (NORCO) 7.5-325 MG per tablet 1 tablet  1 tablet Oral TID Aliya Muse APRN   1 tablet at 12/13/21 0813   • hydrOXYzine (ATARAX) tablet 50 mg  50 mg Oral Q6H PRN lAiya Muse, APRN   50 mg at 12/09/21 1622   • ipratropium (ATROVENT) nebulizer solution 0.5 mg  0.5 mg Nebulization 4x Daily - RT Denisse German APRN   0.5 mg at 12/13/21 1008   • LORazepam (ATIVAN) tablet 0.5 mg  0.5 mg Oral Q1H PRN Denisse German, APRN        Or   • LORazepam (ATIVAN) injection 0.5 mg  0.5 mg Intravenous Q1H PRN Denisse German APRN        Or   • LORazepam (ATIVAN) injection 0.5 mg  0.5 mg Intramuscular Q1H PRN Denisse German APRN        Or   • LORazepam (ATIVAN) injection 0.5 mg  0.5 mg Subcutaneous Q1H PRN Denisse German APRN        Or   • LORazepam (ATIVAN) 2 MG/ML concentrated solution 0.5 mg  0.5 mg Oral Q1H PRN Adam Germania SHELLY, APRN        Or   • LORazepam (ATIVAN) 2 MG/ML concentrated solution 0.5 mg  0.5 mg Sublingual Q1H PRN Denisse German, APRN       • LORazepam (ATIVAN) tablet 1 mg  1 mg Oral Q1H PRN Denisse German APRN   1 mg at 12/11/21 1758    Or   • LORazepam (ATIVAN) injection 1 mg  1 mg Intravenous Q1H PRN Denisse German APRN        Or   • LORazepam (ATIVAN) injection 1 mg  1 mg Intramuscular Q1H PRN Denisse German APRN        Or   • LORazepam (ATIVAN) injection 1 mg  1 mg Subcutaneous Q1H PRN Siobhan Germanricia K, APRN        Or   • LORazepam (ATIVAN) 2 MG/ML concentrated solution 1 mg  1 mg Oral Q1H PRN WoeltSiobhan louisDenisse K, APRN        Or   • LORazepam (ATIVAN) 2 MG/ML concentrated solution 1 mg  1 mg Sublingual Q1H PRN Woagusto, Denisse K, APRN       • LORazepam (ATIVAN) tablet 2 mg  2 mg Oral Q1H PRN Siobhan Germanricia K, APRN        Or   • LORazepam (ATIVAN)  injection 2 mg  2 mg Intravenous Q1H PRN Denisse German APRN        Or   • LORazepam (ATIVAN) injection 2 mg  2 mg Intramuscular Q1H PRN Denisse German APRN        Or   • LORazepam (ATIVAN) injection 2 mg  2 mg Subcutaneous Q1H PRN Denisse German APRN        Or   • LORazepam (ATIVAN) 2 MG/ML concentrated solution 2 mg  2 mg Oral Q1H PRN Denisse German APRCARLOS        Or   • LORazepam (ATIVAN) 2 MG/ML concentrated solution 2 mg  2 mg Sublingual Q1H PRN Denisse German APRN       • metoprolol tartrate (LOPRESSOR) tablet 12.5 mg  12.5 mg Oral Q12H Denisse German APRN   12.5 mg at 12/13/21 0826   • montelukast (SINGULAIR) tablet 10 mg  10 mg Oral Nightly Angel Nuñez MD   10 mg at 12/12/21 2116   • Morphine sulfate (PF) injection 2 mg  2 mg Intravenous Q1H PRN Denisse German APRN        Or   • morphine concentrated solution 10 mg  10 mg Oral Q3H PRN Denisse German APRN       • ondansetron (ZOFRAN) tablet 4 mg  4 mg Oral Q6H PRN Denisse German APRN        Or   • ondansetron (ZOFRAN) injection 4 mg  4 mg Intravenous Q6H PRN Denisse German APRN       • oxyCODONE-acetaminophen (PERCOCET) 5-325 MG per tablet 1 tablet  1 tablet Oral Q6H PRN Angel Nuñez MD       • rOPINIRole (REQUIP) tablet 2 mg  2 mg Oral Nightly nAgel Nuñez MD   2 mg at 12/12/21 2116   • sennosides-docusate (PERICOLACE) 8.6-50 MG per tablet 2 tablet  2 tablet Oral Nightly Denisse German APRCARLOS   2 tablet at 12/12/21 2116   • sodium chloride 0.9 % flush 10 mL  10 mL Intravenous PRN Angel Nuñez MD   10 mL at 12/08/21 0335   • sodium chloride 0.9 % flush 10 mL  10 mL Intravenous Q12H Angel Nuñez MD   10 mL at 12/13/21 0827   • sodium chloride 0.9 % flush 10 mL  10 mL Intravenous PRN Angel Nuñez MD       • sodium chloride nasal spray 1 spray  1 spray Nasal PRN Angel Nuñez MD            •  acetaminophen **OR** acetaminophen  "**OR** acetaminophen  •  acetaminophen  •  albuterol  •  diphenoxylate-atropine  •  Glycerin-Hypromellose-  •  glycopyrrolate **OR** glycopyrrolate **OR** glycopyrrolate **OR** glycopyrrolate  •  hydrOXYzine  •  LORazepam **OR** LORazepam **OR** LORazepam **OR** LORazepam **OR** LORazepam **OR** LORazepam  •  LORazepam **OR** LORazepam **OR** LORazepam **OR** LORazepam **OR** LORazepam **OR** LORazepam  •  LORazepam **OR** LORazepam **OR** LORazepam **OR** LORazepam **OR** LORazepam **OR** LORazepam  •  Morphine **OR** morphine  •  ondansetron **OR** ondansetron  •  oxyCODONE-acetaminophen  •  [COMPLETED] Insert peripheral IV **AND** sodium chloride  •  sodium chloride  •  sodium chloride    A:    BP 99/60 (BP Location: Right arm, Patient Position: Lying)   Pulse 97   Temp 98.1 °F (36.7 °C) (Oral)   Resp 18   Ht 157.5 cm (62\")   Wt 48.3 kg (106 lb 7.7 oz)   SpO2 90%   BMI 19.48 kg/m²     Vitals and nursing note reviewed.   Constitutional:       Appearance: Cachectic and frail. Ill-appearing and chronically ill-appearing.      Interventions: Nasal cannula in place.   Eyes:      General: Lids are normal.      Pupils: Pupils are equal, round, and reactive to light.   HENT:      Head: Normocephalic.   Pulmonary:      Effort: Accessory muscle usage present.      Comments: 4 L via nasal cannula  Cardiovascular:      Tachycardia present.   Abdominal:      Palpations: Abdomen is soft.   Musculoskeletal:      Cervical back: Neck supple.   Skin:     General: Skin is warm and dry.   Genitourinary:     Comments: Voiding without difficulty  Neurological:      Mental Status: Alert and oriented to person, place, and time.   Psychiatric:         Mood and Affect: Mood is calm.         Cognition and Memory: Cognition normal.      Patient status: Disease state: Deteriorating despite treatments.  Functional status: Palliative Performance Scale Score: Performance 40% based on the following measures: Ambulation: Mainly in " bed, Activity and Evidence of Disease: Unable to do any work, extensive evidence of disease, Self-Care: Mainly assistance required,  Intake: Normal or reduced, LOC: Full, drowsy or confusion   Nutritional status: Albumin 3.40. Body mass index is 19.48 kg/m².      Family support: The patient receives support from her son..  Advance Directives: Advance Directive Status: Patient has advance directive, copy in chart   POA/Healthcare surrogate-son/POA-Jensen Salcedo.     Impression/Problem List:     1.  Stage IV COPD  2.  Acute on chronic respiratory failure  3.  Severe pulmonary hypertension  4.  Pulmonary edema  5.  Former smoker  6.  Obstructive sleep apnea     Recommendations/Plan:  1. plan: Goals of care include CODE STATUS no CPR/comfort measures per attending.     2.  Palliative care encounter  -Poor long-term prognosis secondary to stage IV COPD, chronic respiratory failure, severe pulmonary hypertension, and multiple comorbidities.  -Plans to discharge to nursing facility with hospice services.  SW working toward discharge disposition.     3.    Comfort care  -Continue scheduled Norco 3 times daily and morphine as needed.  Would recommend discharge to nursing facility with morphine concentrated solution 10 mg sublingual every hour as needed.  -Continue Ativan and would recommend discharge to nursing facility with Ativan concentrated solution 0.5 mg sublingual every hour as needed.  -May consider placement of Mares catheter if so desired by patient.        Thank you for allowing us to participate in patient's plan of care. Palliative Care Team will continue to follow patient.     Time spent: 48 minutes spent reviewing medical and medication records, assessing and examining patient, discussing with family, answering questions, providing some guidance about a plan and documentation of care, and coordinating care with other healthcare members, with > 50% time spent face to face.   18 minutes spent on advance care  planning    Aliya Muse, APRN  12/13/2021

## 2021-12-13 NOTE — PROGRESS NOTES
"    [facility] Medicine Services  INPATIENT PROGRESS NOTE    Length of Stay: 5  Date of Admission: 12/7/2021  Primary Care Physician: Pato Cooney DO    Subjective   Chief Complaint: Follow-up  HPI   Patient resting in bed playing on her iPad.  She states she is feeling better overall.  She is currently on 5 L nasal cannula, typically wears 4-1/2 L at home.  I did turn her down to 2 L during my evaluation and have asked nursing to continue to wean throughout the day.  She tells me \"I think they are taking me off of oxygen\".  She has told me that she is tired of fighting and coming back and forth to the hospital and is agreeable to comfort care/hospice services.    Review of Systems   All pertinent negatives and positives are as above. All other systems have been reviewed and are negative unless otherwise stated.     Objective    Temp:  [98 °F (36.7 °C)-98.1 °F (36.7 °C)] 98.1 °F (36.7 °C)  Heart Rate:  [] 97  Resp:  [16-22] 18  BP: ()/(53-60) 99/60  Physical Exam  Vitals and nursing note reviewed.   Constitutional:       Appearance: She is ill-appearing. She is not toxic-appearing.   HENT:      Head: Normocephalic and atraumatic.   Cardiovascular:      Rate and Rhythm: Regular rhythm.      Heart sounds: Normal heart sounds.   Pulmonary:      Breath sounds: No wheezing or rhonchi.      Comments: Grossly diminished throughout.  Much less labored today.  Abdominal:      General: Bowel sounds are normal. There is no distension.      Palpations: Abdomen is soft.      Tenderness: There is no abdominal tenderness.   Musculoskeletal:         General: No swelling or tenderness. Normal range of motion.      Cervical back: Normal range of motion and neck supple. No tenderness.   Skin:     General: Skin is warm and dry.  Pale with bilateral forearm bruising.     Findings: No erythema or rash.   Neurological:      General: No focal deficit present.      Mental Status: She is " alert and oriented to person, place, and time.   Psychiatric:         Mood and Affect: Mood normal.         Behavior: Behavior normal.         Thought Content: Thought content normal.         Judgment: Judgment normal.     Results Review:  I have reviewed the labs, radiology results, and diagnostic studies.    Laboratory Data:   Results from last 7 days   Lab Units 12/11/21  0518 12/10/21  0452 12/09/21  0618   WBC 10*3/mm3 8.55 12.75* 10.24   HEMOGLOBIN g/dL 10.2* 9.9* 10.3*   HEMATOCRIT % 37.4 36.9 37.2   PLATELETS 10*3/mm3 243 265 305        Results from last 7 days   Lab Units 12/11/21 0518 12/10/21  0452 12/09/21  0618 12/08/21  0445 12/07/21  2349   SODIUM mmol/L 141 139 140   < > 143   POTASSIUM mmol/L 3.8 4.3 4.4   < > 4.4   CHLORIDE mmol/L 90* 88* 90*   < > 94*   CO2 mmol/L 48.0* 48.0* 47.0*   < > 40.0*   BUN mg/dL 33* 36* 26*   < > 26*   CREATININE mg/dL 0.51* 0.48* 0.53*   < > 0.45*   CALCIUM mg/dL 9.8 9.3 9.5   < > 10.0   BILIRUBIN mg/dL  --   --   --   --  0.3   ALK PHOS U/L  --   --   --   --  100   ALT (SGPT) U/L  --   --   --   --  12   AST (SGOT) U/L  --   --   --   --  16   GLUCOSE mg/dL 100* 116* 138*   < > 133*    < > = values in this interval not displayed.     I have reviewed the patient current medications.     Assessment/Plan     Active Hospital Problems    Diagnosis    • **Acute on chronic respiratory failure with hypoxia and hypercapnia (HCC)    • Acute-on-chronic respiratory failure (HCC)    • NICK (obstructive sleep apnea)    • Pulmonary emphysema (HCC)    • COPD, very severe (HCC)    • Severe malnutrition (HCC)      Plan:  1.  Patient presented to the emergency department on 12/7/2021 with complaints of shortness of breath.  She typically wears oxygen at 4-1/2 to 5 L at home continuously.  She was recently admitted to our facility from 11/23/2021 through 11/27/2021 with acute on chronic respiratory failure with hypoxia and hypercapnia.  She was discharged with a trilogy device.  She  apparently did not feel this was working correctly and then stopped using this at home.  2.  The patient recently had a CTA of the chest during her last admission which showed no evidence of pulmonary embolus.  Her chest x-ray on this admission showed emphysematous changes and superimposed cardiomegaly with increased interstitial markings and Kerley B-lines suggesting pulmonary edema.  Small pleural effusions present.  Repeat chest x-ray 12/9 showed mild cardiomegaly and changes of interstitial pulmonary edema with small to moderate bilateral pleural effusions.  She has been given IV Lasix, IV Bumex and Diamox for diuresis.  Her last echocardiogram in September showed moderate to severe pulmonary hypertension, severe dilation of the right atrial cavity with hyperdynamic ejection fraction.  Diastolic function indeterminate.  Likely has cor pulmonale secondary to pulmonary hypertension.  3.  Senokot for bowel regimen  4.  Appreciate palliative care assistance.  The patient has transitioned to comfort care.  Orders placed with as needed medications for pain/air hunger, agitation, nausea, and secretions.  Other unnecessary medications that are not aligned with comfort care have been discontinued.  Aliya recommends changing to morphine concentrated solution and Ativan concentrated solution at time of discharge.  5.   is following.  Referrals have been made to several facilities, however we have not been able to get in touch with any of these facilities.   will discuss with the patient's son to see if referrals can be made to any other facilities.  Home with hospice would not be appropriate for this patient as her  whom she lives with has Alzheimer's and there is also an active APS investigation on this patient.     Discharge Planning: I expect the patient to be discharged to ? in ? days.    Electronically signed by JESSENIA Paniagua, 12/13/2021, 14:38 CST.    I personally  evaluated and examined the patient in conjunction with JESSENIA Pozo and agree with the assessment, treatment plan, and disposition of the patient as recorded by her. My history, exam, and further recommendations are:     Discussed with her nurse, Woodrow.     Up in bed.  No distress.  No family present. However, she received roses from her family. She has her iPad.  She states the only thing bothering her right now is problems with related restless legs at.  I will start 1 mg of Requip in the morning in addition to the 2 mg that she takes nightly.    Appreciate palliative care assisting us.  Comfort measures only was recently chosen by the patient and her family.  She is awaiting placement at a skilled nursing facility for palliative/hospice care.    I signed her MOST form today.    Oxygen has been weaned down towards her home dose.    Discontinue telemetry.     Electronically signed by Fantasma Combs DO, 12/13/2021, 14:56 CST.

## 2021-12-14 NOTE — PROGRESS NOTES
Palliative Care Daily Progress Note   Chief complaint-follow up comfort care    Code Status:   Code Status and Medical Interventions:   Ordered at: 21 1148     Level Of Support Discussed With:    Patient     Code Status (Patient has no pulse and is not breathing):    No CPR (Do Not Attempt to Resuscitate)     Medical Interventions (Patient has pulse or is breathing):    Comfort Measures      Advanced Directives: Advance Directive Status: Patient has advance directive, copy in chart   Goals of Care: Ongoing.     S: Medical record reviewed. Events noted.  Laying in bed without apparent distress.  Lethargic, pale, and irregular breathing pattern.  O2 saturation 82% on 2 L.  Jensen Anne currently at bedside states patient's sister is on the way to the hospital this morning.  Discussed symptom management plan and changes given patient's change in mental status and inability to take oral medications.  Support given.     Review of Systems   Constitutional: Positive for malaise/fatigue.   Cardiovascular: Positive for dyspnea on exertion. Negative for leg swelling.   Respiratory: Positive for shortness of breath.    Hematologic/Lymphatic: Negative for bleeding problem.   Musculoskeletal: Positive for muscle weakness.   Gastrointestinal: Negative for nausea.   Genitourinary: Negative for dysuria.   Neurological: Positive for weakness.   Psychiatric/Behavioral: The patient is nervous/anxious.      Pain Assessment  Preferred Pain Scale: number (Numeric Rating Pain Scale)  PAINAD Breathin-->normal  PAINAD Negative Vocalization: 0-->none  PAINAD Facial Expression: 0-->smiling or inexpressive  PAINAD Body Language: 0-->relaxed  PAINAD Consolability: 0-->no need to console  PAINAD Score: 0  Pain Location: back  Pain Description: constant    O:   No intake or output data in the 24 hours ending 21 0842    Diagnostics: Reviewed    Current Facility-Administered Medications   Medication Dose Route Frequency Provider Last  Rate Last Admin   • acetaminophen (TYLENOL) tablet 650 mg  650 mg Oral Q4H PRN Denisse German APRN        Or   • acetaminophen (TYLENOL) 160 MG/5ML solution 650 mg  650 mg Oral Q4H PRN Denisse German APRN        Or   • acetaminophen (TYLENOL) suppository 650 mg  650 mg Rectal Q4H PRN Denisse German APRN       • albuterol (PROVENTIL) nebulizer solution 0.083% 2.5 mg/3mL  2.5 mg Nebulization Q4H PRN Angel Nuñez MD       • diphenoxylate-atropine (LOMOTIL) 2.5-0.025 MG per tablet 1 tablet  1 tablet Oral Q2H PRN Denisse German APRN       • Glycerin-Hypromellose- (ARTIFICIAL TEARS) 0.2-0.2-1 % ophthalmic solution solution 1 drop  1 drop Both Eyes Q30 Min PRN Denisse German APRN       • glycopyrrolate (ROBINUL) injection 0.2 mg  0.2 mg Intravenous Q2H PRN Denisse German APRN        Or   • glycopyrrolate (ROBINUL) injection 0.2 mg  0.2 mg Subcutaneous Q2H PRN Denisse German APRN        Or   • glycopyrrolate (ROBINUL) injection 0.4 mg  0.4 mg Intravenous Q2H PRN Denisse German APRN        Or   • glycopyrrolate (ROBINUL) injection 0.4 mg  0.4 mg Subcutaneous Q2H PRN Denisse German APRN       • hydrOXYzine (ATARAX) tablet 50 mg  50 mg Oral Q6H PRN Aliya Muse APRN   50 mg at 12/09/21 1622   • ipratropium (ATROVENT) nebulizer solution 0.5 mg  0.5 mg Nebulization Q6H PRN Denisse German APRN       • LORazepam (ATIVAN) 2 MG/ML concentrated solution 0.26 mg  0.26 mg Sublingual Q6H Aliya Muse APRN       • LORazepam (ATIVAN) tablet 0.5 mg  0.5 mg Oral Q1H PRN Woeltz, Denisse K, APRN        Or   • LORazepam (ATIVAN) injection 0.5 mg  0.5 mg Intravenous Q1H PRN Woeltz, Denisse K, APRN        Or   • LORazepam (ATIVAN) injection 0.5 mg  0.5 mg Intramuscular Q1H PRN Woeltz, Denisse K, APRN        Or   • LORazepam (ATIVAN) injection 0.5 mg  0.5 mg Subcutaneous Q1H PRN Woeltz, Denisse K, APRN        Or   • LORazepam (ATIVAN) 2 MG/ML concentrated  solution 0.5 mg  0.5 mg Oral Q1H PRN Woeltz, Denisse K, APRN        Or   • LORazepam (ATIVAN) 2 MG/ML concentrated solution 0.5 mg  0.5 mg Sublingual Q1H PRN Woeltz, Denisse K, APRN       • LORazepam (ATIVAN) tablet 1 mg  1 mg Oral Q1H PRN Woeltz, Denisse K, APRN   1 mg at 12/13/21 2337    Or   • LORazepam (ATIVAN) injection 1 mg  1 mg Intravenous Q1H PRN Woeltz, Denisse K, APRN        Or   • LORazepam (ATIVAN) injection 1 mg  1 mg Intramuscular Q1H PRN Woeltz, Denisse K, APRN        Or   • LORazepam (ATIVAN) injection 1 mg  1 mg Subcutaneous Q1H PRN Woeltz, Denisse K, APRN        Or   • LORazepam (ATIVAN) 2 MG/ML concentrated solution 1 mg  1 mg Oral Q1H PRN Woeltz, Denisse K, APRN        Or   • LORazepam (ATIVAN) 2 MG/ML concentrated solution 1 mg  1 mg Sublingual Q1H PRN Woeltz, Denisse K, APRN       • LORazepam (ATIVAN) tablet 2 mg  2 mg Oral Q1H PRN Woeltz, Denisse K, APRN        Or   • LORazepam (ATIVAN) injection 2 mg  2 mg Intravenous Q1H PRN Woeltz, Denisse K, APRN        Or   • LORazepam (ATIVAN) injection 2 mg  2 mg Intramuscular Q1H PRN Woeltz, Denisse K, APRN        Or   • LORazepam (ATIVAN) injection 2 mg  2 mg Subcutaneous Q1H PRN Woeltz, Denisse K, APRN        Or   • LORazepam (ATIVAN) 2 MG/ML concentrated solution 2 mg  2 mg Oral Q1H PRN Woeltz, Denisse K, APRN        Or   • LORazepam (ATIVAN) 2 MG/ML concentrated solution 2 mg  2 mg Sublingual Q1H PRN Woeltz, Denisse K, APRN       • morphine concentrated solution 10 mg  10 mg Sublingual Q1H PRN MuseCynthia ortiza L, APRN        Or   • Morphine sulfate (PF) injection 2 mg  2 mg Intravenous Q1H PRN Aliya Muse, APRN   2 mg at 12/14/21 0059   • morphine concentrated solution 10 mg  10 mg Sublingual Q6H Aliya Muse APRN       • ondansetron (ZOFRAN) tablet 4 mg  4 mg Oral Q6H PRN Denisse German APRN        Or   • ondansetron (ZOFRAN) injection 4 mg  4 mg Intravenous Q6H PRN Denisse German APRN       •  "sennosides-docusate (PERICOLACE) 8.6-50 MG per tablet 2 tablet  2 tablet Oral Nightly Denisse German APRN   2 tablet at 12/12/21 2116   • sodium chloride 0.9 % flush 10 mL  10 mL Intravenous PRN Angel Nuñez MD   10 mL at 12/08/21 0335   • sodium chloride 0.9 % flush 10 mL  10 mL Intravenous Q12H Angel Nuñez MD   10 mL at 12/13/21 0827   • sodium chloride 0.9 % flush 10 mL  10 mL Intravenous PRN Angel Nuñez MD       • sodium chloride nasal spray 1 spray  1 spray Nasal PRN Angel Nuñez MD            •  acetaminophen **OR** acetaminophen **OR** acetaminophen  •  albuterol  •  diphenoxylate-atropine  •  Glycerin-Hypromellose-  •  glycopyrrolate **OR** glycopyrrolate **OR** glycopyrrolate **OR** glycopyrrolate  •  hydrOXYzine  •  ipratropium  •  LORazepam **OR** LORazepam **OR** LORazepam **OR** LORazepam **OR** LORazepam **OR** LORazepam  •  LORazepam **OR** LORazepam **OR** LORazepam **OR** LORazepam **OR** LORazepam **OR** LORazepam  •  LORazepam **OR** LORazepam **OR** LORazepam **OR** LORazepam **OR** LORazepam **OR** LORazepam  •  Morphine **OR** morphine  •  ondansetron **OR** ondansetron  •  [COMPLETED] Insert peripheral IV **AND** sodium chloride  •  sodium chloride  •  sodium chloride    A:    /58 (BP Location: Right arm, Patient Position: Lying)   Pulse 116   Temp 97.5 °F (36.4 °C) (Oral)   Resp 16   Ht 157.5 cm (62\")   Wt 48.3 kg (106 lb 7.7 oz)   SpO2 (!) 82% Comment: Rn notified  BMI 19.48 kg/m²     Vitals and nursing note reviewed.   Constitutional:       Appearance: Cachectic and frail. Ill-appearing and chronically ill-appearing.      Interventions: Nasal cannula in place.   Eyes:      General: Lids are normal.   HENT:      Head: Normocephalic.   Pulmonary:      Effort: Accessory muscle usage present.      Comments: 2 L via nasal cannula  Skin:     General: Skin is warm and dry.   Genitourinary:     Comments: " Loretta  Neurological:      Mental Status: Lethargic  Psychiatric:         Mood and Affect: Appears calm.          Patient status: Disease state: Deteriorating despite treatments.  Functional status: Palliative Performance Scale Score: Performance 40% based on the following measures: Ambulation: Mainly in bed, Activity and Evidence of Disease: Unable to do any work, extensive evidence of disease, Self-Care: Mainly assistance required,  Intake: Normal or reduced, LOC: Full, drowsy or confusion   Nutritional status: Albumin 3.40. Body mass index is 19.48 kg/m².      Family support: The patient receives support from her son..  Advance Directives: Advance Directive Status: Patient has advance directive, copy in chart   POA/Healthcare surrogate-son/POA-Jensen Salcedo.     Impression/Problem List:     1.  Stage IV COPD  2.  Acute on chronic respiratory failure  3.  Severe pulmonary hypertension  4.  Pulmonary edema  5.  Former smoker  6.  Obstructive sleep apnea     Recommendations/Plan:  1. plan: Goals of care include CODE STATUS no CPR/comfort measures per attending.     2.  Palliative care encounter  -Poor long-term prognosis secondary to stage IV COPD, chronic respiratory failure, severe pulmonary hypertension, and multiple comorbidities.    -Death imminent     3.    Comfort care  -Transition Norco to scheduled morphine concentrated solution every 6 hours and as needed.    -Add scheduled Ativan concentrated solution every 6 hours and as needed.   -May consider placement of Mares catheter for comfort        Thank you for allowing us to participate in patient's plan of care. Palliative Care Team will continue to follow patient.     Time spent: 35 minutes spent reviewing medical and medication records, assessing and examining patient, discussing with family, answering questions, providing some guidance about a plan and documentation of care, and coordinating care with other healthcare members, with > 50% time spent face to  face.     Aliya Muse, APRN  12/14/2021

## 2021-12-14 NOTE — SIGNIFICANT NOTE
Notified by patients family that she was no longer breathing. No breath sounds, heart sounds ausculatated, no pulse felt. Verified by Slime GARCIA. Pts family has chosen [facility] to be notified to  her body.

## 2021-12-14 NOTE — PROGRESS NOTES
[facility]  INPATIENT PROGRESS NOTE    Length of Stay: 6  Date of Admission: 12/7/2021  Primary Care Physician: Pato Cooney DO    Subjective   Chief Complaint: Follow-up  HPI   Patient resting in bed with sons at bedside.  She has had a drastic change from yesterday.  She is no longer alert or responsive.  She does seem somewhat restless but not necessarily in pain.    Review of Systems   Unable to perform ROS: Mental status change      Objective    Temp:  [96 °F (35.6 °C)-97.5 °F (36.4 °C)] 97.5 °F (36.4 °C)  Heart Rate:  [] 116  Resp:  [16-18] 16  BP: (100-106)/(58-66) 100/58  Physical Exam  Vitals and nursing note reviewed.   Constitutional:       Appearance: She is ill-appearing.   HENT:      Head: Normocephalic and atraumatic.   Cardiovascular:      Rate and Rhythm: Normal rate and regular rhythm.      Pulses: Normal pulses.      Heart sounds: Normal heart sounds.   Pulmonary:      Comments: Tachypnea, shallow respirations.  Diminished breath sounds.  Abdominal:      General: Bowel sounds are normal. There is no distension.      Palpations: Abdomen is soft.      Tenderness: There is no abdominal tenderness.   Musculoskeletal:         General: No swelling or tenderness.   Skin:     General: Skin is warm and dry.   Neurological:      Comments: Lethargic, unable to follow commands.    Psychiatric:      Comments: Restless     Results Review:  I have reviewed the labs, radiology results, and diagnostic studies.    Laboratory Data:   Results from last 7 days   Lab Units 12/11/21  0518 12/10/21  0452 12/09/21  0618   WBC 10*3/mm3 8.55 12.75* 10.24   HEMOGLOBIN g/dL 10.2* 9.9* 10.3*   HEMATOCRIT % 37.4 36.9 37.2   PLATELETS 10*3/mm3 243 265 305     Results from last 7 days   Lab Units 12/11/21  0518 12/10/21  0452 12/09/21 0618 12/08/21  0445 12/07/21  2349   SODIUM mmol/L 141 139 140   < > 143   POTASSIUM mmol/L 3.8 4.3 4.4   < > 4.4   CHLORIDE  mmol/L 90* 88* 90*   < > 94*   CO2 mmol/L 48.0* 48.0* 47.0*   < > 40.0*   BUN mg/dL 33* 36* 26*   < > 26*   CREATININE mg/dL 0.51* 0.48* 0.53*   < > 0.45*   CALCIUM mg/dL 9.8 9.3 9.5   < > 10.0   BILIRUBIN mg/dL  --   --   --   --  0.3   ALK PHOS U/L  --   --   --   --  100   ALT (SGPT) U/L  --   --   --   --  12   AST (SGOT) U/L  --   --   --   --  16   GLUCOSE mg/dL 100* 116* 138*   < > 133*    < > = values in this interval not displayed.     I have reviewed the patient current medications.     Assessment/Plan     Active Hospital Problems    Diagnosis    • **Acute on chronic respiratory failure with hypoxia and hypercapnia (HCC)    • Acute-on-chronic respiratory failure (HCC)    • NICK (obstructive sleep apnea)    • Pulmonary emphysema (HCC)    • COPD, very severe (HCC)    • Severe malnutrition (HCC)      Plan:  1.  Patient presented to the emergency department on 12/7/2021 with complaints of shortness of breath.  She typically wears oxygen at 4-1/2 to 5 L at home continuously.  She was recently admitted to our facility from 11/23/2021 through 11/27/2021 with acute on chronic respiratory failure with hypoxia and hypercapnia.  She was discharged with a trilogy device.  She apparently did not feel this was working correctly and then stopped using this at home.  2.  The patient recently had a CTA of the chest during her last admission which showed no evidence of pulmonary embolus.  Her chest x-ray on this admission showed emphysematous changes and superimposed cardiomegaly with increased interstitial markings and Kerley B-lines suggesting pulmonary edema.  Small pleural effusions present.  Repeat chest x-ray 12/9 showed mild cardiomegaly and changes of interstitial pulmonary edema with small to moderate bilateral pleural effusions.  She has been given IV Lasix, IV Bumex and Diamox for diuresis.  Her last echocardiogram in September showed moderate to severe pulmonary hypertension, severe dilation of the right atrial  cavity with hyperdynamic ejection fraction.  Diastolic function indeterminate.  Likely has cor pulmonale secondary to pulmonary hypertension.  3.  The patient has transitioned to comfort measures.  She has had a drastic change in the last 24 hours and death is imminent.  Continue as needed medications for pain/air hunger, agitation, nausea, and secretions. Scheduled morphine concentrated solution.     Electronically signed by JESSENIA Paniagua, 12/14/2021, 12:56 CST.    I personally evaluated and examined the patient in conjunction with JESSENIA Pozo and agree with the assessment, treatment plan, and disposition of the patient as recorded by her. My history, exam, and further recommendations are:     Discussed with her nurse, Jenn.    Seen and discussed with 2 of her sons.    She is resting comfortably on no oxygen and just received morphine.  Her family is currently pleased with the level of her comfort.  Based on what I am seeing at this point in time, I feel that she may not last the day.    Electronically signed by Fantasma Combs DO, 12/14/2021, 15:45 CST.

## 2021-12-14 NOTE — PLAN OF CARE
Goal Outcome Evaluation:   Patient is comfort care. Son Jensen has been by her side tonight. Oxygen was reduced to 2L per patient request. Denies air hunger. Complaints of lower back pain and right abdominal pain rating from 7-10. Prn pain medications administered. PRN anxiety medications given as well. Oxygen saturation levels have been dropping through night. Skin pale and cool. Reduced pain care to minimal for comfort and prioritized family presence. Patient has refused several medications at [facility]. After midnight she has appeared to be sleeping. Muscles appear relaxed and no nonverbal signs of pain noted.

## 2021-12-14 NOTE — CASE MANAGEMENT/SOCIAL WORK
Continued Stay Note   Pedro     Patient Name: Heide Newsome  MRN: [id number]  Today's Date: 2021    Admit Date: 2021     Discharge Plan     Row Name 21 1410       Plan    Final Discharge Disposition Code 20 -     Final Note Pt  today               Discharge Codes    No documentation.                     FLAKITA Chambers

## 2021-12-14 NOTE — DISCHARGE SUMMARY
[facility]  DEATH SUMMARY       Date of Admission: 12/7/2021  Date of Death:  12/14/2021 at approximately 1307  Primary Care Physician: Pato Cooney DO    Presenting Problem/History of Present Illness:  Shortness of breath    Final Death Diagnoses:  Active Hospital Problems    Diagnosis    • **Acute on chronic respiratory failure with hypoxia and hypercapnia (HCC)    • Acute-on-chronic respiratory failure (HCC)    • NICK (obstructive sleep apnea)    • Pulmonary emphysema (HCC)    • COPD, very severe (HCC)    • Severe malnutrition (HCC)      Consults:   1.  Pulmonology  2.  Palliative care    Procedures Performed: None    Pertinent Test Results:   Lab Results (all)     Procedure Component Value Units Date/Time    Blood Culture - Blood, Arm, Right [[id number]]  (Normal) Collected: 12/08/21 0028    Specimen: Blood from Arm, Right Updated: 12/13/21 0045     Blood Culture No growth at 5 days    Blood Culture - Blood, Arm, Left [[id number]]  (Normal) Collected: 12/07/21 2349    Specimen: Blood from Arm, Left Updated: 12/13/21 0000     Blood Culture No growth at 5 days    Basic Metabolic Panel [[id number]]  (Abnormal) Collected: 12/11/21 0518    Specimen: Blood Updated: 12/11/21 0619     Glucose 100 mg/dL      BUN 33 mg/dL      Creatinine 0.51 mg/dL      Sodium 141 mmol/L      Potassium 3.8 mmol/L      Chloride 90 mmol/L      CO2 48.0 mmol/L      Calcium 9.8 mg/dL      eGFR Non African Amer 118 mL/min/1.73      BUN/Creatinine Ratio 64.7    CBC (No Diff) [[id number]]  (Abnormal) Collected: 12/11/21 0518    Specimen: Blood Updated: 12/11/21 0601     WBC 8.55 10*3/mm3      RBC 4.06 10*6/mm3      Hemoglobin 10.2 g/dL      Hematocrit 37.4 %      MCV 92.1 fL      MCH 25.1 pg      MCHC 27.3 g/dL      RDW 15.1 %      RDW-SD 50.5 fl      MPV 12.0 fL      Platelets 243 10*3/mm3     Basic Metabolic Panel [[id number]]  (Abnormal) Collected: 12/10/21 0452    Specimen: Blood Updated:  12/10/21 0607     Glucose 116 mg/dL      BUN 36 mg/dL      Creatinine 0.48 mg/dL      Sodium 139 mmol/L      Potassium 4.3 mmol/L      Chloride 88 mmol/L      CO2 48.0 mmol/L      Calcium 9.3 mg/dL      eGFR Non African Amer 126 mL/min/1.73      BUN/Creatinine Ratio 75.0     Anion Gap 3.0 mmol/L     CBC (No Diff) [[id number]]  (Abnormal) Collected: 12/10/21 0452    Specimen: Blood Updated: 12/10/21 0536     WBC 12.75 10*3/mm3      RBC 4.01 10*6/mm3      Hemoglobin 9.9 g/dL      Hematocrit 36.9 %      MCV 92.0 fL      MCH 24.7 pg      MCHC 26.8 g/dL      RDW 15.2 %      RDW-SD 51.1 fl      MPV 12.0 fL      Platelets 265 10*3/mm3     Basic Metabolic Panel [[id number]]  (Abnormal) Collected: 12/09/21 0618    Specimen: Blood Updated: 12/09/21 0738     Glucose 138 mg/dL      BUN 26 mg/dL      Creatinine 0.53 mg/dL      Sodium 140 mmol/L      Potassium 4.4 mmol/L      Chloride 90 mmol/L      CO2 47.0 mmol/L      Calcium 9.5 mg/dL      eGFR Non African Amer 113 mL/min/1.73      BUN/Creatinine Ratio 49.1     Anion Gap 3.0 mmol/L     CBC Auto Differential [[id number]]  (Abnormal) Collected: 12/09/21 0618    Specimen: Blood Updated: 12/09/21 0719     WBC 10.24 10*3/mm3      RBC 3.99 10*6/mm3      Hemoglobin 10.3 g/dL      Hematocrit 37.2 %      MCV 93.2 fL      MCH 25.8 pg      MCHC 27.7 g/dL      RDW 15.0 %      RDW-SD 51.1 fl      MPV 12.1 fL      Platelets 305 10*3/mm3      Neutrophil % 95.1 %      Lymphocyte % 1.9 %      Monocyte % 2.0 %      Eosinophil % 0.0 %      Basophil % 0.1 %      Immature Grans % 0.9 %      Neutrophils, Absolute 9.75 10*3/mm3      Lymphocytes, Absolute 0.19 10*3/mm3      Monocytes, Absolute 0.20 10*3/mm3      Eosinophils, Absolute 0.00 10*3/mm3      Basophils, Absolute 0.01 10*3/mm3      Immature Grans, Absolute 0.09 10*3/mm3      nRBC 0.0 /100 WBC     CBC Auto Differential [[id number]]  (Abnormal) Collected: 12/08/21 0445    Specimen: Blood Updated: 12/08/21 0624     WBC 9.64 10*3/mm3      RBC 4.52  10*6/mm3      Hemoglobin 11.2 g/dL      Hematocrit 41.5 %      MCV 91.8 fL      MCH 24.8 pg      MCHC 27.0 g/dL      RDW 15.3 %      RDW-SD 50.6 fl      MPV 11.4 fL      Platelets 361 10*3/mm3     Manual Differential [[id number]]  (Abnormal) Collected: 12/08/21 0445    Specimen: Blood Updated: 12/08/21 0624     Neutrophil % 94.0 %      Lymphocyte % 4.0 %      Bands %  2.0 %      Neutrophils Absolute 9.25 10*3/mm3      Lymphocytes Absolute 0.39 10*3/mm3      Anisocytosis Slight/1+     Poikilocytes Slight/1+     Polychromasia Slight/1+     Schistocytes Slight/1+     WBC Morphology Normal     Platelet Morphology Normal    Basic Metabolic Panel [[id number]]  (Abnormal) Collected: 12/08/21 0445    Specimen: Blood Updated: 12/08/21 0518     Glucose 146 mg/dL      BUN 25 mg/dL      Creatinine 0.49 mg/dL      Sodium 142 mmol/L      Potassium 4.4 mmol/L      Chloride 93 mmol/L      CO2 39.0 mmol/L      Calcium 9.9 mg/dL      eGFR Non African Amer 123 mL/min/1.73      BUN/Creatinine Ratio 51.0     Anion Gap 10.0 mmol/L     BNP [[id number]]  (Abnormal) Collected: 12/07/21 2349    Specimen: Blood Updated: 12/08/21 0229     proBNP 8,308.0 pg/mL     COVID-19,[facility]-HOUSE,Nasal Swab No Transport Media 3-4 HR TAT - Swab, Nasal Cavity [[id number]]  (Normal) Collected: 12/07/21 2348    Specimen: Swab from Nasal Cavity Updated: 12/08/21 0044     COVID19 Not Detected    Comprehensive Metabolic Panel [[id number]]  (Abnormal) Collected: 12/07/21 2349    Specimen: Blood Updated: 12/08/21 0019     Glucose 133 mg/dL      BUN 26 mg/dL      Creatinine 0.45 mg/dL      Sodium 143 mmol/L      Potassium 4.4 mmol/L      Chloride 94 mmol/L      CO2 40.0 mmol/L      Calcium 10.0 mg/dL      Total Protein 6.8 g/dL      Albumin 3.40 g/dL      ALT (SGPT) 12 U/L      AST (SGOT) 16 U/L      Alkaline Phosphatase 100 U/L      Total Bilirubin 0.3 mg/dL      eGFR Non African Amer 136 mL/min/1.73      Globulin 3.4 gm/dL      A/G Ratio 1.0 g/dL       BUN/Creatinine Ratio 57.8     Anion Gap 9.0 mmol/L     Lactic Acid, Plasma [[id number]]  (Normal) Collected: 12/07/21 2349    Specimen: Blood Updated: 12/08/21 0016     Lactate 1.4 mmol/L     CBC Auto Differential [[id number]]  (Abnormal) Collected: 12/07/21 2349    Specimen: Blood Updated: 12/08/21 0001     WBC 11.91 10*3/mm3      RBC 4.45 10*6/mm3      Hemoglobin 11.1 g/dL      Hematocrit 40.5 %      MCV 91.0 fL      MCH 24.9 pg      MCHC 27.4 g/dL      RDW 15.4 %      RDW-SD 50.6 fl      MPV 11.6 fL      Platelets 375 10*3/mm3      Neutrophil % 87.8 %      Lymphocyte % 5.8 %      Monocyte % 5.2 %      Eosinophil % 0.3 %      Basophil % 0.3 %      Immature Grans % 0.6 %      Neutrophils, Absolute 10.46 10*3/mm3      Lymphocytes, Absolute 0.69 10*3/mm3      Monocytes, Absolute 0.62 10*3/mm3      Eosinophils, Absolute 0.03 10*3/mm3      Basophils, Absolute 0.04 10*3/mm3      Immature Grans, Absolute 0.07 10*3/mm3      nRBC 0.0 /100 WBC         Imaging Results (All)     Procedure Component Value Units Date/Time    XR Chest 1 View [[id number]] Collected: 12/09/21 1155     Updated: 12/09/21 1201    Narrative:      EXAM: XR CHEST 1 VW- 12/9/2021 11:44 AM CST     HISTORY: Follow-up for pulmonary edema; R09.02-Hypoxemia; I50.9-Heart  failure, unspecified       COMPARISON: December 7, 2021.     TECHNIQUE: Frontal radiograph of the chest     FINDINGS:   Bilateral interstitial infiltrates are present. Small to moderate  bilateral pleural effusions are now present. Pleural effusions represent  a change December 7, 2021. Cardiac silhouettes mildly enlarged..      The osseous structures and surrounding soft tissues demonstrate no acute  abnormality.          Impression:      1. Mild cardiomegaly changes of interstitial pulmonary edema and small  to moderate bilateral pleural effusions.        This report was finalized on 12/09/2021 11:58 by Dr. Zeb Gardner MD.    XR Chest 1 View [[id number]] Collected: 12/08/21 0718     Updated:  12/08/21 0724    Narrative:      EXAMINATION: Chest 1 view 12/7/2021     HISTORY: Shortness of breath.     FINDINGS: Today's exam is compared to previous study of 11/26/2021.  Upright frontal projection of chest demonstrates emphysematous changes  lungs with hyperinflation lung parenchyma. There is enlargement of the  main pulmonary artery segment and pulmonary arteries suggesting  pulmonary arterial hypertension. Superimposed upon these emphysematous  changes are increased interstitial markings and cardiomegaly.  Radiographic findings would suggest this represents superimposed  congestive failure with mild interstitial edema. Small effusions are  present.       Impression:      1.. Emphysematous changes of the lungs. There is suspected pulmonary  arterial hypertension.  2. Superimposed cardiomegaly with increased interstitial markings and  Kerley B line suggesting pulmonary venous hypertension with pulmonary  edema. Small effusions are present.  This report was finalized on 12/08/2021 07:21 by Dr. Brennon Lucas MD.        Hospital Course:  Ms. Newsome is a 76-year-old  female with a medical history significant for severe chronic obstructive pulmonary disease, chronic hypoxic/hypercapnic respiratory failure with oxygen/trilogy dependence, restless leg syndrome, and colon cancer.  The patient presented to the [facility] emergency department on 12/7/2021 with complaints of shortness of breath.  She was recently admitted to our facility from 11/23 through 11/27 with acute on chronic respiratory failure with hypoxia and hypercapnia.  She was discharged with a trilogy device.  She apparently did not feel this was working correctly and then stopped using this at home.  In the emergency department, chest x-ray showed emphysematous changes and superimposed cardiomegaly with increased interstitial markings suggesting pulmonary edema.  She had a CTA of the chest during her last admission which  showed no evidence of pulmonary embolus.  The patient was admitted to the hospitalist service for further evaluation and management.    The patient was treated for cor pulmonale secondary to pulmonary hypertension.  Her last echocardiogram in September showed moderate to severe pulmonary hypertension, severe dilation of the right atrial cavity with hyperdynamic ejection fraction.  Diastolic function indeterminate. She was given IV diuretics with good response.  Repeat chest x-ray  showed mild cardiomegaly and changes of interstitial pulmonary edema with small to moderate bilateral pleural effusions.  She was  placed on a BiPAP for short time then transitioned to Vapotherm.  She was also treated for COPD exacerbation and seen in consultation by pulmonology.  She was given Levaquin, DuoNeb breathing treatments, Mucinex, and IV Solu-Medrol.     Due to frequent rehospitalizations and poor overall prognosis, we did have palliative care evaluate the patient.  She was agreeable that she is tired of 3 hospitalizations and wanted to transition to comfort/hospice care.  All aggressive interventions were discontinued and the patient was given medications as needed for pain/air hunger and anxiety.  She ultimately  on 2021 at 1307 due to respiratory failure.    Electronically signed by JESSENIA Paniagua, 21, 13:40 CST.    Time: 40 minutes    I personally evaluated and examined the patient in conjunction with JESSENIA Pozo and agree with the assessment, treatment plan, and disposition of the patient as recorded by her. My history, exam, and further recommendations are:     Seen earlier today with her sons.  Discussed with her nurse Danny Walker and I saw her at approximately 11:30 AM.  She had just been treated with morphine for air hunger and pain.  She was unresponsive.  Her sons were pleased with her current level of comfort.  The patient ultimately  at 1307 with her family  No surrounding her at the bedside. [facility] declined for donation.  No autopsy was requested.  The patient's body will be released to the  home of the family's choosing.    Electronically signed by Fantasma Combs DO, 2021, 15:46 CST.

## (undated) DEVICE — GW FOR TROCH NAIL 3.2X400MM

## (undated) DEVICE — GLV SURG SENSICARE PI ORTHO SZ8.5 LF STRL

## (undated) DEVICE — DRSNG BRDR MEPILEXLITE SLFADHR SIL 2X5

## (undated) DEVICE — GW FNADVANCED 3.2X475MM STRL

## (undated) DEVICE — 3M™ IOBAN™ 2 ANTIMICROBIAL INCISE DRAPE 6650EZ: Brand: IOBAN™ 2

## (undated) DEVICE — PK TURNOVER RM ADV

## (undated) DEVICE — SPK10183 ORTHOPEDIC FRACTURE AND TRAUMA KIT: Brand: SPK10183 ORTHOPEDIC FRACTURE AND TRAUMA KIT

## (undated) DEVICE — SYS CLS SKIN PREMIERPRO EXOFINFUSION 22CM

## (undated) DEVICE — GLV SURG DERMASSURE GRN LF PF 8.5

## (undated) DEVICE — Device

## (undated) DEVICE — 4-PORT MANIFOLD: Brand: NEPTUNE 2

## (undated) DEVICE — PENCL ES MEGADINE EZ/CLEAN BUTN W/HOLSTR 10FT

## (undated) DEVICE — PK HIP ORIF FX TABL 30

## (undated) DEVICE — BIT DRL 3FLUT QC NDL PT 4.2X145MM STRL

## (undated) DEVICE — ANTIBACTERIAL UNDYED BRAIDED (POLYGLACTIN 910), SYNTHETIC ABSORBABLE SUTURE: Brand: COATED VICRYL

## (undated) DEVICE — SHORT LENS-STERILE